# Patient Record
Sex: MALE | Race: BLACK OR AFRICAN AMERICAN | NOT HISPANIC OR LATINO | Employment: OTHER | URBAN - METROPOLITAN AREA
[De-identification: names, ages, dates, MRNs, and addresses within clinical notes are randomized per-mention and may not be internally consistent; named-entity substitution may affect disease eponyms.]

---

## 2017-01-10 RX ORDER — RAMIPRIL 2.5 MG/1
CAPSULE ORAL
Qty: 30 CAPSULE | Refills: 1 | Status: SHIPPED | OUTPATIENT
Start: 2017-01-10 | End: 2017-03-13 | Stop reason: DRUGHIGH

## 2017-02-05 DIAGNOSIS — I10 HTN (HYPERTENSION), BENIGN: ICD-10-CM

## 2017-02-05 RX ORDER — NIFEDIPINE 60 MG/1
TABLET, FILM COATED, EXTENDED RELEASE ORAL
Qty: 30 TABLET | Refills: 2 | Status: SHIPPED | OUTPATIENT
Start: 2017-02-05 | End: 2017-05-22 | Stop reason: SDUPTHER

## 2017-02-08 ENCOUNTER — OFFICE VISIT (OUTPATIENT)
Dept: PAIN MEDICINE | Facility: CLINIC | Age: 72
End: 2017-02-08

## 2017-02-08 VITALS
RESPIRATION RATE: 16 BRPM | WEIGHT: 315 LBS | HEIGHT: 74 IN | BODY MASS INDEX: 40.43 KG/M2 | SYSTOLIC BLOOD PRESSURE: 190 MMHG | TEMPERATURE: 97.8 F | OXYGEN SATURATION: 97 % | DIASTOLIC BLOOD PRESSURE: 92 MMHG | HEART RATE: 71 BPM

## 2017-02-08 DIAGNOSIS — M47.816 SPONDYLOSIS OF LUMBAR REGION WITHOUT MYELOPATHY OR RADICULOPATHY: Primary | ICD-10-CM

## 2017-02-08 PROCEDURE — 99213 OFFICE O/P EST LOW 20 MIN: CPT | Performed by: ANESTHESIOLOGY

## 2017-02-08 RX ORDER — TAMSULOSIN HYDROCHLORIDE 0.4 MG/1
1 CAPSULE ORAL 2 TIMES DAILY
COMMUNITY

## 2017-02-08 NOTE — PROGRESS NOTES
CHIEF COMPLAINT  Since 3/16/16 office visit,Pt states LBP bilaterally has significantly over the last 2 weeks,with episodic cramping in bilateral thighs and calfs at night,2-3x monthly.    Subjective   James Loaiza is a 71 y.o. male  who presents to the office for follow-up.He has a history of an axial type low back pain.    He states that his previous RF procedure was therapeutically successful when performed at [facility].         Back Pain   This is a recurrent problem. The current episode started more than 1 month ago. The problem occurs constantly. The problem has been gradually worsening since onset. The pain is present in the lumbar spine. The quality of the pain is described as aching. The pain does not radiate. The pain is moderate. The symptoms are aggravated by standing and twisting. Pertinent negatives include no chest pain, dysuria or numbness. The treatment provided moderate relief.        PEG Assessment   What number best describes your pain on average in the past week?3  What number best describes how, during the past week, pain has interfered with your enjoyment of life?4  What number best describes how, during the past week, pain has interfered with your general activity?  6      The following portions of the patient's history were reviewed and updated as appropriate: allergies, current medications, past family history, past medical history, past social history, past surgical history and problem list.    Review of Systems   Constitutional: Negative for activity change and appetite change.   Respiratory: Positive for apnea. Negative for chest tightness and shortness of breath.    Cardiovascular: Negative for chest pain.   Gastrointestinal: Negative for constipation, diarrhea and nausea.   Genitourinary: Negative for difficulty urinating and dysuria.   Musculoskeletal: Positive for back pain.   Neurological: Negative for dizziness, light-headedness and numbness.  "  Psychiatric/Behavioral: Positive for sleep disturbance. Negative for suicidal ideas.                             Vitals:    02/08/17 1552   BP: (!) 190/92   Pulse: 71   Resp: 16   Temp: 97.8 °F (36.6 °C)   SpO2: 97%   Weight: (!) 340 lb (154 kg)   Height: 74\" (188 cm)   PainSc: 2  Comment: LBP bilaterally ranges from 2-8/10   PainLoc: Back         Objective   Physical Exam   Constitutional: He is oriented to person, place, and time. Vital signs are normal. He appears well-developed and well-nourished. No distress.   HENT:   Head: Normocephalic and atraumatic.   Right Ear: Hearing normal.   Eyes: Conjunctivae and EOM are normal. Pupils are equal, round, and reactive to light.   Neck: No spinous process tenderness and no muscular tenderness present. Normal range of motion present.   Pulmonary/Chest: Effort normal. No respiratory distress.   Abdominal: Normal appearance.   Musculoskeletal:        Lumbar back: He exhibits decreased range of motion and tenderness. He exhibits no bony tenderness and no spasm.   He exhibitis pain on extension past 5 deg.  Pain on flexion but extension is more painful.  Rotation less than full.  Lumbar loading quite positive.  No pain on palp of spinous processes and also SI joints.  Herman equivocal.     Lymphadenopathy:     He has no cervical adenopathy.   Neurological: He is alert and oriented to person, place, and time. He has normal strength. No cranial nerve deficit or sensory deficit. Gait normal.   Reflex Scores:       Patellar reflexes are 2+ on the right side and 2+ on the left side.       Achilles reflexes are 2+ on the right side and 2+ on the left side.  SLR negative bilaterally   Psychiatric: He has a normal mood and affect. His speech is not rapid and/or pressured. He is not agitated.   Nursing note and vitals reviewed.          Assessment/Plan   James was seen today for back pain.    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  --- Bilateral L35 Lumbar medial branch blockade, x1    -- Reviewed the procedure at length with the patient.  Included in the review was expectations, complications, risk and benefits.The procedure was described in detail and the risks, benefits and alternatives were discussed with the patient (including but not limited to: bleeding, infection, nerve damage, worsening of pain, inability to perform injection, paralysis, seizures, and death) who agreed to proceed.  Discussed the potential for sedation if warranted/wanted.  Questions were answered and in a way the patient could understand.  Patient verbalized understanding and wishes to proceed.  This intervention will be ordered.    -- This procedure will be both diagnostic and potentially therapeutic.               DILAN REPORT      DILAN report has been reviewed and scanned into the patient's chart.    Date of last DILAN : 2-5-17    No current use of controlled substances.      EMR Dragon/Transcription disclaimer:   Much of this encounter note is an electronic transcription/translation of spoken language to printed text. The electronic translation of spoken language may permit erroneous, or at times, nonsensical words or phrases to be inadvertently transcribed; Although I have reviewed the note for such errors, some may still exist.

## 2017-02-14 ENCOUNTER — OUTSIDE FACILITY SERVICE (OUTPATIENT)
Dept: PAIN MEDICINE | Facility: CLINIC | Age: 72
End: 2017-02-14

## 2017-02-14 PROCEDURE — 64493 INJ PARAVERT F JNT L/S 1 LEV: CPT | Performed by: ANESTHESIOLOGY

## 2017-02-14 PROCEDURE — 64494 INJ PARAVERT F JNT L/S 2 LEV: CPT | Performed by: ANESTHESIOLOGY

## 2017-03-01 ENCOUNTER — OFFICE VISIT (OUTPATIENT)
Dept: PAIN MEDICINE | Facility: CLINIC | Age: 72
End: 2017-03-01

## 2017-03-01 VITALS
OXYGEN SATURATION: 94 % | HEART RATE: 68 BPM | HEIGHT: 74 IN | RESPIRATION RATE: 18 BRPM | DIASTOLIC BLOOD PRESSURE: 105 MMHG | TEMPERATURE: 98.7 F | WEIGHT: 315 LBS | BODY MASS INDEX: 40.43 KG/M2 | SYSTOLIC BLOOD PRESSURE: 196 MMHG

## 2017-03-01 DIAGNOSIS — M54.5 CHRONIC LOW BACK PAIN, UNSPECIFIED BACK PAIN LATERALITY, WITH SCIATICA PRESENCE UNSPECIFIED: Primary | ICD-10-CM

## 2017-03-01 DIAGNOSIS — G89.29 CHRONIC LOW BACK PAIN, UNSPECIFIED BACK PAIN LATERALITY, WITH SCIATICA PRESENCE UNSPECIFIED: Primary | ICD-10-CM

## 2017-03-01 DIAGNOSIS — M43.06 LUMBAR SPONDYLOLYSIS: ICD-10-CM

## 2017-03-01 PROBLEM — M54.50 CHRONIC LOW BACK PAIN: Status: ACTIVE | Noted: 2017-03-01

## 2017-03-01 PROCEDURE — 99212 OFFICE O/P EST SF 10 MIN: CPT | Performed by: NURSE PRACTITIONER

## 2017-03-01 NOTE — PROGRESS NOTES
CHIEF COMPLAINT  Back Pain, patient states that the pain has decreased since his last office visit.      Subjective   James Loaiza is a 71 y.o. male  who presents to the office for follow-up of procedure.  He completed a LMBB bilateral L3-L5   on  2/14/17 performed by Dr. Tineo for management of low back pain. Patient reports 70% ongoing relief from the procedure.     He also has a history of gout and is having pain in his right foot from this.    Complains of pain in his low back. Today his pain is 2/10VAS. His pain is not nearly as continuous. It has become more intermittent.     Has had low back pain since 2014. He was seen at [facility] in spring 2015. Had lumbar facets and eventually had lumbar RFA with positive results. He had significant relief for approximately 1 year.     Denies any dizziness, vision changes or headache today in office.     Back Pain   This is a recurrent problem. The current episode started more than 1 month ago. The pain is present in the lumbar spine. The quality of the pain is described as aching. The pain does not radiate. The pain is at a severity of 2/10. The symptoms are aggravated by standing and twisting. Pertinent negatives include no chest pain, dysuria, fever, headaches, numbness or weakness. The treatment provided moderate relief.      PEG Assessment   What number best describes your pain on average in the past week?2  What number best describes how, during the past week, pain has interfered with your enjoyment of life?3  What number best describes how, during the past week, pain has interfered with your general activity?  2    The following portions of the patient's history were reviewed and updated as appropriate: allergies, current medications, past family history, past medical history, past social history, past surgical history and problem list.    Review of Systems   Constitutional: Negative for chills and fever.   Respiratory: Negative for shortness of breath.   "  Cardiovascular: Negative for chest pain.   Gastrointestinal: Negative for constipation, diarrhea, nausea and vomiting.   Genitourinary: Negative for difficulty urinating and dysuria.   Musculoskeletal: Positive for back pain.   Neurological: Negative for dizziness, weakness, light-headedness, numbness and headaches.   Psychiatric/Behavioral: Negative for confusion, hallucinations, self-injury, sleep disturbance and suicidal ideas. The patient is not nervous/anxious.        Vitals:    03/01/17 1502   BP: (!) 196/105   Pulse: 68   Resp: 18   Temp: 98.7 °F (37.1 °C)   SpO2: 94%   Weight: (!) 336 lb (152 kg)   Height: 74\" (188 cm)   PainSc:   2   PainLoc: Back     Repeat manual BP-162/100 LUE    Objective   Physical Exam   Constitutional: He is oriented to person, place, and time. Vital signs are normal. He appears well-developed and well-nourished. He is cooperative.   HENT:   Head: Normocephalic and atraumatic.   Nose: Nose normal.   Eyes: Conjunctivae and lids are normal.   Cardiovascular: Normal rate, regular rhythm and normal heart sounds.    Pulmonary/Chest: Effort normal and breath sounds normal. No respiratory distress.   Abdominal: Normal appearance.   Musculoskeletal:        Lumbar back: He exhibits tenderness and pain. He exhibits no bony tenderness.   Minimal tenderness of bilateral L3-L5 facets    Negative SLR bilaterally    Neurological: He is alert and oriented to person, place, and time. Gait (slow ambulation) abnormal.   Reflex Scores:       Patellar reflexes are 1+ on the right side and 1+ on the left side.  Skin: Skin is warm, dry and intact.   Psychiatric: He has a normal mood and affect. His speech is normal and behavior is normal. Judgment and thought content normal. Cognition and memory are normal.   Nursing note and vitals reviewed.          Assessment/Plan   James was seen today for back pain.    Diagnoses and all orders for this visit:    Chronic low back pain, unspecified back pain " laterality, with sciatica presence unspecified    Lumbar spondylolysis        --- Encouraged patient to follow-up with PCP in regards to elevated BP.  --- --- The previous MBB is showing some preliminary evidence of therapeutic success.  Will bring in for follow up in 4 weeks, if therapeutic benefit from medial branch block (MBB) is ongoing at that time we can repeat the blocks on an as needed basis.  We once again discussed the diagnostic nature of MBB and the role of MBB in determining likelihood of therapeutic success of RF Lesioning.  If the therapeutic effects have started to wane at that time will seek authorization for radiofrequency lesioning (RFL) of the same levels; also the patient will call if pain begins to re-emerge before follow up and the plan will be to move forward with RFL.  We discussed risks of RFL procedure including but not limited to bleeding & infection & neural injury & postprocedural pain & risk of a lack of pain relief.  Patient verbalizes informed consent to proceed with RFL if needed.    --- Can order RFA if pain increases prior to 6 week follow-up.   --- Follow-up 6 weeks or sooner if needed.         DILAN REPORT    DILAN report has been reviewed and scanned into the patient's chart.    Date of last DILAN : 2-27-17          EMR Dragon/Transcription disclaimer:   Much of this encounter note is an electronic transcription/translation of spoken language to printed text. The electronic translation of spoken language may permit erroneous, or at times, nonsensical words or phrases to be inadvertently transcribed; Although I have reviewed the note for such errors, some may still exist.

## 2017-03-06 ENCOUNTER — TELEPHONE (OUTPATIENT)
Dept: PAIN MEDICINE | Facility: CLINIC | Age: 72
End: 2017-03-06

## 2017-03-06 DIAGNOSIS — M43.06 LUMBAR SPONDYLOLYSIS: Primary | ICD-10-CM

## 2017-03-09 ENCOUNTER — OUTSIDE FACILITY SERVICE (OUTPATIENT)
Dept: PAIN MEDICINE | Facility: CLINIC | Age: 72
End: 2017-03-09

## 2017-03-09 PROCEDURE — 99153 MOD SED SAME PHYS/QHP EA: CPT | Performed by: ANESTHESIOLOGY

## 2017-03-09 PROCEDURE — 64636 DESTROY L/S FACET JNT ADDL: CPT | Performed by: ANESTHESIOLOGY

## 2017-03-09 PROCEDURE — 64635 DESTROY LUMB/SAC FACET JNT: CPT | Performed by: ANESTHESIOLOGY

## 2017-03-09 PROCEDURE — 99152 MOD SED SAME PHYS/QHP 5/>YRS: CPT | Performed by: ANESTHESIOLOGY

## 2017-03-13 ENCOUNTER — OFFICE VISIT (OUTPATIENT)
Dept: FAMILY MEDICINE CLINIC | Facility: CLINIC | Age: 72
End: 2017-03-13

## 2017-03-13 VITALS
HEART RATE: 68 BPM | WEIGHT: 315 LBS | SYSTOLIC BLOOD PRESSURE: 150 MMHG | OXYGEN SATURATION: 96 % | BODY MASS INDEX: 40.43 KG/M2 | TEMPERATURE: 97.7 F | HEIGHT: 74 IN | DIASTOLIC BLOOD PRESSURE: 80 MMHG

## 2017-03-13 DIAGNOSIS — N40.0 BENIGN NODULAR PROSTATIC HYPERPLASIA WITHOUT LOWER URINARY TRACT SYMPTOMS: ICD-10-CM

## 2017-03-13 DIAGNOSIS — Z79.899 MEDICATION MANAGEMENT: ICD-10-CM

## 2017-03-13 DIAGNOSIS — R35.0 URINARY FREQUENCY: ICD-10-CM

## 2017-03-13 DIAGNOSIS — G44.031 INTRACTABLE EPISODIC PAROXYSMAL HEMICRANIA: ICD-10-CM

## 2017-03-13 DIAGNOSIS — R53.82 CHRONIC FATIGUE: Primary | ICD-10-CM

## 2017-03-13 DIAGNOSIS — I10 BENIGN ESSENTIAL HYPERTENSION: ICD-10-CM

## 2017-03-13 DIAGNOSIS — I10 HTN (HYPERTENSION), BENIGN: ICD-10-CM

## 2017-03-13 DIAGNOSIS — E55.9 VITAMIN D DEFICIENCY DISEASE: ICD-10-CM

## 2017-03-13 DIAGNOSIS — Z79.899 ENCOUNTER FOR LONG-TERM (CURRENT) USE OF MEDICATIONS: ICD-10-CM

## 2017-03-13 DIAGNOSIS — N18.2 CHRONIC KIDNEY DISEASE, STAGE 2 (MILD): ICD-10-CM

## 2017-03-13 DIAGNOSIS — E78.2 MIXED HYPERLIPIDEMIA: ICD-10-CM

## 2017-03-13 DIAGNOSIS — Z00.00 PREVENTATIVE HEALTH CARE: ICD-10-CM

## 2017-03-13 PROBLEM — R51.9 HEADACHE: Status: ACTIVE | Noted: 2017-03-13

## 2017-03-13 LAB
BILIRUB BLD-MCNC: NEGATIVE MG/DL
CLARITY, POC: CLEAR
COLOR UR: YELLOW
GLUCOSE UR STRIP-MCNC: NEGATIVE MG/DL
KETONES UR QL: NEGATIVE
LEUKOCYTE EST, POC: NEGATIVE
NITRITE UR-MCNC: NEGATIVE MG/ML
PH UR: 6 [PH] (ref 5–8)
PROT UR STRIP-MCNC: ABNORMAL MG/DL
RBC # UR STRIP: ABNORMAL /UL
SP GR UR: 1.03 (ref 1–1.03)
UROBILINOGEN UR QL: NORMAL

## 2017-03-13 PROCEDURE — 96372 THER/PROPH/DIAG INJ SC/IM: CPT | Performed by: INTERNAL MEDICINE

## 2017-03-13 PROCEDURE — 99214 OFFICE O/P EST MOD 30 MIN: CPT | Performed by: INTERNAL MEDICINE

## 2017-03-13 PROCEDURE — 81002 URINALYSIS NONAUTO W/O SCOPE: CPT | Performed by: INTERNAL MEDICINE

## 2017-03-13 RX ORDER — RAMIPRIL 5 MG/1
5 CAPSULE ORAL DAILY
Qty: 90 CAPSULE | Refills: 1 | Status: SHIPPED | OUTPATIENT
Start: 2017-03-13 | End: 2017-10-14 | Stop reason: SDUPTHER

## 2017-03-13 RX ORDER — KETOROLAC TROMETHAMINE 30 MG/ML
60 INJECTION, SOLUTION INTRAMUSCULAR; INTRAVENOUS ONCE
Status: COMPLETED | OUTPATIENT
Start: 2017-03-13 | End: 2017-03-13

## 2017-03-13 RX ADMIN — KETOROLAC TROMETHAMINE 60 MG: 30 INJECTION, SOLUTION INTRAMUSCULAR; INTRAVENOUS at 14:44

## 2017-03-13 NOTE — PROGRESS NOTES
James Loaiza is a 71 y.o. male   Chief Complaint   Patient presents with   • Hypertension     c/o b/p running high x 1 month        Subjective   History of Present Illness     James Loaiza is a 71 y.o.male who presents with:hypertension and headache for follow up.  Went to bathroom multiple times last night and now starting again.  Headache severe at times.  Taking ibuprofen and no relief.    Off Xarellto at this point.  No other acute changes.  No other acute changes at present time. No other acute changes at present time    Thursday night headache lasted through the night.  Seems to have headache 2-3 times per week.  Saw Tineo x 3 and each time BP was rather high.  BP is running 148/88.  Meds just not working as well as they did in the past.  Pressure in head feels very severe to patient.  Focuses in back of the neck.    The following portions of the patient's history were reviewed and updated as appropriate: past medical history, surgeries, family history, allergies, current medications, past social history and problem list.    A comprehensive review of 14 systems was peformed  Review of Systems   Constitutional: Negative.  Negative for chills, fatigue, fever and unexpected weight change.   HENT: Negative for ear pain, hearing loss, sinus pressure, sore throat and tinnitus.    Eyes: Negative.  Negative for pain, discharge and redness.   Respiratory: Negative.  Negative for cough, shortness of breath and wheezing.    Cardiovascular: Negative.  Negative for chest pain, palpitations and leg swelling.   Gastrointestinal: Negative.  Negative for abdominal pain, constipation, diarrhea and nausea.   Endocrine: Negative.  Negative for cold intolerance and heat intolerance.   Genitourinary: Positive for frequency. Negative for difficulty urinating, flank pain and urgency.   Musculoskeletal: Negative.  Negative for back pain, joint swelling and myalgias.   Skin: Negative.  Negative for rash and wound.  "  Allergic/Immunologic: Negative.  Negative for environmental allergies and food allergies.   Neurological: Positive for headaches. Negative for dizziness, seizures and numbness.   Hematological: Negative.  Negative for adenopathy. Does not bruise/bleed easily.   Psychiatric/Behavioral: Negative.  Negative for decreased concentration, dysphoric mood and sleep disturbance. The patient is not nervous/anxious.    All other systems reviewed and are negative.      I have reviewed the patient's medical history in detail and updated the computerized patient record.    Objective   Vitals:    03/13/17 1353   BP: 150/80   BP Location: Right arm   Patient Position: Sitting   Cuff Size: Large Adult   Pulse: 68   Temp: 97.7 °F (36.5 °C)   TempSrc: Oral   SpO2: 96%   Weight: (!) 338 lb 9.6 oz (154 kg)   Height: 74\" (188 cm)   PainSc: 4  Comment: headache started this morning 5 am has headaches in evening x 1 month   PainLoc: Head         Physical Exam   Constitutional: He is oriented to person, place, and time. He appears well-developed and well-nourished. He is active and cooperative.  Non-toxic appearance. No distress.   HENT:   Head: Normocephalic and atraumatic. Hair is normal.   Right Ear: Hearing, tympanic membrane, external ear and ear canal normal. No drainage.   Left Ear: Hearing, tympanic membrane, external ear and ear canal normal. No drainage.   Nose: Nose normal. No rhinorrhea, nasal deformity or septal deviation.   Mouth/Throat: Oropharynx is clear and moist.   Eyes: Conjunctivae, EOM and lids are normal. Pupils are equal, round, and reactive to light. Right eye exhibits no exudate. Left eye exhibits no exudate. Right pupil is round and reactive. Left pupil is round and reactive.   Neck: Trachea normal and normal range of motion. Neck supple. Normal carotid pulses, no hepatojugular reflux and no JVD present. Carotid bruit is not present. No tracheal deviation, no edema and normal range of motion present. No thyroid " mass and no thyromegaly present.   Cardiovascular: Normal rate, regular rhythm, normal heart sounds, intact distal pulses and normal pulses.   No extrasystoles are present. PMI is not displaced.    Pulmonary/Chest: Effort normal and breath sounds normal. No accessory muscle usage. No tachypnea. No respiratory distress.   Abdominal: Soft. Normal appearance, normal aorta and bowel sounds are normal. He exhibits no abdominal bruit. There is no hepatosplenomegaly. There is no tenderness. Hernia confirmed negative in the right inguinal area and confirmed negative in the left inguinal area.   Bowel sounds present and normal in all four quadrants   Musculoskeletal: Normal range of motion.   Lymphadenopathy: No inguinal adenopathy noted on the right or left side.   Neurological: He is alert and oriented to person, place, and time. He has normal strength and normal reflexes. No cranial nerve deficit or sensory deficit. He displays a negative Romberg sign.   No specific changes  Headache   Skin: Skin is warm, dry and intact. He is not diaphoretic. No pallor.   Psychiatric: He has a normal mood and affect. His speech is normal and behavior is normal. Judgment and thought content normal. Cognition and memory are normal.   Nursing note and vitals reviewed.      Procedures    Ay already have appointmednt                        Assessment/Plan     Diagnoses and all orders for this visit:    Chronic fatigue  Comments:  Check labs  Orders:  -     CBC & Differential  -     Comprehensive Metabolic Panel  -     Lipid Panel With LDL / HDL Ratio  -     Thyroid Panel With TSH  -     Vitamin D 25 Hydroxy  -     PSA  -     Ambulatory Referral to Urology    HTN (hypertension), benign  Comments:  Increase Altace to 5 daily  Avoid salt  Continue weight loss efforts  Orders:  -     PSA  -     Ambulatory Referral to Urology    Intractable episodic paroxysmal hemicrania  Comments:  Refer for Ct Scan  Other acute chAnges at present  Orders:  -      CT Head Without Contrast; Future    Vitamin D deficiency disease  Comments:  Check level again  Follow up as planned  Orders:  -     CBC & Differential  -     Comprehensive Metabolic Panel  -     Lipid Panel With LDL / HDL Ratio  -     Thyroid Panel With TSH  -     Vitamin D 25 Hydroxy  -     PSA  -     Ambulatory Referral to Urology    Mixed hyperlipidemia  Comments:  Continue same meds  Encouraged weight loss  Orders:  -     CBC & Differential  -     Comprehensive Metabolic Panel  -     Lipid Panel With LDL / HDL Ratio  -     Thyroid Panel With TSH  -     Vitamin D 25 Hydroxy  -     PSA  -     Ambulatory Referral to Urology    Chronic kidney disease, stage 2 (mild)  Comments:  Check labs today  Protein noted iin urine  Orders:  -     PSA  -     Ambulatory Referral to Urology    Benign nodular prostatic hyperplasia without lower urinary tract symptoms  Comments:  PSA  Refer to urology  Orders:  -     PSA  -     Ambulatory Referral to Urology    Urinary frequency  Comments:  refer to Dr. Castro in urology  Orders:  -     POCT urinalysis dipstick, manual  -     PSA  -     Ambulatory Referral to Urology    Medication management  -     CBC & Differential  -     Comprehensive Metabolic Panel  -     Lipid Panel With LDL / HDL Ratio  -     Thyroid Panel With TSH  -     Vitamin D 25 Hydroxy  -     PSA  -     Ambulatory Referral to Urology    Encounter for long-term (current) use of medications  -     CBC & Differential  -     Comprehensive Metabolic Panel  -     Lipid Panel With LDL / HDL Ratio  -     Thyroid Panel With TSH  -     Vitamin D 25 Hydroxy  -     PSA  -     Ambulatory Referral to Urology    Preventative health care  -     CBC & Differential  -     Comprehensive Metabolic Panel  -     Lipid Panel With LDL / HDL Ratio  -     Thyroid Panel With TSH  -     Vitamin D 25 Hydroxy  -     PSA  -     Ambulatory Referral to Urology    Benign essential hypertension (Ijeoma 1999)  -     PSA  -     Ambulatory Referral to  Urology    Other orders  -     ramipril (ALTACE) 5 MG capsule; Take 1 capsule by mouth Daily.           Franky Reeves MD  3/13/2017  2:01 PM

## 2017-03-13 NOTE — PATIENT INSTRUCTIONS
Diagnoses and all orders for this visit:    Chronic fatigue  Comments:  Check labs  Orders:  -     CBC & Differential  -     Comprehensive Metabolic Panel  -     Lipid Panel With LDL / HDL Ratio  -     Thyroid Panel With TSH  -     Vitamin D 25 Hydroxy  -     PSA  -     Ambulatory Referral to Urology    HTN (hypertension), benign  Comments:  Increase Altace to 5 daily  Avoid salt  Continue weight loss efforts  Orders:  -     PSA  -     Ambulatory Referral to Urology    Intractable episodic paroxysmal hemicrania  Comments:  Refer for Ct Scan  Other acute chAnges at present  Orders:  -     CT Head Without Contrast; Future    Vitamin D deficiency disease  Comments:  Check level again  Follow up as planned  Orders:  -     CBC & Differential  -     Comprehensive Metabolic Panel  -     Lipid Panel With LDL / HDL Ratio  -     Thyroid Panel With TSH  -     Vitamin D 25 Hydroxy  -     PSA  -     Ambulatory Referral to Urology    Mixed hyperlipidemia  Comments:  Continue same meds  Encouraged weight loss  Orders:  -     CBC & Differential  -     Comprehensive Metabolic Panel  -     Lipid Panel With LDL / HDL Ratio  -     Thyroid Panel With TSH  -     Vitamin D 25 Hydroxy  -     PSA  -     Ambulatory Referral to Urology    Chronic kidney disease, stage 2 (mild)  Comments:  Check labs today  Protein noted iin urine  Orders:  -     PSA  -     Ambulatory Referral to Urology    Benign nodular prostatic hyperplasia without lower urinary tract symptoms  Comments:  PSA  Refer to urology  Orders:  -     PSA  -     Ambulatory Referral to Urology    Urinary frequency  Comments:  refer to Dr. Castro in urology  Orders:  -     POCT urinalysis dipstick, manual  -     PSA  -     Ambulatory Referral to Urology    Medication management  -     CBC & Differential  -     Comprehensive Metabolic Panel  -     Lipid Panel With LDL / HDL Ratio  -     Thyroid Panel With TSH  -     Vitamin D 25 Hydroxy  -     PSA  -     Ambulatory Referral to  Urology    Encounter for long-term (current) use of medications  -     CBC & Differential  -     Comprehensive Metabolic Panel  -     Lipid Panel With LDL / HDL Ratio  -     Thyroid Panel With TSH  -     Vitamin D 25 Hydroxy  -     PSA  -     Ambulatory Referral to Urology    Preventative health care  -     CBC & Differential  -     Comprehensive Metabolic Panel  -     Lipid Panel With LDL / HDL Ratio  -     Thyroid Panel With TSH  -     Vitamin D 25 Hydroxy  -     PSA  -     Ambulatory Referral to Urology    Benign essential hypertension (Ijeoma 1999)  -     PSA  -     Ambulatory Referral to Urology    Other orders  -     ramipril (ALTACE) 5 MG capsule; Take 1 capsule by mouth Daily.

## 2017-03-14 LAB
25(OH)D3+25(OH)D2 SERPL-MCNC: 12.8 NG/ML (ref 30–100)
ALBUMIN SERPL-MCNC: 4.3 G/DL (ref 3.5–5.2)
ALBUMIN/GLOB SERPL: 1.6 G/DL
ALP SERPL-CCNC: 161 U/L (ref 39–117)
ALT SERPL-CCNC: 12 U/L (ref 1–41)
AST SERPL-CCNC: 13 U/L (ref 1–40)
BASOPHILS # BLD AUTO: 0.02 10*3/MM3 (ref 0–0.2)
BASOPHILS NFR BLD AUTO: 0.2 % (ref 0–1.5)
BILIRUB SERPL-MCNC: 0.6 MG/DL (ref 0.1–1.2)
BUN SERPL-MCNC: 18 MG/DL (ref 8–23)
BUN/CREAT SERPL: 11.3 (ref 7–25)
CALCIUM SERPL-MCNC: 9.3 MG/DL (ref 8.6–10.5)
CHLORIDE SERPL-SCNC: 100 MMOL/L (ref 98–107)
CHOLEST SERPL-MCNC: 165 MG/DL (ref 0–200)
CO2 SERPL-SCNC: 25.2 MMOL/L (ref 22–29)
CREAT SERPL-MCNC: 1.59 MG/DL (ref 0.76–1.27)
EOSINOPHIL # BLD AUTO: 0.11 10*3/MM3 (ref 0–0.7)
EOSINOPHIL NFR BLD AUTO: 1.1 % (ref 0.3–6.2)
ERYTHROCYTE [DISTWIDTH] IN BLOOD BY AUTOMATED COUNT: 13.3 % (ref 11.5–14.5)
FT4I SERPL CALC-MCNC: 1.5 (ref 1.2–4.9)
GLOBULIN SER CALC-MCNC: 2.7 GM/DL
GLUCOSE SERPL-MCNC: 80 MG/DL (ref 65–99)
HCT VFR BLD AUTO: 48.8 % (ref 40.4–52.2)
HDLC SERPL-MCNC: 35 MG/DL (ref 40–60)
HGB BLD-MCNC: 15.8 G/DL (ref 13.7–17.6)
IMM GRANULOCYTES # BLD: 0.03 10*3/MM3 (ref 0–0.03)
IMM GRANULOCYTES NFR BLD: 0.3 % (ref 0–0.5)
LDLC SERPL CALC-MCNC: 82 MG/DL (ref 0–100)
LDLC/HDLC SERPL: 2.34 {RATIO}
LYMPHOCYTES # BLD AUTO: 1.35 10*3/MM3 (ref 0.9–4.8)
LYMPHOCYTES NFR BLD AUTO: 13.4 % (ref 19.6–45.3)
MCH RBC QN AUTO: 31.5 PG (ref 27–32.7)
MCHC RBC AUTO-ENTMCNC: 32.4 G/DL (ref 32.6–36.4)
MCV RBC AUTO: 97.4 FL (ref 79.8–96.2)
MONOCYTES # BLD AUTO: 1.03 10*3/MM3 (ref 0.2–1.2)
MONOCYTES NFR BLD AUTO: 10.2 % (ref 5–12)
NEUTROPHILS # BLD AUTO: 7.57 10*3/MM3 (ref 1.9–8.1)
NEUTROPHILS NFR BLD AUTO: 74.8 % (ref 42.7–76)
PLATELET # BLD AUTO: 213 10*3/MM3 (ref 140–500)
POTASSIUM SERPL-SCNC: 4.1 MMOL/L (ref 3.5–5.2)
PROT SERPL-MCNC: 7 G/DL (ref 6–8.5)
PSA SERPL-MCNC: 0.92 NG/ML (ref 0–4)
RBC # BLD AUTO: 5.01 10*6/MM3 (ref 4.6–6)
SODIUM SERPL-SCNC: 143 MMOL/L (ref 136–145)
T3RU NFR SERPL: 29 % (ref 24–39)
T4 SERPL-MCNC: 5.3 UG/DL (ref 4.5–12)
TRIGL SERPL-MCNC: 240 MG/DL (ref 0–150)
TSH SERPL DL<=0.005 MIU/L-ACNC: 1.14 UIU/ML (ref 0.45–4.5)
VLDLC SERPL CALC-MCNC: 48 MG/DL (ref 5–40)
WBC # BLD AUTO: 10.11 10*3/MM3 (ref 4.5–10.7)

## 2017-03-22 ENCOUNTER — HOSPITAL ENCOUNTER (OUTPATIENT)
Dept: CT IMAGING | Facility: HOSPITAL | Age: 72
Discharge: HOME | End: 2017-03-22
Attending: INTERNAL MEDICINE | Admitting: INTERNAL MEDICINE

## 2017-03-22 DIAGNOSIS — G44.031 INTRACTABLE EPISODIC PAROXYSMAL HEMICRANIA: ICD-10-CM

## 2017-03-22 PROCEDURE — 70450 CT HEAD/BRAIN W/O DYE: CPT

## 2017-04-12 ENCOUNTER — OFFICE VISIT (OUTPATIENT)
Dept: PAIN MEDICINE | Facility: CLINIC | Age: 72
End: 2017-04-12

## 2017-04-12 VITALS
HEIGHT: 74 IN | DIASTOLIC BLOOD PRESSURE: 86 MMHG | OXYGEN SATURATION: 95 % | RESPIRATION RATE: 18 BRPM | HEART RATE: 65 BPM | BODY MASS INDEX: 40.43 KG/M2 | TEMPERATURE: 97.8 F | SYSTOLIC BLOOD PRESSURE: 143 MMHG | WEIGHT: 315 LBS

## 2017-04-12 DIAGNOSIS — M43.06 LUMBAR SPONDYLOLYSIS: Primary | ICD-10-CM

## 2017-04-12 DIAGNOSIS — G89.29 CHRONIC LOW BACK PAIN, UNSPECIFIED BACK PAIN LATERALITY, WITH SCIATICA PRESENCE UNSPECIFIED: ICD-10-CM

## 2017-04-12 DIAGNOSIS — M54.5 CHRONIC LOW BACK PAIN, UNSPECIFIED BACK PAIN LATERALITY, WITH SCIATICA PRESENCE UNSPECIFIED: ICD-10-CM

## 2017-04-12 PROCEDURE — 99212 OFFICE O/P EST SF 10 MIN: CPT | Performed by: NURSE PRACTITIONER

## 2017-04-12 NOTE — PROGRESS NOTES
CHIEF COMPLAINT  Follow-up for back pain.    Subjective   James Loaiza is a 71 y.o. male  who presents to the office for follow-up of procedure.  He completed a bilateral lumbar RF @ L3-5   on  3/9/17 performed by Dr. Tineo for management of back pain. Patient reports 30% ongoing relief from the procedure.     Complains of pain in his low back. Today his pain is 3/10VAS. Describes the pain as intermittent. He notices his pain is not as bad at night. He also notices that when it bothers him, it does not take as long to calm down. He notes no daytime pain but rather stiffness.  Pain increases with sleeping/rolling over; pain decreases with rest. ADLs by self.    Back Pain   This is a recurrent problem. The current episode started more than 1 month ago. The pain is present in the lumbar spine. The quality of the pain is described as aching. The pain does not radiate. The pain is at a severity of 3/10. The symptoms are aggravated by standing and twisting. Pertinent negatives include no bladder incontinence, bowel incontinence, chest pain, dysuria, fever, headaches, numbness or weakness. The treatment provided moderate relief.       PEG Assessment   What number best describes your pain on average in the past week?3  What number best describes how, during the past week, pain has interfered with your enjoyment of life?3  What number best describes how, during the past week, pain has interfered with your general activity?  3    The following portions of the patient's history were reviewed and updated as appropriate: allergies, current medications, past family history, past medical history, past social history, past surgical history and problem list.    Review of Systems   Constitutional: Positive for fatigue. Negative for fever.   HENT: Negative for congestion.    Eyes: Negative for visual disturbance.   Respiratory: Negative for cough, shortness of breath and wheezing.    Cardiovascular: Negative.  Negative for chest  "pain.   Gastrointestinal: Negative for bowel incontinence, constipation and diarrhea.   Genitourinary: Positive for frequency. Negative for bladder incontinence, difficulty urinating and dysuria.   Musculoskeletal: Positive for back pain.   Neurological: Negative for weakness, numbness and headaches.   Psychiatric/Behavioral: Positive for sleep disturbance. Negative for suicidal ideas. The patient is not nervous/anxious.        Vitals:    04/12/17 1305   BP: 143/86   Pulse: 65   Resp: 18   Temp: 97.8 °F (36.6 °C)   SpO2: 95%   Weight: (!) 340 lb (154 kg)   Height: 74\" (188 cm)   PainSc:   3   PainLoc: Back       Objective   Physical Exam   Constitutional: He is oriented to person, place, and time. Vital signs are normal. He appears well-developed and well-nourished. He is cooperative.   HENT:   Head: Normocephalic and atraumatic.   Nose: Nose normal.   Eyes: Conjunctivae and lids are normal.   Cardiovascular: Normal rate, regular rhythm and normal heart sounds.    Pulmonary/Chest: Effort normal and breath sounds normal. No respiratory distress.   Abdominal: Normal appearance.   Musculoskeletal:        Lumbar back: He exhibits tenderness, bony tenderness and pain.   Minimal tenderness of bilateral L3-L5 facets    Negative SLR bilaterally    Neurological: He is alert and oriented to person, place, and time. Gait (slow ambulation) abnormal.   Reflex Scores:       Patellar reflexes are 1+ on the right side and 1+ on the left side.  Skin: Skin is warm, dry and intact.   Psychiatric: He has a normal mood and affect. His speech is normal and behavior is normal. Judgment and thought content normal. Cognition and memory are normal.   Nursing note and vitals reviewed.          Assessment/Plan   James was seen today for back pain.    Diagnoses and all orders for this visit:    Lumbar spondylolysis    Chronic low back pain, unspecified back pain laterality, with sciatica presence unspecified      --- Continue with regimen. NO " changes at this time.  --- Reviewed expectations related to RFL/RFA, including how it can take 6-8 weeks to reach therapeutic effects from the procedure.  --- Follow-up 6 weeks or sooner if needed.         DILAN REPORT    DILAN report has been reviewed and scanned into the patient's chart.    Date of last DILAN : 4-10-17.          EMR Dragon/Transcription disclaimer:   Much of this encounter note is an electronic transcription/translation of spoken language to printed text. The electronic translation of spoken language may permit erroneous, or at times, nonsensical words or phrases to be inadvertently transcribed; Although I have reviewed the note for such errors, some may still exist.

## 2017-05-22 DIAGNOSIS — I10 HTN (HYPERTENSION), BENIGN: ICD-10-CM

## 2017-05-22 RX ORDER — NIFEDIPINE 60 MG/1
TABLET, EXTENDED RELEASE ORAL
Qty: 30 TABLET | Refills: 1 | Status: SHIPPED | OUTPATIENT
Start: 2017-05-22 | End: 2017-08-01 | Stop reason: SDUPTHER

## 2017-05-26 ENCOUNTER — OFFICE VISIT (OUTPATIENT)
Dept: PAIN MEDICINE | Facility: CLINIC | Age: 72
End: 2017-05-26

## 2017-05-26 VITALS
DIASTOLIC BLOOD PRESSURE: 77 MMHG | WEIGHT: 315 LBS | HEART RATE: 65 BPM | HEIGHT: 74 IN | SYSTOLIC BLOOD PRESSURE: 147 MMHG | TEMPERATURE: 97.9 F | OXYGEN SATURATION: 96 % | BODY MASS INDEX: 40.43 KG/M2 | RESPIRATION RATE: 18 BRPM

## 2017-05-26 DIAGNOSIS — M43.06 LUMBAR SPONDYLOLYSIS: ICD-10-CM

## 2017-05-26 DIAGNOSIS — M54.5 CHRONIC LOW BACK PAIN, UNSPECIFIED BACK PAIN LATERALITY, WITH SCIATICA PRESENCE UNSPECIFIED: Primary | ICD-10-CM

## 2017-05-26 DIAGNOSIS — D17.1 LIPOMA OF BACK: ICD-10-CM

## 2017-05-26 DIAGNOSIS — R22.2 MASS ON BACK: ICD-10-CM

## 2017-05-26 DIAGNOSIS — G89.29 CHRONIC LOW BACK PAIN, UNSPECIFIED BACK PAIN LATERALITY, WITH SCIATICA PRESENCE UNSPECIFIED: Primary | ICD-10-CM

## 2017-05-26 PROCEDURE — 99214 OFFICE O/P EST MOD 30 MIN: CPT | Performed by: NURSE PRACTITIONER

## 2017-06-05 RX ORDER — ATORVASTATIN CALCIUM 40 MG/1
TABLET, FILM COATED ORAL
Qty: 30 TABLET | Refills: 0 | Status: SHIPPED | OUTPATIENT
Start: 2017-06-05 | End: 2017-07-13 | Stop reason: SDUPTHER

## 2017-06-14 ENCOUNTER — OFFICE VISIT (OUTPATIENT)
Dept: FAMILY MEDICINE CLINIC | Facility: CLINIC | Age: 72
End: 2017-06-14

## 2017-06-14 VITALS
WEIGHT: 315 LBS | HEIGHT: 74 IN | TEMPERATURE: 97.7 F | BODY MASS INDEX: 40.43 KG/M2 | SYSTOLIC BLOOD PRESSURE: 138 MMHG | DIASTOLIC BLOOD PRESSURE: 76 MMHG

## 2017-06-14 DIAGNOSIS — E55.9 VITAMIN D DEFICIENCY DISEASE: ICD-10-CM

## 2017-06-14 DIAGNOSIS — E78.2 MIXED HYPERLIPIDEMIA: ICD-10-CM

## 2017-06-14 DIAGNOSIS — M54.5 CHRONIC LOW BACK PAIN, UNSPECIFIED BACK PAIN LATERALITY, WITH SCIATICA PRESENCE UNSPECIFIED: ICD-10-CM

## 2017-06-14 DIAGNOSIS — I10 BENIGN ESSENTIAL HYPERTENSION: Primary | ICD-10-CM

## 2017-06-14 DIAGNOSIS — N18.2 CHRONIC KIDNEY DISEASE, STAGE 2 (MILD): ICD-10-CM

## 2017-06-14 DIAGNOSIS — G89.29 CHRONIC LOW BACK PAIN, UNSPECIFIED BACK PAIN LATERALITY, WITH SCIATICA PRESENCE UNSPECIFIED: ICD-10-CM

## 2017-06-14 DIAGNOSIS — E29.1 HYPOGONADISM MALE: ICD-10-CM

## 2017-06-14 DIAGNOSIS — Z79.899 ENCOUNTER FOR LONG-TERM (CURRENT) USE OF MEDICATIONS: ICD-10-CM

## 2017-06-14 DIAGNOSIS — R53.82 CHRONIC FATIGUE: ICD-10-CM

## 2017-06-14 DIAGNOSIS — Z00.00 PREVENTATIVE HEALTH CARE: ICD-10-CM

## 2017-06-14 DIAGNOSIS — Z79.899 MEDICATION MANAGEMENT: ICD-10-CM

## 2017-06-14 PROCEDURE — 99214 OFFICE O/P EST MOD 30 MIN: CPT | Performed by: INTERNAL MEDICINE

## 2017-06-14 NOTE — PROGRESS NOTES
James Loaiza is a 71 y.o. male   Chief Complaint   Patient presents with   • Hypertension     follow up           Subjective   History of Present Illness     James Loaiza is a 71 y.o.male who presents with:increased lethargy and sleepiness despite using sleep apnea machine.  Testosterone still low.  Trying 18 pellets in his right hip to treat.  Radioablation not working well on his back.  First round went a lot better.  No other changes.  Now stable at present time.    Now stable at present time.  CKD stage 2, hypertension, chronic fatigue, hyperlipidemia, vit D deficiency    The following portions of the patient's history were reviewed and updated as appropriate: past medical history, surgeries, family history, allergies, current medications, past social history and problem list.    A comprehensive review of 14 systems was peformed  Review of Systems   Constitutional: Negative for chills, fatigue, fever and unexpected weight change.   HENT: Negative for ear pain, hearing loss, sinus pressure, sore throat and tinnitus.    Eyes: Negative for pain, discharge and redness.   Respiratory: Negative for cough, shortness of breath and wheezing.    Cardiovascular: Negative for chest pain, palpitations and leg swelling.   Gastrointestinal: Negative for abdominal pain, constipation, diarrhea and nausea.   Endocrine: Negative for cold intolerance and heat intolerance.   Genitourinary: Negative for difficulty urinating, flank pain and urgency.   Musculoskeletal: Negative for back pain, joint swelling and myalgias.   Skin: Negative for rash and wound.   Allergic/Immunologic: Negative for environmental allergies and food allergies.   Neurological: Negative for dizziness, seizures, numbness and headaches.   Hematological: Negative for adenopathy. Does not bruise/bleed easily.   Psychiatric/Behavioral: Negative for decreased concentration, dysphoric mood and sleep disturbance. The patient is not nervous/anxious.    All other  "systems reviewed and are negative.      I have reviewed the patient's medical history in detail and updated the computerized patient record.      Objective   Vitals:    06/14/17 1436   BP: 138/76   BP Location: Right arm   Patient Position: Sitting   Temp: 97.7 °F (36.5 °C)   TempSrc: Oral   Weight: (!) 333 lb 9.6 oz (151 kg)   Height: 74\" (188 cm)           Physical Exam   Constitutional: He is oriented to person, place, and time. He appears well-developed and well-nourished. He is active and cooperative.  Non-toxic appearance. No distress.   HENT:   Head: Normocephalic and atraumatic. Hair is normal.   Right Ear: Hearing, tympanic membrane, external ear and ear canal normal. No drainage.   Left Ear: Hearing, tympanic membrane, external ear and ear canal normal. No drainage.   Nose: Nose normal. No rhinorrhea, nasal deformity or septal deviation.   Mouth/Throat: Oropharynx is clear and moist.   Eyes: Conjunctivae, EOM and lids are normal. Pupils are equal, round, and reactive to light. Right eye exhibits no exudate. Left eye exhibits no exudate. Right pupil is round and reactive. Left pupil is round and reactive.   Neck: Trachea normal and normal range of motion. Neck supple. Normal carotid pulses, no hepatojugular reflux and no JVD present. Carotid bruit is not present. No tracheal deviation, no edema and normal range of motion present. No thyroid mass and no thyromegaly present.   Cardiovascular: Normal rate, regular rhythm, normal heart sounds, intact distal pulses and normal pulses.   No extrasystoles are present. PMI is not displaced.    Pulmonary/Chest: Effort normal and breath sounds normal. No accessory muscle usage. No tachypnea. No respiratory distress.   Abdominal: Soft. Normal appearance, normal aorta and bowel sounds are normal. He exhibits no abdominal bruit. There is no hepatosplenomegaly. There is no tenderness. Hernia confirmed negative in the right inguinal area and confirmed negative in the left " inguinal area.   Bowel sounds present and normal in all four quadrants   Musculoskeletal: Normal range of motion.   Lymphadenopathy: No inguinal adenopathy noted on the right or left side.   Neurological: He is alert and oriented to person, place, and time. He has normal strength and normal reflexes. No cranial nerve deficit or sensory deficit. He displays a negative Romberg sign.   Skin: Skin is warm, dry and intact. He is not diaphoretic. No pallor.   Psychiatric: He has a normal mood and affect. His speech is normal and behavior is normal. Judgment and thought content normal. Cognition and memory are normal.   Nursing note and vitals reviewed.      Procedures                            Assessment/Plan     Diagnoses and all orders for this visit:    Benign essential hypertension ([place]je[place] 1999)  Comments:  Reduce salt in diet  Exercise regularly  try to lose weight  Do not change meds  Orders:  -     CBC & Differential  -     Comprehensive Metabolic Panel  -     Lipid Panel With LDL / HDL Ratio  -     Thyroid Panel With TSH  -     Vitamin D 25 Hydroxy    Chronic kidney disease, stage 2 (mild)  Comments:  Continue to monitor carefully  Follow up if symptoms change or worsen  Orders:  -     CBC & Differential  -     Comprehensive Metabolic Panel  -     Lipid Panel With LDL / HDL Ratio  -     Thyroid Panel With TSH  -     Vitamin D 25 Hydroxy    Chronic low back pain, unspecified back pain laterality, with sciatica presence unspecified  Comments:  Monitor low back pain closely  Continue follow up with [facility]  Orders:  -     CBC & Differential  -     Comprehensive Metabolic Panel  -     Lipid Panel With LDL / HDL Ratio  -     Thyroid Panel With TSH  -     Vitamin D 25 Hydroxy    Chronic fatigue  Comments:  Follow up as planned.  Feels great as   Orders:  -     CBC & Differential  -     Comprehensive Metabolic Panel  -     Lipid Panel With LDL / HDL Ratio  -     Thyroid Panel With TSH  -     Vitamin D 25 Hydroxy    Mixed  hyperlipidemia  Comments:  Checked labs  Follow up as planned  Watch fats in the diet  Orders:  -     CBC & Differential  -     Comprehensive Metabolic Panel  -     Lipid Panel With LDL / HDL Ratio  -     Thyroid Panel With TSH  -     Vitamin D 25 Hydroxy    Hypogonadism male  Comments:  Dr. Castro follows    Vitamin D deficiency disease  Comments:  Now stable at present time  Doing well  Orders:  -     CBC & Differential  -     Comprehensive Metabolic Panel  -     Lipid Panel With LDL / HDL Ratio  -     Thyroid Panel With TSH  -     Vitamin D 25 Hydroxy    Preventative health care  -     CBC & Differential  -     Comprehensive Metabolic Panel  -     Lipid Panel With LDL / HDL Ratio  -     Thyroid Panel With TSH  -     Vitamin D 25 Hydroxy    Encounter for long-term (current) use of medications  -     CBC & Differential  -     Comprehensive Metabolic Panel  -     Lipid Panel With LDL / HDL Ratio  -     Thyroid Panel With TSH  -     Vitamin D 25 Hydroxy    Medication management  -     CBC & Differential  -     Comprehensive Metabolic Panel  -     Lipid Panel With LDL / HDL Ratio  -     Thyroid Panel With TSH  -     Vitamin D 25 Hydroxy           Franky Reeves MD  6/14/2017  2:40 PM

## 2017-06-14 NOTE — PATIENT INSTRUCTIONS
Diagnoses and all orders for this visit:    Benign essential hypertension (Ijeoma 1999)  Comments:  Reduce salt in diet  Exercise regularly  try to lose weight  Do not change meds  Orders:  -     CBC & Differential  -     Comprehensive Metabolic Panel  -     Lipid Panel With LDL / HDL Ratio  -     Thyroid Panel With TSH  -     Vitamin D 25 Hydroxy    Chronic kidney disease, stage 2 (mild)  Comments:  Continue to monitor carefully  Follow up if symptoms change or worsen  Orders:  -     CBC & Differential  -     Comprehensive Metabolic Panel  -     Lipid Panel With LDL / HDL Ratio  -     Thyroid Panel With TSH  -     Vitamin D 25 Hydroxy    Chronic low back pain, unspecified back pain laterality, with sciatica presence unspecified  Comments:  Monitor low back pain closely  Continue follow up with [facility]  Orders:  -     CBC & Differential  -     Comprehensive Metabolic Panel  -     Lipid Panel With LDL / HDL Ratio  -     Thyroid Panel With TSH  -     Vitamin D 25 Hydroxy    Chronic fatigue  Comments:  Follow up as planned.  Feels great as   Orders:  -     CBC & Differential  -     Comprehensive Metabolic Panel  -     Lipid Panel With LDL / HDL Ratio  -     Thyroid Panel With TSH  -     Vitamin D 25 Hydroxy    Mixed hyperlipidemia  Comments:  Checked labs  Follow up as planned  Watch fats in the diet  Orders:  -     CBC & Differential  -     Comprehensive Metabolic Panel  -     Lipid Panel With LDL / HDL Ratio  -     Thyroid Panel With TSH  -     Vitamin D 25 Hydroxy    Hypogonadism male  Comments:  Dr. Castro follows    Vitamin D deficiency disease  Comments:  Now stable at present time  Doing well  Orders:  -     CBC & Differential  -     Comprehensive Metabolic Panel  -     Lipid Panel With LDL / HDL Ratio  -     Thyroid Panel With TSH  -     Vitamin D 25 Hydroxy    Preventative health care  -     CBC & Differential  -     Comprehensive Metabolic Panel  -     Lipid Panel With LDL / HDL Ratio  -     Thyroid Panel With  TSH  -     Vitamin D 25 Hydroxy    Encounter for long-term (current) use of medications  -     CBC & Differential  -     Comprehensive Metabolic Panel  -     Lipid Panel With LDL / HDL Ratio  -     Thyroid Panel With TSH  -     Vitamin D 25 Hydroxy    Medication management  -     CBC & Differential  -     Comprehensive Metabolic Panel  -     Lipid Panel With LDL / HDL Ratio  -     Thyroid Panel With TSH  -     Vitamin D 25 Hydroxy

## 2017-06-15 LAB
25(OH)D3+25(OH)D2 SERPL-MCNC: 11.6 NG/ML (ref 30–100)
ALBUMIN SERPL-MCNC: 3.9 G/DL (ref 3.5–5.2)
ALBUMIN/GLOB SERPL: 1.4 G/DL
ALP SERPL-CCNC: 160 U/L (ref 39–117)
ALT SERPL-CCNC: 11 U/L (ref 1–41)
AST SERPL-CCNC: 14 U/L (ref 1–40)
BASOPHILS # BLD AUTO: 0.02 10*3/MM3 (ref 0–0.2)
BASOPHILS NFR BLD AUTO: 0.2 % (ref 0–1.5)
BILIRUB SERPL-MCNC: 0.6 MG/DL (ref 0.1–1.2)
BUN SERPL-MCNC: 21 MG/DL (ref 8–23)
BUN/CREAT SERPL: 10.8 (ref 7–25)
CALCIUM SERPL-MCNC: 8.7 MG/DL (ref 8.6–10.5)
CHLORIDE SERPL-SCNC: 104 MMOL/L (ref 98–107)
CHOLEST SERPL-MCNC: 153 MG/DL (ref 0–200)
CO2 SERPL-SCNC: 23.5 MMOL/L (ref 22–29)
CREAT SERPL-MCNC: 1.95 MG/DL (ref 0.76–1.27)
DIFFERENTIAL COMMENT: NORMAL
EOSINOPHIL # BLD AUTO: 0.07 10*3/MM3 (ref 0–0.7)
EOSINOPHIL NFR BLD AUTO: 0.8 % (ref 0.3–6.2)
ERYTHROCYTE [DISTWIDTH] IN BLOOD BY AUTOMATED COUNT: 13.6 % (ref 11.5–14.5)
FT4I SERPL CALC-MCNC: 1.5 (ref 1.2–4.9)
GLOBULIN SER CALC-MCNC: 2.7 GM/DL
GLUCOSE SERPL-MCNC: 92 MG/DL (ref 65–99)
HCT VFR BLD AUTO: 42.3 % (ref 40.4–52.2)
HDLC SERPL-MCNC: 28 MG/DL (ref 40–60)
HGB BLD-MCNC: 14 G/DL (ref 13.7–17.6)
IMM GRANULOCYTES # BLD: 0.05 10*3/MM3 (ref 0–0.03)
IMM GRANULOCYTES NFR BLD: 0.6 % (ref 0–0.5)
LDLC SERPL CALC-MCNC: 62 MG/DL (ref 0–100)
LDLC/HDLC SERPL: 2.22 {RATIO}
LYMPHOCYTES # BLD AUTO: 1.17 10*3/MM3 (ref 0.9–4.8)
LYMPHOCYTES NFR BLD AUTO: 12.9 % (ref 19.6–45.3)
MCH RBC QN AUTO: 31.3 PG (ref 27–32.7)
MCHC RBC AUTO-ENTMCNC: 33.1 G/DL (ref 32.6–36.4)
MCV RBC AUTO: 94.6 FL (ref 79.8–96.2)
MONOCYTES # BLD AUTO: 0.75 10*3/MM3 (ref 0.2–1.2)
MONOCYTES NFR BLD AUTO: 8.3 % (ref 5–12)
NEUTROPHILS # BLD AUTO: 7.01 10*3/MM3 (ref 1.9–8.1)
NEUTROPHILS NFR BLD AUTO: 77.2 % (ref 42.7–76)
NRBC BLD AUTO-RTO: 0 /100 WBC (ref 0–0)
PLATELET # BLD AUTO: 179 10*3/MM3 (ref 140–500)
PLATELET BLD QL SMEAR: NORMAL
POTASSIUM SERPL-SCNC: 4 MMOL/L (ref 3.5–5.2)
PROT SERPL-MCNC: 6.6 G/DL (ref 6–8.5)
RBC # BLD AUTO: 4.47 10*6/MM3 (ref 4.6–6)
RBC MORPH BLD: NORMAL
SODIUM SERPL-SCNC: 143 MMOL/L (ref 136–145)
T3RU NFR SERPL: 28 % (ref 24–39)
T4 SERPL-MCNC: 5.2 UG/DL (ref 4.5–12)
TRIGL SERPL-MCNC: 314 MG/DL (ref 0–150)
TSH SERPL DL<=0.005 MIU/L-ACNC: 0.84 UIU/ML (ref 0.45–4.5)
VLDLC SERPL CALC-MCNC: 62.8 MG/DL (ref 5–40)
WBC # BLD AUTO: 9.07 10*3/MM3 (ref 4.5–10.7)

## 2017-07-13 RX ORDER — ATORVASTATIN CALCIUM 40 MG/1
TABLET, FILM COATED ORAL
Qty: 30 TABLET | Refills: 0 | Status: SHIPPED | OUTPATIENT
Start: 2017-07-13 | End: 2018-03-08

## 2017-08-01 ENCOUNTER — OFFICE VISIT (OUTPATIENT)
Dept: NEUROSURGERY | Facility: CLINIC | Age: 72
End: 2017-08-01

## 2017-08-01 VITALS
SYSTOLIC BLOOD PRESSURE: 171 MMHG | DIASTOLIC BLOOD PRESSURE: 81 MMHG | BODY MASS INDEX: 40.43 KG/M2 | HEIGHT: 74 IN | WEIGHT: 315 LBS | HEART RATE: 68 BPM

## 2017-08-01 DIAGNOSIS — I10 HTN (HYPERTENSION), BENIGN: ICD-10-CM

## 2017-08-01 DIAGNOSIS — M43.06 LUMBAR SPONDYLOLYSIS: Primary | ICD-10-CM

## 2017-08-01 PROCEDURE — 99203 OFFICE O/P NEW LOW 30 MIN: CPT | Performed by: NEUROLOGICAL SURGERY

## 2017-08-01 NOTE — PATIENT INSTRUCTIONS

## 2017-08-01 NOTE — PROGRESS NOTES
Subjective   Patient ID: James Loaiza is a 71 y.o. male is being seen for consultation today at the request of YANIRA Cuellar for back pain.     History of Present Illness    This patient has been having pain in his back for a long time.  He doesn't have a lot of pain in his legs but he does have swelling in his legs.  He says that a mass was noted in the left side of his lower back at his last visit pain management and that is why he was referred here.  He has had 2 sets of radiofrequency ablation.  The first one helped after about 4 months and the most recent one was about 4 months ago and it is not helped as much as he had hoped.  The pain is still dull and aching and primarily in his back.  He has no difficulty with bowel or bladder control or other associated symptoms.  Any kind of activity makes the pain worse.    The following portions of the patient's history were reviewed and updated as appropriate: allergies, current medications, past family history, past medical history, past social history, past surgical history and problem list.    Review of Systems   Constitutional: Positive for activity change and fatigue.   Eyes: Positive for itching.   Respiratory: Positive for apnea and shortness of breath. Negative for chest tightness.    Cardiovascular: Negative for chest pain.   Genitourinary: Positive for urgency.   Musculoskeletal: Positive for arthralgias, back pain, gait problem and joint swelling.   Allergic/Immunologic: Positive for environmental allergies.   Neurological: Positive for weakness and light-headedness.   All other systems reviewed and are negative.      Objective   Physical Exam   Constitutional: He is oriented to person, place, and time. He appears well-developed and well-nourished.   Eyes: EOM are normal. Pupils are equal, round, and reactive to light.   Neurological: He is oriented to person, place, and time. He has a normal Finger-Nose-Finger Test and a normal Heel to Shin Test.  Gait normal.   Reflex Scores:       Tricep reflexes are 2+ on the right side and 2+ on the left side.       Bicep reflexes are 2+ on the right side and 2+ on the left side.       Brachioradialis reflexes are 2+ on the right side and 2+ on the left side.       Patellar reflexes are 2+ on the right side and 2+ on the left side.       Achilles reflexes are 2+ on the right side and 2+ on the left side.  Psychiatric: His speech is normal.     Neurologic Exam     Mental Status   Oriented to person, place, and time.   Registration of memory: Good recent and remote memory.   Attention: normal. Concentration: normal.   Speech: speech is normal   Level of consciousness: alert  Knowledge: consistent with education.     Cranial Nerves     CN II   Visual fields full to confrontation.   Visual acuity: normal    CN III, IV, VI   Pupils are equal, round, and reactive to light.  Extraocular motions are normal.     CN V   Facial sensation intact.   Right corneal reflex: normal  Left corneal reflex: normal    CN VII   Facial expression full, symmetric.   Right facial weakness: none  Left facial weakness: none    CN VIII   Hearing: intact    CN IX, X   Palate: symmetric    CN XI   Right sternocleidomastoid strength: normal  Left sternocleidomastoid strength: normal    CN XII   Tongue: not atrophic  Tongue deviation: none    Motor Exam   Muscle bulk: normal  Right arm tone: normal  Left arm tone: normal  Right leg tone: normal  Left leg tone: normal    Strength   Strength 5/5 except as noted.     Sensory Exam   Light touch normal.     Gait, Coordination, and Reflexes     Gait  Gait: normal    Coordination   Finger to nose coordination: normal  Heel to shin coordination: normal    Reflexes   Right brachioradialis: 2+  Left brachioradialis: 2+  Right biceps: 2+  Left biceps: 2+  Right triceps: 2+  Left triceps: 2+  Right patellar: 2+  Left patellar: 2+  Right achilles: 2+  Left achilles: 2+  Right : 2+  Left : 2+    I cannot feel  a mass in the area where he was told there was a mass.    Assessment/Plan   Independent Review of Radiographic Studies:      I did review a set of plain films of the lumbar spine done on this man in February 2015.  This shows multilevel degenerative disease particularly marked at L3 4, L4 5, and L5-S1.  There is also lateral spurring.    Medical Decision Making:      I told the patient that I don't think we need to do any further workup for the mass.  Clearly he has severe degenerative change in his back and that is almost certainly the cause of his pain.  There is really very little or nothing we can do with the man this size.  Until he loses some weight he will continue to have to treat this symptomatically.    James was seen today for back pain.    Diagnoses and all orders for this visit:    Lumbar spondylolysis    Return if symptoms worsen or fail to improve.

## 2017-08-02 RX ORDER — NIFEDIPINE 60 MG/1
TABLET, EXTENDED RELEASE ORAL
Qty: 90 TABLET | Refills: 0 | Status: SHIPPED | OUTPATIENT
Start: 2017-08-02 | End: 2019-01-30

## 2017-08-24 ENCOUNTER — OFFICE VISIT (OUTPATIENT)
Dept: PAIN MEDICINE | Facility: CLINIC | Age: 72
End: 2017-08-24

## 2017-08-24 VITALS
OXYGEN SATURATION: 95 % | WEIGHT: 315 LBS | SYSTOLIC BLOOD PRESSURE: 155 MMHG | BODY MASS INDEX: 40.43 KG/M2 | HEART RATE: 75 BPM | HEIGHT: 74 IN | DIASTOLIC BLOOD PRESSURE: 80 MMHG | TEMPERATURE: 98.1 F | RESPIRATION RATE: 16 BRPM

## 2017-08-24 DIAGNOSIS — M43.06 LUMBAR SPONDYLOLYSIS: ICD-10-CM

## 2017-08-24 DIAGNOSIS — G89.29 CHRONIC LOW BACK PAIN, UNSPECIFIED BACK PAIN LATERALITY, WITH SCIATICA PRESENCE UNSPECIFIED: Primary | ICD-10-CM

## 2017-08-24 DIAGNOSIS — M51.26 LUMBAR HERNIATED DISC: ICD-10-CM

## 2017-08-24 DIAGNOSIS — M54.5 CHRONIC LOW BACK PAIN, UNSPECIFIED BACK PAIN LATERALITY, WITH SCIATICA PRESENCE UNSPECIFIED: Primary | ICD-10-CM

## 2017-08-24 PROCEDURE — 99214 OFFICE O/P EST MOD 30 MIN: CPT | Performed by: NURSE PRACTITIONER

## 2017-08-24 RX ORDER — ATORVASTATIN CALCIUM 40 MG/1
TABLET, FILM COATED ORAL
Qty: 30 TABLET | Refills: 3 | Status: SHIPPED | OUTPATIENT
Start: 2017-08-24 | End: 2017-09-15 | Stop reason: SDUPTHER

## 2017-08-24 NOTE — PROGRESS NOTES
"CHIEF COMPLAINT    Since last visit, pt states back pain is unchanged. He has seen a neurosurgeon this month.     Subjective   James Loaiza is a 71 y.o. male  who presents to the office for follow-up.He has a history of chronic low back pain. His pain is unchanged since his last office visit.He completed a bilateral lumbar RF @ L3-5 on 3/9/17 performed by Dr. Tineo for management of back pain. He reports \"marginal improvement\" with this.     Complains of pain in his low back. Today his pain is 7/10VAS. Describes the pain as continuous and unchanged since his last office visit. No pain radiates into his legs. He is describing his pain as a \"discomfort.\" Reviewed VAS scale. Patient states his pain is closer to 4-5/10VAS.  Pain increases with arising from seated position, walking; pain decreases with sitting, rest and changing position.  Occasionally takes Aleve with mild improvement.  ADL's by self.    Admits his activity level is very limited, both due to back pain and SOA.     Back Pain   This is a recurrent problem. The current episode started more than 1 month ago. The problem occurs constantly. The pain is present in the lumbar spine. The quality of the pain is described as aching. The pain does not radiate. The pain is at a severity of 7/10. The pain is moderate. The pain is worse during the night (frequent urination at night which increases his pain--getting up and out of bed). The symptoms are aggravated by standing and twisting. Stiffness is present all day. Pertinent negatives include no bladder incontinence, bowel incontinence, chest pain, dysuria, fever, headaches, numbness or weakness. Risk factors include lack of exercise, obesity and recent trauma. Treatments tried: stiffness decreases with movement and walking. takes OTC IBU PRN.      PEG Assessment   What number best describes your pain on average in the past week?7  What number best describes how, during the past week, pain has interfered with " "your enjoyment of life?7  What number best describes how, during the past week, pain has interfered with your general activity?  7    The following portions of the patient's history were reviewed and updated as appropriate: allergies, current medications, past family history, past medical history, past social history, past surgical history and problem list.    Review of Systems   Constitutional: Positive for fatigue. Negative for chills and fever.   HENT: Negative for congestion.    Eyes: Negative for visual disturbance.   Respiratory: Negative for cough, shortness of breath and wheezing.    Cardiovascular: Negative.  Negative for chest pain.   Gastrointestinal: Negative for bowel incontinence, constipation, diarrhea, nausea and vomiting.   Genitourinary: Negative for bladder incontinence, difficulty urinating and dysuria.   Musculoskeletal: Positive for back pain.   Neurological: Negative for dizziness, weakness, light-headedness, numbness and headaches.   Psychiatric/Behavioral: Positive for sleep disturbance. Negative for agitation, confusion, hallucinations and suicidal ideas. The patient is not nervous/anxious.        Vitals:    08/24/17 1514   BP: 155/80   Pulse: 75   Resp: 16   Temp: 98.1 °F (36.7 °C)   SpO2: 95%   Weight: (!) 345 lb (156 kg)   Height: 74\" (188 cm)   PainSc:   7   PainLoc: Back     Objective   Physical Exam   Constitutional: He is oriented to person, place, and time. Vital signs are normal. He appears well-developed and well-nourished. He is cooperative.   HENT:   Head: Normocephalic and atraumatic.   Nose: Nose normal.   Eyes: Conjunctivae and lids are normal.   Cardiovascular: Normal rate, regular rhythm and normal heart sounds.    Pulmonary/Chest: Effort normal and breath sounds normal. No respiratory distress.   Abdominal: Normal appearance.   Musculoskeletal:        Lumbar back: He exhibits tenderness and pain. He exhibits no bony tenderness.   Minimal tenderness of bilateral L3-L5 " facets    Negative SLR bilaterally    Neurological: He is alert and oriented to person, place, and time. Gait (slow ambulation) abnormal.   Reflex Scores:       Patellar reflexes are 1+ on the right side and 1+ on the left side.  Skin: Skin is warm, dry and intact.   Psychiatric: He has a normal mood and affect. His speech is normal and behavior is normal. Judgment and thought content normal. Cognition and memory are normal.   Nursing note and vitals reviewed.      Assessment/Plan   James was seen today for back pain.    Diagnoses and all orders for this visit:    Chronic low back pain, unspecified back pain laterality, with sciatica presence unspecified  -     Ambulatory Referral to Physical Therapy Evaluate and treat    Lumbar spondylolysis  -     Ambulatory Referral to Physical Therapy Evaluate and treat    Lumbar herniated disc L3-L5 3/16/16 Open MRI  -     Ambulatory Referral to Physical Therapy Evaluate and treat    Other orders  -     diclofenac (VOLTAREN) 50 MG EC tablet; Take 1 tablet by mouth 2 (Two) Times a Day As Needed (pain).      --- Trial of Diclofenac 50 mg BID. Discussed medication with the patient.  Included in this discussion was the potential for side effects and adverse events.  Patient verbalized understanding and wished to proceed.  Prescription will be sent to pharmacy.  --- Refer to PT. Orders noted.   --- Reviewed importance of increasing activity and mechanical low back pain. ALso discussed importance of weight loss efforts.  --- Follow-up 6-8 weeks or sooner if needed.       DILAN REPORT    DILAN report has been reviewed and scanned into the patient's chart.    Date of last DILAN : 8-21-17          EMR Dragon/Transcription disclaimer:   Much of this encounter note is an electronic transcription/translation of spoken language to printed text. The electronic translation of spoken language may permit erroneous, or at times, nonsensical words or phrases to be inadvertently transcribed;  Although I have reviewed the note for such errors, some may still exist.

## 2017-09-11 ENCOUNTER — HOSPITAL ENCOUNTER (OUTPATIENT)
Dept: PHYSICAL THERAPY | Facility: HOSPITAL | Age: 72
Setting detail: THERAPIES SERIES
Discharge: HOME | End: 2017-09-11

## 2017-09-11 DIAGNOSIS — M43.06 SPONDYLOLYSIS OF LUMBAR REGION: ICD-10-CM

## 2017-09-11 DIAGNOSIS — M43.16 SPONDYLOLISTHESIS OF LUMBAR REGION: ICD-10-CM

## 2017-09-11 DIAGNOSIS — G89.29 CHRONIC LOW BACK PAIN, UNSPECIFIED BACK PAIN LATERALITY, WITH SCIATICA PRESENCE UNSPECIFIED: Primary | ICD-10-CM

## 2017-09-11 DIAGNOSIS — M54.5 CHRONIC LOW BACK PAIN, UNSPECIFIED BACK PAIN LATERALITY, WITH SCIATICA PRESENCE UNSPECIFIED: Primary | ICD-10-CM

## 2017-09-11 PROCEDURE — G8982 BODY POS GOAL STATUS: HCPCS | Performed by: PHYSICAL THERAPIST

## 2017-09-11 PROCEDURE — 97161 PT EVAL LOW COMPLEX 20 MIN: CPT | Performed by: PHYSICAL THERAPIST

## 2017-09-11 PROCEDURE — G8981 BODY POS CURRENT STATUS: HCPCS | Performed by: PHYSICAL THERAPIST

## 2017-09-11 PROCEDURE — 97110 THERAPEUTIC EXERCISES: CPT | Performed by: PHYSICAL THERAPIST

## 2017-09-11 NOTE — THERAPY EVALUATION
Outpatient Physical Therapy Ortho Initial Evaluation  [facility]     Patient Name: James Loaiza  : 1945  MRN: [id number]  Today's Date: 2017      Visit Date: 2017    Patient Active Problem List   Diagnosis   • Lumbar herniated disc L3-L5 3/16/16 Open MRI   • Abnormal electrocardiogram   • Abnormal weight gain   • Aortic valve insufficiency   • Coronary arteriosclerosis in native artery   • Benign essential hypertension ([place]je[place] )   • Edema   • Dyspnea on exertion   • Fatigue   • Left ventricular hypertrophy   • Obstructive sleep apnea syndrome   • Arthritis of left hip   • Hx of pulmonary embolus 7/15   • Morbid obesity with BMI of 45.0-49.9, adult   • Intermittent dysphagia   • Chronic kidney disease ([place])   • Hyperlipidemia    • BPH (benign prostatic hypertrophy)   • Left cervical radiculopathy   • Rotator cuff tear, left   • Cardiomegaly   • Plantar fasciitis   • Hypogonadism male   • Vitamin D deficiency disease   • Renal cyst, left   • Preventative health care   • Medication management   • Chronic low back pain   • Lumbar spondylolysis   • Headache        Past Medical History:   Diagnosis Date   • Abnormal EKG     referring to cardiology   • Anxiety    • Arthritis    • Back pain     worsening   • BPH (benign prostatic hyperplasia)     BPH/Nocturia/ Urinary Frequency   • Breast nodule     right   • Cardiomegaly     Cardiomegaly/ SOB with exertion   • Circulation problem    • Constipation    • Deviated septum    • DJD (degenerative joint disease)     DJD Knees   • Dysphagia     solids and liquids - resolved with normal studies   • Encounter for annual health examination 2013    Annual Health Assessment   • Enlarged prostate    • Fatigue     Extreme fatigue   • Hyperlipidemia    • Hypertension     followed Dr. Marcelo   • Hypogonadism male    • Left hip pain     and DJD   • Left leg pain    • Low back pain    • Moist mucous membranes of ear, nose, and throat      "\"Mucous in throat\"   • Morbid obesity     (BMI-41.2za)   • Muscle cramping    • Muscle spasm     Muscle spasms   • Neck arthritis    • Neck muscle spasm     Neck muscle spasm/Cervical strain   • Obesity    • Plantar fasciitis    • Prostatitis     recurrent   • Psoriasis    • Pulmonary embolus 07/2015    on Xarelltoypseerlipidemia   • Radiculopathy     Radiculopathy / Neuropathy left ar   • Renal insufficiency     followed by Dr. Mendes   • Seborrhea    • Shortness of breath    • Sleep apnea     Obstructive Sleep apnea worsening   • Urinary frequency    • Vascular disorder    • Vertigo    • Weight gain    • Wellness examination 10/23/2014    Annual Wellness Visit        Past Surgical History:   Procedure Laterality Date   • APPENDECTOMY  1965   • CARDIAC CATHETERIZATION  07/29/2010    [facility]   • COLONOSCOPY  01/05/2010   • COLONOSCOPY  10/01/2001   • NASAL SEPTUM SURGERY     • NOSE SURGERY  1999   • TURP / TRANSURETHRAL INCISION / DRAINAGE PROSTATE  10/23/2015    [facility]       Visit Dx:     ICD-10-CM ICD-9-CM   1. Chronic low back pain, unspecified back pain laterality, with sciatica presence unspecified M54.5 724.2    G89.29 338.29   2. Spondylolysis of lumbar region M43.06 738.4   3. Spondylolisthesis of lumbar region M43.16 738.4             Patient History       09/11/17 0900          History    Chief Complaint Pain  -RA      Type of Pain Back pain  -RA      Date Current Problem(s) Began --   chronic  -RA      Brief Description of Current Complaint Patient with hx of chronic LBP.  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 Yes  -RA      How has patient tried to help current problem? rest, OTC medication  -RA      What clinical tests have you had for this problem? X-ray   nothing recent  -RA      Results of Clinical Tests degenerative changes/arthritis, sponylolysis, spondylolisthesis  -RA      Pain     Pain Location Back  -RA      Pain at Present 2  -RA      Pain at Best 1  -RA      Pain at Worst 5  -RA      Pain Frequency Constant/continuous  -RA      Pain Description Aching;Discomfort  -RA      What Performance Factors Make the Current Problem(s) WORSE? standing, walking, transitional movements, increased activity  -RA      What Performance Factors Make the Current Problem(s) BETTER? sitting eases (increased stiffness if sits too long), rest  -RA      Tolerance Time- Sitting 1 hour  -RA      Is your sleep disturbed? Yes   wakes 2/2 BR needs but getting in/OOB increases back pain  -RA      Is medication used to assist with sleep? No  -RA      What position do you sleep in? Right sidelying;Left sidelying  -RA      Difficulties at work? 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 some pain and/or modification  -RA      Fall Risk Assessment    Any falls in the past year: Yes  -RA      Number of falls reported in the last 12 months a couple  -RA      Factors that contributed to the fall: Tripped;Lost balance;Uneven surface  -RA      Daily Activities    Primary Language English  -RA      How does patient learn best? Listening;Reading;Demonstration;Pictures/Video  -RA      Teaching needs identified Home Exercise Program;Management of Condition  -RA      Patient is concerned about/has problems with Bed Mobility;Performing home management (household chores, shopping, care of dependents);Walking;Transfers (getting out of a chair, bed);Standing;Performing job responsibilities/community activities (work, school,  -RA      Does  patient have problems with the following? None  -RA      Barriers to learning None  -RA      Functional Status --   independent with pain and/or modification  -RA      Pt Participated in POC and Goals Yes  -RA      Safety    Are you being hurt, hit, or frightened by anyone at home or in your life? No  -RA      Are you being neglected by a caregiver No  -RA        User Key  (r) = Recorded By, (t) = Taken By, (c) = Cosigned By    Initials Name Provider Type    RA Katharine Hamilton PT Physical Therapist                PT Ortho       09/11/17 0900    Posture/Observations    Posture/Observations Comments FH/rounded shoulders, increased body habitus, slow gait and transitional movements  -RA    Special Tests/Palpation    Special Tests/Palpation Lumbar/SI  -RA    Lumbosacral Palpation    Lumbosacral Palpation? Yes   tenderness mid/lower lumbar/sacral region  -RA    Lumbar/SI Special Tests    SLR (Neural Tension) Negative  -RA    LINDA (hip vs. SI Dysfunction) --   decreased ROM and painful L>>R   -RA    FAIR Test (Piriformis Syndrome) --   decreased ROM and painful B   -RA    Trunk    Flexion ROM Details 75%  -RA    Extension ROM Details 50%  -RA    Lt Lat Flexion ROM Details 25% - feels increased discomfort in LB  -RA    Right Lateral Flexion ROM Details 25% - feels increased discomfort in LB  -RA    Lt Rotation ROM Details 25% - increased LBP/discomfort   -RA    Right Rotation ROM Details 25% increased LBP/discomfort  -RA    Neck Trunk    Neck/Trunk Manual Muscle Testing Detail core strength grossly 4-/5   -RA    Left Hip    Hip Flexion Gross Movement (4+/5) good plus   ER bias  -RA    Hip Extension Gross Movement (4-/5) good minus;(4/5) good  -RA    Hip ABduction Gross Movement (4/5) good  -RA    Hip Int (Medial) Rotation Gross Movement (4/5) good;(4+/5) good plus  -RA    Hip Ext (Lat) Rotation Gross Movement (5/5) normal  -RA    Right Hip    Hip Flexion Gross Movement (4+/5) good plus   ER bias  -RA    Hip Extension  Gross Movement (4-/5) good minus;(4/5) good  -RA    Hip ABduction Gross Movement (4/5) good  -RA    Hip Int (Medial) Rotation Gross Movement (4/5) good;(4+/5) good plus  -RA    Hip Ext (Lat) Rotation Gross Movement (5/5) normal  -RA    Left Knee    Knee Extension Gross Movement (4+/5) good plus  -RA    Knee Flexion Gross Movement (4+/5) good plus  -RA    Right Knee    Knee Extension Gross Movement (4+/5) good plus  -RA    Knee Flexion Gross Movement (4+/5) good plus  -RA    Left Ankle/Foot    Ankle PF Gross Movement (4/5) good  -RA    Ankle Dorsiflexion Gross Movement (4/5) good;(4+/5) good plus  -RA    Right Ankle/Foot    Ankle PF Gross Movement (4/5) good  -RA    Ankle Dorsiflexion Gross Movement (4+/5) good plus  -RA    Lower Extremity Flexibility    LE Flexibility Comments tightness of hip flexors, hip rotators, and HS   -RA    Transfers    Transfer, Comment requires B UE push (hx of B knee arthritis as well as back pain)  -RA    Gait Assessment/Treatment    Gait, Comment slow,, increased R LE ER   -RA    Stairs Assessment/Treatment    Stairs, Comment Tries to avoid stairs - reports reciprocal at times and non reciprocal with railing at times - depends on time of day, energy level, and SOA.  (does not have to go up/down stairs at home)  -RA      User Key  (r) = Recorded By, (t) = Taken By, (c) = Cosigned By    Initials Name Provider Type    RA Katharine Hamilton PT Physical Therapist                            Therapy Education       09/11/17 9063          Therapy Education    Education Details Spinal anatomy/mechanics, optimal posture, importance of core strength/stabilization  -RA      Given HEP;Symptoms/condition management;Posture/body mechanics;Mobility training  -RA      Program New  -RA      How Provided Verbal;Demonstration;Written  -RA      Provided to Patient  -RA      Level of Understanding Teach back education performed;Verbalized  -RA        User Key  (r) = Recorded By, (t) = Taken By, (c) = Cosigned  By    Initials Name Provider Type    CARLOS MANUEL Hamilton, PT Physical Therapist                PT OP Goals       09/11/17 0900       PT Short Term Goals    STG 1 Patient will be independent with initial HEP for posture and ROM/flexiblity without increased symptoms  -RA     STG 2 Patient will be educated on and demonstrate knowledge of optimal posture, proper techniques with transitional movements, good body mechanics, and proper lifting techniques to minimize strain to spine and soft tissues with performance of ADLs.  -RA     STG 3 Patient will report >/= 25% increased ease/decreased symptoms with ADL's/transitional movements.  -RA     Long Term Goals    LTG 1 Patient will be independent with established HEP for strength/stabilization to facilitate self management of condition  -RA     LTG 2 Patient will demonstrate grossly 50% trunk AROM with minimal pain for greater ease/tolerance with ADL's   -RA     LTG 3 Patient will have >/= 1/2 grade improvement in core strength for greater ease/tolerance with prolonged positioning/activity  -RA     LTG 4 Patient will improve score on Modified Oswestry Outcome Measures from 32% to </= 22% indicating reduced functional disability   -RA     Time Calculation    PT Goal Re-Cert Due Date 10/11/17  -RA       User Key  (r) = Recorded By, (t) = Taken By, (c) = Cosigned By    Initials Name Provider Type    CARLOS MANUEL Hamilton, PT Physical Therapist                PT Assessment/Plan       09/11/17 0935       PT Assessment    Functional Limitations Impaired gait;Limitation in home management;Limitations in community activities;Performance in leisure activities  -RA     Impairments Posture;Range of motion;Muscle strength;Pain;Gait;Impaired flexibility  -RA     Assessment Comments Patient is a 71yo M with intermittent back pain secondary to degenerative spine changes.  He has hx of injections and radiofrequency ablation with some benefit but notes less benefit from last radiofrequency  ablation about 4 months ago.  His primary complaint is pain with transitional movements especially after prolonged sitting.  He has limitations with prolonged positioning/activity but indicates SOA is more limiting than back pain.  He demonstrates mild FF posture, slow gait with mild increased R LE ER, decreased trunk ROM, core weakness, decreased LE strength, impaired LE flexibility, and tenderness with palpation of lower lumbar/sacral spine.  He will benefit from skilled therapy for education, ROM/flexibility, strength/stabilization, and manual/modalities prn to help reduce pain with  functional mobility and aid in self-management of symptoms/condition  -RA     Please refer to paper survey for additional self-reported information Yes   TROY score = 16/50 or 32% disability  -RA     Rehab Potential Good  -RA     Patient/caregiver participated in establishment of treatment plan and goals Yes  -RA     Patient would benefit from skilled therapy intervention Yes  -RA     PT Plan    PT Frequency 2x/week  -RA     Predicted Duration of Therapy Intervention (days/wks) 4 weeks  -RA     Planned CPT's? PT THER PROC EA 15 MIN: 27647;PT ELECTRICAL STIM UNATTEND: ;PT HOT OR COLD PACK TREAT MCARE;PT MANUAL THERAPY EA 15 MIN: 37344;PT TRACTION LUMBAR: 35589;PT ULTRASOUND EA 15 MIN: 38215;PT EVAL LOW COMPLEXITY: 20248  -RA     PT Plan Comments Skilled PT for chronic LBP including education, ther ex for ROM/flexibility and strength/stabilization, and manual/modalities prn to help reduce pain and improve functional activity tolerance.  -RA       User Key  (r) = Recorded By, (t) = Taken By, (c) = Cosigned By    Initials Name Provider Type    CARLOS MANUEL Hamilton PT Physical Therapist                  Exercises       09/11/17 0900          Exercise 1    Exercise Name 1 See chart copy of initial HEP for ex details.  -RA        User Key  (r) = Recorded By, (t) = Taken By, (c) = Cosigned By    Initials Name Provider Type    CARLOS MANUEL SCHMIDT  Alfonso, PT Physical Therapist                              Outcome Measures       09/11/17 1000          Modified Oswestry    Modified Oswestry Score/Comments score = 16/50 or 32% disability  -RA      Functional Assessment    Outcome Measure Options Modifed Owestry  -RA        User Key  (r) = Recorded By, (t) = Taken By, (c) = Cosigned By    Initials Name Provider Type    RA Katharine Hamilton, PT Physical Therapist            Time Calculation:   Start Time: 0932  Stop Time: 1029  Time Calculation (min): 57 min     Therapy Charges for Today     Code Description Service Date Service Provider Modifiers Qty    26395986386 HC PT THER PROC EA 15 MIN 9/11/2017 Katharine Hamilton, PT GP 1    30221792168 HC PT EVAL LOW COMPLEXITY 3 9/11/2017 Katharine Hamilton, PT GP 1    43840418494 HC PT [facility] MAIN POS CURRENT 9/11/2017 Katharine Hamilton, PT GP, CJ 1    16216608284 HC PT [facility] MAIN POS PROJECTED 9/11/2017 Katharine Hamilton, PT GP, CI 1          PT G-Codes  PT Professional Judgement Used?: Yes  Outcome Measure Options: Modifed Owestry  Score: 16/50 or 32% disability  Functional Limitation: Changing and maintaining body position  Changing and Maintaining Body Position Current Status (): At least 20 percent but less than 40 percent impaired, limited or restricted  Changing and Maintaining Body Position Goal Status (): At least 1 percent but less than 20 percent impaired, limited or restricted         Katharine Hamilton, PT  9/11/2017

## 2017-09-15 ENCOUNTER — APPOINTMENT (OUTPATIENT)
Dept: PHYSICAL THERAPY | Facility: HOSPITAL | Age: 72
End: 2017-09-15

## 2017-09-15 ENCOUNTER — OFFICE VISIT (OUTPATIENT)
Dept: FAMILY MEDICINE CLINIC | Facility: CLINIC | Age: 72
End: 2017-09-15

## 2017-09-15 VITALS
TEMPERATURE: 98.2 F | HEIGHT: 74 IN | HEART RATE: 69 BPM | DIASTOLIC BLOOD PRESSURE: 92 MMHG | BODY MASS INDEX: 40.43 KG/M2 | SYSTOLIC BLOOD PRESSURE: 142 MMHG | WEIGHT: 315 LBS | OXYGEN SATURATION: 98 %

## 2017-09-15 DIAGNOSIS — E55.9 VITAMIN D DEFICIENCY DISEASE: ICD-10-CM

## 2017-09-15 DIAGNOSIS — E78.2 MIXED HYPERLIPIDEMIA: Primary | ICD-10-CM

## 2017-09-15 DIAGNOSIS — G47.33 OBSTRUCTIVE SLEEP APNEA SYNDROME: ICD-10-CM

## 2017-09-15 PROCEDURE — 99213 OFFICE O/P EST LOW 20 MIN: CPT | Performed by: NURSE PRACTITIONER

## 2017-09-15 RX ORDER — FUROSEMIDE 40 MG/1
TABLET ORAL
COMMUNITY
Start: 2017-09-14 | End: 2021-07-24

## 2017-09-15 RX ORDER — ERGOCALCIFEROL 1.25 MG/1
50000 CAPSULE ORAL WEEKLY
Qty: 12 CAPSULE | Refills: 1 | Status: SHIPPED | OUTPATIENT
Start: 2017-09-15 | End: 2021-02-24

## 2017-09-15 NOTE — PROGRESS NOTES
Subjective   James Loaiza is a 71 y.o. male presents for chronic fatigue. Followed by Dr. Sandoval, pulmonology for sleep apnea. Last visit was in January of this year. Started otc vitamin d without relief. Does receive testosterone replacement via Dr. Castro, urology. Also treated for BPH. Has scheduled a colonoscopy next week, secondary to rectal bleeding. Starting physical therapy next week every other day at the recommendation of Dr. Tineo, pain management. Does not want to start pain medications at this time secondary to decreased kidney function.    Fatigue   This is a chronic problem. The current episode started more than 1 month ago. The problem occurs daily. The problem has been unchanged. Associated symptoms include arthralgias and fatigue. Pertinent negatives include no abdominal pain, anorexia, change in bowel habit, chest pain, chills, congestion, coughing, diaphoresis, fever, headaches, joint swelling, myalgias, nausea, neck pain, numbness, rash, sore throat, swollen glands, urinary symptoms, vertigo, visual change, vomiting or weakness. Nothing aggravates the symptoms. Treatments tried: testosterone. The treatment provided mild relief.        The following portions of the patient's history were reviewed and updated as appropriate: allergies, current medications, past family history, past medical history, past social history, past surgical history and problem list.    Review of Systems   Constitutional: Positive for fatigue. Negative for chills, diaphoresis and fever.   HENT: Negative.  Negative for congestion and sore throat.    Eyes: Negative.    Respiratory: Negative.  Negative for cough.    Cardiovascular: Negative.  Negative for chest pain.   Gastrointestinal: Negative.  Negative for abdominal pain, anorexia, change in bowel habit, nausea and vomiting.   Endocrine: Negative.    Genitourinary: Negative.    Musculoskeletal: Positive for arthralgias. Negative for joint swelling, myalgias and neck  pain.   Skin: Negative.  Negative for rash.   Allergic/Immunologic: Negative.    Neurological: Negative.  Negative for vertigo, weakness, numbness and headaches.   Hematological: Negative.    Psychiatric/Behavioral: Negative.        Objective   Physical Exam   Constitutional: He is oriented to person, place, and time. He appears well-developed and well-nourished.   Eyes: Pupils are equal, round, and reactive to light.   Neck: Neck supple. No thyromegaly present.   Cardiovascular: Normal rate, regular rhythm and normal heart sounds.  Exam reveals no gallop and no friction rub.    No murmur heard.  Pulmonary/Chest: Effort normal and breath sounds normal. No respiratory distress. He has no wheezes. He has no rales.   Abdominal: Soft. Bowel sounds are normal.   Neurological: He is alert and oriented to person, place, and time.   Skin: Skin is warm and dry.   Psychiatric: He has a normal mood and affect.   Vitals reviewed.      Assessment/Plan   James was seen today for follow-up.    Diagnoses and all orders for this visit:    Mixed hyperlipidemia    Obstructive sleep apnea syndrome    Vitamin D deficiency disease    Other orders  -     vitamin D (ERGOCALCIFEROL) 35596 units capsule capsule; Take 1 capsule by mouth 1 (One) Time Per Week.

## 2017-09-18 ENCOUNTER — HOSPITAL ENCOUNTER (OUTPATIENT)
Dept: PHYSICAL THERAPY | Facility: HOSPITAL | Age: 72
Setting detail: THERAPIES SERIES
Discharge: HOME | End: 2017-09-18

## 2017-09-18 DIAGNOSIS — G89.29 CHRONIC LOW BACK PAIN, UNSPECIFIED BACK PAIN LATERALITY, WITH SCIATICA PRESENCE UNSPECIFIED: Primary | ICD-10-CM

## 2017-09-18 DIAGNOSIS — M43.16 SPONDYLOLISTHESIS OF LUMBAR REGION: ICD-10-CM

## 2017-09-18 DIAGNOSIS — M54.5 CHRONIC LOW BACK PAIN, UNSPECIFIED BACK PAIN LATERALITY, WITH SCIATICA PRESENCE UNSPECIFIED: Primary | ICD-10-CM

## 2017-09-18 DIAGNOSIS — M43.06 SPONDYLOLYSIS OF LUMBAR REGION: ICD-10-CM

## 2017-09-18 PROCEDURE — 97110 THERAPEUTIC EXERCISES: CPT | Performed by: PHYSICAL THERAPIST

## 2017-09-18 NOTE — THERAPY TREATMENT NOTE
Outpatient Physical Therapy Ortho Treatment Note  [facility]     Patient Name: James Loaiza  : 1945  MRN: [id number]  Today's Date: 2017      Visit Date: 2017    Visit Dx:    ICD-10-CM ICD-9-CM   1. Chronic low back pain, unspecified back pain laterality, with sciatica presence unspecified M54.5 724.2    G89.29 338.29   2. Spondylolysis of lumbar region M43.06 738.4   3. Spondylolisthesis of lumbar region M43.16 738.4       Patient Active Problem List   Diagnosis   • Lumbar herniated disc L3-L5 3/16/16 Open MRI   • Abnormal electrocardiogram   • Abnormal weight gain   • Aortic valve insufficiency   • Coronary arteriosclerosis in native artery   • Benign essential hypertension (Ijeoma )   • Edema   • Dyspnea on exertion   • Fatigue   • Left ventricular hypertrophy   • Obstructive sleep apnea syndrome   • Arthritis of left hip   • Hx of pulmonary embolus 7/15   • Morbid obesity with BMI of 45.0-49.9, adult   • Intermittent dysphagia   • Chronic kidney disease (Lona)   • Hyperlipidemia    • BPH (benign prostatic hypertrophy)   • Left cervical radiculopathy   • Rotator cuff tear, left   • Cardiomegaly   • Plantar fasciitis   • Hypogonadism male   • Vitamin D deficiency disease   • Renal cyst, left   • Preventative health care   • Medication management   • Chronic low back pain   • Lumbar spondylolysis   • Headache        Past Medical History:   Diagnosis Date   • Abnormal EKG     referring to cardiology   • Anxiety    • Arthritis    • Back pain     worsening   • BPH (benign prostatic hyperplasia)     BPH/Nocturia/ Urinary Frequency   • Breast nodule     right   • Cardiomegaly     Cardiomegaly/ SOB with exertion   • Circulation problem    • Constipation    • Deviated septum    • DJD (degenerative joint disease)     DJD Knees   • Dysphagia     solids and liquids - resolved with normal studies   • Encounter for annual health examination 2013    Annual Health Assessment   • Enlarged  "prostate    • Fatigue     Extreme fatigue   • Hyperlipidemia 1999   • Hypertension 1999    followed Dr. Marcelo   • Hypogonadism male    • Left hip pain     and DJD   • Left leg pain    • Low back pain    • Moist mucous membranes of ear, nose, and throat     \"Mucous in throat\"   • Morbid obesity     (BMI-41.2za)   • Muscle cramping    • Muscle spasm     Muscle spasms   • Neck arthritis    • Neck muscle spasm     Neck muscle spasm/Cervical strain   • Obesity    • Plantar fasciitis    • Prostatitis     recurrent   • Psoriasis    • Pulmonary embolus 07/2015    on Xarelltoypseerlipidemia   • Radiculopathy     Radiculopathy / Neuropathy left ar   • Renal insufficiency     followed by Dr. Mendes   • Seborrhea    • Shortness of breath    • Sleep apnea     Obstructive Sleep apnea worsening   • Urinary frequency    • Vascular disorder    • Vertigo    • Weight gain    • Wellness examination 10/23/2014    Annual Wellness Visit        Past Surgical History:   Procedure Laterality Date   • APPENDECTOMY  1965   • CARDIAC CATHETERIZATION  07/29/2010    [facility]   • COLONOSCOPY  01/05/2010   • COLONOSCOPY  10/01/2001   • NASAL SEPTUM SURGERY     • NOSE SURGERY  1999   • TURP / TRANSURETHRAL INCISION / DRAINAGE PROSTATE  10/23/2015    Michael Castro                             PT Assessment/Plan       09/18/17 1047       PT Assessment    Assessment Comments Patient returns to therapy for first time since initial eval (melida appt. last Friday due to conflict with MD appt.).  He admits to non compliance with initial HEP and asks questions as to how therapy is going to help him.  Ongoing education regarding importance of posture and core stabilization to reduce strain on spine with functional activity.  Progressed ther ex in clinic but did not progress for home due to admitted non compliance with initial HEP (of 3 ex).  -RA     PT Plan    PT Plan Comments Continue skilled PT for chronic LBP working on " ROM/flexibility and strength/stabilization to help reduce pain and improve functional activity tolerance.  Consider manual/modalities prn  -RA       User Key  (r) = Recorded By, (t) = Taken By, (c) = Cosigned By    Initials Name Provider Type    CARLOS MANUEL Hamilton PT Physical Therapist                    Exercises       09/18/17 1000          Subjective Comments    Subjective Comments Doing okay.  I'll be honest, I haven't really done the exercises at home.  -RA      Subjective Pain    Able to rate subjective pain? yes  -RA      Pre-Treatment Pain Level 0  -RA      Subjective Pain Comment except with STS transition  -RA      Exercise 1    Exercise Name 1 See flowsheet in chart for ex details  -RA        User Key  (r) = Recorded By, (t) = Taken By, (c) = Cosigned By    Initials Name Provider Type    CARLOS MANUEL Hamilton PT Physical Therapist                                   Therapy Education       09/18/17 1040          Therapy Education    Education Details Benefits of exercise and compliance with HEP  -RA      Given HEP;Symptoms/condition management;Posture/body mechanics;Mobility training  -RA      Program Reinforced  -RA      How Provided Verbal;Demonstration  -RA      Provided to Patient  -RA      Level of Understanding Teach back education performed;Verbalized  -RA        User Key  (r) = Recorded By, (t) = Taken By, (c) = Cosigned By    Initials Name Provider Type    CARLOS MANUEL Hamilton PT Physical Therapist                Time Calculation:   Start Time: 1035  Stop Time: 1115  Time Calculation (min): 40 min    Therapy Charges for Today     Code Description Service Date Service Provider Modifiers Qty    01177609141  PT THER PROC EA 15 MIN 9/18/2017 Katharine Hamilton, PT GP 3                    Katharine Hamilton PT  9/18/2017

## 2017-09-20 ENCOUNTER — HOSPITAL ENCOUNTER (OUTPATIENT)
Dept: PHYSICAL THERAPY | Facility: HOSPITAL | Age: 72
Setting detail: THERAPIES SERIES
Discharge: HOME | End: 2017-09-20

## 2017-09-20 DIAGNOSIS — G89.29 CHRONIC LOW BACK PAIN, UNSPECIFIED BACK PAIN LATERALITY, WITH SCIATICA PRESENCE UNSPECIFIED: Primary | ICD-10-CM

## 2017-09-20 DIAGNOSIS — M43.06 SPONDYLOLYSIS OF LUMBAR REGION: ICD-10-CM

## 2017-09-20 DIAGNOSIS — M43.16 SPONDYLOLISTHESIS OF LUMBAR REGION: ICD-10-CM

## 2017-09-20 DIAGNOSIS — M54.5 CHRONIC LOW BACK PAIN, UNSPECIFIED BACK PAIN LATERALITY, WITH SCIATICA PRESENCE UNSPECIFIED: Primary | ICD-10-CM

## 2017-09-20 PROCEDURE — 97110 THERAPEUTIC EXERCISES: CPT | Performed by: PHYSICAL THERAPIST

## 2017-09-20 NOTE — THERAPY TREATMENT NOTE
Outpatient Physical Therapy Ortho Treatment Note  [facility]     Patient Name: James Loaiza  : 1945  MRN: [id number]  Today's Date: 2017      Visit Date: 2017    Visit Dx:    ICD-10-CM ICD-9-CM   1. Chronic low back pain, unspecified back pain laterality, with sciatica presence unspecified M54.5 724.2    G89.29 338.29   2. Spondylolysis of lumbar region M43.06 738.4   3. Spondylolisthesis of lumbar region M43.16 738.4       Patient Active Problem List   Diagnosis   • Lumbar herniated disc L3-L5 3/16/16 Open MRI   • Abnormal electrocardiogram   • Abnormal weight gain   • Aortic valve insufficiency   • Coronary arteriosclerosis in native artery   • Benign essential hypertension (Ijeoma )   • Edema   • Dyspnea on exertion   • Fatigue   • Left ventricular hypertrophy   • Obstructive sleep apnea syndrome   • Arthritis of left hip   • Hx of pulmonary embolus 7/15   • Morbid obesity with BMI of 45.0-49.9, adult   • Intermittent dysphagia   • Chronic kidney disease (Lona)   • Hyperlipidemia    • BPH (benign prostatic hypertrophy)   • Left cervical radiculopathy   • Rotator cuff tear, left   • Cardiomegaly   • Plantar fasciitis   • Hypogonadism male   • Vitamin D deficiency disease   • Renal cyst, left   • Preventative health care   • Medication management   • Chronic low back pain   • Lumbar spondylolysis   • Headache        Past Medical History:   Diagnosis Date   • Abnormal EKG     referring to cardiology   • Anxiety    • Arthritis    • Back pain     worsening   • BPH (benign prostatic hyperplasia)     BPH/Nocturia/ Urinary Frequency   • Breast nodule     right   • Cardiomegaly     Cardiomegaly/ SOB with exertion   • Circulation problem    • Constipation    • Deviated septum    • DJD (degenerative joint disease)     DJD Knees   • Dysphagia     solids and liquids - resolved with normal studies   • Encounter for annual health examination 2013    Annual Health Assessment   • Enlarged  "prostate    • Fatigue     Extreme fatigue   • Hyperlipidemia 1999   • Hypertension 1999    followed Dr. Marcelo   • Hypogonadism male    • Left hip pain     and DJD   • Left leg pain    • Low back pain    • Moist mucous membranes of ear, nose, and throat     \"Mucous in throat\"   • Morbid obesity     (BMI-41.2za)   • Muscle cramping    • Muscle spasm     Muscle spasms   • Neck arthritis    • Neck muscle spasm     Neck muscle spasm/Cervical strain   • Obesity    • Plantar fasciitis    • Prostatitis     recurrent   • Psoriasis    • Pulmonary embolus 07/2015    on Xarelltoypseerlipidemia   • Radiculopathy     Radiculopathy / Neuropathy left ar   • Renal insufficiency     followed by Dr. Mendes   • Seborrhea    • Shortness of breath    • Sleep apnea     Obstructive Sleep apnea worsening   • Urinary frequency    • Vascular disorder    • Vertigo    • Weight gain    • Wellness examination 10/23/2014    Annual Wellness Visit        Past Surgical History:   Procedure Laterality Date   • APPENDECTOMY  1965   • CARDIAC CATHETERIZATION  07/29/2010    [facility]   • COLONOSCOPY  01/05/2010   • COLONOSCOPY  10/01/2001   • NASAL SEPTUM SURGERY     • NOSE SURGERY  1999   • TURP / TRANSURETHRAL INCISION / DRAINAGE PROSTATE  10/23/2015    Michael Castro                             PT Assessment/Plan       09/20/17 0906       PT Assessment    Assessment Comments Increased time on NuStep and progressed reps with ther ex without issue.  Issued GTB and printout of new TB ex for home.  Patient demonstrates difficulty with supine -> sit transition in clinic (does try to log roll) but denies difficulty getting OOB at home.   -RA     PT Plan    PT Plan Comments Continue skilled PT working on ROM/flexibility and core strength/stabilization to help reduce pain and improve functional activity tolerance. Consider manual/modalities prn  -RA       User Key  (r) = Recorded By, (t) = Taken By, (c) = Cosigned By    Initials " Name Provider Type    CARLOS MANUEL Hamilton PT Physical Therapist                    Exercises       09/20/17 0900          Subjective Comments    Subjective Comments Did okay after Tuesday, no soreness after the exercises.  -RA      Subjective Pain    Able to rate subjective pain? yes  -RA      Pre-Treatment Pain Level 0  -RA      Subjective Pain Comment only has pain with STS transition  -RA      Exercise 1    Exercise Name 1 See flowsheet in chart for ex details  -RA        User Key  (r) = Recorded By, (t) = Taken By, (c) = Cosigned By    Initials Name Provider Type    CARLOS MANUEL Hamilton PT Physical Therapist                                   Therapy Education       09/20/17 0906          Therapy Education    Given HEP;Symptoms/condition management;Posture/body mechanics;Mobility training  -RA      Program Reinforced  -RA      How Provided Verbal;Demonstration  -RA      Provided to Patient  -RA      Level of Understanding Teach back education performed;Verbalized  -RA        User Key  (r) = Recorded By, (t) = Taken By, (c) = Cosigned By    Initials Name Provider Type    CARLOS MANUEL Hamilton PT Physical Therapist                Time Calculation:   Start Time: 0902  Stop Time: 0945  Time Calculation (min): 43 min    Therapy Charges for Today     Code Description Service Date Service Provider Modifiers Qty    59674973109  PT THER PROC EA 15 MIN 9/20/2017 Katharine Hamilton PT GP 3                    Katharine Hamilton PT  9/20/2017

## 2017-09-25 ENCOUNTER — HOSPITAL ENCOUNTER (OUTPATIENT)
Dept: PHYSICAL THERAPY | Facility: HOSPITAL | Age: 72
Setting detail: THERAPIES SERIES
Discharge: HOME | End: 2017-09-25

## 2017-09-25 DIAGNOSIS — M43.06 SPONDYLOLYSIS OF LUMBAR REGION: ICD-10-CM

## 2017-09-25 DIAGNOSIS — M43.16 SPONDYLOLISTHESIS OF LUMBAR REGION: ICD-10-CM

## 2017-09-25 DIAGNOSIS — G89.29 CHRONIC LOW BACK PAIN, UNSPECIFIED BACK PAIN LATERALITY, WITH SCIATICA PRESENCE UNSPECIFIED: Primary | ICD-10-CM

## 2017-09-25 DIAGNOSIS — M54.5 CHRONIC LOW BACK PAIN, UNSPECIFIED BACK PAIN LATERALITY, WITH SCIATICA PRESENCE UNSPECIFIED: Primary | ICD-10-CM

## 2017-09-25 PROCEDURE — 97110 THERAPEUTIC EXERCISES: CPT

## 2017-09-25 NOTE — THERAPY TREATMENT NOTE
Outpatient Physical Therapy Ortho Treatment Note  [facility]     Patient Name: James Loaiza  : 1945  MRN: [id number]  Today's Date: 2017      Visit Date: 2017    Visit Dx:    ICD-10-CM ICD-9-CM   1. Chronic low back pain, unspecified back pain laterality, with sciatica presence unspecified M54.5 724.2    G89.29 338.29   2. Spondylolysis of lumbar region M43.06 738.4   3. Spondylolisthesis of lumbar region M43.16 738.4       Patient Active Problem List   Diagnosis   • Lumbar herniated disc L3-L5 3/16/16 Open MRI   • Abnormal electrocardiogram   • Abnormal weight gain   • Aortic valve insufficiency   • Coronary arteriosclerosis in native artery   • Benign essential hypertension (Ijeoma )   • Edema   • Dyspnea on exertion   • Fatigue   • Left ventricular hypertrophy   • Obstructive sleep apnea syndrome   • Arthritis of left hip   • Hx of pulmonary embolus 7/15   • Morbid obesity with BMI of 45.0-49.9, adult   • Intermittent dysphagia   • Chronic kidney disease (Lona)   • Hyperlipidemia    • BPH (benign prostatic hypertrophy)   • Left cervical radiculopathy   • Rotator cuff tear, left   • Cardiomegaly   • Plantar fasciitis   • Hypogonadism male   • Vitamin D deficiency disease   • Renal cyst, left   • Preventative health care   • Medication management   • Chronic low back pain   • Lumbar spondylolysis   • Headache        Past Medical History:   Diagnosis Date   • Abnormal EKG     referring to cardiology   • Anxiety    • Arthritis    • Back pain     worsening   • BPH (benign prostatic hyperplasia)     BPH/Nocturia/ Urinary Frequency   • Breast nodule     right   • Cardiomegaly     Cardiomegaly/ SOB with exertion   • Circulation problem    • Constipation    • Deviated septum    • DJD (degenerative joint disease)     DJD Knees   • Dysphagia     solids and liquids - resolved with normal studies   • Encounter for annual health examination 2013    Annual Health Assessment   • Enlarged  "prostate    • Fatigue     Extreme fatigue   • Hyperlipidemia 1999   • Hypertension 1999    followed Dr. Marcelo   • Hypogonadism male    • Left hip pain     and DJD   • Left leg pain    • Low back pain    • Moist mucous membranes of ear, nose, and throat     \"Mucous in throat\"   • Morbid obesity     (BMI-41.2za)   • Muscle cramping    • Muscle spasm     Muscle spasms   • Neck arthritis    • Neck muscle spasm     Neck muscle spasm/Cervical strain   • Obesity    • Plantar fasciitis    • Prostatitis     recurrent   • Psoriasis    • Pulmonary embolus 07/2015    on Xarelltoypseerlipidemia   • Radiculopathy     Radiculopathy / Neuropathy left ar   • Renal insufficiency     followed by Dr. Mendes   • Seborrhea    • Shortness of breath    • Sleep apnea     Obstructive Sleep apnea worsening   • Urinary frequency    • Vascular disorder    • Vertigo    • Weight gain    • Wellness examination 10/23/2014    Annual Wellness Visit        Past Surgical History:   Procedure Laterality Date   • APPENDECTOMY  1965   • CARDIAC CATHETERIZATION  07/29/2010    [facility]   • COLONOSCOPY  01/05/2010   • COLONOSCOPY  10/01/2001   • NASAL SEPTUM SURGERY     • NOSE SURGERY  1999   • TURP / TRANSURETHRAL INCISION / DRAINAGE PROSTATE  10/23/2015    Michael Castro                             PT Assessment/Plan       09/25/17 1015       PT Assessment    Assessment Comments Patient reports too busy over the weekend to do exercises. Continue to have difficulty with supine to sit  -       User Key  (r) = Recorded By, (t) = Taken By, (c) = Cosigned By    Initials Name Provider Type    WYATT Lee PTA Physical Therapy Assistant                    Exercises       09/25/17 0935          Subjective Comments    Subjective Comments Pain only with transitional movements  -WS      Subjective Pain    Able to rate subjective pain? yes  -WS      Pre-Treatment Pain Level 0  -WS      Exercise 1    Exercise Name 1 See flowsheet " in chart for ex details  -WS        User Key  (r) = Recorded By, (t) = Taken By, (c) = Cosigned By    Initials Name Provider Type    WYATT Lee PTA Physical Therapy Assistant                               PT OP Goals       09/25/17 1000       PT Short Term Goals    STG 1 Patient will be independent with initial HEP for posture and ROM/flexiblity without increased symptoms  -WS     STG 2 Patient will be educated on and demonstrate knowledge of optimal posture, proper techniques with transitional movements, good body mechanics, and proper lifting techniques to minimize strain to spine and soft tissues with performance of ADLs.  -WS     STG 3 Patient will report >/= 25% increased ease/decreased symptoms with ADL's/transitional movements.  -WS     Long Term Goals    LTG 1 Patient will be independent with established HEP for strength/stabilization to facilitate self management of condition  -WS     LTG 2 Patient will demonstrate grossly 50% trunk AROM with minimal pain for greater ease/tolerance with ADL's   -WS     LTG 3 Patient will have >/= 1/2 grade improvement in core strength for greater ease/tolerance with prolonged positioning/activity  -WS     LTG 4 Patient will improve score on Modified Oswestry Outcome Measures from 32% to </= 22% indicating reduced functional disability   -WS       User Key  (r) = Recorded By, (t) = Taken By, (c) = Cosigned By    Initials Name Provider Type    WYATT Lee PTA Physical Therapy Assistant                Therapy Education       09/25/17 1014          Therapy Education    Given HEP;Symptoms/condition management;Pain management;Posture/body mechanics  -WS      Program Reinforced  -WS      How Provided Verbal  -WS      Provided to Patient  -WS        User Key  (r) = Recorded By, (t) = Taken By, (c) = Cosigned By    Initials Name Provider Type    WYATT Lee PTA Physical Therapy Assistant                Time Calculation:   Start Time: 0930  Stop Time:  1015  Time Calculation (min): 45 min    Therapy Charges for Today     Code Description Service Date Service Provider Modifiers Qty    75015027352 HC PT THER PROC EA 15 MIN 9/25/2017 Franky Lee PTA GP 3                    Franky Lee PTA  9/25/2017

## 2017-09-27 ENCOUNTER — HOSPITAL ENCOUNTER (OUTPATIENT)
Dept: PHYSICAL THERAPY | Facility: HOSPITAL | Age: 72
Setting detail: THERAPIES SERIES
Discharge: HOME | End: 2017-09-27

## 2017-09-27 DIAGNOSIS — M43.06 SPONDYLOLYSIS OF LUMBAR REGION: ICD-10-CM

## 2017-09-27 DIAGNOSIS — G89.29 CHRONIC LOW BACK PAIN, UNSPECIFIED BACK PAIN LATERALITY, WITH SCIATICA PRESENCE UNSPECIFIED: Primary | ICD-10-CM

## 2017-09-27 DIAGNOSIS — M43.16 SPONDYLOLISTHESIS OF LUMBAR REGION: ICD-10-CM

## 2017-09-27 DIAGNOSIS — M54.5 CHRONIC LOW BACK PAIN, UNSPECIFIED BACK PAIN LATERALITY, WITH SCIATICA PRESENCE UNSPECIFIED: Primary | ICD-10-CM

## 2017-09-27 PROCEDURE — 97110 THERAPEUTIC EXERCISES: CPT

## 2017-09-27 NOTE — THERAPY TREATMENT NOTE
Outpatient Physical Therapy Ortho Treatment Note  [facility]     Patient Name: James Loaiza  : 1945  MRN: [id number]  Today's Date: 2017      Visit Date: 2017    Visit Dx:    ICD-10-CM ICD-9-CM   1. Chronic low back pain, unspecified back pain laterality, with sciatica presence unspecified M54.5 724.2    G89.29 338.29   2. Spondylolysis of lumbar region M43.06 738.4   3. Spondylolisthesis of lumbar region M43.16 738.4       Patient Active Problem List   Diagnosis   • Lumbar herniated disc L3-L5 3/16/16 Open MRI   • Abnormal electrocardiogram   • Abnormal weight gain   • Aortic valve insufficiency   • Coronary arteriosclerosis in native artery   • Benign essential hypertension (Ijeoma )   • Edema   • Dyspnea on exertion   • Fatigue   • Left ventricular hypertrophy   • Obstructive sleep apnea syndrome   • Arthritis of left hip   • Hx of pulmonary embolus 7/15   • Morbid obesity with BMI of 45.0-49.9, adult   • Intermittent dysphagia   • Chronic kidney disease (Lona)   • Hyperlipidemia    • BPH (benign prostatic hypertrophy)   • Left cervical radiculopathy   • Rotator cuff tear, left   • Cardiomegaly   • Plantar fasciitis   • Hypogonadism male   • Vitamin D deficiency disease   • Renal cyst, left   • Preventative health care   • Medication management   • Chronic low back pain   • Lumbar spondylolysis   • Headache        Past Medical History:   Diagnosis Date   • Abnormal EKG     referring to cardiology   • Anxiety    • Arthritis    • Back pain     worsening   • BPH (benign prostatic hyperplasia)     BPH/Nocturia/ Urinary Frequency   • Breast nodule     right   • Cardiomegaly     Cardiomegaly/ SOB with exertion   • Circulation problem    • Constipation    • Deviated septum    • DJD (degenerative joint disease)     DJD Knees   • Dysphagia     solids and liquids - resolved with normal studies   • Encounter for annual health examination 2013    Annual Health Assessment   • Enlarged  "prostate    • Fatigue     Extreme fatigue   • Hyperlipidemia 1999   • Hypertension 1999    followed Dr. Marcelo   • Hypogonadism male    • Left hip pain     and DJD   • Left leg pain    • Low back pain    • Moist mucous membranes of ear, nose, and throat     \"Mucous in throat\"   • Morbid obesity     (BMI-41.2za)   • Muscle cramping    • Muscle spasm     Muscle spasms   • Neck arthritis    • Neck muscle spasm     Neck muscle spasm/Cervical strain   • Obesity    • Plantar fasciitis    • Prostatitis     recurrent   • Psoriasis    • Pulmonary embolus 07/2015    on Xarelltoypseerlipidemia   • Radiculopathy     Radiculopathy / Neuropathy left ar   • Renal insufficiency     followed by Dr. Mendes   • Seborrhea    • Shortness of breath    • Sleep apnea     Obstructive Sleep apnea worsening   • Urinary frequency    • Vascular disorder    • Vertigo    • Weight gain    • Wellness examination 10/23/2014    Annual Wellness Visit        Past Surgical History:   Procedure Laterality Date   • APPENDECTOMY  1965   • CARDIAC CATHETERIZATION  07/29/2010    [facility]   • COLONOSCOPY  01/05/2010   • COLONOSCOPY  10/01/2001   • NASAL SEPTUM SURGERY     • NOSE SURGERY  1999   • TURP / TRANSURETHRAL INCISION / DRAINAGE PROSTATE  10/23/2015    Michael Castro                             PT Assessment/Plan       09/27/17 7777       PT Assessment    Assessment Comments Continued with core and hip strenghtening today and discussed importance of TA with transitional movements. Worked on sit to supine and supine to sit utilizing log roll and TA activation. Discussed importance of compliance with HEP in order to increase core and hip strength required for increased functional movement.   -CN     PT Plan    PT Plan Comments Continue with skilled PT with emphasis on core and hip strengthening and flexibility.   -CN       User Key  (r) = Recorded By, (t) = Taken By, (c) = Cosigned By    Initials Name Provider Type    CN " Cindy Tom, PT Physical Therapist                    Exercises       09/27/17 0900          Subjective Comments    Subjective Comments It feels like it might be a little better when I leave here. I really just have problems with transitions.   -CN      Subjective Pain    Able to rate subjective pain? yes  -CN      Pre-Treatment Pain Level 4  -CN      Exercise 1    Exercise Name 1 See flowsheet in chart for ex details  -CN        User Key  (r) = Recorded By, (t) = Taken By, (c) = Cosigned By    Initials Name Provider Type    TIA Tom, PT Physical Therapist                               PT OP Goals       09/27/17 0900       PT Short Term Goals    STG 1 Patient will be independent with initial HEP for posture and ROM/flexiblity without increased symptoms  -CN     STG 1 Progress Progressing  -CN     STG 1 Progress Comments Pt states that he is performing exercises with bands at home.   -CN     STG 2 Patient will be educated on and demonstrate knowledge of optimal posture, proper techniques with transitional movements, good body mechanics, and proper lifting techniques to minimize strain to spine and soft tissues with performance of ADLs.  -CN     STG 2 Progress Ongoing  -CN     STG 2 Progress Comments Educated on body mechanics with transitional movements today.   -CN     STG 3 Patient will report >/= 25% increased ease/decreased symptoms with ADL's/transitional movements.  -CN     STG 3 Progress Ongoing  -CN     Long Term Goals    LTG 1 Patient will be independent with established HEP for strength/stabilization to facilitate self management of condition  -CN     LTG 1 Progress Ongoing  -CN     LTG 2 Patient will demonstrate grossly 50% trunk AROM with minimal pain for greater ease/tolerance with ADL's   -CN     LTG 2 Progress Ongoing  -CN     LTG 3 Patient will have >/= 1/2 grade improvement in core strength for greater ease/tolerance with prolonged positioning/activity  -CN     LTG 3  Progress Ongoing  -CN     LTG 4 Patient will improve score on Modified Oswestry Outcome Measures from 32% to </= 22% indicating reduced functional disability   -CN     LTG 4 Progress Ongoing  -CN       User Key  (r) = Recorded By, (t) = Taken By, (c) = Cosigned By    Initials Name Provider Type    TIA Tom PT Physical Therapist                Therapy Education       09/27/17 0909          Therapy Education    Given HEP;Symptoms/condition management;Pain management;Posture/body mechanics  -CN      Program Progressed  -CN      How Provided Verbal  -CN      Provided to Patient  -CN      Level of Understanding Teach back education performed;Verbalized  -CN        User Key  (r) = Recorded By, (t) = Taken By, (c) = Cosigned By    Initials Name Provider Type    TIA Tom PT Physical Therapist                Time Calculation:   Start Time: 0900  Stop Time: 0945  Time Calculation (min): 45 min    Therapy Charges for Today     Code Description Service Date Service Provider Modifiers Qty    75798282251 HC PT THER PROC EA 15 MIN 9/27/2017 Cindy Tom, PT GP 3                    Cindy Tom PT  9/27/2017

## 2017-10-02 ENCOUNTER — HOSPITAL ENCOUNTER (OUTPATIENT)
Dept: PHYSICAL THERAPY | Facility: HOSPITAL | Age: 72
Setting detail: THERAPIES SERIES
Discharge: HOME | End: 2017-10-02

## 2017-10-02 DIAGNOSIS — M43.16 SPONDYLOLISTHESIS OF LUMBAR REGION: ICD-10-CM

## 2017-10-02 DIAGNOSIS — G89.29 CHRONIC LOW BACK PAIN, UNSPECIFIED BACK PAIN LATERALITY, WITH SCIATICA PRESENCE UNSPECIFIED: Primary | ICD-10-CM

## 2017-10-02 DIAGNOSIS — M43.06 SPONDYLOLYSIS OF LUMBAR REGION: ICD-10-CM

## 2017-10-02 DIAGNOSIS — M54.5 CHRONIC LOW BACK PAIN, UNSPECIFIED BACK PAIN LATERALITY, WITH SCIATICA PRESENCE UNSPECIFIED: Primary | ICD-10-CM

## 2017-10-02 PROCEDURE — 97110 THERAPEUTIC EXERCISES: CPT

## 2017-10-02 NOTE — THERAPY TREATMENT NOTE
Outpatient Physical Therapy Ortho Treatment Note  [facility]     Patient Name: James Loaiza  : 1945  MRN: [id number]  Today's Date: 10/2/2017      Visit Date: 10/02/2017    Visit Dx:    ICD-10-CM ICD-9-CM   1. Chronic low back pain, unspecified back pain laterality, with sciatica presence unspecified M54.5 724.2    G89.29 338.29   2. Spondylolysis of lumbar region M43.06 738.4   3. Spondylolisthesis of lumbar region M43.16 738.4       Patient Active Problem List   Diagnosis   • Lumbar herniated disc L3-L5 3/16/16 Open MRI   • Abnormal electrocardiogram   • Abnormal weight gain   • Aortic valve insufficiency   • Coronary arteriosclerosis in native artery   • Benign essential hypertension (Ijeoma )   • Edema   • Dyspnea on exertion   • Fatigue   • Left ventricular hypertrophy   • Obstructive sleep apnea syndrome   • Arthritis of left hip   • Hx of pulmonary embolus 7/15   • Morbid obesity with BMI of 45.0-49.9, adult   • Intermittent dysphagia   • Chronic kidney disease (Lona)   • Hyperlipidemia    • BPH (benign prostatic hypertrophy)   • Left cervical radiculopathy   • Rotator cuff tear, left   • Cardiomegaly   • Plantar fasciitis   • Hypogonadism male   • Vitamin D deficiency disease   • Renal cyst, left   • Preventative health care   • Medication management   • Chronic low back pain   • Lumbar spondylolysis   • Headache        Past Medical History:   Diagnosis Date   • Abnormal EKG     referring to cardiology   • Anxiety    • Arthritis    • Back pain     worsening   • BPH (benign prostatic hyperplasia)     BPH/Nocturia/ Urinary Frequency   • Breast nodule     right   • Cardiomegaly     Cardiomegaly/ SOB with exertion   • Circulation problem    • Constipation    • Deviated septum    • DJD (degenerative joint disease)     DJD Knees   • Dysphagia     solids and liquids - resolved with normal studies   • Encounter for annual health examination 2013    Annual Health Assessment   • Enlarged  "prostate    • Fatigue     Extreme fatigue   • Hyperlipidemia 1999   • Hypertension 1999    followed Dr. Marcelo   • Hypogonadism male    • Left hip pain     and DJD   • Left leg pain    • Low back pain    • Moist mucous membranes of ear, nose, and throat     \"Mucous in throat\"   • Morbid obesity     (BMI-41.2za)   • Muscle cramping    • Muscle spasm     Muscle spasms   • Neck arthritis    • Neck muscle spasm     Neck muscle spasm/Cervical strain   • Obesity    • Plantar fasciitis    • Prostatitis     recurrent   • Psoriasis    • Pulmonary embolus 07/2015    on Xarelltoypseerlipidemia   • Radiculopathy     Radiculopathy / Neuropathy left ar   • Renal insufficiency     followed by Dr. Mendes   • Seborrhea    • Shortness of breath    • Sleep apnea     Obstructive Sleep apnea worsening   • Urinary frequency    • Vascular disorder    • Vertigo    • Weight gain    • Wellness examination 10/23/2014    Annual Wellness Visit        Past Surgical History:   Procedure Laterality Date   • APPENDECTOMY  1965   • CARDIAC CATHETERIZATION  07/29/2010    [facility]   • COLONOSCOPY  01/05/2010   • COLONOSCOPY  10/01/2001   • NASAL SEPTUM SURGERY     • NOSE SURGERY  1999   • TURP / TRANSURETHRAL INCISION / DRAINAGE PROSTATE  10/23/2015    Michael Castro                             PT Assessment/Plan       10/02/17 1056       PT Assessment    Assessment Comments Pt reports increased fluidity of movement with transitions and demonstrates increased hip strength with exercises. Pt states that he is not performing HEP daily and discussed importance of compliance with pt after D/C from formal therapy services to maintain gains. Pt agreeable to perform HEP outside of therapy sessions in order to continue to progress toward goals.   -CN     PT Plan    PT Plan Comments Continue with core and hip strengthening as tolerated.   -CN       User Key  (r) = Recorded By, (t) = Taken By, (c) = Cosigned By    Initials Name " Provider Type    TIA Tom, PT Physical Therapist                    Exercises       10/02/17 0900          Subjective Comments    Subjective Comments I have an easier time getting out of bed. The pain is the same but it doesn't last as long. My motion is more fluid now.   -CN      Subjective Pain    Able to rate subjective pain? yes  -CN      Pre-Treatment Pain Level 0  -CN      Exercise 1    Exercise Name 1 See flowsheet in chart for ex details  -CN        User Key  (r) = Recorded By, (t) = Taken By, (c) = Cosigned By    Initials Name Provider Type    TIA Tom, PT Physical Therapist                               PT OP Goals       10/02/17 0900       PT Short Term Goals    STG 1 Patient will be independent with initial HEP for posture and ROM/flexiblity without increased symptoms  -CN     STG 1 Progress Progressing  -CN     STG 1 Progress Comments Pt states that he does not perform HEP at home.   -CN     STG 2 Patient will be educated on and demonstrate knowledge of optimal posture, proper techniques with transitional movements, good body mechanics, and proper lifting techniques to minimize strain to spine and soft tissues with performance of ADLs.  -CN     STG 2 Progress Ongoing  -CN     STG 3 Patient will report >/= 25% increased ease/decreased symptoms with ADL's/transitional movements.  -CN     STG 3 Progress Ongoing  -CN     Long Term Goals    LTG 1 Patient will be independent with established HEP for strength/stabilization to facilitate self management of condition  -CN     LTG 1 Progress Ongoing  -CN     LTG 2 Patient will demonstrate grossly 50% trunk AROM with minimal pain for greater ease/tolerance with ADL's   -CN     LTG 2 Progress Ongoing  -CN     LTG 3 Patient will have >/= 1/2 grade improvement in core strength for greater ease/tolerance with prolonged positioning/activity  -CN     LTG 3 Progress Ongoing  -CN     LTG 4 Patient will improve score on Modified Oswestry  Outcome Measures from 32% to </= 22% indicating reduced functional disability   -CN     LTG 4 Progress Ongoing  -CN       User Key  (r) = Recorded By, (t) = Taken By, (c) = Cosigned By    Initials Name Provider Type    TIA Tom PT Physical Therapist                Therapy Education       10/02/17 0920          Therapy Education    Given HEP;Symptoms/condition management;Pain management;Posture/body mechanics  -CN      Program Progressed  -CN      How Provided Verbal  -CN      Provided to Patient  -CN      Level of Understanding Teach back education performed;Verbalized  -CN        User Key  (r) = Recorded By, (t) = Taken By, (c) = Cosigned By    Initials Name Provider Type    TIA Tom PT Physical Therapist                Time Calculation:   Start Time: 0900  Stop Time: 0945  Time Calculation (min): 45 min    Therapy Charges for Today     Code Description Service Date Service Provider Modifiers Qty    [id number]  PT THER PROC EA 15 MIN 10/2/2017 Cindy Tom, JOHNNY GP 3                    Cindy Tom PT  10/2/2017

## 2017-10-04 ENCOUNTER — HOSPITAL ENCOUNTER (OUTPATIENT)
Dept: PHYSICAL THERAPY | Facility: HOSPITAL | Age: 72
Setting detail: THERAPIES SERIES
Discharge: HOME | End: 2017-10-04

## 2017-10-04 DIAGNOSIS — M43.06 SPONDYLOLYSIS OF LUMBAR REGION: ICD-10-CM

## 2017-10-04 DIAGNOSIS — M54.5 CHRONIC LOW BACK PAIN, UNSPECIFIED BACK PAIN LATERALITY, WITH SCIATICA PRESENCE UNSPECIFIED: Primary | ICD-10-CM

## 2017-10-04 DIAGNOSIS — M43.16 SPONDYLOLISTHESIS OF LUMBAR REGION: ICD-10-CM

## 2017-10-04 DIAGNOSIS — G89.29 CHRONIC LOW BACK PAIN, UNSPECIFIED BACK PAIN LATERALITY, WITH SCIATICA PRESENCE UNSPECIFIED: Primary | ICD-10-CM

## 2017-10-04 PROCEDURE — 97110 THERAPEUTIC EXERCISES: CPT

## 2017-10-04 NOTE — THERAPY TREATMENT NOTE
Outpatient Physical Therapy Ortho Treatment Note  [facility]     Patient Name: James Loaiza  : 1945  MRN: [id number]  Today's Date: 10/4/2017      Visit Date: 10/04/2017    Visit Dx:    ICD-10-CM ICD-9-CM   1. Chronic low back pain, unspecified back pain laterality, with sciatica presence unspecified M54.5 724.2    G89.29 338.29   2. Spondylolysis of lumbar region M43.06 738.4   3. Spondylolisthesis of lumbar region M43.16 738.4       Patient Active Problem List   Diagnosis   • Lumbar herniated disc L3-L5 3/16/16 Open MRI   • Abnormal electrocardiogram   • Abnormal weight gain   • Aortic valve insufficiency   • Coronary arteriosclerosis in native artery   • Benign essential hypertension (Ijeoma )   • Edema   • Dyspnea on exertion   • Fatigue   • Left ventricular hypertrophy   • Obstructive sleep apnea syndrome   • Arthritis of left hip   • Hx of pulmonary embolus 7/15   • Morbid obesity with BMI of 45.0-49.9, adult   • Intermittent dysphagia   • Chronic kidney disease (Lona)   • Hyperlipidemia    • BPH (benign prostatic hypertrophy)   • Left cervical radiculopathy   • Rotator cuff tear, left   • Cardiomegaly   • Plantar fasciitis   • Hypogonadism male   • Vitamin D deficiency disease   • Renal cyst, left   • Preventative health care   • Medication management   • Chronic low back pain   • Lumbar spondylolysis   • Headache        Past Medical History:   Diagnosis Date   • Abnormal EKG     referring to cardiology   • Anxiety    • Arthritis    • Back pain     worsening   • BPH (benign prostatic hyperplasia)     BPH/Nocturia/ Urinary Frequency   • Breast nodule     right   • Cardiomegaly     Cardiomegaly/ SOB with exertion   • Circulation problem    • Constipation    • Deviated septum    • DJD (degenerative joint disease)     DJD Knees   • Dysphagia     solids and liquids - resolved with normal studies   • Encounter for annual health examination 2013    Annual Health Assessment   • Enlarged  "prostate    • Fatigue     Extreme fatigue   • Hyperlipidemia 1999   • Hypertension 1999    followed Dr. Marcelo   • Hypogonadism male    • Left hip pain     and DJD   • Left leg pain    • Low back pain    • Moist mucous membranes of ear, nose, and throat     \"Mucous in throat\"   • Morbid obesity     (BMI-41.2za)   • Muscle cramping    • Muscle spasm     Muscle spasms   • Neck arthritis    • Neck muscle spasm     Neck muscle spasm/Cervical strain   • Obesity    • Plantar fasciitis    • Prostatitis     recurrent   • Psoriasis    • Pulmonary embolus 07/2015    on Xarelltoypseerlipidemia   • Radiculopathy     Radiculopathy / Neuropathy left ar   • Renal insufficiency     followed by Dr. Mendes   • Seborrhea    • Shortness of breath    • Sleep apnea     Obstructive Sleep apnea worsening   • Urinary frequency    • Vascular disorder    • Vertigo    • Weight gain    • Wellness examination 10/23/2014    Annual Wellness Visit        Past Surgical History:   Procedure Laterality Date   • APPENDECTOMY  1965   • CARDIAC CATHETERIZATION  07/29/2010    [facility]   • COLONOSCOPY  01/05/2010   • COLONOSCOPY  10/01/2001   • NASAL SEPTUM SURGERY     • NOSE SURGERY  1999   • TURP / TRANSURETHRAL INCISION / DRAINAGE PROSTATE  10/23/2015    Michael Castro                             PT Assessment/Plan       10/04/17 1046       PT Assessment    Assessment Comments Pt reports reduced symptoms with bed mobility including rolling and supine to sit. Pt continues to report relief of symptoms with PT exercises, however unable to perform at home due to busy schedule. Continued to encourage HEP for maximal therapy benefit.   -CN     PT Plan    PT Plan Comments Continue treating for next 2-3 weeks with plan to D/C to independent HEP.   -CN       User Key  (r) = Recorded By, (t) = Taken By, (c) = Cosigned By    Initials Name Provider Type    TIA Tom, PT Physical Therapist                    Exercises     "   10/04/17 0900          Subjective Comments    Subjective Comments It has still been feeling really good. I can turn over in bed without that shooting pain.   -CN      Subjective Pain    Able to rate subjective pain? yes  -CN      Pre-Treatment Pain Level 0  -CN      Exercise 1    Exercise Name 1 See flowsheet in chart for ex details  -CN        User Key  (r) = Recorded By, (t) = Taken By, (c) = Cosigned By    Initials Name Provider Type    TIA Tom PT Physical Therapist                                   Therapy Education       10/04/17 0926          Therapy Education    Given HEP;Symptoms/condition management;Pain management;Posture/body mechanics  -CN      Program Progressed  -CN      How Provided Verbal  -CN      Provided to Patient  -CN      Level of Understanding Teach back education performed;Verbalized  -CN        User Key  (r) = Recorded By, (t) = Taken By, (c) = Cosigned By    Initials Name Provider Type    TIA Tom PT Physical Therapist                Time Calculation:   Start Time: 0903  Stop Time: 0945  Time Calculation (min): 42 min    Therapy Charges for Today     Code Description Service Date Service Provider Modifiers Qty    22274450720  PT THER PROC EA 15 MIN 10/4/2017 Cindy Tom PT GP 3                    Cindy Tom PT  10/4/2017

## 2017-10-09 ENCOUNTER — APPOINTMENT (OUTPATIENT)
Dept: PHYSICAL THERAPY | Facility: HOSPITAL | Age: 72
End: 2017-10-09

## 2017-10-10 ENCOUNTER — HOSPITAL ENCOUNTER (OUTPATIENT)
Dept: PHYSICAL THERAPY | Facility: HOSPITAL | Age: 72
Setting detail: THERAPIES SERIES
Discharge: HOME | End: 2017-10-10

## 2017-10-10 DIAGNOSIS — G89.29 CHRONIC LOW BACK PAIN, UNSPECIFIED BACK PAIN LATERALITY, WITH SCIATICA PRESENCE UNSPECIFIED: Primary | ICD-10-CM

## 2017-10-10 DIAGNOSIS — M54.5 CHRONIC LOW BACK PAIN, UNSPECIFIED BACK PAIN LATERALITY, WITH SCIATICA PRESENCE UNSPECIFIED: Primary | ICD-10-CM

## 2017-10-10 DIAGNOSIS — M43.06 SPONDYLOLYSIS OF LUMBAR REGION: ICD-10-CM

## 2017-10-10 DIAGNOSIS — M43.16 SPONDYLOLISTHESIS OF LUMBAR REGION: ICD-10-CM

## 2017-10-10 PROCEDURE — G8982 BODY POS GOAL STATUS: HCPCS

## 2017-10-10 PROCEDURE — 97110 THERAPEUTIC EXERCISES: CPT

## 2017-10-10 PROCEDURE — G8981 BODY POS CURRENT STATUS: HCPCS

## 2017-10-10 NOTE — THERAPY PROGRESS REPORT/RE-CERT
Outpatient Physical Therapy Ortho Treatment Note  [facility]     Patient Name: James Loaiza  : 1945  MRN: [id number]  Today's Date: 10/10/2017      Visit Date: 10/10/2017    Visit Dx:    ICD-10-CM ICD-9-CM   1. Chronic low back pain, unspecified back pain laterality, with sciatica presence unspecified M54.5 724.2    G89.29 338.29   2. Spondylolysis of lumbar region M43.06 738.4   3. Spondylolisthesis of lumbar region M43.16 738.4       Patient Active Problem List   Diagnosis   • Lumbar herniated disc L3-L5 3/16/16 Open MRI   • Abnormal electrocardiogram   • Abnormal weight gain   • Aortic valve insufficiency   • Coronary arteriosclerosis in native artery   • Benign essential hypertension (Ijeoma )   • Edema   • Dyspnea on exertion   • Fatigue   • Left ventricular hypertrophy   • Obstructive sleep apnea syndrome   • Arthritis of left hip   • Hx of pulmonary embolus 7/15   • Morbid obesity with BMI of 45.0-49.9, adult   • Intermittent dysphagia   • Chronic kidney disease (Lona)   • Hyperlipidemia    • BPH (benign prostatic hypertrophy)   • Left cervical radiculopathy   • Rotator cuff tear, left   • Cardiomegaly   • Plantar fasciitis   • Hypogonadism male   • Vitamin D deficiency disease   • Renal cyst, left   • Preventative health care   • Medication management   • Chronic low back pain   • Lumbar spondylolysis   • Headache        Past Medical History:   Diagnosis Date   • Abnormal EKG     referring to cardiology   • Anxiety    • Arthritis    • Back pain     worsening   • BPH (benign prostatic hyperplasia)     BPH/Nocturia/ Urinary Frequency   • Breast nodule     right   • Cardiomegaly     Cardiomegaly/ SOB with exertion   • Circulation problem    • Constipation    • Deviated septum    • DJD (degenerative joint disease)     DJD Knees   • Dysphagia     solids and liquids - resolved with normal studies   • Encounter for annual health examination 2013    Annual Health Assessment   •  "Enlarged prostate    • Fatigue     Extreme fatigue   • Hyperlipidemia 1999   • Hypertension 1999    followed Dr. Marcelo   • Hypogonadism male    • Left hip pain     and DJD   • Left leg pain    • Low back pain    • Moist mucous membranes of ear, nose, and throat     \"Mucous in throat\"   • Morbid obesity     (BMI-41.2za)   • Muscle cramping    • Muscle spasm     Muscle spasms   • Neck arthritis    • Neck muscle spasm     Neck muscle spasm/Cervical strain   • Obesity    • Plantar fasciitis    • Prostatitis     recurrent   • Psoriasis    • Pulmonary embolus 07/2015    on Xarelltoypseerlipidemia   • Radiculopathy     Radiculopathy / Neuropathy left ar   • Renal insufficiency     followed by Dr. Mendes   • Seborrhea    • Shortness of breath    • Sleep apnea     Obstructive Sleep apnea worsening   • Urinary frequency    • Vascular disorder    • Vertigo    • Weight gain    • Wellness examination 10/23/2014    Annual Wellness Visit        Past Surgical History:   Procedure Laterality Date   • APPENDECTOMY  1965   • CARDIAC CATHETERIZATION  07/29/2010    [facility]   • COLONOSCOPY  01/05/2010   • COLONOSCOPY  10/01/2001   • NASAL SEPTUM SURGERY     • NOSE SURGERY  1999   • TURP / TRANSURETHRAL INCISION / DRAINAGE PROSTATE  10/23/2015    Michael Castro             PT Ortho       10/10/17 1500    Trunk    Flexion ROM Details 75%  -CN    Extension ROM Details 50%  -CN    Lt Lat Flexion ROM Details 25% with slight pain  -CN    Right Lateral Flexion ROM Details 25% with slight pain  -CN    Lt Rotation ROM Details 25% with slight pain  -CN    Right Rotation ROM Details 25% with slight pain  -CN    Neck Trunk    Neck/Trunk Manual Muscle Testing Detail Core strength grossly 4-/5, however increased awareness of TA contraction  -CN      User Key  (r) = Recorded By, (t) = Taken By, (c) = Cosigned By    Initials Name Provider Type    CN Cindy Tom, PT Physical Therapist                          "   PT Assessment/Plan       10/10/17 1552       PT Assessment    Functional Limitations Impaired gait;Limitation in home management;Limitations in community activities;Performance in leisure activities  -CN     Impairments Posture;Range of motion;Muscle strength;Pain;Gait;Impaired flexibility  -CN     Assessment Comments Pt has attended 8 skilled therapy sessions for treatment of low back pain due to degenerative changes. Pt reports 60% improvement in symptoms with therapy exercises and states that he is better able to perform bed mobility such as rolling and sit to supine. Pt continues to have days with slightly increased pain and continues to have slight difficulty with bed mobility on days with increased symptoms, however these days are becoming fewer. Pt has not attempted squatting/stooping which increased pain and will attempt with correct form this week. Pt scored 20% on the Oswestry where 0% is no disability. Pt continues to be candidate for skilled therapy services to address remaining deficits and prepare for independent self management of symptoms.   -CN     Please refer to paper survey for additional self-reported information Yes  -CN     Rehab Potential Good  -CN     Patient/caregiver participated in establishment of treatment plan and goals Yes  -CN     Patient would benefit from skilled therapy intervention Yes  -CN     PT Plan    PT Frequency 2x/week;1x/week  -CN     Predicted Duration of Therapy Intervention (days/wks) 3 weeks  -CN     Planned CPT's? PT RE-EVAL: 87430;PT THER PROC EA 15 MIN: 95610;PT THER ACT EA 15 MIN: 70595;PT MANUAL THERAPY EA 15 MIN: 61399;PT NEUROMUSC RE-EDUCATION EA 15 MIN: 98592;PT GAIT TRAINING EA 15 MIN: 10385;PT HOT OR COLD PACK TREAT MCARE;PT ELECTRICAL STIM UNATTEND:   -CN     Physical Therapy Interventions (Optional Details) lumbar stabilization;ROM (Range of Motion);stair training;strengthening;stretching;modalities;manual therapy techniques;bed mobility  training;neuromuscular re-education;patient/family education;home exercise program;postural re-education  -CN     PT Plan Comments Continue treating for next 2-3 weeks 1-2x/week with emphasis on D/C to independent HEP.   -CN       User Key  (r) = Recorded By, (t) = Taken By, (c) = Cosigned By    Initials Name Provider Type    TIA Tom, PT Physical Therapist                    Exercises       10/10/17 1500          Subjective Comments    Subjective Comments It was really achey yesterday and I don't know why. It was a little harder to roll in bed last night too.   -CN      Subjective Pain    Able to rate subjective pain? yes  -CN      Pre-Treatment Pain Level 3  -CN      Exercise 1    Exercise Name 1 See flowsheet in chart for ex details  -CN        User Key  (r) = Recorded By, (t) = Taken By, (c) = Cosigned By    Initials Name Provider Type    TIA Tom, PT Physical Therapist                               PT OP Goals       10/10/17 1500       PT Short Term Goals    STG 1 Patient will be independent with initial HEP for posture and ROM/flexiblity without increased symptoms  -CN     STG 1 Progress Progressing  -CN     STG 1 Progress Comments Pt states that due to time contraints, unable to perform HEP outside of PT.   -CN     STG 2 Patient will be educated on and demonstrate knowledge of optimal posture, proper techniques with transitional movements, good body mechanics, and proper lifting techniques to minimize strain to spine and soft tissues with performance of ADLs.  -CN     STG 2 Progress Met  -CN     STG 2 Progress Comments Pt performing good body mechanics with bed mobility activities.   -CN     STG 3 Patient will report >/= 25% increased ease/decreased symptoms with ADL's/transitional movements.  -CN     STG 3 Progress Met  -CN     STG 3 Progress Comments Pt reports 60% improvement in symptoms with transitional movements.   -CN     Long Term Goals    LTG 1 Patient will be  independent with established HEP for strength/stabilization to facilitate self management of condition  -CN     LTG 1 Progress Ongoing  -CN     LTG 2 Patient will demonstrate grossly 50% trunk AROM with minimal pain for greater ease/tolerance with ADL's   -CN     LTG 2 Progress Ongoing  -CN     LTG 2 Progress Comments Pt continuing to have difficulty with B SBing and rotation.  -CN     LTG 3 Patient will have >/= 1/2 grade improvement in core strength for greater ease/tolerance with prolonged positioning/activity  -CN     LTG 3 Progress Ongoing  -CN     LTG 3 Progress Comments Continues with weakness in core, however improved awareness of TA activation.   -CN     LTG 4 Patient will improve score on Modified Oswestry Outcome Measures from 32% to </= 22% indicating reduced functional disability   -CN     LTG 4 Progress Met  -CN     LTG 4 Progress Comments Pt scored 20% on the Oswestry where 0% is no disability.   -CN       User Key  (r) = Recorded By, (t) = Taken By, (c) = Cosigned By    Initials Name Provider Type    TIA Tom PT Physical Therapist                Therapy Education       10/10/17 1541          Therapy Education    Given HEP;Symptoms/condition management;Pain management;Posture/body mechanics  -CN      Program Progressed  -CN      How Provided Verbal  -CN      Provided to Patient  -CN      Level of Understanding Teach back education performed;Verbalized  -CN        User Key  (r) = Recorded By, (t) = Taken By, (c) = Cosigned By    Initials Name Provider Type    TIA Tom PT Physical Therapist                Outcome Measures       10/10/17 1600          Modified Oswestry    Modified Oswestry Score/Comments 20% where 0% is no disability  -TIA      Functional Assessment    Outcome Measure Options Modifed Owestry  -CN        User Key  (r) = Recorded By, (t) = Taken By, (c) = Cosigned By    Initials Name Provider Type    TIA Tom PT Physical Therapist             Time Calculation:   Start Time: 1530  Stop Time: 1615  Time Calculation (min): 45 min    Therapy Charges for Today     Code Description Service Date Service Provider Modifiers Qty    92578274742 HC PT THER PROC EA 15 MIN 10/10/2017 Cindy Tom, PT GP 3    60654300320 HC PT [facility] MAIN POS CURRENT 10/10/2017 Cindy Tom, PT GP, CI 1    70680589063 HC PT [facility] MAIN POS PROJECTED 10/10/2017 Cindy Tom, PT GP, CI 1          PT G-Codes  PT Professional Judgement Used?: Yes  Outcome Measure Options: Didier Song  Score: 20% where 0% is no disability  Functional Limitation: Changing and maintaining body position  Changing and Maintaining Body Position Current Status (): At least 1 percent but less than 20 percent impaired, limited or restricted  Changing and Maintaining Body Position Goal Status (): At least 1 percent but less than 20 percent impaired, limited or restricted         Cindy Tom, PT  10/10/2017

## 2017-10-11 ENCOUNTER — APPOINTMENT (OUTPATIENT)
Dept: PHYSICAL THERAPY | Facility: HOSPITAL | Age: 72
End: 2017-10-11

## 2017-10-12 ENCOUNTER — APPOINTMENT (OUTPATIENT)
Dept: PHYSICAL THERAPY | Facility: HOSPITAL | Age: 72
End: 2017-10-12

## 2017-10-14 RX ORDER — RAMIPRIL 5 MG/1
5 CAPSULE ORAL DAILY
Qty: 90 CAPSULE | Refills: 1 | Status: SHIPPED | OUTPATIENT
Start: 2017-10-14 | End: 2018-05-14 | Stop reason: SDUPTHER

## 2017-10-18 ENCOUNTER — HOSPITAL ENCOUNTER (OUTPATIENT)
Dept: PHYSICAL THERAPY | Facility: HOSPITAL | Age: 72
Setting detail: THERAPIES SERIES
Discharge: HOME | End: 2017-10-18

## 2017-10-18 DIAGNOSIS — M43.06 SPONDYLOLYSIS OF LUMBAR REGION: ICD-10-CM

## 2017-10-18 DIAGNOSIS — G89.29 CHRONIC LOW BACK PAIN, UNSPECIFIED BACK PAIN LATERALITY, WITH SCIATICA PRESENCE UNSPECIFIED: Primary | ICD-10-CM

## 2017-10-18 DIAGNOSIS — M43.16 SPONDYLOLISTHESIS OF LUMBAR REGION: ICD-10-CM

## 2017-10-18 DIAGNOSIS — M54.5 CHRONIC LOW BACK PAIN, UNSPECIFIED BACK PAIN LATERALITY, WITH SCIATICA PRESENCE UNSPECIFIED: Primary | ICD-10-CM

## 2017-10-18 PROCEDURE — 97110 THERAPEUTIC EXERCISES: CPT

## 2017-10-18 NOTE — THERAPY TREATMENT NOTE
Outpatient Physical Therapy Ortho Treatment Note  [facility]     Patient Name: James Loaiza  : 1945  MRN: [id number]  Today's Date: 10/18/2017      Visit Date: 10/18/2017    Visit Dx:    ICD-10-CM ICD-9-CM   1. Chronic low back pain, unspecified back pain laterality, with sciatica presence unspecified M54.5 724.2    G89.29 338.29   2. Spondylolysis of lumbar region M43.06 738.4   3. Spondylolisthesis of lumbar region M43.16 738.4       Patient Active Problem List   Diagnosis   • Lumbar herniated disc L3-L5 3/16/16 Open MRI   • Abnormal electrocardiogram   • Abnormal weight gain   • Aortic valve insufficiency   • Coronary arteriosclerosis in native artery   • Benign essential hypertension (Ijeoma )   • Edema   • Dyspnea on exertion   • Fatigue   • Left ventricular hypertrophy   • Obstructive sleep apnea syndrome   • Arthritis of left hip   • Hx of pulmonary embolus 7/15   • Morbid obesity with BMI of 45.0-49.9, adult   • Intermittent dysphagia   • Chronic kidney disease (Lona)   • Hyperlipidemia    • BPH (benign prostatic hypertrophy)   • Left cervical radiculopathy   • Rotator cuff tear, left   • Cardiomegaly   • Plantar fasciitis   • Hypogonadism male   • Vitamin D deficiency disease   • Renal cyst, left   • Preventative health care   • Medication management   • Chronic low back pain   • Lumbar spondylolysis   • Headache        Past Medical History:   Diagnosis Date   • Abnormal EKG     referring to cardiology   • Anxiety    • Arthritis    • Back pain     worsening   • BPH (benign prostatic hyperplasia)     BPH/Nocturia/ Urinary Frequency   • Breast nodule     right   • Cardiomegaly     Cardiomegaly/ SOB with exertion   • Circulation problem    • Constipation    • Deviated septum    • DJD (degenerative joint disease)     DJD Knees   • Dysphagia     solids and liquids - resolved with normal studies   • Encounter for annual health examination 2013    Annual Health Assessment   •  "Enlarged prostate    • Fatigue     Extreme fatigue   • Hyperlipidemia 1999   • Hypertension 1999    followed Dr. Marcelo   • Hypogonadism male    • Left hip pain     and DJD   • Left leg pain    • Low back pain    • Moist mucous membranes of ear, nose, and throat     \"Mucous in throat\"   • Morbid obesity     (BMI-41.2za)   • Muscle cramping    • Muscle spasm     Muscle spasms   • Neck arthritis    • Neck muscle spasm     Neck muscle spasm/Cervical strain   • Obesity    • Plantar fasciitis    • Prostatitis     recurrent   • Psoriasis    • Pulmonary embolus 07/2015    on Xarelltoypseerlipidemia   • Radiculopathy     Radiculopathy / Neuropathy left ar   • Renal insufficiency     followed by Dr. Mendes   • Seborrhea    • Shortness of breath    • Sleep apnea     Obstructive Sleep apnea worsening   • Urinary frequency    • Vascular disorder    • Vertigo    • Weight gain    • Wellness examination 10/23/2014    Annual Wellness Visit        Past Surgical History:   Procedure Laterality Date   • APPENDECTOMY  1965   • CARDIAC CATHETERIZATION  07/29/2010    [facility]   • COLONOSCOPY  01/05/2010   • COLONOSCOPY  10/01/2001   • NASAL SEPTUM SURGERY     • NOSE SURGERY  1999   • TURP / TRANSURETHRAL INCISION / DRAINAGE PROSTATE  10/23/2015    Michael Castro                             PT Assessment/Plan       10/18/17 1023       PT Assessment    Assessment Comments Continue to work on postural education and strengthening  -       User Key  (r) = Recorded By, (t) = Taken By, (c) = Cosigned By    Initials Name Provider Type    WYATT Lee PTA Physical Therapy Assistant                    Exercises       10/18/17 0940          Subjective Comments    Subjective Comments Pain is in the moderate range  -WYATT      Subjective Pain    Able to rate subjective pain? yes  -WS      Pre-Treatment Pain Level 5  -WS      Exercise 1    Exercise Name 1 See flowsheet in chart for ex details  -        User Key  " (r) = Recorded By, (t) = Taken By, (c) = Cosigned By    Initials Name Provider Type    WYATT Lee PTA Physical Therapy Assistant                               PT OP Goals       10/18/17 1000       PT Short Term Goals    STG 1 Patient will be independent with initial HEP for posture and ROM/flexiblity without increased symptoms  -WS     STG 1 Progress Progressing  -WS     STG 2 Patient will be educated on and demonstrate knowledge of optimal posture, proper techniques with transitional movements, good body mechanics, and proper lifting techniques to minimize strain to spine and soft tissues with performance of ADLs.  -WS     STG 2 Progress Met  -WS     STG 3 Patient will report >/= 25% increased ease/decreased symptoms with ADL's/transitional movements.  -WS     STG 3 Progress Met  -WS     Long Term Goals    LTG 1 Patient will be independent with established HEP for strength/stabilization to facilitate self management of condition  -WS     LTG 1 Progress Ongoing  -WS     LTG 2 Patient will demonstrate grossly 50% trunk AROM with minimal pain for greater ease/tolerance with ADL's   -WS     LTG 2 Progress Ongoing  -WS     LTG 3 Patient will have >/= 1/2 grade improvement in core strength for greater ease/tolerance with prolonged positioning/activity  -WS     LTG 3 Progress Ongoing  -WS     LTG 4 Patient will improve score on Modified Oswestry Outcome Measures from 32% to </= 22% indicating reduced functional disability   -WS     LTG 4 Progress Met  -WS       User Key  (r) = Recorded By, (t) = Taken By, (c) = Cosigned By    Initials Name Provider Type    WYATT Lee PTA Physical Therapy Assistant                Therapy Education       10/18/17 1023          Therapy Education    Given HEP;Symptoms/condition management;Pain management;Posture/body mechanics  -WS      Program Reinforced  -WS      How Provided Verbal  -WS      Provided to Patient  -WS        User Key  (r) = Recorded By, (t) = Taken By,  (c) = Cosigned By    Initials Name Provider Type    WS Franky Lee PTA Physical Therapy Assistant                Time Calculation:   Start Time: 0940  Stop Time: 1025  Time Calculation (min): 45 min    Therapy Charges for Today     Code Description Service Date Service Provider Modifiers Qty    00697981813 HC PT THER PROC EA 15 MIN 10/18/2017 Franky Lee PTA GP 3                    Franky Lee PTA  10/18/2017

## 2017-10-23 ENCOUNTER — OFFICE VISIT (OUTPATIENT)
Dept: PAIN MEDICINE | Facility: CLINIC | Age: 72
End: 2017-10-23

## 2017-10-23 VITALS
BODY MASS INDEX: 40.43 KG/M2 | TEMPERATURE: 98.4 F | WEIGHT: 315 LBS | HEIGHT: 74 IN | HEART RATE: 73 BPM | DIASTOLIC BLOOD PRESSURE: 79 MMHG | RESPIRATION RATE: 16 BRPM | OXYGEN SATURATION: 94 % | SYSTOLIC BLOOD PRESSURE: 167 MMHG

## 2017-10-23 DIAGNOSIS — G89.29 OTHER CHRONIC PAIN: Primary | ICD-10-CM

## 2017-10-23 DIAGNOSIS — M51.26 LUMBAR HERNIATED DISC: ICD-10-CM

## 2017-10-23 DIAGNOSIS — M54.5 CHRONIC LOW BACK PAIN, UNSPECIFIED BACK PAIN LATERALITY, WITH SCIATICA PRESENCE UNSPECIFIED: ICD-10-CM

## 2017-10-23 DIAGNOSIS — G89.29 CHRONIC LOW BACK PAIN, UNSPECIFIED BACK PAIN LATERALITY, WITH SCIATICA PRESENCE UNSPECIFIED: ICD-10-CM

## 2017-10-23 DIAGNOSIS — M43.06 LUMBAR SPONDYLOLYSIS: ICD-10-CM

## 2017-10-23 PROCEDURE — 99213 OFFICE O/P EST LOW 20 MIN: CPT | Performed by: NURSE PRACTITIONER

## 2017-10-23 NOTE — PROGRESS NOTES
"CHIEF COMPLAINT  Pt states episodic center LBP is basically unchanged,being moderately controlled overall.  Pt will finish PT on 10/24 17,with PT helping to \"stablelize\" back pain.    Subjective   James Loaiza is a 72 y.o. male  who presents to the office for follow-up.He has a history of chronic low back pain.     Overall his pain is unchanged. He has been in PT for the past 5-6 weeks.  He does feel that he is getting some benefit from the PT overall, moderate benefit.  Also trialed on Diclofenac, he did not start because his nephrologist did not recommend taking it.    Bilateral lumbar RFL @ L3-5 on 3/9/17 - he reports \"marginal\" benefit.  He reports that it tends to take a few months for it to fully kick in.  This does help him sleep comfortably through the night.  It has not helped as much as the first RFL he had a few years ago with [facility], which provided more significant benefit.      He continues to report back pain.  Denies any symptoms in the leg.  The pain is aggravated by changing positions, prolonged standing, walking.  It is improved with sitting, rest.      Back Pain   This is a recurrent problem. The current episode started more than 1 month ago. The problem occurs constantly. The pain is present in the lumbar spine. The quality of the pain is described as aching. The pain does not radiate. The pain is at a severity of 0/10. The pain is moderate. The pain is worse during the night (frequent urination at night which increases his pain--getting up and out of bed). The symptoms are aggravated by standing and twisting. Stiffness is present all day. Pertinent negatives include no bladder incontinence, bowel incontinence, chest pain, dysuria, fever, headaches, numbness or weakness. Risk factors include lack of exercise, obesity and recent trauma. Treatments tried: stiffness decreases with movement and walking. takes OTC IBU PRN.      PEG Assessment   What number best describes your pain on " "average in the past week?5  What number best describes how, during the past week, pain has interfered with your enjoyment of life?7  What number best describes how, during the past week, pain has interfered with your general activity?  5    The following portions of the patient's history were reviewed and updated as appropriate: allergies, current medications, past family history, past medical history, past social history, past surgical history and problem list.    Review of Systems   Constitutional: Negative for activity change, appetite change, chills, fatigue and fever.   HENT: Negative for congestion.    Eyes: Negative for visual disturbance.   Respiratory: Positive for apnea (cpap). Negative for cough, shortness of breath and wheezing.    Cardiovascular: Negative.  Negative for chest pain.   Gastrointestinal: Negative for bowel incontinence, constipation, diarrhea, nausea and vomiting.   Genitourinary: Negative for bladder incontinence, difficulty urinating and dysuria.   Musculoskeletal: Positive for back pain (with occasional legs cramping).   Neurological: Negative for dizziness, weakness, light-headedness, numbness and headaches.   Psychiatric/Behavioral: Positive for sleep disturbance. Negative for agitation, confusion, hallucinations and suicidal ideas. The patient is not nervous/anxious.      Vitals:    10/23/17 1524   BP: 167/79   Pulse: 73   Resp: 16   Temp: 98.4 °F (36.9 °C)   SpO2: 94%   Weight: (!) 339 lb (154 kg)   Height: 74\" (188 cm)   PainSc: 0-No pain  Comment: center LBP ranges from 0-7/10   PainLoc: Back     Objective   Physical Exam   Constitutional: He is oriented to person, place, and time. Vital signs are normal. He appears well-developed and well-nourished. He is cooperative.   HENT:   Head: Normocephalic and atraumatic.   Nose: Nose normal.   Eyes: Conjunctivae and lids are normal.   Cardiovascular: Normal rate.    Pulmonary/Chest: Effort normal. No respiratory distress.   Abdominal: " "Normal appearance.   Musculoskeletal:        Lumbar back: He exhibits tenderness and pain. He exhibits no bony tenderness.   Minimal tenderness of bilateral L2-L5 facets  Negative SLR bilaterally    Neurological: He is alert and oriented to person, place, and time. Gait (slowed) abnormal.   Reflex Scores:       Patellar reflexes are 1+ on the right side and 1+ on the left side.  Skin: Skin is warm, dry and intact.   Psychiatric: He has a normal mood and affect. His speech is normal and behavior is normal. Judgment and thought content normal. Cognition and memory are normal.   Nursing note and vitals reviewed.      Assessment/Plan   James was seen today for back pain.    Diagnoses and all orders for this visit:    Other chronic pain    Chronic low back pain, unspecified back pain laterality, with sciatica presence unspecified    Lumbar herniated disc L3-L5 3/16/16 Open MRI    Lumbar spondylolysis  -     Cancel: Case Request      --- Consider repeating lumbar MBB/RFL PRN - bilateral L2-L5   --- F/u 2 months          DILAN REPORT  DILAN report has been reviewed and scanned into the patient's chart.  Date of last DILAN : 10/20/2017  15 minute visit, greater than 50% spent in education/counseling    -------  Education about Medial Branch Blockade and RF Therapy:    This medial branch blockade (MBB) suggested is intended for diagnostic purposes, with the intent of offering the patient Radiofrequency thermal rhizotomy (RF) if the MBB is diagnostically effective.  The diagnostic blockade is necessary to determine the likelihood that RF therapy could be efficacious in providing long term relief to the patient.    Medial branches are sensory nerve branches that connect to a facet joint and transmit sensations & pain signals from that joint.  Facet is a term for the type of joints found in the spine.  Medial branches are the nerves that go to a facet, and therefore are also sometimes called \"facet joint nerves\" (FJNs).  "     In a medial branch blockade procedure, xray fluoroscopy is used to verify the locations of the outside of the joint lines which are being targeted.  Under xray guidance, needles are placed to these areas.  Contrast dye is injected to confirm proper placement, with dye flowing over the joint area, and to ensure that the dye does not flow into unintended areas such as a vein.  When this is confirmed, local anesthetic is injected to block the medial branch at that joint level.      If MBBs are diagnostically successful in blocking pain, then the patient is most likely a great candidate for Radiofrequency of those facet joint nerves.  In the RF procedure, needles are placed to the joint lines in the same fashion, and after testing, the needle tips are heated to thermally treat the nerves, blocking the nerves by in essence damaging the nerves with the heat treatment.       Medically, a successful RF procedure should provide a patient with 50% pain relief or more for at least 6 months.  Clinical experience suggests that successful patients receive relief more in the range of 12 months on average.  We also discussed that a fortunate minority of patients receive therapeutic success from the MBB, and may not require RF ablation.  If a patient receives more than 8 weeks of relief from MBB, then occasional repeat MBB for therapeutic purposes is a very reasonable alternative therapy.  This course of therapy is consistent with our LCDs according to our CMS  in the area, and therefore other insurance providers should follow accordingly.  We will monitor our patients to screen for these therapeutic responders and will offer RF therapy only when necessary.        We discussed that MBB & RF are not without risks.  Guidelines regarding anticoagulant use & neuraxial procedures will be respected.  Patients that are ill or otherwise may be at risk for sepsis will not have their spines accessed by neuraxial injections of  any type.  This patient will not be offered these therapies if there is an increased risk.   We discussed that there is a risk of postprocedural pain and also a risk of worsening of clinical picture with these procedures as with any neuraxial procedure.    -------

## 2017-10-23 NOTE — PATIENT INSTRUCTIONS
"-------  Education about Medial Branch Blockade and RF Therapy:    This medial branch blockade (MBB) suggested is intended for diagnostic purposes, with the intent of offering the patient Radiofrequency thermal rhizotomy (RF) if the MBB is diagnostically effective.  The diagnostic blockade is necessary to determine the likelihood that RF therapy could be efficacious in providing long term relief to the patient.    Medial branches are sensory nerve branches that connect to a facet joint and transmit sensations & pain signals from that joint.  Facet is a term for the type of joints found in the spine.  Medial branches are the nerves that go to a facet, and therefore are also sometimes called \"facet joint nerves\" (FJNs).      In a medial branch blockade procedure, xray fluoroscopy is used to verify the locations of the outside of the joint lines which are being targeted.  Under xray guidance, needles are placed to these areas.  Contrast dye is injected to confirm proper placement, with dye flowing over the joint area, and to ensure that the dye does not flow into unintended areas such as a vein.  When this is confirmed, local anesthetic is injected to block the medial branch at that joint level.      If MBBs are diagnostically successful in blocking pain, then the patient is most likely a great candidate for Radiofrequency of those facet joint nerves.  In the RF procedure, needles are placed to the joint lines in the same fashion, and after testing, the needle tips are heated to thermally treat the nerves, blocking the nerves by in essence damaging the nerves with the heat treatment.       Medically, a successful RF procedure should provide a patient with 50% pain relief or more for at least 6 months.  Clinical experience suggests that successful patients receive relief more in the range of 12 months on average.  We also discussed that a fortunate minority of patients receive therapeutic success from the MBB, and may " not require RF ablation.  If a patient receives more than 8 weeks of relief from MBB, then occasional repeat MBB for therapeutic purposes is a very reasonable alternative therapy.  This course of therapy is consistent with our LCDs according to our CMS  in the area, and therefore other insurance providers should follow accordingly.  We will monitor our patients to screen for these therapeutic responders and will offer RF therapy only when necessary.        We discussed that MBB & RF are not without risks.  Guidelines regarding anticoagulant use & neuraxial procedures will be respected.  Patients that are ill or otherwise may be at risk for sepsis will not have their spines accessed by neuraxial injections of any type.  This patient will not be offered these therapies if there is an increased risk.   We discussed that there is a risk of postprocedural pain and also a risk of worsening of clinical picture with these procedures as with any neuraxial procedure.    -------

## 2017-10-24 ENCOUNTER — HOSPITAL ENCOUNTER (OUTPATIENT)
Dept: PHYSICAL THERAPY | Facility: HOSPITAL | Age: 72
Setting detail: THERAPIES SERIES
Discharge: HOME | End: 2017-10-24

## 2017-10-24 DIAGNOSIS — G89.29 CHRONIC LOW BACK PAIN, UNSPECIFIED BACK PAIN LATERALITY, WITH SCIATICA PRESENCE UNSPECIFIED: Primary | ICD-10-CM

## 2017-10-24 DIAGNOSIS — M54.5 CHRONIC LOW BACK PAIN, UNSPECIFIED BACK PAIN LATERALITY, WITH SCIATICA PRESENCE UNSPECIFIED: Primary | ICD-10-CM

## 2017-10-24 DIAGNOSIS — M43.16 SPONDYLOLISTHESIS OF LUMBAR REGION: ICD-10-CM

## 2017-10-24 DIAGNOSIS — M43.06 SPONDYLOLYSIS OF LUMBAR REGION: ICD-10-CM

## 2017-10-24 PROCEDURE — 97110 THERAPEUTIC EXERCISES: CPT

## 2017-10-24 NOTE — THERAPY DISCHARGE NOTE
Outpatient Physical Therapy Ortho Treatment Note/Discharge Summary  [facility]     Patient Name: James Loaiza  : 1945  MRN: [id number]  Today's Date: 10/24/2017      Visit Date: 10/24/2017    Visit Dx:    ICD-10-CM ICD-9-CM   1. Chronic low back pain, unspecified back pain laterality, with sciatica presence unspecified M54.5 724.2    G89.29 338.29   2. Spondylolysis of lumbar region M43.06 738.4   3. Spondylolisthesis of lumbar region M43.16 738.4       Patient Active Problem List   Diagnosis   • Lumbar herniated disc L3-L5 3/16/16 Open MRI   • Abnormal electrocardiogram   • Abnormal weight gain   • Aortic valve insufficiency   • Coronary arteriosclerosis in native artery   • Benign essential hypertension ([place]je[place] )   • Edema   • Dyspnea on exertion   • Fatigue   • Left ventricular hypertrophy   • Obstructive sleep apnea syndrome   • Arthritis of left hip   • Hx of pulmonary embolus 7/15   • Morbid obesity with BMI of 45.0-49.9, adult   • Intermittent dysphagia   • Chronic kidney disease ([place])   • Hyperlipidemia    • BPH (benign prostatic hypertrophy)   • Left cervical radiculopathy   • Rotator cuff tear, left   • Cardiomegaly   • Plantar fasciitis   • Hypogonadism male   • Vitamin D deficiency disease   • Renal cyst, left   • Preventative health care   • Medication management   • Chronic low back pain   • Lumbar spondylolysis   • Headache        Past Medical History:   Diagnosis Date   • Abnormal EKG     referring to cardiology   • Anxiety    • Arthritis    • Back pain     worsening   • BPH (benign prostatic hyperplasia)     BPH/Nocturia/ Urinary Frequency   • Breast nodule     right   • Cardiomegaly     Cardiomegaly/ SOB with exertion   • Circulation problem    • Constipation    • Deviated septum    • DJD (degenerative joint disease)     DJD Knees   • Dysphagia     solids and liquids - resolved with normal studies   • Encounter for annual health examination 2013    Annual Health  "Assessment   • Enlarged prostate    • Fatigue     Extreme fatigue   • Hyperlipidemia 1999   • Hypertension 1999    followed Dr. Marcelo   • Hypogonadism male    • Left hip pain     and DJD   • Left leg pain    • Low back pain    • Moist mucous membranes of ear, nose, and throat     \"Mucous in throat\"   • Morbid obesity     (BMI-41.2za)   • Muscle cramping    • Muscle spasm     Muscle spasms   • Neck arthritis    • Neck muscle spasm     Neck muscle spasm/Cervical strain   • Obesity    • Plantar fasciitis    • Prostatitis     recurrent   • Psoriasis    • Pulmonary embolus 07/2015    on Xarelltoypseerlipidemia   • Radiculopathy     Radiculopathy / Neuropathy left ar   • Renal insufficiency     followed by Dr. Mendes   • Seborrhea    • Shortness of breath    • Sleep apnea     Obstructive Sleep apnea worsening   • Urinary frequency    • Vascular disorder    • Vertigo    • Weight gain    • Wellness examination 10/23/2014    Annual Wellness Visit        Past Surgical History:   Procedure Laterality Date   • APPENDECTOMY  1965   • CARDIAC CATHETERIZATION  07/29/2010    [facility]   • COLONOSCOPY  01/05/2010   • COLONOSCOPY  10/01/2001   • NASAL SEPTUM SURGERY     • NOSE SURGERY  1999   • TURP / TRANSURETHRAL INCISION / DRAINAGE PROSTATE  10/23/2015    Michael Castro             PT Ortho       10/24/17 1400    Trunk    Flexion ROM Details 75%  -CN    Extension ROM Details 50%  -CN    Lt Lat Flexion ROM Details 25% with tightness  -CN    Right Lateral Flexion ROM Details 25 with tightness  -CN    Lt Rotation ROM Details 25% with tightness  -CN    Right Rotation ROM Details 25% with tightness  -CN      User Key  (r) = Recorded By, (t) = Taken By, (c) = Cosigned By    Initials Name Provider Type    TIA Tom, PT Physical Therapist                                    Exercises       10/24/17 1400          Subjective Comments    Subjective Comments It has been feeling ok. I went to the " pain doctor yesterday and I might get another shot in December.   -CN      Subjective Pain    Able to rate subjective pain? yes  -CN      Pre-Treatment Pain Level 3  -CN      Exercise 1    Exercise Name 1 See flowsheet in chart for ex details  -CN        User Key  (r) = Recorded By, (t) = Taken By, (c) = Cosigned By    Initials Name Provider Type    TIA Tom, PT Physical Therapist                               PT OP Goals       10/24/17 1400       PT Short Term Goals    STG 1 Patient will be independent with initial HEP for posture and ROM/flexiblity without increased symptoms  -CN     STG 1 Progress Partially Met  -CN     STG 1 Progress Comments Readministered HEP handouts and encouraged to perform 5x/week to maintain therapy gains.   -CN     STG 2 Patient will be educated on and demonstrate knowledge of optimal posture, proper techniques with transitional movements, good body mechanics, and proper lifting techniques to minimize strain to spine and soft tissues with performance of ADLs.  -CN     STG 2 Progress Met  -CN     STG 3 Patient will report >/= 25% increased ease/decreased symptoms with ADL's/transitional movements.  -CN     STG 3 Progress Met  -CN     Long Term Goals    LTG 1 Patient will be independent with established HEP for strength/stabilization to facilitate self management of condition  -CN     LTG 1 Progress Partially Met  -CN     LTG 1 Progress Comments Readministered handouts today and reinforced importance of HEP performance.   -CN     LTG 2 Patient will demonstrate grossly 50% trunk AROM with minimal pain for greater ease/tolerance with ADL's   -CN     LTG 2 Progress Partially Met  -CN     LTG 2 Progress Comments Pt demonstrates continue stiffness into SBing and rotation wth stiffness B.   -CN     LTG 3 Patient will have >/= 1/2 grade improvement in core strength for greater ease/tolerance with prolonged positioning/activity  -CN     LTG 3 Progress Partially Met  -CN     LTG 3  Progress Comments Pt demonstrates improvement in core strength, however continues with slight weakness with transitional movements.   -CN     LTG 4 Patient will improve score on Modified Oswestry Outcome Measures from 32% to </= 22% indicating reduced functional disability   -CN     LTG 4 Progress Met  -CN       User Key  (r) = Recorded By, (t) = Taken By, (c) = Cosigned By    Initials Name Provider Type    TIA Tom PT Physical Therapist                Therapy Education       10/24/17 1413          Therapy Education    Given HEP;Symptoms/condition management;Pain management;Posture/body mechanics  -CN      Program Reinforced  -CN      How Provided Verbal;Written  -CN      Provided to Patient  -CN      Level of Understanding Teach back education performed;Verbalized  -CN        User Key  (r) = Recorded By, (t) = Taken By, (c) = Cosigned By    Initials Name Provider Type    TIA Tom PT Physical Therapist                Outcome Measures       10/24/17 1400          Modified Oswestry    Modified Oswestry Score/Comments 14% where 0% is no disability  -CN      Functional Assessment    Outcome Measure Options Modifed Owestry  -CN        User Key  (r) = Recorded By, (t) = Taken By, (c) = Cosigned By    Initials Name Provider Type    TIA Tom PT Physical Therapist            Time Calculation:   Start Time: 1400  Stop Time: 1445  Time Calculation (min): 45 min    Therapy Charges for Today     Code Description Service Date Service Provider Modifiers Qty    [id number]  PT THER PROC EA 15 MIN 10/24/2017 Cindy Tom PT GP 3          PT G-Codes  Outcome Measure Options: Modifed Owestry     OP PT Discharge Summary  Date of Discharge: 10/24/17  Reason for Discharge: Maximum functional potential achieved  Outcomes Achieved: Patient able to partially acheive established goals  Discharge Destination: Home with home program  Discharge Instructions: Pt attended 10  skilled therapy sessions for treatment of low back pain due to degenerative changes. Pt reports improved pain with transitions as well as increased core and hip strength and stability. Pt noncompliant with HEP during course of PT, however discussed importance of performing HEP daily in order to maintain therapy gains. Pt verbalizes understanding and updated handouts with current program.       Cindy Tom, PT  10/24/2017

## 2018-01-29 RX ORDER — ATORVASTATIN CALCIUM 40 MG/1
TABLET, FILM COATED ORAL
Qty: 30 TABLET | Refills: 2 | Status: SHIPPED | OUTPATIENT
Start: 2018-01-29 | End: 2018-03-03 | Stop reason: SDUPTHER

## 2018-03-05 RX ORDER — ATORVASTATIN CALCIUM 40 MG/1
TABLET, FILM COATED ORAL
Qty: 30 TABLET | Refills: 1 | Status: SHIPPED | OUTPATIENT
Start: 2018-03-05 | End: 2018-03-08 | Stop reason: SDUPTHER

## 2018-03-08 ENCOUNTER — TELEPHONE (OUTPATIENT)
Dept: FAMILY MEDICINE CLINIC | Facility: CLINIC | Age: 73
End: 2018-03-08

## 2018-03-08 RX ORDER — ATORVASTATIN CALCIUM 40 MG/1
40 TABLET, FILM COATED ORAL
Qty: 90 TABLET | Refills: 1 | Status: SHIPPED | OUTPATIENT
Start: 2018-03-08 | End: 2019-01-30

## 2018-03-15 ENCOUNTER — OFFICE VISIT (OUTPATIENT)
Dept: FAMILY MEDICINE CLINIC | Facility: CLINIC | Age: 73
End: 2018-03-15

## 2018-03-15 VITALS
OXYGEN SATURATION: 98 % | HEIGHT: 74 IN | HEART RATE: 64 BPM | SYSTOLIC BLOOD PRESSURE: 150 MMHG | TEMPERATURE: 98.3 F | WEIGHT: 315 LBS | DIASTOLIC BLOOD PRESSURE: 80 MMHG | BODY MASS INDEX: 40.43 KG/M2

## 2018-03-15 DIAGNOSIS — Z13.6 SCREENING FOR CARDIOVASCULAR CONDITION: ICD-10-CM

## 2018-03-15 DIAGNOSIS — Z00.00 ROUTINE GENERAL MEDICAL EXAMINATION AT HEALTH CARE FACILITY: ICD-10-CM

## 2018-03-15 DIAGNOSIS — Z79.899 MEDICATION MANAGEMENT: ICD-10-CM

## 2018-03-15 DIAGNOSIS — Z13.5 SCREENING FOR EYE CONDITION: Primary | ICD-10-CM

## 2018-03-15 DIAGNOSIS — R73.09 ELEVATED GLUCOSE: ICD-10-CM

## 2018-03-15 DIAGNOSIS — Z11.59 NEED FOR HEPATITIS C SCREENING TEST: ICD-10-CM

## 2018-03-15 DIAGNOSIS — Z13.1 SCREENING FOR DIABETES MELLITUS (DM): ICD-10-CM

## 2018-03-15 DIAGNOSIS — Z12.5 SCREENING FOR PROSTATE CANCER: ICD-10-CM

## 2018-03-15 DIAGNOSIS — Z13.6 SCREENING FOR HYPERTENSION: ICD-10-CM

## 2018-03-15 PROCEDURE — G0439 PPPS, SUBSEQ VISIT: HCPCS | Performed by: NURSE PRACTITIONER

## 2018-03-15 NOTE — PROGRESS NOTES
QUICK REFERENCE INFORMATION:  The ABCs of the Annual Wellness Visit    Subsequent Medicare Wellness Visit    HEALTH RISK ASSESSMENT    1945    Recent Hospitalizations:  No hospitalization(s) within the last year..        Current Medical Providers:  Patient Care Team:  YANIRA Roe as PCP - General (Family Medicine)  David Castro MD as PCP - Claims Attributed        Smoking Status:  History   Smoking Status   • Never Smoker   Smokeless Tobacco   • Never Used       Alcohol Consumption:  History   Alcohol Use   • Yes     Comment: social       Depression Screen:   PHQ-2/PHQ-9 Depression Screening 3/15/2018   Little interest or pleasure in doing things 0   Feeling down, depressed, or hopeless 0   Trouble falling or staying asleep, or sleeping too much 0   Feeling tired or having little energy 0   Poor appetite or overeating 0   Feeling bad about yourself - or that you are a failure or have let yourself or your family down 0   Trouble concentrating on things, such as reading the newspaper or watching television 0   Moving or speaking so slowly that other people could have noticed. Or the opposite - being so fidgety or restless that you have been moving around a lot more than usual 0   Thoughts that you would be better off dead, or of hurting yourself in some way 0   Total Score 0   If you checked off any problems, how difficult have these problems made it for you to do your work, take care of things at home, or get along with other people? Not difficult at all       Health Habits and Functional and Cognitive Screening:  Functional & Cognitive Status 3/15/2018   Do you have difficulty preparing food and eating? No   Do you have difficulty bathing yourself, getting dressed or grooming yourself? No   Do you have difficulty using the toilet? No   Do you have difficulty moving around from place to place? No   Do you have trouble with steps or getting out of a bed or a chair? No   In the past year  have you fallen or experienced a near fall? No   Current Diet Unhealthy Diet   Dental Exam Not up to date   Eye Exam Up to date   Exercise (times per week) 0 times per week   Current Exercise Activities Include None   Do you need help using the phone?  No   Are you deaf or do you have serious difficulty hearing?  No   Do you need help with transportation? No   Do you need help shopping? No   Do you need help preparing meals?  No   Do you need help with housework?  No   Do you need help with laundry? No   Do you need help taking your medications? No   Do you need help managing money? No   Have you felt unusual stress, anger or loneliness in the last month? No   Who do you live with? Spouse   If you need help, do you have trouble finding someone available to you? No   Have you been bothered in the last four weeks by sexual problems? No   Do you have difficulty concentrating, remembering or making decisions? No           Does the patient have evidence of cognitive impairment? No    Aspirin use counseling: Taking ASA appropriately as indicated      Recent Lab Results:  CMP:  Lab Results   Component Value Date    GLU 92 06/14/2017    BUN 21 06/14/2017    CREATININE 1.95 (H) 06/14/2017    EGFRIFNONA 34 (L) 06/14/2017    EGFRIFAFRI 42 (L) 06/14/2017    BCR 10.8 06/14/2017     06/14/2017    K 4.0 06/14/2017    CO2 23.5 06/14/2017    CALCIUM 8.7 06/14/2017    PROTENTOTREF 6.6 06/14/2017    ALBUMIN 3.90 06/14/2017    LABGLOBREF 2.7 06/14/2017    LABIL2 1.4 06/14/2017    BILITOT 0.6 06/14/2017    ALKPHOS 160 (H) 06/14/2017    AST 14 06/14/2017    ALT 11 06/14/2017     Lipid Panel:  Lab Results   Component Value Date    TRIG 314 (H) 06/14/2017    HDL 28 (L) 06/14/2017    VLDL 62.8 (H) 06/14/2017    LDLHDL 2.22 06/14/2017     HbA1c:       Visual Acuity:  Referral placed.    Age-appropriate Screening Schedule:  Refer to the list below for future screening recommendations based on patient's age, sex and/or medical  conditions. Orders for these recommended tests are listed in the plan section. The patient has been provided with a written plan.    Health Maintenance   Topic Date Due   • PNEUMOCOCCAL VACCINES (65+ LOW/MEDIUM RISK) (1 of 2 - PCV13) 09/26/2013   • LIPID PANEL  06/14/2018   • TDAP/TD VACCINES (2 - Td) 12/04/2018   • COLONOSCOPY  09/21/2027   • INFLUENZA VACCINE  Completed   • ZOSTER VACCINE  Addressed        Subjective   History of Present Illness presents for medicare wellness. Also reports new diagnosis of COPD. Winded with exertion. Seeing Dr. Sandoval, treated with Stiolto and albuterol prn.    James Loaiza is a 72 y.o. male who presents for an Subsequent Wellness Visit.    The following portions of the patient's history were reviewed and updated as appropriate: allergies, current medications, past family history, past medical history, past social history, past surgical history and problem list.    Outpatient Medications Prior to Visit   Medication Sig Dispense Refill   • aspirin 325 MG tablet Take by mouth daily.     • atorvastatin (LIPITOR) 40 MG tablet Take 1 tablet by mouth every night at bedtime. 90 tablet 1   • furosemide (LASIX) 40 MG tablet      • NIFEdipine XL (PROCARDIA XL) 60 MG 24 hr tablet TAKE ONE TABLET BY MOUTH DAILY 90 tablet 0   • ramipril (ALTACE) 5 MG capsule Take 1 capsule by mouth Daily. 90 capsule 1   • tamsulosin (FLOMAX) 0.4 MG capsule 24 hr capsule Take 1 capsule by mouth Every Night.     • vitamin D (ERGOCALCIFEROL) 30452 units capsule capsule Take 1 capsule by mouth 1 (One) Time Per Week. 12 capsule 1     No facility-administered medications prior to visit.        Patient Active Problem List   Diagnosis   • Lumbar herniated disc L3-L5 3/16/16 Open MRI   • Abnormal electrocardiogram   • Abnormal weight gain   • Aortic valve insufficiency   • Coronary arteriosclerosis in native artery   • Benign essential hypertension ([place] 1999)   • Edema   • Dyspnea on exertion   • Fatigue   • Left  "ventricular hypertrophy   • Obstructive sleep apnea syndrome   • Arthritis of left hip   • Hx of pulmonary embolus 7/15   • Morbid obesity with BMI of 45.0-49.9, adult   • Intermittent dysphagia   • Chronic kidney disease ([place])   • Hyperlipidemia 1999   • BPH (benign prostatic hypertrophy)   • Left cervical radiculopathy   • Rotator cuff tear, left   • Cardiomegaly   • Plantar fasciitis   • Hypogonadism male   • Vitamin D deficiency disease   • Renal cyst, left   • Preventative health care   • Medication management   • Chronic low back pain   • Lumbar spondylolysis   • Headache       Advance Care Planning:  has NO advance directive - not interested in additional information    Identification of Risk Factors:  Risk factors include: weight  and unhealthy diet.    Review of Systems   Respiratory: Positive for shortness of breath (on stiolto, followed by pulmonology).        Compared to one year ago, the patient feels his physical health is the same.  Compared to one year ago, the patient feels his mental health is the same.    Objective     Physical Exam   Constitutional: He is oriented to person, place, and time. He appears well-developed and well-nourished.   Neck: Neck supple.   Cardiovascular: Normal rate, regular rhythm and normal heart sounds.  Exam reveals no gallop and no friction rub.    No murmur heard.  Pulmonary/Chest: Effort normal and breath sounds normal. No respiratory distress. He has no wheezes. He has no rales.   Neurological: He is alert and oriented to person, place, and time.   Skin: Skin is warm and dry.   Psychiatric: He has a normal mood and affect.   Vitals reviewed.      Vitals:    03/15/18 1433   BP: 150/80   Pulse: 64   Temp: 98.3 °F (36.8 °C)   SpO2: 98%   Weight: (!) 155 kg (342 lb 8 oz)   Height: 188 cm (74.02\")       Body mass index is 43.96 kg/m².  Discussed the patient's BMI with him. BMI is above normal parameters. Follow-up plan includes:  exercise counseling.    Assessment/Plan "   Patient Self-Management and Personalized Health Advice  The patient has been provided with information about: diet and exercise and preventive services including:   · Exercise counseling provided.    Visit Diagnoses:    ICD-10-CM ICD-9-CM   1. Screening for eye condition Z13.5 V80.2   2. Need for hepatitis C screening test Z11.59 V73.89   3. Elevated glucose R73.09 790.29   4. Routine general medical examination at health care facility Z00.00 V70.0   5. Screening for cardiovascular condition Z13.6 V81.2   6. Screening for diabetes mellitus (DM) Z13.1 V77.1   7. Screening for hypertension Z13.6 V81.1   8. Screening for prostate cancer Z12.5 V76.44   9. Medication management Z79.899 V58.69       Orders Placed This Encounter   Procedures   • Hemoglobin A1c   • Hepatitis C Antibody   • Lipid Panel   • Comprehensive metabolic panel   • Ambulatory Referral to Ophthalmology     Referral Priority:   Routine     Referral Type:   Consultation     Referral Reason:   Specialty Services Required     Requested Specialty:   Ophthalmology     Number of Visits Requested:   1   • CBC & Differential       Outpatient Encounter Prescriptions as of 3/15/2018   Medication Sig Dispense Refill   • aspirin 325 MG tablet Take by mouth daily.     • atorvastatin (LIPITOR) 40 MG tablet Take 1 tablet by mouth every night at bedtime. 90 tablet 1   • furosemide (LASIX) 40 MG tablet      • NIFEdipine XL (PROCARDIA XL) 60 MG 24 hr tablet TAKE ONE TABLET BY MOUTH DAILY 90 tablet 0   • ramipril (ALTACE) 5 MG capsule Take 1 capsule by mouth Daily. 90 capsule 1   • tamsulosin (FLOMAX) 0.4 MG capsule 24 hr capsule Take 1 capsule by mouth Every Night.     • vitamin D (ERGOCALCIFEROL) 81238 units capsule capsule Take 1 capsule by mouth 1 (One) Time Per Week. 12 capsule 1     No facility-administered encounter medications on file as of 3/15/2018.        Reviewed use of high risk medication in the elderly: yes  Reviewed for potential of harmful drug  interactions in the elderly: yes    Follow Up:  Return in 6 months (on 9/15/2018).     An After Visit Summary and PPPS with all of these plans were given to the patient.

## 2018-03-16 LAB
ALBUMIN SERPL-MCNC: 4.3 G/DL (ref 3.5–5.2)
ALBUMIN/GLOB SERPL: 1.7 G/DL
ALP SERPL-CCNC: 168 U/L (ref 39–117)
ALT SERPL-CCNC: 13 U/L (ref 1–41)
AST SERPL-CCNC: 13 U/L (ref 1–40)
BASOPHILS # BLD AUTO: 0.02 10*3/MM3 (ref 0–0.2)
BASOPHILS NFR BLD AUTO: 0.2 % (ref 0–1.5)
BILIRUB SERPL-MCNC: 0.4 MG/DL (ref 0.1–1.2)
BUN SERPL-MCNC: 22 MG/DL (ref 8–23)
BUN/CREAT SERPL: 12 (ref 7–25)
CALCIUM SERPL-MCNC: 8.8 MG/DL (ref 8.6–10.5)
CHLORIDE SERPL-SCNC: 102 MMOL/L (ref 98–107)
CO2 SERPL-SCNC: 28.6 MMOL/L (ref 22–29)
CREAT SERPL-MCNC: 1.84 MG/DL (ref 0.76–1.27)
EOSINOPHIL # BLD AUTO: 0.13 10*3/MM3 (ref 0–0.7)
EOSINOPHIL NFR BLD AUTO: 1.2 % (ref 0.3–6.2)
ERYTHROCYTE [DISTWIDTH] IN BLOOD BY AUTOMATED COUNT: 13.8 % (ref 11.5–14.5)
GFR SERPLBLD CREATININE-BSD FMLA CKD-EPI: 37 ML/MIN/1.73
GFR SERPLBLD CREATININE-BSD FMLA CKD-EPI: 44 ML/MIN/1.73
GLOBULIN SER CALC-MCNC: 2.5 GM/DL
GLUCOSE SERPL-MCNC: 79 MG/DL (ref 65–99)
HCT VFR BLD AUTO: 41.8 % (ref 40.4–52.2)
HCV AB S/CO SERPL IA: <0.1 S/CO RATIO (ref 0–0.9)
HGB BLD-MCNC: 13 G/DL (ref 13.7–17.6)
IMM GRANULOCYTES # BLD: 0.06 10*3/MM3 (ref 0–0.03)
IMM GRANULOCYTES NFR BLD: 0.6 % (ref 0–0.5)
LYMPHOCYTES # BLD AUTO: 1.9 10*3/MM3 (ref 0.9–4.8)
LYMPHOCYTES NFR BLD AUTO: 17.6 % (ref 19.6–45.3)
MCH RBC QN AUTO: 30.1 PG (ref 27–32.7)
MCHC RBC AUTO-ENTMCNC: 31.1 G/DL (ref 32.6–36.4)
MCV RBC AUTO: 96.8 FL (ref 79.8–96.2)
MONOCYTES # BLD AUTO: 0.77 10*3/MM3 (ref 0.2–1.2)
MONOCYTES NFR BLD AUTO: 7.1 % (ref 5–12)
NEUTROPHILS # BLD AUTO: 7.91 10*3/MM3 (ref 1.9–8.1)
NEUTROPHILS NFR BLD AUTO: 73.3 % (ref 42.7–76)
PLATELET # BLD AUTO: 218 10*3/MM3 (ref 140–500)
POTASSIUM SERPL-SCNC: 4.2 MMOL/L (ref 3.5–5.2)
PROT SERPL-MCNC: 6.8 G/DL (ref 6–8.5)
RBC # BLD AUTO: 4.32 10*6/MM3 (ref 4.6–6)
SODIUM SERPL-SCNC: 143 MMOL/L (ref 136–145)
WBC # BLD AUTO: 10.79 10*3/MM3 (ref 4.5–10.7)

## 2018-05-15 RX ORDER — RAMIPRIL 5 MG/1
CAPSULE ORAL
Qty: 90 CAPSULE | Refills: 0 | Status: SHIPPED | OUTPATIENT
Start: 2018-05-15 | End: 2018-08-20 | Stop reason: SDUPTHER

## 2018-05-22 ENCOUNTER — OFFICE VISIT (OUTPATIENT)
Dept: PAIN MEDICINE | Facility: CLINIC | Age: 73
End: 2018-05-22

## 2018-05-22 VITALS
OXYGEN SATURATION: 94 % | HEIGHT: 74 IN | SYSTOLIC BLOOD PRESSURE: 159 MMHG | DIASTOLIC BLOOD PRESSURE: 81 MMHG | HEART RATE: 66 BPM | WEIGHT: 315 LBS | TEMPERATURE: 97.8 F | RESPIRATION RATE: 16 BRPM | BODY MASS INDEX: 40.43 KG/M2

## 2018-05-22 DIAGNOSIS — M43.06 LUMBAR SPONDYLOLYSIS: Primary | ICD-10-CM

## 2018-05-22 DIAGNOSIS — M54.5 CHRONIC LOW BACK PAIN, UNSPECIFIED BACK PAIN LATERALITY, WITH SCIATICA PRESENCE UNSPECIFIED: ICD-10-CM

## 2018-05-22 DIAGNOSIS — G89.29 CHRONIC LOW BACK PAIN, UNSPECIFIED BACK PAIN LATERALITY, WITH SCIATICA PRESENCE UNSPECIFIED: ICD-10-CM

## 2018-05-22 DIAGNOSIS — M51.26 LUMBAR HERNIATED DISC: ICD-10-CM

## 2018-05-22 PROCEDURE — 99213 OFFICE O/P EST LOW 20 MIN: CPT | Performed by: NURSE PRACTITIONER

## 2018-05-22 NOTE — PROGRESS NOTES
"CHIEF COMPLAINT  F/U back pain, pt's last visit was 10-23-17. States he senses back pain is increasing so he wanted to come in before it got worse.     Subjective   James Loaiza is a 72 y.o. male  who presents to the office for follow-up.He has a history of chronic back pain. He reports this is worsening since last office visit (10-23-17). Had Bilateral lumbar RFL @ L3-5 on 3/9/17 . Wants to repeat \"before it gets too bad.\"    Complains of pain in his low back. Today his pain is 0/10V(Sitting). Pain can range from 0-6/10VAS. Pain can interfere with sleep. Pain is worst at night. Describes the pain as intermittent. Pain increases with waking, standing, bending, laying down; pain decreases with procedures and sitting. Takes Aspirin 325 mg daily. ADL's by self.  HAs failed PT.  Has COPD.  Back Pain   This is a recurrent problem. The current episode started more than 1 month ago. The problem occurs constantly. Progression since onset: worse since last office visit 10-23-17. The pain is present in the lumbar spine. The quality of the pain is described as aching. The pain does not radiate. The pain is at a severity of 0/10. The pain is moderate. The pain is worse during the night (frequent urination at night which increases his pain--getting up and out of bed). The symptoms are aggravated by standing and twisting. Stiffness is present all day. Pertinent negatives include no bladder incontinence, bowel incontinence, chest pain, dysuria, fever, headaches, numbness or weakness. Risk factors include lack of exercise, obesity and recent trauma. Treatments tried: stiffness decreases with movement and walking. takes OTC IBU PRN.      PEG Assessment   What number best describes your pain on average in the past week?3  What number best describes how, during the past week, pain has interfered with your enjoyment of life?5  What number best describes how, during the past week, pain has interfered with your general activity?  " "5    The following portions of the patient's history were reviewed and updated as appropriate: allergies, current medications, past family history, past medical history, past social history, past surgical history and problem list.    Review of Systems   Constitutional: Negative for activity change, appetite change, chills, fatigue and fever.   HENT: Negative for congestion.    Eyes: Negative for visual disturbance.   Respiratory: Positive for apnea (cpap). Negative for cough, shortness of breath and wheezing.    Cardiovascular: Negative.  Negative for chest pain.   Gastrointestinal: Negative for bowel incontinence, constipation, diarrhea, nausea and vomiting.   Genitourinary: Negative for bladder incontinence, difficulty urinating and dysuria.   Musculoskeletal: Positive for back pain (with occasional legs cramping).   Neurological: Negative for dizziness, weakness, light-headedness, numbness and headaches.   Psychiatric/Behavioral: Positive for sleep disturbance. Negative for agitation, confusion, hallucinations and suicidal ideas. The patient is not nervous/anxious.        Vitals:    05/22/18 1332   BP: 159/81   Pulse: 66   Resp: 16   Temp: 97.8 °F (36.6 °C)   SpO2: 94%   Weight: (!) 155 kg (341 lb 12.8 oz)   Height: 188 cm (74.02\")   PainSc: 0-No pain   PainLoc: Back     Objective   Physical Exam   Constitutional: He is oriented to person, place, and time. Vital signs are normal. He appears well-developed and well-nourished. He is cooperative.   HENT:   Head: Normocephalic and atraumatic.   Nose: Nose normal.   Eyes: Conjunctivae and lids are normal.   Cardiovascular: Normal rate.    Pulmonary/Chest: Effort normal.   Abdominal: Normal appearance.   Musculoskeletal:        Lumbar back: He exhibits tenderness and pain. He exhibits no bony tenderness.   MODERATE tenderness of bilateral L2-L5 facets   Neurological: He is alert and oriented to person, place, and time. Gait (slowed) abnormal.   Reflex Scores:       " Patellar reflexes are 1+ on the right side and 1+ on the left side.  Skin: Skin is warm, dry and intact.   Psychiatric: He has a normal mood and affect. His speech is normal and behavior is normal. Judgment and thought content normal. Cognition and memory are normal.   Nursing note and vitals reviewed.      Assessment/Plan   James was seen today for back pain.    Diagnoses and all orders for this visit:    Lumbar spondylolysis  -     Case Request    Chronic low back pain, unspecified back pain laterality, with sciatica presence unspecified  -     Case Request    Lumbar herniated disc L3-L5 3/16/16 Open MRI      --- bilateral L3-L5 MBB. No blood thinners. Reviewed the procedure at length with the patient.  Included in the review was expectations, complications, risk and benefits.The procedure was described in detail and the risks, benefits and alternatives were discussed with the patient (including but not limited to: bleeding, infection, nerve damage, worsening of pain, inability to perform injection, paralysis, seizures, and death) who agreed to proceed.  Discussed the potential for sedation if warranted/wanted.  Questions were answered and in a way the patient could understand.  Patient verbalized understanding and wishes to proceed.  This intervention will be ordered.  --- Plan to proceed with RFL after MBB.  --- Follow-up after procedure or sooner if needed.       DILAN REPORT    DILAN report has been reviewed and scanned into the patient's chart.    As the clinician, I personally reviewed the DILAN from 5-21-18 while the patient was in the office today.          EMR Dragon/Transcription disclaimer:   Much of this encounter note is an electronic transcription/translation of spoken language to printed text. The electronic translation of spoken language may permit erroneous, or at times, nonsensical words or phrases to be inadvertently transcribed; Although I have reviewed the note for such errors, some may  still exist.

## 2018-06-12 ENCOUNTER — DOCUMENTATION (OUTPATIENT)
Dept: PAIN MEDICINE | Facility: CLINIC | Age: 73
End: 2018-06-12

## 2018-06-12 ENCOUNTER — OUTSIDE FACILITY SERVICE (OUTPATIENT)
Dept: PAIN MEDICINE | Facility: CLINIC | Age: 73
End: 2018-06-12

## 2018-06-12 PROCEDURE — 64494 INJ PARAVERT F JNT L/S 2 LEV: CPT | Performed by: ANESTHESIOLOGY

## 2018-06-12 PROCEDURE — 64493 INJ PARAVERT F JNT L/S 1 LEV: CPT | Performed by: ANESTHESIOLOGY

## 2018-06-12 PROCEDURE — 64495 INJ PARAVERT F JNT L/S 3 LEV: CPT | Performed by: ANESTHESIOLOGY

## 2018-06-13 NOTE — PROGRESS NOTES
Bilateral L2-5 Lumbar Medial Branch Blockade  [facility]    PREOPERATIVE DIAGNOSIS:  Lumbar spondylosis without myelopathy    POSTOPERATIVE DIAGNOSIS:  Lumbar spondylosis without myelopathy    PROCEDURE:   Diagnostic Bilateral Lumbar Medial Branch Nerve Blockades, with fluoroscopy:  L2, L3, L4, and L5 nerves (at the L3, L4, L5 transverse processes and the sacral alar groove) to block facet joints L3-4, L4-5, and L5-S1  1. 54419-10 -- Bilateral Lumbar Facet blocks, 1st Level  2. 12854-31 -- Bilateral Lumbar Facet blocks, 2nd  Level  3. 23322-46 -- Bilateral Lumbar Facet blocks, 3rd Level    PRE-PROCEDURE DISCUSSION WITH PATIENT:    Risks and complications were discussed with the patient prior to starting the procedure and informed consent was obtained.      SURGEON:  Edu Tineo MD    REASON FOR PROCEDURE:   The patient complains of pain that seems to have a significant axial component, Previous clinically significant therapeutic effect after prior Radiofrequency procedure, Tenderness of the affected facet joints on palpation and Tenderness of the affected facet joints on exam under fluoroscopy    SEDATION:  Patient declined administration of moderate sedation    ANESTHETIC:  Marcaine 0.5%  STEROID:  Methylprednisolone (DEPO MEDROL) 80mg/ml  TOTAL VOLUME OF SOLUTION: 8ml    DESCRIPTON OF PROCEDURE:  After obtaining informed consent, IV access was obtained in the preoperative area.   The patient was taken to the operating room.  The patient was placed in the prone position with a pillow under the abdomen. All pressure points were well padded.  EKG, blood pressure, and pulse oximeter were monitored.  The patient was monitored and sedated by the RN under my direction. The lumbosacral area was prepped with Chloraprep and draped in a sterile fashion. Under fluoroscopic guidance the transverse processes of the L3, L4, and L5 vertebrae at the junctions of the superior articular processes were  identified on the right. Also identified was the groove between the ala and the superior articular process of the sacrum on the ipsilateral side.  Skin and subcutaneous tissue were anesthetized with 1% lidocaine above each of these points. A 22-gauge spinal needle was introduced under fluoroscopic guidance at the above junctions. Aspiration was negative for blood and CSF.  After confirming the position of the needle with fluoroscope in all views, 0.25 mL of Omnipaque was injected, and after seeing the proper spread a total of 1 mL of the anesthetic solution noted above was injected at each of these points.  Needles were removed intact from each of the areas.  A similar procedure was repeated to block the L2, L3, L4, and L5 nerves on the contralateral side.   Onset of analgesia was noted.  Vital signs remained stable throughout.      ESTIMATED BLOOD LOSS:  <5 mL  SPECIMENS:  none    COMPLICATIONS:   No complications were noted., There was no indication of vascular uptake on live injection of contrast dye. and The patient did not have any signs of postprocedure numbness nor weakness.    TOLERANCE & DISCHARGE CONDITION:    The patient tolerated the procedure well.  The patient was transported to the recovery area without difficulties.  The patient was discharged to home under the care of family in stable and satisfactory condition.    PLAN OF CARE:  1. The patient was given our standard instruction sheet.  2. We discussed that Lumbar Medial Branch Blockade is a diagnostic procedure in consideration for radiofrequency ablation if two diagnostic procedures prove to be positive for significant benefit.  If sustained relief of 6 to eight weeks is obtained, then an alternative plan could be therapeutic lumbar branch blockades.  3. The patient is asked to keep a pain log each hour for 8 hours after the procedure today.  4. The patient will  Return to clinic 1 wk, be examined in the PACU to assess diagnostic effect. and Plan  for Radiofrequency procedure of the same levels as we have noted significant diagnostic effect on exam after the procedure.  5. The patient will resume all medications as per the medication reconciliation sheet.

## 2018-06-20 ENCOUNTER — OFFICE VISIT (OUTPATIENT)
Dept: PAIN MEDICINE | Facility: CLINIC | Age: 73
End: 2018-06-20

## 2018-06-20 VITALS
TEMPERATURE: 98.3 F | HEIGHT: 74 IN | HEART RATE: 60 BPM | DIASTOLIC BLOOD PRESSURE: 78 MMHG | BODY MASS INDEX: 40.43 KG/M2 | SYSTOLIC BLOOD PRESSURE: 154 MMHG | WEIGHT: 315 LBS

## 2018-06-20 DIAGNOSIS — M43.06 LUMBAR SPONDYLOLYSIS: Primary | ICD-10-CM

## 2018-06-20 DIAGNOSIS — M51.26 LUMBAR HERNIATED DISC: ICD-10-CM

## 2018-06-20 PROCEDURE — 99213 OFFICE O/P EST LOW 20 MIN: CPT | Performed by: NURSE PRACTITIONER

## 2018-06-20 NOTE — PROGRESS NOTES
CHIEF COMPLAINT patient is here for a 1 week follow up for a block and back pain     Subjective   James Loaiza is a 72 y.o. male  who presents to the office for follow-up of procedure.  He completed a bilateral L2-L5 MBB   On 6/12/18  performed by Dr. mojgan renteria for management of lumbar spondylosis without myelopathy. Patient reports  60% relief from the procedure for 4 days. Noticed he didn't have pain with sitting up, walked more and sneezed without feeling pain.     Complains of pain in his low back. Today his pain is 2/10VAS. Describes the pain as nearly continuous. He reports his pain is worse since the procedure wore off. Wants to repeat RFL. Pain can interfere with his sleep.  Continues with Ibuprofen  OTC PRN with mild relief. ADL's by self.     Had Bilateral lumbar RFL @ L3-5 on 3/9/17. Had significant relief until the past 2 months.   HAs failed PT.  Has COPD.    Back Pain   This is a recurrent problem. The current episode started more than 1 month ago. The problem occurs constantly. Progression since onset: worse since last office visit. The pain is present in the lumbar spine. The quality of the pain is described as aching. The pain does not radiate. The pain is at a severity of 2/10. The pain is moderate. The pain is worse during the night (frequent urination at night which increases his pain--getting up and out of bed). The symptoms are aggravated by standing and twisting. Stiffness is present all day. Pertinent negatives include no bladder incontinence, bowel incontinence, chest pain, dysuria, fever, headaches, numbness or weakness. Risk factors include lack of exercise, obesity and recent trauma. Treatments tried: stiffness decreases with movement and walking. takes OTC IBU PRN.    I have reviewed the above HPI today, 06/20/18.  The HPI is unchanged from the previous office visit.      PEG Assessment   What number best describes your pain on average in the past week?5  What number best describes  "how, during the past week, pain has interfered with your enjoyment of life?3  What number best describes how, during the past week, pain has interfered with your general activity?  3    The following portions of the patient's history were reviewed and updated as appropriate: allergies, current medications, past family history, past medical history, past social history, past surgical history and problem list.    Review of Systems   Constitutional: Negative for activity change, appetite change, chills, fatigue and fever.   HENT: Negative for congestion.    Eyes: Negative for visual disturbance.   Respiratory: Positive for apnea (cpap). Negative for cough, shortness of breath and wheezing.    Cardiovascular: Negative.  Negative for chest pain.   Gastrointestinal: Negative for bowel incontinence, constipation, diarrhea, nausea and vomiting.   Genitourinary: Negative for bladder incontinence, difficulty urinating and dysuria.   Musculoskeletal: Positive for back pain (with occasional legs cramping).   Neurological: Negative for dizziness, weakness, light-headedness, numbness and headaches.   Psychiatric/Behavioral: Positive for sleep disturbance. Negative for agitation, confusion, hallucinations and suicidal ideas. The patient is not nervous/anxious.        Vitals:    06/20/18 1325   BP: 154/78   BP Location: Left arm   Patient Position: Sitting   Cuff Size: Large Adult   Pulse: 60   Temp: 98.3 °F (36.8 °C)   TempSrc: Oral   Weight: (!) 156 kg (344 lb 6.4 oz)   Height: 188 cm (74.02\")   PainSc:   2   PainLoc: Back     Objective   Physical Exam   Constitutional: He is oriented to person, place, and time. Vital signs are normal. He appears well-developed and well-nourished. He is cooperative.   HENT:   Head: Normocephalic and atraumatic.   Nose: Nose normal.   Eyes: Conjunctivae and lids are normal.   Cardiovascular: Normal rate.    Pulmonary/Chest: Effort normal.   Abdominal: Normal appearance.   Musculoskeletal:        " Lumbar back: He exhibits tenderness and pain. He exhibits no bony tenderness.   MODERATE tenderness of bilateral L2-L5 facets  +lumbar loading manuever   Neurological: He is alert and oriented to person, place, and time. Gait (slowed) abnormal.   Reflex Scores:       Patellar reflexes are 1+ on the right side and 1+ on the left side.  Negative SLR bilaterally   Skin: Skin is warm, dry and intact.   Psychiatric: He has a normal mood and affect. His speech is normal and behavior is normal. Judgment and thought content normal. Cognition and memory are normal.   Nursing note and vitals reviewed.      Assessment/Plan   James was seen today for back pain.    Diagnoses and all orders for this visit:    Lumbar spondylolysis  -     Case Request    Lumbar herniated disc L3-L5 3/16/16 Open MRI      --- repeat bilateral L2-L5 RFL. No blood thinners. Reviewed the procedure at length with the patient.  Included in the review was expectations, complications, risk and benefits.The procedure was described in detail and the risks, benefits and alternatives were discussed with the patient (including but not limited to: bleeding, infection, nerve damage, worsening of pain, inability to perform injection, paralysis, seizures, and death) who agreed to proceed.  Discussed the potential for sedation if warranted/wanted.  Questions were answered and in a way the patient could understand.  Patient verbalized understanding and wishes to proceed.  This intervention will be ordered.  --- Follow-up after procedure or sooner if needed.    -------  Education about Medial Branch Blockade and RF Therapy:    This medial branch blockade (MBB) suggested is intended for diagnostic purposes, with the intent of offering the patient Radiofrequency thermal rhizotomy (RF) if the MBB is diagnostically effective.  The diagnostic blockade is necessary to determine the likelihood that RF therapy could be efficacious in providing long term relief to the  "patient.    Medial branches are sensory nerve branches that connect to a facet joint and transmit sensations & pain signals from that joint.  Facet is a term for the type of joints found in the spine.  Medial branches are the nerves that go to a facet, and therefore are also sometimes called \"facet joint nerves\" (FJNs).      In a medial branch blockade procedure, xray fluoroscopy is used to verify the locations of the outside of the joint lines which are being targeted.  Under xray guidance, needles are placed to these areas.  Contrast dye is injected to confirm proper placement, with dye flowing over the joint area, and to ensure that the dye does not flow into unintended areas such as a vein.  When this is confirmed, local anesthetic is injected to block the medial branch at that joint level.      If MBBs are diagnostically successful in blocking pain, then the patient is most likely a great candidate for Radiofrequency of those facet joint nerves.  In the RF procedure, needles are placed to the joint lines in the same fashion, and after testing, the needle tips are heated to thermally treat the nerves, blocking the nerves by in essence damaging the nerves with the heat treatment.       Medically, a successful RF procedure should provide a patient with 50% pain relief or more for at least 6 months.  Clinical experience suggests that successful patients receive relief more in the range of 12 months on average.  We also discussed that a fortunate minority of patients receive therapeutic success from the MBB, and may not require RF ablation.  If a patient receives more than 8 weeks of relief from MBB, then occasional repeat MBB for therapeutic purposes is a very reasonable alternative therapy.  This course of therapy is consistent with our LCDs according to our CMS  in the area, and therefore other insurance providers should follow accordingly.  We will monitor our patients to screen for these " therapeutic responders and will offer RF therapy only when necessary.        We discussed that MBB & RF are not without risks.  Guidelines regarding anticoagulant use & neuraxial procedures will be respected.  Patients that are ill or otherwise may be at risk for sepsis will not have their spines accessed by neuraxial injections of any type.  This patient will not be offered these therapies if there is an increased risk.   We discussed that there is a risk of postprocedural pain and also a risk of worsening of clinical picture with these procedures as with any neuraxial procedure.    -------             DILAN REPORT    DILAN report has been reviewed and scanned into the patient's chart.    As the clinician, I personally reviewed the DILAN from 6-19-18 while the patient was in the office today.          EMR Dragon/Transcription disclaimer:   Much of this encounter note is an electronic transcription/translation of spoken language to printed text. The electronic translation of spoken language may permit erroneous, or at times, nonsensical words or phrases to be inadvertently transcribed; Although I have reviewed the note for such errors, some may still exist.

## 2018-06-22 ENCOUNTER — DOCUMENTATION (OUTPATIENT)
Dept: PAIN MEDICINE | Facility: CLINIC | Age: 73
End: 2018-06-22

## 2018-06-22 ENCOUNTER — OUTSIDE FACILITY SERVICE (OUTPATIENT)
Dept: PAIN MEDICINE | Facility: CLINIC | Age: 73
End: 2018-06-22

## 2018-06-22 PROCEDURE — 64635 DESTROY LUMB/SAC FACET JNT: CPT | Performed by: ANESTHESIOLOGY

## 2018-06-22 PROCEDURE — 99152 MOD SED SAME PHYS/QHP 5/>YRS: CPT | Performed by: ANESTHESIOLOGY

## 2018-06-22 PROCEDURE — 64636 DESTROY L/S FACET JNT ADDL: CPT | Performed by: ANESTHESIOLOGY

## 2018-06-22 NOTE — PROGRESS NOTES
Bilateral L2-5 Lumbar Medial Branch RADIOFREQUENCY  [facility]      PREOPERATIVE DIAGNOSIS:  Lumbar spondylosis without myelopathy    POSTOPERATIVE DIAGNOSIS:  Lumbar spondylosis without myelopathy    PROCEDURE:   Diagnostic Bilateral Lumbar Medial Branch Nerve thermal radiofrequency lesioning, with fluoroscopy:  L2, L3, L4, and L5 nerves (at the L3, L4, L5 transverse processes and the sacral alar groove) to thermally treat the innervation to facet joints L3-4, L4-5, and L5-S1  1. 84415-56 -- Bilateral L/S facet neuro destr., 1st Level  2. 74591-95 -- Bilateral L/S facet neuro destr., 2nd  Level  3. 40153-15 -- Bilateral  L/S facet neuro destr., 3rd Level    PRE-PROCEDURE DISCUSSION WITH PATIENT:    Risks and complications were discussed with the patient prior to starting the procedure and informed consent was obtained.      SURGEON:  Edu Tineo MD    REASON FOR PROCEDURE:    The patient complains of pain that seems to have a significant axial component. Previous diagnostic positivity of MULTIPLE diagnostic injections to the same area. Previous clinically significant therapeutic effect after prior Radiofrequency procedure.    SEDATION:  Fentanyl 150 mcg IV and Dexamethasone 8mg IV  TIME OF PROCEDURE:   The intraoperative procedure time after administration of the sedative was 33 minutes.       ANESTHETIC:  Lidocaine 2%  STEROID:  NONE      DESCRIPTON OF PROCEDURE:  After obtaining informed consent, IV access was obtained in the preoperative area.   The patient was taken to the operating room.  The patient was placed in the prone position with a pillow under the abdomen. All pressure points were well padded.  EKG, blood pressure, and pulse oximeter were monitored.  The patient was monitored and sedated by the RN under my direction. The lumbosacral area was prepped with Chloraprep and draped in a sterile fashion.     Under fluoroscopic guidance the transverse processes of the L3, L4, and L5  vertebrae at the junctions of the superior articular processes were identified on the right.  Also identified was the groove between the ala and the superior articular process of the sacrum on the ipsilateral side.  Skin and subcutaneous tissue were anesthetized with 1ml of 1% lidocaine above each of these points. Then, radiofrequency probe needles were advanced in this fluoro view to the above junctions.  Aspiration was negative for blood and CSF.  After confirming the position of the needle with fluoroscope in all views, testing was initiated.  First, sensory testing was started on each needle a 1V and 50Hz and slowly decreased until painful pressure stimulation diminished at 0.5V.  Next, motor testing was confirmed to be negative at 3V and 2Hz for any radicular stimulation.  Then 1mL of the local anesthetic was instilled in each needle.  Two minutes elapsed, and during this time a lateral fluoroscopic view was confirmed again to ensure the needles had not advanced nor retracted.  Then, Radiofrequency Lesioning was initiated for 1.5 minutes at 85 degrees Celsius.  Needles were removed intact from each of the areas.     A similar procedure was repeated to address the L2, L3, L4, and L5 nerves on the contralateral side.   Onset of analgesia was noted.  Vital signs remained stable throughout.      ESTIMATED BLOOD LOSS:  <5 mL  SPECIMENS:  none    COMPLICATIONS:   No complications were noted., There was no indication of vascular uptake on live injection of contrast dye. and The patient did not have any signs of postprocedure numbness nor weakness.    TOLERANCE & DISCHARGE CONDITION:    The patient tolerated the procedure well.  The patient was transported to the recovery area without difficulties.  The patient was discharged to home under the care of family in stable and satisfactory condition.    PLAN OF CARE:  1. The patient was given our standard instruction sheet.  2. The patient will  Return to clinic 6  wks.  3. The patient will resume all medications as per the medication reconciliation sheet.

## 2018-07-30 ENCOUNTER — OFFICE VISIT (OUTPATIENT)
Dept: PAIN MEDICINE | Facility: CLINIC | Age: 73
End: 2018-07-30

## 2018-07-30 VITALS
WEIGHT: 315 LBS | HEIGHT: 74 IN | RESPIRATION RATE: 18 BRPM | OXYGEN SATURATION: 94 % | SYSTOLIC BLOOD PRESSURE: 133 MMHG | HEART RATE: 116 BPM | DIASTOLIC BLOOD PRESSURE: 82 MMHG | BODY MASS INDEX: 40.43 KG/M2 | TEMPERATURE: 98.4 F

## 2018-07-30 DIAGNOSIS — M43.06 LUMBAR SPONDYLOLYSIS: ICD-10-CM

## 2018-07-30 DIAGNOSIS — G89.29 OTHER CHRONIC PAIN: Primary | ICD-10-CM

## 2018-07-30 PROCEDURE — 99212 OFFICE O/P EST SF 10 MIN: CPT | Performed by: NURSE PRACTITIONER

## 2018-07-30 NOTE — PROGRESS NOTES
"CHIEF COMPLAINT  Back pain has decreased since last visit. He states he is starting to receive relief from the procedure.    Subjective   James Loaiza is a 72 y.o. male  who presents to the office for follow-up of procedure.  He completed a Bilateral L2-5 Lumbar Medial Branch RADIOFREQUENCY   on  6/22/18 performed by Dr. COOPER for management of low back pain. Patient reports 75% ongoing relief from the procedure.     Complains of pain in his low back. Today his pain is 4/10VAS. Describes the pain as nearly continuous and improved. Pain increases with walking, standing, getting up from laying or seated position; pain decreases with procedures and rest.  Has a lift chair at home. ADL' s by self.     Does occasionally notices that his back feels \"loose\" when he's been sitting and has to stand up.   Has had PT previously.    Back Pain   This is a recurrent problem. The current episode started more than 1 month ago. The problem occurs constantly. Progression since onset: improved since RFL. The pain is present in the lumbar spine. The quality of the pain is described as aching. The pain does not radiate. The pain is at a severity of 4/10. The pain is moderate. The pain is worse during the night (frequent urination at night which increases his pain--getting up and out of bed). The symptoms are aggravated by standing and twisting. Stiffness is present all day. Pertinent negatives include no bladder incontinence, bowel incontinence, chest pain, dysuria, fever, headaches, numbness or weakness. Risk factors include lack of exercise, obesity and recent trauma. Treatments tried: lumbar RFL, OTC IBU.      PEG Assessment   What number best describes your pain on average in the past week?4  What number best describes how, during the past week, pain has interfered with your enjoyment of life?4  What number best describes how, during the past week, pain has interfered with your general activity?  4    The following portions of " "the patient's history were reviewed and updated as appropriate: allergies, current medications, past family history, past medical history, past social history, past surgical history and problem list.    Review of Systems   Constitutional: Negative for chills and fever.   Respiratory: Negative for shortness of breath.    Cardiovascular: Negative for chest pain.   Gastrointestinal: Negative for bowel incontinence, constipation, diarrhea, nausea and vomiting.   Genitourinary: Negative for bladder incontinence, difficulty urinating, dysuria and enuresis.   Musculoskeletal: Positive for back pain (with occasional legs cramping).   Neurological: Negative for dizziness, weakness, light-headedness, numbness and headaches.   Psychiatric/Behavioral: Negative for confusion, hallucinations, self-injury, sleep disturbance and suicidal ideas. The patient is not nervous/anxious.        Vitals:    07/30/18 1239   BP: 133/82   Pulse: 116   Resp: 18   Temp: 98.4 °F (36.9 °C)   SpO2: 94%   Weight: (!) 157 kg (346 lb)   Height: 188 cm (74.02\")   PainSc:   4   PainLoc: Back     Objective   Physical Exam   Constitutional: He is oriented to person, place, and time. Vital signs are normal. He appears well-developed and well-nourished. He is cooperative.   HENT:   Head: Normocephalic and atraumatic.   Nose: Nose normal.   Eyes: Conjunctivae and lids are normal.   Cardiovascular: Normal rate.    Pulmonary/Chest: Effort normal.   Abdominal:   obese   Musculoskeletal:        Lumbar back: He exhibits tenderness and pain.   Mild tenderness of bilateral L2-L5 facets     Neurological: He is alert and oriented to person, place, and time. Gait (slowed) abnormal.   Reflex Scores:       Patellar reflexes are 1+ on the right side and 1+ on the left side.  Negative SLR bilaterally   Skin: Skin is warm, dry and intact.   Psychiatric: He has a normal mood and affect. His speech is normal and behavior is normal. Judgment and thought content normal. " Cognition and memory are normal.   Nursing note and vitals reviewed.      Assessment/Plan   James was seen today for back pain.    Diagnoses and all orders for this visit:    Other chronic pain    Lumbar spondylolysis      --- Continue with regimen.  --- Consider with repeat injections in future PRN.  --- Discussed repeating PT. Patient declined.  --- Discussed more regular Ibuprofen as needed.  --- Follow-up 6 months or sooner if needed.       DILAN REPORT  DILAN report has been reviewed and scanned into the patient's chart.    As the clinician, I personally reviewed the DILAN from 7-25-18 while the patient was in the office today.        EMR Dragon/Transcription disclaimer:   Much of this encounter note is an electronic transcription/translation of spoken language to printed text. The electronic translation of spoken language may permit erroneous, or at times, nonsensical words or phrases to be inadvertently transcribed; Although I have reviewed the note for such errors, some may still exist.

## 2018-08-20 RX ORDER — RAMIPRIL 5 MG/1
CAPSULE ORAL
Qty: 90 CAPSULE | Refills: 0 | Status: SHIPPED | OUTPATIENT
Start: 2018-08-20 | End: 2018-12-03 | Stop reason: SDUPTHER

## 2018-12-03 RX ORDER — RAMIPRIL 5 MG/1
CAPSULE ORAL
Qty: 90 CAPSULE | Refills: 0 | Status: SHIPPED | OUTPATIENT
Start: 2018-12-03 | End: 2019-03-17 | Stop reason: SDUPTHER

## 2018-12-12 ENCOUNTER — OFFICE VISIT (OUTPATIENT)
Dept: FAMILY MEDICINE CLINIC | Facility: CLINIC | Age: 73
End: 2018-12-12

## 2018-12-12 VITALS
BODY MASS INDEX: 44.36 KG/M2 | WEIGHT: 315 LBS | DIASTOLIC BLOOD PRESSURE: 90 MMHG | OXYGEN SATURATION: 96 % | TEMPERATURE: 98.1 F | SYSTOLIC BLOOD PRESSURE: 162 MMHG | HEART RATE: 67 BPM

## 2018-12-12 DIAGNOSIS — Z79.899 MEDICATION MANAGEMENT: Primary | ICD-10-CM

## 2018-12-12 DIAGNOSIS — E78.2 MIXED HYPERLIPIDEMIA: ICD-10-CM

## 2018-12-12 DIAGNOSIS — M25.552 LEFT HIP PAIN: ICD-10-CM

## 2018-12-12 DIAGNOSIS — R10.9 LEFT FLANK PAIN: ICD-10-CM

## 2018-12-12 DIAGNOSIS — E55.9 VITAMIN D DEFICIENCY: ICD-10-CM

## 2018-12-12 DIAGNOSIS — I10 BENIGN ESSENTIAL HYPERTENSION: ICD-10-CM

## 2018-12-12 DIAGNOSIS — Z12.5 SCREENING FOR PROSTATE CANCER: ICD-10-CM

## 2018-12-12 DIAGNOSIS — N18.30 STAGE 3 CHRONIC KIDNEY DISEASE (HCC): ICD-10-CM

## 2018-12-12 DIAGNOSIS — E55.9 VITAMIN D DEFICIENCY DISEASE: ICD-10-CM

## 2018-12-12 LAB
25(OH)D3+25(OH)D2 SERPL-MCNC: 47.9 NG/ML (ref 30–100)
ALBUMIN SERPL-MCNC: 3.8 G/DL (ref 3.5–5.2)
ALBUMIN/GLOB SERPL: 1.3 G/DL
ALP SERPL-CCNC: 176 U/L (ref 39–117)
ALT SERPL-CCNC: 9 U/L (ref 1–41)
AST SERPL-CCNC: 14 U/L (ref 1–40)
BASOPHILS # BLD AUTO: 0.02 10*3/MM3 (ref 0–0.2)
BASOPHILS NFR BLD AUTO: 0.2 % (ref 0–1.5)
BILIRUB SERPL-MCNC: 0.5 MG/DL (ref 0.1–1.2)
BUN SERPL-MCNC: 20 MG/DL (ref 8–23)
BUN/CREAT SERPL: 11.2 (ref 7–25)
CALCIUM SERPL-MCNC: 9 MG/DL (ref 8.6–10.5)
CHLORIDE SERPL-SCNC: 104 MMOL/L (ref 98–107)
CHOLEST SERPL-MCNC: 136 MG/DL (ref 0–200)
CO2 SERPL-SCNC: 27.5 MMOL/L (ref 22–29)
CREAT SERPL-MCNC: 1.78 MG/DL (ref 0.76–1.27)
EOSINOPHIL # BLD AUTO: 0.18 10*3/MM3 (ref 0–0.7)
EOSINOPHIL NFR BLD AUTO: 1.7 % (ref 0.3–6.2)
ERYTHROCYTE [DISTWIDTH] IN BLOOD BY AUTOMATED COUNT: 13.6 % (ref 11.5–14.5)
GLOBULIN SER CALC-MCNC: 2.9 GM/DL
GLUCOSE SERPL-MCNC: 92 MG/DL (ref 65–99)
HCT VFR BLD AUTO: 43.1 % (ref 40.4–52.2)
HDLC SERPL-MCNC: 31 MG/DL (ref 40–60)
HGB BLD-MCNC: 13.1 G/DL (ref 13.7–17.6)
IMM GRANULOCYTES # BLD: 0.05 10*3/MM3 (ref 0–0.03)
IMM GRANULOCYTES NFR BLD: 0.5 % (ref 0–0.5)
LDLC SERPL CALC-MCNC: 73 MG/DL (ref 0–100)
LDLC/HDLC SERPL: 2.35 {RATIO}
LYMPHOCYTES # BLD AUTO: 1.37 10*3/MM3 (ref 0.9–4.8)
LYMPHOCYTES NFR BLD AUTO: 13 % (ref 19.6–45.3)
MCH RBC QN AUTO: 30.8 PG (ref 27–32.7)
MCHC RBC AUTO-ENTMCNC: 30.4 G/DL (ref 32.6–36.4)
MCV RBC AUTO: 101.4 FL (ref 79.8–96.2)
MONOCYTES # BLD AUTO: 0.77 10*3/MM3 (ref 0.2–1.2)
MONOCYTES NFR BLD AUTO: 7.3 % (ref 5–12)
NEUTROPHILS # BLD AUTO: 8.11 10*3/MM3 (ref 1.9–8.1)
NEUTROPHILS NFR BLD AUTO: 77.3 % (ref 42.7–76)
PLATELET # BLD AUTO: 211 10*3/MM3 (ref 140–500)
POTASSIUM SERPL-SCNC: 4.2 MMOL/L (ref 3.5–5.2)
PROT SERPL-MCNC: 6.7 G/DL (ref 6–8.5)
PSA SERPL-MCNC: 0.25 NG/ML (ref 0–4)
RBC # BLD AUTO: 4.25 10*6/MM3 (ref 4.6–6)
SODIUM SERPL-SCNC: 143 MMOL/L (ref 136–145)
TRIGL SERPL-MCNC: 161 MG/DL (ref 0–150)
VLDLC SERPL CALC-MCNC: 32.2 MG/DL (ref 5–40)
WBC # BLD AUTO: 10.5 10*3/MM3 (ref 4.5–10.7)

## 2018-12-12 PROCEDURE — 90662 IIV NO PRSV INCREASED AG IM: CPT | Performed by: NURSE PRACTITIONER

## 2018-12-12 PROCEDURE — G0008 ADMIN INFLUENZA VIRUS VAC: HCPCS | Performed by: NURSE PRACTITIONER

## 2018-12-12 PROCEDURE — 99214 OFFICE O/P EST MOD 30 MIN: CPT | Performed by: NURSE PRACTITIONER

## 2018-12-12 NOTE — PROGRESS NOTES
Subjective   James Loaiza is a 73 y.o. male presents with for routine follow up. Does have new onset left flank pain, sensation is dull. Denies any pain with movement. States it does not feel like his typical lower back pain. He has decreased kidney function and is concerned. He has a follow up with nephrology in January.    He also is following up for hypertension. He has been taking his medication daily without reports of chest pain, headaches or lower extremity edema. BP today is noted to be elevated at 162/90. He has taken his bp med already today.     He has chronic hyperlipidemia, taking atorvastatin daily. Denies myalgias.      Hyperlipidemia   This is a chronic problem. The current episode started more than 1 year ago. The problem is controlled. Recent lipid tests were reviewed and are normal. Exacerbating diseases include obesity. He has no history of chronic renal disease, diabetes, hypothyroidism, liver disease or nephrotic syndrome. There are no known factors aggravating his hyperlipidemia. Pertinent negatives include no chest pain, focal sensory loss, focal weakness, leg pain, myalgias or shortness of breath. Current antihyperlipidemic treatment includes statins. The current treatment provides moderate improvement of lipids. Compliance problems include adherence to diet and adherence to exercise.  Risk factors for coronary artery disease include male sex, hypertension, obesity and dyslipidemia.   Hypertension   This is a chronic problem. The current episode started more than 1 year ago. The problem is unchanged. The problem is uncontrolled. Pertinent negatives include no anxiety, blurred vision, chest pain, headaches, malaise/fatigue, neck pain, orthopnea, palpitations, peripheral edema, PND, shortness of breath or sweats. There are no associated agents to hypertension. Risk factors for coronary artery disease include male gender, obesity and dyslipidemia. Past treatments include calcium channel  blockers and diuretics. Current antihypertension treatment includes diuretics and calcium channel blockers. The current treatment provides moderate improvement. There is no history of chronic renal disease.   Flank Pain   This is a new problem. The current episode started 1 to 4 weeks ago. The problem occurs intermittently. The problem has been waxing and waning since onset. Pain location: left flank. The quality of the pain is described as aching (dull). The pain does not radiate. The pain is at a severity of 4/10. The pain is mild. Pertinent negatives include no abdominal pain, bladder incontinence, bowel incontinence, chest pain, dysuria, fever, headaches, leg pain, numbness, paresis, paresthesias, pelvic pain, perianal numbness, tingling, weakness or weight loss. Risk factors include sedentary lifestyle and obesity. He has tried nothing for the symptoms. The treatment provided no relief.        The following portions of the patient's history were reviewed and updated as appropriate: allergies, current medications, past family history, past medical history, past social history, past surgical history and problem list.    Review of Systems   Constitutional: Negative for fever, malaise/fatigue and weight loss.   Eyes: Negative for blurred vision.   Respiratory: Negative for shortness of breath.    Cardiovascular: Negative for chest pain, palpitations, orthopnea and PND.   Gastrointestinal: Negative for abdominal pain and bowel incontinence.   Genitourinary: Positive for flank pain. Negative for bladder incontinence, dysuria and pelvic pain.   Musculoskeletal: Negative for myalgias and neck pain.   Neurological: Negative for tingling, focal weakness, weakness, numbness, headaches and paresthesias.       Objective   Physical Exam   Constitutional: He is oriented to person, place, and time. He appears well-developed and well-nourished.   HENT:   Head: Normocephalic and atraumatic.   Right Ear: Tympanic membrane and ear  canal normal.   Left Ear: Tympanic membrane and ear canal normal.   Nose: Nose normal.   Mouth/Throat: Uvula is midline, oropharynx is clear and moist and mucous membranes are normal.   Eyes: Conjunctivae are normal. Pupils are equal, round, and reactive to light.   Neck: Neck supple.   Cardiovascular: Normal rate, regular rhythm and normal heart sounds. Exam reveals no gallop and no friction rub.   No murmur heard.  Pulmonary/Chest: Effort normal and breath sounds normal. No stridor. No respiratory distress. He has no wheezes.   Abdominal: Soft. Bowel sounds are normal. He exhibits no distension. There is no tenderness.   Neurological: He is alert and oriented to person, place, and time.   Skin: Skin is warm and dry.   Psychiatric: He has a normal mood and affect.   Vitals reviewed.      Assessment/Plan   James was seen today for follow-up.    Diagnoses and all orders for this visit:    Medication management  -     Comprehensive metabolic panel  -     CBC & Differential  -     Lipid Panel With LDL / HDL Ratio  -     PSA Screen  -     Vitamin D 25 hydroxy    Vitamin D deficiency  -     Vitamin D 25 hydroxy    Benign essential hypertension ([place] 1999)  -     Comprehensive metabolic panel    Mixed hyperlipidemia  -     Lipid Panel With LDL / HDL Ratio    Vitamin D deficiency disease    Stage 3 chronic kidney disease (CMS/HCC)  -     Comprehensive metabolic panel  -     US Renal Bilateral; Future    Screening for prostate cancer  -     PSA Screen    Left flank pain  -     US Renal Bilateral; Future    Left hip pain  -     XR Hip With or Without Pelvis 2 - 3 View Left; Future    Other orders  -     Flu Vaccine High Dose PF 65YR+ ([id number])    will check renal US to check blood flow to kidneys with kidney disease and flank pain  Follow up scheduled with nephrology in one month  Will check xr of his left hip with hip problems  Follow up recommended in six months, sooner if needed

## 2018-12-14 RX ORDER — ATORVASTATIN CALCIUM 40 MG/1
TABLET, FILM COATED ORAL
Qty: 30 TABLET | Refills: 1 | Status: SHIPPED | OUTPATIENT
Start: 2018-12-14 | End: 2019-03-05 | Stop reason: SDUPTHER

## 2018-12-26 ENCOUNTER — HOSPITAL ENCOUNTER (OUTPATIENT)
Dept: ULTRASOUND IMAGING | Facility: HOSPITAL | Age: 73
Discharge: HOME | End: 2018-12-26
Admitting: NURSE PRACTITIONER

## 2018-12-26 DIAGNOSIS — R10.9 LEFT FLANK PAIN: ICD-10-CM

## 2018-12-26 DIAGNOSIS — N18.30 STAGE 3 CHRONIC KIDNEY DISEASE (HCC): ICD-10-CM

## 2018-12-26 PROCEDURE — 76775 US EXAM ABDO BACK WALL LIM: CPT

## 2019-01-13 ENCOUNTER — APPOINTMENT (OUTPATIENT)
Dept: GENERAL RADIOLOGY | Facility: HOSPITAL | Age: 74
End: 2019-01-13

## 2019-01-13 ENCOUNTER — HOSPITAL ENCOUNTER (INPATIENT)
Facility: HOSPITAL | Age: 74
LOS: 3 days | Discharge: HOME | End: 2019-01-16
Attending: EMERGENCY MEDICINE | Admitting: INTERNAL MEDICINE

## 2019-01-13 ENCOUNTER — APPOINTMENT (OUTPATIENT)
Dept: NUCLEAR MEDICINE | Facility: HOSPITAL | Age: 74
End: 2019-01-13

## 2019-01-13 DIAGNOSIS — I26.99 OTHER ACUTE PULMONARY EMBOLISM WITHOUT ACUTE COR PULMONALE (HCC): Primary | ICD-10-CM

## 2019-01-13 DIAGNOSIS — N18.9 CHRONIC KIDNEY DISEASE, UNSPECIFIED CKD STAGE: ICD-10-CM

## 2019-01-13 LAB
ALBUMIN SERPL-MCNC: 3.8 G/DL (ref 3.5–5.2)
ALBUMIN/GLOB SERPL: 1 G/DL
ALP SERPL-CCNC: 167 U/L (ref 39–117)
ALT SERPL W P-5'-P-CCNC: 11 U/L (ref 1–41)
ANION GAP SERPL CALCULATED.3IONS-SCNC: 13.5 MMOL/L
APTT PPP: 30 SECONDS (ref 22.7–35.4)
ARTERIAL PATENCY WRIST A: POSITIVE
AST SERPL-CCNC: 14 U/L (ref 1–40)
ATMOSPHERIC PRESS: 754.5 MMHG
BASE EXCESS BLDA CALC-SCNC: 2.5 MMOL/L (ref 0–2)
BASOPHILS # BLD AUTO: 0.01 10*3/MM3 (ref 0–0.2)
BASOPHILS NFR BLD AUTO: 0.1 % (ref 0–1.5)
BDY SITE: ABNORMAL
BILIRUB SERPL-MCNC: 0.6 MG/DL (ref 0.1–1.2)
BUN BLD-MCNC: 19 MG/DL (ref 8–23)
BUN/CREAT SERPL: 11.2 (ref 7–25)
CALCIUM SPEC-SCNC: 9.2 MG/DL (ref 8.6–10.5)
CHLORIDE SERPL-SCNC: 103 MMOL/L (ref 98–107)
CO2 SERPL-SCNC: 25.5 MMOL/L (ref 22–29)
CREAT BLD-MCNC: 1.69 MG/DL (ref 0.76–1.27)
D DIMER PPP FEU-MCNC: 11.57 MCGFEU/ML (ref 0–0.49)
DEPRECATED RDW RBC AUTO: 50 FL (ref 37–54)
EOSINOPHIL # BLD AUTO: 0.13 10*3/MM3 (ref 0–0.7)
EOSINOPHIL NFR BLD AUTO: 1 % (ref 0.3–6.2)
ERYTHROCYTE [DISTWIDTH] IN BLOOD BY AUTOMATED COUNT: 13.9 % (ref 11.5–14.5)
GAS FLOW AIRWAY: 2 LPM
GFR SERPL CREATININE-BSD FRML MDRD: 49 ML/MIN/1.73
GLOBULIN UR ELPH-MCNC: 3.8 GM/DL
GLUCOSE BLD-MCNC: 107 MG/DL (ref 65–99)
HCO3 BLDA-SCNC: 27.1 MMOL/L (ref 22–28)
HCT VFR BLD AUTO: 43.4 % (ref 40.4–52.2)
HGB BLD-MCNC: 13.8 G/DL (ref 13.7–17.6)
IMM GRANULOCYTES # BLD AUTO: 0.06 10*3/MM3 (ref 0–0.03)
IMM GRANULOCYTES NFR BLD AUTO: 0.5 % (ref 0–0.5)
INR PPP: 1.13 (ref 0.9–1.1)
LYMPHOCYTES # BLD AUTO: 1.02 10*3/MM3 (ref 0.9–4.8)
LYMPHOCYTES NFR BLD AUTO: 7.7 % (ref 19.6–45.3)
MCH RBC QN AUTO: 31.4 PG (ref 27–32.7)
MCHC RBC AUTO-ENTMCNC: 31.8 G/DL (ref 32.6–36.4)
MCV RBC AUTO: 98.6 FL (ref 79.8–96.2)
MODALITY: ABNORMAL
MONOCYTES # BLD AUTO: 0.73 10*3/MM3 (ref 0.2–1.2)
MONOCYTES NFR BLD AUTO: 5.5 % (ref 5–12)
NEUTROPHILS # BLD AUTO: 11.28 10*3/MM3 (ref 1.9–8.1)
NEUTROPHILS NFR BLD AUTO: 85.2 % (ref 42.7–76)
NT-PROBNP SERPL-MCNC: 289.5 PG/ML (ref 5–900)
PCO2 BLDA: 41.1 MM HG (ref 35–45)
PH BLDA: 7.43 PH UNITS (ref 7.35–7.45)
PLATELET # BLD AUTO: 184 10*3/MM3 (ref 140–500)
PMV BLD AUTO: 10 FL (ref 6–12)
PO2 BLDA: 66.4 MM HG (ref 80–100)
POTASSIUM BLD-SCNC: 3.6 MMOL/L (ref 3.5–5.2)
PROT SERPL-MCNC: 7.6 G/DL (ref 6–8.5)
PROTHROMBIN TIME: 14.3 SECONDS (ref 11.7–14.2)
RBC # BLD AUTO: 4.4 10*6/MM3 (ref 4.6–6)
SAO2 % BLDCOA: 93.3 % (ref 92–99)
SODIUM BLD-SCNC: 142 MMOL/L (ref 136–145)
TOTAL RATE: 18 BREATHS/MINUTE
TROPONIN T SERPL-MCNC: <0.01 NG/ML (ref 0–0.03)
WBC NRBC COR # BLD: 13.23 10*3/MM3 (ref 4.5–10.7)

## 2019-01-13 PROCEDURE — 71046 X-RAY EXAM CHEST 2 VIEWS: CPT

## 2019-01-13 PROCEDURE — 36600 WITHDRAWAL OF ARTERIAL BLOOD: CPT

## 2019-01-13 PROCEDURE — 25010000002 HEPARIN (PORCINE) PER 1000 UNITS: Performed by: EMERGENCY MEDICINE

## 2019-01-13 PROCEDURE — A9558 XE133 XENON 10MCI: HCPCS | Performed by: EMERGENCY MEDICINE

## 2019-01-13 PROCEDURE — 85730 THROMBOPLASTIN TIME PARTIAL: CPT | Performed by: INTERNAL MEDICINE

## 2019-01-13 PROCEDURE — 84484 ASSAY OF TROPONIN QUANT: CPT | Performed by: EMERGENCY MEDICINE

## 2019-01-13 PROCEDURE — 99285 EMERGENCY DEPT VISIT HI MDM: CPT

## 2019-01-13 PROCEDURE — 85610 PROTHROMBIN TIME: CPT | Performed by: INTERNAL MEDICINE

## 2019-01-13 PROCEDURE — 83880 ASSAY OF NATRIURETIC PEPTIDE: CPT | Performed by: EMERGENCY MEDICINE

## 2019-01-13 PROCEDURE — 93005 ELECTROCARDIOGRAM TRACING: CPT | Performed by: EMERGENCY MEDICINE

## 2019-01-13 PROCEDURE — 0 TECHNETIUM ALBUMIN AGGREGATED: Performed by: EMERGENCY MEDICINE

## 2019-01-13 PROCEDURE — 85379 FIBRIN DEGRADATION QUANT: CPT | Performed by: EMERGENCY MEDICINE

## 2019-01-13 PROCEDURE — 80053 COMPREHEN METABOLIC PANEL: CPT | Performed by: EMERGENCY MEDICINE

## 2019-01-13 PROCEDURE — 93010 ELECTROCARDIOGRAM REPORT: CPT | Performed by: INTERNAL MEDICINE

## 2019-01-13 PROCEDURE — 36415 COLL VENOUS BLD VENIPUNCTURE: CPT | Performed by: EMERGENCY MEDICINE

## 2019-01-13 PROCEDURE — 82803 BLOOD GASES ANY COMBINATION: CPT

## 2019-01-13 PROCEDURE — A9540 TC99M MAA: HCPCS | Performed by: EMERGENCY MEDICINE

## 2019-01-13 PROCEDURE — 85025 COMPLETE CBC W/AUTO DIFF WBC: CPT | Performed by: EMERGENCY MEDICINE

## 2019-01-13 PROCEDURE — 0 XENON XE 133: Performed by: EMERGENCY MEDICINE

## 2019-01-13 PROCEDURE — 78582 LUNG VENTILAT&PERFUS IMAGING: CPT

## 2019-01-13 RX ORDER — ASPIRIN 325 MG
325 TABLET ORAL DAILY
Status: DISCONTINUED | OUTPATIENT
Start: 2019-01-13 | End: 2019-01-16 | Stop reason: HOSPADM

## 2019-01-13 RX ORDER — HEPARIN SODIUM 10000 [USP'U]/100ML
9.74 INJECTION, SOLUTION INTRAVENOUS
Status: DISCONTINUED | OUTPATIENT
Start: 2019-01-13 | End: 2019-01-14

## 2019-01-13 RX ORDER — LABETALOL HYDROCHLORIDE 5 MG/ML
10 INJECTION, SOLUTION INTRAVENOUS ONCE
Status: COMPLETED | OUTPATIENT
Start: 2019-01-13 | End: 2019-01-13

## 2019-01-13 RX ORDER — HYDROCODONE BITARTRATE AND ACETAMINOPHEN 5; 325 MG/1; MG/1
1 TABLET ORAL EVERY 4 HOURS PRN
Status: DISCONTINUED | OUTPATIENT
Start: 2019-01-13 | End: 2019-01-16 | Stop reason: HOSPADM

## 2019-01-13 RX ORDER — HEPARIN SODIUM 5000 [USP'U]/ML
10000 INJECTION, SOLUTION INTRAVENOUS; SUBCUTANEOUS ONCE
Status: COMPLETED | OUTPATIENT
Start: 2019-01-13 | End: 2019-01-13

## 2019-01-13 RX ORDER — BISACODYL 5 MG/1
5 TABLET, DELAYED RELEASE ORAL DAILY PRN
Status: DISCONTINUED | OUTPATIENT
Start: 2019-01-13 | End: 2019-01-16 | Stop reason: HOSPADM

## 2019-01-13 RX ORDER — POTASSIUM CHLORIDE 1.5 G/1.77G
40 POWDER, FOR SOLUTION ORAL AS NEEDED
Status: DISCONTINUED | OUTPATIENT
Start: 2019-01-13 | End: 2019-01-16 | Stop reason: HOSPADM

## 2019-01-13 RX ORDER — CHOLECALCIFEROL (VITAMIN D3) 125 MCG
5 CAPSULE ORAL NIGHTLY PRN
Status: DISCONTINUED | OUTPATIENT
Start: 2019-01-13 | End: 2019-01-16 | Stop reason: HOSPADM

## 2019-01-13 RX ORDER — IPRATROPIUM BROMIDE AND ALBUTEROL SULFATE 2.5; .5 MG/3ML; MG/3ML
3 SOLUTION RESPIRATORY (INHALATION) EVERY 4 HOURS PRN
Status: DISCONTINUED | OUTPATIENT
Start: 2019-01-13 | End: 2019-01-16 | Stop reason: HOSPADM

## 2019-01-13 RX ORDER — POTASSIUM CHLORIDE 750 MG/1
40 CAPSULE, EXTENDED RELEASE ORAL AS NEEDED
Status: DISCONTINUED | OUTPATIENT
Start: 2019-01-13 | End: 2019-01-16 | Stop reason: HOSPADM

## 2019-01-13 RX ORDER — RAMIPRIL 5 MG/1
5 CAPSULE ORAL DAILY
Status: DISCONTINUED | OUTPATIENT
Start: 2019-01-13 | End: 2019-01-16 | Stop reason: HOSPADM

## 2019-01-13 RX ORDER — PROMETHAZINE HYDROCHLORIDE 12.5 MG/1
12.5 TABLET ORAL EVERY 6 HOURS PRN
Status: DISCONTINUED | OUTPATIENT
Start: 2019-01-13 | End: 2019-01-16 | Stop reason: HOSPADM

## 2019-01-13 RX ORDER — SODIUM CHLORIDE 0.9 % (FLUSH) 0.9 %
3 SYRINGE (ML) INJECTION EVERY 12 HOURS SCHEDULED
Status: DISCONTINUED | OUTPATIENT
Start: 2019-01-13 | End: 2019-01-16 | Stop reason: HOSPADM

## 2019-01-13 RX ORDER — HEPARIN SODIUM 5000 [USP'U]/ML
6200-10000 INJECTION, SOLUTION INTRAVENOUS; SUBCUTANEOUS EVERY 6 HOURS PRN
Status: DISCONTINUED | OUTPATIENT
Start: 2019-01-13 | End: 2019-01-14

## 2019-01-13 RX ORDER — ARFORMOTEROL TARTRATE 15 UG/2ML
15 SOLUTION RESPIRATORY (INHALATION)
Status: DISCONTINUED | OUTPATIENT
Start: 2019-01-13 | End: 2019-01-16 | Stop reason: HOSPADM

## 2019-01-13 RX ORDER — ATORVASTATIN CALCIUM 20 MG/1
40 TABLET, FILM COATED ORAL DAILY
Status: DISCONTINUED | OUTPATIENT
Start: 2019-01-13 | End: 2019-01-16 | Stop reason: HOSPADM

## 2019-01-13 RX ORDER — POTASSIUM CHLORIDE 7.45 MG/ML
10 INJECTION INTRAVENOUS
Status: DISCONTINUED | OUTPATIENT
Start: 2019-01-13 | End: 2019-01-16 | Stop reason: HOSPADM

## 2019-01-13 RX ORDER — SODIUM CHLORIDE 0.9 % (FLUSH) 0.9 %
3-10 SYRINGE (ML) INJECTION AS NEEDED
Status: DISCONTINUED | OUTPATIENT
Start: 2019-01-13 | End: 2019-01-16 | Stop reason: HOSPADM

## 2019-01-13 RX ORDER — TAMSULOSIN HYDROCHLORIDE 0.4 MG/1
0.4 CAPSULE ORAL NIGHTLY
Status: DISCONTINUED | OUTPATIENT
Start: 2019-01-13 | End: 2019-01-16 | Stop reason: HOSPADM

## 2019-01-13 RX ORDER — ACETAMINOPHEN 325 MG/1
650 TABLET ORAL EVERY 4 HOURS PRN
Status: DISCONTINUED | OUTPATIENT
Start: 2019-01-13 | End: 2019-01-16 | Stop reason: HOSPADM

## 2019-01-13 RX ORDER — NIFEDIPINE 60 MG/1
60 TABLET, EXTENDED RELEASE ORAL DAILY
Status: DISCONTINUED | OUTPATIENT
Start: 2019-01-13 | End: 2019-01-16 | Stop reason: HOSPADM

## 2019-01-13 RX ORDER — SODIUM CHLORIDE 0.9 % (FLUSH) 0.9 %
10 SYRINGE (ML) INJECTION AS NEEDED
Status: DISCONTINUED | OUTPATIENT
Start: 2019-01-13 | End: 2019-01-16 | Stop reason: HOSPADM

## 2019-01-13 RX ORDER — BISACODYL 10 MG
10 SUPPOSITORY, RECTAL RECTAL DAILY PRN
Status: DISCONTINUED | OUTPATIENT
Start: 2019-01-13 | End: 2019-01-16 | Stop reason: HOSPADM

## 2019-01-13 RX ADMIN — XENON XE-133 10.1 MILLICURIE: 10 GAS RESPIRATORY (INHALATION) at 18:49

## 2019-01-13 RX ADMIN — LABETALOL HYDROCHLORIDE 10 MG: 5 INJECTION, SOLUTION INTRAVENOUS at 20:23

## 2019-01-13 RX ADMIN — Medication 1 DOSE: at 18:50

## 2019-01-13 RX ADMIN — HEPARIN SODIUM 9.74 UNITS/KG/HR: 10000 INJECTION, SOLUTION INTRAVENOUS at 19:39

## 2019-01-13 RX ADMIN — TAMSULOSIN HYDROCHLORIDE 0.4 MG: 0.4 CAPSULE ORAL at 22:59

## 2019-01-13 RX ADMIN — NIFEDIPINE 60 MG: 60 TABLET, FILM COATED, EXTENDED RELEASE ORAL at 22:59

## 2019-01-13 RX ADMIN — ATORVASTATIN CALCIUM 40 MG: 20 TABLET, FILM COATED ORAL at 22:59

## 2019-01-13 RX ADMIN — RAMIPRIL 5 MG: 5 CAPSULE ORAL at 22:59

## 2019-01-13 RX ADMIN — ASPIRIN 325 MG: 325 TABLET ORAL at 22:59

## 2019-01-13 RX ADMIN — HEPARIN SODIUM 10000 UNITS: 5000 INJECTION INTRAVENOUS; SUBCUTANEOUS at 19:38

## 2019-01-13 RX ADMIN — LABETALOL HYDROCHLORIDE 10 MG: 5 INJECTION, SOLUTION INTRAVENOUS at 15:34

## 2019-01-13 NOTE — ED PROVIDER NOTES
EMERGENCY DEPARTMENT ENCOUNTER    CHIEF COMPLAINT  Chief Complaint: dyspnea  History given by: Patient  History limited by: None  Room Number: N640/1  PMD: Joleen Mccullough APRN Dr. Esterle, pulmonology.    HPI:  Pt is a 73 y.o. male who presents complaining of increased dyspnea for the past week worse with exertion. Pt advised he was evaluated by his pulmonologist 1/7/19 for f/u and sx developed shortly after evaluation. Pt has to use a C-PAP at night and denies any complications or increased difficulties with use of it, though he did develop a vague HA last night. He denies fever, sinus pressure, orthopnea, cough, hemoptysis, and other complaints, but does have chronic BLE swelling (R>L). Pt has hx of DVT (ALESHIA, 2006), denies hx of PE.  He is not currently anticoagulated.    Duration/Onset/Timing: one week  Location: n/a  Radiation: n/a  Quality: shortness of air  Intensity/Severity: moderate  Associated Symptoms: leg swelling  Aggravating or Alleviating Factors: exertion  Previous Episodes: pt has hx of respiratory disease and follows up w/ Dr. Sandoval regularly.      PAST MEDICAL HISTORY  Active Ambulatory Problems     Diagnosis Date Noted   • Lumbar herniated disc L3-L5 3/16/16 Open MRI 06/13/2016   • Abnormal electrocardiogram 06/13/2016   • Abnormal weight gain 06/13/2016   • Aortic valve insufficiency 06/13/2016   • Coronary arteriosclerosis in native artery 06/13/2016   • Benign essential hypertension ([place] 1999) 06/13/2016   • Edema 06/13/2016   • Dyspnea on exertion 06/13/2016   • Fatigue 06/13/2016   • Left ventricular hypertrophy 06/13/2016   • Obstructive sleep apnea syndrome 06/13/2016   • Arthritis of left hip 06/13/2016   • Hx of pulmonary embolus 7/15 06/13/2016   • Morbid obesity with BMI of 45.0-49.9, adult (CMS/[facility]) 06/13/2016   • Intermittent dysphagia 06/13/2016   • Chronic kidney disease ([place]) 06/13/2016   • Hyperlipidemia 1999 06/13/2016   • BPH (benign prostatic hypertrophy)  06/13/2016   • Left cervical radiculopathy 06/13/2016   • Rotator cuff tear, left 06/13/2016   • Cardiomegaly 06/13/2016   • Plantar fasciitis 06/13/2016   • Hypogonadism male 06/13/2016   • Vitamin D deficiency disease 06/13/2016   • Renal cyst, left 06/13/2016   • Preventative health care 09/22/2016   • Medication management 09/22/2016   • Chronic low back pain 03/01/2017   • Lumbar spondylolysis 03/01/2017   • Headache 03/13/2017   • Other chronic pain 07/30/2018     Resolved Ambulatory Problems     Diagnosis Date Noted   • No Resolved Ambulatory Problems     Past Medical History:   Diagnosis Date   • Abnormal EKG    • Anxiety    • Arthritis    • Back pain    • BPH (benign prostatic hyperplasia)    • Breast nodule    • Cardiomegaly    • Circulation problem    • Constipation    • COPD (chronic obstructive pulmonary disease) (CMS/HCC)    • Deviated septum    • DJD (degenerative joint disease)    • Dysphagia    • Encounter for annual health examination 11/14/2013   • Enlarged prostate    • Fatigue    • Hyperlipidemia 1999   • Hypertension 1999   • Hypogonadism male    • Left hip pain    • Left leg pain    • Low back pain    • Moist mucous membranes of ear, nose, and throat    • Morbid obesity (CMS/HCC)    • Muscle cramping    • Muscle spasm    • Neck arthritis    • Neck muscle spasm    • Obesity    • Plantar fasciitis    • Prostatitis    • Psoriasis    • Pulmonary embolus (CMS/HCC) 07/2015   • Radiculopathy    • Renal insufficiency    • Seborrhea    • Shortness of breath    • Sleep apnea    • Urinary frequency    • Vascular disorder    • Vertigo    • Weight gain    • Wellness examination 10/23/2014       PAST SURGICAL HISTORY  Past Surgical History:   Procedure Laterality Date   • APPENDECTOMY  1965   • CARDIAC CATHETERIZATION  07/29/2010    [facility]   • COLONOSCOPY  01/05/2010   • COLONOSCOPY  10/01/2001   • NASAL SEPTUM SURGERY     • NOSE SURGERY  1999   • TURP / TRANSURETHRAL INCISION /  DRAINAGE PROSTATE  10/23/2015    Michael Castro       FAMILY HISTORY  Family History   Problem Relation Age of Onset   • Arthritis Mother    • Heart disease Mother    • Hypertension Mother    • Heart attack Father    • Heart disease Father    • Hypertension Father    • Arthritis Sister    • Multiple sclerosis Sister    • Alcohol abuse Brother    • Diabetes Son        SOCIAL HISTORY  Social History     Socioeconomic History   • Marital status:      Spouse name: Not on file   • Number of children: Not on file   • Years of education: Not on file   • Highest education level: Not on file   Social Needs   • Financial resource strain: Not on file   • Food insecurity - worry: Not on file   • Food insecurity - inability: Not on file   • Transportation needs - medical: Not on file   • Transportation needs - non-medical: Not on file   Occupational History   • Not on file   Tobacco Use   • Smoking status: Never Smoker   • Smokeless tobacco: Never Used   Substance and Sexual Activity   • Alcohol use: Yes     Comment: social   • Drug use: No   • Sexual activity: Defer   Other Topics Concern   • Not on file   Social History Narrative   • Not on file       ALLERGIES  Patient has no known allergies.    REVIEW OF SYSTEMS  Review of Systems   Constitutional: Negative for activity change, appetite change and fever.   HENT: Negative for congestion and sore throat.    Eyes: Negative.    Respiratory: Positive for shortness of breath. Negative for cough.    Cardiovascular: Positive for leg swelling. Negative for chest pain.   Gastrointestinal: Negative for abdominal pain, diarrhea and vomiting.   Endocrine: Negative.    Genitourinary: Negative for decreased urine volume and dysuria.   Musculoskeletal: Negative for neck pain.   Skin: Negative for rash and wound.   Allergic/Immunologic: Negative.    Neurological: Negative for weakness, numbness and headaches.   Hematological: Negative.    Psychiatric/Behavioral: Negative.    All  other systems reviewed and are negative.      PHYSICAL EXAM  ED Triage Vitals [01/13/19 1453]   Temp Heart Rate Resp BP SpO2   97.7 °F (36.5 °C) 114 22 -- 91 %      Temp src Heart Rate Source Patient Position BP Location FiO2 (%)   -- -- -- -- --       Physical Exam   Constitutional: He is oriented to person, place, and time. No distress.   HENT:   Head: Normocephalic and atraumatic.   Eyes: EOM are normal. Pupils are equal, round, and reactive to light.   Neck: Normal range of motion. Neck supple.   Cardiovascular: Normal rate, regular rhythm and normal heart sounds.   Pulmonary/Chest: Effort normal and breath sounds normal. No respiratory distress.   92% on RA   Abdominal: Soft. There is no tenderness. There is no rebound and no guarding.   Pt is morbidly obese   Musculoskeletal: Normal range of motion. He exhibits no edema.   Chronic BLE swelling (R>L) w/ chronic venostasis on the R.   Neurological: He is alert and oriented to person, place, and time. He has normal sensation and normal strength.   Skin: Skin is warm and dry.   Psychiatric: Mood and affect normal.   Nursing note and vitals reviewed.      LAB RESULTS  Lab Results (last 24 hours)     Procedure Component Value Units Date/Time    CBC & Differential [[id number]] Collected:  01/13/19 1534    Specimen:  Blood Updated:  01/13/19 1600    Narrative:       The following orders were created for panel order CBC & Differential.  Procedure                               Abnormality         Status                     ---------                               -----------         ------                     CBC Auto Differential[[id number]]        Abnormal            Final result                 Please view results for these tests on the individual orders.    Comprehensive Metabolic Panel [[id number]]  (Abnormal) Collected:  01/13/19 1534    Specimen:  Blood Updated:  01/13/19 1613     Glucose 107 mg/dL      BUN 19 mg/dL      Creatinine 1.69 mg/dL      Sodium 142 mmol/L       Potassium 3.6 mmol/L      Chloride 103 mmol/L      CO2 25.5 mmol/L      Calcium 9.2 mg/dL      Total Protein 7.6 g/dL      Albumin 3.80 g/dL      ALT (SGPT) 11 U/L      AST (SGOT) 14 U/L      Alkaline Phosphatase 167 U/L      Total Bilirubin 0.6 mg/dL      eGFR  Spencer Amer 49 mL/min/1.73      Globulin 3.8 gm/dL      A/G Ratio 1.0 g/dL      BUN/Creatinine Ratio 11.2     Anion Gap 13.5 mmol/L     Narrative:       The MDRD GFR formula is only valid for adults with stable renal function between ages 18 and 70.    BNP [[id number]]  (Normal) Collected:  01/13/19 1534    Specimen:  Blood Updated:  01/13/19 1616     proBNP 289.5 pg/mL     Narrative:       Among patients with dyspnea, NT-proBNP is highly sensitive for the detection of acute congestive heart failure. In addition NT-proBNP of <300 pg/ml effectively rules out acute congestive heart failure with 99% negative predictive value.    D-dimer, Quantitative [[id number]]  (Abnormal) Collected:  01/13/19 1534    Specimen:  Blood Updated:  01/13/19 1616     D-Dimer, Quantitative 11.57 MCGFEU/mL     Narrative:       The Stago D-Dimer test used in conjunction with a clinical pretest probability (PTP) assessment model, has been approved by the FDA to rule out the presence of venous thromboembolism (VTE) in outpatients suspected of deep venous thrombosis (DVT) or pulmonary embolism (PE).     Troponin [[id number]]  (Normal) Collected:  01/13/19 1534    Specimen:  Blood Updated:  01/13/19 1616     Troponin T <0.010 ng/mL     Narrative:       Troponin T Reference Ranges:  Less than 0.03 ng/mL:    Negative for AMI  0.03 to 0.09 ng/mL:      Indeterminant for AMI  Greater than 0.09 ng/mL: Positive for AMI    CBC Auto Differential [[id number]]  (Abnormal) Collected:  01/13/19 1534    Specimen:  Blood Updated:  01/13/19 1600     WBC 13.23 10*3/mm3      RBC 4.40 10*6/mm3      Hemoglobin 13.8 g/dL      Hematocrit 43.4 %      MCV 98.6 fL      MCH 31.4 pg      MCHC 31.8 g/dL      RDW  13.9 %      RDW-SD 50.0 fl      MPV 10.0 fL      Platelets 184 10*3/mm3      Neutrophil % 85.2 %      Lymphocyte % 7.7 %      Monocyte % 5.5 %      Eosinophil % 1.0 %      Basophil % 0.1 %      Immature Grans % 0.5 %      Neutrophils, Absolute 11.28 10*3/mm3      Lymphocytes, Absolute 1.02 10*3/mm3      Monocytes, Absolute 0.73 10*3/mm3      Eosinophils, Absolute 0.13 10*3/mm3      Basophils, Absolute 0.01 10*3/mm3      Immature Grans, Absolute 0.06 10*3/mm3     aPTT [[id number]]  (Normal) Collected:  01/13/19 1534    Specimen:  Blood Updated:  01/13/19 1951     PTT 30.0 seconds     Protime-INR [[id number]]  (Abnormal) Collected:  01/13/19 1534    Specimen:  Blood Updated:  01/13/19 1952     Protime 14.3 Seconds      INR 1.13    Blood Gas, Arterial [[id number]]  (Abnormal) Collected:  01/13/19 1601    Specimen:  Arterial Blood Updated:  01/13/19 1606     Site Arterial: right radial     Rudi's Test Positive     pH, Arterial 7.427 pH units      pCO2, Arterial 41.1 mm Hg      pO2, Arterial 66.4 mm Hg      HCO3, Arterial 27.1 mmol/L      Base Excess, Arterial 2.5 mmol/L      O2 Saturation Calculated 93.3 %      Barometric Pressure for Blood Gas 754.5 mmHg      Modality Cannula     Flow Rate 2 lpm      Rate 18 Breaths/minute           I ordered the above labs and reviewed the results    RADIOLOGY  NM Lung Ventilation Perfusion   Preliminary Result   Mismatched perfusion defects as described. Findings are   consistent with high probability for pulmonary thromboembolism.          XR Chest 2 View   Final Result           I ordered the above noted radiological studies. Interpreted by radiologist. Reviewed by me in PACS.       PROCEDURES  Critical Care  Performed by: Franky Ramírez MD  Authorized by: Franky Ramírez MD     Critical care provider statement:     Critical care time (minutes):  30    Critical care was necessary to treat or prevent imminent or life-threatening deterioration of the following conditions:   Circulatory failure    Critical care was time spent personally by me on the following activities:  Development of treatment plan with patient or surrogate, discussions with consultants, evaluation of patient's response to treatment, examination of patient, obtaining history from patient or surrogate, ordering and performing treatments and interventions, ordering and review of laboratory studies, ordering and review of radiographic studies, re-evaluation of patient's condition and review of old charts        EKG          EKG time: 1508  Rhythm/Rate: NSR 84  P waves and KS: nml  QRS, axis: nml   ST and T waves: nonspecific ST/T wave changes     Interpreted Contemporaneously by me, independently viewed  unchanged compared to prior 08/10/15      PROGRESS AND CONSULTS     1640  BP- 183/121 HR- 71 Temp- 97.7 °F (36.5 °C) O2 sat- 92%  Rechecked the patient who is in NAD and is resting comfortably. Discussed pt's d-dimer being elevated and the need to do additional tests. Share decision making w/ the pt as his creatinine is elevated, though baseline. Discussed risks and benefits of doing CTA chest. Pt agreed to VQ scan first and if indeterminate then will do CTA chest. Pt understands and agrees with the plan, all questions answered.    1909  BP- 177/89 HR- 70 Temp- 97.7 °F (36.5 °C) O2 sat- 94%  Rechecked the patient who is in NAD and is resting comfortably. Discussed the scan to showing a high probability and the plan to consult with pulmonology, admit, and treat w/ anticoagulants. Pt understands and agrees with the plan, all questions answered.    1910  Discussed the pt with Dr. BJORN Palacios, pulmonology. He agreed to admit to telemetry.    MEDICAL DECISION MAKING  Results were reviewed/discussed with the patient and they were also made aware of online access. Pt also made aware that some labs, such as cultures, will not be resulted during ER visit and follow up with PMD is necessary.     MDM  Number of Diagnoses or  Management Options  Chronic kidney disease, unspecified CKD stage:   Other acute pulmonary embolism without acute cor pulmonale (CMS/HCC):      Amount and/or Complexity of Data Reviewed  Clinical lab tests: reviewed (D-dimer 11.57)  Tests in the radiology section of CPT®: reviewed (NM Lung perfusion-high probability scan w/ moderate sized defect on the left and right w/ a nml ventilation scan)  Tests in the medicine section of CPT®: reviewed (See EKG procedure note.)  Decide to obtain previous medical records or to obtain history from someone other than the patient: yes  Discuss the patient with other providers: yes (Dr. BJORN Palacios, pulmonology)  Independent visualization of images, tracings, or specimens: yes    Critical Care  Total time providing critical care: 30-74 minutes    Patient Progress  Patient progress: stable         DIAGNOSIS  Final diagnoses:   Other acute pulmonary embolism without acute cor pulmonale (CMS/HCC)   Chronic kidney disease, unspecified CKD stage       DISPOSITION  ADMISSION    Discussed treatment plan and reason for admission with pt/family and admitting physician.  Pt/family voiced understanding of the plan for admission for further testing/treatment as needed.     Latest Documented Vital Signs:  As of 9:58 PM  BP- (!) 187/107 HR- (!) 8 Temp- 97.8 °F (36.6 °C) (Oral) O2 sat- 94%    --  Documentation assistance provided by saulo Jeffries for Dr. Ramírez.  Information recorded by the scribe was done at my direction and has been verified and validated by me.     Jennifer Jeffries  01/13/19 1923       Franky Ramírez MD  01/13/19 3564

## 2019-01-13 NOTE — ED NOTES
"Pt reports SOA that has been getting \"progressively worse\" over the last week. Denies cough, chest pain, or fever. SOA with exertion, as well as SOA while sitting. Pt wears CPAP at night.     Pt is hypertensive. Reports HA last night. Denies HA or blurry vision at this time.     Freda Fernandez, AARON  01/13/19 1625       Freda Fernandez RN  01/13/19 1632    "

## 2019-01-14 ENCOUNTER — APPOINTMENT (OUTPATIENT)
Dept: CARDIOLOGY | Facility: HOSPITAL | Age: 74
End: 2019-01-14
Attending: INTERNAL MEDICINE

## 2019-01-14 LAB
AORTIC DIMENSIONLESS INDEX: 0.7 (DI)
APTT PPP: 124.3 SECONDS (ref 22.7–35.4)
APTT PPP: 59.3 SECONDS (ref 22.7–35.4)
BASOPHILS # BLD AUTO: 0.02 10*3/MM3 (ref 0–0.2)
BASOPHILS NFR BLD AUTO: 0.1 % (ref 0–1.5)
BH CV ECHO MEAS - ACS: 2.2 CM
BH CV ECHO MEAS - AI DEC SLOPE: 332 CM/SEC^2
BH CV ECHO MEAS - AI MAX PG: 100 MMHG
BH CV ECHO MEAS - AI MAX VEL: 500 CM/SEC
BH CV ECHO MEAS - AI P1/2T: 441.1 MSEC
BH CV ECHO MEAS - AO MAX PG (FULL): 5.4 MMHG
BH CV ECHO MEAS - AO MAX PG: 13.1 MMHG
BH CV ECHO MEAS - AO MEAN PG (FULL): 2 MMHG
BH CV ECHO MEAS - AO MEAN PG: 6 MMHG
BH CV ECHO MEAS - AO ROOT AREA (BSA CORRECTED): 1.5
BH CV ECHO MEAS - AO ROOT AREA: 13.2 CM^2
BH CV ECHO MEAS - AO ROOT DIAM: 4.1 CM
BH CV ECHO MEAS - AO V2 MAX: 181 CM/SEC
BH CV ECHO MEAS - AO V2 MEAN: 114 CM/SEC
BH CV ECHO MEAS - AO V2 VTI: 36.3 CM
BH CV ECHO MEAS - ASC AORTA: 3.4 CM
BH CV ECHO MEAS - AVA(I,A): 3.3 CM^2
BH CV ECHO MEAS - AVA(I,D): 3.3 CM^2
BH CV ECHO MEAS - AVA(V,A): 3.5 CM^2
BH CV ECHO MEAS - AVA(V,D): 3.5 CM^2
BH CV ECHO MEAS - BSA(HAYCOCK): 2.9 M^2
BH CV ECHO MEAS - BSA: 2.7 M^2
BH CV ECHO MEAS - BZI_BMI: 42.9 KILOGRAMS/M^2
BH CV ECHO MEAS - BZI_METRIC_HEIGHT: 188 CM
BH CV ECHO MEAS - BZI_METRIC_WEIGHT: 151.5 KG
BH CV ECHO MEAS - EDV(CUBED): 195.1 ML
BH CV ECHO MEAS - EDV(MOD-SP2): 154 ML
BH CV ECHO MEAS - EDV(MOD-SP4): 144 ML
BH CV ECHO MEAS - EDV(TEICH): 166.6 ML
BH CV ECHO MEAS - EF(CUBED): 64.7 %
BH CV ECHO MEAS - EF(MOD-BP): 62 %
BH CV ECHO MEAS - EF(MOD-SP2): 58.4 %
BH CV ECHO MEAS - EF(MOD-SP4): 63.9 %
BH CV ECHO MEAS - EF(TEICH): 55.4 %
BH CV ECHO MEAS - ESV(CUBED): 68.9 ML
BH CV ECHO MEAS - ESV(MOD-SP2): 64 ML
BH CV ECHO MEAS - ESV(MOD-SP4): 52 ML
BH CV ECHO MEAS - ESV(TEICH): 74.2 ML
BH CV ECHO MEAS - FS: 29.3 %
BH CV ECHO MEAS - IVS/LVPW: 1.2
BH CV ECHO MEAS - IVSD: 1.2 CM
BH CV ECHO MEAS - LAT PEAK E' VEL: 9 CM/SEC
BH CV ECHO MEAS - LV DIASTOLIC VOL/BSA (35-75): 53.3 ML/M^2
BH CV ECHO MEAS - LV MASS(C)D: 264.3 GRAMS
BH CV ECHO MEAS - LV MASS(C)DI: 97.8 GRAMS/M^2
BH CV ECHO MEAS - LV MAX PG: 7.7 MMHG
BH CV ECHO MEAS - LV MEAN PG: 4 MMHG
BH CV ECHO MEAS - LV SYSTOLIC VOL/BSA (12-30): 19.2 ML/M^2
BH CV ECHO MEAS - LV V1 MAX: 139 CM/SEC
BH CV ECHO MEAS - LV V1 MEAN: 86 CM/SEC
BH CV ECHO MEAS - LV V1 VTI: 26.2 CM
BH CV ECHO MEAS - LVIDD: 5.8 CM
BH CV ECHO MEAS - LVIDS: 4.1 CM
BH CV ECHO MEAS - LVLD AP2: 9.4 CM
BH CV ECHO MEAS - LVLD AP4: 9.2 CM
BH CV ECHO MEAS - LVLS AP2: 8.4 CM
BH CV ECHO MEAS - LVLS AP4: 8.2 CM
BH CV ECHO MEAS - LVOT AREA (M): 4.5 CM^2
BH CV ECHO MEAS - LVOT AREA: 4.5 CM^2
BH CV ECHO MEAS - LVOT DIAM: 2.4 CM
BH CV ECHO MEAS - LVPWD: 1 CM
BH CV ECHO MEAS - MED PEAK E' VEL: 7 CM/SEC
BH CV ECHO MEAS - MV A DUR: 0.17 SEC
BH CV ECHO MEAS - MV A MAX VEL: 118 CM/SEC
BH CV ECHO MEAS - MV DEC SLOPE: 419 CM/SEC^2
BH CV ECHO MEAS - MV DEC TIME: 0.25 SEC
BH CV ECHO MEAS - MV E MAX VEL: 88.1 CM/SEC
BH CV ECHO MEAS - MV E/A: 0.75
BH CV ECHO MEAS - MV MAX PG: 6.5 MMHG
BH CV ECHO MEAS - MV MEAN PG: 2 MMHG
BH CV ECHO MEAS - MV P1/2T MAX VEL: 91.2 CM/SEC
BH CV ECHO MEAS - MV P1/2T: 63.8 MSEC
BH CV ECHO MEAS - MV V2 MAX: 127 CM/SEC
BH CV ECHO MEAS - MV V2 MEAN: 63.5 CM/SEC
BH CV ECHO MEAS - MV V2 VTI: 32.8 CM
BH CV ECHO MEAS - MVA P1/2T LCG: 2.4 CM^2
BH CV ECHO MEAS - MVA(P1/2T): 3.5 CM^2
BH CV ECHO MEAS - MVA(VTI): 3.6 CM^2
BH CV ECHO MEAS - PA ACC TIME: 0.09 SEC
BH CV ECHO MEAS - PA MAX PG (FULL): 4.8 MMHG
BH CV ECHO MEAS - PA MAX PG: 6.7 MMHG
BH CV ECHO MEAS - PA PR(ACCEL): 40.8 MMHG
BH CV ECHO MEAS - PA V2 MAX: 129 CM/SEC
BH CV ECHO MEAS - PI END-D VEL: 136 CM/SEC
BH CV ECHO MEAS - PULM A REVS DUR: 0.17 SEC
BH CV ECHO MEAS - PULM A REVS VEL: 43.9 CM/SEC
BH CV ECHO MEAS - PULM DIAS VEL: 53.2 CM/SEC
BH CV ECHO MEAS - PULM S/D: 1.6
BH CV ECHO MEAS - PULM SYS VEL: 84.7 CM/SEC
BH CV ECHO MEAS - PVA(V,A): 3.1 CM^2
BH CV ECHO MEAS - PVA(V,D): 3.1 CM^2
BH CV ECHO MEAS - QP/QS: 0.63
BH CV ECHO MEAS - RV MAX PG: 1.9 MMHG
BH CV ECHO MEAS - RV MEAN PG: 1 MMHG
BH CV ECHO MEAS - RV V1 MAX: 69 CM/SEC
BH CV ECHO MEAS - RV V1 MEAN: 45.1 CM/SEC
BH CV ECHO MEAS - RV V1 VTI: 13 CM
BH CV ECHO MEAS - RVOT AREA: 5.7 CM^2
BH CV ECHO MEAS - RVOT DIAM: 2.7 CM
BH CV ECHO MEAS - SI(AO): 177.3 ML/M^2
BH CV ECHO MEAS - SI(CUBED): 46.7 ML/M^2
BH CV ECHO MEAS - SI(LVOT): 43.9 ML/M^2
BH CV ECHO MEAS - SI(MOD-SP2): 33.3 ML/M^2
BH CV ECHO MEAS - SI(MOD-SP4): 34 ML/M^2
BH CV ECHO MEAS - SI(TEICH): 34.2 ML/M^2
BH CV ECHO MEAS - SV(AO): 479.3 ML
BH CV ECHO MEAS - SV(CUBED): 126.2 ML
BH CV ECHO MEAS - SV(LVOT): 118.5 ML
BH CV ECHO MEAS - SV(MOD-SP2): 90 ML
BH CV ECHO MEAS - SV(MOD-SP4): 92 ML
BH CV ECHO MEAS - SV(RVOT): 74.4 ML
BH CV ECHO MEAS - SV(TEICH): 92.3 ML
BH CV ECHO MEAS - TAPSE (>1.6): 2.9 CM2
BH CV ECHO MEASUREMENTS AVERAGE E/E' RATIO: 11.01
BH CV LOW VAS RIGHT GASTRONEMIUS VESSEL: 1
BH CV LOW VAS RIGHT PERONEAL VESSEL: 1
BH CV LOW VAS RIGHT POPLITEAL SPONT: 1
BH CV LOW VAS RIGHT POSTERIOR TIBIAL VESSEL: 1
BH CV LOWER VASCULAR LEFT COMMON FEMORAL AUGMENT: NORMAL
BH CV LOWER VASCULAR LEFT COMMON FEMORAL COMPETENT: NORMAL
BH CV LOWER VASCULAR LEFT COMMON FEMORAL COMPRESS: NORMAL
BH CV LOWER VASCULAR LEFT COMMON FEMORAL PHASIC: NORMAL
BH CV LOWER VASCULAR LEFT COMMON FEMORAL SPONT: NORMAL
BH CV LOWER VASCULAR LEFT DISTAL FEMORAL COMPRESS: NORMAL
BH CV LOWER VASCULAR LEFT GASTRONEMIUS COMPRESS: NORMAL
BH CV LOWER VASCULAR LEFT GREATER SAPH AK COMPRESS: NORMAL
BH CV LOWER VASCULAR LEFT GREATER SAPH BK COMPRESS: NORMAL
BH CV LOWER VASCULAR LEFT LESSER SAPH COMPRESS: NORMAL
BH CV LOWER VASCULAR LEFT MID FEMORAL AUGMENT: NORMAL
BH CV LOWER VASCULAR LEFT MID FEMORAL COMPETENT: NORMAL
BH CV LOWER VASCULAR LEFT MID FEMORAL COMPRESS: NORMAL
BH CV LOWER VASCULAR LEFT MID FEMORAL PHASIC: NORMAL
BH CV LOWER VASCULAR LEFT MID FEMORAL SPONT: NORMAL
BH CV LOWER VASCULAR LEFT PERONEAL COMPRESS: NORMAL
BH CV LOWER VASCULAR LEFT POPLITEAL AUGMENT: NORMAL
BH CV LOWER VASCULAR LEFT POPLITEAL COMPETENT: NORMAL
BH CV LOWER VASCULAR LEFT POPLITEAL COMPRESS: NORMAL
BH CV LOWER VASCULAR LEFT POPLITEAL PHASIC: NORMAL
BH CV LOWER VASCULAR LEFT POPLITEAL SPONT: NORMAL
BH CV LOWER VASCULAR LEFT POSTERIOR TIBIAL COMPRESS: NORMAL
BH CV LOWER VASCULAR LEFT PROXIMAL FEMORAL COMPRESS: NORMAL
BH CV LOWER VASCULAR LEFT SAPHENOFEMORAL JUNCTION COMPRESS: NORMAL
BH CV LOWER VASCULAR LEFT SAPHENOFEMORAL JUNCTION PHASIC: NORMAL
BH CV LOWER VASCULAR LEFT SAPHENOFEMORAL JUNCTION SPONT: NORMAL
BH CV LOWER VASCULAR RIGHT COMMON FEMORAL AUGMENT: NORMAL
BH CV LOWER VASCULAR RIGHT COMMON FEMORAL COMPETENT: NORMAL
BH CV LOWER VASCULAR RIGHT COMMON FEMORAL COMPRESS: NORMAL
BH CV LOWER VASCULAR RIGHT COMMON FEMORAL PHASIC: NORMAL
BH CV LOWER VASCULAR RIGHT COMMON FEMORAL SPONT: NORMAL
BH CV LOWER VASCULAR RIGHT DISTAL FEMORAL COMPRESS: NORMAL
BH CV LOWER VASCULAR RIGHT GASTRONEMIUS COMPRESS: NORMAL
BH CV LOWER VASCULAR RIGHT GASTRONEMIUS THROMBUS: NORMAL
BH CV LOWER VASCULAR RIGHT GREATER SAPH AK COMPRESS: NORMAL
BH CV LOWER VASCULAR RIGHT GREATER SAPH BK COMPRESS: NORMAL
BH CV LOWER VASCULAR RIGHT LESSER SAPH COMPRESS: NORMAL
BH CV LOWER VASCULAR RIGHT MID FEMORAL AUGMENT: NORMAL
BH CV LOWER VASCULAR RIGHT MID FEMORAL COMPETENT: NORMAL
BH CV LOWER VASCULAR RIGHT MID FEMORAL COMPRESS: NORMAL
BH CV LOWER VASCULAR RIGHT MID FEMORAL PHASIC: NORMAL
BH CV LOWER VASCULAR RIGHT MID FEMORAL SPONT: NORMAL
BH CV LOWER VASCULAR RIGHT PERONEAL COMPRESS: NORMAL
BH CV LOWER VASCULAR RIGHT PERONEAL THROMBUS: NORMAL
BH CV LOWER VASCULAR RIGHT POPLITEAL AUGMENT: NORMAL
BH CV LOWER VASCULAR RIGHT POPLITEAL COMPETENT: NORMAL
BH CV LOWER VASCULAR RIGHT POPLITEAL COMPRESS: NORMAL
BH CV LOWER VASCULAR RIGHT POPLITEAL PHASIC: NORMAL
BH CV LOWER VASCULAR RIGHT POPLITEAL SPONT: NORMAL
BH CV LOWER VASCULAR RIGHT POPLITEAL THROMBUS: NORMAL
BH CV LOWER VASCULAR RIGHT POSTERIOR TIBIAL COMPRESS: NORMAL
BH CV LOWER VASCULAR RIGHT POSTERIOR TIBIAL THROMBUS: NORMAL
BH CV LOWER VASCULAR RIGHT PROXIMAL FEMORAL COMPRESS: NORMAL
BH CV LOWER VASCULAR RIGHT SAPHENOFEMORAL JUNCTION COMPRESS: NORMAL
BH CV LOWER VASCULAR RIGHT SAPHENOFEMORAL JUNCTION PHASIC: NORMAL
BH CV LOWER VASCULAR RIGHT SAPHENOFEMORAL JUNCTION SPONT: NORMAL
BH CV VAS BP RIGHT ARM: NORMAL MMHG
BH CV XLRA - RV BASE: 3.4 CM
BH CV XLRA - TDI S': 22 CM/SEC
DEPRECATED RDW RBC AUTO: 51.1 FL (ref 37–54)
EOSINOPHIL # BLD AUTO: 0.14 10*3/MM3 (ref 0–0.7)
EOSINOPHIL NFR BLD AUTO: 1 % (ref 0.3–6.2)
ERYTHROCYTE [DISTWIDTH] IN BLOOD BY AUTOMATED COUNT: 14 % (ref 11.5–14.5)
HCT VFR BLD AUTO: 40.6 % (ref 40.4–52.2)
HGB BLD-MCNC: 12.6 G/DL (ref 13.7–17.6)
IMM GRANULOCYTES # BLD AUTO: 0.07 10*3/MM3 (ref 0–0.03)
IMM GRANULOCYTES NFR BLD AUTO: 0.5 % (ref 0–0.5)
LEFT ATRIUM VOLUME INDEX: 28 ML/M2
LEFT ATRIUM VOLUME: 76 CM3
LYMPHOCYTES # BLD AUTO: 1.69 10*3/MM3 (ref 0.9–4.8)
LYMPHOCYTES NFR BLD AUTO: 11.7 % (ref 19.6–45.3)
MAXIMAL PREDICTED HEART RATE: 149 BPM
MCH RBC QN AUTO: 31.3 PG (ref 27–32.7)
MCHC RBC AUTO-ENTMCNC: 31 G/DL (ref 32.6–36.4)
MCV RBC AUTO: 100.7 FL (ref 79.8–96.2)
MONOCYTES # BLD AUTO: 0.7 10*3/MM3 (ref 0.2–1.2)
MONOCYTES NFR BLD AUTO: 4.9 % (ref 5–12)
NEUTROPHILS # BLD AUTO: 11.77 10*3/MM3 (ref 1.9–8.1)
NEUTROPHILS NFR BLD AUTO: 81.8 % (ref 42.7–76)
PLATELET # BLD AUTO: 178 10*3/MM3 (ref 140–500)
PMV BLD AUTO: 10 FL (ref 6–12)
RBC # BLD AUTO: 4.03 10*6/MM3 (ref 4.6–6)
STRESS TARGET HR: 127 BPM
WBC NRBC COR # BLD: 14.39 10*3/MM3 (ref 4.5–10.7)

## 2019-01-14 PROCEDURE — 85730 THROMBOPLASTIN TIME PARTIAL: CPT | Performed by: INTERNAL MEDICINE

## 2019-01-14 PROCEDURE — 83090 ASSAY OF HOMOCYSTEINE: CPT

## 2019-01-14 PROCEDURE — 25010000002 HEPARIN (PORCINE) PER 1000 UNITS: Performed by: EMERGENCY MEDICINE

## 2019-01-14 PROCEDURE — 85306 CLOT INHIBIT PROT S FREE: CPT | Performed by: INTERNAL MEDICINE

## 2019-01-14 PROCEDURE — 85025 COMPLETE CBC W/AUTO DIFF WBC: CPT | Performed by: EMERGENCY MEDICINE

## 2019-01-14 PROCEDURE — 86147 CARDIOLIPIN ANTIBODY EA IG: CPT

## 2019-01-14 PROCEDURE — 25010000002 HYDRALAZINE PER 20 MG: Performed by: INTERNAL MEDICINE

## 2019-01-14 PROCEDURE — 94640 AIRWAY INHALATION TREATMENT: CPT

## 2019-01-14 PROCEDURE — 93970 EXTREMITY STUDY: CPT

## 2019-01-14 PROCEDURE — 63710000001 PROMETHAZINE PER 12.5 MG: Performed by: INTERNAL MEDICINE

## 2019-01-14 PROCEDURE — 93306 TTE W/DOPPLER COMPLETE: CPT | Performed by: INTERNAL MEDICINE

## 2019-01-14 PROCEDURE — 85300 ANTITHROMBIN III ACTIVITY: CPT | Performed by: INTERNAL MEDICINE

## 2019-01-14 PROCEDURE — 94799 UNLISTED PULMONARY SVC/PX: CPT

## 2019-01-14 PROCEDURE — 81241 F5 GENE: CPT | Performed by: INTERNAL MEDICINE

## 2019-01-14 PROCEDURE — 85598 HEXAGNAL PHOSPH PLTLT NEUTRL: CPT

## 2019-01-14 PROCEDURE — 85307 ASSAY ACTIVATED PROTEIN C: CPT

## 2019-01-14 PROCEDURE — 85240 CLOT FACTOR VIII AHG 1 STAGE: CPT

## 2019-01-14 PROCEDURE — 85306 CLOT INHIBIT PROT S FREE: CPT

## 2019-01-14 PROCEDURE — 93306 TTE W/DOPPLER COMPLETE: CPT

## 2019-01-14 PROCEDURE — 85300 ANTITHROMBIN III ACTIVITY: CPT

## 2019-01-14 PROCEDURE — 81240 F2 GENE: CPT | Performed by: INTERNAL MEDICINE

## 2019-01-14 PROCEDURE — 85613 RUSSELL VIPER VENOM DILUTED: CPT

## 2019-01-14 PROCEDURE — 85730 THROMBOPLASTIN TIME PARTIAL: CPT

## 2019-01-14 PROCEDURE — 86146 BETA-2 GLYCOPROTEIN ANTIBODY: CPT

## 2019-01-14 PROCEDURE — 85303 CLOT INHIBIT PROT C ACTIVITY: CPT

## 2019-01-14 PROCEDURE — 85303 CLOT INHIBIT PROT C ACTIVITY: CPT | Performed by: INTERNAL MEDICINE

## 2019-01-14 RX ORDER — HYDRALAZINE HYDROCHLORIDE 20 MG/ML
20 INJECTION INTRAMUSCULAR; INTRAVENOUS EVERY 4 HOURS PRN
Status: DISCONTINUED | OUTPATIENT
Start: 2019-01-14 | End: 2019-01-16 | Stop reason: HOSPADM

## 2019-01-14 RX ADMIN — RAMIPRIL 5 MG: 5 CAPSULE ORAL at 09:53

## 2019-01-14 RX ADMIN — HYDRALAZINE HYDROCHLORIDE 20 MG: 20 INJECTION INTRAMUSCULAR; INTRAVENOUS at 11:24

## 2019-01-14 RX ADMIN — ASPIRIN 325 MG: 325 TABLET ORAL at 09:55

## 2019-01-14 RX ADMIN — ATORVASTATIN CALCIUM 40 MG: 20 TABLET, FILM COATED ORAL at 09:53

## 2019-01-14 RX ADMIN — HYDROCODONE BITARTRATE AND ACETAMINOPHEN 1 TABLET: 5; 325 TABLET ORAL at 22:06

## 2019-01-14 RX ADMIN — HYDRALAZINE HYDROCHLORIDE 20 MG: 20 INJECTION INTRAMUSCULAR; INTRAVENOUS at 22:06

## 2019-01-14 RX ADMIN — HEPARIN SODIUM 13.74 UNITS/KG/HR: 10000 INJECTION, SOLUTION INTRAVENOUS at 03:03

## 2019-01-14 RX ADMIN — HEPARIN SODIUM 10000 UNITS: 5000 INJECTION INTRAVENOUS; SUBCUTANEOUS at 03:01

## 2019-01-14 RX ADMIN — ARFORMOTEROL TARTRATE 15 MCG: 15 SOLUTION RESPIRATORY (INHALATION) at 11:02

## 2019-01-14 RX ADMIN — TAMSULOSIN HYDROCHLORIDE 0.4 MG: 0.4 CAPSULE ORAL at 20:49

## 2019-01-14 RX ADMIN — HYDROCODONE BITARTRATE AND ACETAMINOPHEN 1 TABLET: 5; 325 TABLET ORAL at 17:57

## 2019-01-14 RX ADMIN — ACETAMINOPHEN 650 MG: 325 TABLET, FILM COATED ORAL at 09:52

## 2019-01-14 RX ADMIN — APIXABAN 10 MG: 5 TABLET, FILM COATED ORAL at 12:38

## 2019-01-14 RX ADMIN — PROMETHAZINE HYDROCHLORIDE 12.5 MG: 12.5 TABLET ORAL at 23:01

## 2019-01-14 RX ADMIN — TIOTROPIUM BROMIDE 1 CAPSULE: 18 CAPSULE ORAL; RESPIRATORY (INHALATION) at 11:02

## 2019-01-14 RX ADMIN — NIFEDIPINE 60 MG: 60 TABLET, FILM COATED, EXTENDED RELEASE ORAL at 09:53

## 2019-01-14 RX ADMIN — APIXABAN 10 MG: 5 TABLET, FILM COATED ORAL at 20:49

## 2019-01-14 RX ADMIN — HEPARIN SODIUM 13.7 UNITS/KG/HR: 10000 INJECTION, SOLUTION INTRAVENOUS at 10:10

## 2019-01-14 RX ADMIN — SODIUM CHLORIDE, PRESERVATIVE FREE 3 ML: 5 INJECTION INTRAVENOUS at 20:50

## 2019-01-14 RX ADMIN — HYDRALAZINE HYDROCHLORIDE 20 MG: 20 INJECTION INTRAMUSCULAR; INTRAVENOUS at 16:36

## 2019-01-14 RX ADMIN — SODIUM CHLORIDE, PRESERVATIVE FREE 3 ML: 5 INJECTION INTRAVENOUS at 08:49

## 2019-01-14 NOTE — PROGRESS NOTES
[facility] INPATIENT PROGRESS NOTE         [address]    2019      PATIENT IDENTIFICATION:  Name: James Loaiza ADMIT: 2019   : 1945  PCP: Joleen Mccullough APRN    MRN: [id number] LOS: 1 days   AGE/SEX: 73 y.o. male  ROOM: [facility]                     LOS 1    Reason for visit: Follow up acute PE      SUBJECTIVE:    On heparin drip.  Going for Doppler    Objective   OBJECTIVE:    Vital Sign Min/Max for last 24 hours  Temp  Min: 97.7 °F (36.5 °C)  Max: 98.5 °F (36.9 °C)   BP  Min: 152/90  Max: 199/96   Pulse  Min: 8  Max: 114   Resp  Min: 20  Max: 22   SpO2  Min: 88 %  Max: 94 %   No Data Recorded   Weight  Min: 152 kg (334 lb)  Max: 154 kg (340 lb 9.6 oz)                         Body mass index is 42.88 kg/m².    Intake/Output Summary (Last 24 hours) at 2019 1012  Last data filed at 2019 0440  Gross per 24 hour   Intake --   Output 400 ml   Net -400 ml         Exam:  GEN:  No distress, appears stated age  EYES:   PERRL, anicteric sclera  ENT:    External ears/nose normal, OP clear  NECK:  No adenopathy, midline trachea  LUNGS: Normal chest on inspection, palpation and auscultation  CV:  Normal S1S2, without murmur  ABD:  Non tender, non distended, no hepatosplenomegaly, +BS  EXT:  No edema, cyanosis or clubbing    Scheduled meds:    arformoterol 15 mcg Nebulization BID - RT   aspirin 325 mg Oral Daily   atorvastatin 40 mg Oral Daily   NIFEdipine XL 60 mg Oral Daily   ramipril 5 mg Oral Daily   sodium chloride 3 mL Intravenous Q12H   tamsulosin 0.4 mg Oral Nightly   tiotropium 1 capsule Inhalation Daily - RT     IV meds:                        heparin (porcine) 9.74 Units/kg/hr Last Rate: 13.7 Units/kg/hr (19 1010)     Data Review:  Results from last 7 days   Lab Units  19   1534   SODIUM mmol/L  142   POTASSIUM mmol/L  3.6   CHLORIDE mmol/L  103   CO2 mmol/L  25.5   BUN mg/dL  19   CREATININE mg/dL  1.69*   GLUCOSE mg/dL  107*   CALCIUM mg/dL  9.2          Estimated Creatinine Clearance: 62.4 mL/min (A) (by C-G formula based on SCr of 1.69 mg/dL (H)).  Results from last 7 days   Lab Units  01/14/19   0210  01/13/19   1534   WBC 10*3/mm3  14.39*  13.23*   HEMOGLOBIN g/dL  12.6*  13.8   PLATELETS 10*3/mm3  178  184     Results from last 7 days   Lab Units  01/13/19   1534   INR   1.13*     Results from last 7 days   Lab Units  01/13/19   1534   ALT (SGPT) U/L  11   AST (SGOT) U/L  14     Results from last 7 days   Lab Units  01/13/19   1601   PH, ARTERIAL pH units  7.427   PO2 ART mm Hg  66.4*   PCO2, ARTERIAL mm Hg  41.1   HCO3 ART mmol/L  27.1             Lower extremity Doppler positive    )Assessment   ASSESSMENT:      Active Hospital Problems    Diagnosis Date Noted   • Pulmonary embolus (CMS/HCC) [I26.99] 01/13/2019      Resolved Hospital Problems   No resolved problems to display.       Acute PE/DVT  COPD  Restrictive lung disease related to obesity  Chronic kidney disease  GENOVEVA on CPAP    PLAN:  Anticoagulation on heparin drip.  Lower extremity Dopplers positive for DVT which is likely the etiology of his acute PE.  Will require lifelong anticoagulation as this is a recurrence of blood clot.  Previously was on Xarelto.  Hypercoagulable panel.  E echo is pending.    Davonte Sandoval MD  Pulmonary and Critical Care Medicine  [facility], [facility]  1/14/2019    10:12 AM

## 2019-01-14 NOTE — PLAN OF CARE
Problem: Patient Care Overview  Goal: Plan of Care Review  Outcome: Ongoing (interventions implemented as appropriate)   01/14/19 0256   Coping/Psychosocial   Plan of Care Reviewed With patient   Plan of Care Review   Progress no change   OTHER   Outcome Summary Pt admitted from ER for possible PE. Heparin drip started, pt on 2LNC to maintain sats >90%. Will continue to follow closely and await further orders       Problem: Fall Risk (Adult)  Goal: Identify Related Risk Factors and Signs and Symptoms  Outcome: Ongoing (interventions implemented as appropriate)    Goal: Absence of Fall  Outcome: Ongoing (interventions implemented as appropriate)      Problem: VTE, DVT and PE (Adult)  Goal: Signs and Symptoms of Listed Potential Problems Will be Absent, Minimized or Managed (VTE, DVT and PE)  Outcome: Ongoing (interventions implemented as appropriate)

## 2019-01-14 NOTE — NURSING NOTE
Ann Marie from vascular lab called with positive results on right leg acute dvt, left leg negative. Will place call to Dr Palacios office

## 2019-01-14 NOTE — PLAN OF CARE
Problem: Patient Care Overview  Goal: Plan of Care Review  Outcome: Ongoing (interventions implemented as appropriate)   01/14/19 7638   Coping/Psychosocial   Plan of Care Reviewed With patient   Plan of Care Review   Progress improving   OTHER   Outcome Summary c/o headache tylenol given with relief, b/p elevated hydralazine ordered prn, heparin stopped and eliquis started, positive for acute dvt in right leg,  o2 2Ln/c, will continue to monitor

## 2019-01-14 NOTE — H&P
"[facility]    CC: shortness of breath    HPI:  Mr. Loaiza is a 74yo AAM with a history of COPD.  He had fairly sudden onset of shortness of breath this last Tuesday.  He notices it is worse with exertion.  He has not had much increased cough or wheezing he has not had chest pain or tightness.  He has not had orthopnea or PND.  He reports the shortness of breath is moderate in nature.  It is better at rest. Evaluation in the ER showed very elevated D-dimer and V/q is high probability. He does report a history of an unprovoked DVT about 13 years ago.       Past Medical History:   Diagnosis Date   • Abnormal EKG     referring to cardiology   • Anxiety    • Arthritis    • Back pain     worsening   • BPH (benign prostatic hyperplasia)     BPH/Nocturia/ Urinary Frequency   • Breast nodule     right   • Cardiomegaly     Cardiomegaly/ SOB with exertion   • Circulation problem    • Constipation    • COPD (chronic obstructive pulmonary disease) (CMS/[facility])    • Deviated septum    • DJD (degenerative joint disease)     DJD Knees   • Dysphagia     solids and liquids - resolved with normal studies   • Encounter for annual health examination 11/14/2013    Annual Health Assessment   • Enlarged prostate    • Fatigue     Extreme fatigue   • Hyperlipidemia 1999   • Hypertension 1999    followed Dr. Marcelo   • Hypogonadism male    • Left hip pain     and DJD   • Left leg pain    • Low back pain    • Moist mucous membranes of ear, nose, and throat     \"Mucous in throat\"   • Morbid obesity (CMS/[facility])     (BMI-41.2za)   • Muscle cramping    • Muscle spasm     Muscle spasms   • Neck arthritis    • Neck muscle spasm     Neck muscle spasm/Cervical strain   • Obesity    • Plantar fasciitis    • Prostatitis     recurrent   • Psoriasis    • Pulmonary embolus (CMS/[facility]) 07/2015    on Xarelltoypseerlipidemia   • Radiculopathy     Radiculopathy / Neuropathy left ar   • Renal insufficiency     followed by Dr. Mendes   • Seborrhea    • " Shortness of breath    • Sleep apnea     Obstructive Sleep apnea worsening   • Urinary frequency    • Vascular disorder    • Vertigo    • Weight gain    • Wellness examination 10/23/2014    Annual Wellness Visit     Social History     Socioeconomic History   • Marital status:      Spouse name: Not on file   • Number of children: Not on file   • Years of education: Not on file   • Highest education level: Not on file   Tobacco Use   • Smoking status: Never Smoker   • Smokeless tobacco: Never Used   Substance and Sexual Activity   • Alcohol use: Yes     Comment: social   • Drug use: No   • Sexual activity: Defer     Family History   Problem Relation Age of Onset   • Arthritis Mother    • Heart disease Mother    • Hypertension Mother    • Heart attack Father    • Heart disease Father    • Hypertension Father    • Arthritis Sister    • Multiple sclerosis Sister    • Alcohol abuse Brother    • Diabetes Son      MEDS: reviewed as per chart  ALL: NKDA  ROS:  Constitutional: Negative for activity change, appetite change and fever.   HENT: Negative for congestion and sore throat.    Eyes: Negative.    Respiratory: Positive for shortness of breath. Negative for cough.    Cardiovascular: Positive for leg swelling. Negative for chest pain.   Gastrointestinal: Negative for abdominal pain, diarrhea and vomiting.   Endocrine: Negative.    Genitourinary: Negative for decreased urine volume and dysuria.   Musculoskeletal: Negative for neck pain.   Skin: Negative for rash and wound.   Allergic/Immunologic: Negative.    Neurological: Negative for weakness, numbness and headaches.   Hematological: Negative.    Psychiatric/Behavioral: Negative.    All other systems reviewed and are negative.    Vital Sign Min/Max for last 24 hours  Temp  Min: 97.7 °F (36.5 °C)  Max: 97.7 °F (36.5 °C)   BP  Min: 152/90  Max: 199/96   Pulse  Min: 70  Max: 114   Resp  Min: 22  Max: 22   SpO2  Min: 88 %  Max: 94 %   Flow (L/min)  Min: 2  Max: 2    Weight  Min: 154 kg (340 lb 9.6 oz)  Max: 154 kg (340 lb 9.6 oz)     GEN:   appears ill, obese, AxOx3  HEENT: PERRL, EOMI, no icterus, mmm, no jvd, trachea midline, neck supple  CHEST: decreased bilat, no wheezes, no crackles, no use of accessory muscles  CV: RRR, no m/g/r  ABD: soft, nt, nd +bs, no hepatosplenomegaly  EXT: no c/c/ no pitting edema bilaterally le  SKIN: no rashes, no xanthomas, nl turgor  LYMPH: no palpable cervical or supraclavicular lymphadenopathy  NEURO: CN 2-12 intact and symmetric bilaterally  PSYCH: nl affect, nl orientation, nl judgement, nl mood  MSK: no kyphoscoliosis, 5/5 strength ue and  Le bilaterally    Labs:  7.42/41/66  Cr 1.69 (baseline)  Bicarb 22  probnp 289  D dimer 11.5  Trop 0.01  Wbc 13.  hgb 13.8  plts 184    V/q: reviewed myself agree with report  CXR: NAD  Office note from Dr. Butler reviewed (fev1/fvc 103; fev1 53%  Old echo in computer reviewed    A/P:  1. Acute PE -- heparin gtt, check echo and le doppler.  He was on xarelto in the past.  He will need lifelong anticoagulation.  2. COPD -- appears stable.  3. Restrictive lung disease -- no fibrosis, likely just obesity related  4. CKD -- avoid nephrotoxins  5. GENOVEVA -- continue cpap  6. Obesity    Discussed with wife

## 2019-01-14 NOTE — PROGRESS NOTES
Bilateral lower extremity venous doppler completed, Prelim positive Rt leg for acute DVT and negative Lt leg given to AARON Whitmore.

## 2019-01-14 NOTE — NURSING NOTE
Spoke with Dr Sandoval about elevated b/p 197/95 he ordered hydralazine q 4 prn. Also made him aware of the elevated ptt at 124.3 heparin stopped. He wants eliqus started 2 hours after heparin stopped.

## 2019-01-14 NOTE — PLAN OF CARE
Problem: Fall Risk (Adult)  Goal: Identify Related Risk Factors and Signs and Symptoms  Outcome: Ongoing (interventions implemented as appropriate)    Goal: Absence of Fall  Outcome: Ongoing (interventions implemented as appropriate)      Problem: VTE, DVT and PE (Adult)  Goal: Signs and Symptoms of Listed Potential Problems Will be Absent, Minimized or Managed (VTE, DVT and PE)  Outcome: Ongoing (interventions implemented as appropriate)

## 2019-01-15 LAB
ANION GAP SERPL CALCULATED.3IONS-SCNC: 13.5 MMOL/L
APTT PPP: 38.7 SECONDS (ref 22.7–35.4)
BASOPHILS # BLD AUTO: 0.02 10*3/MM3 (ref 0–0.2)
BASOPHILS NFR BLD AUTO: 0.1 % (ref 0–1.5)
BUN BLD-MCNC: 18 MG/DL (ref 8–23)
BUN/CREAT SERPL: 10.7 (ref 7–25)
CALCIUM SPEC-SCNC: 9.1 MG/DL (ref 8.6–10.5)
CHLORIDE SERPL-SCNC: 106 MMOL/L (ref 98–107)
CO2 SERPL-SCNC: 21.5 MMOL/L (ref 22–29)
CREAT BLD-MCNC: 1.68 MG/DL (ref 0.76–1.27)
DEPRECATED RDW RBC AUTO: 51.7 FL (ref 37–54)
EOSINOPHIL # BLD AUTO: 0.02 10*3/MM3 (ref 0–0.7)
EOSINOPHIL NFR BLD AUTO: 0.1 % (ref 0.3–6.2)
ERYTHROCYTE [DISTWIDTH] IN BLOOD BY AUTOMATED COUNT: 14.1 % (ref 11.5–14.5)
GFR SERPL CREATININE-BSD FRML MDRD: 49 ML/MIN/1.73
GLUCOSE BLD-MCNC: 115 MG/DL (ref 65–99)
HCT VFR BLD AUTO: 44.8 % (ref 40.4–52.2)
HGB BLD-MCNC: 14.3 G/DL (ref 13.7–17.6)
IMM GRANULOCYTES # BLD AUTO: 0.08 10*3/MM3 (ref 0–0.03)
IMM GRANULOCYTES NFR BLD AUTO: 0.5 % (ref 0–0.5)
LYMPHOCYTES # BLD AUTO: 1.31 10*3/MM3 (ref 0.9–4.8)
LYMPHOCYTES NFR BLD AUTO: 8.5 % (ref 19.6–45.3)
MCH RBC QN AUTO: 31.8 PG (ref 27–32.7)
MCHC RBC AUTO-ENTMCNC: 31.9 G/DL (ref 32.6–36.4)
MCV RBC AUTO: 99.8 FL (ref 79.8–96.2)
MONOCYTES # BLD AUTO: 1.11 10*3/MM3 (ref 0.2–1.2)
MONOCYTES NFR BLD AUTO: 7.2 % (ref 5–12)
NEUTROPHILS # BLD AUTO: 12.88 10*3/MM3 (ref 1.9–8.1)
NEUTROPHILS NFR BLD AUTO: 83.6 % (ref 42.7–76)
PLATELET # BLD AUTO: 203 10*3/MM3 (ref 140–500)
PMV BLD AUTO: 10 FL (ref 6–12)
POTASSIUM BLD-SCNC: 3.9 MMOL/L (ref 3.5–5.2)
RBC # BLD AUTO: 4.49 10*6/MM3 (ref 4.6–6)
SODIUM BLD-SCNC: 141 MMOL/L (ref 136–145)
SPECIMEN STATUS: NORMAL
WBC NRBC COR # BLD: 15.42 10*3/MM3 (ref 4.5–10.7)

## 2019-01-15 PROCEDURE — 94799 UNLISTED PULMONARY SVC/PX: CPT

## 2019-01-15 PROCEDURE — 25010000002 HYDRALAZINE PER 20 MG: Performed by: INTERNAL MEDICINE

## 2019-01-15 PROCEDURE — 36415 COLL VENOUS BLD VENIPUNCTURE: CPT | Performed by: EMERGENCY MEDICINE

## 2019-01-15 PROCEDURE — 85025 COMPLETE CBC W/AUTO DIFF WBC: CPT | Performed by: EMERGENCY MEDICINE

## 2019-01-15 PROCEDURE — 80048 BASIC METABOLIC PNL TOTAL CA: CPT | Performed by: INTERNAL MEDICINE

## 2019-01-15 PROCEDURE — 85730 THROMBOPLASTIN TIME PARTIAL: CPT | Performed by: EMERGENCY MEDICINE

## 2019-01-15 RX ADMIN — NIFEDIPINE 60 MG: 60 TABLET, FILM COATED, EXTENDED RELEASE ORAL at 08:22

## 2019-01-15 RX ADMIN — ARFORMOTEROL TARTRATE 15 MCG: 15 SOLUTION RESPIRATORY (INHALATION) at 20:00

## 2019-01-15 RX ADMIN — TAMSULOSIN HYDROCHLORIDE 0.4 MG: 0.4 CAPSULE ORAL at 22:36

## 2019-01-15 RX ADMIN — Medication 5 MG: at 22:36

## 2019-01-15 RX ADMIN — TIOTROPIUM BROMIDE 1 CAPSULE: 18 CAPSULE ORAL; RESPIRATORY (INHALATION) at 12:26

## 2019-01-15 RX ADMIN — ASPIRIN 325 MG: 325 TABLET ORAL at 08:22

## 2019-01-15 RX ADMIN — HYDRALAZINE HYDROCHLORIDE 20 MG: 20 INJECTION INTRAMUSCULAR; INTRAVENOUS at 14:06

## 2019-01-15 RX ADMIN — RAMIPRIL 5 MG: 5 CAPSULE ORAL at 08:22

## 2019-01-15 RX ADMIN — ARFORMOTEROL TARTRATE 15 MCG: 15 SOLUTION RESPIRATORY (INHALATION) at 09:18

## 2019-01-15 RX ADMIN — HYDROCODONE BITARTRATE AND ACETAMINOPHEN 1 TABLET: 5; 325 TABLET ORAL at 22:42

## 2019-01-15 RX ADMIN — ATORVASTATIN CALCIUM 40 MG: 20 TABLET, FILM COATED ORAL at 08:22

## 2019-01-15 RX ADMIN — APIXABAN 10 MG: 5 TABLET, FILM COATED ORAL at 22:38

## 2019-01-15 RX ADMIN — SODIUM CHLORIDE, PRESERVATIVE FREE 3 ML: 5 INJECTION INTRAVENOUS at 08:24

## 2019-01-15 RX ADMIN — APIXABAN 10 MG: 5 TABLET, FILM COATED ORAL at 08:22

## 2019-01-15 NOTE — PROGRESS NOTES
Discharge Planning Assessment  [facility]     Patient Name: James Loaiza  MRN: [id number]  Today's Date: 1/15/2019    Admit Date: 1/13/2019    Discharge Needs Assessment     Row Name 01/15/19 1545       Living Environment    Lives With  spouse    Current Living Arrangements  home/apartment/condo    Primary Care Provided by  self    Provides Primary Care For  no one    Family Caregiver if Needed  spouse;child(ángel), adult    Quality of Family Relationships  involved;helpful;supportive    Able to Return to Prior Arrangements  yes       Resource/Environmental Concerns    Transportation Concerns  car, none       Transition Planning    Patient/Family Anticipates Transition to  home with family    Transportation Anticipated  family or friend will provide       Discharge Needs Assessment    Readmission Within the Last 30 Days  no previous admission in last 30 days    Concerns to be Addressed  basic needs    Equipment Currently Used at Home  bipap/cpap    Equipment Needed After Discharge  none    Discharge Facility/Level of Care Needs  home with home health        Discharge Plan     Row Name 01/15/19 1546       Plan    Plan  Home with spouse and [facility]     Patient/Family in Agreement with Plan  yes    Plan Comments  Spoke with pt and his dtrNo at bedside. Role of CCP explained. Facesheet info verified. Pharmacy verified. Pt denies any issues affording their medications or remembering to take them. Pt denies having a Living Will or a POA. Pt denies any history of HH or SURENDAR. Pt lives with his spouse in a multilevel home with 1-3 steps to enter. Pts bedroom and bathroom are on the 1st floor. Pt was [employer]'s PTA and still drives. Pt plans to return home at [place] with his spouse and HH. List of HH agencies reviewed with the pt. Pt would like to use [facility], referral made to [facility]. Pt wears a CPAP at night provided by [facility]. If any additional DME is needed pt would like to use [facility].Pt was started on Eliquis and a 30  day free coupon was given to pt. CCP to follow. [facility]/CCP         [facility] - Selection Complete      Service Provider Request Status Selected Services Address Phone Number Fax Number    [facility] [address] [address] [phone]      [facility]      No service coordination in this encounter.            Jade Bruce RN

## 2019-01-15 NOTE — PROGRESS NOTES
[facility] INPATIENT PROGRESS NOTE         [address]    1/15/2019      PATIENT IDENTIFICATION:  Name: James Loaiza ADMIT: 2019   : 1945  PCP: Joleen Mccullough APRN    MRN: [id number] LOS: 2 days   AGE/SEX: 73 y.o. male  ROOM: [facility]                     LOS 2    Reason for visit: Follow up acute PE      SUBJECTIVE:      Resting comfortably. Get shorter breath with exertion. Saturations 91% on room air at rest. Started Oral AC. Possibly home tomorrow.    Objective   OBJECTIVE:    Vital Sign Min/Max for last 24 hours  Temp  Min: 98.2 °F (36.8 °C)  Max: 98.6 °F (37 °C)   BP  Min: 166/96  Max: 189/94   Pulse  Min: 87  Max: 103   Resp  Min: 16  Max: 18   SpO2  Min: 89 %  Max: 97 %   No Data Recorded   No Data Recorded                         Body mass index is 42.88 kg/m².    Intake/Output Summary (Last 24 hours) at 1/15/2019 1318  Last data filed at 1/15/2019 0800  Gross per 24 hour   Intake 360 ml   Output 950 ml   Net -590 ml         Exam:  GEN:  No distress, appears stated age  EYES:   PERRL, anicteric sclera  ENT:    External ears/nose normal, OP clear  NECK:  No adenopathy, midline trachea  LUNGS: Normal chest on inspection, palpation and auscultation  CV:  Normal S1S2, without murmur  ABD:  Non tender, non distended, no hepatosplenomegaly, +BS  EXT:  No edema, cyanosis or clubbing    Scheduled meds:      apixaban 10 mg Oral Q12H   arformoterol 15 mcg Nebulization BID - RT   aspirin 325 mg Oral Daily   atorvastatin 40 mg Oral Daily   NIFEdipine XL 60 mg Oral Daily   ramipril 5 mg Oral Daily   sodium chloride 3 mL Intravenous Q12H   tamsulosin 0.4 mg Oral Nightly   tiotropium 1 capsule Inhalation Daily - RT     IV meds:                         Data Review:  Results from last 7 days   Lab Units  01/15/19   0545  19   1534   SODIUM mmol/L  141  142   POTASSIUM mmol/L  3.9  3.6   CHLORIDE mmol/L  106  103   CO2 mmol/L  21.5*  25.5   BUN mg/dL  18  19   CREATININE mg/dL   1.68*  1.69*   GLUCOSE mg/dL  115*  107*   CALCIUM mg/dL  9.1  9.2         Estimated Creatinine Clearance: 62.7 mL/min (A) (by C-G formula based on SCr of 1.68 mg/dL (H)).  Results from last 7 days   Lab Units  01/15/19   0545  01/14/19   0210  01/13/19   1534   WBC 10*3/mm3  15.42*  14.39*  13.23*   HEMOGLOBIN g/dL  14.3  12.6*  13.8   PLATELETS 10*3/mm3  203  178  184     Results from last 7 days   Lab Units  01/13/19   1534   INR   1.13*     Results from last 7 days   Lab Units  01/13/19   1534   ALT (SGPT) U/L  11   AST (SGOT) U/L  14     Results from last 7 days   Lab Units  01/13/19   1601   PH, ARTERIAL pH units  7.427   PO2 ART mm Hg  66.4*   PCO2, ARTERIAL mm Hg  41.1   HCO3 ART mmol/L  27.1             Lower extremity Doppler positive    2-D echo reviewed: EF 62% with grade 1 diastolic dysfunction        )Assessment   ASSESSMENT:      Active Hospital Problems    Diagnosis Date Noted   • Pulmonary embolus (CMS/HCC) [I26.99] 01/13/2019      Resolved Hospital Problems   No resolved problems to display.       Acute PE/DVT  COPD  Restrictive lung disease related to obesity  Chronic kidney disease  GENOVEVA on CPAP    PLAN:  Starting Eliquis.  Will require lifelong anticoagulation as has had recurrent clots.  Hypercoagulable panel pending.  2-D echo does not show evidence of pulmonary hypertension.  Possibly home tomorrow.    Davonte Sandoval MD  Pulmonary and Critical Care Medicine  [facility], [facility]  1/15/2019    1:18 PM

## 2019-01-15 NOTE — PLAN OF CARE
Problem: Patient Care Overview  Goal: Plan of Care Review  Outcome: Ongoing (interventions implemented as appropriate)   01/15/19 9627   Plan of Care Review   Progress improving   OTHER   Outcome Summary no c/o pain, soa better today, b/p elevated prn meds as ordered, on R/A, will continue to monitor        Problem: Fall Risk (Adult)  Goal: Identify Related Risk Factors and Signs and Symptoms  Outcome: Ongoing (interventions implemented as appropriate)    Goal: Absence of Fall  Outcome: Ongoing (interventions implemented as appropriate)

## 2019-01-16 VITALS
OXYGEN SATURATION: 90 % | HEART RATE: 94 BPM | HEIGHT: 74 IN | DIASTOLIC BLOOD PRESSURE: 81 MMHG | WEIGHT: 315 LBS | BODY MASS INDEX: 40.43 KG/M2 | SYSTOLIC BLOOD PRESSURE: 141 MMHG | TEMPERATURE: 97.5 F | RESPIRATION RATE: 18 BRPM

## 2019-01-16 LAB
ANION GAP SERPL CALCULATED.3IONS-SCNC: 15.2 MMOL/L
APTT PPP: 36.3 SECONDS (ref 22.7–35.4)
BASOPHILS # BLD AUTO: 0.02 10*3/MM3 (ref 0–0.2)
BASOPHILS NFR BLD AUTO: 0.2 % (ref 0–1.5)
BUN BLD-MCNC: 24 MG/DL (ref 8–23)
BUN/CREAT SERPL: 12.4 (ref 7–25)
CALCIUM SPEC-SCNC: 9.1 MG/DL (ref 8.6–10.5)
CHLORIDE SERPL-SCNC: 103 MMOL/L (ref 98–107)
CO2 SERPL-SCNC: 21.8 MMOL/L (ref 22–29)
CREAT BLD-MCNC: 1.94 MG/DL (ref 0.76–1.27)
DEPRECATED RDW RBC AUTO: 50.5 FL (ref 37–54)
EOSINOPHIL # BLD AUTO: 0.1 10*3/MM3 (ref 0–0.7)
EOSINOPHIL NFR BLD AUTO: 0.8 % (ref 0.3–6.2)
ERYTHROCYTE [DISTWIDTH] IN BLOOD BY AUTOMATED COUNT: 14.1 % (ref 11.5–14.5)
GFR SERPL CREATININE-BSD FRML MDRD: 42 ML/MIN/1.73
GLUCOSE BLD-MCNC: 114 MG/DL (ref 65–99)
HCT VFR BLD AUTO: 43.2 % (ref 40.4–52.2)
HGB BLD-MCNC: 13.9 G/DL (ref 13.7–17.6)
IMM GRANULOCYTES # BLD AUTO: 0.06 10*3/MM3 (ref 0–0.03)
IMM GRANULOCYTES NFR BLD AUTO: 0.5 % (ref 0–0.5)
LYMPHOCYTES # BLD AUTO: 1.2 10*3/MM3 (ref 0.9–4.8)
LYMPHOCYTES NFR BLD AUTO: 10 % (ref 19.6–45.3)
MCH RBC QN AUTO: 31.7 PG (ref 27–32.7)
MCHC RBC AUTO-ENTMCNC: 32.2 G/DL (ref 32.6–36.4)
MCV RBC AUTO: 98.6 FL (ref 79.8–96.2)
MONOCYTES # BLD AUTO: 0.72 10*3/MM3 (ref 0.2–1.2)
MONOCYTES NFR BLD AUTO: 6 % (ref 5–12)
NEUTROPHILS # BLD AUTO: 9.95 10*3/MM3 (ref 1.9–8.1)
NEUTROPHILS NFR BLD AUTO: 83 % (ref 42.7–76)
PLATELET # BLD AUTO: 224 10*3/MM3 (ref 140–500)
PMV BLD AUTO: 10.6 FL (ref 6–12)
POTASSIUM BLD-SCNC: 3.7 MMOL/L (ref 3.5–5.2)
RBC # BLD AUTO: 4.38 10*6/MM3 (ref 4.6–6)
SODIUM BLD-SCNC: 140 MMOL/L (ref 136–145)
WBC NRBC COR # BLD: 11.99 10*3/MM3 (ref 4.5–10.7)

## 2019-01-16 PROCEDURE — 85025 COMPLETE CBC W/AUTO DIFF WBC: CPT | Performed by: EMERGENCY MEDICINE

## 2019-01-16 PROCEDURE — 85730 THROMBOPLASTIN TIME PARTIAL: CPT | Performed by: EMERGENCY MEDICINE

## 2019-01-16 PROCEDURE — 94799 UNLISTED PULMONARY SVC/PX: CPT

## 2019-01-16 PROCEDURE — 80048 BASIC METABOLIC PNL TOTAL CA: CPT | Performed by: INTERNAL MEDICINE

## 2019-01-16 RX ADMIN — SODIUM CHLORIDE, PRESERVATIVE FREE 3 ML: 5 INJECTION INTRAVENOUS at 00:14

## 2019-01-16 RX ADMIN — ATORVASTATIN CALCIUM 40 MG: 20 TABLET, FILM COATED ORAL at 09:36

## 2019-01-16 RX ADMIN — APIXABAN 10 MG: 5 TABLET, FILM COATED ORAL at 09:36

## 2019-01-16 RX ADMIN — ASPIRIN 325 MG: 325 TABLET ORAL at 09:36

## 2019-01-16 RX ADMIN — TIOTROPIUM BROMIDE 1 CAPSULE: 18 CAPSULE ORAL; RESPIRATORY (INHALATION) at 08:50

## 2019-01-16 RX ADMIN — RAMIPRIL 5 MG: 5 CAPSULE ORAL at 09:36

## 2019-01-16 RX ADMIN — NIFEDIPINE 60 MG: 60 TABLET, FILM COATED, EXTENDED RELEASE ORAL at 09:36

## 2019-01-16 RX ADMIN — SODIUM CHLORIDE, PRESERVATIVE FREE 3 ML: 5 INJECTION INTRAVENOUS at 09:30

## 2019-01-16 RX ADMIN — ARFORMOTEROL TARTRATE 15 MCG: 15 SOLUTION RESPIRATORY (INHALATION) at 08:50

## 2019-01-16 NOTE — DISCHARGE SUMMARY
"                                                                   PHYSICIAN DISCHARGE SUMMARY                                                                        [facility]    Date of admit: 1/13/2019  Date of Discharge:  1/16/2019    PCP: Joleen Mccullough APRN    Discharge Diagnosis:     Pulmonary embolus (CMS/[facility])  Acute PE/DVT  COPD  Restrictive lung disease related to obesity  Chronic kidney disease  GENOVEAV on CPAP          Secondary Diagnoses:  Past Medical History:   Diagnosis Date   • Abnormal EKG     referring to cardiology   • Anxiety    • Arthritis    • Back pain     worsening   • BPH (benign prostatic hyperplasia)     BPH/Nocturia/ Urinary Frequency   • Breast nodule     right   • Cardiomegaly     Cardiomegaly/ SOB with exertion   • Circulation problem    • Constipation    • COPD (chronic obstructive pulmonary disease) (CMS/HCC)    • Deviated septum    • DJD (degenerative joint disease)     DJD Knees   • Dysphagia     solids and liquids - resolved with normal studies   • Encounter for annual health examination 11/14/2013    Annual Health Assessment   • Enlarged prostate    • Fatigue     Extreme fatigue   • Hyperlipidemia 1999   • Hypertension 1999    followed Dr. Marcelo   • Hypogonadism male    • Left hip pain     and DJD   • Left leg pain    • Low back pain    • Moist mucous membranes of ear, nose, and throat     \"Mucous in throat\"   • Morbid obesity (CMS/[facility])     (BMI-41.2za)   • Muscle cramping    • Muscle spasm     Muscle spasms   • Neck arthritis    • Neck muscle spasm     Neck muscle spasm/Cervical strain   • Obesity    • Plantar fasciitis    • Prostatitis     recurrent   • Psoriasis    • Pulmonary embolus (CMS/[facility]) 07/2015    on Xarelltoypseerlipidemia   • Radiculopathy     Radiculopathy / Neuropathy left ar   • Renal insufficiency     followed by Dr. Mendes   • Seborrhea    • Shortness of breath    • Sleep apnea     Obstructive Sleep apnea worsening   • Urinary frequency  "   • Vascular disorder    • Vertigo    • Weight gain    • Wellness examination 10/23/2014    Annual Wellness Visit       Procedures Performed           Consults:       Hospital Course  Patient is a 73 y.o. male presented with        CC: shortness of breath     HPI:  Mr. Loaiza is a 72yo AAM with a history of COPD.  He had fairly sudden onset of shortness of breath this last Tuesday.  He notices it is worse with exertion.  He has not had much increased cough or wheezing he has not had chest pain or tightness.  He has not had orthopnea or PND.  He reports the shortness of breath is moderate in nature.  It is better at rest. Evaluation in the ER showed very elevated D-dimer and V/q is high probability. He does report a history of an unprovoked DVT about 13 years ago.          Patient was admitted with acute PE and DVT.  Has chronic COPD and restrictive lung disease  due to obesity as well.  Was on anticoagulation.  Hypercoagulable lab panel has been sent but still pending at time of discharge.  2-D echo does not show evidence of pulmonary hypertension.  Switched over from heparin drip to Eliquis.  Plan for follow-up in the office in one week.  Home today.  Exercise oximetry prior to discharge to evaluate for potential supplemental oxygen need.      Condition on Discharge:  Stable.      Vital Signs  Temp:  [97.8 °F (36.6 °C)-98.8 °F (37.1 °C)] 98.8 °F (37.1 °C)  Heart Rate:  [] 94  Resp:  [16-18] 18  BP: (139-169)/(78-95) 139/89    Physical Exam:  General Appearance: no acute distress, appears comfortable  Heart: regular rate and rhythm  Lungs: clear to auscultation bilaterally, respirations unlabored  Abdomen: soft, non-tender, non-distended, no guarding/rebound, nl BS  Extremeties: no edema, no cyanosis  Neurology: speech normal, mental status normal, moving all four extremeties  Mental Status: alert, oriented        --  Consults:   IP CONSULT TO PULMONOLOGY    Significant Discharge Diagnostics   Procedures  Performed:         Pertinent Lab Results:  Results from last 7 days   Lab Units 01/16/19  0427 01/15/19  0545 01/13/19  1534   SODIUM mmol/L 140 141 142   POTASSIUM mmol/L 3.7 3.9 3.6   CHLORIDE mmol/L 103 106 103   CO2 mmol/L 21.8* 21.5* 25.5   BUN mg/dL 24* 18 19   CREATININE mg/dL 1.94* 1.68* 1.69*   GLUCOSE mg/dL 114* 115* 107*   CALCIUM mg/dL 9.1 9.1 9.2   AST (SGOT) U/L  --   --  14   ALT (SGPT) U/L  --   --  11     Results from last 7 days   Lab Units 01/13/19  1534   TROPONIN T ng/mL <0.010     Results from last 7 days   Lab Units 01/16/19  0427 01/15/19  0545 01/14/19  0210 01/13/19  1534   WBC 10*3/mm3 11.99* 15.42* 14.39* 13.23*   HEMOGLOBIN g/dL 13.9 14.3 12.6* 13.8   HEMATOCRIT % 43.2 44.8 40.6 43.4   PLATELETS 10*3/mm3 224 203 178 184   MCV fL 98.6* 99.8* 100.7* 98.6*   MCH pg 31.7 31.8 31.3 31.4   MCHC g/dL 32.2* 31.9* 31.0* 31.8*   RDW % 14.1 14.1 14.0 13.9   RDW-SD fl 50.5 51.7 51.1 50.0   MPV fL 10.6 10.0 10.0 10.0   NEUTROPHIL % % 83.0* 83.6* 81.8* 85.2*   LYMPHOCYTE % % 10.0* 8.5* 11.7* 7.7*   MONOCYTES % % 6.0 7.2 4.9* 5.5   EOSINOPHIL % % 0.8 0.1* 1.0 1.0   BASOPHIL % % 0.2 0.1 0.1 0.1   IMM GRAN % % 0.5 0.5 0.5 0.5   NEUTROS ABS 10*3/mm3 9.95* 12.88* 11.77* 11.28*   LYMPHS ABS 10*3/mm3 1.20 1.31 1.69 1.02   MONOS ABS 10*3/mm3 0.72 1.11 0.70 0.73   EOS ABS 10*3/mm3 0.10 0.02 0.14 0.13   BASOS ABS 10*3/mm3 0.02 0.02 0.02 0.01   IMMATURE GRANS (ABS) 10*3/mm3 0.06* 0.08* 0.07* 0.06*     Results from last 7 days   Lab Units 01/16/19  0427 01/15/19  0545 01/14/19  0959 01/14/19  0210 01/13/19  1534   INR   --   --   --   --  1.13*   APTT seconds 36.3* 38.7* 124.3* 59.3* 30.0               Invalid input(s): LDLCALC  Results from last 7 days   Lab Units 01/13/19  1534   PROBNP pg/mL 289.5         Results from last 7 days   Lab Units 01/13/19  1601   PH, ARTERIAL pH units 7.427   PO2 ART mm Hg 66.4*   PCO2, ARTERIAL mm Hg 41.1   HCO3 ART mmol/L 27.1                                   Imaging  Results:  Imaging Results (all)     Procedure Component Value Units Date/Time    NM Lung Ventilation Perfusion [[id number]] Collected:  01/13/19 1940     Updated:  01/14/19 1614    Narrative:       NUCLEAR VENTILATION AND PERFUSION LUNG SCAN 01/13/2019     HISTORY: Chest pain. Possible pulmonary embolus.     10.1 mCi 133 xenon was inhaled. Single breath rebreathing and washout  images were obtained posteriorly.     No segmental or larger ventilation defects are seen.. 5.4 mCi technetium  MAA was injected intravenously. Multiple perfusion images were obtained.     At least 2 or 3 moderate size perfusion defects are seen in the left  upper lobe. There may be moderate size perfusion defect in the right  lower lobe as seen on the RPO and posterior images. Mildly heterogeneous  perfusion is seen in the right upper lobe..       Impression:       Mismatched perfusion defects as described. Findings are  consistent with high probability for pulmonary thromboembolism.     This report was finalized on 1/14/2019 4:10 PM by Dr. Efrain Hernandez M.D.       XR Chest 2 View [[id number]] Collected:  01/13/19 1609     Updated:  01/13/19 1620    Narrative:       TWO-VIEW CHEST     HISTORY: Shortness of breath.     FINDINGS: The lungs are well-expanded and clear. There is mild  cardiomegaly unchanged from 08/10/2015. No acute abnormality is seen.     This report was finalized on 1/13/2019 4:17 PM by Dr. Remi Renee M.D.           --    Discharge Disposition  Home or Self Care    Discharge Medications     Discharge Medications      New Medications      Instructions Start Date   apixaban 5 MG tablet tablet  Commonly known as:  ELIQUIS   10 mg, Oral, Every 12 Hours Scheduled      apixaban 5 MG tablet tablet  Commonly known as:  ELIQUIS   5 mg, Oral, Every 12 Hours Scheduled         Continue These Medications      Instructions Start Date   aspirin 325 MG tablet   Oral, Daily      atorvastatin 40 MG tablet  Commonly known as:  LIPITOR    40 mg, Oral, Every Night at Bedtime      atorvastatin 40 MG tablet  Commonly known as:  LIPITOR   TAKE ONE TABLET BY MOUTH DAILY      furosemide 40 MG tablet  Commonly known as:  LASIX   PT REPORTS HE TAKES ONCE EVERY WEEK OR TWO      NIFEdipine XL 60 MG 24 hr tablet  Commonly known as:  PROCARDIA XL   TAKE ONE TABLET BY MOUTH DAILY      ramipril 5 MG capsule  Commonly known as:  ALTACE   TAKE ONE CAPSULE BY MOUTH DAILY      STIOLTO RESPIMAT 2.5-2.5 MCG/ACT aerosol solution inhaler  Generic drug:  tiotropium bromide-olodaterol   Inhalation, Daily - RT      tamsulosin 0.4 MG capsule 24 hr capsule  Commonly known as:  FLOMAX   1 capsule, Oral, Nightly      vitamin D 21783 units capsule capsule  Commonly known as:  ERGOCALCIFEROL   50,000 Units, Oral, Weekly             Discharge Diet: regular diet    Activity at Discharge:      Contact information for follow-up providers     Joleen Mccullough APRN Follow up.    Specialty:  Family Medicine  Contact information:  [address]  [facility] [id number] [phone]             Davonte Sandoval MD Follow up in 1 week(s).    Specialty:  Pulmonary Disease  Contact information:  [address]  [address]  [facility] [id number] [phone]                   Contact information for after-discharge care     Home Medical Care     [facility] Follow up.    Service:  Home Health Services  Contact information:  [address]  [facility] [address] [phone]                           See primary care provider, Joleen Mccullough APRN, in 1-2 weeks        Test Results Pending at Discharge   Order Current Status    Venous Thrombosis Profile ([id number]) - Miscellaneous Test In process           Dvaonte Sandoval MD  [facility], [facility]  Pulmonary and Critical Care Medicine  01/16/19  1:13 PM    Time: greater than 30 minutes.        Total time spent discharging patient including evaluation,post  hospitalization follow up,  medication and post hospitalization instructions and education total time exceeds 30 minutes.

## 2019-01-16 NOTE — PLAN OF CARE
Problem: Patient Care Overview  Goal: Plan of Care Review  Outcome: Ongoing (interventions implemented as appropriate)   01/16/19 6432   Coping/Psychosocial   Plan of Care Reviewed With patient   OTHER   Outcome Summary Has rested quietly tonight no distress noted vss possible discharge home today patient up in the room without assistance sitting in the chair without complaints        Problem: Fall Risk (Adult)  Goal: Absence of Fall  Outcome: Ongoing (interventions implemented as appropriate)      Problem: VTE, DVT and PE (Adult)  Goal: Signs and Symptoms of Listed Potential Problems Will be Absent, Minimized or Managed (VTE, DVT and PE)  Outcome: Ongoing (interventions implemented as appropriate)

## 2019-01-16 NOTE — PLAN OF CARE
Problem: Patient Care Overview  Goal: Plan of Care Review  Outcome: Outcome(s) achieved Date Met: 01/16/19

## 2019-01-16 NOTE — PROGRESS NOTES
Continued Stay Note  [facility]     Patient Name: James Loaiza  MRN: [id number]  Today's Date: 1/16/2019    Admit Date: 1/13/2019    Discharge Plan     Row Name 01/16/19 1505       Plan    Plan  Home with spouse and [employer]      Plan Comments  Pt is being discharged today. Pt qualifies for home O2. Dina/David updated. Dina to deliver O2 to pts room before dc. Pt updated at bedside. Saad aware. JChasteenRN/CCP         Discharge Codes    No documentation.       Expected Discharge Date and Time     Expected Discharge Date Expected Discharge Time    Jan 16, 2019             Jade Bruce RN

## 2019-01-17 ENCOUNTER — READMISSION MANAGEMENT (OUTPATIENT)
Dept: CALL CENTER | Facility: HOSPITAL | Age: 74
End: 2019-01-17

## 2019-01-17 NOTE — PROGRESS NOTES
Case Management Discharge Note    Final Note: Pt dc'd home with [facility]    Destination      No service has been selected for the patient.      Durable Medical Equipment      No service has been selected for the patient.      Dialysis/Infusion      No service has been selected for the patient.      Home Medical Care - Selection Complete      Service Provider Request Status Selected Services Address Phone Number Fax Number    [facility] Selected Home Health Services [address] [address] [phone]      Community Resources      No service has been selected for the patient.        Other: Other(private vehicle )    Final Discharge Disposition Code: 06 - home with home health care

## 2019-01-18 ENCOUNTER — READMISSION MANAGEMENT (OUTPATIENT)
Dept: CALL CENTER | Facility: HOSPITAL | Age: 74
End: 2019-01-18

## 2019-01-18 ENCOUNTER — TELEPHONE (OUTPATIENT)
Dept: FAMILY MEDICINE CLINIC | Facility: CLINIC | Age: 74
End: 2019-01-18

## 2019-01-18 NOTE — OUTREACH NOTE
Medical Week 1 Survey      Responses   Facility patient discharged from?  [place]   Does the patient have one of the following disease processes/diagnoses(primary or secondary)?  Other   Is there a successful TCM telephone encounter documented?  No   Week 1 attempt successful?  Yes   Call start time  0937   Call end time  0943   Meds reviewed with patient/caregiver?  Yes   Is the patient having any side effects they believe may be caused by any medication additions or changes?  No   Does the patient have all medications ordered at discharge?  Yes   Is the patient taking all medications as directed (includes completed medication regime)?  Yes   Comments regarding appointments  Dr Sandoval on 01/25/19   Does the patient have a primary care provider?   Yes   Does the patient have an appointment with their PCP within 7 days of discharge?  Yes   Comments regarding PCP  Joleen Mccullough   Has the patient kept scheduled appointments due by today?  N/A   What is the Home health agency?   [facility]   Has home health visited the patient within 72 hours of discharge?  Call prior to 72 hours   Has all DME been delivered?  Yes   DME comments  home oxygen as needed upon exertion   Psychosocial issues?  No   Psychosocial comments  Wife lives with him.   Did the patient receive a copy of their discharge instructions?  Yes   Nursing interventions  Reviewed instructions with patient, Educated on MyChart   What is the patient's perception of their health status since discharge?  Improving   Is the patient/caregiver able to teach back signs and symptoms related to disease process for when to call PCP?  Yes   Is the patient/caregiver able to teach back signs and symptoms related to disease process for when to call 911?  Yes   Is the patient/caregiver able to teach back the hierarchy of who to call/visit for symptoms/problems? PCP, Specialist, Home health nurse, Urgent Care, ED, 911  Yes   Week 1 call completed?  Yes   Wrap up additional  comments  States is feeling much better-wearing home oxygen as needed. Denies any problems today. [facility] home health will be visiting tomorrow.          Lexus Velasco RN

## 2019-01-18 NOTE — TELEPHONE ENCOUNTER
Hey they called Fabiola's phone. I received call from Shirley at [facility] and she states that they were suppose to visit this patient yesterday but he refused so they will see him tomorrow. JUAN CARLOS

## 2019-01-18 NOTE — OUTREACH NOTE
Prep Survey      Responses   Facility patient discharged from?  [place]   Is patient eligible?  Yes   Discharge diagnosis  Pulmonary embolus/DVT   Does the patient have one of the following disease processes/diagnoses(primary or secondary)?  Other   Does the patient have Home health ordered?  Yes   What is the Home health agency?   [facility]   Is there a DME ordered?  Yes   What DME was ordered?  [place] for home O2   Prep survey completed?  Yes          Argelia Holbrook RN

## 2019-01-24 ENCOUNTER — OFFICE VISIT (OUTPATIENT)
Dept: FAMILY MEDICINE CLINIC | Facility: CLINIC | Age: 74
End: 2019-01-24

## 2019-01-24 VITALS
DIASTOLIC BLOOD PRESSURE: 84 MMHG | HEIGHT: 74 IN | SYSTOLIC BLOOD PRESSURE: 160 MMHG | OXYGEN SATURATION: 97 % | HEART RATE: 63 BPM | BODY MASS INDEX: 40.43 KG/M2 | WEIGHT: 315 LBS | TEMPERATURE: 97.7 F

## 2019-01-24 DIAGNOSIS — F41.9 ANXIETY: Primary | ICD-10-CM

## 2019-01-24 DIAGNOSIS — I26.99 OTHER ACUTE PULMONARY EMBOLISM WITHOUT ACUTE COR PULMONALE (HCC): ICD-10-CM

## 2019-01-24 DIAGNOSIS — E66.01 MORBID (SEVERE) OBESITY DUE TO EXCESS CALORIES (HCC): ICD-10-CM

## 2019-01-24 DIAGNOSIS — J44.9 CHRONIC OBSTRUCTIVE PULMONARY DISEASE, UNSPECIFIED COPD TYPE (HCC): ICD-10-CM

## 2019-01-24 LAB
APCR PPP: 2.6 RATIO
APTT HEX PL PPP: 1 SEC
APTT IMM NP PPP: ABNORMAL SEC
APTT PPP 1:1 SALINE: ABNORMAL SEC
APTT PPP: 67.9 SEC
AT III ACT/NOR PPP CHRO: 98 %
B2 GLYCOPROT1 IGA SER-ACNC: <10 SAU
B2 GLYCOPROT1 IGG SER-ACNC: <10 SGU
B2 GLYCOPROT1 IGM SER-ACNC: <10 SMU
CARDIOLIPIN IGG SER IA-ACNC: <10 GPL
CARDIOLIPIN IGM SER IA-ACNC: <10 MPL
CONFIRM DRVVT: 44.7 SEC
CONV COMMENT: ABNORMAL
F2 GENE MUT ANL BLD/T: ABNORMAL
FACT VIII ACT/NOR PPP: 310 %
HCYS SERPL-SCNC: 13.4 UMOL/L
INTERPRETATION: ABNORMAL
LAC INTERPRETATION: ABNORMAL
PROT S FREE PPP-ACNC: 138 %
PRT C ACTIVITY (CHROMOGENIC): 109 %
REF LAB TEST METHOD: ABNORMAL
SCREEN DRVVT/NORMAL: 1 RATIO
SCREEN DRVVT: 48.2 SEC

## 2019-01-24 PROCEDURE — 99214 OFFICE O/P EST MOD 30 MIN: CPT | Performed by: NURSE PRACTITIONER

## 2019-01-24 RX ORDER — BUSPIRONE HYDROCHLORIDE 5 MG/1
5 TABLET ORAL NIGHTLY PRN
Qty: 30 TABLET | Refills: 3 | Status: SHIPPED | OUTPATIENT
Start: 2019-01-24 | End: 2021-02-24

## 2019-01-24 RX ORDER — LIDOCAINE 50 MG/G
1 PATCH TOPICAL EVERY 24 HOURS
Qty: 30 EACH | Refills: 1 | Status: SHIPPED | OUTPATIENT
Start: 2019-01-24 | End: 2020-02-27 | Stop reason: SDUPTHER

## 2019-01-24 NOTE — PROGRESS NOTES
Subjective   James Loaiza is a 73 y.o. male presents for hospital follow up. States he developed acute shortness of breath, was unable to walk several feet without getting extremely winded and proceeded to the ED. Was diagnosed with DVT and PE. Started on Eliquis and tolerating well. Denies any bleeding. Breathing is gradually improving. Denies any acute shortness of breath. He does report a previous DVT and treatment with anticoagulant. It was stopped after the recommended time.     Shortness of Breath   This is a new problem. The current episode started 1 to 4 weeks ago. The problem occurs constantly. The problem has been gradually improving. Associated symptoms include leg pain, leg swelling and PND. Pertinent negatives include no abdominal pain, chest pain, claudication, coryza, ear pain, fever, headaches, hemoptysis, neck pain, orthopnea, rash, rhinorrhea, sore throat, sputum production, swollen glands, syncope, vomiting or wheezing. The symptoms are aggravated by lying flat and any activity. The patient has no known risk factors for DVT/PE. Treatments tried: treated with anticoagulants. The treatment provided moderate relief. His past medical history is significant for COPD, DVT and PE.        The following portions of the patient's history were reviewed and updated as appropriate: allergies, current medications, past family history, past medical history, past social history, past surgical history and problem list.    Review of Systems   Constitutional: Positive for activity change (decreased, gradually improving) and fatigue (improving). Negative for fever.   HENT: Negative.  Negative for ear pain, rhinorrhea and sore throat.    Eyes: Negative.    Respiratory: Negative for cough, hemoptysis, sputum production, shortness of breath and wheezing.    Cardiovascular: Positive for leg swelling and PND. Negative for chest pain, orthopnea, claudication and syncope.   Gastrointestinal: Negative.  Negative for  abdominal pain and vomiting.   Endocrine: Negative.    Genitourinary: Negative.    Musculoskeletal: Negative.  Negative for neck pain.   Skin: Negative for rash.   Allergic/Immunologic: Negative.    Neurological: Negative.  Negative for headaches.   Hematological: Negative.    Psychiatric/Behavioral: The patient is nervous/anxious (at bedtime, states sleeping sitting up).        Objective   Physical Exam   Constitutional: He is oriented to person, place, and time. He appears well-developed and well-nourished.   HENT:   Mouth/Throat: Oropharynx is clear and moist.   Cardiovascular: Normal rate, regular rhythm and normal heart sounds. Exam reveals no gallop and no friction rub.   No murmur heard.  Pulmonary/Chest: Effort normal. No stridor. No respiratory distress. He has decreased breath sounds. He has no wheezes. He has no rhonchi. He has no rales.   Abdominal: Soft. Bowel sounds are normal. He exhibits no distension. There is no tenderness. There is no guarding.   Neurological: He is alert and oriented to person, place, and time.   Psychiatric: He has a normal mood and affect.   Vitals reviewed.    Current outpatient and discharge medications have been reconciled for the patient.  Reviewed by: YANIRA Roe      Assessment/Plan   James was seen today for follow-up.    Diagnoses and all orders for this visit:    Anxiety    Other acute pulmonary embolism without acute cor pulmonale (CMS/HCC)    Chronic obstructive pulmonary disease, unspecified COPD type (CMS/HCC)    Morbid (severe) obesity due to excess calories (CMS/HCC)    Other orders  -     busPIRone (BUSPAR) 5 MG tablet; Take 1 tablet by mouth At Night As Needed (anxiety).  -     lidocaine (LIDODERM) 5 %; Place 1 patch on the skin as directed by provider Daily. Remove & Discard patch within 12 hours or as directed by MD        Follow up with Dr. Beth as scheduled for worsening kidney function  Continue eliquis, lifelong  buspar prn for  anxiety  Lidocaine patch to help alleviate musculoskeletal pain  Follow up in 2 months, sooner if needed  Will monitor closely

## 2019-01-25 ENCOUNTER — READMISSION MANAGEMENT (OUTPATIENT)
Dept: CALL CENTER | Facility: HOSPITAL | Age: 74
End: 2019-01-25

## 2019-01-25 LAB — REF LAB TEST RESULTS: NORMAL

## 2019-01-25 NOTE — OUTREACH NOTE
Medical Week 2 Survey      Responses   Facility patient discharged from?  [place]   Does the patient have one of the following disease processes/diagnoses(primary or secondary)?  Other   Week 2 attempt successful?  Yes   Call start time  1109   Discharge diagnosis  Pulmonary embolus/DVT   Call end time  1121   Meds reviewed with patient/caregiver?  Yes   Is the patient having any side effects they believe may be caused by any medication additions or changes?  Yes   Side effects comments   Not sure but not sleeping the last few nights, new med started yesterday by APRN but no change   Does the patient have all medications ordered at discharge?  Yes   Is the patient taking all medications as directed (includes completed medication regime)?  Yes   Does the patient have a primary care provider?   Yes   Has the patient kept scheduled appointments due by today?  Yes   What DME was ordered?  [place] for home O2   DME comments  Has not needed O2, checking sats >94   Psychosocial issues?  No   Did the patient receive a copy of their discharge instructions?  Yes   Nursing interventions  Educated on MyChart   What is the patient's perception of their health status since discharge?  New symptoms unrelated to diagnosis   Additional teach back comments  Doesn't know why his sleep is being disrupted, no SOA, pain or other indication of PE sx's but just feels 'out of sorts', will take new med to dr. vora today and discuss further   Week 2 Call Completed?  Yes          Dina Hector RN

## 2019-01-29 PROBLEM — J44.9 CHRONIC OBSTRUCTIVE PULMONARY DISEASE (HCC): Status: ACTIVE | Noted: 2019-01-29

## 2019-01-30 ENCOUNTER — OFFICE VISIT (OUTPATIENT)
Dept: PAIN MEDICINE | Facility: CLINIC | Age: 74
End: 2019-01-30

## 2019-01-30 VITALS
HEIGHT: 74 IN | SYSTOLIC BLOOD PRESSURE: 160 MMHG | OXYGEN SATURATION: 94 % | TEMPERATURE: 97.9 F | DIASTOLIC BLOOD PRESSURE: 78 MMHG | WEIGHT: 315 LBS | RESPIRATION RATE: 16 BRPM | BODY MASS INDEX: 40.43 KG/M2 | HEART RATE: 69 BPM

## 2019-01-30 DIAGNOSIS — M19.011 OSTEOARTHRITIS OF RIGHT SHOULDER, UNSPECIFIED OSTEOARTHRITIS TYPE: ICD-10-CM

## 2019-01-30 DIAGNOSIS — M43.06 LUMBAR SPONDYLOLYSIS: ICD-10-CM

## 2019-01-30 DIAGNOSIS — G89.29 OTHER CHRONIC PAIN: Primary | ICD-10-CM

## 2019-01-30 PROCEDURE — 99214 OFFICE O/P EST MOD 30 MIN: CPT | Performed by: NURSE PRACTITIONER

## 2019-01-30 RX ORDER — ALPRAZOLAM 0.5 MG/1
TABLET ORAL
COMMUNITY
Start: 2019-01-25 | End: 2019-10-31

## 2019-01-30 RX ORDER — NIFEDIPINE 60 MG/1
60 TABLET, EXTENDED RELEASE ORAL DAILY
COMMUNITY
Start: 2018-08-01 | End: 2021-08-18 | Stop reason: SDUPTHER

## 2019-01-30 NOTE — PROGRESS NOTES
CHIEF COMPLAINT  Chronic Pain   Pt states improvement. He only has mild discomfort with some movements but no pain while inactive    Subjective   James Loaiza is a 73 y.o. male  who presents to the office for follow-up.He has a history of chronic back pain. REports his pain is relatively stable since last office visit.    Since last office visit, he developed a PE. Is now on Eliquis indefinitely.     Complains of pain in his low back. Today his pain is 0/10VAs. Describes the pain as intermittent aching. Pain increases with certain movements; pain decreases with rest and procedures.   Is having some right shoulder pain. This started approximately a month ago. He sleeps on his right shoulder. It can interfere with his sleep. Has to move to recliner to sleep sometimes. Was prescribed Lidocaine patch by PCP but insurance denied.  He did take Ibuprofen the other night. He is unsure if he can take this. Did not try Tylenol.     He completed a Bilateral L2-5 Lumbar Medial Branch RADIOFREQUENCY   on  6/22/18 performed by Dr. COOPER for management of low back pain. Patient reports 75% ongoing relief from the procedure.     Back Pain   This is a recurrent problem. The current episode started more than 1 month ago. The problem occurs constantly. Progression since onset: improved since RFL; stable since last office visit. The pain is present in the lumbar spine. The quality of the pain is described as aching. The pain does not radiate. The pain is at a severity of 0/10. The pain is mild. The pain is worse during the night (frequent urination at night which increases his pain--getting up and out of bed). The symptoms are aggravated by standing and twisting. Stiffness is present all day. Pertinent negatives include no bladder incontinence, bowel incontinence, chest pain, dysuria, fever, headaches, numbness or weakness. Risk factors include lack of exercise, obesity and recent trauma. Treatments tried: lumbar RFL, OTC IBU.     "  PEG Assessment   What number best describes your pain on average in the past week?2  What number best describes how, during the past week, pain has interfered with your enjoyment of life?2  What number best describes how, during the past week, pain has interfered with your general activity?  2    The following portions of the patient's history were reviewed and updated as appropriate: allergies, current medications, past family history, past medical history, past social history, past surgical history and problem list.    Review of Systems   Constitutional: Negative for chills and fever.   Respiratory: Negative for shortness of breath.    Cardiovascular: Negative for chest pain.   Gastrointestinal: Negative for bowel incontinence, constipation, diarrhea, nausea and vomiting.   Genitourinary: Negative for bladder incontinence, difficulty urinating, dysuria and enuresis.   Musculoskeletal: Positive for back pain (with occasional legs cramping).   Neurological: Negative for dizziness, weakness, light-headedness, numbness and headaches.   Psychiatric/Behavioral: Negative for confusion, hallucinations, self-injury, sleep disturbance and suicidal ideas. The patient is nervous/anxious (since recent Blood Clots Treatment with medication has begun).        Vitals:    01/30/19 1143   BP: 160/78   Pulse: 69   Resp: 16   Temp: 97.9 °F (36.6 °C)   SpO2: 94%   Weight: (!) 151 kg (333 lb)   Height: 188 cm (74\")   PainSc: 0-No pain     Objective   Physical Exam   Constitutional: He is oriented to person, place, and time. Vital signs are normal. He appears well-developed and well-nourished. He is cooperative.   HENT:   Head: Normocephalic and atraumatic.   Nose: Nose normal.   Eyes: Conjunctivae and lids are normal.   Cardiovascular: Normal rate.   Pulmonary/Chest: Effort normal.   Abdominal:   obese   Musculoskeletal:        Right shoulder: He exhibits decreased range of motion, tenderness and bony tenderness.        Lumbar back: " He exhibits tenderness and pain.   Mild tenderness of bilateral L2-L5 facets     Neurological: He is alert and oriented to person, place, and time. Gait (slowed) abnormal.   Reflex Scores:       Patellar reflexes are 1+ on the right side and 1+ on the left side.  Negative SLR bilaterally   Skin: Skin is warm, dry and intact.   Psychiatric: He has a normal mood and affect. His speech is normal and behavior is normal. Judgment and thought content normal. Cognition and memory are normal.   Nursing note and vitals reviewed.      Assessment/Plan   James was seen today for pain.    Diagnoses and all orders for this visit:    Other chronic pain    Lumbar spondylolysis    Osteoarthritis of right shoulder, unspecified osteoarthritis type    Other orders  -     diclofenac (VOLTAREN) 1 % gel gel; Apply 4 g topically to the appropriate area as directed 2 (Two) Times a Day.      --- Voltaren gel. Discussed medication with the patient.  Included in this discussion was the potential for side effects and adverse events.  Patient verbalized understanding and wished to proceed.  Prescription will be sent to pharmacy.  --- Encouraged patient to contact Dr. Sandoval in regards to Eliquis and ibuprofen. Reviewed there may be contraindications with Eliquis and NSAIDS. Discussed ok with Tylenol.  --- Repeat lumbar interventions in future PRN. Reviewed he would temporarily have to stop Eliquis in future for lumbar interventions. Patient verbalized understanding.   --- Follow-up 6 months or sooner if needed.     DILAN REPORT  DILAN report has been reviewed and scanned into the patient's chart.    As the clinician, I personally reviewed the DILAN from 1-29-19 while the patient was in the office today.          EMR Dragon/Transcription disclaimer:   Much of this encounter note is an electronic transcription/translation of spoken language to printed text. The electronic translation of spoken language may permit erroneous, or at times,  nonsensical words or phrases to be inadvertently transcribed; Although I have reviewed the note for such errors, some may still exist.

## 2019-02-02 ENCOUNTER — READMISSION MANAGEMENT (OUTPATIENT)
Dept: CALL CENTER | Facility: HOSPITAL | Age: 74
End: 2019-02-02

## 2019-02-02 NOTE — OUTREACH NOTE
Medical Week 3 Survey      Responses   Facility patient discharged from?  [place]   Does the patient have one of the following disease processes/diagnoses(primary or secondary)?  Other   Week 3 attempt successful?  No   Unsuccessful attempts  Attempt 1          Liset Ramirez RN

## 2019-02-03 ENCOUNTER — READMISSION MANAGEMENT (OUTPATIENT)
Dept: CALL CENTER | Facility: HOSPITAL | Age: 74
End: 2019-02-03

## 2019-02-03 NOTE — OUTREACH NOTE
Medical Week 3 Survey      Responses   Facility patient discharged from?  [place]   Does the patient have one of the following disease processes/diagnoses(primary or secondary)?  Other   Week 3 attempt successful?  Yes   Call start time  1451   Call end time  1456   Is patient permission given to speak with other caregiver?  No   Medication alerts for this patient  stated ofn Lidocaine pataches and xanax   Meds reviewed with patient/caregiver?  Yes   Is the patient having any side effects they believe may be caused by any medication additions or changes?  No   Is the patient taking all medications as directed (includes completed medication regime)?  Yes   Comments regarding appointments  has kept his appointments   Does the patient have a primary care provider?   Yes   Does the patient have an appointment with their PCP within 7 days of discharge?  N/A   Has the patient kept scheduled appointments due by today?  Yes   Has home health visited the patient within 72 hours of discharge?  N/A   What DME was ordered?  has oxygen but is not using at this time   Psychosocial issues?  No   Did the patient receive a copy of their discharge instructions?  Yes   Nursing interventions  Reviewed instructions with patient [My chart is pending]   What is the patient's perception of their health status since discharge?  Improving   Is the patient/caregiver able to teach back signs and symptoms related to disease process for when to call PCP?  Yes   Is the patient/caregiver able to teach back signs and symptoms related to disease process for when to call 911?  Yes   Is the patient/caregiver able to teach back the hierarchy of who to call/visit for symptoms/problems? PCP, Specialist, Home health nurse, Urgent Care, ED, 911  Yes   Week 3 Call Completed?  Yes   Wrap up additional comments  feels he is improving and has went to a movie last evening          Liset Ramirez RN

## 2019-02-06 ENCOUNTER — PRIOR AUTHORIZATION (OUTPATIENT)
Dept: PAIN MEDICINE | Facility: CLINIC | Age: 74
End: 2019-02-06

## 2019-02-06 NOTE — TELEPHONE ENCOUNTER
James Loaiza (Key: [id number]) – [id number]  Diclofenac Sodium 1% TD GEL  Status: Denied  Created: February 6th, 2019  Sent: February 6th, 2019

## 2019-02-20 ENCOUNTER — OFFICE VISIT (OUTPATIENT)
Dept: PAIN MEDICINE | Facility: CLINIC | Age: 74
End: 2019-02-20

## 2019-02-20 VITALS
HEIGHT: 74 IN | DIASTOLIC BLOOD PRESSURE: 81 MMHG | HEART RATE: 62 BPM | OXYGEN SATURATION: 95 % | RESPIRATION RATE: 15 BRPM | TEMPERATURE: 97.2 F | SYSTOLIC BLOOD PRESSURE: 149 MMHG | WEIGHT: 315 LBS | BODY MASS INDEX: 40.43 KG/M2

## 2019-02-20 DIAGNOSIS — G89.29 OTHER CHRONIC PAIN: Primary | ICD-10-CM

## 2019-02-20 DIAGNOSIS — Z79.01 CHRONIC ANTICOAGULATION: ICD-10-CM

## 2019-02-20 DIAGNOSIS — M54.5 CHRONIC LOW BACK PAIN, UNSPECIFIED BACK PAIN LATERALITY, WITH SCIATICA PRESENCE UNSPECIFIED: ICD-10-CM

## 2019-02-20 DIAGNOSIS — G89.29 CHRONIC LOW BACK PAIN, UNSPECIFIED BACK PAIN LATERALITY, WITH SCIATICA PRESENCE UNSPECIFIED: ICD-10-CM

## 2019-02-20 DIAGNOSIS — M43.06 LUMBAR SPONDYLOLYSIS: ICD-10-CM

## 2019-02-20 PROCEDURE — 99214 OFFICE O/P EST MOD 30 MIN: CPT | Performed by: NURSE PRACTITIONER

## 2019-02-20 RX ORDER — HYDRALAZINE HYDROCHLORIDE 10 MG/1
10 TABLET, FILM COATED ORAL
COMMUNITY
Start: 2019-02-14 | End: 2021-02-24

## 2019-02-20 NOTE — PROGRESS NOTES
CHIEF COMPLAINT  F/U back pain. Pt states pain is worse at night. Diclofenac gel in appeal. Wondering what options are while we wait. He is having a lot of difficulty sleeping. Still in PT.    Subjective   James Loaiza is a 73 y.o. male  who presents to the office for follow-up.He has a history of chronic back pain. Reports his pain is worsening.     Saw orthopedic surgeon since last office visit.  Told no back surgery.     Complains of pain his low back. Today his pain is 2/10VAS. Describes the pain as intermittent aching with intermittent shooting and stabbing pain. Pain increases with prolonged standing, laying flat, rolling over in bed; pain decreases with medication, rest, heating pad. Started PT, has been twice since last office visit. ADL's by self. Denies any bowel or bladder changes.      He completed a Bilateral L2-5 Lumbar Medial Branch RADIOFREQUENCY   on  6/22/18 performed by Dr. COOPER for management of low back pain. Patient reports 75% relief from the procedure.     Is having increased knee pain due to history of OA. Has a history of osteoarthritis of multiple joints. Wants to try Voltaren gel.   Back Pain   This is a recurrent problem. The current episode started more than 1 month ago. The problem occurs constantly. Progression since onset: improved since RFL; stable since last office visit. The pain is present in the lumbar spine. The quality of the pain is described as aching. The pain does not radiate. The pain is at a severity of 2/10. The pain is mild. The pain is worse during the night (frequent urination at night which increases his pain--getting up and out of bed). The symptoms are aggravated by standing and twisting. Stiffness is present all day. Pertinent negatives include no bladder incontinence, bowel incontinence, chest pain, dysuria, fever, headaches, numbness or weakness. Risk factors include lack of exercise, obesity and recent trauma. Treatments tried: lumbar RFL, OTC IBU.     "  PEG Assessment   What number best describes your pain on average in the past week?8  What number best describes how, during the past week, pain has interfered with your enjoyment of life?8  What number best describes how, during the past week, pain has interfered with your general activity?  5      The following portions of the patient's history were reviewed and updated as appropriate: allergies, current medications, past family history, past medical history, past social history, past surgical history and problem list.    Review of Systems   Constitutional: Negative for chills and fever.   Respiratory: Negative for shortness of breath.    Cardiovascular: Negative for chest pain.   Gastrointestinal: Negative for bowel incontinence, constipation, diarrhea, nausea and vomiting.   Genitourinary: Positive for frequency. Negative for bladder incontinence, difficulty urinating, dysuria and enuresis.   Musculoskeletal: Positive for arthralgias (tailbone) and back pain (with occasional legs cramping).   Neurological: Negative for dizziness, weakness, light-headedness, numbness and headaches.   Psychiatric/Behavioral: Positive for sleep disturbance. Negative for confusion, hallucinations, self-injury and suicidal ideas. The patient is nervous/anxious (since recent Blood Clots Treatment with medication has begun).        Vitals:    02/20/19 1455   BP: 149/81   Pulse: 62   Resp: 15   Temp: 97.2 °F (36.2 °C)   SpO2: 95%   Weight: (!) 150 kg (329 lb 12.8 oz)   Height: 188 cm (74.02\")   PainSc:   2   PainLoc: Back     Objective   Physical Exam   Constitutional: He is oriented to person, place, and time. Vital signs are normal. He appears well-developed and well-nourished. He is cooperative.   HENT:   Head: Normocephalic and atraumatic.   Nose: Nose normal.   Eyes: Conjunctivae and lids are normal.   Cardiovascular: Normal rate.   Pulmonary/Chest: Effort normal.   Abdominal: Normal appearance.   Musculoskeletal:        Right " shoulder: He exhibits decreased range of motion and tenderness.        Right knee: He exhibits decreased range of motion. Tenderness found.        Left knee: He exhibits decreased range of motion. Tenderness found.        Lumbar back: He exhibits tenderness, bony tenderness and pain.   MODERATE tenderness of bilateral L5-S3 facets  +lumbar loading manuever   Neurological: He is alert and oriented to person, place, and time. Gait (slowed) abnormal.   Reflex Scores:       Patellar reflexes are 1+ on the right side and 1+ on the left side.  Skin: Skin is warm, dry and intact.   Psychiatric: He has a normal mood and affect. His speech is normal and behavior is normal. Judgment and thought content normal. Cognition and memory are normal.   Nursing note and vitals reviewed.    Assessment/Plan   James was seen today for back pain.    Diagnoses and all orders for this visit:    Other chronic pain    Chronic low back pain, unspecified back pain laterality, with sciatica presence unspecified    Lumbar spondylolysis  -     Case Request    Chronic anticoagulation      --- bilateral L5-S3 MBB. Stop ELiquis 3 days. Patient will obtain OK from Dr. Sandoval for temporary cessation of Eliquis for injections. Reviewed the procedure at length with the patient.  Included in the review was expectations, complications, risk and benefits.The procedure was described in detail and the risks, benefits and alternatives were discussed with the patient (including but not limited to: bleeding, infection, nerve damage, worsening of pain, inability to perform injection, paralysis, seizures, and death) who agreed to proceed.  Discussed the potential for sedation if warranted/wanted.  The procedure will plan to be performed at [facility] with fluoroscopic guidance(unless ultrasound is indicated). Questions were answered and in a way the patient could understand.  Patient verbalized understanding and wishes to proceed.  This  "intervention will be ordered.  --- Voltaren gel for OA-knee, shoulder  --- Follow-up after procedure or sooner if needed.    -------  Education about Medial Branch Blockade and RF Therapy:    This medial branch blockade (MBB) suggested is intended for diagnostic purposes, with the intent of offering the patient Radiofrequency thermal rhizotomy (RF) if the MBB is diagnostically effective.  The diagnostic blockade is necessary to determine the likelihood that RF therapy could be efficacious in providing long term relief to the patient.    Medial branches are sensory nerve branches that connect to a facet joint and transmit sensations & pain signals from that joint.  Facet is a term for the type of joints found in the spine.  Medial branches are the nerves that go to a facet, and therefore are also sometimes called \"facet joint nerves\" (FJNs).      In a medial branch blockade procedure, xray fluoroscopy is used to verify the locations of the outside of the joint lines which are being targeted.  Under xray guidance, needles are placed to these areas.  Contrast dye is injected to confirm proper placement, with dye flowing over the joint area, and to ensure that the dye does not flow into unintended areas such as a vein.  When this is confirmed, local anesthetic is injected to block the medial branch at that joint level.      If MBBs are diagnostically successful in blocking pain, then the patient is most likely a great candidate for Radiofrequency of those facet joint nerves.  In the RF procedure, needles are placed to the joint lines in the same fashion, and after testing, the needle tips are heated to thermally treat the nerves, blocking the nerves by in essence damaging the nerves with the heat treatment.       Medically, a successful RF procedure should provide a patient with 50% pain relief or more for at least 6 months.  Clinical experience suggests that successful patients receive relief more in the range of 12 " months on average.  We also discussed that a fortunate minority of patients receive therapeutic success from the MBB, and may not require RF ablation.  If a patient receives more than 8 weeks of relief from MBB, then occasional repeat MBB for therapeutic purposes is a very reasonable alternative therapy.  This course of therapy is consistent with our LCDs according to our CMS  in the area, and therefore other insurance providers should follow accordingly.  We will monitor our patients to screen for these therapeutic responders and will offer RF therapy only when necessary.        We discussed that MBB & RF are not without risks.  Guidelines regarding anticoagulant use & neuraxial procedures will be respected.  Patients that are ill or otherwise may be at risk for sepsis will not have their spines accessed by neuraxial injections of any type.  This patient will not be offered these therapies if there is an increased risk.   We discussed that there is a risk of postprocedural pain and also a risk of worsening of clinical picture with these procedures as with any neuraxial procedure.    -------      --------    Anticoagulation risks for procedures....   - there are risks to discontinuation of anticoagulants for neuraxial procedures and surgery.  Risks include but are not limited to deep vein thromboses, pulmonary emboli, myocardial infarction, and stroke    - Neuraxial access with a needle is relatively contraindicated while anticoagulated because of the risk of hemorrhage and/or epidural hematoma, which can be a neurosurgical emergency to evacuate bleeding.  Left unchecked, bleeding can cause nerve damage leading to paralysis and/or death.  --------     DILAN REPORT  DILAN report has been reviewed and scanned into the patient's chart.    As the clinician, I personally reviewed the DILAN from 2-18-19 while the patient was in the office today.          EMR Dragon/Transcription disclaimer:   Much of this  encounter note is an electronic transcription/translation of spoken language to printed text. The electronic translation of spoken language may permit erroneous, or at times, nonsensical words or phrases to be inadvertently transcribed; Although I have reviewed the note for such errors, some may still exist.

## 2019-03-05 ENCOUNTER — HOSPITAL ENCOUNTER (EMERGENCY)
Facility: HOSPITAL | Age: 74
Discharge: HOME | End: 2019-03-05
Attending: EMERGENCY MEDICINE | Admitting: EMERGENCY MEDICINE

## 2019-03-05 ENCOUNTER — APPOINTMENT (OUTPATIENT)
Dept: CARDIOLOGY | Facility: HOSPITAL | Age: 74
End: 2019-03-05

## 2019-03-05 ENCOUNTER — TELEPHONE (OUTPATIENT)
Dept: FAMILY MEDICINE CLINIC | Facility: CLINIC | Age: 74
End: 2019-03-05

## 2019-03-05 VITALS
BODY MASS INDEX: 38.5 KG/M2 | WEIGHT: 300 LBS | RESPIRATION RATE: 18 BRPM | SYSTOLIC BLOOD PRESSURE: 163 MMHG | HEIGHT: 74 IN | HEART RATE: 80 BPM | TEMPERATURE: 98.3 F | OXYGEN SATURATION: 98 % | DIASTOLIC BLOOD PRESSURE: 87 MMHG

## 2019-03-05 DIAGNOSIS — F41.9 ANXIETY: ICD-10-CM

## 2019-03-05 DIAGNOSIS — I82.501 CHRONIC DEEP VEIN THROMBOSIS (DVT) OF RIGHT LOWER EXTREMITY, UNSPECIFIED VEIN (HCC): Primary | ICD-10-CM

## 2019-03-05 LAB
BH CV LOW VAS RIGHT POPLITEAL SPONT: 1
BH CV LOW VAS RIGHT POSTERIOR TIBIAL VESSEL: 1
BH CV LOWER VASCULAR LEFT COMMON FEMORAL AUGMENT: NORMAL
BH CV LOWER VASCULAR LEFT COMMON FEMORAL COMPETENT: NORMAL
BH CV LOWER VASCULAR LEFT COMMON FEMORAL COMPRESS: NORMAL
BH CV LOWER VASCULAR LEFT COMMON FEMORAL PHASIC: NORMAL
BH CV LOWER VASCULAR LEFT COMMON FEMORAL SPONT: NORMAL
BH CV LOWER VASCULAR RIGHT COMMON FEMORAL AUGMENT: NORMAL
BH CV LOWER VASCULAR RIGHT COMMON FEMORAL COMPETENT: NORMAL
BH CV LOWER VASCULAR RIGHT COMMON FEMORAL COMPRESS: NORMAL
BH CV LOWER VASCULAR RIGHT COMMON FEMORAL PHASIC: NORMAL
BH CV LOWER VASCULAR RIGHT COMMON FEMORAL SPONT: NORMAL
BH CV LOWER VASCULAR RIGHT DISTAL FEMORAL COMPRESS: NORMAL
BH CV LOWER VASCULAR RIGHT GASTRONEMIUS COMPRESS: NORMAL
BH CV LOWER VASCULAR RIGHT GREATER SAPH AK COMPRESS: NORMAL
BH CV LOWER VASCULAR RIGHT GREATER SAPH BK COMPRESS: NORMAL
BH CV LOWER VASCULAR RIGHT MID FEMORAL AUGMENT: NORMAL
BH CV LOWER VASCULAR RIGHT MID FEMORAL COMPETENT: NORMAL
BH CV LOWER VASCULAR RIGHT MID FEMORAL COMPRESS: NORMAL
BH CV LOWER VASCULAR RIGHT MID FEMORAL PHASIC: NORMAL
BH CV LOWER VASCULAR RIGHT MID FEMORAL SPONT: NORMAL
BH CV LOWER VASCULAR RIGHT PERONEAL COMPRESS: NORMAL
BH CV LOWER VASCULAR RIGHT POPLITEAL AUGMENT: NORMAL
BH CV LOWER VASCULAR RIGHT POPLITEAL COMPETENT: NORMAL
BH CV LOWER VASCULAR RIGHT POPLITEAL COMPRESS: NORMAL
BH CV LOWER VASCULAR RIGHT POPLITEAL PHASIC: NORMAL
BH CV LOWER VASCULAR RIGHT POPLITEAL SPONT: NORMAL
BH CV LOWER VASCULAR RIGHT POPLITEAL THROMBUS: NORMAL
BH CV LOWER VASCULAR RIGHT POSTERIOR TIBIAL COMPRESS: NORMAL
BH CV LOWER VASCULAR RIGHT POSTERIOR TIBIAL THROMBUS: NORMAL
BH CV LOWER VASCULAR RIGHT PROXIMAL FEMORAL COMPRESS: NORMAL
BH CV LOWER VASCULAR RIGHT SAPHENOFEMORAL JUNCTION AUGMENT: NORMAL
BH CV LOWER VASCULAR RIGHT SAPHENOFEMORAL JUNCTION COMPETENT: NORMAL
BH CV LOWER VASCULAR RIGHT SAPHENOFEMORAL JUNCTION COMPRESS: NORMAL
BH CV LOWER VASCULAR RIGHT SAPHENOFEMORAL JUNCTION PHASIC: NORMAL
BH CV LOWER VASCULAR RIGHT SAPHENOFEMORAL JUNCTION SPONT: NORMAL
WHOLE BLOOD HOLD SPECIMEN: NORMAL

## 2019-03-05 PROCEDURE — 93971 EXTREMITY STUDY: CPT

## 2019-03-05 PROCEDURE — 99283 EMERGENCY DEPT VISIT LOW MDM: CPT

## 2019-03-05 RX ORDER — ATORVASTATIN CALCIUM 40 MG/1
TABLET, FILM COATED ORAL
Qty: 30 TABLET | Refills: 5 | Status: SHIPPED | OUTPATIENT
Start: 2019-03-05 | End: 2019-09-26 | Stop reason: SDUPTHER

## 2019-03-05 RX ORDER — ALPRAZOLAM 0.5 MG/1
0.5 TABLET ORAL 2 TIMES DAILY PRN
Qty: 6 TABLET | Refills: 0 | Status: SHIPPED | OUTPATIENT
Start: 2019-03-05 | End: 2021-02-24

## 2019-03-05 RX ORDER — SODIUM CHLORIDE 0.9 % (FLUSH) 0.9 %
10 SYRINGE (ML) INJECTION AS NEEDED
Status: DISCONTINUED | OUTPATIENT
Start: 2019-03-05 | End: 2019-03-05 | Stop reason: HOSPADM

## 2019-03-05 NOTE — DISCHARGE INSTRUCTIONS
Continue your blood thinners for DVT. Follow up with your PCP as needed. Return for worsening symptoms as needed.

## 2019-03-05 NOTE — TELEPHONE ENCOUNTER
Washington Fay is in the er for anxiety. He wants to know if we can prescribe him anxiety meds. I told him I would forward you the message.  Washington fay [birth year]

## 2019-03-05 NOTE — ED PROVIDER NOTES
EMERGENCY DEPARTMENT ENCOUNTER    CHIEF COMPLAINT  Chief Complaint: right lower leg pain  History given by: patient  History limited by: nothing  Room Number: 01/01  PMD: Joleen Mccullough APRN      HPI:  Pt is a 73 y.o. male who presents complaining of pain and discomfort in his right lower leg that began several days ago. He notices the pain when he lays down at night and it resolves when he moves around during the day. Pt denies recent travel or surgery. Pt has a h/x of DVT and PEs 6 weeks ago and is now taking blood thinners. He also complains of a posterior HA and took ibuprofen three hours ago without relief.     Duration:  Several days  Onset: gradual  Timing: intermittent  Location: right, posterior lower leg  Radiation: none  Quality: pain  Intensity/Severity: moderate  Progression: worsening  Associated Symptoms: HA  Aggravating Factors: worse at night  Alleviating Factors: moving around  Previous Episodes: none  Treatment before arrival: none    PAST MEDICAL HISTORY  Active Ambulatory Problems     Diagnosis Date Noted   • Lumbar herniated disc L3-L5 3/16/16 Open MRI 06/13/2016   • Abnormal electrocardiogram 06/13/2016   • Abnormal weight gain 06/13/2016   • Aortic valve insufficiency 06/13/2016   • Coronary arteriosclerosis in native artery 06/13/2016   • Benign essential hypertension ([place] 1999) 06/13/2016   • Edema 06/13/2016   • Dyspnea on exertion 06/13/2016   • Fatigue 06/13/2016   • Left ventricular hypertrophy 06/13/2016   • Obstructive sleep apnea syndrome 06/13/2016   • Arthritis of left hip 06/13/2016   • Hx of pulmonary embolus 7/15 06/13/2016   • Morbid obesity with BMI of 45.0-49.9, adult (CMS/[facility]) 06/13/2016   • Intermittent dysphagia 06/13/2016   • Chronic kidney disease ([place]) 06/13/2016   • Hyperlipidemia 1999 06/13/2016   • BPH (benign prostatic hypertrophy) 06/13/2016   • Left cervical radiculopathy 06/13/2016   • Rotator cuff tear, left 06/13/2016   • Cardiomegaly  06/13/2016   • Plantar fasciitis 06/13/2016   • Hypogonadism male 06/13/2016   • Vitamin D deficiency disease 06/13/2016   • Renal cyst, left 06/13/2016   • Preventative health care 09/22/2016   • Medication management 09/22/2016   • Chronic low back pain 03/01/2017   • Lumbar spondylolysis 03/01/2017   • Headache 03/13/2017   • Other chronic pain 07/30/2018   • Pulmonary embolus (CMS/[facility]) 01/13/2019   • Chronic obstructive pulmonary disease (CMS/[facility]) 01/29/2019   • Osteoarthritis of right shoulder 01/30/2019   • Chronic anticoagulation 02/20/2019     Resolved Ambulatory Problems     Diagnosis Date Noted   • No Resolved Ambulatory Problems     Past Medical History:   Diagnosis Date   • Abnormal EKG    • Anxiety    • Arthritis    • Back pain    • BPH (benign prostatic hyperplasia)    • Breast nodule    • Cardiomegaly    • Circulation problem    • Constipation    • COPD (chronic obstructive pulmonary disease) (CMS/[facility])    • Deviated septum    • DJD (degenerative joint disease)    • DVT (deep venous thrombosis) (CMS/[facility])    • Dysphagia    • Encounter for annual health examination 11/14/2013   • Enlarged prostate    • Fatigue    • Hyperlipidemia 1999   • Hypertension 1999   • Hypogonadism male    • Left hip pain    • Left leg pain    • Low back pain    • Moist mucous membranes of ear, nose, and throat    • Morbid obesity (CMS/[facility])    • Muscle cramping    • Muscle spasm    • Neck arthritis    • Neck muscle spasm    • Obesity    • Plantar fasciitis    • Prostatitis    • Psoriasis    • Pulmonary embolus (CMS/[facility]) 07/2015   • Radiculopathy    • Renal insufficiency    • Seborrhea    • Shortness of breath    • Sleep apnea    • Urinary frequency    • Vascular disorder    • Vertigo    • Weight gain    • Wellness examination 10/23/2014       PAST SURGICAL HISTORY  Past Surgical History:   Procedure Laterality Date   • APPENDECTOMY  1965   • CARDIAC CATHETERIZATION  07/29/2010    [facility]   •  COLONOSCOPY  01/05/2010   • COLONOSCOPY  10/01/2001   • NASAL SEPTUM SURGERY     • NOSE SURGERY  1999   • TURP / TRANSURETHRAL INCISION / DRAINAGE PROSTATE  10/23/2015    [facility] Castro       FAMILY HISTORY  Family History   Problem Relation Age of Onset   • Arthritis Mother    • Heart disease Mother    • Hypertension Mother    • Heart attack Father    • Heart disease Father    • Hypertension Father    • Arthritis Sister    • Multiple sclerosis Sister    • Alcohol abuse Brother    • Diabetes Son        SOCIAL HISTORY  Social History     Socioeconomic History   • Marital status:      Spouse name: Not on file   • Number of children: Not on file   • Years of education: Not on file   • Highest education level: Not on file   Social Needs   • Financial resource strain: Not on file   • Food insecurity - worry: Not on file   • Food insecurity - inability: Not on file   • Transportation needs - medical: Not on file   • Transportation needs - non-medical: Not on file   Occupational History   • Not on file   Tobacco Use   • Smoking status: Never Smoker   • Smokeless tobacco: Never Used   Substance and Sexual Activity   • Alcohol use: No     Frequency: Never     Comment: social   • Drug use: No   • Sexual activity: Defer   Other Topics Concern   • Not on file   Social History Narrative   • Not on file       ALLERGIES  Patient has no known allergies.    REVIEW OF SYSTEMS  Review of Systems   Constitutional: Negative for activity change, appetite change and fever.   HENT: Negative for congestion and sore throat.    Eyes: Negative.    Respiratory: Negative for cough and shortness of breath.    Cardiovascular: Negative for chest pain and leg swelling.   Gastrointestinal: Negative for abdominal pain, diarrhea and vomiting.   Endocrine: Negative.    Genitourinary: Negative for decreased urine volume and dysuria.   Musculoskeletal: Positive for myalgias (right lower leg posteriorly). Negative for neck pain.   Skin: Negative  for rash and wound.   Allergic/Immunologic: Negative.    Neurological: Positive for headaches (posterior). Negative for weakness and numbness.   Hematological: Negative.    Psychiatric/Behavioral: Negative.    All other systems reviewed and are negative.      PHYSICAL EXAM  ED Triage Vitals   Temp Heart Rate Resp BP SpO2   03/05/19 1335 03/05/19 1335 03/05/19 1335 03/05/19 1345 03/05/19 1335   98.3 °F (36.8 °C) 80 18 163/87 98 %      Temp src Heart Rate Source Patient Position BP Location FiO2 (%)   03/05/19 1335 -- 03/05/19 1345 03/05/19 1345 --   Tympanic  Sitting Right arm        Physical Exam   Constitutional: He is oriented to person, place, and time and well-developed, well-nourished, and in no distress. No distress.   HENT:   Mouth/Throat: Mucous membranes are normal.   Eyes: EOM are normal.   Cardiovascular: Normal rate and regular rhythm. Exam reveals no gallop and no friction rub.   No murmur heard.  Pulses:       Dorsalis pedis pulses are 2+ on the right side.        Posterior tibial pulses are 2+ on the right side.   chronic venous stasis changes of his RLE. No calf tenderness. No significant edema.   Pulmonary/Chest: Effort normal. No respiratory distress. He has no wheezes. He has no rhonchi. He has no rales.   Breath sounds are symmetric.   Abdominal: Soft. He exhibits no distension. There is no tenderness. There is no guarding.   Musculoskeletal: Normal range of motion. He exhibits no deformity.   Neurological: He is alert and oriented to person, place, and time.   moving all extremities, no focal deficits   Skin: Skin is warm and dry.   No erythema of his right lower extremity   Psychiatric:   Calm, cooperative       LAB RESULTS  Lab Results (last 24 hours)     ** No results found for the last 24 hours. **          I ordered the above labs and reviewed the results    RADIOLOGY  No orders to display      Reviewed doppler RLE which shows chronic DVTs but nothing acute. Independently viewed by me.  Interpreted by radiologist. Discussed with US technician.    I ordered the above noted radiological studies. Interpreted by radiologist. Reviewed by me in PACS.       PROCEDURES  Procedures      PROGRESS AND CONSULTS     1418  Ordered doppler RLE for further evaluation.    1528  Rechecked Pt who is resting comfortably. Informed him that there are no new clots in his right leg. He does have old clots visualized on his doppler. Discussed plans to discharge pt and instructed him to apply heat to the area. Pt requests a prescription for a couple days worth of xanax until he can get to his PCP for a refill. He has a h/x anxiety and he is worried about not being able to sleep again tonight. I agreed to write this prescription. Pt understands and agrees to all plans. All questions answered.       MEDICAL DECISION MAKING  Results were reviewed/discussed with the patient and they were also made aware of online access. Pt also made aware that some labs, such as cultures, will not be resulted during ER visit and follow up with PMD is necessary.     MDM  Number of Diagnoses or Management Options     Amount and/or Complexity of Data Reviewed  Tests in the radiology section of CPT®: ordered and reviewed (Doppler RLE shows chronic DVTs but nothing acute.)  Discussion of test results with the performing providers: yes (US technician)           DIAGNOSIS  Final diagnoses:   Chronic deep vein thrombosis (DVT) of right lower extremity, unspecified vein (CMS/HCC)   Anxiety       DISPOSITION  DISCHARGE    Patient discharged in stable condition.    Reviewed implications of results, diagnosis, meds, responsibility to follow up, warning signs and symptoms of possible worsening, potential complications and reasons to return to ER, including new or worsening symptoms.    Patient/Family voiced understanding of above instructions.    Discussed plan for discharge, as there is no emergent indication for admission. Patient referred to primary  care provider for BP management due to today's BP. Pt/family is agreeable and understands need for follow up and repeat testing.  Pt is aware that discharge does not mean that nothing is wrong but it indicates no emergency is present that requires admission and they must continue care with follow-up as given below or physician of their choice.     FOLLOW-UP  Jamel Joleen Cherelle, APRN  [address]  [address]  [facility] [id number] [phone]    Schedule an appointment as soon as possible for a visit   As needed    [facility] Emergency Department  [address] [facility] [address] [phone]    As needed, If symptoms worsen         Medication List      Changed    * ALPRAZolam 0.5 MG tablet  Commonly known as:  XANAX  What changed:  Another medication with the same name was added. Make sure   you understand how and when to take each.     * ALPRAZolam 0.5 MG tablet  Commonly known as:  XANAX  Take 1 tablet by mouth 2 (Two) Times a Day As Needed for Anxiety.  What changed:  You were already taking a medication with the same name,   and this prescription was added. Make sure you understand how and when to   take each.         * This list has 2 medication(s) that are the same as other medications   prescribed for you. Read the directions carefully, and ask your doctor or   other care provider to review them with you.                  Latest Documented Vital Signs:  As of 3:35 PM  BP- 163/87 HR- 80 Temp- 98.3 °F (36.8 °C) (Tympanic) O2 sat- 98%    --  Documentation assistance provided by saulo Balderas for Dr. Moran.  Information recorded by the saulo was done at my direction and has been verified and validated by me.       Jesenia Balderas  03/05/19 3085       Des Moran MD  03/05/19 [id number]

## 2019-03-06 NOTE — TELEPHONE ENCOUNTER
Would recommend psych follow up for anxiety, can increase buspar if only at night time to 10 mg if he would like to try that

## 2019-03-19 RX ORDER — RAMIPRIL 5 MG/1
CAPSULE ORAL
Qty: 90 CAPSULE | Refills: 0 | Status: SHIPPED | OUTPATIENT
Start: 2019-03-19 | End: 2019-06-30 | Stop reason: SDUPTHER

## 2019-07-01 RX ORDER — RAMIPRIL 5 MG/1
CAPSULE ORAL
Qty: 90 CAPSULE | Refills: 0 | Status: SHIPPED | OUTPATIENT
Start: 2019-07-01 | End: 2019-10-12 | Stop reason: SDUPTHER

## 2019-07-30 ENCOUNTER — OFFICE VISIT (OUTPATIENT)
Dept: PAIN MEDICINE | Facility: CLINIC | Age: 74
End: 2019-07-30

## 2019-07-30 VITALS
OXYGEN SATURATION: 94 % | DIASTOLIC BLOOD PRESSURE: 80 MMHG | TEMPERATURE: 97.7 F | HEART RATE: 57 BPM | RESPIRATION RATE: 16 BRPM | SYSTOLIC BLOOD PRESSURE: 167 MMHG | WEIGHT: 315 LBS | HEIGHT: 74 IN | BODY MASS INDEX: 40.43 KG/M2

## 2019-07-30 DIAGNOSIS — Z79.01 CHRONIC ANTICOAGULATION: ICD-10-CM

## 2019-07-30 DIAGNOSIS — G89.29 OTHER CHRONIC PAIN: Primary | ICD-10-CM

## 2019-07-30 DIAGNOSIS — M43.06 LUMBAR SPONDYLOLYSIS: ICD-10-CM

## 2019-07-30 PROCEDURE — 99213 OFFICE O/P EST LOW 20 MIN: CPT | Performed by: NURSE PRACTITIONER

## 2019-07-30 NOTE — PROGRESS NOTES
CHIEF COMPLAINT  F/U back pain- patient states that his back pain has improved since his last visit. He states that he went to PT and that has improved his pain.     Subjective   James Loaiza is a 73 y.o. male  who presents to the office for follow-up.He has a history of chronic back pain. Reports this is improved since last office visit.  Reports he has been going to PT. Notes improvement in his pain.    Complains of pain in his low back. Today his pain is 2/10VAS. Describes the pain as intermittent aching and throbbing. Pain increases with walking, standing, and activity; pain decreases with rest, HEP and procedures. ADL's by self. Denies any bowel or bladder changes.   Congratulated on 10lb intentional weight loss since last office visit.   Remains on Eliquis. Per Dr Sandoval. Ran out 2 days ago. Called pulmonology but has not yet heard about refill.   Back Pain   This is a recurrent problem. The current episode started more than 1 month ago. The problem occurs constantly. Progression since onset: improved since RFL; improved since last office visit. The pain is present in the lumbar spine. The quality of the pain is described as aching. The pain does not radiate. The pain is at a severity of 2/10. The pain is mild. The pain is worse during the night (frequent urination at night which increases his pain--getting up and out of bed). The symptoms are aggravated by standing and twisting. Stiffness is present all day. Pertinent negatives include no abdominal pain, bladder incontinence, bowel incontinence, chest pain, dysuria, fever, headaches, numbness or weakness. Risk factors include lack of exercise, obesity and recent trauma. Treatments tried: lumbar RFL, OTC IBU.      PEG Assessment   What number best describes your pain on average in the past week?2  What number best describes how, during the past week, pain has interfered with your enjoyment of life?2  What number best describes how, during the past week, pain  "has interfered with your general activity?  2    The following portions of the patient's history were reviewed and updated as appropriate: allergies, current medications, past family history, past medical history, past social history, past surgical history and problem list.    Review of Systems   Constitutional: Positive for chills (occ). Negative for fatigue and fever.   Respiratory: Negative for chest tightness and shortness of breath.    Cardiovascular: Negative for chest pain.   Gastrointestinal: Negative for abdominal pain, bowel incontinence, constipation, diarrhea, nausea and vomiting.   Genitourinary: Positive for frequency. Negative for bladder incontinence, difficulty urinating, dysuria and enuresis.   Musculoskeletal: Positive for arthralgias (tailbone) and back pain (with occasional legs cramping).   Neurological: Negative for dizziness, weakness, light-headedness, numbness and headaches.   Psychiatric/Behavioral: Positive for sleep disturbance. Negative for agitation, confusion, hallucinations, self-injury and suicidal ideas. The patient is nervous/anxious (since recent Blood Clots Treatment with medication has begun).        Vitals:    07/30/19 1131   BP: 167/80   Pulse: 57   Resp: 16   Temp: 97.7 °F (36.5 °C)   SpO2: 94%   Weight: (!) 145 kg (319 lb 6.4 oz)   Height: 188 cm (74\")   PainSc:   2   PainLoc: Back     Objective   Physical Exam   Constitutional: He is oriented to person, place, and time. Vital signs are normal. He appears well-developed and well-nourished. He is cooperative.   HENT:   Head: Normocephalic and atraumatic.   Nose: Nose normal.   Eyes: Conjunctivae and lids are normal.   Cardiovascular: Normal rate.   Pulmonary/Chest: Effort normal.   Abdominal: Normal appearance.   Musculoskeletal:        Right shoulder: He exhibits decreased range of motion and tenderness.        Right knee: He exhibits decreased range of motion. Tenderness found.        Left knee: He exhibits decreased range " of motion. Tenderness found.        Lumbar back: He exhibits tenderness, bony tenderness and pain.   Mild to moderate tenderness of bilateral L5-S3 facets  +lumbar loading manuever   Neurological: He is alert and oriented to person, place, and time. Gait (slowed) abnormal.   Skin: Skin is warm, dry and intact.   Psychiatric: He has a normal mood and affect. His speech is normal and behavior is normal. Judgment and thought content normal. Cognition and memory are normal.   Nursing note and vitals reviewed.      Assessment/Plan   James was seen today for back pain.    Diagnoses and all orders for this visit:    Other chronic pain    Lumbar spondylolysis    Chronic anticoagulation    Other orders  -     apixaban (ELIQUIS) 5 MG tablet tablet; Take 1 tablet by mouth Every 12 (Twelve) Hours.      --- Continue with HEP.  --- Repeat interventions in future PRN. HOld at this time.  --- Temporary prescription for Eliquis 5 mg BID #6. Discussed medication with the patient.  Included in this discussion was the potential for side effects and adverse events.  Patient verbalized understanding and wished to proceed.  Prescription will be sent to pharmacy.  --- Follow-up 6 months or sooner if needed.     DILAN REPORT  DILAN report has been reviewed and scanned into the patient's chart.    As the clinician, I personally reviewed the DILAN from 7-29-19 while the patient was in the office today.          EMR Dragon/Transcription disclaimer:   Much of this encounter note is an electronic transcription/translation of spoken language to printed text. The electronic translation of spoken language may permit erroneous, or at times, nonsensical words or phrases to be inadvertently transcribed; Although I have reviewed the note for such errors, some may still exist.

## 2019-09-26 RX ORDER — ATORVASTATIN CALCIUM 40 MG/1
TABLET, FILM COATED ORAL
Qty: 90 TABLET | Refills: 1 | Status: SHIPPED | OUTPATIENT
Start: 2019-09-26 | End: 2020-04-29

## 2019-10-14 RX ORDER — RAMIPRIL 5 MG/1
CAPSULE ORAL
Qty: 30 CAPSULE | Refills: 0 | Status: SHIPPED | OUTPATIENT
Start: 2019-10-14 | End: 2019-10-31 | Stop reason: SDUPTHER

## 2019-10-31 ENCOUNTER — HOSPITAL ENCOUNTER (OUTPATIENT)
Dept: GENERAL RADIOLOGY | Facility: HOSPITAL | Age: 74
Discharge: HOME | End: 2019-10-31
Admitting: NURSE PRACTITIONER

## 2019-10-31 ENCOUNTER — OFFICE VISIT (OUTPATIENT)
Dept: FAMILY MEDICINE CLINIC | Facility: CLINIC | Age: 74
End: 2019-10-31

## 2019-10-31 VITALS
OXYGEN SATURATION: 97 % | HEART RATE: 62 BPM | WEIGHT: 315 LBS | TEMPERATURE: 97.9 F | BODY MASS INDEX: 40.43 KG/M2 | DIASTOLIC BLOOD PRESSURE: 80 MMHG | HEIGHT: 74 IN | SYSTOLIC BLOOD PRESSURE: 164 MMHG

## 2019-10-31 DIAGNOSIS — M25.512 ACUTE PAIN OF BOTH SHOULDERS: Primary | ICD-10-CM

## 2019-10-31 DIAGNOSIS — M25.511 ACUTE PAIN OF BOTH SHOULDERS: Primary | ICD-10-CM

## 2019-10-31 PROCEDURE — 99213 OFFICE O/P EST LOW 20 MIN: CPT | Performed by: NURSE PRACTITIONER

## 2019-10-31 PROCEDURE — 90653 IIV ADJUVANT VACCINE IM: CPT | Performed by: NURSE PRACTITIONER

## 2019-10-31 PROCEDURE — 73030 X-RAY EXAM OF SHOULDER: CPT

## 2019-10-31 PROCEDURE — G0008 ADMIN INFLUENZA VIRUS VAC: HCPCS | Performed by: NURSE PRACTITIONER

## 2019-10-31 RX ORDER — RAMIPRIL 5 MG/1
5 CAPSULE ORAL DAILY
Qty: 90 CAPSULE | Refills: 1 | Status: SHIPPED | OUTPATIENT
Start: 2019-10-31 | End: 2020-06-17

## 2019-10-31 NOTE — PROGRESS NOTES
Subjective   James Loaiza is a 74 y.o. male presents with five month history of shoulder pain. He is a contractor, previous frequent over use, and overhead work, however none in recent years. Pain is worse at night, when trying to sleep. No pain during the day typically.   Sleeps on right side, now with 5 month history of discomfort on his shoulder, bilateral. Does not cause problems during the day, but constant at night time. Unable to sleep on his back, sleeps in a lift chair. Will wake frequently to use the restroom. Denies DROM. States difficulty sleeping due to pain.    BP running 147/78 and 140/80 at rest. Denies chest pain or other problems with blood pressure.    Declines labs today  Follow up with urology a few weeks ago, labs completed    Shoulder Injury    There was no injury mechanism. The quality of the pain is described as aching. The pain is at a severity of 4/10. The pain is mild. Pertinent negatives include no chest pain, muscle weakness, numbness or tingling. The symptoms are aggravated by palpation. He has tried nothing for the symptoms. The treatment provided no relief.        The following portions of the patient's history were reviewed and updated as appropriate: allergies, current medications, past family history, past medical history, past social history, past surgical history and problem list.    Review of Systems   Constitutional: Negative.    HENT: Negative.    Eyes: Negative.    Respiratory: Negative.    Cardiovascular: Negative.  Negative for chest pain.   Gastrointestinal: Negative.    Endocrine: Negative.    Genitourinary: Negative.    Musculoskeletal: Positive for arthralgias.   Skin: Negative.    Allergic/Immunologic: Negative.    Neurological: Negative.  Negative for tingling and numbness.   Hematological: Negative.    Psychiatric/Behavioral: Negative.        Objective   Physical Exam   Constitutional: He is oriented to person, place, and time. He appears well-developed and  well-nourished.   Cardiovascular: Normal rate, regular rhythm and normal heart sounds. Exam reveals no friction rub.   No murmur heard.  Pulmonary/Chest: Effort normal and breath sounds normal. No stridor. No respiratory distress. He has no wheezes.   Musculoskeletal:        Right shoulder: He exhibits tenderness and bony tenderness. He exhibits normal range of motion and no swelling.        Left shoulder: He exhibits normal range of motion, no tenderness and no bony tenderness.   Neurological: He is alert and oriented to person, place, and time.   Psychiatric: He has a normal mood and affect.   Vitals reviewed.      Assessment/Plan   James was seen today for shoulder pain.    Diagnoses and all orders for this visit:    Acute pain of both shoulders  -     XR shoulder 2+ vw bilateral  -     Ambulatory Referral to Orthopedic Surgery    Other orders  -     FluZone High Dose 65YR+ ([id number])      Will eval shoulders for arthritic changes or other abnormalities  Recommend tylenol arthritis at bedtime  Apply biofreeze before bed and prn  Follow up with ortho for possible injection

## 2020-01-30 ENCOUNTER — OFFICE VISIT (OUTPATIENT)
Dept: PAIN MEDICINE | Facility: CLINIC | Age: 75
End: 2020-01-30

## 2020-01-30 VITALS
RESPIRATION RATE: 20 BRPM | OXYGEN SATURATION: 97 % | HEART RATE: 63 BPM | SYSTOLIC BLOOD PRESSURE: 160 MMHG | DIASTOLIC BLOOD PRESSURE: 88 MMHG | BODY MASS INDEX: 40.43 KG/M2 | WEIGHT: 315 LBS | HEIGHT: 74 IN | TEMPERATURE: 97.4 F

## 2020-01-30 DIAGNOSIS — M43.06 LUMBAR SPONDYLOLYSIS: ICD-10-CM

## 2020-01-30 DIAGNOSIS — M54.50 CHRONIC LOW BACK PAIN, UNSPECIFIED BACK PAIN LATERALITY, UNSPECIFIED WHETHER SCIATICA PRESENT: ICD-10-CM

## 2020-01-30 DIAGNOSIS — G89.29 CHRONIC LOW BACK PAIN, UNSPECIFIED BACK PAIN LATERALITY, UNSPECIFIED WHETHER SCIATICA PRESENT: ICD-10-CM

## 2020-01-30 DIAGNOSIS — G89.29 OTHER CHRONIC PAIN: Primary | ICD-10-CM

## 2020-01-30 PROCEDURE — 99212 OFFICE O/P EST SF 10 MIN: CPT | Performed by: NURSE PRACTITIONER

## 2020-01-30 NOTE — PROGRESS NOTES
"CHIEF COMPLAINT  F/u back pain. Pt sts pain has improved since doing PT exercises at home.     Subjective   James Loaiza is a 74 y.o. male  who presents to the office for follow-up.He has a history of chronic back pain. Reports this is improved since last office visit. Went to PT again. \"it loosened things up.\"    Complains of pain in his low back. Today his pain is 2/10VAS.  Describes the pain as intermittent aching. Pain is worse upon awakening and has stiffness, and prolonged position; pain decreases with rest, changing position and PT.  ADL's by self. Denies any bowel or bladder changes.     Remains on Eliquis.   He completed a Bilateral L2-5 Lumbar Medial Branch RADIOFREQUENCY   on  6/22/18 performed by Dr. COOPER for management of low back pain. Patient reports 75% ongoing relief from the procedure.   Back Pain   This is a recurrent problem. The current episode started more than 1 month ago. The problem occurs constantly. Progression since onset: improved since RFL; improved since last office visit. The pain is present in the lumbar spine. The quality of the pain is described as aching. The pain does not radiate. The pain is at a severity of 2/10. The pain is mild. The pain is worse during the night (frequent urination at night which increases his pain--getting up and out of bed). The symptoms are aggravated by standing and twisting. Stiffness is present all day. Pertinent negatives include no abdominal pain, bladder incontinence, bowel incontinence, chest pain, dysuria, fever, headaches, numbness or weakness. Risk factors include lack of exercise, obesity and recent trauma. Treatments tried: lumbar RFL, OTC IBU.      PEG Assessment   What number best describes your pain on average in the past week?2  What number best describes how, during the past week, pain has interfered with your enjoyment of life?2  What number best describes how, during the past week, pain has interfered with your general activity?  " "2    The following portions of the patient's history were reviewed and updated as appropriate: allergies, current medications, past family history, past medical history, past social history, past surgical history and problem list.    Review of Systems   Constitutional: Positive for activity change (improved) and chills (occ). Negative for fatigue and fever.   Respiratory: Negative for chest tightness and shortness of breath.    Cardiovascular: Negative for chest pain.   Gastrointestinal: Negative for abdominal pain, bowel incontinence, constipation, diarrhea, nausea and vomiting.   Genitourinary: Positive for frequency. Negative for bladder incontinence, difficulty urinating, dysuria and enuresis.   Musculoskeletal: Positive for arthralgias (tailbone) and back pain (with occasional legs cramping). Negative for gait problem.   Neurological: Negative for dizziness, weakness, light-headedness, numbness and headaches.   Psychiatric/Behavioral: Positive for sleep disturbance. Negative for agitation, confusion, hallucinations, self-injury and suicidal ideas. The patient is nervous/anxious (occ).        Vitals:    01/30/20 1131   BP: 160/88  Comment: pt sts did not take bp meds today   Pulse: 63   Resp: 20   Temp: 97.4 °F (36.3 °C)   SpO2: 97%   Weight: (!) 152 kg (334 lb)   Height: 188 cm (74\")   PainSc:   2   PainLoc: Back     Objective   Physical Exam   Constitutional: He is oriented to person, place, and time. Vital signs are normal. He appears well-developed and well-nourished. He is cooperative.   HENT:   Head: Normocephalic and atraumatic.   Nose: Nose normal.   Eyes: Conjunctivae and lids are normal.   Cardiovascular: Normal rate.   Pulmonary/Chest: Effort normal.   Abdominal: Normal appearance.   Musculoskeletal:        Right knee: He exhibits decreased range of motion. Tenderness found.        Left knee: He exhibits decreased range of motion. Tenderness found.        Lumbar back: He exhibits tenderness, bony " tenderness and pain.   Neurological: He is alert and oriented to person, place, and time. Gait (slowed) abnormal.   Skin: Skin is warm, dry and intact.   Psychiatric: He has a normal mood and affect. His speech is normal and behavior is normal. Judgment and thought content normal. Cognition and memory are normal.   Nursing note and vitals reviewed.      Assessment/Plan   James was seen today for back pain.    Diagnoses and all orders for this visit:    Other chronic pain    Chronic low back pain, unspecified back pain laterality, unspecified whether sciatica present    Lumbar spondylolysis      --- Continue with progress and HEP.  --- Follow-up 1 year or sooner if needed.       DILAN REPORT      DILAN report has been reviewed and scanned into the patient's chart.    As the clinician, I personally reviewed the DILAN from 1-28-20 while the patient was in the office today.          EMR Dragon/Transcription disclaimer:   Much of this encounter note is an electronic transcription/translation of spoken language to printed text. The electronic translation of spoken language may permit erroneous, or at times, nonsensical words or phrases to be inadvertently transcribed; Although I have reviewed the note for such errors, some may still exist.

## 2020-02-27 ENCOUNTER — OFFICE VISIT (OUTPATIENT)
Dept: FAMILY MEDICINE CLINIC | Facility: CLINIC | Age: 75
End: 2020-02-27

## 2020-02-27 VITALS
HEIGHT: 74 IN | WEIGHT: 315 LBS | HEART RATE: 66 BPM | RESPIRATION RATE: 20 BRPM | TEMPERATURE: 98 F | DIASTOLIC BLOOD PRESSURE: 90 MMHG | BODY MASS INDEX: 40.43 KG/M2 | OXYGEN SATURATION: 98 % | SYSTOLIC BLOOD PRESSURE: 166 MMHG

## 2020-02-27 DIAGNOSIS — M25.561 CHRONIC PAIN OF RIGHT KNEE: Primary | ICD-10-CM

## 2020-02-27 DIAGNOSIS — M15.9 PRIMARY OSTEOARTHRITIS INVOLVING MULTIPLE JOINTS: ICD-10-CM

## 2020-02-27 DIAGNOSIS — G89.29 CHRONIC PAIN OF RIGHT KNEE: Primary | ICD-10-CM

## 2020-02-27 PROCEDURE — 99213 OFFICE O/P EST LOW 20 MIN: CPT | Performed by: NURSE PRACTITIONER

## 2020-02-27 RX ORDER — LIDOCAINE 50 MG/G
1 PATCH TOPICAL EVERY 24 HOURS
Qty: 30 EACH | Refills: 1 | Status: SHIPPED | OUTPATIENT
Start: 2020-02-27 | End: 2020-11-20

## 2020-02-27 NOTE — PROGRESS NOTES
"Subjective   James Loaiza is a 74 y.o. male   who presents for   Chief Complaint   Patient presents with   • Leg Pain     right leg by knee area (discomfort) cant sleep with it       Increased pressure, discomfort on his right knee, below knee  Usually sleeps on his right side  States usually occurs after getting up to go to the bathroom  Woke up at 3:30 or 4 oclock in the morning and unable to lay back down  Denies pain with walking, improves after sitting up in the chair in a few hours, will generally stay up afterwards  Feels like a numbness, states it is more irritated than    Chronic OA in lower back, states this is different type of sensation  Ongoing intermittently for the past six months, worse over past week      /90   Pulse 66   Temp 98 °F (36.7 °C)   Resp 20   Ht 188 cm (74\")   Wt (!) 153 kg (336 lb 4.8 oz)   SpO2 98%   BMI 43.18 kg/m²       History of Present Illness   Knee Pain    The incident occurred more than 1 week ago. There was no injury mechanism. The pain is present in the right knee. The quality of the pain is described as aching. The pain is mild. The pain has been intermittent since onset. Pertinent negatives include no inability to bear weight, loss of motion, loss of sensation, muscle weakness, numbness or tingling. He reports no foreign bodies present. Exacerbated by: lying down. He has tried rest (position change) for the symptoms. The treatment provided moderate relief.       The following portions of the patient's history were reviewed and updated as appropriate: allergies, current medications, past family history, past medical history, past social history, past surgical history and problem list.    Review of Systems  Review of Systems   Constitutional: Negative.    HENT: Negative.    Eyes: Negative.    Respiratory: Negative.    Cardiovascular: Negative.    Gastrointestinal: Negative.    Endocrine: Negative.    Genitourinary: Negative.    Musculoskeletal: Positive for " arthralgias (right knee).   Skin: Negative.    Allergic/Immunologic: Negative.    Neurological: Negative.  Negative for tingling and numbness.   Hematological: Negative.    Psychiatric/Behavioral: Negative.        Objective   Physical Exam  Physical Exam   Constitutional: He is oriented to person, place, and time. He appears well-developed and well-nourished. No distress.   Cardiovascular: Normal rate, regular rhythm and normal heart sounds. Exam reveals no gallop and no friction rub.   No murmur heard.  Pulmonary/Chest: Effort normal and breath sounds normal. No stridor. No respiratory distress. He has no wheezes. He has no rales.   Musculoskeletal:        Right knee: He exhibits normal range of motion, no swelling and no effusion. Tenderness found. Lateral joint line tenderness noted.   Neurological: He is alert and oriented to person, place, and time.   Skin: Skin is warm and dry. He is not diaphoretic.   Psychiatric: He has a normal mood and affect.   Vitals reviewed.        Assessment/Plan   James was seen today for leg pain.    Diagnoses and all orders for this visit:    Chronic pain of right knee  -     Ambulatory Referral to Physical Therapy Evaluate and treat    Primary osteoarthritis involving multiple joints  -     lidocaine (LIDODERM) 5 %; Place 1 patch on the skin as directed by provider Daily. Remove & Discard patch within 12 hours or as directed by MD  -     Ambulatory Referral to Physical Therapy Evaluate and treat    previous imaging, only bothering at night, will resume lidocaine patches, used with improvement  PT as directed  Consider further imaging based on PT results

## 2020-03-09 ENCOUNTER — HOSPITAL ENCOUNTER (OUTPATIENT)
Dept: PHYSICAL THERAPY | Facility: HOSPITAL | Age: 75
Setting detail: THERAPIES SERIES
Discharge: HOME | End: 2020-03-09

## 2020-03-09 ENCOUNTER — TELEPHONE (OUTPATIENT)
Dept: FAMILY MEDICINE CLINIC | Facility: CLINIC | Age: 75
End: 2020-03-09

## 2020-03-09 NOTE — TELEPHONE ENCOUNTER
PATIENT IS CALLING FOR A PHYSICAL THERAPY REQUEST FOR HIS BACK PAIN.  CAN GIVE A VERBAL OR FAX AN ORDER.  PATIENT WOULD LIKE TO GO TO [facility] FOR THE THERAPY.  PATIENT DID NOT KNOW THE PHONE NUMBER      PATIENT CALL BACK [phone]

## 2020-03-09 NOTE — TELEPHONE ENCOUNTER
Pt would like order for PT for [facility] [place] by [facility] PT therapy for back pain

## 2020-03-10 DIAGNOSIS — G89.29 CHRONIC LOW BACK PAIN, UNSPECIFIED BACK PAIN LATERALITY, UNSPECIFIED WHETHER SCIATICA PRESENT: Primary | ICD-10-CM

## 2020-03-10 DIAGNOSIS — M54.50 CHRONIC LOW BACK PAIN, UNSPECIFIED BACK PAIN LATERALITY, UNSPECIFIED WHETHER SCIATICA PRESENT: Primary | ICD-10-CM

## 2020-04-29 RX ORDER — ATORVASTATIN CALCIUM 40 MG/1
TABLET, FILM COATED ORAL
Qty: 90 TABLET | Refills: 1 | Status: SHIPPED | OUTPATIENT
Start: 2020-04-29 | End: 2020-11-13

## 2020-05-06 ENCOUNTER — TELEPHONE (OUTPATIENT)
Dept: PAIN MEDICINE | Facility: CLINIC | Age: 75
End: 2020-05-06

## 2020-05-06 RX ORDER — METHYLPREDNISOLONE 4 MG/1
TABLET ORAL
Qty: 21 TABLET | Refills: 0 | Status: SHIPPED | OUTPATIENT
Start: 2020-05-06 | End: 2020-06-17

## 2020-05-06 NOTE — TELEPHONE ENCOUNTER
Pt called in today c/o increased severe tailbone pain this week. Pt hasn't had ov since 1/30/20. Pt sts he has been going to PT since Jan, which has helped but over the past week pain has increased. Pt asked for narcotics for pain, I explained to pt we will not prescribe narcotics or any new med without having an appt and eval by provider. Pt verbalized understanding. Pt would like to know if he can have a MDP in the meantime until his phone visit appt with you on 5/19/20. I told him I would ask if you could send MDP to his pharmacy. He would also like to discuss an inj at the appt also. Please advise. Thank you.

## 2020-05-06 NOTE — TELEPHONE ENCOUNTER
Sent in MDP. Please review red flag symptoms. Thanks. BB    Reviewed RED FLAG SYMPTOMS, including but not limited to-- fever, chills, stiff neck, headache, flu-like symptoms, increased tenderness and redness. Also reviewed loss of bowel and bladder control and saddle anesthesia. If patient notices this, he is to call office immediately. Patient verbalized understanding.

## 2020-05-19 ENCOUNTER — OFFICE VISIT (OUTPATIENT)
Dept: PAIN MEDICINE | Facility: CLINIC | Age: 75
End: 2020-05-19

## 2020-05-19 DIAGNOSIS — M54.50 CHRONIC LOW BACK PAIN, UNSPECIFIED BACK PAIN LATERALITY, UNSPECIFIED WHETHER SCIATICA PRESENT: Primary | ICD-10-CM

## 2020-05-19 DIAGNOSIS — M46.1 SACROILIAC INFLAMMATION (HCC): ICD-10-CM

## 2020-05-19 DIAGNOSIS — G89.29 CHRONIC LOW BACK PAIN, UNSPECIFIED BACK PAIN LATERALITY, UNSPECIFIED WHETHER SCIATICA PRESENT: Primary | ICD-10-CM

## 2020-05-19 DIAGNOSIS — M43.06 LUMBAR SPONDYLOLYSIS: ICD-10-CM

## 2020-05-19 DIAGNOSIS — Z79.01 CHRONIC ANTICOAGULATION: ICD-10-CM

## 2020-05-19 DIAGNOSIS — M53.3 SI (SACROILIAC) JOINT DYSFUNCTION: ICD-10-CM

## 2020-05-19 PROCEDURE — 99442 PR PHYS/QHP TELEPHONE EVALUATION 11-20 MIN: CPT | Performed by: NURSE PRACTITIONER

## 2020-05-19 RX ORDER — HYDROCODONE BITARTRATE AND ACETAMINOPHEN 5; 325 MG/1; MG/1
1 TABLET ORAL EVERY 4 HOURS PRN
Qty: 18 TABLET | Refills: 0 | Status: SHIPPED | OUTPATIENT
Start: 2020-05-19 | End: 2020-09-21 | Stop reason: SDUPTHER

## 2020-05-19 NOTE — PROGRESS NOTES
"TELEPHONE VISIT    You have chosen to receive care through a telephone visit. Do you consent to use a telephone visit for your medical care today? Yes    CHIEF COMPLAINT  Low back pain    Subjective   James Loaiza is a 74 y.o. male  who presents for a telephonic follow-up.He has a history of chronic low back pain. Reports his pain pattern is worse since yesterday.     Patient called on 5-6-20 with increased pain in his left low back. Was sent in Medrol Dosepack. Noticed improvement with this.  Finished this.     Complains of pain in his low back, now mainly in his right side(hip and back and buttock). Today his pain is 5/10VAS. Describes the pain as intermittent aching and throbbing.  Pain is worst at night.  Pain increases with laying flat, sleeping; pain decreases with medication and reclining position. Having extreme difficulty sleeping. Taking Ibuprofen OTC (\"it's the only thing that gives me relief.\") Reviewed not taking Ibuprofen with Eliquis. Takes Tylenol intermittently with minimal relief. Has been unable to attend PT.     Remains on Eliquis.   He completed a Bilateral L2-5 Lumbar Medial Branch RADIOFREQUENCY   on  6/22/18 performed by Dr. COOPER for management of low back pain. Patient reports 75% ongoing relief from the procedure.   Back Pain   This is a recurrent problem. The current episode started more than 1 month ago. The problem occurs constantly. Progression since onset: worse since last office viist. The pain is present in the lumbar spine and sacro-iliac. The quality of the pain is described as aching. The pain does not radiate. The pain is at a severity of 5/10. The pain is moderate. Worse during: frequent urination at night which increases his pain--getting up and out of bed. The symptoms are aggravated by standing and twisting. Stiffness is present all day. Pertinent negatives include no abdominal pain, bladder incontinence, bowel incontinence, chest pain, dysuria, fever, headaches, numbness " or weakness. Risk factors include lack of exercise, obesity and recent trauma. Treatments tried: lumbar RFL, OTC IBU.      The following portions of the patient's history were reviewed and updated as appropriate: allergies, current medications, past family history, past medical history, past social history, past surgical history and problem list.    Review of Systems   Constitutional: Negative for chills and fever.   Respiratory: Negative for chest tightness and shortness of breath.    Cardiovascular: Negative for chest pain.   Gastrointestinal: Negative for abdominal pain, bowel incontinence, constipation, diarrhea, nausea and vomiting.   Genitourinary: Negative for bladder incontinence, difficulty urinating and dysuria.   Musculoskeletal: Positive for back pain (with occasional legs cramping) and gait problem.   Neurological: Negative for weakness, numbness and headaches.   Psychiatric/Behavioral: Positive for sleep disturbance. The patient is nervous/anxious.      There were no vitals filed for this visit.    Objective   Physical Exam  As this is a telephone check-in, the ability to perform a routine physical exam is extremely limited. The patient seems alert and is oriented appropriately.   On this phone call there is not any evidence of respiratory distress.   The patient seems of normal mood.   The remainder of a routine physical exam is deferred.      Assessment/Plan   Diagnoses and all orders for this visit:    Chronic low back pain, unspecified back pain laterality, unspecified whether sciatica present    Lumbar spondylolysis  -     Ambulatory Referral to Physical Therapy Evaluate and treat    Chronic anticoagulation    Sacroiliac inflammation (CMS/HCC)  -     Case Request    SI (sacroiliac) joint dysfunction  -     Case Request    Other orders  -     HYDROcodone-acetaminophen (NORCO) 5-325 MG per tablet; Take 1 tablet by mouth Every 4 (Four) Hours As Needed for Moderate Pain .      ----------------    Our  practice is offering alternative &/or electronic methods to continue to follow our patients while at the same time further the efforts toward social distancing, in accordance with our organizational policies, professional societies' guidance, and [facility] mandates.  I support the Healthy at Home campaign and in this visit I have counseled the patient on our needs to limit in-person office visits and physical encounters with medical facilities whenever possible.  I have also educated the patient on the medical necessities of maintaining social distancing while we continue to function during this crisis period.      The patient was counseled on the need to consider telehealth options. The patient had obstacles which preclude consideration for a Video Visit. As a Video Visit was not practical, the patient was offered the option for a Telephone Check-In. The patient agreed to a Telephone Check-In. The patient was counseled on the need for a check-in visit. The patient was educated about our efforts to comply with monitoring standards when prescribing potent medications.    TIME:  Total Time:  11 minutes. Topics discussed are outlined in the Assessment/Plan section of the note.      ----------------    --- PT for low back pain.  --- RIGHT SI Joint injection. Reviewed the procedure at length with the patient.  Included in the review was expectations, complications, risk and benefits.The procedure was described in detail and the risks, benefits and alternatives were discussed with the patient (including but not limited to: bleeding, infection, nerve damage, worsening of pain, inability to perform injection, paralysis, seizures, and death) who agreed to proceed.  Discussed the potential for sedation if warranted/wanted.  The procedure will plan to be performed at [facility] with fluoroscopic guidance(unless ultrasound is indicated) and could potentially have steroids and contrast dye used.  Questions were answered and in a way the patient could understand.  Patient verbalized understanding and wishes to proceed.  This intervention will be ordered.  Discussed with patient that all procedures are part of a multimodal plan of care and include either formal PT or a home exercise program.  Patient has no evidence of coagulopathy or current infection.  --- Acute supply of Hydrocodone 5/325 q4-6hrs PRN. Discussed medication with the patient.  Included in this discussion was the potential for side effects and adverse events.  Patient verbalized understanding and wished to proceed.  Prescription will be sent to pharmacy.  --- Follow-up after procedure or sooner if needed.    ----------  Education about Sacroiliac joint injections:  This Sacroiliac joint injection (blockade) we have suggested is intended for diagnostic purposes, with the intent of offering the patient Radiofrequency thermal rhizotomy (RF) of the sensory branches to the joint if the block is diagnostically effective.  The diagnostic blockade is necessary to determine the likelihood that RF therapy could be efficacious in providing long term relief to the patient.    In this procedure, the sacroiliac joint is aligned with imaging, and under image guidance a needle is placed with the needle tip into the joint.  The needle position is confirmed to be appropriately in the joint before injection of medication into the joint.  When xray fluoroscopy is used, contrast dye is used to confirm a proper arthrogram (i.e., outline of the joint).  When ultrasound is used, IV fluid (normal saline) is injected to see the flow of the fluid into the joint.  Once confirmed, then the medication can be injected into the joint.  Oftentimes this medication is a combination of local anesthetics (for diagnostic purposes) and also a steroid (to decrease irritation & inflammation in the joint, also known as sacroilitis).      Medically, a successful RF procedure should  provide a patient with 50% pain relief or more for at least 6 months.  Clinical experience suggests that successful patients receive relief more in the range of 12 months on average.  We also discussed that many patients receive therapeutic success from the intraarticular joint injection, and may not require RF ablation.  If a patient receives more than 8 weeks of relief from joint injection, then occasional repeat joint blocks for therapeutic purposes is a very reasonable alternative therapy.  This course of therapy is consistent with our LCDs according to our CMS  in the area, and therefore other insurance providers should follow accordingly.  We will monitor our patients to screen for these therapeutic responders and will offer RF therapy only when necessary.      We discussed that joint injections & also RF procedures are not without risks.  Best practices regarding anticoagulant use & neuraxial procedures will be respected.  Oftentimes a patient on an anticoagulant can be offered a joint injection safely, but again this is not risk-free, and such patients give consent with regards to this increased bleeding risk, which could cause problems including but not limited to worsening of pain, nerve damage, or muscle damage.  Patients that are ill or otherwise may be at risk for sepsis will not have their spines accessed by neuraxial injections of any type.  This patient will not be offered these therapies if there is an increased risk.   We discussed that there is a risk of postprocedural pain and also a risk of worsening of clinical picture with these procedures as with any neuraxial procedure.    ----------         DILAN REPORT    As part of the patient's treatment plan, I am prescribing controlled substances. The patient has been made aware of appropriate use of such medications, including potential risk of somnolence, limited ability to drive and/or work safely, and the potential for dependence or  overdose. It has also been made clear that these medications are for use by this patient only, without concomitant use of alcohol or other substances unless prescribed.     Patient has completed prescribing agreement detailing terms of continued prescribing of controlled substances, including monitoring DILAN reports, urine drug screening, and pill counts if necessary. The patient is aware that inappropriate use will results in cessation of prescribing such medications.    DILAN report has been reviewed and scanned into the patient's chart.    As the clinician, I personally reviewed the DILAN from 5-18-20 while the patient was on the telephonic visit today.    History and physical exam exhibit continued safe and appropriate use of controlled substances.    -------    EMR Dragon/Transcription disclaimer:   Much of this encounter note is an electronic transcription/translation of spoken language to printed text. The electronic translation of spoken language may permit erroneous, or at times, nonsensical words or phrases to be inadvertently transcribed; Although I have reviewed the note for such errors, some may still exist.

## 2020-05-28 ENCOUNTER — HOSPITAL ENCOUNTER (OUTPATIENT)
Dept: PHYSICAL THERAPY | Facility: HOSPITAL | Age: 75
Setting detail: THERAPIES SERIES
Discharge: HOME | End: 2020-05-28

## 2020-05-28 DIAGNOSIS — M54.50 CHRONIC MIDLINE LOW BACK PAIN WITHOUT SCIATICA: Primary | ICD-10-CM

## 2020-05-28 DIAGNOSIS — G89.29 CHRONIC MIDLINE LOW BACK PAIN WITHOUT SCIATICA: Primary | ICD-10-CM

## 2020-05-28 DIAGNOSIS — Z74.09 IMPAIRED MOBILITY: ICD-10-CM

## 2020-05-28 PROCEDURE — 97110 THERAPEUTIC EXERCISES: CPT

## 2020-05-28 PROCEDURE — 97161 PT EVAL LOW COMPLEX 20 MIN: CPT

## 2020-05-28 NOTE — THERAPY EVALUATION
Outpatient Physical Therapy Ortho Initial Evaluation  [facility]     Patient Name: James Loaiza  : 1945  MRN: [id number]  Today's Date: 2020      Visit Date: 2020    Patient Active Problem List   Diagnosis   • Lumbar herniated disc L3-L5 3/16/16 Open MRI   • Abnormal electrocardiogram   • Abnormal weight gain   • Aortic valve insufficiency   • Coronary arteriosclerosis in native artery   • Benign essential hypertension ([place] )   • Edema   • Dyspnea on exertion   • Fatigue   • Left ventricular hypertrophy   • Obstructive sleep apnea syndrome   • Arthritis of left hip   • Hx of pulmonary embolus 7/15   • Morbid obesity with BMI of 45.0-49.9, adult (CMS/HCC)   • Intermittent dysphagia   • Chronic kidney disease ([place])   • Hyperlipidemia    • BPH (benign prostatic hypertrophy)   • Left cervical radiculopathy   • Rotator cuff tear, left   • Cardiomegaly   • Plantar fasciitis   • Hypogonadism male   • Vitamin D deficiency disease   • Renal cyst, left   • Preventative health care   • Medication management   • Chronic low back pain   • Lumbar spondylolysis   • Headache   • Other chronic pain   • Pulmonary embolus (CMS/HCC)   • Chronic obstructive pulmonary disease (CMS/HCC)   • Osteoarthritis of right shoulder   • Chronic anticoagulation        Past Medical History:   Diagnosis Date   • Abnormal EKG     referring to cardiology   • Anxiety    • Arthritis    • Back pain     worsening   • BPH (benign prostatic hyperplasia)     BPH/Nocturia/ Urinary Frequency   • Breast nodule     right   • Cardiomegaly     Cardiomegaly/ SOB with exertion   • Circulation problem    • Constipation    • COPD (chronic obstructive pulmonary disease) (CMS/HCC)    • Deviated septum    • DJD (degenerative joint disease)     DJD Knees   • DVT (deep venous thrombosis) (CMS/HCC)    • Dysphagia     solids and liquids - resolved with normal studies   • Encounter for annual health examination 2013    Annual Health  "Assessment   • Enlarged prostate    • Fatigue     Extreme fatigue   • Hyperlipidemia 1999   • Hypertension 1999    followed Dr. Marcelo   • Hypogonadism male    • Left hip pain     and DJD   • Left leg pain    • Low back pain    • Moist mucous membranes of ear, nose, and throat     \"Mucous in throat\"   • Morbid obesity (CMS/HCC)     (BMI-41.2za)   • Muscle cramping    • Muscle spasm     Muscle spasms   • Neck arthritis    • Neck muscle spasm     Neck muscle spasm/Cervical strain   • Obesity    • Plantar fasciitis    • Prostatitis     recurrent   • Psoriasis    • Pulmonary embolus (CMS/HCC) 07/2015    on Xarelltoypseerlipidemia   • Radiculopathy     Radiculopathy / Neuropathy left ar   • Renal insufficiency     followed by Dr. Mendes   • Seborrhea    • Shortness of breath    • Sleep apnea     Obstructive Sleep apnea worsening   • Urinary frequency    • Vascular disorder    • Vertigo    • Weight gain    • Wellness examination 10/23/2014    Annual Wellness Visit        Past Surgical History:   Procedure Laterality Date   • APPENDECTOMY  1965   • CARDIAC CATHETERIZATION  07/29/2010    [facility]   • COLONOSCOPY  01/05/2010   • COLONOSCOPY  10/01/2001   • NASAL SEPTUM SURGERY     • NOSE SURGERY  1999   • TURP / TRANSURETHRAL INCISION / DRAINAGE PROSTATE  10/23/2015    [facility]       Visit Dx:     ICD-10-CM ICD-9-CM   1. Chronic midline low back pain without sciatica M54.5 724.2    G89.29 338.29   2. Impaired mobility Z74.09 799.89         Patient History     Row Name 05/28/20 1200             History    Chief Complaint  Pain  -RS      Type of Pain  Back pain  -RS      Date Current Problem(s) Began  04/28/20  -RS      Brief Description of Current Complaint  The pt is a 75 yo male who presents with acute on chronic low back pain. His pain began around the end of April, no ROBBY. He has significant pain with sleeping, flat or on his sides. The pain was initially on the L, then the R, now in the " [place]. He had shots a few years ago which helped but has not had recently. He is going to get an ablation from [facility]. Denies falls in past year. Moving feels better than sitting still, has COPD which limits walking tolerance.  Takes hydrocodone before bed, had steroid dose pack which helped.  -RS      Previous treatment for THIS PROBLEM  -- having injection in SIJ  -RS      Patient/Caregiver Goals  Relieve pain;Improve mobility;Know what to do to help the symptoms  -RS      Hand Dominance  right-handed  -RS      Occupation/sports/leisure activities  part time- owns [employer]  -RS      How has patient tried to help current problem?  rest, OTC medication  -RS         Pain     Pain Location  Back  -RS      Pain at Present  2  -RS      Pain at Worst  9  -RS      Tolerance Time- Walking  feels better once moving, short distance due to COPD  -RS      Tolerance Time- Lying  5 min  -RS      Is your sleep disturbed?  Yes  -RS      Difficulties with ADL's?  sleeping, transitions  -RS         Fall Risk Assessment    Any falls in the past year:  No  -RS         Services    Are you currently receiving Home Health services  No  -RS         Daily Activities    Primary Language  English  -RS      How does patient learn best?  Listening;Reading  -RS      Pt Participated in POC and Goals  Yes  -RS         Safety    Are you being hurt, hit, or frightened by anyone at home or in your life?  No  -RS      Are you being neglected by a caregiver  No  -RS        User Key  (r) = Recorded By, (t) = Taken By, (c) = Cosigned By    Initials Name Provider Type    RS Cassandra Sprague PT Physical Therapist          PT Ortho     Row Name 05/28/20 1200       Posture/Observations    Alignment Options  Thoracic kyphosis;Rounded shoulders;Forward head;Lumbar lordosis  -RS    Forward Head  Mild  -RS    Thoracic Kyphosis  Moderate  -RS    Rounded Shoulders  Mild  -RS    Lumbar lordosis  Increased  -RS       Myotomal Screen- Lower Quarter  Clearing    Hip flexion (L2)  Right:;4 (Good);Left:;4- (Good -)  -RS    Knee extension (L3)  Bilateral:;5 (Normal)  -RS    Ankle DF (L4)  Bilateral:;4+ (Good +)  -RS    Ankle PF (S1)  Bilateral:;4 (Good)  -RS    Knee flexion (S2)  Bilateral:;4+ (Good +)  -RS       Lumbar ROM Screen- Lower Quarter Clearing    Lumbar Flexion  Impaired 50% WNL  -RS    Lumbar Extension  Impaired 20% WNL  -RS    Lumbar Lateral Flexion  Impaired 20% bilaterally, stiff and slight pain  -RS    Lumbar Rotation  Impaired 30% bilateral, stiff not pain  -RS       Special Tests/Palpation    Special Tests/Palpation  -- pt with mild pain during B LINDA/FAIR in center of back 2/10  -RS       Sensation    Sensation WNL?  WNL  -RS       Flexibility    Flexibility Tested?  Lower Extremity  -RS       Lower Extremity Flexibility    Hamstrings  Bilateral:;Mildly limited  -RS    Hip Flexors  Bilateral:;Moderately limited  -RS    Hip External Rotators  Bilateral:;Moderately limited  -RS       Gait/Stairs Assessment/Training    Comment (Gait/Stairs)  Pt demonstrates increased lumbar lordosis/ APT in standing, increased  lateral weight shift, decreased step length/ beartiz  -RS      User Key  (r) = Recorded By, (t) = Taken By, (c) = Cosigned By    Initials Name Provider Type    RS Cassandra Sprague PT Physical Therapist                      Therapy Education  Education Details: Role of outpatient PT, POC, differential diagnosis, initial HEP, expectations, goals.  Given: HEP, Symptoms/condition management, Pain management, Posture/body mechanics  Program: New  How Provided: Verbal, Written  Provided to: Patient  Level of Understanding: Teach back education performed, Verbalized     PT OP Goals     Row Name 05/28/20 1200          PT Short Term Goals    STG Date to Achieve  06/18/20  -RS     STG 1  The pt will demostrate IND with initial HEP.  -RS     STG 1 Progress  New  -RS     STG 2  The pt will tolerate sleeping through at least 50% of the nights in bed  rather than recliner for improved restorative sleep patter.  -RS     STG 2 Progress  New  -RS        Long Term Goals    LTG Date to Achieve  07/27/20  -RS     LTG 1  The pt will demonstrate IND and compliant with progressive HEP focused on IND condition management and return to PLOF.  -RS     LTG 1 Progress  New  -RS     LTG 2  The pt will score less than 25% disability on the modified oswestry to indicate improved perceived performance of ADLs.  -RS     LTG 2 Progress  New  -RS     LTG 3  The pt will sleep in bed without waking due to pain at least 5 nights per week for improved restorative sleep pattern.  -RS     LTG 3 Progress  New  -RS     LTG 4  The pt will demonstrate activation of TA in standing and during transitional positions to indicate improved activation of  lumbopelvic muscles.  -RS     LTG 4 Progress  New  -RS     LTG 5  The pt will perform transitional positions, including car without increased pain greater than 1/10.  -RS     LTG 5 Progress  New  -RS        Time Calculation    PT Goal Re-Cert Due Date  08/26/20  -RS       User Key  (r) = Recorded By, (t) = Taken By, (c) = Cosigned By    Initials Name Provider Type    RS Cassandra Sprageu, PT Physical Therapist          PT Assessment/Plan     Row Name 05/28/20 1300          PT Assessment    Functional Limitations  Impaired gait;Limitation in home management;Limitations in community activities;Performance in leisure activities  -RS     Impairments  Posture;Range of motion;Muscle strength;Pain;Gait;Impaired flexibility;Endurance;Poor body mechanics  -RS     Assessment Comments  James Loaiza is a 74 y.o. male referred to physical therapy for LBP. He presents with a stable clinical presentation, along with  comorbidities of COPD and personal factors of chronicity of pain, obesity, sedentary lifestyle that may impact his progress in the plan of care. Pt presents today with increased lumbar lordosis, rounded shoulders, difficulty engaging core muscles,  pain with hip ER/IR (mild pain) bilaterally, decreased hip strength. . his signs and symptoms are consistent with referring diagnosis. The previous impairments limit his ability to perform transitional positions, sleep without waking due to pain. He scores 36% disability on the modified oswestry (100= full disability). Pt will benefit from skilled PT to address the previous impairments and return to PLOF.  -RS     Please refer to paper survey for additional self-reported information  Yes  -RS     Rehab Potential  Good  -RS     Patient/caregiver participated in establishment of treatment plan and goals  Yes  -RS     Patient would benefit from skilled therapy intervention  Yes  -RS        PT Plan    PT Frequency  2x/week;1x/week  -RS     Predicted Duration of Therapy Intervention (Therapy Eval)  8 weeks  -RS     Planned CPT's?  PT EVAL LOW COMPLEXITY: 22924;PT RE-EVAL: 43062;PT THER PROC EA 15 MIN: 40380;PT THER ACT EA 15 MIN: 84941;PT MANUAL THERAPY EA 15 MIN: 14358;PT NEUROMUSC RE-EDUCATION EA 15 MIN: 71106;PT GAIT TRAINING EA 15 MIN: 20362;PT HOT OR COLD PACK TREAT MCARE;PT SELF CARE/HOME MGMT/TRAIN EA 15: 38325;PT ELECTRICAL STIM UNATTEND:   -RS     PT Plan Comments  Assess tolerance /compliance with initial HEP, consider traction/LAD manually, focus on hip and core strength as pt demonstrates increased APT and decreased core strength. Focus on pt education regarding importance of exercise and reasoning behind specific activities.  -RS       User Key  (r) = Recorded By, (t) = Taken By, (c) = Cosigned By    Initials Name Provider Type    RS Cassandra Sprague, PT Physical Therapist            OP Exercises     Row Name 05/28/20 1300             Total Minutes    25656 - PT Therapeutic Exercise Minutes  12  -RS         Exercise 1    Exercise Name 1  LTR 11-1  -RS      Cueing 1  Verbal  -RS      Reps 1  10  -RS      Time 1  3s  -RS         Exercise 2    Exercise Name 2  PPT  -RS      Cueing 2  Verbal;Tactile  -RS       Reps 2  10  -RS      Time 2  5s  -RS         Exercise 3    Exercise Name 3  SKTC  -RS      Cueing 3  Verbal  -RS      Reps 3  2  -RS      Time 3  30s  -RS         Exercise 4    Exercise Name 4  seated HS stretch  -RS      Cueing 4  Verbal;Demo  -RS      Reps 4  2  -RS      Time 4  30s  -RS         Exercise 5    Exercise Name 5  consider Nustep low intensity, manual traction/LAD, glute set/bridge, HL clamshell, PPT with march  -RS        User Key  (r) = Recorded By, (t) = Taken By, (c) = Cosigned By    Initials Name Provider Type    RS Cassandra Sprague, PT Physical Therapist                        Outcome Measure Options: Modifed Owest[place]  Modified Oswestry  Modified Oswestry Score/Comments: 36% disability      Time Calculation:     Start Time: 1215  Stop Time: 1255  Time Calculation (min): 40 min     Therapy Charges for Today     Code Description Service Date Service Provider Modifiers Qty    39790543247 HC PT THER PROC EA 15 MIN 5/28/2020 Cassandra Sprague, PT GP 1    05210693582 HC PT EVAL LOW COMPLEXITY 2 5/28/2020 Cassandra Sprague, PT GP 1          PT G-Codes  Outcome Measure Options: Modifed [place]  Modified Oswestry Score/Comments: 36% disability         Cassandra Sprague PT  5/28/2020

## 2020-06-02 ENCOUNTER — HOSPITAL ENCOUNTER (OUTPATIENT)
Dept: PHYSICAL THERAPY | Facility: HOSPITAL | Age: 75
Setting detail: THERAPIES SERIES
Discharge: HOME | End: 2020-06-02

## 2020-06-02 DIAGNOSIS — M54.50 CHRONIC MIDLINE LOW BACK PAIN WITHOUT SCIATICA: Primary | ICD-10-CM

## 2020-06-02 DIAGNOSIS — G89.29 CHRONIC MIDLINE LOW BACK PAIN WITHOUT SCIATICA: Primary | ICD-10-CM

## 2020-06-02 DIAGNOSIS — Z74.09 IMPAIRED MOBILITY: ICD-10-CM

## 2020-06-02 PROCEDURE — 97140 MANUAL THERAPY 1/> REGIONS: CPT

## 2020-06-02 PROCEDURE — 97110 THERAPEUTIC EXERCISES: CPT

## 2020-06-02 NOTE — THERAPY TREATMENT NOTE
Outpatient Physical Therapy Ortho Treatment Note  [facility]     Patient Name: James Loaiza  : 1945  MRN: [id number]  Today's Date: 2020      Visit Date: 2020    Visit Dx:    ICD-10-CM ICD-9-CM   1. Chronic midline low back pain without sciatica M54.5 724.2    G89.29 338.29   2. Impaired mobility Z74.09 799.89       Patient Active Problem List   Diagnosis   • Lumbar herniated disc L3-L5 3/16/16 Open MRI   • Abnormal electrocardiogram   • Abnormal weight gain   • Aortic valve insufficiency   • Coronary arteriosclerosis in native artery   • Benign essential hypertension (Ijeoma )   • Edema   • Dyspnea on exertion   • Fatigue   • Left ventricular hypertrophy   • Obstructive sleep apnea syndrome   • Arthritis of left hip   • Hx of pulmonary embolus 7/15   • Morbid obesity with BMI of 45.0-49.9, adult (CMS/[facility])   • Intermittent dysphagia   • Chronic kidney disease ([facility])   • Hyperlipidemia    • BPH (benign prostatic hypertrophy)   • Left cervical radiculopathy   • Rotator cuff tear, left   • Cardiomegaly   • Plantar fasciitis   • Hypogonadism male   • Vitamin D deficiency disease   • Renal cyst, left   • Preventative health care   • Medication management   • Chronic low back pain   • Lumbar spondylolysis   • Headache   • Other chronic pain   • Pulmonary embolus (CMS/[facility])   • Chronic obstructive pulmonary disease (CMS/[facility])   • Osteoarthritis of right shoulder   • Chronic anticoagulation        Past Medical History:   Diagnosis Date   • Abnormal EKG     referring to cardiology   • Anxiety    • Arthritis    • Back pain     worsening   • BPH (benign prostatic hyperplasia)     BPH/Nocturia/ Urinary Frequency   • Breast nodule     right   • Cardiomegaly     Cardiomegaly/ SOB with exertion   • Circulation problem    • Constipation    • COPD (chronic obstructive pulmonary disease) (CMS/HCC)    • Deviated septum    • DJD (degenerative joint disease)     DJD Knees   • DVT (deep venous thrombosis)  "(CMS/[facility])    • Dysphagia     solids and liquids - resolved with normal studies   • Encounter for annual health examination 11/14/2013    Annual Health Assessment   • Enlarged prostate    • Fatigue     Extreme fatigue   • Hyperlipidemia 1999   • Hypertension 1999    followed Dr. Marcelo   • Hypogonadism male    • Left hip pain     and DJD   • Left leg pain    • Low back pain    • Moist mucous membranes of ear, nose, and throat     \"Mucous in throat\"   • Morbid obesity (CMS/[facility])     (BMI-41.2za)   • Muscle cramping    • Muscle spasm     Muscle spasms   • Neck arthritis    • Neck muscle spasm     Neck muscle spasm/Cervical strain   • Obesity    • Plantar fasciitis    • Prostatitis     recurrent   • Psoriasis    • Pulmonary embolus (CMS/[facility]) 07/2015    on Xarelltoypseerlipidemia   • Radiculopathy     Radiculopathy / Neuropathy left ar   • Renal insufficiency     followed by Dr. Mendes   • Seborrhea    • Shortness of breath    • Sleep apnea     Obstructive Sleep apnea worsening   • Urinary frequency    • Vascular disorder    • Vertigo    • Weight gain    • Wellness examination 10/23/2014    Annual Wellness Visit        Past Surgical History:   Procedure Laterality Date   • APPENDECTOMY  1965   • CARDIAC CATHETERIZATION  07/29/2010    [facility]   • COLONOSCOPY  01/05/2010   • COLONOSCOPY  10/01/2001   • NASAL SEPTUM SURGERY     • NOSE SURGERY  1999   • TURP / TRANSURETHRAL INCISION / DRAINAGE PROSTATE  10/23/2015    Michael Castro       PT Ortho     Row Name 06/02/20 1000       Subjective Comments    Subjective Comments  Pt sleeps on right side.  Unable to lie on left side (Sh pain) and breathing issues if lie on back.  -SI       Subjective Pain    Able to rate subjective pain?  yes  -SI    Pre-Treatment Pain Level  1  -SI    Subjective Pain Comment  Pain at night and once oob in AM back loosens up and less pain  -SI      User Key  (r) = Recorded By, (t) = Taken By, (c) = Cosigned By    Initials " "Name Provider Type    JENNIFER Rebecca Cisneros PTA Physical Therapy Assistant                      PT Assessment/Plan     Row Name 06/02/20 1127          PT Assessment    Assessment Comments  Fair tolerance to lying supine for ex in PT.   Tolerated ex well.  C/o \"tightness\" with the ex  -SI       User Key  (r) = Recorded By, (t) = Taken By, (c) = Cosigned By    Initials Name Provider Type    JENNIFER JanicedougieRebecca PTA Physical Therapy Assistant            OP Exercises     Row Name 06/02/20 1100 06/02/20 1000          Subjective Comments    Subjective Comments  --  Pt sleeps on right side.  Unable to lie on left side (Sh pain) and breathing issues if lie on back.  -SI        Subjective Pain    Able to rate subjective pain?  --  yes  -SI     Pre-Treatment Pain Level  --  1  -SI     Subjective Pain Comment  --  Pain at night and once oob in AM back loosens up and less pain  -SI        Total Minutes    52288 - PT Therapeutic Exercise Minutes  --  35  -SI     98469 - PT Manual Therapy Minutes  6  -SI  --        Exercise 1    Exercise Name 1  --  LTR 11-1  -SI     Cueing 1  --  Verbal  -SI     Reps 1  --  10  -SI     Time 1  --  3s  -SI        Exercise 2    Exercise Name 2  --  PPT  -SI     Cueing 2  --  Verbal;Tactile  -SI     Reps 2  --  10  -SI     Time 2  --  5s  -SI        Exercise 3    Exercise Name 3  --  SKTC  -SI     Cueing 3  --  Verbal  -SI     Reps 3  --  2  -SI     Time 3  --  30s  -SI        Exercise 4    Exercise Name 4  --  seated HS stretch  -SI     Cueing 4  --  Verbal;Demo  -SI     Reps 4  --  2  -SI     Time 4  --  30s  -SI        Exercise 5    Exercise Name 5  --  consider Nustep low intensity, manual traction/LAD, glute set/bridge, HL clamshell, PPT with march  -SI        Exercise 6    Exercise Name 6  --  Nu step   level 5  -SI     Time 6  --  7 min  -SI        Exercise 7    Exercise Name 7  --  PPT with alt march  -SI     Reps 7  --  5  -SI     Additional Comments  --  HEP  -SI        Exercise 8    " Exercise Name 8  --  glut set with slight hip bridge  -SI     Reps 8  --  10  -SI     Additional Comments  --  HEP  -SI        Exercise 9    Exercise Name 9  --  Mini squat  -SI     Reps 9  --  5  -SI     Additional Comments  --  with UE support  -SI       User Key  (r) = Recorded By, (t) = Taken By, (c) = Cosigned By    Initials Name Provider Type    Rebecca Lee PTA Physical Therapy Assistant                      Manual Rx (last 36 hours)      Manual Treatments     Row Name 06/02/20 1100             Total Minutes    19530 - PT Manual Therapy Minutes  6  -SI         Manual Rx 1    Manual Rx 1 Location  LAD  -SI      Manual Rx 1 Duration  3 min each LE  -SI        User Key  (r) = Recorded By, (t) = Taken By, (c) = Cosigned By    Initials Name Provider Type    Rebecca Lee PTA Physical Therapy Assistant              Therapy Education  Given: HEP, Symptoms/condition management(sleep posture review)  Program: Progressed  How Provided: Verbal, Demonstration, Written  Provided to: Patient  Level of Understanding: Teach back education performed              Time Calculation:   Start Time: 1045  Stop Time: 1130  Time Calculation (min): 45 min  Total Timed Code Minutes- PT: 45 minute(s)  Therapy Charges for Today     Code Description Service Date Service Provider Modifiers Qty    58010458611 HC PT THER PROC EA 15 MIN 6/2/2020 Rebecca Cisneros PTA GP 2    13945806835 HC PT MANUAL THERAPY EA 15 MIN 6/2/2020 Rebecca Cisneros PTA GP 1                    Rebecca Cisneros PTA  6/2/2020

## 2020-06-05 ENCOUNTER — HOSPITAL ENCOUNTER (OUTPATIENT)
Dept: PHYSICAL THERAPY | Facility: HOSPITAL | Age: 75
Setting detail: THERAPIES SERIES
Discharge: HOME | End: 2020-06-05

## 2020-06-05 DIAGNOSIS — G89.29 CHRONIC MIDLINE LOW BACK PAIN WITHOUT SCIATICA: Primary | ICD-10-CM

## 2020-06-05 DIAGNOSIS — M54.50 CHRONIC MIDLINE LOW BACK PAIN WITHOUT SCIATICA: Primary | ICD-10-CM

## 2020-06-05 DIAGNOSIS — Z74.09 IMPAIRED MOBILITY: ICD-10-CM

## 2020-06-05 PROCEDURE — 97110 THERAPEUTIC EXERCISES: CPT

## 2020-06-05 NOTE — THERAPY TREATMENT NOTE
Outpatient Physical Therapy Ortho Treatment Note  [facility]     Patient Name: James Loaiza  : 1945  MRN: [id number]  Today's Date: 2020      Visit Date: 2020    Visit Dx:    ICD-10-CM ICD-9-CM   1. Chronic midline low back pain without sciatica M54.5 724.2    G89.29 338.29   2. Impaired mobility Z74.09 799.89       Patient Active Problem List   Diagnosis   • Lumbar herniated disc L3-L5 3/16/16 Open MRI   • Abnormal electrocardiogram   • Abnormal weight gain   • Aortic valve insufficiency   • Coronary arteriosclerosis in native artery   • Benign essential hypertension ([place] )   • Edema   • Dyspnea on exertion   • Fatigue   • Left ventricular hypertrophy   • Obstructive sleep apnea syndrome   • Arthritis of left hip   • Hx of pulmonary embolus 7/15   • Morbid obesity with BMI of 45.0-49.9, adult (CMS/[facility])   • Intermittent dysphagia   • Chronic kidney disease ([facility])   • Hyperlipidemia    • BPH (benign prostatic hypertrophy)   • Left cervical radiculopathy   • Rotator cuff tear, left   • Cardiomegaly   • Plantar fasciitis   • Hypogonadism male   • Vitamin D deficiency disease   • Renal cyst, left   • Preventative health care   • Medication management   • Chronic low back pain   • Lumbar spondylolysis   • Headache   • Other chronic pain   • Pulmonary embolus (CMS/[facility])   • Chronic obstructive pulmonary disease (CMS/[facility])   • Osteoarthritis of right shoulder   • Chronic anticoagulation        Past Medical History:   Diagnosis Date   • Abnormal EKG     referring to cardiology   • Anxiety    • Arthritis    • Back pain     worsening   • BPH (benign prostatic hyperplasia)     BPH/Nocturia/ Urinary Frequency   • Breast nodule     right   • Cardiomegaly     Cardiomegaly/ SOB with exertion   • Circulation problem    • Constipation    • COPD (chronic obstructive pulmonary disease) (CMS/HCC)    • Deviated septum    • DJD (degenerative joint disease)     DJD Knees   • DVT (deep venous thrombosis)  "(CMS/[facility])    • Dysphagia     solids and liquids - resolved with normal studies   • Encounter for annual health examination 11/14/2013    Annual Health Assessment   • Enlarged prostate    • Fatigue     Extreme fatigue   • Hyperlipidemia 1999   • Hypertension 1999    followed Dr. Marcelo   • Hypogonadism male    • Left hip pain     and DJD   • Left leg pain    • Low back pain    • Moist mucous membranes of ear, nose, and throat     \"Mucous in throat\"   • Morbid obesity (CMS/[facility])     (BMI-41.2za)   • Muscle cramping    • Muscle spasm     Muscle spasms   • Neck arthritis    • Neck muscle spasm     Neck muscle spasm/Cervical strain   • Obesity    • Plantar fasciitis    • Prostatitis     recurrent   • Psoriasis    • Pulmonary embolus (CMS/[facility]) 07/2015    on Xarelltoypseerlipidemia   • Radiculopathy     Radiculopathy / Neuropathy left ar   • Renal insufficiency     followed by Dr. Mendes   • Seborrhea    • Shortness of breath    • Sleep apnea     Obstructive Sleep apnea worsening   • Urinary frequency    • Vascular disorder    • Vertigo    • Weight gain    • Wellness examination 10/23/2014    Annual Wellness Visit        Past Surgical History:   Procedure Laterality Date   • APPENDECTOMY  1965   • CARDIAC CATHETERIZATION  07/29/2010    [facility]   • COLONOSCOPY  01/05/2010   • COLONOSCOPY  10/01/2001   • NASAL SEPTUM SURGERY     • NOSE SURGERY  1999   • TURP / TRANSURETHRAL INCISION / DRAINAGE PROSTATE  10/23/2015    Michael Castro       PT Ortho     Row Name 06/05/20 1000       Subjective Comments    Subjective Comments  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  Tied when get home from work.  States he tries to do PT ex (some of them) before gets out of bed in AM  -SI       Subjective Pain    Able to rate subjective pain?  yes  -SI    Pre-Treatment Pain Level  0  -SI    Subjective Pain Comment  little twinges and stiffness in back vs pain  -SI      User Key  (r) = " Recorded By, (t) = Taken By, (c) = Cosigned By    Initials Name Provider Type    Rebecca Lee PTA Physical Therapy Assistant                      PT Assessment/Plan     Row Name 06/05/20 1115          PT Assessment    Assessment Comments  Pt overweight and SOB with activity.  In and out of vehicle and drives a lot with work.  Deconditioned.  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  Tied when get home from work.  States he tries to do PT ex (some of them) before gets out of bed in AM  -SI        Subjective Pain    Able to rate subjective pain?  --  yes  -SI     Pre-Treatment Pain Level  --  0  -SI     Subjective Pain Comment  --  little twinges and stiffness in back vs pain  -SI        Total Minutes    94865 - PT Therapeutic Exercise Minutes  40  -SI  --        Exercise 1    Exercise Name 1  LTR 11-1  -SI  --     Cueing 1  Verbal  -SI  --     Reps 1  10  -SI  --     Time 1  3s  -SI  --        Exercise 2    Exercise Name 2  PPT  -SI  --     Cueing 2  Verbal;Tactile  -SI  --     Reps 2  10  -SI  --     Time 2  5s  -SI  --        Exercise 3    Exercise Name 3  SKTC  -SI  --     Cueing 3  Verbal  -SI  --     Reps 3  2  -SI  --     Time 3  30s  -SI  --        Exercise 4    Exercise Name 4  seated HS stretch  -SI  --     Cueing 4  Verbal;Demo  -SI  --     Reps 4  2  -SI  --     Time 4  30s  -SI  --        Exercise 5    Exercise Name 5   High knee march in parallel bars  -SI  --        Exercise 6    Exercise Name 6  Nu step   level 5  -SI  --     Time 6  7 min  -SI  --        Exercise 7    Exercise Name 7  PPT with alt march  -SI  --     Reps 7  5  -SI  --         Exercise 8    Exercise Name 8  glut set with slight hip bridge  -SI  --     Reps 8  10  -SI  --        Exercise 9    Exercise Name 9  Mini squat  -SI  --     Reps 9  5  -SI  --     Time 9  10  -SI  --        Exercise 10    Exercise Name 10  HS stretch sit side of mat  -SI  --     Reps 10  5  -SI  --     Time 10  20  -SI  --        Exercise 11    Exercise Name 11  gastroc stretch sit side of mat with belt  -SI  --     Reps 11  3  -SI  --     Time 11  20  -SI  --       User Key  (r) = Recorded By, (t) = Taken By, (c) = Cosigned By    Initials Name Provider Type    Rebecca Lee PTA Physical Therapy Assistant                           Therapy Education  Given: HEP, Symptoms/condition management  Program: Progressed  How Provided: Demonstration, Verbal, Written  Provided to: Patient              Time Calculation:   Start Time: 1045  Stop Time: 1130  Time Calculation (min): 45 min  Total Timed Code Minutes- PT: 40 minute(s)  Therapy Charges for Today     Code Description Service Date Service Provider Modifiers Qty    48598445222 HC PT THER PROC EA 15 MIN 6/5/2020 Rebecca Cisneros PTA GP 3                    Rebecca Cisneros PTA  6/5/2020

## 2020-06-09 ENCOUNTER — TELEPHONE (OUTPATIENT)
Dept: PHYSICAL THERAPY | Facility: HOSPITAL | Age: 75
End: 2020-06-09

## 2020-06-09 NOTE — TELEPHONE ENCOUNTER
Pt called since no show for PT appt.  Got his time wrong....Reminded him of next appt date and time

## 2020-06-12 ENCOUNTER — HOSPITAL ENCOUNTER (OUTPATIENT)
Dept: PHYSICAL THERAPY | Facility: HOSPITAL | Age: 75
Setting detail: THERAPIES SERIES
Discharge: HOME | End: 2020-06-12

## 2020-06-12 DIAGNOSIS — Z74.09 IMPAIRED MOBILITY: ICD-10-CM

## 2020-06-12 DIAGNOSIS — G89.29 CHRONIC MIDLINE LOW BACK PAIN WITHOUT SCIATICA: Primary | ICD-10-CM

## 2020-06-12 DIAGNOSIS — M54.50 CHRONIC MIDLINE LOW BACK PAIN WITHOUT SCIATICA: Primary | ICD-10-CM

## 2020-06-12 PROCEDURE — 97110 THERAPEUTIC EXERCISES: CPT

## 2020-06-12 NOTE — THERAPY TREATMENT NOTE
Outpatient Physical Therapy Ortho Treatment Note  [facility]     Patient Name: James Loaiza  : 1945  MRN: [id number]  Today's Date: 2020      Visit Date: 2020    Visit Dx:    ICD-10-CM ICD-9-CM   1. Chronic midline low back pain without sciatica M54.5 724.2    G89.29 338.29   2. Impaired mobility Z74.09 799.89       Patient Active Problem List   Diagnosis   • Lumbar herniated disc L3-L5 3/16/16 Open MRI   • Abnormal electrocardiogram   • Abnormal weight gain   • Aortic valve insufficiency   • Coronary arteriosclerosis in native artery   • Benign essential hypertension ([place] )   • Edema   • Dyspnea on exertion   • Fatigue   • Left ventricular hypertrophy   • Obstructive sleep apnea syndrome   • Arthritis of left hip   • Hx of pulmonary embolus 7/15   • Morbid obesity with BMI of 45.0-49.9, adult (CMS/[facility])   • Intermittent dysphagia   • Chronic kidney disease ([facility])   • Hyperlipidemia    • BPH (benign prostatic hypertrophy)   • Left cervical radiculopathy   • Rotator cuff tear, left   • Cardiomegaly   • Plantar fasciitis   • Hypogonadism male   • Vitamin D deficiency disease   • Renal cyst, left   • Preventative health care   • Medication management   • Chronic low back pain   • Lumbar spondylolysis   • Headache   • Other chronic pain   • Pulmonary embolus (CMS/HCC)   • Chronic obstructive pulmonary disease (CMS/[facility])   • Osteoarthritis of right shoulder   • Chronic anticoagulation        Past Medical History:   Diagnosis Date   • Abnormal EKG     referring to cardiology   • Anxiety    • Arthritis    • Back pain     worsening   • BPH (benign prostatic hyperplasia)     BPH/Nocturia/ Urinary Frequency   • Breast nodule     right   • Cardiomegaly     Cardiomegaly/ SOB with exertion   • Circulation problem    • Constipation    • COPD (chronic obstructive pulmonary disease) (CMS/HCC)    • Deviated septum    • DJD (degenerative joint disease)     DJD Knees   • DVT (deep venous  "thrombosis) (CMS/[facility])    • Dysphagia     solids and liquids - resolved with normal studies   • Encounter for annual health examination 11/14/2013    Annual Health Assessment   • Enlarged prostate    • Fatigue     Extreme fatigue   • Hyperlipidemia 1999   • Hypertension 1999    followed Dr. Marcelo   • Hypogonadism male    • Left hip pain     and DJD   • Left leg pain    • Low back pain    • Moist mucous membranes of ear, nose, and throat     \"Mucous in throat\"   • Morbid obesity (CMS/[facility])     (BMI-41.2za)   • Muscle cramping    • Muscle spasm     Muscle spasms   • Neck arthritis    • Neck muscle spasm     Neck muscle spasm/Cervical strain   • Obesity    • Plantar fasciitis    • Prostatitis     recurrent   • Psoriasis    • Pulmonary embolus (CMS/[facility]) 07/2015    on Xarelltoypseerlipidemia   • Radiculopathy     Radiculopathy / Neuropathy left ar   • Renal insufficiency     followed by Dr. Mendes   • Seborrhea    • Shortness of breath    • Sleep apnea     Obstructive Sleep apnea worsening   • Urinary frequency    • Vascular disorder    • Vertigo    • Weight gain    • Wellness examination 10/23/2014    Annual Wellness Visit        Past Surgical History:   Procedure Laterality Date   • APPENDECTOMY  1965   • CARDIAC CATHETERIZATION  07/29/2010    [facility]   • COLONOSCOPY  01/05/2010   • COLONOSCOPY  10/01/2001   • NASAL SEPTUM SURGERY     • NOSE SURGERY  1999   • TURP / TRANSURETHRAL INCISION / DRAINAGE PROSTATE  10/23/2015    Michael Castro                       PT Assessment/Plan     Row Name 06/12/20 1116          PT Assessment    Assessment Comments  Pt appears to be having much less back pain with activity.  Some better at night since instructed in use of pillow between knees.  Highly encouraging pt to continue HEP every other day for long term.  Sacral injection and RF proceedures not scheduled yet per pt report.  -SI       User Key  (r) = Recorded By, (t) = Taken By, (c) = Cosigned By    " "Initials Name Provider Type    SI Rebecca Cisneros, PTA Physical Therapy Assistant            OP Exercises     Row Name 06/12/20 1000             Subjective Comments    Subjective Comments  C/O right knee pain at night..  -SI         Subjective Pain    Able to rate subjective pain?  yes  -SI      Pre-Treatment Pain Level  1  -SI      Post-Treatment Pain Level  1  -SI      Subjective Pain Comment  \"twinges of pain in back.  Much better.  -SI         Total Minutes    48260 - PT Therapeutic Exercise Minutes  40  -SI         Exercise 1    Exercise Name 1  LTR 11-1  -SI      Cueing 1  Verbal  -SI      Reps 1  10  -SI      Time 1  3s  -SI         Exercise 2    Exercise Name 2  PPT  -SI      Cueing 2  Verbal;Tactile  -SI      Reps 2  10  -SI      Time 2  5s  -SI         Exercise 3    Exercise Name 3  SKTC  -SI      Cueing 3  Verbal  -SI      Reps 3  2  -SI      Time 3  30s  -SI         Exercise 4    Exercise Name 4  seated HS stretch  -SI      Cueing 4  Verbal;Demo  -SI      Reps 4  2  -SI      Time 4  30s  -SI         Exercise 5    Exercise Name 5   High knee march in parallel bars  -SI         Exercise 6    Exercise Name 6  Nu step   level 5  -SI      Time 6  7 min  -SI         Exercise 7    Exercise Name 7  PPT with alt march  -SI      Reps 7  5  -SI         Exercise 8    Exercise Name 8  glut set with slight hip bridge  -SI      Reps 8  20  -SI         Exercise 9    Exercise Name 9  Mini squat  -SI      Reps 9  5  -SI      Time 9  10  -SI         Exercise 10    Exercise Name 10  HS stretch sit side of mat  -SI      Reps 10  5  -SI      Time 10  20  -SI         Exercise 11    Exercise Name 11  gastroc stretch sit side of mat with belt  -SI      Reps 11  3  -SI      Time 11  20  -SI         Exercise 12    Exercise Name 12  LAQ  -SI      Reps 12  20  -SI      Additional Comments  alternate  left and right  -SI         Exercise 13    Exercise Name 13  WATER walking demo s has a pool  -SI      Cueing 13  Demo  -SI      Time " 13  5  -SI         Exercise 14    Exercise Name 14  piriformis stretch  -SI      Sets 14  3  -SI      Reps 14  20  -SI      Additional Comments  towel assist  -SI        User Key  (r) = Recorded By, (t) = Taken By, (c) = Cosigned By    Initials Name Provider Type    Rebecca Lee, PTA Physical Therapy Assistant                       PT OP Goals     Row Name 06/12/20 1000          PT Short Term Goals    STG Date to Achieve  06/18/20  -SI     STG 1  The pt will demostrate IND with initial HEP.  -SI     STG 1 Progress  New;Met  -SI     STG 2  The pt will tolerate sleeping through at least 50% of the nights in bed rather than recliner for improved restorative sleep patter.  -SI     STG 2 Progress  Met m50 ot 660% he reorts  -SI     STG 3  Patient will report >/= 25% increased ease/decreased symptoms with ADL's/transitional movements.  -SI     STG 3 Progress  Met  -SI        Long Term Goals    LTG Date to Achieve  07/27/20  -SI     LTG 1  The pt will demonstrate IND and compliant with progressive HEP focused on IND condition management and return to PLOF.  -SI     LTG 1 Progress  Progressing fair compliance reported with the ex  -SI     LTG 2  The pt will score less than 25% disability on the modified oswestry to indicate improved perceived performance of ADLs.  -SI     LTG 2 Progress  New  -SI     LTG 3  The pt will sleep in bed without waking due to pain at least 5 nights per week for improved restorative sleep pattern.  -SI     LTG 3 Progress  Partially Met  -SI     LTG 4  The pt will demonstrate activation of TA in standing and during transitional positions to indicate improved activation of  lumbopelvic muscles.  -SI     LTG 4 Progress  New  -SI     LTG 5  The pt will perform transitional positions, including car without increased pain greater than 1/10.  -SI     LTG 5 Progress  New  -SI       User Key  (r) = Recorded By, (t) = Taken By, (c) = Cosigned By    Initials Name Provider Type    Rebecca Lee  PTA Physical Therapy Assistant          Therapy Education  Given: HEP, Symptoms/condition management(proper bending and basic posture and body mechanics instruction and written info issued. )  Program: Progressed  How Provided: Verbal, Demonstration, Written  Provided to: Patient  Level of Understanding: Teach back education performed              Time Calculation:   Start Time: 1045  Stop Time: 1125  Time Calculation (min): 40 min  Total Timed Code Minutes- PT: 40 minute(s)  Therapy Charges for Today     Code Description Service Date Service Provider Modifiers Qty    72650130334 HC PT THER PROC EA 15 MIN 6/12/2020 Rebecca Cisneros PTA GP 3                    Rebecca Cisneros PTA  6/12/2020

## 2020-06-17 ENCOUNTER — OFFICE VISIT (OUTPATIENT)
Dept: FAMILY MEDICINE CLINIC | Facility: CLINIC | Age: 75
End: 2020-06-17

## 2020-06-17 VITALS
HEIGHT: 74 IN | DIASTOLIC BLOOD PRESSURE: 80 MMHG | TEMPERATURE: 97.8 F | WEIGHT: 315 LBS | RESPIRATION RATE: 18 BRPM | HEART RATE: 63 BPM | OXYGEN SATURATION: 99 % | BODY MASS INDEX: 40.43 KG/M2 | SYSTOLIC BLOOD PRESSURE: 162 MMHG

## 2020-06-17 DIAGNOSIS — R51.9 ACUTE NONINTRACTABLE HEADACHE, UNSPECIFIED HEADACHE TYPE: Primary | ICD-10-CM

## 2020-06-17 DIAGNOSIS — I10 ESSENTIAL HYPERTENSION: ICD-10-CM

## 2020-06-17 PROCEDURE — 99213 OFFICE O/P EST LOW 20 MIN: CPT | Performed by: NURSE PRACTITIONER

## 2020-06-17 RX ORDER — RAMIPRIL 10 MG/1
10 CAPSULE ORAL DAILY
Qty: 90 CAPSULE | Refills: 2 | Status: SHIPPED | OUTPATIENT
Start: 2020-06-17 | End: 2021-04-26

## 2020-06-17 NOTE — PROGRESS NOTES
"Subjective   James Loaiza is a 74 y.o. male   who presents for   Chief Complaint   Patient presents with   • Headache     in the back of the skull x1wk     Headache, since last Wednesday, took two tylenol a few hours later, continued, took two ibuprofen.   Improved by Thursday afternoon  Improved by sitting in lift chair    bp normally 140-145/80, states higher than usual, not taking lasix due to increased urination      /80   Pulse 63   Temp 97.8 °F (36.6 °C)   Resp 18   Ht 188 cm (74\")   Wt (!) 151 kg (333 lb 11.2 oz)   SpO2 99%   BMI 42.84 kg/m²       History of Present Illness   Headache    This is a new problem. The current episode started in the past 7 days. The problem occurs intermittently. The problem has been waxing and waning. The pain is located in the occipital region. The pain does not radiate. The pain quality is not similar to prior headaches. The quality of the pain is described as dull. The pain is at a severity of 2/10 (at worst, 8/10). The pain is mild. Pertinent negatives include no abdominal pain, abnormal behavior, anorexia, back pain, blurred vision, coughing, dizziness, drainage, ear pain, eye pain, eye redness, eye watering, facial sweating, fever, hearing loss, insomnia, loss of balance, muscle aches, nausea, neck pain, numbness, phonophobia, photophobia, rhinorrhea, scalp tenderness, seizures, sinus pressure, sore throat, swollen glands, tingling, tinnitus, visual change, vomiting, weakness or weight loss.       The following portions of the patient's history were reviewed and updated as appropriate: allergies, current medications, past family history, past medical history, past social history, past surgical history and problem list.    Review of Systems  Review of Systems   Constitutional: Negative for fever and weight loss.   HENT: Negative for ear pain, hearing loss, rhinorrhea, sinus pressure, sore throat and tinnitus.    Eyes: Negative for blurred vision, photophobia, " pain and redness.   Respiratory: Negative for cough.    Gastrointestinal: Negative for abdominal pain, anorexia, nausea and vomiting.   Musculoskeletal: Negative for back pain and neck pain.   Neurological: Positive for headaches. Negative for dizziness, tingling, seizures, weakness, numbness and loss of balance.   Psychiatric/Behavioral: The patient does not have insomnia.        Objective   Physical Exam  Physical Exam   Constitutional: He is oriented to person, place, and time. He appears well-developed and well-nourished. No distress.   Neck: Neck supple.   Cardiovascular: Normal rate, regular rhythm and normal heart sounds. Exam reveals no gallop and no friction rub.   No murmur heard.  Pulmonary/Chest: Effort normal and breath sounds normal. No respiratory distress. He has no wheezes. He has no rales.   Abdominal: Soft. Bowel sounds are normal. He exhibits no distension. There is no tenderness.   Neurological: He is alert and oriented to person, place, and time. No cranial nerve deficit or sensory deficit. Coordination normal.   Skin: Skin is warm and dry. He is not diaphoretic.   Psychiatric: He has a normal mood and affect.         Assessment/Plan   James was seen today for headache.    Diagnoses and all orders for this visit:    Acute nonintractable headache, unspecified headache type  -     CT Head Without Contrast; Future    Other orders  -     ramipril (Altace) 10 MG capsule; Take 1 capsule by mouth Daily.    discussed headache may be related to bp, but with acute onset, would like CT to rule out other etiology  Increase ramipril to 10 mg daily  Follow up for no improvement/worsening symptoms  For unilateral weakness, visual change or speech disturbance, follow up in ER

## 2020-06-18 ENCOUNTER — HOSPITAL ENCOUNTER (OUTPATIENT)
Dept: PHYSICAL THERAPY | Facility: HOSPITAL | Age: 75
Setting detail: THERAPIES SERIES
Discharge: HOME | End: 2020-06-18

## 2020-06-18 DIAGNOSIS — M54.50 CHRONIC MIDLINE LOW BACK PAIN WITHOUT SCIATICA: Primary | ICD-10-CM

## 2020-06-18 DIAGNOSIS — G89.29 CHRONIC MIDLINE LOW BACK PAIN WITHOUT SCIATICA: Primary | ICD-10-CM

## 2020-06-18 DIAGNOSIS — Z74.09 IMPAIRED MOBILITY: ICD-10-CM

## 2020-06-18 PROCEDURE — 97110 THERAPEUTIC EXERCISES: CPT

## 2020-06-18 NOTE — THERAPY DISCHARGE NOTE
Outpatient Physical Therapy Neuro Treatment Note/Discharge Summary  [facility]     Patient Name: James Loaiza  : 1945  MRN: [id number]  Today's Date: 2020      Visit Date: 2020    Visit Dx:    ICD-10-CM ICD-9-CM   1. Chronic midline low back pain without sciatica M54.5 724.2    G89.29 338.29   2. Impaired mobility Z74.09 799.89       Patient Active Problem List   Diagnosis   • Lumbar herniated disc L3-L5 3/16/16 Open MRI   • Abnormal electrocardiogram   • Abnormal weight gain   • Aortic valve insufficiency   • Coronary arteriosclerosis in native artery   • Benign essential hypertension ([place] )   • Edema   • Dyspnea on exertion   • Fatigue   • Left ventricular hypertrophy   • Obstructive sleep apnea syndrome   • Arthritis of left hip   • Hx of pulmonary embolus 7/15   • Morbid obesity with BMI of 45.0-49.9, adult (CMS/HCC)   • Intermittent dysphagia   • Chronic kidney disease ([facility])   • Hyperlipidemia    • BPH (benign prostatic hypertrophy)   • Left cervical radiculopathy   • Rotator cuff tear, left   • Cardiomegaly   • Plantar fasciitis   • Hypogonadism male   • Vitamin D deficiency disease   • Renal cyst, left   • Preventative health care   • Medication management   • Chronic low back pain   • Lumbar spondylolysis   • Headache   • Other chronic pain   • Pulmonary embolus (CMS/[facility])   • Chronic obstructive pulmonary disease (CMS/[facility])   • Osteoarthritis of right shoulder   • Chronic anticoagulation          PT Ortho     Row Name 20 1100       Subjective Comments    Subjective Comments  Stiffness more than pain  -SI       Subjective Pain    Able to rate subjective pain?  yes  -SI    Pre-Treatment Pain Level  1  -SI    Post-Treatment Pain Level  1  -SI    Subjective Pain Comment  no reeal pain he reports  -SI      User Key  (r) = Recorded By, (t) = Taken By, (c) = Cosigned By    Initials Name Provider Type    Rebecca Lee PTA Physical Therapy Assistant                     PT Assessment/Plan     Row Name 06/18/20 1201          PT Assessment    Assessment Comments  Pt demonstrates the given ex correctly and hs written copy of ex.  He realizees he nees to do for the long term.  he has a pool but not running yet for the summer.  Water eex has been discussed.  He has not scheduled the SI injection as back pain muchh better.  Stiffness is his complaint   -SI       User Key  (r) = Recorded By, (t) = Taken By, (c) = Cosigned By    Initials Name Provider Type    SI Rebecca Cisneros PTA Physical Therapy Assistant               OP Exercises     Row Name 06/18/20 1100             Subjective Comments    Subjective Comments  Stiffness more than pain  -SI         Subjective Pain    Able to rate subjective pain?  yes  -SI      Pre-Treatment Pain Level  1  -SI      Post-Treatment Pain Level  1  -SI      Subjective Pain Comment  no reeal pain he reports  -SI         Total Minutes    70622 - PT Therapeutic Exercise Minutes  40  -SI         Exercise 1    Exercise Name 1  LTR 11-1  -SI      Cueing 1  Verbal  -SI      Reps 1  10  -SI      Time 1  3s  -SI         Exercise 2    Exercise Name 2  PPT  -SI      Cueing 2  Verbal;Tactile  -SI      Reps 2  20  -SI      Time 2  5s  -SI         Exercise 3    Exercise Name 3  SKTC  -SI      Cueing 3  Verbal  -SI      Reps 3  2  -SI      Time 3  30s  -SI         Exercise 4    Exercise Name 4  seated HS stretch  -SI      Cueing 4  Verbal;Demo  -SI      Reps 4  2  -SI      Time 4  30s  -SI         Exercise 5    Exercise Name 5   High knee march in parallel bars  -SI         Exercise 6    Exercise Name 6  Nu step   level 5  -SI      Time 6  7 min  -SI         Exercise 7    Exercise Name 7  PPT with alt march  -SI      Reps 7  5  -SI         Exercise 8    Exercise Name 8  glut set with slight hip bridge  -SI      Reps 8  20  -SI         Exercise 9    Exercise Name 9  Mini squat  -SI      Reps 9  5  -SI      Time 9  10  -SI         Exercise 10    Exercise  Name 10  HS stretch sit side of mat  -SI      Reps 10  5  -SI      Time 10  20  -SI         Exercise 11    Exercise Name 11  gastroc stretch sit side of mat with belt  -SI      Reps 11  3  -SI      Time 11  20  -SI         Exercise 12    Exercise Name 12  LAQ  -SI      Reps 12  20  -SI         Exercise 13    Exercise Name 13  WATER walking demo s has a pool  -SI      Cueing 13  Demo  -SI      Time 13  5  -SI         Exercise 14    Exercise Name 14  piriformis stretch  -SI      Sets 14  3  -SI      Reps 14  20  -SI        User Key  (r) = Recorded By, (t) = Taken By, (c) = Cosigned By    Initials Name Provider Type    SI Rebecca Cisneros, PTA Physical Therapy Assistant                        PT OP Goals     Row Name 06/18/20 1200 06/18/20 1100       PT Short Term Goals    STG Date to Achieve  06/18/20  -SI  06/18/20  -SI    STG 1  The pt will demostrate IND with initial HEP.  -SI  The pt will demostrate IND with initial HEP.  -SI    STG 1 Progress  Met  -SI  Met  -SI    STG 2  The pt will tolerate sleeping through at least 50% of the nights in bed rather than recliner for improved restorative sleep patter.  -SI  The pt will tolerate sleeping through at least 50% of the nights in bed rather than recliner for improved restorative sleep patter.  -SI    STG 2 Progress  Met m50 ot 660% he reorts  -SI  Met m50 ot 660% he reorts  -SI    STG 3  Patient will report >/= 25% increased ease/decreased symptoms with ADL's/transitional movements.  -SI  Patient will report >/= 25% increased ease/decreased symptoms with ADL's/transitional movements.  -SI    STG 3 Progress  Met  -SI  Met  -SI       Long Term Goals    LTG Date to Achieve  07/27/20  -SI  07/27/20  -SI    LTG 1  The pt will demonstrate IND and compliant with progressive HEP focused on IND condition management and return to PLOF.  -SI  The pt will demonstrate IND and compliant with progressive HEP focused on IND condition management and return to PLOF.  -SI    LTG 1  "Progress  Met fair compliance reported with the ex  -SI  Met fair compliance reported with the ex  -SI    LTG 2  The pt will score less than 25% disability on the modified oswestry to indicate improved perceived performance of ADLs.  -SI  The pt will score less than 25% disability on the modified oswestry to indicate improved perceived performance of ADLs.  -SI    LTG 2 Progress  Not Met rogressed from 36 to 30%  -SI  Not Met rogressed from 36 to 30%  -SI    LTG 3  The pt will sleep in bed without waking due to pain at least 5 nights per week for improved restorative sleep pattern.  -SI  The pt will sleep in bed without waking due to pain at least 5 nights per week for improved restorative sleep pattern.  -SI    LTG 3 Progress  Not Met never have a real good night, right sh hurts also.  \"  -SI  Not Met never have a real good night, right sh hurts also.  \"  -SI    LTG 4  The pt will demonstrate activation of TA in standing and during transitional positions to indicate improved activation of  lumbopelvic muscles.  -SI  The pt will demonstrate activation of TA in standing and during transitional positions to indicate improved activation of  lumbopelvic muscles.  -SI    LTG 4 Progress  Partially Met fair  -SI  Partially Met fair  -SI    LTG 5  The pt will perform transitional positions, including car without increased pain greater than 1/10.  -SI  The pt will perform transitional positions, including car without increased pain greater than 1/10.  -SI    LTG 5 Progress  Partially Met \"somewhat better\"  -SI  Partially Met \"somewhat better\"  -SI      User Key  (r) = Recorded By, (t) = Taken By, (c) = Cosigned By    Initials Name Provider Type    Rebecca Lee, PTA Physical Therapy Assistant          Therapy Education  Given: HEP, Symptoms/condition management(water ex discussed when and if he gets his pool up and going...Emphasis on weightloss, daily to 4 times a week compliance for the long term with the ex and " stretching for his stiffness.)  Program: Reinforced  How Provided: Verbal, Demonstration, Written  Provided to: Patient  Level of Understanding: Teach back education performed    Outcome Measure Options: Modifed Owestry  Modified Oswestry  Modified Oswestry Score/Comments: 30%    Time Calculation:         Therapy Charges for Today     Code Description Service Date Service Provider Modifiers Qty    02595755005 HC PT THER PROC EA 15 MIN 6/18/2020 Rebecca Cisneros, PTA GP 3    60390046785 HC PT THER PROC EA 15 MIN 6/18/2020 Rebecca Cisneros, PTA GP 3          PT G-Codes  Outcome Measure Options: Modifed Owestry  Modified Oswestry Score/Comments: 30%     OP PT Discharge Summary  Date of Discharge: 06/18/20  Reason for Discharge: Independent  Outcomes Achieved: Patient able to partially acheive established goals  Discharge Destination: Home with home program  Discharge Instructions/Additional Comments: HEP 4-5 days a week for the long term.  Water walking and ex as available.        Rebecca Cisneros PTA  6/18/2020

## 2020-06-22 ENCOUNTER — HOSPITAL ENCOUNTER (OUTPATIENT)
Dept: CT IMAGING | Facility: HOSPITAL | Age: 75
Discharge: HOME | End: 2020-06-22
Admitting: NURSE PRACTITIONER

## 2020-06-22 DIAGNOSIS — R51.9 ACUTE NONINTRACTABLE HEADACHE, UNSPECIFIED HEADACHE TYPE: ICD-10-CM

## 2020-06-22 PROCEDURE — 70450 CT HEAD/BRAIN W/O DYE: CPT

## 2020-07-08 ENCOUNTER — TELEPHONE (OUTPATIENT)
Dept: FAMILY MEDICINE CLINIC | Facility: CLINIC | Age: 75
End: 2020-07-08

## 2020-07-09 ENCOUNTER — TELEPHONE (OUTPATIENT)
Dept: FAMILY MEDICINE CLINIC | Facility: CLINIC | Age: 75
End: 2020-07-09

## 2020-07-09 RX ORDER — AMOXICILLIN AND CLAVULANATE POTASSIUM 875; 125 MG/1; MG/1
1 TABLET, FILM COATED ORAL EVERY 12 HOURS SCHEDULED
Qty: 14 TABLET | Refills: 0 | Status: SHIPPED | OUTPATIENT
Start: 2020-07-09 | End: 2020-09-09

## 2020-07-09 NOTE — TELEPHONE ENCOUNTER
Pt has been called and advised. Pt understands.     ----- Message from YANIRA Roe sent at 7/9/2020  8:43 AM EDT -----  Contact: Recent CT   rx sent augmentin, twice daily x 7 days  ----- Message -----  From: Immanuel Giles MA  Sent: 7/8/2020   2:10 PM EDT  To: YANIRA Roe    Pt has been advised of recent CT results he does states he is now having sinus pain and pressure and would like Abx sent into his local pharmacy located on [facility] located in the chart.

## 2020-07-22 ENCOUNTER — OFFICE VISIT (OUTPATIENT)
Dept: FAMILY MEDICINE CLINIC | Facility: CLINIC | Age: 75
End: 2020-07-22

## 2020-07-22 VITALS
RESPIRATION RATE: 16 BRPM | DIASTOLIC BLOOD PRESSURE: 72 MMHG | WEIGHT: 315 LBS | BODY MASS INDEX: 40.43 KG/M2 | OXYGEN SATURATION: 98 % | TEMPERATURE: 97.6 F | HEART RATE: 69 BPM | SYSTOLIC BLOOD PRESSURE: 146 MMHG | HEIGHT: 74 IN

## 2020-07-22 DIAGNOSIS — M50.90 CERVICAL NECK PAIN WITH EVIDENCE OF DISC DISEASE: ICD-10-CM

## 2020-07-22 DIAGNOSIS — M54.2 CHRONIC NECK PAIN: Primary | ICD-10-CM

## 2020-07-22 DIAGNOSIS — M50.30 DEGENERATIVE DISC DISEASE, CERVICAL: ICD-10-CM

## 2020-07-22 DIAGNOSIS — G89.29 CHRONIC NECK PAIN: Primary | ICD-10-CM

## 2020-07-22 DIAGNOSIS — G89.29 CHRONIC PAIN OF BOTH SHOULDERS: ICD-10-CM

## 2020-07-22 DIAGNOSIS — M25.512 CHRONIC PAIN OF BOTH SHOULDERS: ICD-10-CM

## 2020-07-22 DIAGNOSIS — M25.511 CHRONIC PAIN OF BOTH SHOULDERS: ICD-10-CM

## 2020-07-22 PROCEDURE — 99213 OFFICE O/P EST LOW 20 MIN: CPT | Performed by: NURSE PRACTITIONER

## 2020-07-22 RX ORDER — BACLOFEN 10 MG/1
10 TABLET ORAL NIGHTLY PRN
Qty: 30 TABLET | Refills: 1 | Status: SHIPPED | OUTPATIENT
Start: 2020-07-22 | End: 2020-10-20

## 2020-07-22 NOTE — PROGRESS NOTES
"Subjective   James Loaiza is a 74 y.o. male   who presents for   Chief Complaint   Patient presents with   • Headache     several days   • Shoulder Pain     years ago more on the right but both shoulders     Has tried lidocaine to shoulder and diclofenac gel, states previously would help but now worse    Headache, starts in neck, improved if lying down with head hyperflexed      /72   Pulse 69   Temp 97.6 °F (36.4 °C)   Resp 16   Ht 188 cm (74\")   Wt (!) 153 kg (337 lb 11.2 oz)   SpO2 98%   BMI 43.36 kg/m²       History of Present Illness   Neck Pain    This is a chronic problem. The current episode started more than 1 year ago. The problem occurs daily. The problem has been gradually worsening. The pain is associated with nothing. The pain is present in the midline. The quality of the pain is described as aching. The pain is at a severity of 5/10. The pain is moderate. The symptoms are aggravated by position. Associated symptoms include headaches, numbness (occasional) and tingling. Pertinent negatives include no chest pain, fever, leg pain, paresis, photophobia, syncope, trouble swallowing, visual change, weakness or weight loss. He has tried nothing for the symptoms. The treatment provided no relief.   Headache    This is a chronic problem. The current episode started more than 1 month ago. The problem occurs intermittently. The problem has been waxing and waning. The pain quality is similar to prior headaches. The quality of the pain is described as aching. The pain is at a severity of 5/10. The pain is moderate. Associated symptoms include back pain, muscle aches (neck/shoulder), neck pain, numbness (occasional) and tingling. Pertinent negatives include no abdominal pain, abnormal behavior, anorexia, blurred vision, coughing, dizziness, drainage, ear pain, eye pain, eye redness, eye watering, facial sweating, fever, hearing loss, insomnia, loss of balance, nausea, phonophobia, photophobia, " rhinorrhea, scalp tenderness, seizures, sinus pressure, sore throat, swollen glands, tinnitus, visual change, vomiting, weakness or weight loss. Nothing aggravates the symptoms. He has tried nothing for the symptoms.       The following portions of the patient's history were reviewed and updated as appropriate: allergies, current medications, past family history, past medical history, past social history, past surgical history and problem list.    Review of Systems  Review of Systems   Constitutional: Negative.  Negative for fever and weight loss.   HENT: Negative for ear pain, hearing loss, rhinorrhea, sinus pressure, sore throat, tinnitus and trouble swallowing.    Eyes: Negative.  Negative for blurred vision, photophobia, pain and redness.   Respiratory: Negative.  Negative for cough.    Cardiovascular: Negative.  Negative for chest pain and syncope.   Gastrointestinal: Negative.  Negative for abdominal pain, anorexia, nausea and vomiting.   Endocrine: Negative.    Genitourinary: Negative.    Musculoskeletal: Positive for back pain and neck pain.   Skin: Negative.    Allergic/Immunologic: Negative.    Neurological: Positive for tingling, numbness (occasional) and headaches. Negative for dizziness, seizures, weakness and loss of balance.   Hematological: Negative.    Psychiatric/Behavioral: Negative.  The patient does not have insomnia.        Objective   Physical Exam  Physical Exam   Constitutional: He is oriented to person, place, and time. He appears well-developed and well-nourished. No distress.   Neck: Neck supple.   Cardiovascular: Normal rate, regular rhythm and normal heart sounds. Exam reveals no gallop and no friction rub.   No murmur heard.  Pulmonary/Chest: Effort normal and breath sounds normal. No respiratory distress. He has no wheezes. He has no rales.   Abdominal: Soft. Bowel sounds are normal. He exhibits no distension. There is no tenderness.   Neurological: He is alert and oriented to person,  place, and time.   Skin: Skin is warm and dry. He is not diaphoretic.   Psychiatric: He has a normal mood and affect.         Assessment/Plan   James was seen today for headache and shoulder pain.    Diagnoses and all orders for this visit:    Chronic neck pain    Chronic pain of both shoulders  -     Ambulatory Referral to Orthopedic Surgery    Cervical neck pain with evidence of disc disease  -     Ambulatory Referral to Orthopedic Surgery  -     MRI Cervical Spine Without Contrast; Future    Degenerative disc disease, cervical  -     Ambulatory Referral to Orthopedic Surgery  -     MRI Cervical Spine Without Contrast; Future    Other orders  -     baclofen (LIORESAL) 10 MG tablet; Take 1 tablet by mouth At Night As Needed for Muscle Spasms.    start baclofen prn  Reports neck pain now as chronic, positional, previous MRI completed and showed degenerative changes, will repeat MRI and follow up with ortho, previously established  Obtain MRI

## 2020-08-07 ENCOUNTER — TELEPHONE (OUTPATIENT)
Dept: FAMILY MEDICINE CLINIC | Facility: CLINIC | Age: 75
End: 2020-08-07

## 2020-08-07 ENCOUNTER — HOSPITAL ENCOUNTER (OUTPATIENT)
Dept: MRI IMAGING | Facility: HOSPITAL | Age: 75
Discharge: HOME | End: 2020-08-07

## 2020-08-07 DIAGNOSIS — M50.30 DEGENERATIVE DISC DISEASE, CERVICAL: ICD-10-CM

## 2020-08-07 DIAGNOSIS — M50.90 CERVICAL NECK PAIN WITH EVIDENCE OF DISC DISEASE: ICD-10-CM

## 2020-08-11 DIAGNOSIS — F40.240 CLAUSTROPHOBIA: Primary | ICD-10-CM

## 2020-08-11 RX ORDER — LORAZEPAM 0.5 MG/1
0.5 TABLET ORAL EVERY 8 HOURS PRN
Qty: 2 TABLET | Refills: 0 | Status: SHIPPED | OUTPATIENT
Start: 2020-08-11 | End: 2020-09-09

## 2020-08-23 ENCOUNTER — HOSPITAL ENCOUNTER (OUTPATIENT)
Dept: MRI IMAGING | Facility: HOSPITAL | Age: 75
Discharge: HOME | End: 2020-08-23
Admitting: NURSE PRACTITIONER

## 2020-08-23 PROCEDURE — 72141 MRI NECK SPINE W/O DYE: CPT

## 2020-08-24 RX ORDER — METHYLPREDNISOLONE 4 MG/1
TABLET ORAL
Qty: 21 EACH | Refills: 0 | OUTPATIENT
Start: 2020-08-24

## 2020-08-24 NOTE — TELEPHONE ENCOUNTER
Request came from the pharmacy. Looks like he was called in July to schedule his injection but declined because he didn't need it at the time.

## 2020-08-24 NOTE — TELEPHONE ENCOUNTER
I spoke with Mr. Loaiza and he states that he didn't request the pharmacy to send over the med refill. His painful issue resolved with PT.

## 2020-08-25 ENCOUNTER — OFFICE VISIT (OUTPATIENT)
Dept: ORTHOPEDIC SURGERY | Facility: CLINIC | Age: 75
End: 2020-08-25

## 2020-08-25 ENCOUNTER — TELEPHONE (OUTPATIENT)
Dept: ORTHOPEDIC SURGERY | Facility: CLINIC | Age: 75
End: 2020-08-25

## 2020-08-25 VITALS — WEIGHT: 315 LBS | TEMPERATURE: 97.8 F | BODY MASS INDEX: 40.43 KG/M2 | HEIGHT: 74 IN

## 2020-08-25 DIAGNOSIS — M54.2 NECK PAIN: ICD-10-CM

## 2020-08-25 DIAGNOSIS — M47.22 CERVICAL SPONDYLOSIS WITH RADICULOPATHY: ICD-10-CM

## 2020-08-25 DIAGNOSIS — M54.12 LEFT CERVICAL RADICULOPATHY: Primary | ICD-10-CM

## 2020-08-25 PROCEDURE — 99204 OFFICE O/P NEW MOD 45 MIN: CPT | Performed by: ORTHOPAEDIC SURGERY

## 2020-08-25 NOTE — TELEPHONE ENCOUNTER
Patient called to check on status of earlier message asking how much water he should put in cervical traction device? Patient can be reached at [phone]. Thanks /srh

## 2020-08-25 NOTE — PROGRESS NOTES
New patient or new problem visit    Chief Complaint   Patient presents with   • Cervical Spine - Establish Care, Pain       HPI: He complains of neck pain which radiates into the right shoulder also complains of numbness in the left middle finger.  He gets occipital headaches the pain is been chronic but worse in the last month is severe intermittent aching and activity related he has longstanding history of urinary retention but worse lately and he is wondering if it is from his neck.  He does have a history of prostate issues.    PFSH: See chart- reviewed    Review of Systems   Constitutional: Positive for fatigue. Negative for chills, fever and unexpected weight change.   HENT: Positive for congestion. Negative for trouble swallowing and voice change.    Eyes: Positive for discharge. Negative for visual disturbance.   Respiratory: Positive for apnea and shortness of breath. Negative for cough.    Cardiovascular: Positive for leg swelling. Negative for chest pain.   Gastrointestinal: Negative for abdominal pain, nausea and vomiting.   Endocrine: Positive for polyuria. Negative for cold intolerance and heat intolerance.   Genitourinary: Positive for frequency. Negative for difficulty urinating and urgency.   Musculoskeletal: Positive for arthralgias, back pain and neck stiffness.   Skin: Negative for rash and wound.   Allergic/Immunologic: Negative for immunocompromised state.   Neurological: Negative for weakness and numbness.   Hematological: Does not bruise/bleed easily.   Psychiatric/Behavioral: Negative for dysphoric mood. The patient is nervous/anxious.        PE: Constitutional: Vital signs above-noted.  Awake, alert and oriented he is morbidly obese but also just a big man    Psychiatric: Affect and insight do not appear grossly disturbed.    Pulmonary: Breathing is unlabored, color is good.    Skin: Warm, dry and normal turgor    Cardiac: Radial pulses intact.  No edema.    Eyesight and hearing appear  adequate for examination purposes      Musculoskeletal:  Posture is unremarkable to coronal and sagittal inspection.  Motion appears somewhat stiff.  The skin about the area is intact.  There is no palpable or visible deformity.  There is no local spasm. There is moderate tenderness to percussion and palpation of the mid cervical spine.     Neurologic:  In the bilateral upper extremities there is no evidence of atrophy.  Motor function is undisturbed in shoulder abduction, elbow flexion, wrist extension, finger extension, triceps extension, or finger abduction.  Sensation appears symmetrically intact to light touch.  Reflexes are 2+ and symmetrical in the biceps, brachioradialis, and triceps. Babb test is negative.  Gait appears undisturbed.  Spurling test is negative.      MEDICAL DECISION MAKING    XRAY: Plain film x-rays of the cervical spine  show degenerative changes and loss of lordosis.  No comparison views are available.  MRI shows multilevel foraminal stenosis with at least one level of moderate canal stenosis.  A lot of motion artifact is noted.  No evidence of myelomalacia.    Other: n/a    Impression: Cervical spondylosis with radiculopathy, I doubt myelopathy    Plan: For now epidural injections and traction.  If he fails to improve then a myelogram and CT scan of the cervical spine could be useful for better planning surgical intervention.

## 2020-09-09 ENCOUNTER — ANESTHESIA EVENT (OUTPATIENT)
Dept: PAIN MEDICINE | Facility: HOSPITAL | Age: 75
End: 2020-09-09

## 2020-09-09 ENCOUNTER — ANESTHESIA (OUTPATIENT)
Dept: PAIN MEDICINE | Facility: HOSPITAL | Age: 75
End: 2020-09-09

## 2020-09-09 ENCOUNTER — HOSPITAL ENCOUNTER (OUTPATIENT)
Dept: PAIN MEDICINE | Facility: HOSPITAL | Age: 75
Discharge: HOME | End: 2020-09-09

## 2020-09-09 ENCOUNTER — HOSPITAL ENCOUNTER (OUTPATIENT)
Dept: GENERAL RADIOLOGY | Facility: HOSPITAL | Age: 75
Discharge: HOME | End: 2020-09-09

## 2020-09-09 VITALS
RESPIRATION RATE: 16 BRPM | HEART RATE: 66 BPM | SYSTOLIC BLOOD PRESSURE: 141 MMHG | DIASTOLIC BLOOD PRESSURE: 75 MMHG | BODY MASS INDEX: 40.43 KG/M2 | TEMPERATURE: 98.6 F | WEIGHT: 315 LBS | HEIGHT: 74 IN | OXYGEN SATURATION: 96 %

## 2020-09-09 DIAGNOSIS — Q76.49 CONGENITAL CERVICAL SPINE STENOSIS: Primary | ICD-10-CM

## 2020-09-09 DIAGNOSIS — M48.02 CERVICAL STENOSIS OF SPINE: ICD-10-CM

## 2020-09-09 DIAGNOSIS — R52 PAIN: ICD-10-CM

## 2020-09-09 PROCEDURE — 25010000002 MIDAZOLAM PER 1 MG: Performed by: ANESTHESIOLOGY

## 2020-09-09 PROCEDURE — C1755 CATHETER, INTRASPINAL: HCPCS

## 2020-09-09 PROCEDURE — 25010000002 DEXAMETHASONE SODIUM PHOSPHATE 10 MG/ML SOLUTION: Performed by: ANESTHESIOLOGY

## 2020-09-09 PROCEDURE — 77003 FLUOROGUIDE FOR SPINE INJECT: CPT

## 2020-09-09 RX ORDER — METHYLPREDNISOLONE ACETATE 80 MG/ML
80 INJECTION, SUSPENSION INTRA-ARTICULAR; INTRALESIONAL; INTRAMUSCULAR; SOFT TISSUE ONCE
Status: DISCONTINUED | OUTPATIENT
Start: 2020-09-09 | End: 2020-09-10 | Stop reason: HOSPADM

## 2020-09-09 RX ORDER — MIDAZOLAM HYDROCHLORIDE 1 MG/ML
1 INJECTION INTRAMUSCULAR; INTRAVENOUS AS NEEDED
Status: DISCONTINUED | OUTPATIENT
Start: 2020-09-09 | End: 2020-09-10 | Stop reason: HOSPADM

## 2020-09-09 RX ORDER — SODIUM CHLORIDE 0.9 % (FLUSH) 0.9 %
3 SYRINGE (ML) INJECTION EVERY 12 HOURS SCHEDULED
Status: DISCONTINUED | OUTPATIENT
Start: 2020-09-09 | End: 2020-09-10 | Stop reason: HOSPADM

## 2020-09-09 RX ORDER — SODIUM CHLORIDE 0.9 % (FLUSH) 0.9 %
3-10 SYRINGE (ML) INJECTION AS NEEDED
Status: DISCONTINUED | OUTPATIENT
Start: 2020-09-09 | End: 2020-09-10 | Stop reason: HOSPADM

## 2020-09-09 RX ORDER — LIDOCAINE HYDROCHLORIDE 10 MG/ML
1 INJECTION, SOLUTION INFILTRATION; PERINEURAL ONCE AS NEEDED
Status: DISCONTINUED | OUTPATIENT
Start: 2020-09-09 | End: 2020-09-10 | Stop reason: HOSPADM

## 2020-09-09 RX ORDER — FENTANYL CITRATE 50 UG/ML
50 INJECTION, SOLUTION INTRAMUSCULAR; INTRAVENOUS AS NEEDED
Status: DISCONTINUED | OUTPATIENT
Start: 2020-09-09 | End: 2020-09-10 | Stop reason: HOSPADM

## 2020-09-09 RX ORDER — DEXAMETHASONE SODIUM PHOSPHATE 10 MG/ML
10 INJECTION, SOLUTION INTRAMUSCULAR; INTRAVENOUS ONCE
Status: COMPLETED | OUTPATIENT
Start: 2020-09-09 | End: 2020-09-09

## 2020-09-09 RX ORDER — SODIUM CHLORIDE 0.9 % (FLUSH) 0.9 %
1-10 SYRINGE (ML) INJECTION AS NEEDED
Status: DISCONTINUED | OUTPATIENT
Start: 2020-09-09 | End: 2020-09-10 | Stop reason: HOSPADM

## 2020-09-09 RX ADMIN — MIDAZOLAM 1 MG: 1 INJECTION INTRAMUSCULAR; INTRAVENOUS at 14:10

## 2020-09-09 RX ADMIN — DEXAMETHASONE SODIUM PHOSPHATE 10 MG: 10 INJECTION, SOLUTION INTRAMUSCULAR; INTRAVENOUS at 14:15

## 2020-09-09 NOTE — H&P
"[facility]    History and Physical    Patient Name: James Loaiza  :  1945  MRN:  [id number]  Date of Admission: 2020    Subjective     Patient is a 74 y.o. male presents with chief complaint of chronic neck pain.  Onset of symptoms was gradual starting several years ago.  Symptoms are associated/aggravated by nothing in particular. Symptoms improve with nothing    The following portions of the patients history were reviewed and updated as appropriate: current medications, allergies, past medical history, past surgical history, past family history, past social history and problem list     MRI shows degeneration and stenosis                Objective     Past Medical History:   Past Medical History:   Diagnosis Date   • Abnormal EKG     referring to cardiology   • Anxiety    • Arthritis    • Back pain     worsening   • BPH (benign prostatic hyperplasia)     BPH/Nocturia/ Urinary Frequency   • Breast nodule     right   • Cardiomegaly     Cardiomegaly/ SOB with exertion   • Circulation problem    • Constipation    • COPD (chronic obstructive pulmonary disease) (CMS/[facility])    • Deviated septum    • DJD (degenerative joint disease)     DJD Knees   • DVT (deep venous thrombosis) (CMS/[facility])    • Dysphagia     solids and liquids - resolved with normal studies   • Encounter for annual health examination 2013    Annual Health Assessment   • Enlarged prostate    • Fatigue     Extreme fatigue   • Hyperlipidemia    • Hypertension     followed Dr. Marcelo   • Hypogonadism male    • Left hip pain     and DJD   • Left leg pain    • Low back pain    • Moist mucous membranes of ear, nose, and throat     \"Mucous in throat\"   • Morbid obesity (CMS/[facility])     (BMI-41.2za)   • Muscle cramping    • Muscle spasm     Muscle spasms   • Neck arthritis    • Neck muscle spasm     Neck muscle spasm/Cervical strain   • Neck pain    • Obesity    • Plantar fasciitis    • Prostatitis     recurrent   • Psoriasis    • Pulmonary " "embolus (CMS/[facility]) 07/2015    on Xarelltoypseerlipidemia   • Radiculopathy     Radiculopathy / Neuropathy left ar   • Renal insufficiency     followed by Dr. Mendes   • Seborrhea    • Shortness of breath    • Sleep apnea     Obstructive Sleep apnea worsening   • Urinary frequency    • Vascular disorder    • Vertigo    • Weight gain    • Wellness examination 10/23/2014    Annual Wellness Visit     Past Surgical History:   Past Surgical History:   Procedure Laterality Date   • APPENDECTOMY  1965   • CARDIAC CATHETERIZATION  07/29/2010    [facility]   • COLONOSCOPY  01/05/2010   • COLONOSCOPY  10/01/2001   • NASAL SEPTUM SURGERY     • NOSE SURGERY  1999   • TURP / TRANSURETHRAL INCISION / DRAINAGE PROSTATE  10/23/2015    [facility] Castro     Family History:   Family History   Problem Relation Age of Onset   • Arthritis Mother    • Heart disease Mother    • Hypertension Mother    • Heart attack Father    • Heart disease Father    • Hypertension Father    • Arthritis Sister    • Multiple sclerosis Sister    • Alcohol abuse Brother    • Diabetes Son      Social History:   Social History     Tobacco Use   • Smoking status: Never Smoker   • Smokeless tobacco: Never Used   Substance Use Topics   • Alcohol use: Yes     Frequency: Never     Comment: social   • Drug use: No       Vital Signs Range for the last 24 hours  Temperature: Temp:  [37 °C (98.6 °F)] 37 °C (98.6 °F)   Temp Source: Temp src: Infrared   BP: BP: (199)/(115) 199/115   Pulse: Heart Rate:  [80] 80   Respirations: Resp:  [16] 16   SPO2: SpO2:  [99 %] 99 %   O2 Amount (l/min):     O2 Devices Device (Oxygen Therapy): room air   Weight: Weight:  [147 kg (325 lb)] 147 kg (325 lb)     Flowsheet Rows      First Filed Value   Admission Height  188 cm (74\") Documented at 09/09/2020 1343   Admission Weight  (!) 147 kg (325 lb) Documented at 09/09/2020 1343    "       --------------------------------------------------------------------------------    Current Outpatient Medications   Medication Sig Dispense Refill   • ALPRAZolam (XANAX) 0.5 MG tablet Take 1 tablet by mouth 2 (Two) Times a Day As Needed for Anxiety. 6 tablet 0   • apixaban (ELIQUIS) 5 MG tablet tablet Take 1 tablet by mouth Every 12 (Twelve) Hours. 6 tablet 0   • aspirin 325 MG tablet Take by mouth daily.     • atorvastatin (LIPITOR) 40 MG tablet TAKE ONE TABLET BY MOUTH DAILY 90 tablet 1   • baclofen (LIORESAL) 10 MG tablet Take 1 tablet by mouth At Night As Needed for Muscle Spasms. 30 tablet 1   • busPIRone (BUSPAR) 5 MG tablet Take 1 tablet by mouth At Night As Needed (anxiety). 30 tablet 3   • diclofenac (VOLTAREN) 1 % gel gel Apply 4 g topically to the appropriate area as directed 2 (Two) Times a Day. 3 tube 2   • furosemide (LASIX) 40 MG tablet PT REPORTS HE TAKES ONCE EVERY WEEK OR TWO     • hydrALAZINE (APRESOLINE) 10 MG tablet Take 10 mg by mouth.     • HYDROcodone-acetaminophen (NORCO) 5-325 MG per tablet Take 1 tablet by mouth Every 4 (Four) Hours As Needed for Moderate Pain . 18 tablet 0   • lidocaine (LIDODERM) 5 % Place 1 patch on the skin as directed by provider Daily. Remove & Discard patch within 12 hours or as directed by MD 30 each 1   • NIFEdipine XL (PROCARDIA XL) 60 MG 24 hr tablet TAKE ONE TABLET BY MOUTH DAILY     • ramipril (Altace) 10 MG capsule Take 1 capsule by mouth Daily. 90 capsule 2   • tamsulosin (FLOMAX) 0.4 MG capsule 24 hr capsule Take 1 capsule by mouth Every Night.     • tiotropium bromide-olodaterol (STIOLTO RESPIMAT) 2.5-2.5 MCG/ACT aerosol solution inhaler Inhale Daily.     • vitamin D (ERGOCALCIFEROL) 53992 units capsule capsule Take 1 capsule by mouth 1 (One) Time Per Week. 12 capsule 1     Current Facility-Administered Medications   Medication Dose Route Frequency Provider Last Rate Last Dose   • fentaNYL citrate (PF) (SUBLIMAZE) injection 50 mcg  50 mcg  Intravenous PRN Render, Armin Alba MD       • iopamidol (ISOVUE-M 200) injection 41%  12 mL Epidural Once in imaging Render, Armin Alba MD       • lidocaine (XYLOCAINE) 1 % injection 1 mL  1 mL Intradermal Once PRN Render, Armin Alba MD       • methylPREDNISolone acetate (DEPO-medrol) injection 80 mg  80 mg Intra-articular Once Render, Armin Alba MD       • midazolam (VERSED) injection 1 mg  1 mg Intravenous PRN Render, Armin Alba MD       • sodium chloride 0.9 % flush 1-10 mL  1-10 mL Intravenous PRN Armin Manzo MD       • sodium chloride 0.9 % flush 3 mL  3 mL Intravenous Q12H Render, Armin Alba MD       • sodium chloride 0.9 % flush 3-10 mL  3-10 mL Intravenous PRN RenderArmin MD           --------------------------------------------------------------------------------  Assessment/Plan      Anesthesia Evaluation           Pain impairs ability to perform ADLs: Sleeping       Airway   Mallampati: III  TM distance: >3 FB  Neck ROM: full  Possible difficult intubation  Dental - normal exam     Pulmonary - normal exam   (+) pulmonary embolism, COPD, shortness of breath, sleep apnea on CPAP,   Cardiovascular     Rhythm: regular    (+) hypertension, CAD, DVT, hyperlipidemia,   (-) murmur      Neuro/Psych- neuro exam normal  (+) headaches,     GI/Hepatic/Renal/Endo    (+) morbid obesity,  renal disease,     Musculoskeletal (-) normal exam    (+) neck pain,   Abdominal    Substance History      OB/GYN          Other                   Diagnosis and Plan    Treatment Plan  ASA 3      Procedures: Cervical Epidural Steroid Injection(ELENITA), With fluoroscopy,               Diagnosis     * 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

## 2020-09-09 NOTE — ANESTHESIA PROCEDURE NOTES
PAIN Epidural block    Pre-sedation assessment completed: 9/9/2020 2:05 PM    Patient reassessed immediately prior to procedure    Patient location during procedure: pain clinic  Start Time: 9/9/2020 2:05 PM  Stop Time: 9/9/2020 2:15 PM  Indication:at surgeon's request and procedure for pain  Performed By  Anesthesiologist: Armin Manzo MD  Preanesthetic Checklist  Completed: patient identified, surgical consent, pre-op evaluation, timeout performed, risks and benefits discussed and monitors and equipment checked  Additional Notes  Post-Op Diagnosis Codes:     * Cervical spine degeneration (M47.812)     * Cervical neuritis (M54.12)     * Cervical stenosis of spine (M48.02)    Prep:  Pt Position:prone  Sterile Tech:sterile barrier, mask and gloves  Prep:chlorhexidine gluconate and isopropyl alcohol  Monitoring:blood pressure monitoring, continuous pulse oximetry and EKG  Procedure:Sedation: yes     Approach:midline  Guidance: fluoroscopy  Location:cervical  Level:6-7  Needle Gauge:20 G  Aspiration:negative  Test Dose:negative  Medications:  Analgesia: dexamethasone 10 mg.  Comments:Epidural steroid injection was performed what I believed was the C6-7 level.  Due to his very large body habitus and difficulty positioning was difficult to count levels.  Lateral fluoroscopic guidance was used to place the needle tip into the interspinous ligament and then slowly advanced into there is loss resistance at the appropriate level.  Positioning was confirmed by lateral fluoroscopic imaging.  There is no return of red cells or CSF.  Dexamethasone was slowly injected and the needle was withdrawn.  Post Assessment:  Pt Tolerance:patient tolerated the procedure well with no apparent complications  Complications:no

## 2020-09-14 ENCOUNTER — TELEPHONE (OUTPATIENT)
Dept: PAIN MEDICINE | Facility: CLINIC | Age: 75
End: 2020-09-14

## 2020-09-14 NOTE — TELEPHONE ENCOUNTER
Pt called stg he had a recent cervical epidural that offered him 100% relief x 4 days then pain returned he wanted to discuss pain further with you, told zhao to call and schedule f/u appt. Please advise. Thank you.

## 2020-09-21 ENCOUNTER — OFFICE VISIT (OUTPATIENT)
Dept: PAIN MEDICINE | Facility: CLINIC | Age: 75
End: 2020-09-21

## 2020-09-21 ENCOUNTER — PROCEDURE VISIT (OUTPATIENT)
Dept: PAIN MEDICINE | Facility: CLINIC | Age: 75
End: 2020-09-21

## 2020-09-21 VITALS
WEIGHT: 315 LBS | TEMPERATURE: 97.3 F | HEIGHT: 74 IN | SYSTOLIC BLOOD PRESSURE: 145 MMHG | BODY MASS INDEX: 40.43 KG/M2 | OXYGEN SATURATION: 94 % | DIASTOLIC BLOOD PRESSURE: 79 MMHG | HEART RATE: 63 BPM | RESPIRATION RATE: 16 BRPM

## 2020-09-21 DIAGNOSIS — G89.29 OTHER CHRONIC PAIN: Primary | ICD-10-CM

## 2020-09-21 DIAGNOSIS — M54.81 BILATERAL OCCIPITAL NEURALGIA: Primary | ICD-10-CM

## 2020-09-21 DIAGNOSIS — M54.81 BILATERAL OCCIPITAL NEURALGIA: ICD-10-CM

## 2020-09-21 DIAGNOSIS — M43.06 LUMBAR SPONDYLOLYSIS: ICD-10-CM

## 2020-09-21 DIAGNOSIS — M48.02 CERVICAL STENOSIS OF SPINE: ICD-10-CM

## 2020-09-21 DIAGNOSIS — G89.29 CHRONIC LOW BACK PAIN, UNSPECIFIED BACK PAIN LATERALITY, UNSPECIFIED WHETHER SCIATICA PRESENT: ICD-10-CM

## 2020-09-21 DIAGNOSIS — M54.50 CHRONIC LOW BACK PAIN, UNSPECIFIED BACK PAIN LATERALITY, UNSPECIFIED WHETHER SCIATICA PRESENT: ICD-10-CM

## 2020-09-21 DIAGNOSIS — M54.12 LEFT CERVICAL RADICULOPATHY: ICD-10-CM

## 2020-09-21 PROCEDURE — 64405 NJX AA&/STRD GR OCPL NRV: CPT | Performed by: ANESTHESIOLOGY

## 2020-09-21 PROCEDURE — 99214 OFFICE O/P EST MOD 30 MIN: CPT | Performed by: NURSE PRACTITIONER

## 2020-09-21 RX ORDER — HYDROCODONE BITARTRATE AND ACETAMINOPHEN 5; 325 MG/1; MG/1
1 TABLET ORAL EVERY 4 HOURS PRN
Qty: 18 TABLET | Refills: 0 | Status: SHIPPED | OUTPATIENT
Start: 2020-09-21 | End: 2021-02-24

## 2020-09-21 NOTE — PROGRESS NOTES
CHIEF COMPLAINT: Neck Pain      James Loaiza is a 74 y.o. male.  He is here for the following procedure:  bilateral occipital nerve block.     See other note for vitals today.           Bupivacaine Lot#: [id number] Exp: Aug 2022  Depo Medrol Lot#: [id number] Exp: 08/2021      Procedures    Bilateral occipital nerve block:    Informed consent was obtained.  We discussed items including but not limited to bleeding and nerve injury and infection and paralysis and stroke and death.    A solution was prepared of 8 mL of 0.25% bupivacaine and 80 mg of Depo-Medrol.    The left and right occipital areas were cleansed with alcohol ×2 and then a 25-gauge needle was inserted just medial to each of the occipital arteries until contacting periosteum and aspiration was negative and then 3 mL of the above solution was injected.  The needle at each location was withdrawn to the skin and then redirected laterally and aspiration was checked and then I proceeded to inject another 2  milliliters of medication in the vicinity of the lesser occipital branch.  Again this was bilaterally performed.      The needle was removed intact.  Bleeding was minimal.  No apparent complications.          Visit Diagnoses:  1. Bilateral occipital neuralgia        Edu Tineo MD  Pain Management

## 2020-09-21 NOTE — PROGRESS NOTES
CHIEF COMPLAINT  F/U back pain- patient states his pain has worsened since his last visit.     Subjective   James Loaiza is a 74 y.o. male  who presents for follow-up.  He has a history of chronic back pain. Reports this is worse since last evaluation.    Patient presents for follow-up of PROCEDURE. Patient had a ELENITA performed by Dr. BERNAL([facility] on 9-9-20 for management of NECK PAIN. Patient reports 100% relief from the procedure for 4 days.    At last office visit, was ordered to have right SI joint injection.  He did not have this because he was feeling better.    Saw PCP for more recent increased neck pain. Had CT and MRI. Also prescribed Baclofen. Could not tolerate full dose.     Complains of pain in his low back and neck. Is also having right shoulder pain. Today his pain is 3/10VAS.  Describes his pain as nearly continuous aching and throbbing. Pain increases with activity, laying on back, household chores; pain decreases with rest, medication and procedures. Has been prescribed Hydrocodone 5/235 acutely previously. Helped pain. No side effects. Denies any bowel or bladder changes.    Remains on Eliquis.   He completed a Bilateral L2-5 Lumbar Medial Branch RADIOFREQUENCY   on  6/22/18 performed by Dr. COOPER for management of low back pain. Patient reports 75% ongoing relief from the procedure.     Patient remained masked during entire encounter. No cough present. I donned a mask and faceshield throughout entire visit. Prior to donning mask and faceshield, hand hygiene was performed, as well as when it was doffed.  I was closer than 6 feet, but not for an extended period of time. No obvious exposure to any bodily fluids.    Back Pain  This is a recurrent problem. The current episode started more than 1 month ago. The problem occurs constantly. Progression since onset: worse since last office visit. The pain is present in the lumbar spine and sacro-iliac. The quality of the pain is described as aching. The  pain does not radiate. The pain is at a severity of 3/10. The pain is moderate. Worse during: frequent urination at night which increases his pain--getting up and out of bed. The symptoms are aggravated by standing and twisting. Stiffness is present all day. Associated symptoms include numbness (right leg). Pertinent negatives include no abdominal pain, bladder incontinence, bowel incontinence, chest pain, dysuria, fever, headaches or weakness. Risk factors include lack of exercise, obesity and recent trauma. Treatments tried: lumbar RFL, OTC IBU.   Neck Pain   This is a new problem. The current episode started more than 1 month ago. The problem occurs constantly. The problem has been waxing and waning. The pain is associated with nothing. The pain is present in the occipital region. The pain is at a severity of 7/10. The pain is severe. Associated symptoms include numbness (right leg). Pertinent negatives include no chest pain, fever, headaches or weakness.      MRI OF THE CERVICAL SPINE WITHOUT CONTRAST 08/23/2020     CLINICAL HISTORY: Abnormal cervical spine x-ray demonstrating  degenerative disc disease of the cervical spine, patient has neck pain  and bilateral shoulder pain, right worse than left.     TECHNIQUE: Sagittal T1, gradient echo T2 and fat-suppressed fast spin  echo T2-weighted images were obtained of the cervical spine, in addition  axial gradient echo T2 weighted images were obtained from the skull base  to the T1 thoracic level.     COMPARISON: This is correlated to an outside MRI of the cervical spine  from [facility] on 07/28/2016.     FINDINGS: Exam is compromised by significant patient motion artifact  with substantial blurring of images on the sagittal images limits  evaluation.     There are some arthritic changes at the atlantodental interval,  otherwise the C1-C2 level is within normal limits.     At C2-C3, there is disc space narrowing and degenerative  endplate  changes, posterior spurs abut the ventral cord mildly narrowing the  canal. There is some mild right facet overgrowth and uncovertebral joint  hypertrophy. There is mild right foraminal narrowing. Left facets and  uncovertebral joints are normal with no left foraminal narrowing.      At C3-C4, the sagittal images are so severely blurred that the C3-C4  level is inadequately assessed in the axial images. There appears to be  some posterior endplate spurs that abut and deform the ventral surface  of the cord. On the axial images, there appears to be up to moderate  canal narrowing. There is moderate right facet overgrowth, right-sided  uncovertebral joint hypertrophy. There is moderate right foraminal  narrowing. There is some left-sided uncovertebral joint hypertrophy and  mild left facet overgrowth with mild-to-moderate left foraminal  narrowing.     At C4-C5, there is posterior endplate spurs abut the ventral surface of  the cord mildly narrowing the canal. There is mild facet overgrowth and  uncovertebral joint hypertrophy. There is mild-to-moderate left and  moderate right bony foraminal narrowing.     At C5-C6, there is moderate disc space narrowing. There is some anterior  marginal spurring. There is minimal posterior endplate spurring, minimal  canal narrowing. The facets are normal. There is some right-sided  uncovertebral joint hypertrophy and there is mild right and no left  foraminal narrowing.     At C6-C7, there is some anterior marginal endplate spurring. Posterior  disc margin is normal with no canal narrowing. There is mild  uncovertebral joint hypertrophy, and there is up to mild foraminal  narrowing.     At C7-T1, there is anterior marginal endplate spurring. There is mild  posterior disc osteophyte complex, minimal canal narrowing. There is  mild foraminal narrowing.     IMPRESSION:  1. This exam is significantly compromised by patient motion artifact.  There is blurring of the sagittal  images, significantly limits  evaluation, and furthermore the axial images are degraded and axial  images also tend to overestimate the degree of canal narrowing, thus  this is a limited evaluation of the cervical spine.  2. There is some disc space narrowing and degenerative endplate changes  throughout the cervical spine. At C2-C3, posterior endplate spurs abut  the ventral cord, mildly narrowing the canal, and there is mild right  facet overgrowth and uncovertebral joint hypertrophy with mild right  foraminal narrowing.  3. At C3-C4, again images are severely blurred on the sagittal images,  obscures evaluation of the C3-C4 level. There is mild motion artifact on  the axial images, limits evaluation. There is posterior endplate  spurring that appear to abut and deform the ventral surface of the cord  contributing to up to moderate canal narrowing. There is moderate right  facet overgrowth and some uncovertebral joint spurring, and there is  moderate right and mild-to-moderate left bony foraminal narrowing at  C3-C4.  4. At C4-C5, posterior endplate spurs abut the ventral cord, mildly  narrowing the canal, and there is some mild facet overgrowth and  uncovertebral joint hypertrophy. There is mild-to-moderate left and  moderate right bony foraminal narrowing.  5. There is some anterior marginal endplate spurring from C5 down to T2.  At C5-C6, there is minimal canal and mild right foraminal narrowing. At  C7-T1, there is minimal posterior spurring with mild canal and there is  mild bilateral foraminal narrowing. The remainder of the cervical spine  MRI is unremarkable. If the patient has cervical radiculopathy,  myelogram and postmyelogram CT is recommended for a more comprehensive  assessment as this study provides limited evaluation due to motion  artifact.     This report was finalized on 8/26/2020 7:17 AM by Dr. Adis Lares M.D.    PEG Assessment   What number best describes your pain on average in the past  "week?7  What number best describes how, during the past week, pain has interfered with your enjoyment of life?8  What number best describes how, during the past week, pain has interfered with your general activity?  4    The following portions of the patient's history were reviewed and updated as appropriate: allergies, current medications, past family history, past medical history, past social history, past surgical history and problem list.    Review of Systems   Constitutional: Positive for activity change (decreased). Negative for chills, fatigue and fever.   HENT: Negative for congestion.    Eyes: Negative for visual disturbance.   Respiratory: Negative for chest tightness and shortness of breath.    Cardiovascular: Negative for chest pain.   Gastrointestinal: Negative for abdominal pain, bowel incontinence, constipation and diarrhea.   Genitourinary: Negative for bladder incontinence, difficulty urinating and dysuria.   Musculoskeletal: Positive for back pain and neck pain.   Neurological: Positive for numbness (right leg). Negative for dizziness, weakness, light-headedness and headaches.   Psychiatric/Behavioral: Positive for agitation and sleep disturbance. The patient is nervous/anxious.      I have reviewed and confirmed the accuracy of the ROS as documented by the MA/LPN/RN Sarai Bermudez, YANIRA      Vitals:    09/21/20 0946   BP: 145/79   Pulse: 63   Resp: 16   Temp: 97.3 °F (36.3 °C)   SpO2: 94%   Weight: (!) 156 kg (344 lb)   Height: 188 cm (74\")   PainSc:   3   PainLoc: Back     Objective   Physical Exam  Vitals signs and nursing note reviewed.   Constitutional:       Appearance: Normal appearance. He is well-developed.   HENT:      Head: Normocephalic and atraumatic.      Nose: Nose normal.   Eyes:      General: Lids are normal.      Conjunctiva/sclera: Conjunctivae normal.   Neck:      Musculoskeletal: Neck supple. Decreased range of motion. Pain with movement, spinous process tenderness and " "muscular tenderness present.      Trachea: Trachea normal.      Comments: Exquisite tenderness of bilateral occipital area  Cardiovascular:      Rate and Rhythm: Normal rate.   Pulmonary:      Effort: Pulmonary effort is normal. No respiratory distress.   Abdominal:      General: Abdomen is protuberant.   Musculoskeletal:      Lumbar back: He exhibits decreased range of motion, tenderness and bony tenderness (moderate tenderness bilateral L2-L5 facets, \"+ loading maneuver).   Skin:     General: Skin is warm and dry.   Neurological:      Mental Status: He is alert and oriented to person, place, and time.      Gait: Gait abnormal.      Deep Tendon Reflexes:      Reflex Scores:       Patellar reflexes are 1+ on the right side and 1+ on the left side.  Psychiatric:         Speech: Speech normal.         Behavior: Behavior normal. Behavior is cooperative.         Thought Content: Thought content normal.         Judgment: Judgment normal.         Assessment/Plan   James was seen today for back pain.    Diagnoses and all orders for this visit:    Other chronic pain    Chronic low back pain, unspecified back pain laterality, unspecified whether sciatica present  -     Case Request    Lumbar spondylolysis  -     Case Request    Left cervical radiculopathy    Cervical stenosis of spine    Bilateral occipital neuralgia  -     Case Request    Other orders  -     HYDROcodone-acetaminophen (NORCO) 5-325 MG per tablet; Take 1 tablet by mouth Every 4 (Four) Hours As Needed for Moderate Pain .      --- Bilateral occipital nerve block in office today with Dr Tineo. Reviewed the procedure at length with the patient.  Included in the review was expectations, complications, risk and benefits.The procedure was described in detail and the risks, benefits and alternatives were discussed with the patient (including but not limited to: bleeding, infection, nerve damage, worsening of pain, inability to perform injection, paralysis, " seizures, and death) who agreed to proceed.  Discussed the potential for sedation if warranted/wanted.  The procedure will plan to be performed at [facility] with fluoroscopic guidance(unless ultrasound is indicated) and could potentially have steroids and contrast dye used. Questions were answered and in a way the patient could understand.  Patient verbalized understanding and wishes to proceed.  This intervention will be ordered.  Discussed with patient that all procedures are part of a multimodal plan of care and include either formal PT or a home exercise program.  Patient has no evidence of coagulopathy or current infection.    --- Bilateral L2-L5 MBB x1. Stop eliquis 3 day prior. Reviewed the procedure at length with the patient.  Included in the review was expectations, complications, risk and benefits.The procedure was described in detail and the risks, benefits and alternatives were discussed with the patient (including but not limited to: bleeding, infection, nerve damage, worsening of pain, inability to perform injection, paralysis, seizures, and death) who agreed to proceed.  Discussed the potential for sedation if warranted/wanted.  The procedure will plan to be performed at [facility] with fluoroscopic guidance(unless ultrasound is indicated) and could potentially have steroids and contrast dye used. Questions were answered and in a way the patient could understand.  Patient verbalized understanding and wishes to proceed.  This intervention will be ordered.  Discussed with patient that all procedures are part of a multimodal plan of care and include either formal PT or a home exercise program.  Patient has no evidence of coagulopathy or current infection.  --- ACute Hydrocodone 5/325.  Discussed medication with the patient.  Included in this discussion was the potential for side effects and adverse events.  Patient verbalized understanding and wished to  "proceed.  Prescription will be sent to pharmacy.  --- Follow-up after procedure or sooenr if needed.    -------  Education about Medial Branch Blockade and RF Therapy:    This medial branch blockade (MBB) suggested is intended for diagnostic purposes, with the intent of offering the patient Radiofrequency thermal rhizotomy (RF) if the MBB is diagnostically effective.  The diagnostic blockade is necessary to determine the likelihood that RF therapy could be efficacious in providing long term relief to the patient.    Medial branches are sensory nerve branches that connect to a facet joint and transmit sensations & pain signals from that joint.  Facet is a term for the type of joints found in the spine.  Medial branches are the nerves that go to a facet, and therefore are also sometimes called \"facet joint nerves\" (FJNs).      In a medial branch blockade procedure, xray fluoroscopy is used to verify the locations of the outside of the joint lines which are being targeted.  Under xray guidance, needles are placed to these areas.  Contrast dye is injected to confirm proper placement, with dye flowing over the joint area, and to ensure that the dye does not flow into unintended areas such as a vein.  When this is confirmed, local anesthetic is injected to block the medial branch at that joint level.      If MBBs are diagnostically successful in blocking pain, then the patient is most likely a great candidate for Radiofrequency of those facet joint nerves.  In the RF procedure, needles are placed to the joint lines in the same fashion, and after testing, the needle tips are heated to thermally treat the nerves, blocking the nerves by in essence damaging the nerves with the heat treatment.       Medically, a successful RF procedure should provide a patient with 50% pain relief or more for at least 6 months.  Clinical experience suggests that successful patients receive relief more in the range of 12 months on average.  We " also discussed that a fortunate minority of patients receive therapeutic success from the MBB, and may not require RF ablation.  If a patient receives more than 8 weeks of relief from MBB, then occasional repeat MBB for therapeutic purposes is a very reasonable alternative therapy.  This course of therapy is consistent with our LCDs according to our CMS  in the area, and therefore other insurance providers should follow accordingly.  We will monitor our patients to screen for these therapeutic responders and will offer RF therapy only when necessary.        We discussed that MBB & RF are not without risks.  Guidelines regarding anticoagulant use & neuraxial procedures will be respected.  Patients that are ill or otherwise may be at risk for sepsis will not have their spines accessed by neuraxial injections of any type.  This patient will not be offered these therapies if there is an increased risk.   We discussed that there is a risk of postprocedural pain and also a risk of worsening of clinical picture with these procedures as with any neuraxial procedure.    -------             DILAN REPORT  As part of the patient's treatment plan, I am prescribing controlled substances. The patient has been made aware of appropriate use of such medications, including potential risk of somnolence, limited ability to drive and/or work safely, and the potential for dependence or overdose. It has also bee made clear that these medications are for use by this patient only, without concomitant use of alcohol or other substances unless prescribed.     Patient has completed prescribing agreement detailing terms of continued prescribing of controlled substances, including monitoring DILAN reports, urine drug screening, and pill counts if necessary. The patient is aware that inappropriate use will results in cessation of prescribing such medications.    DILAN report has been reviewed and scanned into the patient's  chart.    As the clinician, I personally reviewed the DILAN from 9-18-20 while the patient was in the office today.    History and physical exam exhibit continued safe and appropriate use of controlled substances.        EMR Dragon/Transcription disclaimer:   Much of this encounter note is an electronic transcription/translation of spoken language to printed text. The electronic translation of spoken language may permit erroneous, or at times, nonsensical words or phrases to be inadvertently transcribed; Although I have reviewed the note for such errors, some may still exist.

## 2020-10-16 ENCOUNTER — LAB REQUISITION (OUTPATIENT)
Dept: LAB | Facility: HOSPITAL | Age: 75
End: 2020-10-16

## 2020-10-16 DIAGNOSIS — Z00.00 ENCOUNTER FOR GENERAL ADULT MEDICAL EXAMINATION WITHOUT ABNORMAL FINDINGS: ICD-10-CM

## 2020-10-19 ENCOUNTER — TELEPHONE (OUTPATIENT)
Dept: PAIN MEDICINE | Facility: CLINIC | Age: 75
End: 2020-10-19

## 2020-10-19 DIAGNOSIS — M54.81 BILATERAL OCCIPITAL NEURALGIA: Primary | ICD-10-CM

## 2020-10-19 PROCEDURE — U0004 COV-19 TEST NON-CDC HGH THRU: HCPCS | Performed by: ANESTHESIOLOGY

## 2020-10-19 NOTE — TELEPHONE ENCOUNTER
Patient called and states he had occipital nerve block completed in the office on 9/21/2020 which did help, but states the issues are coming back and he is having the headaches again. He wanted to know if this could be done at his appointment for injections on 10/21/2020

## 2020-10-20 LAB — SARS-COV-2 RNA RESP QL NAA+PROBE: NOT DETECTED

## 2020-10-20 RX ORDER — BACLOFEN 10 MG/1
TABLET ORAL
Qty: 30 TABLET | Refills: 5 | Status: SHIPPED | OUTPATIENT
Start: 2020-10-20 | End: 2021-02-24

## 2020-10-21 ENCOUNTER — DOCUMENTATION (OUTPATIENT)
Dept: PAIN MEDICINE | Facility: CLINIC | Age: 75
End: 2020-10-21

## 2020-10-21 NOTE — PROGRESS NOTES
Plan was for LMBB and also Occ NB today    He did not hold his eliquis appropriately    Will hold & we have offered jurgen for Friday.

## 2020-10-23 ENCOUNTER — DOCUMENTATION (OUTPATIENT)
Dept: PAIN MEDICINE | Facility: CLINIC | Age: 75
End: 2020-10-23

## 2020-10-23 ENCOUNTER — OUTSIDE FACILITY SERVICE (OUTPATIENT)
Dept: PAIN MEDICINE | Facility: CLINIC | Age: 75
End: 2020-10-23

## 2020-10-23 PROCEDURE — 64494 INJ PARAVERT F JNT L/S 2 LEV: CPT | Performed by: ANESTHESIOLOGY

## 2020-10-23 PROCEDURE — 64495 INJ PARAVERT F JNT L/S 3 LEV: CPT | Performed by: ANESTHESIOLOGY

## 2020-10-23 PROCEDURE — 64405 NJX AA&/STRD GR OCPL NRV: CPT | Performed by: ANESTHESIOLOGY

## 2020-10-23 PROCEDURE — 64493 INJ PARAVERT F JNT L/S 1 LEV: CPT | Performed by: ANESTHESIOLOGY

## 2020-10-23 NOTE — PROGRESS NOTES
TWO INDEPENDENT PROCEDURES:  LMBB & Occipital block    -------      Bilateral L2-5 Lumbar Medial Branch Blockade  [facility]    PREOPERATIVE DIAGNOSIS:  Lumbar spondylosis without myelopathy    POSTOPERATIVE DIAGNOSIS:  Lumbar spondylosis without myelopathy    PROCEDURE:   Diagnostic Bilateral Lumbar Medial Branch Nerve Blockades, with fluoroscopy:  L2, L3, L4, and L5 nerves (at the L3, L4, L5 transverse processes and the sacral alar groove) to block facet joints L3-4, L4-5, and L5-S1  1. 47639-29 -- Bilateral Lumbar Facet blocks, 1st Level  2. 14676-91 -- Bilateral Lumbar Facet blocks, 2nd  Level  3. 24066-89 -- Bilateral Lumbar Facet blocks, 3rd Level    PRE-PROCEDURE DISCUSSION WITH PATIENT:    Risks and complications were discussed with the patient prior to starting the procedure and informed consent was obtained.      SURGEON:  Edu Tineo MD    REASON FOR PROCEDURE:   The patient complains of pain that seems to have a significant axial component and Previous diagnostic positivity of a Lumbar Medial Branch Blockade at the same levels    SEDATION:  Versed 4mg & Fentanyl 50 mcg IV  ANESTHETIC:  Marcaine 0.5%  STEROID:  Methylprednisolone (DEPO MEDROL) 80mg/ml  TOTAL VOLUME OF SOLUTION: 8ml    DESCRIPTON OF PROCEDURE:  After obtaining informed consent, IV access was obtained in the preoperative area.   The patient was taken to the operating room.  The patient was placed in the prone position with a pillow under the abdomen. All pressure points were well padded.  EKG, blood pressure, and pulse oximeter were monitored.  The patient was monitored and sedated by the RN under my direction. The lumbosacral area was prepped with Chloraprep and draped in a sterile fashion. Under fluoroscopic guidance the transverse processes of the L3, L4, and L5 vertebrae at the junctions of the superior articular processes were identified on the right. Also identified was the groove between the ala and the  superior articular process of the sacrum on the ipsilateral side.  Skin and subcutaneous tissue were anesthetized with 1% lidocaine above each of these points. A 22-gauge spinal needle was introduced under fluoroscopic guidance at the above junctions. Aspiration was negative for blood and CSF.  After confirming the position of the needle with fluoroscope in all views, 0.25 mL of Omnipaque was injected, and after seeing the proper spread a total of 1 mL of the anesthetic solution noted above was injected at each of these points.  Needles were removed intact from each of the areas.  A similar procedure was repeated to block the L2, L3, L4, and L5 nerves on the contralateral side.   Onset of analgesia was noted.  Vital signs remained stable throughout.      ESTIMATED BLOOD LOSS:  <5 mL  SPECIMENS:  none    COMPLICATIONS:   No complications were noted., There was no indication of vascular uptake on live injection of contrast dye., There was no indication of intrathecal uptake on live injection of contrast dye., There was not any evidence of dural puncture.   and The patient did not have any signs of postprocedure numbness nor weakness.    TOLERANCE & DISCHARGE CONDITION:    The patient tolerated the procedure well.  The patient was transported to the recovery area without difficulties.  The patient was discharged to home under the care of family in stable and satisfactory condition.    PLAN OF CARE:  1. The patient was given our standard instruction sheet.  2. We discussed that Lumbar Medial Branch Blockade is a diagnostic procedure in consideration for radiofrequency ablation if two diagnostic procedures prove to be positive for significant benefit.  If sustained relief of 6 to eight weeks is obtained, then an alternative plan could be therapeutic lumbar branch blockades.  3. The patient is asked to keep a pain log each hour for 8 hours after the procedure today.  4. The patient will  Return to clinic 1-2 wks.  5. The  patient will resume all medications as per the medication reconciliation sheet.        -----    Occipital Nerve Blockade:  bilateral  [facility]      PREOPERATIVE DIAGNOSIS:  Occipital Neuralgia, bilateral    POSTOPERATIVE DIAGNOSIS:  Same as preop diagnosis    PROCEDURE:   Greater occipital nerve blockade on the aforementioned laterality      PRE-PROCEDURE DISCUSSION WITH PATIENT:    Risks and complications were discussed with the patient prior to starting the procedure and informed consent was obtained.  We discussed various topics including but not limited to bleeding, infection, injury, nerve injury, paralysis, coma, death, postprocedural painful flare-up, postprocedural site soreness, and a lack of pain relief.        SURGEON:  Edu Tineo MD    REASON FOR PROCEDURE:    Previous relief, recurrent pain.  Having other procedure, so it is reasonable to perform this block today for his comfort and also to use the fluoroscopy to assist.   This patient demonstrates evidence of occipital neuralgia on the aforementioned laterality with radiating pain across the occiput and in the pattern of the occipital nerve.     SEDATION:  as above  ANESTHETIC:  Marcaine 0.5%  STEROID:  Methylprednisolone (DEPO MEDROL) 40mg/ml    DESCRIPTON OF PROCEDURE:  After obtaining informed consent, an I.V. was started in the preoperative area. The patient taken to the operating room and was placed in the prone  position.  All pressure points were well padded.  EKG, blood pressure, and pulse oximeter were monitored.  The appropriate area was prepped with Chloraprep and draped in a sterile fashion.     A point on each aforementioned side was identified just lateral and caudad to the external occipitial protuberance.  Palpation was utilized along with ultrasound when necessary to identify the occipital artery.  Needle was inserted through the skin, perpendicularly, with the placement being just medial to the occipital  artery.      Under lateral fluoroscopic view, contrast dye was injected and visualized to spread along the occiput in the vicinity of the occipital nerve.  Lack of vascular uptake was confirmed.  Live fluoroscopy was used for lateral and AP fluoro imaging to confirm a lack of vascular uptake on injection of contrast dye.  Then 3ml of local anesthetic solution was injected, checking for negative aspiration prior to injection and every 1ml.    Needle removed intact.      Then a line is noted between the occipital protuberance and the mastoid process, and at 2/3 of the distance from the occipital protuberance the needle tip was placed subcutaneously, negative aspiration confirmed, 1ml of contrast injected under live fluoroscopy to confirm lack of vascular uptake,and 2ml injected diffusely to block the lesser occipital nerve.  The needle was removed intact.      Vital signs were stable throughout.        ESTIMATED BLOOD LOSS:   None  SPECIMENS:  none    COMPLICATIONS:   No complications were noted., There was no indication of vascular uptake on live injection of contrast dye. and The patient did not have any signs of postprocedure numbness nor weakness.    TOLERANCE & DISCHARGE CONDITION:    The patient tolerated the procedure well.  The patient was transported to the recovery area without difficulties.  The patient was discharged to home under the care of family in stable and satisfactory condition.    PLAN OF CARE:  1. The patient was given our standard instruction sheet.  2. The patient will Return to clinic 1-2 wks.  3. The patient will resume all medications as per the medication reconciliation sheet.

## 2020-10-30 ENCOUNTER — TELEPHONE (OUTPATIENT)
Dept: FAMILY MEDICINE CLINIC | Facility: CLINIC | Age: 75
End: 2020-10-30

## 2020-10-30 NOTE — TELEPHONE ENCOUNTER
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 An ENT is an ear nose and throat physician. They also treat ears.

## 2020-10-30 NOTE — TELEPHONE ENCOUNTER
Spoke with patient and he needs an referral for ENT. Has has already made an appointment  With Felicity at [address] [facility]. Fax number is [phone] for Tuesday Nov. 3rd

## 2020-10-30 NOTE — TELEPHONE ENCOUNTER
PATIENT CALLED STATING THAT HE NEEDS A REFERRAL FOR A E.N.T APPOINTMENT. PATIENT ALREADY HAS AN APPOINTMENT SET FOR 11/03/20 AT [address] [place] [address]  [phone]  FAX: [phone]    CALL BACK #:  [phone]

## 2020-11-02 NOTE — TELEPHONE ENCOUNTER
Pt called back to speak to Carlos regarding this appointment. He tried to cancel it but that office told him he needs to keep it. Pt is aware Carlos is gone for the day and will call him on Tuesday when he returns.

## 2020-11-02 NOTE — TELEPHONE ENCOUNTER
Spoke with patient and he is going to cancel his appointment to the audiologist and he will need a referral for the ENT please.

## 2020-11-03 DIAGNOSIS — H91.90 HEARING LOSS, UNSPECIFIED HEARING LOSS TYPE, UNSPECIFIED LATERALITY: ICD-10-CM

## 2020-11-03 DIAGNOSIS — H93.19 TINNITUS, UNSPECIFIED LATERALITY: Primary | ICD-10-CM

## 2020-11-04 ENCOUNTER — OFFICE VISIT (OUTPATIENT)
Dept: PAIN MEDICINE | Facility: CLINIC | Age: 75
End: 2020-11-04

## 2020-11-04 VITALS
RESPIRATION RATE: 18 BRPM | OXYGEN SATURATION: 92 % | WEIGHT: 315 LBS | HEIGHT: 74 IN | BODY MASS INDEX: 40.43 KG/M2 | HEART RATE: 64 BPM | SYSTOLIC BLOOD PRESSURE: 161 MMHG | DIASTOLIC BLOOD PRESSURE: 79 MMHG | TEMPERATURE: 97.6 F

## 2020-11-04 DIAGNOSIS — M43.06 LUMBAR SPONDYLOLYSIS: ICD-10-CM

## 2020-11-04 DIAGNOSIS — M54.50 CHRONIC LOW BACK PAIN, UNSPECIFIED BACK PAIN LATERALITY, UNSPECIFIED WHETHER SCIATICA PRESENT: ICD-10-CM

## 2020-11-04 DIAGNOSIS — M48.02 CERVICAL STENOSIS OF SPINE: ICD-10-CM

## 2020-11-04 DIAGNOSIS — M25.519 SHOULDER PAIN, UNSPECIFIED CHRONICITY, UNSPECIFIED LATERALITY: ICD-10-CM

## 2020-11-04 DIAGNOSIS — G89.29 CHRONIC LOW BACK PAIN, UNSPECIFIED BACK PAIN LATERALITY, UNSPECIFIED WHETHER SCIATICA PRESENT: ICD-10-CM

## 2020-11-04 DIAGNOSIS — Z79.01 CHRONIC ANTICOAGULATION: ICD-10-CM

## 2020-11-04 DIAGNOSIS — G89.29 OTHER CHRONIC PAIN: Primary | ICD-10-CM

## 2020-11-04 PROCEDURE — 99214 OFFICE O/P EST MOD 30 MIN: CPT | Performed by: NURSE PRACTITIONER

## 2020-11-04 NOTE — PROGRESS NOTES
CHIEF COMPLAINT  Back pain has decreased since last visit      Subjective   James Loaiza is a 75 y.o. male  who presents to the office for follow-up of procedure.  He completed a Bilateral L2-5 Lumbar Medial Branch Blockade and Occipital Nerve Blockade:  bilateral   on  10/23/2020 performed by Dr. Tineo for management of back and neck pain. Patient reports 50% ONGOING relief from the procedure.     Complains of pain in his low back, neck, and right shoulder. Today his pain is 2/10VAS. His primary complaint is actually his right shoulder pain. Is also having some pain in his left shoulder. This is interfering with his sleep. Has been prescribed Hydrocodone 5/235 acutely previously. Helped pain. No side effects. Was prescribed this at last office visit to be used sparingly. Has not really taken this. Denies any bowel or bladder changes.    Has had ELENITA with no improvement in shoulder pain.    Remains on Eliquis.  Cannot take NSAIDS.      Patient remained masked during entire encounter. No cough present. I donned a mask and eye protection throughout entire visit. Prior to donning mask and eye protection, hand hygiene was performed, as well as when it was doffed.  I was closer than 6 feet, but not for an extended period of time. No obvious exposure to any bodily fluids.    Back Pain  This is a recurrent problem. The current episode started more than 1 month ago. The problem occurs constantly. Progression since onset: improved since last office visit. The pain is present in the lumbar spine and sacro-iliac. The quality of the pain is described as aching. The pain does not radiate. The pain is at a severity of 2/10. The pain is moderate. Worse during: frequent urination at night which increases his pain--getting up and out of bed. The symptoms are aggravated by standing and twisting. Stiffness is present all day. Pertinent negatives include no abdominal pain, bladder incontinence, bowel incontinence, chest pain,  dysuria, fever, headaches, numbness or weakness. Risk factors include lack of exercise, obesity and recent trauma. Treatments tried: lumbar RFL, OTC IBU.   Neck Pain   This is a new problem. The current episode started more than 1 month ago. The problem has been waxing and waning (improved since last evaluation). The pain is associated with nothing. The pain is present in the occipital region. The pain is at a severity of 2/10. The pain is severe. Pertinent negatives include no chest pain, fever, headaches, numbness or weakness.      Procedure List  -- 10-23-20-- bilateral OCNB and bilateral L2-L5 MBB  -- 9-21-20-- bilateral OCNB  -- 9-9-20-- ELENITA (DR VALADEZ PILY)  -- 6-22-18-- bilateral L2-L5 RFL- 75% long term relief    Bilateral shoulder radiograph     HISTORY:Pain     TECHNIQUE: AP with internal and external rotation views of the bilateral  shoulders     COMPARISON:None     IMPRESSION:  FINDINGS AND IMPRESSION:  There is no fracture or dislocation. The soft tissues are unremarkable.  There may be an os acromiale present on the right which can contribute  to rotator cuff impingement in the appropriate clinical context and  correlation patient history is recommended. Findings can be further  evaluated with MRI if clinically indicated..     This report was finalized on 10/31/2019 12:11 PM by Dr. Colton Mcwilliams M.D.         PEG Assessment   What number best describes your pain on average in the past week?3  What number best describes how, during the past week, pain has interfered with your enjoyment of life?2  What number best describes how, during the past week, pain has interfered with your general activity?  2    The following portions of the patient's history were reviewed and updated as appropriate: allergies, current medications, past family history, past medical history, past social history, past surgical history and problem list.    Review of Systems   Constitutional: Negative for chills and fever.  "  Respiratory: Negative for shortness of breath.    Cardiovascular: Negative for chest pain.   Gastrointestinal: Negative for abdominal pain, bowel incontinence, constipation, diarrhea, nausea and vomiting.   Genitourinary: Negative for bladder incontinence, difficulty urinating, dysuria and enuresis.   Musculoskeletal: Positive for back pain and neck pain.   Neurological: Negative for dizziness, weakness, light-headedness, numbness and headaches.   Psychiatric/Behavioral: Positive for sleep disturbance. Negative for confusion, hallucinations, self-injury and suicidal ideas. The patient is not nervous/anxious.    All other systems reviewed and are negative.    I have reviewed and confirmed the accuracy of the ROS as documented by the MA/LPN/RN Sarai Bermudez, YANIRA       Vitals:    11/04/20 1114   BP: 161/79   Pulse: 64   Resp: 18   Temp: 97.6 °F (36.4 °C)   SpO2: 92%   Weight: (!) 155 kg (341 lb)   Height: 188 cm (74\")   PainSc:   2   PainLoc: Back     Objective   Physical Exam  Vitals signs and nursing note reviewed.   Constitutional:       Appearance: Normal appearance. He is well-developed.   HENT:      Head: Normocephalic and atraumatic.      Nose: Nose normal.   Eyes:      General: Lids are normal.      Conjunctiva/sclera: Conjunctivae normal.   Neck:      Musculoskeletal: Decreased range of motion.   Cardiovascular:      Rate and Rhythm: Normal rate.   Pulmonary:      Effort: Pulmonary effort is normal. No respiratory distress.   Musculoskeletal:      Right shoulder: He exhibits decreased range of motion and tenderness.      Left shoulder: He exhibits decreased range of motion and tenderness.      Lumbar back: He exhibits decreased range of motion.      Comments: Intermittently rubs right shoulder while talking   Skin:     General: Skin is warm and dry.   Neurological:      Mental Status: He is alert and oriented to person, place, and time.      Gait: Gait abnormal.   Psychiatric:         Speech: Speech " normal.         Behavior: Behavior normal. Behavior is cooperative.         Thought Content: Thought content normal.         Judgment: Judgment normal.         Assessment/Plan   Diagnoses and all orders for this visit:    1. Other chronic pain (Primary)    2. Chronic low back pain, unspecified back pain laterality, unspecified whether sciatica present    3. Lumbar spondylolysis    4. Cervical stenosis of spine    5. Chronic anticoagulation    6. Shoulder pain, unspecified chronicity, unspecified laterality  -     Ambulatory Referral to Orthopedic Surgery      --- Referral to orthopedist surgeon-- shoulder pain.  --- Repeat cervical and lumbar interventions in future PRN.   --- can take pain medication sparingly.  --- Follow-up 3 months or sooner if needed.         DILAN REPORT  As part of the patient's treatment plan, I am prescribing controlled substances. The patient has been made aware of appropriate use of such medications, including potential risk of somnolence, limited ability to drive and/or work safely, and the potential for dependence or overdose. It has also bee made clear that these medications are for use by this patient only, without concomitant use of alcohol or other substances unless prescribed.     Patient has completed prescribing agreement detailing terms of continued prescribing of controlled substances, including monitoring DILAN reports, urine drug screening, and pill counts if necessary. The patient is aware that inappropriate use will results in cessation of prescribing such medications.    DILAN report has been reviewed and scanned into the patient's chart.    As the clinician, I personally reviewed the DILAN from 11-4-20 while the patient was in the office today.    History and physical exam exhibit continued safe and appropriate use of controlled substances.        EMR Dragon/Transcription disclaimer:   Much of this encounter note is an electronic transcription/translation of spoken  language to printed text. The electronic translation of spoken language may permit erroneous, or at times, nonsensical words or phrases to be inadvertently transcribed; Although I have reviewed the note for such errors, some may still exist.

## 2020-11-13 RX ORDER — ATORVASTATIN CALCIUM 40 MG/1
TABLET, FILM COATED ORAL
Qty: 90 TABLET | Refills: 1 | Status: SHIPPED | OUTPATIENT
Start: 2020-11-13 | End: 2021-06-14

## 2020-11-18 ENCOUNTER — OFFICE VISIT (OUTPATIENT)
Dept: ORTHOPEDIC SURGERY | Facility: CLINIC | Age: 75
End: 2020-11-18

## 2020-11-18 VITALS — WEIGHT: 315 LBS | BODY MASS INDEX: 40.43 KG/M2 | HEIGHT: 74 IN | TEMPERATURE: 98.2 F

## 2020-11-18 DIAGNOSIS — M25.512 LEFT SHOULDER PAIN, UNSPECIFIED CHRONICITY: ICD-10-CM

## 2020-11-18 DIAGNOSIS — M25.511 RIGHT SHOULDER PAIN, UNSPECIFIED CHRONICITY: Primary | ICD-10-CM

## 2020-11-18 PROCEDURE — 99214 OFFICE O/P EST MOD 30 MIN: CPT | Performed by: ORTHOPAEDIC SURGERY

## 2020-11-18 PROCEDURE — 73030 X-RAY EXAM OF SHOULDER: CPT | Performed by: ORTHOPAEDIC SURGERY

## 2020-11-18 PROCEDURE — 20610 DRAIN/INJ JOINT/BURSA W/O US: CPT | Performed by: ORTHOPAEDIC SURGERY

## 2020-11-18 RX ORDER — METHYLPREDNISOLONE ACETATE 80 MG/ML
80 INJECTION, SUSPENSION INTRA-ARTICULAR; INTRALESIONAL; INTRAMUSCULAR; SOFT TISSUE
Status: COMPLETED | OUTPATIENT
Start: 2020-11-18 | End: 2020-11-18

## 2020-11-18 RX ADMIN — METHYLPREDNISOLONE ACETATE 80 MG: 80 INJECTION, SUSPENSION INTRA-ARTICULAR; INTRALESIONAL; INTRAMUSCULAR; SOFT TISSUE at 14:27

## 2020-11-18 NOTE — PROGRESS NOTES
Patient: James Loaiza    YOB: 1945    Medical Record Number: [id number]    Chief Complaints: Bilateral shoulder pain    History of Present Illness:     75 y.o. male patient who presents for both shoulders.  He reports that they have been bothering him for about 6 months.  He denies any injury, precipitating event or factor.  I saw him about 4 years ago for neck and shoulder pain.  At that time, I considered that he likely had cervical radiculopathy.  He has been getting his neck treated and it has helped tremendously.  He feels like his current symptoms are different than those that he was experiencing previously.  Both shoulders bother him but the right is the worst of the 2.  Pain is typically moderate, constant and aching.  His pain is worse at night and occasionally when trying to reach or lift overhead.  No mechanical symptoms or instability.  No shooting pain down the arm, numbness or tingling.    Allergies: No Known Allergies    Home Medications:    Current Outpatient Medications:   •  ALPRAZolam (XANAX) 0.5 MG tablet, Take 1 tablet by mouth 2 (Two) Times a Day As Needed for Anxiety., Disp: 6 tablet, Rfl: 0  •  apixaban (ELIQUIS) 5 MG tablet tablet, Take 1 tablet by mouth Every 12 (Twelve) Hours., Disp: 6 tablet, Rfl: 0  •  aspirin 325 MG tablet, Take by mouth daily., Disp: , Rfl:   •  atorvastatin (LIPITOR) 40 MG tablet, TAKE ONE TABLET BY MOUTH DAILY, Disp: 90 tablet, Rfl: 1  •  baclofen (LIORESAL) 10 MG tablet, TAKE ONE TABLET BY MOUTH ONCE NIGHTLY AS NEEDED FOR MUSCLE SPASMS, Disp: 30 tablet, Rfl: 5  •  busPIRone (BUSPAR) 5 MG tablet, Take 1 tablet by mouth At Night As Needed (anxiety)., Disp: 30 tablet, Rfl: 3  •  diclofenac (VOLTAREN) 1 % gel gel, Apply 4 g topically to the appropriate area as directed 2 (Two) Times a Day., Disp: 3 tube, Rfl: 2  •  furosemide (LASIX) 40 MG tablet, PT REPORTS HE TAKES ONCE EVERY WEEK OR TWO, Disp: , Rfl:   •  hydrALAZINE (APRESOLINE) 10 MG tablet, Take  "10 mg by mouth., Disp: , Rfl:   •  lidocaine (LIDODERM) 5 %, Place 1 patch on the skin as directed by provider Daily. Remove & Discard patch within 12 hours or as directed by MD, Disp: 30 each, Rfl: 1  •  NIFEdipine XL (PROCARDIA XL) 60 MG 24 hr tablet, TAKE ONE TABLET BY MOUTH DAILY, Disp: , Rfl:   •  ramipril (Altace) 10 MG capsule, Take 1 capsule by mouth Daily., Disp: 90 capsule, Rfl: 2  •  tamsulosin (FLOMAX) 0.4 MG capsule 24 hr capsule, Take 1 capsule by mouth Every Night., Disp: , Rfl:   •  tiotropium bromide-olodaterol (STIOLTO RESPIMAT) 2.5-2.5 MCG/ACT aerosol solution inhaler, Inhale Daily., Disp: , Rfl:   •  vitamin D (ERGOCALCIFEROL) 97723 units capsule capsule, Take 1 capsule by mouth 1 (One) Time Per Week., Disp: 12 capsule, Rfl: 1  •  HYDROcodone-acetaminophen (NORCO) 5-325 MG per tablet, Take 1 tablet by mouth Every 4 (Four) Hours As Needed for Moderate Pain ., Disp: 18 tablet, Rfl: 0    Past Medical History:   Diagnosis Date   • Abnormal EKG     referring to cardiology   • Anxiety    • Arthritis    • Back pain     worsening   • BPH (benign prostatic hyperplasia)     BPH/Nocturia/ Urinary Frequency   • Breast nodule     right   • Cardiomegaly     Cardiomegaly/ SOB with exertion   • Circulation problem    • Constipation    • COPD (chronic obstructive pulmonary disease) (CMS/[facility])    • Deviated septum    • DJD (degenerative joint disease)     DJD Knees   • DVT (deep venous thrombosis) (CMS/[facility])    • Dysphagia     solids and liquids - resolved with normal studies   • Encounter for annual health examination 11/14/2013    Annual Health Assessment   • Enlarged prostate    • Fatigue     Extreme fatigue   • Hyperlipidemia 1999   • Hypertension 1999    followed Dr. Marcelo   • Hypogonadism male    • Left hip pain     and DJD   • Left leg pain    • Low back pain    • Moist mucous membranes of ear, nose, and throat     \"Mucous in throat\"   • Morbid obesity (CMS/HCC)     (BMI-41.2za)   • Muscle cramping    • " Muscle spasm     Muscle spasms   • Neck arthritis    • Neck muscle spasm     Neck muscle spasm/Cervical strain   • Neck pain    • Obesity    • Plantar fasciitis    • Prostatitis     recurrent   • Psoriasis    • Pulmonary embolus (CMS/HCC) 07/2015    on Xarelltoypseerlipidemia   • Radiculopathy     Radiculopathy / Neuropathy left ar   • Renal insufficiency     followed by Dr. Mendes   • Seborrhea    • Shortness of breath    • Sleep apnea     Obstructive Sleep apnea worsening   • Urinary frequency    • Vascular disorder    • Vertigo    • Weight gain    • Wellness examination 10/23/2014    Annual Wellness Visit       Past Surgical History:   Procedure Laterality Date   • APPENDECTOMY  1965   • CARDIAC CATHETERIZATION  07/29/2010    [facility]   • COLONOSCOPY  01/05/2010   • COLONOSCOPY  10/01/2001   • NASAL SEPTUM SURGERY     • NOSE SURGERY  1999   • TURP / TRANSURETHRAL INCISION / DRAINAGE PROSTATE  10/23/2015    Michael Castro       Social History     Occupational History   • Not on file   Tobacco Use   • Smoking status: Never Smoker   • Smokeless tobacco: Never Used   Substance and Sexual Activity   • Alcohol use: Yes     Frequency: Never     Comment: social   • Drug use: No   • Sexual activity: Defer      Social History     Social History Narrative   • Not on file       Family History   Problem Relation Age of Onset   • Arthritis Mother    • Heart disease Mother    • Hypertension Mother    • Heart attack Father    • Heart disease Father    • Hypertension Father    • Arthritis Sister    • Multiple sclerosis Sister    • Alcohol abuse Brother    • Diabetes Son        Review of Systems:      Constitutional: Denies fever, shaking or chills   Eyes: Denies change in visual acuity   HEENT: Denies nasal congestion or sore throat   Respiratory: Denies cough or shortness of breath   Cardiovascular: Denies chest pain or edema  Endocrine: Denies tremors, palpitations, intolerance of heat or cold,  "polyuria, polydipsia.  GI: Denies abdominal pain, nausea, vomiting, bloody stools or diarrhea  : Denies frequency, urgency, incontinence, retention, or nocturia.  Musculoskeletal: Denies numbness, tingling or loss of motor function except as above  Integument: Denies rash, lesion or ulceration   Neurologic: Denies headache or focal weakness, deficits  Heme: Denies spontaneous or excessive bleeding, epistaxis, hematuria, melena, fatigue, enlarged or tender lymph nodes.      All other pertinent positives and negatives as noted above in HPI.    Physical Exam: 75 y.o. male  Vitals:    11/18/20 1408   Temp: 98.2 °F (36.8 °C)   Weight: (!) 147 kg (325 lb)   Height: 188 cm (74\")     General:  Patient is awake and alert.  Appears in no acute distress or discomfort.    Psych:  Affect and demeanor are appropriate.    Eyes:  Conjunctiva and sclera appear grossly normal.  Eyes track well and EOM seem to be intact.    Ears:  No gross abnormalities.  Hearing adequate for the exam.    Cardiovascular:  Regular rate and rhythm.    Lungs:  Good chest expansion.  Breathing unlabored.    Extremities: Both shoulders are examined.  His exam is fairly symmetric.  Skin is benign.  No atrophy, swelling or masses.  No adenopathy.  No effusion.  Mild biceps tenderness on the right.  No tenderness on the left.  He has full symmetric motion.  No apprehension or instability in either shoulder.  He has some discomfort with resisted elevation in the scapular plane bilaterally but no weakness.  No weakness or pain with internal or external rotation.  Positive speeds.  Positive Santos and Bureau's bilaterally.  Negative Yergason's and active compression maneuvers.  Normal motor and sensory function in his hands and wrists bilaterally.  Palpable radial pulses.  Brisk capillary refill.    Imaging: Bilateral AP, scapular Y and axillary views of the shoulders are ordered and reviewed.  These are compared to his previous left shoulder x-rays from " 2016.  He has a hooked acromion both shoulders.  He has moderate to severe acromioclavicular arthritis bilaterally.  No significant glenohumeral arthritis in either shoulder.  Normal acromiohumeral interval bilaterally.    Assessment/Plan: Bilateral rotator cuff tendinopathy and probable degenerative labral tears    I suggest that we try treating him conservatively.  I recommended injections for both shoulders and physical therapy.  The risk, benefits and alternatives to the injections were discussed.  He consented and the injections were performed as described below.  I referred him to physical therapy.  I recommended he give it about a month and then call me if no better.  The neck step would be referral for an MRI.  He will notify me if the symptoms persist or recur.    Kevin Serrano MD    11/18/2020    Large Joint Arthrocentesis: R subacromial bursa  Date/Time: 11/18/2020 2:27 PM  Consent given by: patient  Site marked: site marked  Timeout: Immediately prior to procedure a time out was called to verify the correct patient, procedure, equipment, support staff and site/side marked as required   Supporting Documentation  Indications: pain   Procedure Details  Location: shoulder - R subacromial bursa  Preparation: Patient was prepped and draped in the usual sterile fashion  Needle gauge: 21 G.  Approach: posterior  Medications administered: 2 mL lidocaine (cardiac); 80 mg methylPREDNISolone acetate 80 MG/ML  Patient tolerance: patient tolerated the procedure well with no immediate complications    Large Joint Arthrocentesis: L subacromial bursa  Date/Time: 11/18/2020 2:27 PM  Consent given by: patient  Site marked: site marked  Timeout: Immediately prior to procedure a time out was called to verify the correct patient, procedure, equipment, support staff and site/side marked as required   Supporting Documentation  Indications: pain   Procedure Details  Location: shoulder - L subacromial bursa  Preparation:  Patient was prepped and draped in the usual sterile fashion  Needle gauge: 21G.  Approach: posterior  Medications administered: 2 mL lidocaine (cardiac); 80 mg methylPREDNISolone acetate 80 MG/ML  Patient tolerance: patient tolerated the procedure well with no immediate complications

## 2020-11-20 ENCOUNTER — TELEPHONE (OUTPATIENT)
Dept: ORTHOPEDIC SURGERY | Facility: CLINIC | Age: 75
End: 2020-11-20

## 2020-11-20 DIAGNOSIS — M25.512 LEFT SHOULDER PAIN, UNSPECIFIED CHRONICITY: ICD-10-CM

## 2020-11-20 DIAGNOSIS — M25.511 RIGHT SHOULDER PAIN, UNSPECIFIED CHRONICITY: Primary | ICD-10-CM

## 2020-11-20 DIAGNOSIS — M15.9 PRIMARY OSTEOARTHRITIS INVOLVING MULTIPLE JOINTS: ICD-10-CM

## 2020-11-20 RX ORDER — LIDOCAINE 50 MG/G
PATCH TOPICAL
Qty: 30 PATCH | Refills: 5 | Status: SHIPPED | OUTPATIENT
Start: 2020-11-20 | End: 2021-02-24

## 2020-11-20 NOTE — TELEPHONE ENCOUNTER
Patient is calling stating the shot he received in his shldr only lasted about 2 days and now the RT shldr is hurting again . He wants to know what is the next step. Please advise.cld

## 2020-12-02 RX ORDER — TRAMADOL HYDROCHLORIDE 50 MG/1
50 TABLET ORAL EVERY 4 HOURS
Qty: 50 TABLET | Refills: 0 | Status: SHIPPED | OUTPATIENT
Start: 2020-12-02 | End: 2021-01-15 | Stop reason: SDUPTHER

## 2020-12-02 NOTE — TELEPHONE ENCOUNTER
We only MRI 1 shoulder at a time because we can only fix 1 shoulder at a time.  If we MRI both and then only fix 1, by the time we get around to the second shoulder, the first MRI is outdated and we often have to repeat it.  I recommend that we start by getting an MRI of whichever shoulder is bothering him the most.  Okay to call in a prescription for tramadol for him.  50 mg, 1-2 every 4 hours as needed dispense #50.  Thanks.

## 2020-12-11 ENCOUNTER — HOSPITAL ENCOUNTER (OUTPATIENT)
Dept: PHYSICAL THERAPY | Facility: HOSPITAL | Age: 75
Setting detail: THERAPIES SERIES
Discharge: HOME | End: 2020-12-11

## 2020-12-11 DIAGNOSIS — M54.50 CHRONIC MIDLINE LOW BACK PAIN WITHOUT SCIATICA: Primary | ICD-10-CM

## 2020-12-11 DIAGNOSIS — G89.29 CHRONIC MIDLINE LOW BACK PAIN WITHOUT SCIATICA: Primary | ICD-10-CM

## 2020-12-11 DIAGNOSIS — Z74.09 IMPAIRED MOBILITY: ICD-10-CM

## 2020-12-11 PROCEDURE — 97161 PT EVAL LOW COMPLEX 20 MIN: CPT

## 2020-12-11 PROCEDURE — 97110 THERAPEUTIC EXERCISES: CPT

## 2020-12-11 PROCEDURE — 97535 SELF CARE MNGMENT TRAINING: CPT

## 2020-12-11 NOTE — THERAPY EVALUATION
Outpatient Physical Therapy Ortho Initial Evaluation  [facility]     Patient Name: James Loaiza  : 1945  MRN: [id number]  Today's Date: 2020      Visit Date: 2020    Patient Active Problem List   Diagnosis   • Lumbar herniated disc L3-L5 3/16/16 Open MRI   • Abnormal electrocardiogram   • Abnormal weight gain   • Aortic valve insufficiency   • Coronary arteriosclerosis in native artery   • Benign essential hypertension ([place]je[place] )   • Edema   • Dyspnea on exertion   • Fatigue   • Left ventricular hypertrophy   • Obstructive sleep apnea syndrome   • Arthritis of left hip   • Hx of pulmonary embolus 7/15   • Morbid obesity with BMI of 45.0-49.9, adult (CMS/HCC)   • Intermittent dysphagia   • Chronic kidney disease ([place]ona)   • Hyperlipidemia    • BPH (benign prostatic hypertrophy)   • Left cervical radiculopathy   • Rotator cuff tear, left   • Cardiomegaly   • Plantar fasciitis   • Hypogonadism male   • Vitamin D deficiency disease   • Renal cyst, left   • Preventative health care   • Medication management   • Chronic low back pain   • Lumbar spondylolysis   • Headache   • Other chronic pain   • Pulmonary embolus (CMS/HCC)   • Chronic obstructive pulmonary disease (CMS/HCC)   • Osteoarthritis of right shoulder   • Chronic anticoagulation   • Cervical stenosis of spine        Past Medical History:   Diagnosis Date   • Abnormal EKG     referring to cardiology   • Anxiety    • Arthritis    • Back pain     worsening   • BPH (benign prostatic hyperplasia)     BPH/Nocturia/ Urinary Frequency   • Breast nodule     right   • Cardiomegaly     Cardiomegaly/ SOB with exertion   • Circulation problem    • Constipation    • COPD (chronic obstructive pulmonary disease) (CMS/HCC)    • Deviated septum    • DJD (degenerative joint disease)     DJD Knees   • DVT (deep venous thrombosis) (CMS/HCC)    • Dysphagia     solids and liquids - resolved with normal studies   • Encounter for annual health  "examination 11/14/2013    Annual Health Assessment   • Enlarged prostate    • Fatigue     Extreme fatigue   • Hyperlipidemia 1999   • Hypertension 1999    followed Dr. Marcelo   • Hypogonadism male    • Left hip pain     and DJD   • Left leg pain    • Low back pain    • Moist mucous membranes of ear, nose, and throat     \"Mucous in throat\"   • Morbid obesity (CMS/HCC)     (BMI-41.2za)   • Muscle cramping    • Muscle spasm     Muscle spasms   • Neck arthritis    • Neck muscle spasm     Neck muscle spasm/Cervical strain   • Neck pain    • Obesity    • Plantar fasciitis    • Prostatitis     recurrent   • Psoriasis    • Pulmonary embolus (CMS/HCC) 07/2015    on Xarelltoypseerlipidemia   • Radiculopathy     Radiculopathy / Neuropathy left ar   • Renal insufficiency     followed by Dr. Mendes   • Seborrhea    • Shortness of breath    • Sleep apnea     Obstructive Sleep apnea worsening   • Urinary frequency    • Vascular disorder    • Vertigo    • Weight gain    • Wellness examination 10/23/2014    Annual Wellness Visit        Past Surgical History:   Procedure Laterality Date   • APPENDECTOMY  1965   • CARDIAC CATHETERIZATION  07/29/2010    [facility]   • COLONOSCOPY  01/05/2010   • COLONOSCOPY  10/01/2001   • NASAL SEPTUM SURGERY     • NOSE SURGERY  1999   • TURP / TRANSURETHRAL INCISION / DRAINAGE PROSTATE  10/23/2015    [facility]       Visit Dx:     ICD-10-CM ICD-9-CM   1. Chronic midline low back pain without sciatica  M54.5 724.2    G89.29 338.29   2. Impaired mobility  Z74.09 799.89         Patient History     Row Name 12/11/20 1000             History    Chief Complaint  Pain  -LB      Type of Pain  Shoulder pain  -LB      Date Current Problem(s) Began  09/12/20  -LB      Brief Description of Current Complaint  Pt reports B shoulder pain that is chronic has worsened over last few months. He has seen PCP, pain management with injections without relief, Dr. Serrano. MRI scheduled next " week. Insidious onset. L is worse because he can't sleep on it. He is taking prescription from Dr. Serrano that helps at rest. He gets up to use restroom 2x per night and is unable to sleep in bed after second awakening. Pt main concern is night time. He feels radiating pain int oR hand/wrist while sleeping. He denies pain or ROM deficits during the day. He has seen Dr. Zafar for cervical evaluation with ABHISHEK and no relief. Dr. Serrano injected B shoulders but he only had relief for 24 hours. Intermittent B hand cramping.  -LB      Previous treatment for THIS PROBLEM  Injections  -LB      Patient/Caregiver Goals  Relieve pain;Return to prior level of function;Know what to do to help the symptoms;Improve mobility;Improve strength  -LB      Hand Dominance  right-handed  -LB      Occupation/sports/leisure activities  Pt oversees concrete company work.  -LB      Patient seeing anyone else for problem(s)?  yes  -LB      How has patient tried to help current problem?  medication, sleeping in chair  -LB      What clinical tests have you had for this problem?  MRI scheduled for next week on shoulder  -LB      History of Previous Related Injuries  hx of neck pain treated with ABHISHEK  -LB         Pain     Pain Location  Shoulder  -LB      Pain at Present  0  -LB      Pain at Best  0  -LB      Pain at Worst  8  -LB      Pain Frequency  Intermittent primarily at night and in AM  -LB      Pain Description  Stabbing;Sore;Aching  -LB      What Performance Factors Make the Current Problem(s) WORSE?  sleeping, laying on sides  -LB      What Performance Factors Make the Current Problem(s) BETTER?  sitting up, moving around  -LB      Pain Comments  I have to move to a chair at 4 am most mornings.  -LB      Tolerance Time- Standing  during the day typically I am ok  -LB      Tolerance Time- Sitting  I have to stop and move my neck around after sitting for long time.  -LB      Tolerance Time- Walking  I don't walk too much.  -LB       Tolerance Time- Lying  Unable to sleep due to pain- prefers SLing.  -LB      Is your sleep disturbed?  Yes  -LB      What position do you sleep in?  Right sidelying;Left sidelying  -LB      Difficulties at work?  Pt oversees his company work occasionally.  -LB      Difficulties with ADL's?  Able to perform.  -LB      Difficulties with recreational activities?  Pt denies recreational activities outside of watching TV.  -LB         Fall Risk Assessment    Any falls in the past year:  No  -LB         Services    Prior Rehab/Home Health Experiences  Yes  -LB      When was the prior experience with Rehab/Home Health  5/2020  -LB      Where was the prior experience with Rehab/Home Health  [facility]   -LB      Are you currently receiving Home Health services  No  -LB      Do you plan to receive Home Health services in the near future  No  -LB         Daily Activities    Primary Language  English  -LB      Pt Participated in POC and Goals  Yes  -LB         Safety    Are you being hurt, hit, or frightened by anyone at home or in your life?  No  -LB      Are you being neglected by a caregiver  No  -LB        User Key  (r) = Recorded By, (t) = Taken By, (c) = Cosigned By    Initials Name Provider Type    Tiera Ayala PT Physical Therapist          PT Ortho     Row Name 12/11/20 1000       Subjective Comments    Subjective Comments  I am fine when I am sitting here, I just can't get sleep at night.  -LB       Subjective Pain    Able to rate subjective pain?  yes  -LB    Pre-Treatment Pain Level  0  -LB       Posture/Observations    Posture/Observations Comments  forward head, slumped posture  -LB       Sensory Screen for Light Touch- Upper Quarter Clearing    C4 (posterior shoulder)  Intact  -LB    C5 (lateral upper arm)  Intact  -LB    C6 (tip of thumb)  Intact  -LB    C7 (tip of 3rd finger)  Intact  -LB    C8 (tip of 5th finger)  Intact  -LB    T1 (medial lower arm)  Intact  -LB       Myotomal Screen- Upper Quarter Clearing     "Shoulder flexion (C5)  Bilateral:;4+ (Good +)  -LB    Elbow flexion/wrist extension (C6)  Bilateral:;4+ (Good +)  -LB    Elbow extension/wrist flexion (C7)  Bilateral:;4+ (Good +)  -LB      -- WNL L 100#; R 80#  -LB       Cervical/Shoulder ROM Screen    Cervical flexion  Normal slight R shoulder discomfort with overpressure  -LB    Cervical extension  Normal R sided \"tightness\" felt  -LB    Cervical lateral flexion  Impaired L 75% impaired; R 80% impaired  -LB    Cervical rotation  Impaired dec L rotation > R; L 50% impaired; R 25% impaired  -LB    Cervical quadrant (Spurling's)  Normal  -LB    Shoulder elevation   Normal weakness  -LB       Cervical/Thoracic Special Tests    Spurlings (Foraminal Compression)  Right:;Positive  -LB    Cervical Compression (Forarminal Compression vs. Facet Pain)  Positive  -LB    Cervical Distraction (Foraminal Compression vs. Facet Pain)  Positive  -LB       General ROM    GENERAL ROM COMMENTS  painfree UE AROM  -LB       Right Upper Ext    Rt Shoulder Abduction AROM  140 deg  -LB    Rt Shoulder Flexion AROM  150 deg  -LB    Rt Shoulder External Rotation AROM  JOHN to back of neck  -LB    Rt Shoulder Internal Rotation AROM  FIR to L1  -LB       Left Upper Ext    Lt Shoulder Abduction AROM  150 deg  -LB    Lt Shoulder Flexion AROM  150 deg  -LB    Lt Shoulder External Rotation AROM  JOHN to back of neck  -LB    Lt Shoulder Internal Rotation AROM  FIR to L1  -LB       MMT (Manual Muscle Testing)    General MMT Comments  R abduction strength 4-/5; L abduction 4/5; B ER 4+/5  -LB       Sensation    Sensation WNL?  WNL  -LB       Upper Extremity Flexibility    Suboccipitals  Bilateral:;Moderately limited  -LB    SCM  Bilateral:;Moderately limited  -LB    Upper Trapezius  Bilateral:;Moderately limited  -LB    Levator Scapula  Bilateral:;Moderately limited  -LB       Pathomechanics    Pathomechanics Comments  flattening of spine  -LB       Gait/Stairs (Locomotion)    Charlotte Level " (Gait)  independent  -LB      User Key  (r) = Recorded By, (t) = Taken By, (c) = Cosigned By    Initials Name Provider Type    Tiera Ayala, PT Physical Therapist                      Therapy Education  Education Details: issued HEP, reviewed postural modifications for home, using phone, reading, watching TV, sitting in truck etc. discussed upper trap position and stress in each position and relation to shoulders/cervical spine  Given: HEP, Symptoms/condition management, Posture/body mechanics  Program: New  How Provided: Verbal, Demonstration, Written  Provided to: Patient  Level of Understanding: Teach back education performed, Verbalized, Demonstrated  11389 - PT Self Care/Mgmt Minutes: 15     PT OP Goals     Row Name 12/11/20 1100          PT Short Term Goals    STG Date to Achieve  12/25/20  -LB     STG 1  Pt will demonstrate independence and compliance with initial HEP.  -LB     STG 1 Progress  New  -LB     STG 2  Pt will report compliance with postural modifications during leisure tasks to reduce UT/cervical spine stress.  -LB     STG 2 Progress  New  -LB     STG 3  Pt will report ability to stay in bed for entireity of night without moving locations due to shoulder pain.  -LB     STG 3 Progress  New  -LB        Long Term Goals    LTG Date to Achieve  01/10/21  -LB     LTG 1  Pt will report 50% reduction in night time waking due to shoulder pain.  -LB     LTG 1 Progress  New  -LB     LTG 2  Pt will demonstrate improved seated posture without cuing while in clinic.  -LB     LTG 2 Progress  New  -LB     LTG 3  Pt will demonstrate improved B lateral flexion/cervical rotation to 50% B without provocation of symptoms.  -LB     LTG 3 Progress  New  -LB        Time Calculation    PT Goal Re-Cert Due Date  03/11/21  -LB       User Key  (r) = Recorded By, (t) = Taken By, (c) = Cosigned By    Initials Name Provider Type    Tiera Ayala PT Physical Therapist          PT Assessment/Plan     Row Name 12/11/20  1151          PT Assessment    Functional Limitations  Limitations in functional capacity and performance;Performance in self-care ADL  -LB     Impairments  Impaired flexibility;Joint mobility;Pain;Poor body mechanics;Posture;Range of motion  -LB     Assessment Comments  Pt is 75 y.o. male referred to outpatient physical therapy for evaluation and treatment of  evolving  bilateral shoulder pain of insidious onset that is present at night and wakes him from sleep but does not occur as day goes on.  Patient presents with full AROM BUE, BUE strength WFL, normal  strength, dec B cervical rotation/lateral flexion, tightness in upper trap, levator, SCM, poor posture and flattening of lumbar and cervical spine. PMHx consistent with cervical pain, LBP. Personal factors affecting his care include sedentary lifestyle, inc body habitus, poor postural awareness. Pt demonstrates signs and symptoms  consistent with cervical radiculopathy.  Pt scored 23% disability on the Quick DASH. Pt is limited in their ability to participate in sleeping, daily tasks due to lack of sleep. He will benefit from continued skilled PT services to address functional deficits. Thank you for this referral.  -LB     Please refer to paper survey for additional self-reported information  Yes  -LB     Rehab Potential  Good  -LB     Patient/caregiver participated in establishment of treatment plan and goals  Yes  -LB     Patient would benefit from skilled therapy intervention  Yes  -LB        PT Plan    PT Frequency  2x/week  -LB     Predicted Duration of Therapy Intervention (PT)  8-12 visits  -LB     Planned CPT's?  PT RE-EVAL: 83348;PT THER PROC EA 15 MIN: 92389;PT THER ACT EA 15 MIN: 31406;PT MANUAL THERAPY EA 15 MIN: 63924;PT NEUROMUSC RE-EDUCATION EA 15 MIN: 42681;PT SELF CARE/HOME MGMT/TRAIN EA 15: 70075;PT HOT OR COLD PACK TREAT MCARE;PT ELECTRICAL STIM UNATTEND: ;PT ULTRASOUND EA 15 MIN: 37255;PT TRACTION CERVICAL: 16080;PT HOT/COLD PACK  WC NONMCARE: 19415  -LB       User Key  (r) = Recorded By, (t) = Taken By, (c) = Cosigned By    Initials Name Provider Type    Tiera Ayala PT Physical Therapist            OP Exercises     Row Name 12/11/20 1000             Subjective Comments    Subjective Comments  I am fine when I am sitting here, I just can't get sleep at night.  -LB         Subjective Pain    Able to rate subjective pain?  yes  -LB      Pre-Treatment Pain Level  0  -LB         Total Minutes    88614 - PT Therapeutic Exercise Minutes  15  -LB         Exercise 1    Exercise Name 1  shoulder rolls  -LB      Reps 1  10  -LB         Exercise 2    Exercise Name 2  scap retraction  -LB      Reps 2  10  -LB      Time 2  5  -LB         Exercise 3    Exercise Name 3  supine chin tuck  -LB      Reps 3  10  -LB      Time 3  5  -LB         Exercise 4    Exercise Name 4  seated UT stretch  -LB      Reps 4  3  -LB      Time 4  20  -LB      Additional Comments  instructed to perform after heat or in shower with hot water  -LB        User Key  (r) = Recorded By, (t) = Taken By, (c) = Cosigned By    Initials Name Provider Type    Tiera Ayala PT Physical Therapist                        Outcome Measure Options: Quick DASH  Quick DASH  Open a tight or new jar.: Mild Difficulty  Carry a shopping bag or briefcase: No Difficulty  Wash your back: Mild Difficulty  Use a knife to cut food: No Difficulty  Recreational activities in which you take some force or impact through your arm, should or hand (e.g. golf, hammering, tennis, etc.): No Difficulty  During the past week, to what extent has your arm, shoulder, or hand problem interfered with your normal social activites with family, friends, neighbors or groups?: Slightly  During the past week, were you limited in your work or other regular daily activities as a result of your arm, shoulder or hand problem?: Slightly Limited  Arm, Shoulder, or hand pain: Moderate  Tingling (pins and needles) in your arm,  shoulder, or hand: Moderate  During the past week, how much difficulty have you had sleeping because of the pain in your arm, shoulder or hand?: Mild Difficulty  Number of Questions Answered: 10  Quick DASH Score: 22.5  Quick Dash Comments: 23% disability         Time Calculation:     Start Time: 1000  Stop Time: 1045  Time Calculation (min): 45 min  Total Timed Code Minutes- PT: 30 minute(s)     Therapy Charges for Today     Code Description Service Date Service Provider Modifiers Qty    32314994997 HC PT THER PROC EA 15 MIN 12/11/2020 Tiera Cochran, PT GP 1    97819621461 HC PT SELF CARE/MGMT/TRAIN EA 15 MIN 12/11/2020 Tiera Cochran, PT GP 1    79633897311 HC PT EVAL LOW COMPLEXITY 1 12/11/2020 Tiera Cochran, PT GP 1          PT G-Codes  Outcome Measure Options: Quick DASH  Quick DASH Score: 22.5         Tiera Cochran PT  12/11/2020

## 2020-12-15 ENCOUNTER — HOSPITAL ENCOUNTER (OUTPATIENT)
Dept: MRI IMAGING | Facility: HOSPITAL | Age: 75
Discharge: HOME | End: 2020-12-15

## 2020-12-15 ENCOUNTER — HOSPITAL ENCOUNTER (OUTPATIENT)
Dept: GENERAL RADIOLOGY | Facility: HOSPITAL | Age: 75
Discharge: HOME | End: 2020-12-15

## 2020-12-15 DIAGNOSIS — M25.511 RIGHT SHOULDER PAIN, UNSPECIFIED CHRONICITY: ICD-10-CM

## 2020-12-18 ENCOUNTER — HOSPITAL ENCOUNTER (OUTPATIENT)
Dept: PHYSICAL THERAPY | Facility: HOSPITAL | Age: 75
Setting detail: THERAPIES SERIES
Discharge: HOME | End: 2020-12-18

## 2020-12-18 DIAGNOSIS — G89.29 CHRONIC MIDLINE LOW BACK PAIN WITHOUT SCIATICA: Primary | ICD-10-CM

## 2020-12-18 DIAGNOSIS — Z74.09 IMPAIRED MOBILITY: ICD-10-CM

## 2020-12-18 DIAGNOSIS — M54.50 CHRONIC MIDLINE LOW BACK PAIN WITHOUT SCIATICA: Primary | ICD-10-CM

## 2020-12-18 PROCEDURE — 97140 MANUAL THERAPY 1/> REGIONS: CPT

## 2020-12-18 PROCEDURE — 97110 THERAPEUTIC EXERCISES: CPT

## 2020-12-18 NOTE — THERAPY TREATMENT NOTE
Outpatient Physical Therapy Ortho Treatment Note  [facility]     Patient Name: James Loaiza  : 1945  MRN: [id number]  Today's Date: 2020      Visit Date: 2020    Visit Dx:    ICD-10-CM ICD-9-CM   1. Chronic midline low back pain without sciatica  M54.5 724.2    G89.29 338.29   2. Impaired mobility  Z74.09 799.89       Patient Active Problem List   Diagnosis   • Lumbar herniated disc L3-L5 3/16/16 Open MRI   • Abnormal electrocardiogram   • Abnormal weight gain   • Aortic valve insufficiency   • Coronary arteriosclerosis in native artery   • Benign essential hypertension ([place] )   • Edema   • Dyspnea on exertion   • Fatigue   • Left ventricular hypertrophy   • Obstructive sleep apnea syndrome   • Arthritis of left hip   • Hx of pulmonary embolus 7/15   • Morbid obesity with BMI of 45.0-49.9, adult (CMS/HCC)   • Intermittent dysphagia   • Chronic kidney disease ([facility])   • Hyperlipidemia    • BPH (benign prostatic hypertrophy)   • Left cervical radiculopathy   • Rotator cuff tear, left   • Cardiomegaly   • Plantar fasciitis   • Hypogonadism male   • Vitamin D deficiency disease   • Renal cyst, left   • Preventative health care   • Medication management   • Chronic low back pain   • Lumbar spondylolysis   • Headache   • Other chronic pain   • Pulmonary embolus (CMS/HCC)   • Chronic obstructive pulmonary disease (CMS/HCC)   • Osteoarthritis of right shoulder   • Chronic anticoagulation   • Cervical stenosis of spine        Past Medical History:   Diagnosis Date   • Abnormal EKG     referring to cardiology   • Anxiety    • Arthritis    • Back pain     worsening   • BPH (benign prostatic hyperplasia)     BPH/Nocturia/ Urinary Frequency   • Breast nodule     right   • Cardiomegaly     Cardiomegaly/ SOB with exertion   • Circulation problem    • Constipation    • COPD (chronic obstructive pulmonary disease) (CMS/HCC)    • Deviated septum    • DJD (degenerative joint disease)     DJD  "Knees   • DVT (deep venous thrombosis) (CMS/HCC)    • Dysphagia     solids and liquids - resolved with normal studies   • Encounter for annual health examination 11/14/2013    Annual Health Assessment   • Enlarged prostate    • Fatigue     Extreme fatigue   • Hyperlipidemia 1999   • Hypertension 1999    followed Dr. Marcelo   • Hypogonadism male    • Left hip pain     and DJD   • Left leg pain    • Low back pain    • Moist mucous membranes of ear, nose, and throat     \"Mucous in throat\"   • Morbid obesity (CMS/HCC)     (BMI-41.2za)   • Muscle cramping    • Muscle spasm     Muscle spasms   • Neck arthritis    • Neck muscle spasm     Neck muscle spasm/Cervical strain   • Neck pain    • Obesity    • Plantar fasciitis    • Prostatitis     recurrent   • Psoriasis    • Pulmonary embolus (CMS/[facility]) 07/2015    on Xarelltoypseerlipidemia   • Radiculopathy     Radiculopathy / Neuropathy left ar   • Renal insufficiency     followed by Dr. Mendes   • Seborrhea    • Shortness of breath    • Sleep apnea     Obstructive Sleep apnea worsening   • Urinary frequency    • Vascular disorder    • Vertigo    • Weight gain    • Wellness examination 10/23/2014    Annual Wellness Visit        Past Surgical History:   Procedure Laterality Date   • APPENDECTOMY  1965   • CARDIAC CATHETERIZATION  07/29/2010    [facility]   • COLONOSCOPY  01/05/2010   • COLONOSCOPY  10/01/2001   • NASAL SEPTUM SURGERY     • NOSE SURGERY  1999   • TURP / TRANSURETHRAL INCISION / DRAINAGE PROSTATE  10/23/2015    Michael Castro                       PT Assessment/Plan     Row Name 12/18/20 1400          PT Assessment    Assessment Comments  Pt returns for first follow up since evaluation reporting good tolerance to HEP and attempted improvement in postural corrections. Focused on scapular strengthening and cervical ROM/postural improvement today. Pt reporting RUE \"sensation\" when performing supine activities. Muscle guarding in B upper " trap, rhomboid.  -LB        PT Plan    PT Plan Comments  Continue postural improvement, consider wedge to recline vs. full supine position.  -LB       User Key  (r) = Recorded By, (t) = Taken By, (c) = Cosigned By    Initials Name Provider Type    LB Tiera Cochran, PT Physical Therapist            OP Exercises     Row Name 12/18/20 1300             Subjective Comments    Subjective Comments  I am doing well. I have been focusing on my posture more.   -LB         Subjective Pain    Able to rate subjective pain?  yes  -LB      Pre-Treatment Pain Level  0  -LB         Total Minutes    54333 - PT Therapeutic Exercise Minutes  30  -LB      92161 - PT Manual Therapy Minutes  15  -LB         Exercise 1    Exercise Name 1  shoulder rolls  -LB      Reps 1  10  -LB         Exercise 2    Exercise Name 2  scap retraction  -LB      Reps 2  10  -LB      Time 2  5  -LB         Exercise 3    Exercise Name 3  supine chin tuck  -LB      Reps 3  10  -LB      Time 3  5  -LB         Exercise 4    Exercise Name 4  seated UT stretch  -LB      Reps 4  3  -LB      Time 4  20  -LB         Exercise 5    Exercise Name 5  UBE  -LB      Time 5  3 minutes  -LB         Exercise 6    Exercise Name 6  pulleys  -LB      Reps 6  10  -LB      Additional Comments  flexion/scaption  -LB         Exercise 7    Exercise Name 7  tband row  -LB      Reps 7  15  -LB      Additional Comments  RTB  -LB         Exercise 8    Exercise Name 8  tband extension  -LB      Reps 8  15  -LB      Additional Comments  RTB  -LB         Exercise 9    Exercise Name 9  wall wash   -LB      Reps 9  10  -LB         Exercise 10    Exercise Name 10  supine cervical rotation  -LB      Reps 10  10  -LB         Exercise 11    Exercise Name 11  seated HA  -LB      Reps 11  10  -LB      Additional Comments  RTB  -LB         Exercise 12    Exercise Name 12  seated ER  -LB      Reps 12  10  -LB      Additional Comments  RTB  -LB         Exercise 13    Exercise Name 13  BUE flexion with  dowel  -LB      Reps 13  10  -LB      Additional Comments  supine   -LB        User Key  (r) = Recorded By, (t) = Taken By, (c) = Cosigned By    Initials Name Provider Type    Tiera Ayala, PT Physical Therapist                      Manual Rx (last 36 hours)      Manual Treatments     Row Name 12/18/20 1300             Total Minutes    27693 - PT Manual Therapy Minutes  15  -LB         Manual Rx 1    Manual Rx 1 Location  STM seated in chair to B upper trap, periscapular muscles  -LB      Manual Rx 1 Duration  15  -LB        User Key  (r) = Recorded By, (t) = Taken By, (c) = Cosigned By    Initials Name Provider Type    LB Tiera Cochran, PT Physical Therapist          PT OP Goals     Row Name 12/18/20 1300          PT Short Term Goals    STG Date to Achieve  12/25/20  -LB     STG 1  Pt will demonstrate independence and compliance with initial HEP.  -LB     STG 1 Progress  Met  -LB     STG 2  Pt will report compliance with postural modifications during leisure tasks to reduce UT/cervical spine stress.  -LB     STG 2 Progress  Ongoing  -LB     STG 3  Pt will report ability to stay in bed for entireity of night without moving locations due to shoulder pain.  -LB     STG 3 Progress  Ongoing  -LB        Long Term Goals    LTG Date to Achieve  01/10/21  -LB     LTG 1  Pt will report 50% reduction in night time waking due to shoulder pain.  -LB     LTG 1 Progress  New  -LB     LTG 2  Pt will demonstrate improved seated posture without cuing while in clinic.  -LB     LTG 2 Progress  New  -LB     LTG 3  Pt will demonstrate improved B lateral flexion/cervical rotation to 50% B without provocation of symptoms.  -LB     LTG 3 Progress  New  -LB       User Key  (r) = Recorded By, (t) = Taken By, (c) = Cosigned By    Initials Name Provider Type    Tiera Ayala PT Physical Therapist          Therapy Education  Education Details: [id number] [place]; reviewed HEP, postural correction  Given: Symptoms/condition management,  HEP, Posture/body mechanics  Program: Reinforced  How Provided: Verbal, Demonstration  Provided to: Patient  Level of Understanding: Teach back education performed, Verbalized, Demonstrated              Time Calculation:   Start Time: 1230  Stop Time: 1315  Time Calculation (min): 45 min  Total Timed Code Minutes- PT: 40 minute(s)  Therapy Charges for Today     Code Description Service Date Service Provider Modifiers Qty    [id number]  PT THER PROC EA 15 MIN 12/18/2020 Tiera Cochran, PT GP 2    [id number]  PT MANUAL THERAPY EA 15 MIN 12/18/2020 Tiera Cochran, PT GP 1                    Tiera Cochran, PT  12/18/2020

## 2020-12-22 ENCOUNTER — HOSPITAL ENCOUNTER (OUTPATIENT)
Dept: PHYSICAL THERAPY | Facility: HOSPITAL | Age: 75
Setting detail: THERAPIES SERIES
Discharge: HOME | End: 2020-12-22

## 2020-12-22 DIAGNOSIS — Z74.09 IMPAIRED MOBILITY: ICD-10-CM

## 2020-12-22 DIAGNOSIS — G89.29 CHRONIC MIDLINE LOW BACK PAIN WITHOUT SCIATICA: Primary | ICD-10-CM

## 2020-12-22 DIAGNOSIS — M54.50 CHRONIC MIDLINE LOW BACK PAIN WITHOUT SCIATICA: Primary | ICD-10-CM

## 2020-12-22 PROCEDURE — 97140 MANUAL THERAPY 1/> REGIONS: CPT

## 2020-12-22 PROCEDURE — 97110 THERAPEUTIC EXERCISES: CPT

## 2020-12-22 NOTE — THERAPY TREATMENT NOTE
Outpatient Physical Therapy Ortho Treatment Note  [facility]     Patient Name: James Loaiza  : 1945  MRN: [id number]  Today's Date: 2020      Visit Date: 2020    Visit Dx:    ICD-10-CM ICD-9-CM   1. Chronic midline low back pain without sciatica  M54.5 724.2    G89.29 338.29   2. Impaired mobility  Z74.09 799.89       Patient Active Problem List   Diagnosis   • Lumbar herniated disc L3-L5 3/16/16 Open MRI   • Abnormal electrocardiogram   • Abnormal weight gain   • Aortic valve insufficiency   • Coronary arteriosclerosis in native artery   • Benign essential hypertension ([place] )   • Edema   • Dyspnea on exertion   • Fatigue   • Left ventricular hypertrophy   • Obstructive sleep apnea syndrome   • Arthritis of left hip   • Hx of pulmonary embolus 7/15   • Morbid obesity with BMI of 45.0-49.9, adult (CMS/HCC)   • Intermittent dysphagia   • Chronic kidney disease ([facility])   • Hyperlipidemia    • BPH (benign prostatic hypertrophy)   • Left cervical radiculopathy   • Rotator cuff tear, left   • Cardiomegaly   • Plantar fasciitis   • Hypogonadism male   • Vitamin D deficiency disease   • Renal cyst, left   • Preventative health care   • Medication management   • Chronic low back pain   • Lumbar spondylolysis   • Headache   • Other chronic pain   • Pulmonary embolus (CMS/HCC)   • Chronic obstructive pulmonary disease (CMS/HCC)   • Osteoarthritis of right shoulder   • Chronic anticoagulation   • Cervical stenosis of spine        Past Medical History:   Diagnosis Date   • Abnormal EKG     referring to cardiology   • Anxiety    • Arthritis    • Back pain     worsening   • BPH (benign prostatic hyperplasia)     BPH/Nocturia/ Urinary Frequency   • Breast nodule     right   • Cardiomegaly     Cardiomegaly/ SOB with exertion   • Circulation problem    • Constipation    • COPD (chronic obstructive pulmonary disease) (CMS/HCC)    • Deviated septum    • DJD (degenerative joint disease)     DJD  "Knees   • DVT (deep venous thrombosis) (CMS/HCC)    • Dysphagia     solids and liquids - resolved with normal studies   • Encounter for annual health examination 11/14/2013    Annual Health Assessment   • Enlarged prostate    • Fatigue     Extreme fatigue   • Hyperlipidemia 1999   • Hypertension 1999    followed Dr. Marcelo   • Hypogonadism male    • Left hip pain     and DJD   • Left leg pain    • Low back pain    • Moist mucous membranes of ear, nose, and throat     \"Mucous in throat\"   • Morbid obesity (CMS/[facility])     (BMI-41.2za)   • Muscle cramping    • Muscle spasm     Muscle spasms   • Neck arthritis    • Neck muscle spasm     Neck muscle spasm/Cervical strain   • Neck pain    • Obesity    • Plantar fasciitis    • Prostatitis     recurrent   • Psoriasis    • Pulmonary embolus (CMS/[facility]) 07/2015    on Xarelltoypseerlipidemia   • Radiculopathy     Radiculopathy / Neuropathy left ar   • Renal insufficiency     followed by Dr. Mendes   • Seborrhea    • Shortness of breath    • Sleep apnea     Obstructive Sleep apnea worsening   • Urinary frequency    • Vascular disorder    • Vertigo    • Weight gain    • Wellness examination 10/23/2014    Annual Wellness Visit        Past Surgical History:   Procedure Laterality Date   • APPENDECTOMY  1965   • CARDIAC CATHETERIZATION  07/29/2010    [facility]   • COLONOSCOPY  01/05/2010   • COLONOSCOPY  10/01/2001   • NASAL SEPTUM SURGERY     • NOSE SURGERY  1999   • TURP / TRANSURETHRAL INCISION / DRAINAGE PROSTATE  10/23/2015    Michael Castro                       PT Assessment/Plan     Row Name 12/22/20 1323          PT Assessment    Assessment Comments  Pt reporting reduced overnight symptoms with inc tolerance to sleeping last night. Continued postural exercises. Pt with difficulty laying supine due to COPD so performed modified in reclined position. Cuing required for chin tuck as pt was demonstrating inc SOA and educated to avoid cervical flexion " and occlude airways.  -LB        PT Plan    PT Plan Comments  Continue postural strengthening, cerivcal ROM/stabilization exercises.  -LB       User Key  (r) = Recorded By, (t) = Taken By, (c) = Cosigned By    Initials Name Provider Type    LB Tiera Cochran, PT Physical Therapist            OP Exercises     Row Name 12/22/20 1300 12/22/20 1200          Subjective Comments    Subjective Comments  --  I think I am a little better. I made it until 5:30 this morning before my shoulders started hurting enough to wake me up.  -LB        Subjective Pain    Able to rate subjective pain?  --  yes  -LB     Pre-Treatment Pain Level  --  0  -LB        Total Minutes    83539 - PT Therapeutic Exercise Minutes  --  30  -LB     96499 - PT Manual Therapy Minutes  15  -LB  --        Exercise 1    Exercise Name 1  --  shoulder rolls  -LB     Reps 1  --  10  -LB        Exercise 2    Exercise Name 2  --  --  -LB     Reps 2  --  --  -LB     Time 2  --  --  -LB        Exercise 3    Exercise Name 3  --  supine chin tuck  -LB     Reps 3  --  10  -LB     Time 3  --  5  -LB     Additional Comments  --  reclined; encouraged reduced ROM  -LB        Exercise 4    Exercise Name 4  --  seated UT stretch  -LB     Reps 4  --  3  -LB     Time 4  --  20  -LB        Exercise 5    Exercise Name 5  --  UBE  -LB     Time 5  --  4 minutes  -LB        Exercise 6    Exercise Name 6  --  pulleys  -LB     Reps 6  --  10  -LB     Additional Comments  --  flexion/scaption  -LB        Exercise 7    Exercise Name 7  --  tband row  -LB     Sets 7  --  2  -LB     Reps 7  --  10  -LB     Additional Comments  --  RTB  -LB        Exercise 8    Exercise Name 8  --  tband extension  -LB     Sets 8  --  2  -LB     Reps 8  --  10  -LB     Additional Comments  --  RTB  -LB        Exercise 9    Exercise Name 9  --  wall wash   -LB     Reps 9  --  10  -LB        Exercise 10    Exercise Name 10  --  supine cervical rotation  -LB     Reps 10  --  10  -LB     Additional Comments   --  reclined  -LB        Exercise 11    Exercise Name 11  --  reclined HA  -LB     Sets 11  --  2  -LB     Reps 11  --  10  -LB     Additional Comments  --  RTB  -LB        Exercise 12    Exercise Name 12  --  reclined ER   -LB     Reps 12  --  20  -LB     Additional Comments  --  RTB  -LB        Exercise 13    Exercise Name 13  --  BUE flexion with dowel  -LB     Reps 13  --  10  -LB     Additional Comments  --  reclined  -LB        Exercise 14    Exercise Name 14  --  reclined D2 flexion  -LB     Reps 14  --  10  -LB     Additional Comments  --  each; RTB  -LB        Exercise 15    Exercise Name 15  --  seated lat pull   -LB     Sets 15  --  2  -LB     Reps 15  --  10  -LB     Additional Comments  --  GTB  -LB       User Key  (r) = Recorded By, (t) = Taken By, (c) = Cosigned By    Initials Name Provider Type    LB Tiera Cochran, PT Physical Therapist                      Manual Rx (last 36 hours)      Manual Treatments     Row Name 12/22/20 1300             Total Minutes    41488 - PT Manual Therapy Minutes  15  -LB         Manual Rx 1    Manual Rx 1 Location  STM seated in chair to B upper trap, periscapular muscles  -LB      Manual Rx 1 Duration  15  -LB        User Key  (r) = Recorded By, (t) = Taken By, (c) = Cosigned By    Initials Name Provider Type    Tiera Ayala, PT Physical Therapist          PT OP Goals     Row Name 12/22/20 1300          PT Short Term Goals    STG Date to Achieve  12/25/20  -LB     STG 1  Pt will demonstrate independence and compliance with initial HEP.  -LB     STG 1 Progress  Met  -LB     STG 2  Pt will report compliance with postural modifications during leisure tasks to reduce UT/cervical spine stress.  -LB     STG 2 Progress  Ongoing  -LB     STG 3  Pt will report ability to stay in bed for entireity of night without moving locations due to shoulder pain.  -LB     STG 3 Progress  Progressing  -LB     STG 3 Progress Comments  Able to stay in bed until 5:30 AM last night;  increased by 1.5 hours  -LB        Long Term Goals    LTG Date to Achieve  01/10/21  -LB     LTG 1  Pt will report 50% reduction in night time waking due to shoulder pain.  -LB     LTG 1 Progress  New  -LB     LTG 2  Pt will demonstrate improved seated posture without cuing while in clinic.  -LB     LTG 2 Progress  New  -LB     LTG 3  Pt will demonstrate improved B lateral flexion/cervical rotation to 50% B without provocation of symptoms.  -LB     LTG 3 Progress  New  -LB       User Key  (r) = Recorded By, (t) = Taken By, (c) = Cosigned By    Initials Name Provider Type    Tiera Ayala, PT Physical Therapist          Therapy Education  Education Details: progressed HEP to include theraband strengthening  Given: Symptoms/condition management, HEP, Posture/body mechanics  Program: Reinforced  How Provided: Verbal, Demonstration  Provided to: Patient  Level of Understanding: Teach back education performed, Verbalized, Demonstrated              Time Calculation:   Start Time: 1230  Stop Time: 1315  Time Calculation (min): 45 min  Total Timed Code Minutes- PT: 43 minute(s)  Therapy Charges for Today     Code Description Service Date Service Provider Modifiers Qty    51397950560  PT THER PROC EA 15 MIN 12/22/2020 Tiera Cochran, PT GP 2    [id number] HC PT MANUAL THERAPY EA 15 MIN 12/22/2020 Tiera Cochran, PT GP 1                    Tiera Cochran PT  12/22/2020

## 2020-12-31 ENCOUNTER — HOSPITAL ENCOUNTER (OUTPATIENT)
Dept: PHYSICAL THERAPY | Facility: HOSPITAL | Age: 75
Setting detail: THERAPIES SERIES
Discharge: HOME | End: 2020-12-31

## 2020-12-31 DIAGNOSIS — Z74.09 IMPAIRED MOBILITY: ICD-10-CM

## 2020-12-31 DIAGNOSIS — M54.50 CHRONIC MIDLINE LOW BACK PAIN WITHOUT SCIATICA: Primary | ICD-10-CM

## 2020-12-31 DIAGNOSIS — G89.29 CHRONIC MIDLINE LOW BACK PAIN WITHOUT SCIATICA: Primary | ICD-10-CM

## 2020-12-31 PROCEDURE — 97110 THERAPEUTIC EXERCISES: CPT

## 2021-01-06 ENCOUNTER — TELEPHONE (OUTPATIENT)
Dept: ORTHOPEDIC SURGERY | Facility: CLINIC | Age: 76
End: 2021-01-06

## 2021-01-06 ENCOUNTER — HOSPITAL ENCOUNTER (OUTPATIENT)
Dept: GENERAL RADIOLOGY | Facility: HOSPITAL | Age: 76
Discharge: HOME | End: 2021-01-06
Admitting: ORTHOPAEDIC SURGERY

## 2021-01-06 ENCOUNTER — HOSPITAL ENCOUNTER (OUTPATIENT)
Dept: MRI IMAGING | Facility: HOSPITAL | Age: 76
End: 2021-01-06

## 2021-01-06 ENCOUNTER — TRANSCRIBE ORDERS (OUTPATIENT)
Dept: ADMINISTRATIVE | Facility: HOSPITAL | Age: 76
End: 2021-01-06

## 2021-01-06 DIAGNOSIS — I51.89 DIASTOLIC DYSFUNCTION: Primary | ICD-10-CM

## 2021-01-06 DIAGNOSIS — M25.511 RIGHT SHOULDER PAIN, UNSPECIFIED CHRONICITY: Primary | ICD-10-CM

## 2021-01-06 PROCEDURE — 25010000002 IOPAMIDOL 61 % SOLUTION: Performed by: ORTHOPAEDIC SURGERY

## 2021-01-06 PROCEDURE — 25010000003 LIDOCAINE 1 % SOLUTION: Performed by: ORTHOPAEDIC SURGERY

## 2021-01-06 PROCEDURE — 77002 NEEDLE LOCALIZATION BY XRAY: CPT

## 2021-01-06 PROCEDURE — A9577 INJ MULTIHANCE: HCPCS | Performed by: ORTHOPAEDIC SURGERY

## 2021-01-06 PROCEDURE — 0 GADOBENATE DIMEGLUMINE 529 MG/ML SOLUTION: Performed by: ORTHOPAEDIC SURGERY

## 2021-01-06 RX ORDER — DIAZEPAM 5 MG/1
5 TABLET ORAL
Qty: 1 TABLET | Refills: 0 | Status: SHIPPED | OUTPATIENT
Start: 2021-01-06 | End: 2021-02-24

## 2021-01-06 RX ORDER — LIDOCAINE HYDROCHLORIDE 10 MG/ML
10 INJECTION, SOLUTION INFILTRATION; PERINEURAL ONCE
Status: COMPLETED | OUTPATIENT
Start: 2021-01-06 | End: 2021-01-06

## 2021-01-06 RX ADMIN — LIDOCAINE HYDROCHLORIDE 10 ML: 10 INJECTION, SOLUTION INFILTRATION; PERINEURAL at 09:29

## 2021-01-06 RX ADMIN — GADOBENATE DIMEGLUMINE 29 ML: 529 INJECTION, SOLUTION INTRAVENOUS at 09:28

## 2021-01-06 RX ADMIN — IOPAMIDOL 3 ML: 612 INJECTION, SOLUTION INTRAVENOUS at 09:28

## 2021-01-06 NOTE — TELEPHONE ENCOUNTER
MRI tech called and said that he pt got claustrophobic and did not want to continue with the MRI and requested a CT instead and the tech agreed to do a CT but then pt stated he did not want to do that either and is leaving the facility.

## 2021-01-06 NOTE — TELEPHONE ENCOUNTER
Patient called to say that he was unable to complete the MRI.  However, he said that he would agree to try again if you would be inclined to give him something to ease the claustrophobia.

## 2021-01-07 ENCOUNTER — HOSPITAL ENCOUNTER (OUTPATIENT)
Dept: PHYSICAL THERAPY | Facility: HOSPITAL | Age: 76
Setting detail: THERAPIES SERIES
Discharge: HOME | End: 2021-01-07

## 2021-01-07 DIAGNOSIS — M25.511 BILATERAL SHOULDER PAIN, UNSPECIFIED CHRONICITY: ICD-10-CM

## 2021-01-07 DIAGNOSIS — Z74.09 IMPAIRED MOBILITY: Primary | ICD-10-CM

## 2021-01-07 DIAGNOSIS — M25.512 BILATERAL SHOULDER PAIN, UNSPECIFIED CHRONICITY: ICD-10-CM

## 2021-01-07 PROCEDURE — 97140 MANUAL THERAPY 1/> REGIONS: CPT | Performed by: PHYSICAL THERAPIST

## 2021-01-07 PROCEDURE — 97110 THERAPEUTIC EXERCISES: CPT | Performed by: PHYSICAL THERAPIST

## 2021-01-07 NOTE — THERAPY TREATMENT NOTE
Outpatient Physical Therapy Ortho Treatment Note  [facility]     Patient Name: James Loaiza  : 1945  MRN: [id number]  Today's Date: 2021      Visit Date: 2021    Visit Dx:    ICD-10-CM ICD-9-CM   1. Chronic midline low back pain without sciatica  M54.5 724.2    G89.29 338.29   2. Impaired mobility  Z74.09 799.89       Patient Active Problem List   Diagnosis   • Lumbar herniated disc L3-L5 3/16/16 Open MRI   • Abnormal electrocardiogram   • Abnormal weight gain   • Aortic valve insufficiency   • Coronary arteriosclerosis in native artery   • Benign essential hypertension ([place] )   • Edema   • Dyspnea on exertion   • Fatigue   • Left ventricular hypertrophy   • Obstructive sleep apnea syndrome   • Arthritis of left hip   • Hx of pulmonary embolus 7/15   • Morbid obesity with BMI of 45.0-49.9, adult (CMS/[facility])   • Intermittent dysphagia   • Chronic kidney disease ([facility])   • Hyperlipidemia    • BPH (benign prostatic hypertrophy)   • Left cervical radiculopathy   • Rotator cuff tear, left   • Cardiomegaly   • Plantar fasciitis   • Hypogonadism male   • Vitamin D deficiency disease   • Renal cyst, left   • Preventative health care   • Medication management   • Chronic low back pain   • Lumbar spondylolysis   • Headache   • Other chronic pain   • Pulmonary embolus (CMS/HCC)   • Chronic obstructive pulmonary disease (CMS/HCC)   • Osteoarthritis of right shoulder   • Chronic anticoagulation   • Cervical stenosis of spine        Past Medical History:   Diagnosis Date   • Abnormal EKG     referring to cardiology   • Anxiety    • Arthritis    • Back pain     worsening   • BPH (benign prostatic hyperplasia)     BPH/Nocturia/ Urinary Frequency   • Breast nodule     right   • Cardiomegaly     Cardiomegaly/ SOB with exertion   • Circulation problem    • Constipation    • COPD (chronic obstructive pulmonary disease) (CMS/HCC)    • Deviated septum    • DJD (degenerative joint disease)     DJD Knees  "  • DVT (deep venous thrombosis) (CMS/[facility])    • Dysphagia     solids and liquids - resolved with normal studies   • Encounter for annual health examination 11/14/2013    Annual Health Assessment   • Enlarged prostate    • Fatigue     Extreme fatigue   • Hyperlipidemia 1999   • Hypertension 1999    followed Dr. Marcelo   • Hypogonadism male    • Left hip pain     and DJD   • Left leg pain    • Low back pain    • Moist mucous membranes of ear, nose, and throat     \"Mucous in throat\"   • Morbid obesity (CMS/[facility])     (BMI-41.2za)   • Muscle cramping    • Muscle spasm     Muscle spasms   • Neck arthritis    • Neck muscle spasm     Neck muscle spasm/Cervical strain   • Neck pain    • Obesity    • Plantar fasciitis    • Prostatitis     recurrent   • Psoriasis    • Pulmonary embolus (CMS/[facility]) 07/2015    on Xarelltoypseerlipidemia   • Radiculopathy     Radiculopathy / Neuropathy left ar   • Renal insufficiency     followed by Dr. Mendes   • Seborrhea    • Shortness of breath    • Sleep apnea     Obstructive Sleep apnea worsening   • Urinary frequency    • Vascular disorder    • Vertigo    • Weight gain    • Wellness examination 10/23/2014    Annual Wellness Visit        Past Surgical History:   Procedure Laterality Date   • APPENDECTOMY  1965   • CARDIAC CATHETERIZATION  07/29/2010    [facility]   • COLONOSCOPY  01/05/2010   • COLONOSCOPY  10/01/2001   • NASAL SEPTUM SURGERY     • NOSE SURGERY  1999   • TURP / TRANSURETHRAL INCISION / DRAINAGE PROSTATE  10/23/2015    Michael Castro                       PT Assessment/Plan     Row Name 01/07/21 1551          PT Assessment    Assessment Comments  Mr. Loaiza presents to the clinic, reporting improvement in his bilateral shoulder pain. He reports he generaly only has pain at night. To date, he has been unable to get his MRI secondary to anxiety and is awaiting possible open MRI.  We continued to work on scapular girdle stregthening today and stretching " of anterior chest tissues.  We worked on STM of bilateral UT tissue which demonstrated signficant tautness bilaterally. Mr. Loaiza demontrates relatively well preserved AAROM bilateral shoulders. He requires regular cuing for posture awareness. He continues to be a good candidate for skilled physical therapy.  -GJ        PT Plan    PT Plan Comments  continue to work on scapular girdle/postural strength. consider STM bilateral UT/levator tissues  -GJ       User Key  (r) = Recorded By, (t) = Taken By, (c) = Cosigned By    Initials Name Provider Type    Fred Ritter, PT Physical Therapist            OP Exercises     Row Name 01/07/21 1326 01/07/21 1300          Subjective Comments    Subjective Comments  --  arrives at 1326 for 1315 appt.  I do think my shoulder is getting better. I'm sleeping better, but that is a new opiod pill and lidocane patch.  I am waiting to get set up for my MR at [facility] for an open MRI.   -GJ        Total Minutes    23478 - PT Therapeutic Exercise Minutes  21  -GJ  --     28379 - PT Manual Therapy Minutes  15  -GJ  --        Exercise 5    Exercise Name 5  --  UBE  -GJ     Time 5  --  4 minutes  -GJ        Exercise 6    Exercise Name 6  --  pulleys  -GJ     Cueing 6  --  Verbal;Demo  -GJ     Reps 6  --  10  -GJ     Additional Comments  --  flexion/scaption  -GJ        Exercise 7    Exercise Name 7  --  tband row  -GJ     Cueing 7  --  Verbal;Demo  -GJ     Sets 7  --  2  -GJ     Reps 7  --  10  -GJ     Additional Comments  --  GTB  -GJ        Exercise 8    Exercise Name 8  --  tband extension  -GJ     Cueing 8  --  Verbal;Demo  -GJ     Sets 8  --  2  -GJ     Reps 8  --  10  -GJ     Additional Comments  --  GTB  -GJ        Exercise 9    Exercise Name 9  --  wall wash   -GJ     Cueing 9  --  Verbal;Demo  -GJ     Reps 9  --  10 ea  -GJ     Additional Comments  --  flex and arc  -GJ        Exercise 11    Exercise Name 11  --  HA standing against pool noodle  -GJ     Cueing 11  --  Verbal   -GJ     Sets 11  --  2  -GJ     Reps 11  --  10  -GJ     Time 11  --  RTB  -GJ     Additional Comments  --  cues for chin tuck and posture  -GJ        Exercise 12    Exercise Name 12  --  B ER standing against noodle  -GJ     Cueing 12  --  Verbal;Tactile  -GJ     Reps 12  --  20  -GJ     Time 12  --  RTB  -GJ     Additional Comments  --  cues for chin tucks  -GJ        Exercise 15    Exercise Name 15  --  seated lat pull   -GJ     Cueing 15  --  Verbal;Demo  -GJ     Sets 15  --  2  -GJ     Reps 15  --  10  -GJ     Additional Comments  --  tuff stuff, 4 plates  -GJ        Exercise 16    Exercise Name 16  --  Doorway stretch  -GJ     Cueing 16  --  Verbal;Demo  -GJ     Reps 16  --  3  -GJ     Time 16  --  20 s  -GJ       User Key  (r) = Recorded By, (t) = Taken By, (c) = Cosigned By    Initials Name Provider Type    Fred Ritter, PT Physical Therapist                      Manual Rx (last 36 hours)      Manual Treatments     Row Name 01/07/21 1326 01/07/21 1300          Total Minutes    34219 - PT Manual Therapy Minutes  15  -GJ  --        Manual Rx 2    Manual Rx 2 Location  --  seated in chair, manual STM to bialteral UT tissues  -GJ       User Key  (r) = Recorded By, (t) = Taken By, (c) = Cosigned By    Initials Name Provider Type    Fred Ritter, PT Physical Therapist          PT OP Goals     Row Name 01/07/21 1300          PT Short Term Goals    STG Date to Achieve  12/25/20  -GJ     STG 1  Pt will demonstrate independence and compliance with initial HEP.  -GJ     STG 1 Progress  Met  -GJ     STG 2  Pt will report compliance with postural modifications during leisure tasks to reduce UT/cervical spine stress.  -GJ     STG 2 Progress  Ongoing  -GJ     STG 3  Pt will report ability to stay in bed for entireity of night without moving locations due to shoulder pain.  -GJ     STG 3 Progress  Progressing  -GJ     STG 3 Progress Comments  reports improving sleep, however with aid of medications  -GJ         Long Term Goals    LTG Date to Achieve  01/10/21  -GJ     LTG 1  Pt will report 50% reduction in night time waking due to shoulder pain.  -GJ     LTG 1 Progress  Ongoing  -GJ     LTG 2  Pt will demonstrate improved seated posture without cuing while in clinic.  -GJ     LTG 2 Progress  Ongoing  -GJ     LTG 2 Progress Comments  requires regular cuing  -GJ     LTG 3  Pt will demonstrate improved B lateral flexion/cervical rotation to 50% B without provocation of symptoms.  -GJ     LTG 3 Progress  Ongoing  -GJ       User Key  (r) = Recorded By, (t) = Taken By, (c) = Cosigned By    Initials Name Provider Type    Fred Ritter, PT Physical Therapist          Therapy Education  Education Details: discusssed postural implications, use of theracane to address soft tissue issues in UT tissues  Given: HEP, Symptoms/condition management, Pain management, Mobility training, Posture/body mechanics  Program: New, Reinforced, Progressed  How Provided: Verbal, Demonstration  Provided to: Patient  Level of Understanding: Teach back education performed, Verbalized, Demonstrated              Time Calculation:   Start Time: 1326(appt time 1315)  Stop Time: 1402  Time Calculation (min): 36 min  Therapy Charges for Today     Code Description Service Date Service Provider Modifiers Qty    35204129195 HC PT THER PROC EA 15 MIN 1/7/2021 Fred Lamar, PT GP 1    80052106914 HC PT MANUAL THERAPY EA 15 MIN 1/7/2021 Fred Lamar, PT GP 1                    Fred Lamar, PT  1/7/2021

## 2021-01-07 NOTE — THERAPY TREATMENT NOTE
Outpatient Physical Therapy Ortho Treatment Note  [facility]     Patient Name: James Loaiza  : 1945  MRN: [id number]  Today's Date: 2021      Visit Date: 2021    Visit Dx:    ICD-10-CM ICD-9-CM   1. Impaired mobility  Z74.09 799.89   2. Bilateral shoulder pain, unspecified chronicity  M25.511 719.41    M25.512        Patient Active Problem List   Diagnosis   • Lumbar herniated disc L3-L5 3/16/16 Open MRI   • Abnormal electrocardiogram   • Abnormal weight gain   • Aortic valve insufficiency   • Coronary arteriosclerosis in native artery   • Benign essential hypertension ([place] )   • Edema   • Dyspnea on exertion   • Fatigue   • Left ventricular hypertrophy   • Obstructive sleep apnea syndrome   • Arthritis of left hip   • Hx of pulmonary embolus 7/15   • Morbid obesity with BMI of 45.0-49.9, adult (CMS/HCC)   • Intermittent dysphagia   • Chronic kidney disease ([facility])   • Hyperlipidemia    • BPH (benign prostatic hypertrophy)   • Left cervical radiculopathy   • Rotator cuff tear, left   • Cardiomegaly   • Plantar fasciitis   • Hypogonadism male   • Vitamin D deficiency disease   • Renal cyst, left   • Preventative health care   • Medication management   • Chronic low back pain   • Lumbar spondylolysis   • Headache   • Other chronic pain   • Pulmonary embolus (CMS/HCC)   • Chronic obstructive pulmonary disease (CMS/HCC)   • Osteoarthritis of right shoulder   • Chronic anticoagulation   • Cervical stenosis of spine        Past Medical History:   Diagnosis Date   • Abnormal EKG     referring to cardiology   • Anxiety    • Arthritis    • Back pain     worsening   • BPH (benign prostatic hyperplasia)     BPH/Nocturia/ Urinary Frequency   • Breast nodule     right   • Cardiomegaly     Cardiomegaly/ SOB with exertion   • Circulation problem    • Constipation    • COPD (chronic obstructive pulmonary disease) (CMS/HCC)    • Deviated septum    • DJD (degenerative joint disease)     DJD Knees  "  • DVT (deep venous thrombosis) (CMS/[facility])    • Dysphagia     solids and liquids - resolved with normal studies   • Encounter for annual health examination 11/14/2013    Annual Health Assessment   • Enlarged prostate    • Fatigue     Extreme fatigue   • Hyperlipidemia 1999   • Hypertension 1999    followed Dr. Marcelo   • Hypogonadism male    • Left hip pain     and DJD   • Left leg pain    • Low back pain    • Moist mucous membranes of ear, nose, and throat     \"Mucous in throat\"   • Morbid obesity (CMS/[facility])     (BMI-41.2za)   • Muscle cramping    • Muscle spasm     Muscle spasms   • Neck arthritis    • Neck muscle spasm     Neck muscle spasm/Cervical strain   • Neck pain    • Obesity    • Plantar fasciitis    • Prostatitis     recurrent   • Psoriasis    • Pulmonary embolus (CMS/[facility]) 07/2015    on Xarelltoypseerlipidemia   • Radiculopathy     Radiculopathy / Neuropathy left ar   • Renal insufficiency     followed by Dr. Mendes   • Seborrhea    • Shortness of breath    • Sleep apnea     Obstructive Sleep apnea worsening   • Urinary frequency    • Vascular disorder    • Vertigo    • Weight gain    • Wellness examination 10/23/2014    Annual Wellness Visit        Past Surgical History:   Procedure Laterality Date   • APPENDECTOMY  1965   • CARDIAC CATHETERIZATION  07/29/2010    [facility]   • COLONOSCOPY  01/05/2010   • COLONOSCOPY  10/01/2001   • NASAL SEPTUM SURGERY     • NOSE SURGERY  1999   • TURP / TRANSURETHRAL INCISION / DRAINAGE PROSTATE  10/23/2015    Michael Castro                       PT Assessment/Plan     Row Name 01/07/21 1551          PT Assessment    Assessment Comments  Mr. Loaiza presents to the clinic, reporting improvement in his bilateral shoulder pain. He reports he generaly only has pain at night. To date, he has been unable to get his MRI secondary to anxiety and is awaiting possible open MRI.  We continued to work on scapular girdle stregthening today and stretching " of anterior chest tissues.  We worked on STM of bilateral UT tissue which demonstrated signficant tautness bilaterally. Mr. Loaiza demontrates relatively well preserved AAROM bilateral shoulders. He requires regular cuing for posture awareness. He continues to be a good candidate for skilled physical therapy.  -GJ        PT Plan    PT Plan Comments  continue to work on scapular girdle/postural strength. consider STM bilateral UT/levator tissues  -GJ       User Key  (r) = Recorded By, (t) = Taken By, (c) = Cosigned By    Initials Name Provider Type    Fred Ritter, PT Physical Therapist            OP Exercises     Row Name 01/07/21 1326 01/07/21 1300          Subjective Comments    Subjective Comments  --  arrives at 1326 for 1315 appt.  I do think my shoulder is getting better. I'm sleeping better, but that is a new opiod pill and lidocane patch.  I am waiting to get set up for my MR at [facility] for an open MRI.   -GJ        Total Minutes    69968 - PT Therapeutic Exercise Minutes  21  -GJ  --     78619 - PT Manual Therapy Minutes  15  -GJ  --        Exercise 5    Exercise Name 5  --  UBE  -GJ     Time 5  --  4 minutes  -GJ        Exercise 6    Exercise Name 6  --  pulleys  -GJ     Cueing 6  --  Verbal;Demo  -GJ     Reps 6  --  10  -GJ     Additional Comments  --  flexion/scaption  -GJ        Exercise 7    Exercise Name 7  --  tband row  -GJ     Cueing 7  --  Verbal;Demo  -GJ     Sets 7  --  2  -GJ     Reps 7  --  10  -GJ     Additional Comments  --  GTB  -GJ        Exercise 8    Exercise Name 8  --  tband extension  -GJ     Cueing 8  --  Verbal;Demo  -GJ     Sets 8  --  2  -GJ     Reps 8  --  10  -GJ     Additional Comments  --  GTB  -GJ        Exercise 9    Exercise Name 9  --  wall wash   -GJ     Cueing 9  --  Verbal;Demo  -GJ     Reps 9  --  10 ea  -GJ     Additional Comments  --  flex and arc  -GJ        Exercise 11    Exercise Name 11  --  HA standing against pool noodle  -GJ     Cueing 11  --  Verbal   -GJ     Sets 11  --  2  -GJ     Reps 11  --  10  -GJ     Time 11  --  RTB  -GJ     Additional Comments  --  cues for chin tuck and posture  -GJ        Exercise 12    Exercise Name 12  --  B ER standing against noodle  -GJ     Cueing 12  --  Verbal;Tactile  -GJ     Reps 12  --  20  -GJ     Time 12  --  RTB  -GJ     Additional Comments  --  cues for chin tucks  -GJ        Exercise 15    Exercise Name 15  --  seated lat pull   -GJ     Cueing 15  --  Verbal;Demo  -GJ     Sets 15  --  2  -GJ     Reps 15  --  10  -GJ     Additional Comments  --  tuff stuff, 4 plates  -GJ        Exercise 16    Exercise Name 16  --  Doorway stretch  -GJ     Cueing 16  --  Verbal;Demo  -GJ     Reps 16  --  3  -GJ     Time 16  --  20 s  -GJ       User Key  (r) = Recorded By, (t) = Taken By, (c) = Cosigned By    Initials Name Provider Type    Fred Ritter, PT Physical Therapist                      Manual Rx (last 36 hours)      Manual Treatments     Row Name 01/07/21 1326 01/07/21 1300          Total Minutes    83925 - PT Manual Therapy Minutes  15  -GJ  --        Manual Rx 2    Manual Rx 2 Location  --  seated in chair, manual STM to bialteral UT tissues  -GJ       User Key  (r) = Recorded By, (t) = Taken By, (c) = Cosigned By    Initials Name Provider Type    Fred Ritter, PT Physical Therapist          PT OP Goals     Row Name 01/07/21 1300          PT Short Term Goals    STG Date to Achieve  12/25/20  -GJ     STG 1  Pt will demonstrate independence and compliance with initial HEP.  -GJ     STG 1 Progress  Met  -GJ     STG 2  Pt will report compliance with postural modifications during leisure tasks to reduce UT/cervical spine stress.  -GJ     STG 2 Progress  Ongoing  -GJ     STG 3  Pt will report ability to stay in bed for entireity of night without moving locations due to shoulder pain.  -GJ     STG 3 Progress  Progressing  -GJ     STG 3 Progress Comments  reports improving sleep, however with aid of medications  -GJ         Long Term Goals    LTG Date to Achieve  01/10/21  -GJ     LTG 1  Pt will report 50% reduction in night time waking due to shoulder pain.  -GJ     LTG 1 Progress  Ongoing  -GJ     LTG 2  Pt will demonstrate improved seated posture without cuing while in clinic.  -GJ     LTG 2 Progress  Ongoing  -GJ     LTG 2 Progress Comments  requires regular cuing  -GJ     LTG 3  Pt will demonstrate improved B lateral flexion/cervical rotation to 50% B without provocation of symptoms.  -GJ     LTG 3 Progress  Ongoing  -GJ       User Key  (r) = Recorded By, (t) = Taken By, (c) = Cosigned By    Initials Name Provider Type    Fred Ritter, PT Physical Therapist          Therapy Education  Education Details: discusssed postural implications, use of theracane to address soft tissue issues in UT tissues  Given: HEP, Symptoms/condition management, Pain management, Mobility training, Posture/body mechanics  Program: New, Reinforced, Progressed  How Provided: Verbal, Demonstration  Provided to: Patient  Level of Understanding: Teach back education performed, Verbalized, Demonstrated              Time Calculation:   Start Time: 1326(appt time 1315)  Stop Time: 1402  Time Calculation (min): 36 min  Therapy Charges for Today     Code Description Service Date Service Provider Modifiers Qty    03115172060 HC PT THER PROC EA 15 MIN 1/7/2021 Fred Lamar, PT GP 1    08231855174 HC PT MANUAL THERAPY EA 15 MIN 1/7/2021 Fred Lmaar, PT GP 1                    Fred Lamar, PT  1/7/2021

## 2021-01-08 ENCOUNTER — APPOINTMENT (OUTPATIENT)
Dept: MRI IMAGING | Facility: HOSPITAL | Age: 76
End: 2021-01-08

## 2021-01-14 ENCOUNTER — TELEPHONE (OUTPATIENT)
Dept: PHYSICAL THERAPY | Facility: HOSPITAL | Age: 76
End: 2021-01-14

## 2021-01-14 ENCOUNTER — TELEPHONE (OUTPATIENT)
Dept: ORTHOPEDIC SURGERY | Facility: CLINIC | Age: 76
End: 2021-01-14

## 2021-01-14 NOTE — TELEPHONE ENCOUNTER
Caller: BETHANIE ORDONEZ    Relationship: SELF    Best call back number: [phone]    What orders are you requesting (i.e. lab or imaging): OPEN MRI    In what timeframe would the patient need to come in: ASAP    Where will you receive your lab/imaging services: UNKNOWN    Additional notes: PT CALLED CHECKING ON STATUS OF RECEIVING AN OPEN MRI DUE TO CLAUSTROPHOBIA. WHILE SPEAKING, CALL DROPPED, ATTEMPTED CALLBACK, UNABLE TO LEAVE A VOICEMAIL.

## 2021-01-14 NOTE — TELEPHONE ENCOUNTER
Spoke to pt regarding missed appt, states he did not know he had one. Reviewed upcoming appointment, pt states will be there and requests a printed schedule.

## 2021-01-15 RX ORDER — TRAMADOL HYDROCHLORIDE 50 MG/1
50 TABLET ORAL EVERY 4 HOURS
Qty: 50 TABLET | Refills: 0 | Status: SHIPPED | OUTPATIENT
Start: 2021-01-15 | End: 2021-02-24

## 2021-01-18 ENCOUNTER — HOSPITAL ENCOUNTER (OUTPATIENT)
Dept: PHYSICAL THERAPY | Facility: HOSPITAL | Age: 76
Setting detail: THERAPIES SERIES
Discharge: HOME | End: 2021-01-18

## 2021-01-18 DIAGNOSIS — M25.512 BILATERAL SHOULDER PAIN, UNSPECIFIED CHRONICITY: Primary | ICD-10-CM

## 2021-01-18 DIAGNOSIS — M25.511 BILATERAL SHOULDER PAIN, UNSPECIFIED CHRONICITY: Primary | ICD-10-CM

## 2021-01-18 DIAGNOSIS — Z74.09 IMPAIRED MOBILITY: ICD-10-CM

## 2021-01-18 PROCEDURE — 97110 THERAPEUTIC EXERCISES: CPT

## 2021-01-18 PROCEDURE — 97140 MANUAL THERAPY 1/> REGIONS: CPT

## 2021-01-18 NOTE — THERAPY PROGRESS REPORT/RE-CERT
Outpatient Physical Therapy Ortho Progress Note  [facility]     Patient Name: James Loaiza  : 1945  MRN: [id number]  Today's Date: 2021      Visit Date: 2021    Patient Active Problem List   Diagnosis   • Lumbar herniated disc L3-L5 3/16/16 Open MRI   • Abnormal electrocardiogram   • Abnormal weight gain   • Aortic valve insufficiency   • Coronary arteriosclerosis in native artery   • Benign essential hypertension (Ije[place] )   • Edema   • Dyspnea on exertion   • Fatigue   • Left ventricular hypertrophy   • Obstructive sleep apnea syndrome   • Arthritis of left hip   • Hx of pulmonary embolus 7/15   • Morbid obesity with BMI of 45.0-49.9, adult (CMS/HCC)   • Intermittent dysphagia   • Chronic kidney disease (Lona)   • Hyperlipidemia    • BPH (benign prostatic hypertrophy)   • Left cervical radiculopathy   • Rotator cuff tear, left   • Cardiomegaly   • Plantar fasciitis   • Hypogonadism male   • Vitamin D deficiency disease   • Renal cyst, left   • Preventative health care   • Medication management   • Chronic low back pain   • Lumbar spondylolysis   • Headache   • Other chronic pain   • Pulmonary embolus (CMS/HCC)   • Chronic obstructive pulmonary disease (CMS/HCC)   • Osteoarthritis of right shoulder   • Chronic anticoagulation   • Cervical stenosis of spine        Past Medical History:   Diagnosis Date   • Abnormal EKG     referring to cardiology   • Anxiety    • Arthritis    • Back pain     worsening   • BPH (benign prostatic hyperplasia)     BPH/Nocturia/ Urinary Frequency   • Breast nodule     right   • Cardiomegaly     Cardiomegaly/ SOB with exertion   • Circulation problem    • Constipation    • COPD (chronic obstructive pulmonary disease) (CMS/HCC)    • Deviated septum    • DJD (degenerative joint disease)     DJD Knees   • DVT (deep venous thrombosis) (CMS/HCC)    • Dysphagia     solids and liquids - resolved with normal studies   • Encounter for annual health examination  "11/14/2013    Annual Health Assessment   • Enlarged prostate    • Fatigue     Extreme fatigue   • Hyperlipidemia 1999   • Hypertension 1999    followed Dr. Marcelo   • Hypogonadism male    • Left hip pain     and DJD   • Left leg pain    • Low back pain    • Moist mucous membranes of ear, nose, and throat     \"Mucous in throat\"   • Morbid obesity (CMS/HCC)     (BMI-41.2za)   • Muscle cramping    • Muscle spasm     Muscle spasms   • Neck arthritis    • Neck muscle spasm     Neck muscle spasm/Cervical strain   • Neck pain    • Obesity    • Plantar fasciitis    • Prostatitis     recurrent   • Psoriasis    • Pulmonary embolus (CMS/HCC) 07/2015    on Xarelltoypseerlipidemia   • Radiculopathy     Radiculopathy / Neuropathy left ar   • Renal insufficiency     followed by Dr. Mendes   • Seborrhea    • Shortness of breath    • Sleep apnea     Obstructive Sleep apnea worsening   • Urinary frequency    • Vascular disorder    • Vertigo    • Weight gain    • Wellness examination 10/23/2014    Annual Wellness Visit        Past Surgical History:   Procedure Laterality Date   • APPENDECTOMY  1965   • CARDIAC CATHETERIZATION  07/29/2010    [facility]   • COLONOSCOPY  01/05/2010   • COLONOSCOPY  10/01/2001   • NASAL SEPTUM SURGERY     • NOSE SURGERY  1999   • TURP / TRANSURETHRAL INCISION / DRAINAGE PROSTATE  10/23/2015    [facility] RABAGO Castro       Visit Dx:     ICD-10-CM ICD-9-CM   1. Bilateral shoulder pain, unspecified chronicity  M25.511 719.41    M25.512    2. Impaired mobility  Z74.09 799.89                             Therapy Education  Given: Symptoms/condition management, HEP  Program: Reinforced  How Provided: Verbal  Provided to: Patient  Level of Understanding: Demonstrated, Verbalized, Teach back education performed     PT OP Goals     Row Name 01/18/21 1500          PT Short Term Goals    STG Date to Achieve  12/25/20  -LB     STG 1  Pt will demonstrate independence and compliance with initial HEP.  " -LB     STG 1 Progress  Met  -LB     STG 2  Pt will report compliance with postural modifications during leisure tasks to reduce UT/cervical spine stress.  -LB     STG 2 Progress  Progressing  -LB     STG 2 Progress Comments  Pt with improving awareness of posture and noted improved positioning in waiting area.  -LB     STG 3  Pt will report ability to stay in bed for entireity of night without moving locations due to shoulder pain.  -LB     STG 3 Progress  Progressing  -LB     STG 3 Progress Comments  Continued intermittent waking due to shoulder pain but pt reports he is now able to get back in bed after short break.  -LB        Long Term Goals    LTG Date to Achieve  01/10/21  -LB     LTG 1  Pt will report 50% reduction in night time waking due to shoulder pain.  -LB     LTG 1 Progress  Progressing  -LB     LTG 1 Progress Comments  Pt reports reduced waking to 1 time per night.  -LB     LTG 2  Pt will demonstrate improved seated posture without cuing while in clinic.  -LB     LTG 2 Progress  Met  -LB     LTG 2 Progress Comments  noted improvement in waiting area in lobby without cuing.  -LB     LTG 3  Pt will demonstrate improved B lateral flexion/cervical rotation to 50% B without provocation of symptoms.  -LB     LTG 3 Progress  Ongoing  -LB     LTG 3 Progress Comments  Continued lateral flexion deficits B; R > L rotation deficits  -LB       User Key  (r) = Recorded By, (t) = Taken By, (c) = Cosigned By    Initials Name Provider Type    Tiera Ayala PT Physical Therapist          PT Assessment/Plan     Row Name 01/18/21 1514          PT Assessment    Functional Limitations  Limitations in functional capacity and performance;Limitation in home management;Performance in self-care ADL  -LB     Impairments  Impaired flexibility;Joint mobility;Muscle strength;Pain;Poor body mechanics;Posture;Range of motion;Sensation  -LB     Assessment Comments  Pt has participated in 7 skilled PT sessions to address scapular  pain that primarily bothers him with cervical rotation/lateral flexion and when laying in bed at night. He reports he is now able to get up, sit up for period of time and return to bed which he was unable to do prior to PT. He has made adjustments to his posture at rest and during recreational activity and noted improvement in seated posture today without cuing. Pt continues to demonstrate dec scapular strength and requires cuing to improve retraction with exercises. Continued B UT and levator as well as rhomboid muscle guarding and trigger points. Continued dec B cervical lateral flexion and R > L cervical rotation. Pt will benefit from continued skilled PT services to continue manual therapy and postural strengthening.  -LB     Rehab Potential  Good  -LB     Patient/caregiver participated in establishment of treatment plan and goals  Yes  -LB     Patient would benefit from skilled therapy intervention  Yes  -LB        PT Plan    PT Frequency  2x/week  -LB     Predicted Duration of Therapy Intervention (PT)  6 visits  -LB     Planned CPT's?  PT RE-EVAL: 20076;PT THER PROC EA 15 MIN: 96882;PT THER ACT EA 15 MIN: 35505;PT MANUAL THERAPY EA 15 MIN: 69553;PT NEUROMUSC RE-EDUCATION EA 15 MIN: 37665;PT SELF CARE/HOME MGMT/TRAIN EA 15: 17918;PT HOT OR COLD PACK TREAT MCARE;PT ELECTRICAL STIM UNATTEND: ;PT TRACTION CERVICAL: 02799  -LB     PT Plan Comments  Continue POC, consider standing flexion with dowel against wall, standing wall renée.  -LB       User Key  (r) = Recorded By, (t) = Taken By, (c) = Cosigned By    Initials Name Provider Type    Tiera Ayala, PT Physical Therapist            OP Exercises     Row Name 01/18/21 1500             Subjective Comments    Subjective Comments  I am doing ok. I am able to get back into bed at night and get back to sleep rather than just staying up but I do continue to have shoulder pain.  -LB         Subjective Pain    Able to rate subjective pain?  yes  -LB       Pre-Treatment Pain Level  2  -LB      Subjective Pain Comment  It doesn't bother me much during the day; just tightness.  -LB         Total Minutes    81441 - PT Therapeutic Exercise Minutes  30  -LB      25744 - PT Manual Therapy Minutes  15  -LB         Exercise 5    Exercise Name 5  UBE  -LB      Time 5  4 minutes  -LB         Exercise 6    Exercise Name 6  pulleys  -LB      Cueing 6  Verbal;Demo  -LB      Reps 6  10  -LB      Additional Comments  flexion/scaption  -LB         Exercise 7    Exercise Name 7  tband row  -LB      Cueing 7  Verbal;Demo  -LB      Sets 7  2  -LB      Reps 7  10  -LB      Additional Comments  flexion/scaption  -LB         Exercise 8    Exercise Name 8  tband extension  -LB      Cueing 8  Verbal;Demo  -LB      Sets 8  2  -LB      Reps 8  10  -LB      Additional Comments  flexion/scaption  -LB         Exercise 9    Exercise Name 9  wall wash   -LB      Cueing 9  Verbal;Demo  -LB      Reps 9  10 ea  -LB      Additional Comments  flex and arc  -LB         Exercise 11    Exercise Name 11  HA standing against pool noodle  -LB      Cueing 11  Verbal  -LB      Sets 11  2  -LB      Reps 11  10  -LB      Time 11  RTB  -LB      Additional Comments  cues for chin tuck and posture  -LB         Exercise 12    Exercise Name 12  B ER standing against noodle  -LB      Cueing 12  Verbal;Tactile  -LB      Reps 12  20  -LB      Time 12  RTB  -LB      Additional Comments  cues for chin tuck and posture  -LB         Exercise 15    Exercise Name 15  seated lat pull   -LB      Cueing 15  Verbal;Demo  -LB      Sets 15  2  -LB      Reps 15  10  -LB      Additional Comments  tuff stuff, 4 plates  -LB         Exercise 16    Exercise Name 16  Doorway stretch  -LB      Cueing 16  Verbal;Demo  -LB      Reps 16  3  -LB      Time 16  20 s  -LB        User Key  (r) = Recorded By, (t) = Taken By, (c) = Cosigned By    Initials Name Provider Type    Tiera Ayala, PT Physical Therapist           Manual Rx (last 36  hours)      Manual Treatments     Row Name 01/18/21 1500             Total Minutes    91869 - PT Manual Therapy Minutes  15  -LB         Manual Rx 1    Manual Rx 1 Location  STM seated in chair to B upper trap, periscapular muscles  -LB      Manual Rx 1 Duration  15  -LB        User Key  (r) = Recorded By, (t) = Taken By, (c) = Cosigned By    Initials Name Provider Type    LB Tiera Cochran, PT Physical Therapist                                Time Calculation:     Start Time: 1430  Stop Time: 1515  Time Calculation (min): 45 min  Total Timed Code Minutes- PT: 43 minute(s)     Therapy Charges for Today     Code Description Service Date Service Provider Modifiers Qty    02932703172 HC PT THER PROC EA 15 MIN 1/18/2021 Tiera Cochran, PT GP 2    [id number]  PT MANUAL THERAPY EA 15 MIN 1/18/2021 Tiera Cochran, PT GP 1                    Tiera Cochran, PT  1/18/2021

## 2021-01-21 ENCOUNTER — OFFICE VISIT (OUTPATIENT)
Dept: PAIN MEDICINE | Facility: CLINIC | Age: 76
End: 2021-01-21

## 2021-01-21 ENCOUNTER — HOSPITAL ENCOUNTER (OUTPATIENT)
Dept: PHYSICAL THERAPY | Facility: HOSPITAL | Age: 76
Setting detail: THERAPIES SERIES
Discharge: HOME | End: 2021-01-21

## 2021-01-21 VITALS
HEART RATE: 69 BPM | TEMPERATURE: 97.5 F | HEIGHT: 74 IN | BODY MASS INDEX: 40.43 KG/M2 | DIASTOLIC BLOOD PRESSURE: 77 MMHG | RESPIRATION RATE: 20 BRPM | OXYGEN SATURATION: 96 % | SYSTOLIC BLOOD PRESSURE: 173 MMHG | WEIGHT: 315 LBS

## 2021-01-21 DIAGNOSIS — G89.29 CHRONIC LOW BACK PAIN, UNSPECIFIED BACK PAIN LATERALITY, UNSPECIFIED WHETHER SCIATICA PRESENT: Primary | ICD-10-CM

## 2021-01-21 DIAGNOSIS — G89.29 CHRONIC MIDLINE LOW BACK PAIN WITHOUT SCIATICA: ICD-10-CM

## 2021-01-21 DIAGNOSIS — Z79.01 CHRONIC ANTICOAGULATION: ICD-10-CM

## 2021-01-21 DIAGNOSIS — M54.50 CHRONIC LOW BACK PAIN, UNSPECIFIED BACK PAIN LATERALITY, UNSPECIFIED WHETHER SCIATICA PRESENT: Primary | ICD-10-CM

## 2021-01-21 DIAGNOSIS — M48.02 CERVICAL STENOSIS OF SPINE: ICD-10-CM

## 2021-01-21 DIAGNOSIS — M43.06 LUMBAR SPONDYLOLYSIS: ICD-10-CM

## 2021-01-21 DIAGNOSIS — Z74.09 IMPAIRED MOBILITY: ICD-10-CM

## 2021-01-21 DIAGNOSIS — M25.512 BILATERAL SHOULDER PAIN, UNSPECIFIED CHRONICITY: Primary | ICD-10-CM

## 2021-01-21 DIAGNOSIS — M25.511 BILATERAL SHOULDER PAIN, UNSPECIFIED CHRONICITY: Primary | ICD-10-CM

## 2021-01-21 DIAGNOSIS — M54.81 BILATERAL OCCIPITAL NEURALGIA: ICD-10-CM

## 2021-01-21 DIAGNOSIS — M54.50 CHRONIC MIDLINE LOW BACK PAIN WITHOUT SCIATICA: ICD-10-CM

## 2021-01-21 PROCEDURE — 99214 OFFICE O/P EST MOD 30 MIN: CPT | Performed by: NURSE PRACTITIONER

## 2021-01-21 PROCEDURE — 97110 THERAPEUTIC EXERCISES: CPT

## 2021-01-21 PROCEDURE — 97140 MANUAL THERAPY 1/> REGIONS: CPT

## 2021-01-21 NOTE — PROGRESS NOTES
"CHIEF COMPLAINT  F/u back and neck pain. Pt sts back pain is the same, neck pain has worsened since last ov. Pt would like to discuss bilat ONB today.     Subjective   James Loaiza is a 75 y.o. male  who presents for follow-up.  He has a history of chronic back and neck pain. Reports back pain is unchanged. Complains of worsening neck pain. Reports it is the same as it was in the fall. Wants to repeat \"shots in the back of the head.\"     Complains of pain in his neck and shoulders. Today his pain is 2/10VAS. Reports his low back pain is still well controlled since LMBB. Describes the neck pain as nearly continuous throbbing. Pain increases with certain positions, activitiss, touching against area; pain decreases with rest, changing position. ADL's by self. Denies any bowel or bladder changes.    Being seen by Dr Serrano for shoulder pain. Having MRI-arthrogram. Was prescribed Tramadol.     Has had ELENITA with no improvement in shoulder pain.     Remains on Eliquis.  Cannot take NSAIDS.     Patient remained masked during entire encounter. No cough present. I donned a mask and eye protection throughout entire visit. Prior to donning mask and eye protection, hand hygiene was performed, as well as when it was doffed.  I was closer than 6 feet, but not for an extended period of time. No obvious exposure to any bodily fluids.    Back Pain  This is a recurrent problem. The current episode started more than 1 month ago. The problem occurs constantly. Progression since onset: unchanged since last office visit. The pain is present in the lumbar spine and sacro-iliac. The quality of the pain is described as aching. The pain does not radiate. The pain is mild. Worse during: frequent urination at night which increases his pain--getting up and out of bed. The symptoms are aggravated by standing and twisting. Stiffness is present all day. Pertinent negatives include no abdominal pain, bladder incontinence, bowel incontinence, chest " pain, dysuria, fever, headaches, numbness or weakness. Risk factors include lack of exercise, obesity and recent trauma. Treatments tried: lumbar RFL, OTC IBU.   Neck Pain   This is a new problem. The current episode started more than 1 month ago. The problem has been waxing and waning (worse since last evaluation). The pain is associated with nothing. The pain is present in the occipital region. The pain is at a severity of 2/10. The pain is severe. Pertinent negatives include no chest pain, fever, headaches, numbness or weakness.      -- 10-23-20-- bilateral OCNB and bilateral L2-L5 MBB  -- 9-21-20-- bilateral OCNB  -- 9-9-20-- ELENITA (DR ALLYSON RIVERA)  -- 6-22-18-- bilateral L2-L5 RFL- 75% long term relief    MRI OF THE CERVICAL SPINE WITHOUT CONTRAST 08/23/2020     CLINICAL HISTORY: Abnormal cervical spine x-ray demonstrating  degenerative disc disease of the cervical spine, patient has neck pain  and bilateral shoulder pain, right worse than left.     TECHNIQUE: Sagittal T1, gradient echo T2 and fat-suppressed fast spin  echo T2-weighted images were obtained of the cervical spine, in addition  axial gradient echo T2 weighted images were obtained from the skull base  to the T1 thoracic level.     COMPARISON: This is correlated to an outside MRI of the cervical spine  from [facility] on 07/28/2016.     FINDINGS: Exam is compromised by significant patient motion artifact  with substantial blurring of images on the sagittal images limits  evaluation.     There are some arthritic changes at the atlantodental interval,  otherwise the C1-C2 level is within normal limits.     At C2-C3, there is disc space narrowing and degenerative endplate  changes, posterior spurs abut the ventral cord mildly narrowing the  canal. There is some mild right facet overgrowth and uncovertebral joint  hypertrophy. There is mild right foraminal narrowing. Left facets and  uncovertebral joints are normal with no left  foraminal narrowing.      At C3-C4, the sagittal images are so severely blurred that the C3-C4  level is inadequately assessed in the axial images. There appears to be  some posterior endplate spurs that abut and deform the ventral surface  of the cord. On the axial images, there appears to be up to moderate  canal narrowing. There is moderate right facet overgrowth, right-sided  uncovertebral joint hypertrophy. There is moderate right foraminal  narrowing. There is some left-sided uncovertebral joint hypertrophy and  mild left facet overgrowth with mild-to-moderate left foraminal  narrowing.     At C4-C5, there is posterior endplate spurs abut the ventral surface of  the cord mildly narrowing the canal. There is mild facet overgrowth and  uncovertebral joint hypertrophy. There is mild-to-moderate left and  moderate right bony foraminal narrowing.     At C5-C6, there is moderate disc space narrowing. There is some anterior  marginal spurring. There is minimal posterior endplate spurring, minimal  canal narrowing. The facets are normal. There is some right-sided  uncovertebral joint hypertrophy and there is mild right and no left  foraminal narrowing.     At C6-C7, there is some anterior marginal endplate spurring. Posterior  disc margin is normal with no canal narrowing. There is mild  uncovertebral joint hypertrophy, and there is up to mild foraminal  narrowing.     At C7-T1, there is anterior marginal endplate spurring. There is mild  posterior disc osteophyte complex, minimal canal narrowing. There is  mild foraminal narrowing.     IMPRESSION:  1. This exam is significantly compromised by patient motion artifact.  There is blurring of the sagittal images, significantly limits  evaluation, and furthermore the axial images are degraded and axial  images also tend to overestimate the degree of canal narrowing, thus  this is a limited evaluation of the cervical spine.  2. There is some disc space narrowing and  degenerative endplate changes  throughout the cervical spine. At C2-C3, posterior endplate spurs abut  the ventral cord, mildly narrowing the canal, and there is mild right  facet overgrowth and uncovertebral joint hypertrophy with mild right  foraminal narrowing.  3. At C3-C4, again images are severely blurred on the sagittal images,  obscures evaluation of the C3-C4 level. There is mild motion artifact on  the axial images, limits evaluation. There is posterior endplate  spurring that appear to abut and deform the ventral surface of the cord  contributing to up to moderate canal narrowing. There is moderate right  facet overgrowth and some uncovertebral joint spurring, and there is  moderate right and mild-to-moderate left bony foraminal narrowing at  C3-C4.  4. At C4-C5, posterior endplate spurs abut the ventral cord, mildly  narrowing the canal, and there is some mild facet overgrowth and  uncovertebral joint hypertrophy. There is mild-to-moderate left and  moderate right bony foraminal narrowing.  5. There is some anterior marginal endplate spurring from C5 down to T2.  At C5-C6, there is minimal canal and mild right foraminal narrowing. At  C7-T1, there is minimal posterior spurring with mild canal and there is  mild bilateral foraminal narrowing. The remainder of the cervical spine  MRI is unremarkable. If the patient has cervical radiculopathy,  myelogram and postmyelogram CT is recommended for a more comprehensive  assessment as this study provides limited evaluation due to motion  artifact.     This report was finalized on 8/26/2020 7:17 AM by Dr. Adis Lares M.D.     PEG Assessment   What number best describes your pain on average in the past week?2  What number best describes how, during the past week, pain has interfered with your enjoyment of life?2  What number best describes how, during the past week, pain has interfered with your general activity?  2      The following portions of the patient's  "history were reviewed and updated as appropriate: allergies, current medications, past family history, past medical history, past social history, past surgical history and problem list.    Review of Systems   Constitutional: Negative for activity change, fatigue and fever.   HENT: Negative for congestion.    Eyes: Negative for visual disturbance.   Respiratory: Negative for cough and shortness of breath.    Cardiovascular: Negative for chest pain.   Gastrointestinal: Negative for abdominal pain, bowel incontinence, constipation and diarrhea.   Genitourinary: Negative for bladder incontinence, difficulty urinating and dysuria.   Musculoskeletal: Positive for back pain, neck pain and neck stiffness.   Allergic/Immunologic: Negative for immunocompromised state.   Neurological: Negative for dizziness, weakness, light-headedness, numbness and headaches.   Psychiatric/Behavioral: Negative for agitation, sleep disturbance and suicidal ideas. The patient is not nervous/anxious.      I have reviewed and confirmed the accuracy of the ROS as documented by the MA/LPN/RN YANIRA Stokes      Vitals:    01/21/21 1025   BP: 173/77  Comment: pt sts took bp meds an hour ago   Pulse: 69   Resp: 20   Temp: 97.5 °F (36.4 °C)   SpO2: 96%   Weight: (!) 155 kg (342 lb)   Height: 188 cm (74\")   PainSc:   2   PainLoc: Back     Objective   Physical Exam  Vitals signs and nursing note reviewed.   Constitutional:       Appearance: Normal appearance. He is well-developed.   HENT:      Head: Normocephalic and atraumatic.   Neck:      Musculoskeletal: Neck supple. Decreased range of motion. Pain with movement, spinous process tenderness and muscular tenderness present.      Trachea: Trachea normal.      Comments: Exquisite tenderness of bilateral occipital area  Cardiovascular:      Rate and Rhythm: Normal rate.   Pulmonary:      Effort: Pulmonary effort is normal. No respiratory distress.   Abdominal:      General: Abdomen is " protuberant.   Musculoskeletal:      Lumbar back: He exhibits decreased range of motion and tenderness.   Skin:     General: Skin is warm and dry.   Neurological:      Mental Status: He is alert.      Gait: Gait abnormal.      Deep Tendon Reflexes:      Reflex Scores:       Bicep reflexes are 1+ on the right side and 1+ on the left side.       Brachioradialis reflexes are 1+ on the right side and 1+ on the left side.  Psychiatric:         Speech: Speech normal.         Behavior: Behavior normal. Behavior is cooperative.         Thought Content: Thought content normal.         Judgment: Judgment normal.         Assessment/Plan   Diagnoses and all orders for this visit:    1. Chronic low back pain, unspecified back pain laterality, unspecified whether sciatica present (Primary)    2. Lumbar spondylolysis    3. Cervical stenosis of spine    4. Chronic anticoagulation    5. Bilateral occipital neuralgia  -     Case Request      --- bilateral OCNB. No blood thinners. Reviewed the procedure at length with the patient.  Included in the review was expectations, complications, risk and benefits.The procedure was described in detail and the risks, benefits and alternatives were discussed with the patient (including but not limited to: bleeding, infection, nerve damage, worsening of pain, inability to perform injection, paralysis, seizures, and death) who agreed to proceed.  Discussed the potential for sedation if warranted/wanted.  The procedure will plan to be performed at [facility] with fluoroscopic guidance(unless ultrasound is indicated) and could potentially have steroids and contrast dye used. Questions were answered and in a way the patient could understand.  Patient verbalized understanding and wishes to proceed.  This intervention will be ordered.  Discussed with patient that all procedures are part of a multimodal plan of care and include either formal PT or a home exercise program.  Patient  has no evidence of coagulopathy or current infection.  --- Proceed with other specialists as planned.  --- Back pain-- stable  --- Follow-up after procedure or sooner if needed.       DLIAN REPORT      DILAN report has been reviewed and scanned into the patient's chart.    As the clinician, I personally reviewed the DILAN from 1-21-21 while the patient was in the office today.            EMR Dragon/Transcription disclaimer:   Much of this encounter note is an electronic transcription/translation of spoken language to printed text. The electronic translation of spoken language may permit erroneous, or at times, nonsensical words or phrases to be inadvertently transcribed; Although I have reviewed the note for such errors, some may still exist.

## 2021-01-21 NOTE — THERAPY TREATMENT NOTE
Outpatient Physical Therapy Ortho Treatment Note  [facility]     Patient Name: James Loaiza  : 1945  MRN: [id number]  Today's Date: 2021      Visit Date: 2021    Visit Dx:    ICD-10-CM ICD-9-CM   1. Bilateral shoulder pain, unspecified chronicity  M25.511 719.41    M25.512    2. Impaired mobility  Z74.09 799.89   3. Chronic midline low back pain without sciatica  M54.5 724.2    G89.29 338.29       Patient Active Problem List   Diagnosis   • Lumbar herniated disc L3-L5 3/16/16 Open MRI   • Abnormal electrocardiogram   • Abnormal weight gain   • Aortic valve insufficiency   • Coronary arteriosclerosis in native artery   • Benign essential hypertension ([place] )   • Edema   • Dyspnea on exertion   • Fatigue   • Left ventricular hypertrophy   • Obstructive sleep apnea syndrome   • Arthritis of left hip   • Hx of pulmonary embolus 7/15   • Morbid obesity with BMI of 45.0-49.9, adult (CMS/[facility])   • Intermittent dysphagia   • Chronic kidney disease ([place])   • Hyperlipidemia    • BPH (benign prostatic hypertrophy)   • Left cervical radiculopathy   • Rotator cuff tear, left   • Cardiomegaly   • Plantar fasciitis   • Hypogonadism male   • Vitamin D deficiency disease   • Renal cyst, left   • Preventative health care   • Medication management   • Chronic low back pain   • Lumbar spondylolysis   • Headache   • Other chronic pain   • Pulmonary embolus (CMS/[facility])   • Chronic obstructive pulmonary disease (CMS/[facility])   • Osteoarthritis of right shoulder   • Chronic anticoagulation   • Cervical stenosis of spine        Past Medical History:   Diagnosis Date   • Abnormal EKG     referring to cardiology   • Anxiety    • Arthritis    • Back pain     worsening   • BPH (benign prostatic hyperplasia)     BPH/Nocturia/ Urinary Frequency   • Breast nodule     right   • Cardiomegaly     Cardiomegaly/ SOB with exertion   • Circulation problem    • Constipation    • COPD (chronic obstructive pulmonary disease)  "(CMS/[facility])    • Deviated septum    • DJD (degenerative joint disease)     DJD Knees   • DVT (deep venous thrombosis) (CMS/[facility])    • Dysphagia     solids and liquids - resolved with normal studies   • Encounter for annual health examination 11/14/2013    Annual Health Assessment   • Enlarged prostate    • Fatigue     Extreme fatigue   • Hyperlipidemia 1999   • Hypertension 1999    followed Dr. Marcelo   • Hypogonadism male    • Left hip pain     and DJD   • Left leg pain    • Low back pain    • Moist mucous membranes of ear, nose, and throat     \"Mucous in throat\"   • Morbid obesity (CMS/[facility])     (BMI-41.2za)   • Muscle cramping    • Muscle spasm     Muscle spasms   • Neck arthritis    • Neck muscle spasm     Neck muscle spasm/Cervical strain   • Neck pain    • Obesity    • Plantar fasciitis    • Prostatitis     recurrent   • Psoriasis    • Pulmonary embolus (CMS/[facility]) 07/2015    on Xarelltoypseerlipidemia   • Radiculopathy     Radiculopathy / Neuropathy left ar   • Renal insufficiency     followed by Dr. Mendes   • Seborrhea    • Shortness of breath    • Sleep apnea     Obstructive Sleep apnea worsening   • Urinary frequency    • Vascular disorder    • Vertigo    • Weight gain    • Wellness examination 10/23/2014    Annual Wellness Visit        Past Surgical History:   Procedure Laterality Date   • APPENDECTOMY  1965   • CARDIAC CATHETERIZATION  07/29/2010    [facility]   • COLONOSCOPY  01/05/2010   • COLONOSCOPY  10/01/2001   • NASAL SEPTUM SURGERY     • NOSE SURGERY  1999   • TURP / TRANSURETHRAL INCISION / DRAINAGE PROSTATE  10/23/2015    Michael Castro                       PT Assessment/Plan     Row Name 01/21/21 1300          PT Assessment    Assessment Comments  Pt reports inc R shoulder pain this region (R UT/levscap) that has been present since last night/this AM. He reports inc TTP R lev scap and upper trap which improves following manual therapy. Added R UT stretch and posterior " shoulder rolls with good tolerance, pt reports dec pain at end of session compared to start of session.  -RS        PT Plan    PT Plan Comments  Consider standing flex at wall, follow up regarding response to epidural  -RS       User Key  (r) = Recorded By, (t) = Taken By, (c) = Cosigned By    Initials Name Provider Type    RS Cassandra Sprague, PT Physical Therapist            OP Exercises     Row Name 01/21/21 1300             Subjective Comments    Subjective Comments  Having mroe pain in R shoulder today than usual, usually it goes away in the AM but it did not today. Goes for epidural tomorrow in neck.  -RS         Subjective Pain    Able to rate subjective pain?  yes  -RS      Pre-Treatment Pain Level  4  -RS         Total Minutes    76187 - PT Therapeutic Exercise Minutes  25  -RS      59391 - PT Manual Therapy Minutes  15  -RS         Exercise 5    Exercise Name 5  UBE  -RS      Time 5  4 minutes  -RS         Exercise 7    Exercise Name 7  tband row  -RS      Cueing 7  Verbal;Demo  -RS      Sets 7  2  -RS      Reps 7  10  -RS      Additional Comments  GTB- cues for chest open  -RS         Exercise 8    Exercise Name 8  tband extension  -RS      Cueing 8  Verbal;Demo  -RS      Sets 8  2  -RS      Reps 8  10  -RS      Additional Comments  GTB cues for chest open  -RS         Exercise 9    Exercise Name 9  wall wash   -RS      Cueing 9  Verbal;Demo  -RS      Reps 9  10 ea  -RS      Additional Comments  flex and arc  -RS         Exercise 11    Exercise Name 11  HA standing against pool noodle  -RS      Cueing 11  Verbal  -RS      Sets 11  2  -RS      Reps 11  10  -RS      Time 11  RTB  -RS         Exercise 12    Exercise Name 12  B ER standing against noodle  -RS      Cueing 12  Verbal;Tactile  -RS      Reps 12  20  -RS      Time 12  RTB  -RS         Exercise 14    Exercise Name 14  seated R UT stretch  -RS      Cueing 14  Verbal;Demo  -RS      Reps 14  3  -RS      Time 14  20s  -RS         Exercise 15     Exercise Name 15  seated lat pull   -RS      Additional Comments  next time  -RS         Exercise 16    Exercise Name 16  Doorway stretch  -RS      Cueing 16  Verbal;Demo  -RS      Reps 16  3  -RS      Time 16  20 s  -RS        User Key  (r) = Recorded By, (t) = Taken By, (c) = Cosigned By    Initials Name Provider Type    RS Cassandra Sprague PT Physical Therapist                      Manual Rx (last 36 hours)      Manual Treatments     Row Name 01/21/21 1300             Total Minutes    73779 - PT Manual Therapy Minutes  15  -RS         Manual Rx 1    Manual Rx 1 Location  STM seated in chair to B upper trap, periscapular muscles  -RS      Manual Rx 1 Duration  15  -RS        User Key  (r) = Recorded By, (t) = Taken By, (c) = Cosigned By    Initials Name Provider Type    RS Cassandra Sprague PT Physical Therapist                             Time Calculation:   Start Time: 1245  Stop Time: 1330  Time Calculation (min): 45 min  Therapy Charges for Today     Code Description Service Date Service Provider Modifiers Qty    21420653038  PT THER PROC EA 15 MIN 1/21/2021 Cassandra Sprague, PT GP 2    [id number]  PT MANUAL THERAPY EA 15 MIN 1/21/2021 Cassandra Sprague, PT GP 1                    Cassandra Sprague PT  1/21/2021

## 2021-01-22 ENCOUNTER — PROCEDURE VISIT (OUTPATIENT)
Dept: PAIN MEDICINE | Facility: CLINIC | Age: 76
End: 2021-01-22

## 2021-01-22 VITALS
WEIGHT: 315 LBS | HEIGHT: 74 IN | TEMPERATURE: 98.4 F | SYSTOLIC BLOOD PRESSURE: 177 MMHG | OXYGEN SATURATION: 95 % | DIASTOLIC BLOOD PRESSURE: 81 MMHG | RESPIRATION RATE: 16 BRPM | BODY MASS INDEX: 40.43 KG/M2 | HEART RATE: 76 BPM

## 2021-01-22 DIAGNOSIS — M54.81 BILATERAL OCCIPITAL NEURALGIA: Primary | ICD-10-CM

## 2021-01-22 PROCEDURE — 64405 NJX AA&/STRD GR OCPL NRV: CPT | Performed by: ANESTHESIOLOGY

## 2021-01-22 NOTE — PROGRESS NOTES
"CHIEF COMPLAINT: Neck Pain      James Loaiza is a 75 y.o. male.  He is here for the following procedure:  bilateral occipital nerve block.     Vitals:    01/22/21 1039   BP: 177/81   Pulse: 76   Resp: 16   Temp: 98.4 °F (36.9 °C)   SpO2: 95%   Weight: (!) 156 kg (343 lb)   Height: 188 cm (74\")   PainSc:   2   PainLoc: Neck       Bupivacaine Lot#: [id number] Exp: 12/2022  Depo Medrol Lot#: [id number] Exp: 09/21      Procedures    Bilateral occipital nerve block:    Informed consent was obtained.  We discussed items including but not limited to bleeding and nerve injury and infection and paralysis and stroke and death.    A solution was prepared of 8 mL of 0.5% bupivacaine and 80 mg of Depo-Medrol.    The left and right occipital areas were cleansed with alcohol ×2 and then a 25-gauge needle was inserted just medial to each of the occipital arteries until contacting periosteum and aspiration was negative and then 3 mL of the above solution was injected.  The needle at each location was withdrawn to the skin and then redirected laterally and aspiration was checked and then I proceeded to inject another 2  milliliters of medication in the vicinity of the lesser occipital branch.  Again this was bilaterally performed.      The needle was removed intact.  Bleeding was minimal.  No apparent complications.          Visit Diagnoses:  1. Bilateral occipital neuralgia        Edu Tineo MD  Pain Management  "

## 2021-01-25 ENCOUNTER — TELEPHONE (OUTPATIENT)
Dept: ORTHOPEDICS | Facility: OTHER | Age: 76
End: 2021-01-25

## 2021-01-25 ENCOUNTER — HOSPITAL ENCOUNTER (OUTPATIENT)
Dept: PHYSICAL THERAPY | Facility: HOSPITAL | Age: 76
Setting detail: THERAPIES SERIES
Discharge: HOME | End: 2021-01-25

## 2021-01-25 DIAGNOSIS — G89.29 CHRONIC MIDLINE LOW BACK PAIN WITHOUT SCIATICA: ICD-10-CM

## 2021-01-25 DIAGNOSIS — M25.512 BILATERAL SHOULDER PAIN, UNSPECIFIED CHRONICITY: Primary | ICD-10-CM

## 2021-01-25 DIAGNOSIS — Z74.09 IMPAIRED MOBILITY: ICD-10-CM

## 2021-01-25 DIAGNOSIS — M25.511 RIGHT SHOULDER PAIN, UNSPECIFIED CHRONICITY: Primary | ICD-10-CM

## 2021-01-25 DIAGNOSIS — M54.50 CHRONIC MIDLINE LOW BACK PAIN WITHOUT SCIATICA: ICD-10-CM

## 2021-01-25 DIAGNOSIS — M25.511 BILATERAL SHOULDER PAIN, UNSPECIFIED CHRONICITY: Primary | ICD-10-CM

## 2021-01-25 PROCEDURE — 97110 THERAPEUTIC EXERCISES: CPT

## 2021-01-25 PROCEDURE — 97140 MANUAL THERAPY 1/> REGIONS: CPT

## 2021-01-27 ENCOUNTER — HOSPITAL ENCOUNTER (OUTPATIENT)
Dept: PHYSICAL THERAPY | Facility: HOSPITAL | Age: 76
Setting detail: THERAPIES SERIES
Discharge: HOME | End: 2021-01-27

## 2021-01-27 DIAGNOSIS — M25.512 BILATERAL SHOULDER PAIN, UNSPECIFIED CHRONICITY: Primary | ICD-10-CM

## 2021-01-27 DIAGNOSIS — M25.511 BILATERAL SHOULDER PAIN, UNSPECIFIED CHRONICITY: Primary | ICD-10-CM

## 2021-01-27 PROCEDURE — 97110 THERAPEUTIC EXERCISES: CPT

## 2021-01-27 PROCEDURE — 97140 MANUAL THERAPY 1/> REGIONS: CPT

## 2021-01-27 NOTE — THERAPY TREATMENT NOTE
Outpatient Physical Therapy Ortho Treatment Note  [facility]     Patient Name: James Loaiza  : 1945  MRN: [id number]  Today's Date: 2021      Visit Date: 2021    Visit Dx:    ICD-10-CM ICD-9-CM   1. Bilateral shoulder pain, unspecified chronicity  M25.511 719.41    M25.512        Patient Active Problem List   Diagnosis   • Lumbar herniated disc L3-L5 3/16/16 Open MRI   • Abnormal electrocardiogram   • Abnormal weight gain   • Aortic valve insufficiency   • Coronary arteriosclerosis in native artery   • Benign essential hypertension ([place] )   • Edema   • Dyspnea on exertion   • Fatigue   • Left ventricular hypertrophy   • Obstructive sleep apnea syndrome   • Arthritis of left hip   • Hx of pulmonary embolus 7/15   • Morbid obesity with BMI of 45.0-49.9, adult (CMS/HCC)   • Intermittent dysphagia   • Chronic kidney disease ([facility])   • Hyperlipidemia    • BPH (benign prostatic hypertrophy)   • Left cervical radiculopathy   • Rotator cuff tear, left   • Cardiomegaly   • Plantar fasciitis   • Hypogonadism male   • Vitamin D deficiency disease   • Renal cyst, left   • Preventative health care   • Medication management   • Chronic low back pain   • Lumbar spondylolysis   • Headache   • Other chronic pain   • Pulmonary embolus (CMS/HCC)   • Chronic obstructive pulmonary disease (CMS/HCC)   • Osteoarthritis of right shoulder   • Chronic anticoagulation   • Cervical stenosis of spine        Past Medical History:   Diagnosis Date   • Abnormal EKG     referring to cardiology   • Anxiety    • Arthritis    • Back pain     worsening   • BPH (benign prostatic hyperplasia)     BPH/Nocturia/ Urinary Frequency   • Breast nodule     right   • Cardiomegaly     Cardiomegaly/ SOB with exertion   • Circulation problem    • Constipation    • COPD (chronic obstructive pulmonary disease) (CMS/HCC)    • Deviated septum    • DJD (degenerative joint disease)     DJD Knees   • DVT (deep venous thrombosis)  "(CMS/[facility])    • Dysphagia     solids and liquids - resolved with normal studies   • Encounter for annual health examination 11/14/2013    Annual Health Assessment   • Enlarged prostate    • Fatigue     Extreme fatigue   • Hyperlipidemia 1999   • Hypertension 1999    followed Dr. Marcelo   • Hypogonadism male    • Left hip pain     and DJD   • Left leg pain    • Low back pain    • Moist mucous membranes of ear, nose, and throat     \"Mucous in throat\"   • Morbid obesity (CMS/[facility])     (BMI-41.2za)   • Muscle cramping    • Muscle spasm     Muscle spasms   • Neck arthritis    • Neck muscle spasm     Neck muscle spasm/Cervical strain   • Neck pain    • Obesity    • Plantar fasciitis    • Prostatitis     recurrent   • Psoriasis    • Pulmonary embolus (CMS/[facility]) 07/2015    on Xarelltoypseerlipidemia   • Radiculopathy     Radiculopathy / Neuropathy left ar   • Renal insufficiency     followed by Dr. Mendes   • Seborrhea    • Shortness of breath    • Sleep apnea     Obstructive Sleep apnea worsening   • Urinary frequency    • Vascular disorder    • Vertigo    • Weight gain    • Wellness examination 10/23/2014    Annual Wellness Visit        Past Surgical History:   Procedure Laterality Date   • APPENDECTOMY  1965   • CARDIAC CATHETERIZATION  07/29/2010    [facility]   • COLONOSCOPY  01/05/2010   • COLONOSCOPY  10/01/2001   • NASAL SEPTUM SURGERY     • NOSE SURGERY  1999   • TURP / TRANSURETHRAL INCISION / DRAINAGE PROSTATE  10/23/2015    Michael Castro                       PT Assessment/Plan     Row Name 01/27/21 1277          PT Assessment    Assessment Comments  Pt reporting improvement over last 2 days. Reduced trigger points noted surrounding R scapular medial border today. Pt continues to demonstrate scapular retraction weakness and dec postural awareness but does seem to tolerate slower progression of strengthening.  -LB        PT Plan    PT Plan Comments  Continue to slowly progress scapular " strengthening. Continue manual therapy.  -LB       User Key  (r) = Recorded By, (t) = Taken By, (c) = Cosigned By    Initials Name Provider Type    Tiera Ayala PT Physical Therapist            OP Exercises     Row Name 01/27/21 1100             Subjective Comments    Subjective Comments  I did better the last 2 nights. I did try a heating pad and that helped.  -LB         Subjective Pain    Able to rate subjective pain?  yes  -LB      Pre-Treatment Pain Level  2  -LB         Total Minutes    49006 - PT Therapeutic Exercise Minutes  25  -LB      72950 - PT Manual Therapy Minutes  15  -LB         Exercise 1    Exercise Name 1  seated T spine extension over chair  -LB      Reps 1  3  -LB      Time 1  15  -LB         Exercise 2    Exercise Name 2  seated shoulder rolls  -LB      Sets 2  2  -LB      Reps 2  10  -LB         Exercise 3    Exercise Name 3  seated UT stretch  -LB      Reps 3  3  -LB      Time 3  20  -LB         Exercise 4    Exercise Name 4  seated HA  -LB      Reps 4  15  -LB      Additional Comments  RTB  -LB         Exercise 5    Exercise Name 5  seated crossbody adduction stretch  -LB      Reps 5  3  -LB      Time 5  20  -LB         Exercise 6    Exercise Name 6  seated scapular clock  -LB      Reps 6  8  -LB      Time 6  3  -LB      Additional Comments  3,6,9,12  -LB         Exercise 7    Exercise Name 7  wall wash flexion  -LB      Reps 7  10  -LB         Exercise 8    Exercise Name 8  pulleys flexion/scaption  -LB      Reps 8  10  -LB      Additional Comments  each  -LB         Exercise 9    Exercise Name 9  seated chin tuck  -LB      Reps 9  10  -LB      Time 9  5  -LB        User Key  (r) = Recorded By, (t) = Taken By, (c) = Cosigned By    Initials Name Provider Type    Tiera Ayala PT Physical Therapist                      Manual Rx (last 36 hours)      Manual Treatments     Row Name 01/27/21 1100             Total Minutes    23232 - PT Manual Therapy Minutes  15  -LB         Manual Rx  1    Manual Rx 1 Location  STM seated in chair to B upper trap, periscapular muscles  -LB      Manual Rx 1 Duration  15  -LB        User Key  (r) = Recorded By, (t) = Taken By, (c) = Cosigned By    Initials Name Provider Type    Tiera Ayala, PT Physical Therapist          PT OP Goals     Row Name 01/27/21 1100          PT Short Term Goals    STG Date to Achieve  12/25/20  -LB     STG 1  Pt will demonstrate independence and compliance with initial HEP.  -LB     STG 1 Progress  Met  -LB     STG 2  Pt will report compliance with postural modifications during leisure tasks to reduce UT/cervical spine stress.  -LB     STG 2 Progress  Progressing  -LB     STG 3  Pt will report ability to stay in bed for entireity of night without moving locations due to shoulder pain.  -LB     STG 3 Progress  Progressing  -LB        Long Term Goals    LTG Date to Achieve  01/10/21  -LB     LTG 1  Pt will report 50% reduction in night time waking due to shoulder pain.  -LB     LTG 1 Progress  Progressing  -LB     LTG 2  Pt will demonstrate improved seated posture without cuing while in clinic.  -LB     LTG 2 Progress  Met  -LB     LTG 3  Pt will demonstrate improved B lateral flexion/cervical rotation to 50% B without provocation of symptoms.  -LB     LTG 3 Progress  Ongoing  -LB       User Key  (r) = Recorded By, (t) = Taken By, (c) = Cosigned By    Initials Name Provider Type    Tiera Ayala PT Physical Therapist          Therapy Education  Education Details: suggested heating pad with automatic shut off for safety vs. sleeping all night; continued reminders for posural improvement  Given: Symptoms/condition management, Posture/body mechanics  Program: Reinforced  How Provided: Verbal  Provided to: Patient  Level of Understanding: Teach back education performed, Verbalized, Demonstrated              Time Calculation:   Start Time: 1115  Stop Time: 1200  Time Calculation (min): 45 min  Total Timed Code Minutes- PT: 40  minute(s)  Therapy Charges for Today     Code Description Service Date Service Provider Modifiers Qty    76097467917 HC PT THER PROC EA 15 MIN 1/27/2021 Tiera Cochran, PT GP 2    49610999712 HC PT MANUAL THERAPY EA 15 MIN 1/27/2021 Tiera Cochran, PT GP 1                    Tiera Cochran, PT  1/27/2021

## 2021-01-28 ENCOUNTER — OFFICE VISIT (OUTPATIENT)
Dept: PAIN MEDICINE | Facility: CLINIC | Age: 76
End: 2021-01-28

## 2021-01-28 ENCOUNTER — TELEPHONE (OUTPATIENT)
Dept: ORTHOPEDICS | Facility: OTHER | Age: 76
End: 2021-01-28

## 2021-01-28 VITALS
SYSTOLIC BLOOD PRESSURE: 182 MMHG | RESPIRATION RATE: 20 BRPM | OXYGEN SATURATION: 97 % | HEIGHT: 74 IN | BODY MASS INDEX: 40.43 KG/M2 | TEMPERATURE: 97.5 F | DIASTOLIC BLOOD PRESSURE: 92 MMHG | HEART RATE: 69 BPM | WEIGHT: 315 LBS

## 2021-01-28 DIAGNOSIS — G89.29 OTHER CHRONIC PAIN: Primary | ICD-10-CM

## 2021-01-28 DIAGNOSIS — M43.06 LUMBAR SPONDYLOLYSIS: ICD-10-CM

## 2021-01-28 DIAGNOSIS — M54.81 BILATERAL OCCIPITAL NEURALGIA: ICD-10-CM

## 2021-01-28 DIAGNOSIS — G89.29 CHRONIC LOW BACK PAIN, UNSPECIFIED BACK PAIN LATERALITY, UNSPECIFIED WHETHER SCIATICA PRESENT: ICD-10-CM

## 2021-01-28 DIAGNOSIS — M54.50 CHRONIC LOW BACK PAIN, UNSPECIFIED BACK PAIN LATERALITY, UNSPECIFIED WHETHER SCIATICA PRESENT: ICD-10-CM

## 2021-01-28 DIAGNOSIS — M25.519 SHOULDER PAIN, UNSPECIFIED CHRONICITY, UNSPECIFIED LATERALITY: ICD-10-CM

## 2021-01-28 PROCEDURE — 99213 OFFICE O/P EST LOW 20 MIN: CPT | Performed by: NURSE PRACTITIONER

## 2021-01-28 NOTE — TELEPHONE ENCOUNTER
CALLER- ROSANNA ORDONEZ  CONTACT : [phone]    PATIENT IS CONCERNED WITH THE LEVEL OF CARE HE HAS RECEIVED FROM DR PRESLEY. THIS IS BEEN GOING ON FOR 9 WEEKS.   DEALING WITH SCHEDULING AN OPEN MRI- UPSET WITH THE AMOUNT OF CARE.   IS IN EXTREME DISCOMFORT- WANTS TO ENSURE EVERYTHING IS BEING DONE THAT CAN BE DONE.    HAS AN APPT WITH DR PRESLEY TOMORROW. PATIENT HASNT BEEN ABLE TO SLEEP AT NIGHT.    PLEASE CONTACT PATIENT. WOULD PREFER TO TALK TO .

## 2021-01-28 NOTE — TELEPHONE ENCOUNTER
PATIENT WAS ORIGINALLY SCHEDULED AT [facility], BUT HE IS CLAUSTROPHOBIC AND HE NEEDS THE BIG MACHINE, WANTS TO GO TO [facility] BECAUSE OF THE COST,  I APOLOGIZED TO PATIENT HE WAS ROUTED BY TO AMBULATORY ON 01-14-21 AND WE DID NOT GET IT, I HAVE THE PT SCHEDULED FOR 02-09-11 ARRIVE AT 09:30 SCAN 10:00, HE IS THEN TO CALL THE OFFICE AND TELL THEM HE HAD HIS MRI, I SPOKE WITH SANJEEV AT [facility],LLR

## 2021-01-28 NOTE — PROGRESS NOTES
"CHIEF COMPLAINT  F/u back and neck pain. Pt sts pain is the same. Pt had Bilateral occipital nerve block on 1/22/21 and sts receiving 5 days relief then pain returned to baseline. Pt c/o increased bilat shoulder pain, right shoulder worse than left.    Subjective   James Loaiza is a 75 y.o. male  who presents for follow-up.  He has a history of chronic back, neck and shoulder pain. Reports his back and neck pain are relatively unchanged. Back pain is stable. Neck pain has been variable since last evaluation.    Patient presents for follow-up of PROCEDURE. Patient had a bilateral OCNB performed by Dr. COOPER on 1-22-21 for management of HEAD AND NECK PAIN. Patient reports 90% relief from the procedure for 5 days. His pain returned yesterday morning and ceased last night.     Complains of pain in his neck, low back and shoulders(right worse than left). Today his pain is 2/10VAS. His primary complaint is his shoulder pain. Describes the pain as intermittent burning and throbbing. Pain increases with laying on shoulder, certain movements; pain decreases with \"nothing right now.\"     Being seen by Dr Serrano for shoulder pain. Having MRI-arthrogram. Was prescribed Tramadol.  Has started PT for this. \"it's just getting worse and they still haven't rescheduled that MRI.\" Has had ELENITA with no improvement in shoulder pain. No improvement with OCNB either.     Remains on Eliquis.  Cannot take NSAIDS.     Patient remained masked during entire encounter. No cough present. I donned a mask and eye protection throughout entire visit. Prior to donning mask and eye protection, hand hygiene was performed, as well as when it was doffed.  I was closer than 6 feet, but not for an extended period of time. No obvious exposure to any bodily fluids.    Back Pain  This is a recurrent problem. The current episode started more than 1 month ago. The problem occurs constantly. Progression since onset: unchanged since last office visit. The " pain is present in the lumbar spine and sacro-iliac. The quality of the pain is described as aching. The pain does not radiate. The pain is at a severity of 2/10. The pain is mild. Worse during: frequent urination at night which increases his pain--getting up and out of bed. The symptoms are aggravated by standing and twisting. Stiffness is present all day. Pertinent negatives include no abdominal pain, bladder incontinence, bowel incontinence, chest pain, dysuria, fever, headaches, numbness or weakness. Risk factors include lack of exercise, obesity and recent trauma. Treatments tried: lumbar RFL, OTC IBU.   Neck Pain   This is a new problem. The current episode started more than 1 month ago. The problem has been waxing and waning. The pain is associated with nothing. The pain is present in the occipital region. The pain is at a severity of 2/10. The pain is severe. Pertinent negatives include no chest pain, fever, headaches, numbness or weakness.      Procedure List  --1-22-21-- bilateral OCNB  -- 10-23-20-- bilateral OCNB and bilateral L2-L5 MBB  -- 9-21-20-- bilateral OCNB  -- 9-9-20-- ELENITA (DR ALLYSON RIVERA)  -- 6-22-18-- bilateral L2-L5 RFL- 75% long term relief    PEG Assessment   What number best describes your pain on average in the past week?7  What number best describes how, during the past week, pain has interfered with your enjoyment of life?7  What number best describes how, during the past week, pain has interfered with your general activity?  7    The following portions of the patient's history were reviewed and updated as appropriate: allergies, current medications, past family history, past medical history, past social history, past surgical history and problem list.    Review of Systems   Constitutional: Negative for activity change, fatigue and fever.   HENT: Negative for congestion.    Eyes: Negative for visual disturbance.   Respiratory: Negative for cough and shortness of breath.   "  Cardiovascular: Negative for chest pain.   Gastrointestinal: Negative for abdominal pain, bowel incontinence, constipation and diarrhea.   Genitourinary: Negative for bladder incontinence, difficulty urinating and dysuria.   Musculoskeletal: Positive for back pain and neck pain.   Allergic/Immunologic: Negative for immunocompromised state.   Neurological: Negative for dizziness, weakness, light-headedness, numbness and headaches.   Psychiatric/Behavioral: Negative for agitation, sleep disturbance and suicidal ideas. The patient is not nervous/anxious.      I have reviewed and confirmed the accuracy of the ROS as documented by the MA/LPN/RN Sarai Bermudez, APRN      Vitals:    01/28/21 1133   BP: (!) 182/92  Comment: pt sts has not had bp meds today   Pulse: 69   Resp: 20   Temp: 97.5 °F (36.4 °C)   SpO2: 97%   Weight: (!) 155 kg (341 lb 9.6 oz)   Height: 188 cm (74\")   PainSc:   2   PainLoc: Back  Comment: and neck     Objective   Physical Exam  Vitals signs and nursing note reviewed.   Constitutional:       Appearance: Normal appearance. He is well-developed.   HENT:      Head: Normocephalic and atraumatic.   Neck:      Musculoskeletal: Neck supple. Decreased range of motion. Pain with movement, spinous process tenderness and muscular tenderness present.      Vascular: JVD: mild to moderate.      Comments: Exquisite tenderness of bilateral occipital area  Abdominal:      General: Abdomen is protuberant.   Musculoskeletal:      Right shoulder: He exhibits decreased range of motion and tenderness.      Lumbar back: He exhibits decreased range of motion and tenderness.   Skin:     General: Skin is warm and dry.   Neurological:      Mental Status: He is alert.      Gait: Gait abnormal.   Psychiatric:         Speech: Speech normal.         Behavior: Behavior normal. Behavior is cooperative.         Thought Content: Thought content normal.         Judgment: Judgment normal.         Assessment/Plan   Diagnoses and " all orders for this visit:    1. Other chronic pain (Primary)    2. Lumbar spondylolysis    3. Chronic low back pain, unspecified back pain laterality, unspecified whether sciatica present    4. Bilateral occipital neuralgia    5. Shoulder pain, unspecified chronicity, unspecified laterality      --- Shoulder pain-- recommend continuing PT and follow up with Dr Solorzano and complete MRI. He is quite upset about prolonged wait for MRI.  --- neck pain- stable-- Consider bilateral C2-C3 RFL in future PRN.  He does not want to proceed with this at this time.   --- Low back pain-- stable. Declines repeat RFL at this time.   --- Follow-up 3 months or sooner if needed.       DILAN REPORT  DILAN report has been reviewed and scanned into the patient's chart.    As the clinician, I personally reviewed the DILAN from 1-28-21 while the patient was in the office today.          EMR Dragon/Transcription disclaimer:   Much of this encounter note is an electronic transcription/translation of spoken language to printed text. The electronic translation of spoken language may permit erroneous, or at times, nonsensical words or phrases to be inadvertently transcribed; Although I have reviewed the note for such errors, some may still exist.

## 2021-01-29 ENCOUNTER — TELEPHONE (OUTPATIENT)
Dept: PAIN MEDICINE | Facility: CLINIC | Age: 76
End: 2021-01-29

## 2021-01-29 ENCOUNTER — OFFICE VISIT (OUTPATIENT)
Dept: ORTHOPEDIC SURGERY | Facility: CLINIC | Age: 76
End: 2021-01-29

## 2021-01-29 VITALS — HEIGHT: 74 IN | TEMPERATURE: 97.2 F | WEIGHT: 315 LBS | BODY MASS INDEX: 40.43 KG/M2

## 2021-01-29 DIAGNOSIS — M47.22 CERVICAL SPONDYLOSIS WITH RADICULOPATHY: Primary | ICD-10-CM

## 2021-01-29 DIAGNOSIS — M25.511 RIGHT SHOULDER PAIN, UNSPECIFIED CHRONICITY: Primary | ICD-10-CM

## 2021-01-29 DIAGNOSIS — M54.2 NECK PAIN: ICD-10-CM

## 2021-01-29 PROCEDURE — 99213 OFFICE O/P EST LOW 20 MIN: CPT | Performed by: ORTHOPAEDIC SURGERY

## 2021-01-29 PROCEDURE — 73030 X-RAY EXAM OF SHOULDER: CPT | Performed by: ORTHOPAEDIC SURGERY

## 2021-01-29 RX ORDER — TRAMADOL HYDROCHLORIDE 50 MG/1
50 TABLET ORAL EVERY 4 HOURS PRN
Qty: 60 TABLET | Refills: 0 | Status: SHIPPED | OUTPATIENT
Start: 2021-01-29 | End: 2021-02-24

## 2021-01-29 RX ORDER — METHYLPREDNISOLONE 4 MG/1
TABLET ORAL
Qty: 21 TABLET | Refills: 0 | Status: SHIPPED | OUTPATIENT
Start: 2021-01-29 | End: 2021-02-24

## 2021-01-29 NOTE — TELEPHONE ENCOUNTER
Caller: BETHANIE ORDONEZ    Relationship: SELF    Best call back number: [phone]    What orders are you requesting (i.e. lab or imaging): PHYSICAL THERAPY    In what timeframe would the patient need to come in: ASAP    Where will you receive your lab/imaging services: [facility] [address]    Additional notes: PATIENT CURRENTLY RECEIVING THERAPY FOR HIS SHOULDERS, WOULD ALSO LIKE TO TRY THERAPY FOR HIS NECK

## 2021-01-29 NOTE — PROGRESS NOTES
Patient:James Loaiza    YOB: 1945    Medical Record Number:[id number]    Chief Complaints: Persistent neck and right shoulder pain    History of Present Illness:     75 y.o. male patient who presents for follow-up of his neck and right shoulder.  He is very upset about what he perceives as poor care through our office.  He is upset that he has not yet had his MRI of the shoulder.  We order the MRI sometime ago.  He tells me he went to a [facility] facility in [place].  He tried to get in the scanner but was too claustrophobic.  He ended up canceling the MRI.  We put in a new request for an open MRI.  He went to another facility and actually had the contrast injected into his shoulder for the MRI but then, once again, he was unable to actually go through the scanner.  We have ordered another open MRI for him but they cannot get him in until February 9.  I did call in a prescription for Valium for him to take prior to his previous MRI.  Unfortunately, he never knew that this had been done and never got that prescription filled.  Again, he is scheduled for another MRI on February 9.  He is upset with our office that we did not do a better job of getting him to a facility that he could actually tolerate the MRI.    He did see Dr. Zafar for his neck.  He had an epidural.  He tells me that the epidural helped for a period of about a day.  He says that the injection that I did for his shoulder also helped for a period of about a day.  The pain that he is experiencing now is burning in character.  He says it is primarily in the back of his shoulder beneath the shoulder blade and occasionally on the lateral side of his upper arm.  The pain is worse at night.  He says that he does get some occasional shooting pains down the arm.  He also gets occasional numbness and tingling into his hand.    Allergies:No Known Allergies    Home Medications:    Current Outpatient Medications:   •  ALPRAZolam (XANAX) 0.5 MG  tablet, Take 1 tablet by mouth 2 (Two) Times a Day As Needed for Anxiety., Disp: 6 tablet, Rfl: 0  •  apixaban (ELIQUIS) 5 MG tablet tablet, Take 1 tablet by mouth Every 12 (Twelve) Hours., Disp: 6 tablet, Rfl: 0  •  aspirin 325 MG tablet, Take by mouth daily., Disp: , Rfl:   •  atorvastatin (LIPITOR) 40 MG tablet, TAKE ONE TABLET BY MOUTH DAILY, Disp: 90 tablet, Rfl: 1  •  baclofen (LIORESAL) 10 MG tablet, TAKE ONE TABLET BY MOUTH ONCE NIGHTLY AS NEEDED FOR MUSCLE SPASMS, Disp: 30 tablet, Rfl: 5  •  busPIRone (BUSPAR) 5 MG tablet, Take 1 tablet by mouth At Night As Needed (anxiety)., Disp: 30 tablet, Rfl: 3  •  diazePAM (Valium) 5 MG tablet, Take 1 tablet by mouth 60 Minutes Prior to Surgery., Disp: 1 tablet, Rfl: 0  •  diclofenac (VOLTAREN) 1 % gel gel, Apply 4 g topically to the appropriate area as directed 2 (Two) Times a Day., Disp: 3 tube, Rfl: 2  •  furosemide (LASIX) 40 MG tablet, PT REPORTS HE TAKES ONCE EVERY WEEK OR TWO, Disp: , Rfl:   •  hydrALAZINE (APRESOLINE) 10 MG tablet, Take 10 mg by mouth., Disp: , Rfl:   •  HYDROcodone-acetaminophen (NORCO) 5-325 MG per tablet, Take 1 tablet by mouth Every 4 (Four) Hours As Needed for Moderate Pain ., Disp: 18 tablet, Rfl: 0  •  lidocaine (LIDODERM) 5 %, APPLY 1 PATCH TO AFFECTED AREA FOR 12 HOURS IN A 24 HOUR PERIOD OR AS DIRECTED BY MD, Disp: 30 patch, Rfl: 5  •  NIFEdipine XL (PROCARDIA XL) 60 MG 24 hr tablet, TAKE ONE TABLET BY MOUTH DAILY, Disp: , Rfl:   •  ramipril (Altace) 10 MG capsule, Take 1 capsule by mouth Daily., Disp: 90 capsule, Rfl: 2  •  tamsulosin (FLOMAX) 0.4 MG capsule 24 hr capsule, Take 1 capsule by mouth Every Night., Disp: , Rfl:   •  tiotropium bromide-olodaterol (STIOLTO RESPIMAT) 2.5-2.5 MCG/ACT aerosol solution inhaler, Inhale Daily., Disp: , Rfl:   •  traMADol (ULTRAM) 50 MG tablet, Take 1 tablet by mouth Every 4 (Four) Hours., Disp: 50 tablet, Rfl: 0  •  vitamin D (ERGOCALCIFEROL) 96944 units capsule capsule, Take 1 capsule by mouth  "1 (One) Time Per Week., Disp: 12 capsule, Rfl: 1    Past Medical History:   Diagnosis Date   • Abnormal EKG     referring to cardiology   • Anxiety    • Arthritis    • Back pain     worsening   • BPH (benign prostatic hyperplasia)     BPH/Nocturia/ Urinary Frequency   • Breast nodule     right   • Cardiomegaly     Cardiomegaly/ SOB with exertion   • Circulation problem    • Constipation    • COPD (chronic obstructive pulmonary disease) (CMS/[facility])    • Deviated septum    • DJD (degenerative joint disease)     DJD Knees   • DVT (deep venous thrombosis) (CMS/[facility])    • Dysphagia     solids and liquids - resolved with normal studies   • Encounter for annual health examination 11/14/2013    Annual Health Assessment   • Enlarged prostate    • Fatigue     Extreme fatigue   • Hyperlipidemia 1999   • Hypertension 1999    followed Dr. Marcelo   • Hypogonadism male    • Left hip pain     and DJD   • Left leg pain    • Low back pain    • Moist mucous membranes of ear, nose, and throat     \"Mucous in throat\"   • Morbid obesity (CMS/[facility])     (BMI-41.2za)   • Muscle cramping    • Muscle spasm     Muscle spasms   • Neck arthritis    • Neck muscle spasm     Neck muscle spasm/Cervical strain   • Neck pain    • Obesity    • Plantar fasciitis    • Prostatitis     recurrent   • Psoriasis    • Pulmonary embolus (CMS/[facility]) 07/2015    on Xarelltoypseerlipidemia   • Radiculopathy     Radiculopathy / Neuropathy left ar   • Renal insufficiency     followed by Dr. Mendes   • Seborrhea    • Shortness of breath    • Sleep apnea     Obstructive Sleep apnea worsening   • Urinary frequency    • Vascular disorder    • Vertigo    • Weight gain    • Wellness examination 10/23/2014    Annual Wellness Visit       Past Surgical History:   Procedure Laterality Date   • APPENDECTOMY  1965   • CARDIAC CATHETERIZATION  07/29/2010    [facility]   • COLONOSCOPY  01/05/2010   • COLONOSCOPY  10/01/2001   • NASAL SEPTUM SURGERY     • NOSE " "SURGERY  1999   • TURP / TRANSURETHRAL INCISION / DRAINAGE PROSTATE  10/23/2015    [facility]       Social History     Occupational History   • Not on file   Tobacco Use   • Smoking status: Never Smoker   • Smokeless tobacco: Never Used   Substance and Sexual Activity   • Alcohol use: Yes     Frequency: Never     Comment: social   • Drug use: No   • Sexual activity: Defer      Social History     Social History Narrative   • Not on file       Family History   Problem Relation Age of Onset   • Arthritis Mother    • Heart disease Mother    • Hypertension Mother    • Heart attack Father    • Heart disease Father    • Hypertension Father    • Arthritis Sister    • Multiple sclerosis Sister    • Alcohol abuse Brother    • Diabetes Son        Review of Systems:      Constitutional: Denies fever, shaking or chills   Eyes: Denies change in visual acuity   HEENT: Denies nasal congestion or sore throat   Respiratory: Denies cough or shortness of breath   Cardiovascular: Denies chest pain or edema  Endocrine: Denies tremors, palpitations, intolerance of heat or cold, polyuria, polydipsia.  GI: Denies abdominal pain, nausea, vomiting, bloody stools or diarrhea  : Denies frequency, urgency, incontinence, retention, or nocturia.  Musculoskeletal: Denies numbness, tingling or loss of motor function except as above  Integument: Denies rash, lesion or ulceration   Neurologic: Denies headache or focal weakness, deficits  Heme: Denies spontaneous or excessive bleeding, epistaxis, hematuria, melena, fatigue, enlarged or tender lymph nodes.      All other pertinent positives and negatives as noted above in HPI.    Physical Exam:75 y.o. male  Vitals:    01/29/21 1332   Temp: 97.2 °F (36.2 °C)   Weight: (!) 155 kg (341 lb)   Height: 188 cm (74\")     General:  Patient is awake and alert.  Appears in no acute distress or discomfort.    Psych:  Affect and demeanor are appropriate.    Neck: Skin is benign.  His motion is limited and " uncomfortable.  A Spurling's reproduces a lot of neck pain but does not really produce much shoulder pain.    His right shoulder is examined.  His shoulder is exam is fairly benign.  There is no tenderness.  No effusion.  He has good motion.  He has good strength in his rotator cuff and deltoid.  Negative Neer and Santos.  Negative speeds maneuver.  Mild discomfort with and Mineral's maneuver but nothing exquisite.    Imaging: AP and scapular Y views of the right shoulder are ordered and reviewed.  These are compared to previous x-rays.  He has a hooked acromion and a downsloping distal clavicle.  Acromiohumeral interval measures normal.  No significant glenohumeral arthritis.  No lesions, masses or other concerning findings.    Assessment/Plan: Neck and right shoulder pain, suspected cervical etiology    I apologized for the miscommunication about the Valium.  I told him that we are doing our best to try to get him taken care of.  He has the MRI scheduled for the ninth.  I recommend that he take the Valium 1 hour prior.  I told him I will call him once I see the results.  I am not expecting to see a whole lot on his shoulder MRI.  I think most of his symptoms are probably coming from the neck.  Dr. Zafar had previously suggested a CT myelogram.  I am going to see if we can get that ordered for him.  Hopefully we can get the results of both tests soon and really come up with some answers for him.  I told him I will call him once I see the MRI results.  If we can get the CT scan done before then, I told him I will also be happy to review those results for him as well.    Kevin Serrano MD    01/29/2021

## 2021-02-01 ENCOUNTER — HOSPITAL ENCOUNTER (OUTPATIENT)
Dept: PHYSICAL THERAPY | Facility: HOSPITAL | Age: 76
Setting detail: THERAPIES SERIES
Discharge: HOME | End: 2021-02-01

## 2021-02-01 ENCOUNTER — TRANSCRIBE ORDERS (OUTPATIENT)
Dept: ADMINISTRATIVE | Facility: HOSPITAL | Age: 76
End: 2021-02-01

## 2021-02-01 DIAGNOSIS — M25.511 BILATERAL SHOULDER PAIN, UNSPECIFIED CHRONICITY: Primary | ICD-10-CM

## 2021-02-01 DIAGNOSIS — M54.2 NECK PAIN: Primary | ICD-10-CM

## 2021-02-01 DIAGNOSIS — Z74.09 IMPAIRED MOBILITY: ICD-10-CM

## 2021-02-01 DIAGNOSIS — M25.512 BILATERAL SHOULDER PAIN, UNSPECIFIED CHRONICITY: Primary | ICD-10-CM

## 2021-02-01 PROCEDURE — 97140 MANUAL THERAPY 1/> REGIONS: CPT

## 2021-02-01 PROCEDURE — 97110 THERAPEUTIC EXERCISES: CPT

## 2021-02-01 NOTE — THERAPY TREATMENT NOTE
Outpatient Physical Therapy Ortho Treatment Note  [facility]     Patient Name: James Loaiza  : 1945  MRN: [id number]  Today's Date: 2021      Visit Date: 2021    Visit Dx:    ICD-10-CM ICD-9-CM   1. Bilateral shoulder pain, unspecified chronicity  M25.511 719.41    M25.512    2. Impaired mobility  Z74.09 799.89       Patient Active Problem List   Diagnosis   • Lumbar herniated disc L3-L5 3/16/16 Open MRI   • Abnormal electrocardiogram   • Abnormal weight gain   • Aortic valve insufficiency   • Coronary arteriosclerosis in native artery   • Benign essential hypertension ([facility] )   • Edema   • Dyspnea on exertion   • Fatigue   • Left ventricular hypertrophy   • Obstructive sleep apnea syndrome   • Arthritis of left hip   • Hx of pulmonary embolus 7/15   • Morbid obesity with BMI of 45.0-49.9, adult (CMS/HCC)   • Intermittent dysphagia   • Chronic kidney disease ([facility])   • Hyperlipidemia    • BPH (benign prostatic hypertrophy)   • Left cervical radiculopathy   • Rotator cuff tear, left   • Cardiomegaly   • Plantar fasciitis   • Hypogonadism male   • Vitamin D deficiency disease   • Renal cyst, left   • Preventative health care   • Medication management   • Chronic low back pain   • Lumbar spondylolysis   • Headache   • Other chronic pain   • Pulmonary embolus (CMS/HCC)   • Chronic obstructive pulmonary disease (CMS/HCC)   • Osteoarthritis of right shoulder   • Chronic anticoagulation   • Cervical stenosis of spine   • Bilateral occipital neuralgia   • Shoulder pain        Past Medical History:   Diagnosis Date   • Abnormal EKG     referring to cardiology   • Anxiety    • Arthritis    • Back pain     worsening   • BPH (benign prostatic hyperplasia)     BPH/Nocturia/ Urinary Frequency   • Breast nodule     right   • Cardiomegaly     Cardiomegaly/ SOB with exertion   • Circulation problem    • Constipation    • COPD (chronic obstructive pulmonary disease) (CMS/HCC)    • Deviated septum   "  • DJD (degenerative joint disease)     DJD Knees   • DVT (deep venous thrombosis) (CMS/HCC)    • Dysphagia     solids and liquids - resolved with normal studies   • Encounter for annual health examination 11/14/2013    Annual Health Assessment   • Enlarged prostate    • Fatigue     Extreme fatigue   • Hyperlipidemia 1999   • Hypertension 1999    followed Dr. Marcelo   • Hypogonadism male    • Left hip pain     and DJD   • Left leg pain    • Low back pain    • Moist mucous membranes of ear, nose, and throat     \"Mucous in throat\"   • Morbid obesity (CMS/HCC)     (BMI-41.2za)   • Muscle cramping    • Muscle spasm     Muscle spasms   • Neck arthritis    • Neck muscle spasm     Neck muscle spasm/Cervical strain   • Neck pain    • Obesity    • Plantar fasciitis    • Prostatitis     recurrent   • Psoriasis    • Pulmonary embolus (CMS/[facility]) 07/2015    on Xarelltoypseerlipidemia   • Radiculopathy     Radiculopathy / Neuropathy left ar   • Renal insufficiency     followed by Dr. Mendes   • Seborrhea    • Shortness of breath    • Sleep apnea     Obstructive Sleep apnea worsening   • Urinary frequency    • Vascular disorder    • Vertigo    • Weight gain    • Wellness examination 10/23/2014    Annual Wellness Visit        Past Surgical History:   Procedure Laterality Date   • APPENDECTOMY  1965   • CARDIAC CATHETERIZATION  07/29/2010    [facility]   • COLONOSCOPY  01/05/2010   • COLONOSCOPY  10/01/2001   • NASAL SEPTUM SURGERY     • NOSE SURGERY  1999   • TURP / TRANSURETHRAL INCISION / DRAINAGE PROSTATE  10/23/2015    Michael Castro                       PT Assessment/Plan     Row Name 02/01/21 0356          PT Assessment    Assessment Comments  Pt has seen Dr. Serrano who agrees symptoms are more cervical in nature, he is awaiting both MRI for cervical spine and myelogram. Pt able to tolerate near supine position today with head/shoulders elevated in order to allow manual distraction and increased " manual focused on cervical spine. Continued postural exercises as pt continues to stand and sit with forward B shoulders and slumped posture. B upper trap muscle guarding and dec PROM rotation B with pt slightly resisting cervical spine mobility despite cues to relax.  -LB        PT Plan    PT Plan Comments  Continue manual therapy to cervical spine, postural exercises. Follow up after MRI/myelogram.  -LB       User Key  (r) = Recorded By, (t) = Taken By, (c) = Cosigned By    Initials Name Provider Type    LB Tiera Cochran, PT Physical Therapist            OP Exercises     Row Name 02/01/21 1700             Subjective Comments    Subjective Comments  I am doing ok. I have a myelogram Friday and and an MRI scheduled for 2/9/21. I have been taking 2 pills at night and have slept much better. Dr. Serrano agrees it is not coming from my shoulder.  -LB         Subjective Pain    Able to rate subjective pain?  yes  -LB      Pre-Treatment Pain Level  2  -LB      Subjective Pain Comment  I am more uncomfortable at the base of my head today.  -LB         Total Minutes    76460 - PT Therapeutic Exercise Minutes  15  -LB      02591 - PT Manual Therapy Minutes  25  -LB         Exercise 1    Exercise Name 1  UBE  -LB      Time 1  3 minutes  -LB         Exercise 2    Exercise Name 2  supine HA  -LB      Sets 2  2  -LB      Reps 2  10  -LB      Additional Comments  RTB  -LB         Exercise 3    Exercise Name 3  supine B ER  -LB      Sets 3  2  -LB      Reps 3  10  -LB         Exercise 4    Exercise Name 4  supine cervical rotation  -LB      Reps 4  10  -LB      Time 4  5  -LB         Exercise 5    Exercise Name 5  supine chin tuck  -LB      Reps 5  10  -LB      Time 5  5  -LB         Exercise 6    Exercise Name 6  seated UT stretch  -LB      Reps 6  3  -LB      Time 6  20  -LB         Exercise 7    Exercise Name 7  shoulder rolls  -LB      Reps 7  10  -LB        User Key  (r) = Recorded By, (t) = Taken By, (c) = Cosigned By     Initials Name Provider Type    Tiera Ayala PT Physical Therapist                      Manual Rx (last 36 hours)      Manual Treatments     Row Name 02/01/21 1700             Total Minutes    56430 - PT Manual Therapy Minutes  25  -LB         Manual Rx 1    Manual Rx 1 Location  cervical spine  -LB      Manual Rx 1 Type  manual distraction, STM, gentle PA, suboccipital release on plinth with head elevated and several pillows  -LB      Manual Rx 1 Duration  25  -LB        User Key  (r) = Recorded By, (t) = Taken By, (c) = Cosigned By    Initials Name Provider Type    Tiera Ayala PT Physical Therapist              Therapy Education  Given: Symptoms/condition management  Program: Reinforced  How Provided: Verbal  Provided to: Patient  Level of Understanding: Teach back education performed, Verbalized              Time Calculation:   Start Time: 1630  Stop Time: 1715  Time Calculation (min): 45 min  Total Timed Code Minutes- PT: 42 minute(s)  Therapy Charges for Today     Code Description Service Date Service Provider Modifiers Qty    26456131779 HC PT THER PROC EA 15 MIN 2/1/2021 Tiera Cochran, PT GP 1    92412211306  PT MANUAL THERAPY EA 15 MIN 2/1/2021 Tiera Cochran, PT GP 2                    Tiera Cochran PT  2/1/2021

## 2021-02-04 ENCOUNTER — APPOINTMENT (OUTPATIENT)
Dept: PHYSICAL THERAPY | Facility: HOSPITAL | Age: 76
End: 2021-02-04

## 2021-02-05 ENCOUNTER — HOSPITAL ENCOUNTER (OUTPATIENT)
Dept: CT IMAGING | Facility: HOSPITAL | Age: 76
Discharge: HOME | End: 2021-02-05

## 2021-02-05 ENCOUNTER — HOSPITAL ENCOUNTER (OUTPATIENT)
Dept: GENERAL RADIOLOGY | Facility: HOSPITAL | Age: 76
Discharge: HOME | End: 2021-02-05

## 2021-02-05 VITALS
BODY MASS INDEX: 40.43 KG/M2 | DIASTOLIC BLOOD PRESSURE: 90 MMHG | TEMPERATURE: 98.2 F | WEIGHT: 315 LBS | OXYGEN SATURATION: 95 % | SYSTOLIC BLOOD PRESSURE: 172 MMHG | RESPIRATION RATE: 20 BRPM | HEART RATE: 83 BPM | HEIGHT: 74 IN

## 2021-02-05 DIAGNOSIS — M47.22 CERVICAL SPONDYLOSIS WITH RADICULOPATHY: ICD-10-CM

## 2021-02-05 RX ORDER — LIDOCAINE HYDROCHLORIDE 10 MG/ML
10 INJECTION, SOLUTION INFILTRATION; PERINEURAL ONCE
Status: DISCONTINUED | OUTPATIENT
Start: 2021-02-05 | End: 2021-02-05

## 2021-02-05 RX ORDER — SODIUM CHLORIDE 0.9 % (FLUSH) 0.9 %
3 SYRINGE (ML) INJECTION EVERY 12 HOURS SCHEDULED
Status: CANCELLED | OUTPATIENT
Start: 2021-02-11

## 2021-02-05 RX ORDER — SODIUM CHLORIDE 0.9 % (FLUSH) 0.9 %
10 SYRINGE (ML) INJECTION AS NEEDED
Status: CANCELLED | OUTPATIENT
Start: 2021-02-11

## 2021-02-05 NOTE — NURSING NOTE
Pt in xray triage for cervical myelogram  Pt wearing mask; RN wearing mask and goggles for all pt interactions

## 2021-02-11 ENCOUNTER — HOSPITAL ENCOUNTER (OUTPATIENT)
Dept: PHYSICAL THERAPY | Facility: HOSPITAL | Age: 76
Setting detail: THERAPIES SERIES
Discharge: HOME | End: 2021-02-11

## 2021-02-11 DIAGNOSIS — M25.512 BILATERAL SHOULDER PAIN, UNSPECIFIED CHRONICITY: Primary | ICD-10-CM

## 2021-02-11 DIAGNOSIS — Z74.09 IMPAIRED MOBILITY: ICD-10-CM

## 2021-02-11 DIAGNOSIS — M25.511 BILATERAL SHOULDER PAIN, UNSPECIFIED CHRONICITY: Primary | ICD-10-CM

## 2021-02-11 PROCEDURE — 97140 MANUAL THERAPY 1/> REGIONS: CPT

## 2021-02-11 PROCEDURE — 97110 THERAPEUTIC EXERCISES: CPT

## 2021-02-11 NOTE — THERAPY TREATMENT NOTE
Outpatient Physical Therapy Ortho Treatment Note  [facility]     Patient Name: James Loaiza  : 1945  MRN: [id number]  Today's Date: 2021      Visit Date: 2021    Visit Dx:    ICD-10-CM ICD-9-CM   1. Bilateral shoulder pain, unspecified chronicity  M25.511 719.41    M25.512    2. Impaired mobility  Z74.09 799.89       Patient Active Problem List   Diagnosis   • Lumbar herniated disc L3-L5 3/16/16 Open MRI   • Abnormal electrocardiogram   • Abnormal weight gain   • Aortic valve insufficiency   • Coronary arteriosclerosis in native artery   • Benign essential hypertension ([place] )   • Edema   • Dyspnea on exertion   • Fatigue   • Left ventricular hypertrophy   • Obstructive sleep apnea syndrome   • Arthritis of left hip   • Hx of pulmonary embolus 7/15   • Morbid obesity with BMI of 45.0-49.9, adult (CMS/HCC)   • Intermittent dysphagia   • Chronic kidney disease ([facility])   • Hyperlipidemia    • BPH (benign prostatic hypertrophy)   • Left cervical radiculopathy   • Rotator cuff tear, left   • Cardiomegaly   • Plantar fasciitis   • Hypogonadism male   • Vitamin D deficiency disease   • Renal cyst, left   • Preventative health care   • Medication management   • Chronic low back pain   • Lumbar spondylolysis   • Headache   • Other chronic pain   • Pulmonary embolus (CMS/HCC)   • Chronic obstructive pulmonary disease (CMS/HCC)   • Osteoarthritis of right shoulder   • Chronic anticoagulation   • Cervical stenosis of spine   • Bilateral occipital neuralgia   • Shoulder pain        Past Medical History:   Diagnosis Date   • Abnormal EKG     referring to cardiology   • Anxiety    • Arthritis    • Back pain     worsening   • BPH (benign prostatic hyperplasia)     BPH/Nocturia/ Urinary Frequency   • Breast nodule     right   • Cardiomegaly     Cardiomegaly/ SOB with exertion   • Circulation problem    • Constipation    • COPD (chronic obstructive pulmonary disease) (CMS/HCC)    • Deviated septum  "   • DJD (degenerative joint disease)     DJD Knees   • DVT (deep venous thrombosis) (CMS/[facility])    • Dysphagia     solids and liquids - resolved with normal studies   • Encounter for annual health examination 11/14/2013    Annual Health Assessment   • Enlarged prostate    • Fatigue     Extreme fatigue   • Hyperlipidemia 1999   • Hypertension 1999    followed Dr. Marcelo   • Hypogonadism male    • Left hip pain     and DJD   • Left leg pain    • Low back pain    • Moist mucous membranes of ear, nose, and throat     \"Mucous in throat\"   • Morbid obesity (CMS/[facility])     (BMI-41.2za)   • Muscle cramping    • Muscle spasm     Muscle spasms   • Neck arthritis    • Neck muscle spasm     Neck muscle spasm/Cervical strain   • Neck pain    • Obesity    • Plantar fasciitis    • Prostatitis     recurrent   • Psoriasis    • Pulmonary embolus (CMS/[facility]) 07/2015    on Xarelltoypseerlipidemia   • Radiculopathy     Radiculopathy / Neuropathy left ar   • Renal insufficiency     followed by Dr. Mendes   • Seborrhea    • Shortness of breath    • Sleep apnea     Obstructive Sleep apnea worsening   • Urinary frequency    • Vascular disorder    • Vertigo    • Weight gain    • Wellness examination 10/23/2014    Annual Wellness Visit        Past Surgical History:   Procedure Laterality Date   • APPENDECTOMY  1965   • CARDIAC CATHETERIZATION  07/29/2010    [facility]   • COLONOSCOPY  01/05/2010   • COLONOSCOPY  10/01/2001   • NASAL SEPTUM SURGERY     • NOSE SURGERY  1999   • TURP / TRANSURETHRAL INCISION / DRAINAGE PROSTATE  10/23/2015    Michael Castro                       PT Assessment/Plan     Row Name 02/11/21 7047          PT Assessment    Assessment Comments  Pt reports improving symptoms and unsure if he should attribute this to PT or increased medication dose over last 2 weeks. He denies burning sensation in R upper arm. Continued manual therapy to cervical spine and postural strengthening. Pt continues to " demonstrate guarded posture with manual therapy and struggles to relax to allow manual stretches.  -LB        PT Plan    PT Plan Comments  Continue x 2 more visits to determine if PT vs. medication is reducing symptoms. D/c to HEP after these 2.  -LB       User Key  (r) = Recorded By, (t) = Taken By, (c) = Cosigned By    Initials Name Provider Type    LB Tiera Cochran, PT Physical Therapist            OP Exercises     Row Name 02/11/21 1300 02/11/21 1200          Subjective Comments    Subjective Comments  --  I have had a few good nights of sleep so something is working. I am not sure if it is because I am taking 2 pills or because of the therapy. I did get a trigger point release and I am working with that.  -LB        Subjective Pain    Able to rate subjective pain?  --  yes  -LB     Pre-Treatment Pain Level  --  2  -LB     Subjective Pain Comment  --  My shoulder pain that was burning is gone.  -LB        Total Minutes    00001 - PT Therapeutic Exercise Minutes  --  25  -LB     66085 - PT Manual Therapy Minutes  15  -LB  --        Exercise 1    Exercise Name 1  --  UBE  -LB     Time 1  --  3 minutes  -LB        Exercise 2    Exercise Name 2  --  supine HA  -LB     Sets 2  --  2  -LB     Reps 2  --  10  -LB     Additional Comments  --  GTB  -LB        Exercise 3    Exercise Name 3  --  supine B ER  -LB     Sets 3  --  2  -LB     Reps 3  --  10  -LB     Additional Comments  --  GTB  -LB        Exercise 4    Exercise Name 4  --  supine cervical rotation  -LB     Reps 4  --  10  -LB     Time 4  --  5  -LB     Additional Comments  --  each  -LB        Exercise 5    Exercise Name 5  --  supine chin tuck  -LB     Reps 5  --  10  -LB     Time 5  --  5  -LB        Exercise 6    Exercise Name 6  --  seated UT stretch  -LB     Reps 6  --  3  -LB     Time 6  --  20  -LB        Exercise 7    Exercise Name 7  --  shoulder rolls  -LB     Reps 7  --  10  -LB        Exercise 8    Exercise Name 8  --  seated levator stretch  -LB      Reps 8  --  3  -LB     Time 8  --  20  -LB        Exercise 9    Exercise Name 9  --  seated crossbody adduction stretch  -LB     Reps 9  --  3  -LB     Time 9  --  20  -LB        Exercise 10    Exercise Name 10  --  tband row  -LB     Sets 10  --  2  -LB     Reps 10  --  10  -LB     Additional Comments  --  GTB  -LB        Exercise 11    Exercise Name 11  --  tband extension  -LB     Sets 11  --  2  -LB     Reps 11  --  10  -LB     Additional Comments  --  GTB  -LB        Exercise 12    Exercise Name 12  --  seated ball diagonals for thoracic mobility  -LB     Reps 12  --  10  -LB        Exercise 13    Exercise Name 13  --  seated B OH flexion with dowel  -LB     Reps 13  --  10  -LB       User Key  (r) = Recorded By, (t) = Taken By, (c) = Cosigned By    Initials Name Provider Type    LB Tiera Cochran, PT Physical Therapist                      Manual Rx (last 36 hours)      Manual Treatments     Row Name 02/11/21 1300             Total Minutes    77398 - PT Manual Therapy Minutes  15  -LB         Manual Rx 1    Manual Rx 1 Location  cervical spine  -LB      Manual Rx 1 Type  manual distraction, STM, gentle PA, suboccipital release on plinth with head elevated and several pillows  -LB      Manual Rx 1 Duration  15  -LB        User Key  (r) = Recorded By, (t) = Taken By, (c) = Cosigned By    Initials Name Provider Type    LB Tiera Cochran, PT Physical Therapist          PT OP Goals     Row Name 02/11/21 1300          PT Short Term Goals    STG Date to Achieve  12/25/20  -LB     STG 1  Pt will demonstrate independence and compliance with initial HEP.  -LB     STG 1 Progress  Met  -LB     STG 2  Pt will report compliance with postural modifications during leisure tasks to reduce UT/cervical spine stress.  -LB     STG 2 Progress  Progressing  -LB     STG 3  Pt will report ability to stay in bed for entireity of night without moving locations due to shoulder pain.  -LB     STG 3 Progress  Met  -LB     STG 3 Progress  Comments  Pt reports he has slept last 6 nights with much less discomfort.  -LB        Long Term Goals    LTG Date to Achieve  01/10/21  -LB     LTG 1  Pt will report 50% reduction in night time waking due to shoulder pain.  -LB     LTG 1 Progress  Met  -LB     LTG 2  Pt will demonstrate improved seated posture without cuing while in clinic.  -LB     LTG 2 Progress  Met  -LB     LTG 3  Pt will demonstrate improved B lateral flexion/cervical rotation to 50% B without provocation of symptoms.  -LB     LTG 3 Progress  Ongoing  -LB     LTG 3 Progress Comments  Limited B lateral flexion to 25% of normal.  -LB       User Key  (r) = Recorded By, (t) = Taken By, (c) = Cosigned By    Initials Name Provider Type    Tiera Ayala, PT Physical Therapist          Therapy Education  Education Details: continued to discuss posture, pain management, HEP for postural strengthening  Given: Symptoms/condition management, Posture/body mechanics, HEP  Program: Reinforced  How Provided: Verbal  Provided to: Patient  Level of Understanding: Teach back education performed, Verbalized, Demonstrated              Time Calculation:   Start Time: 1230  Stop Time: 1315  Time Calculation (min): 45 min  Total Timed Code Minutes- PT: 40 minute(s)  Therapy Charges for Today     Code Description Service Date Service Provider Modifiers Qty    22533232840 HC PT THER PROC EA 15 MIN 2/11/2021 Tiera Cochran, PT GP 2    04062531285 HC PT MANUAL THERAPY EA 15 MIN 2/11/2021 Tiera Cochran, PT GP 1                    Tiera Cochran PT  2/11/2021

## 2021-02-15 ENCOUNTER — TELEPHONE (OUTPATIENT)
Dept: ORTHOPEDIC SURGERY | Facility: CLINIC | Age: 76
End: 2021-02-15

## 2021-02-15 NOTE — TELEPHONE ENCOUNTER
I spoke to Maureen Josse.  I provided him with the results of his right shoulder CT arthrogram as reviewed by Dr. Serrano.  The CT arthrogram of his right shoulder looks okay, but I explained this test is not as good as an MRI.  Unfortunately he was not able to tolerate having the MRI.  Dr. Serrano feels that some of his symptoms still may be coming from his neck.  Patient says he has seen Dr. Zafar for his neck, back in August, and would like to follow-up with him.  I will have our office call him to schedule this appointment.

## 2021-02-17 ENCOUNTER — OFFICE VISIT (OUTPATIENT)
Dept: ORTHOPEDIC SURGERY | Facility: CLINIC | Age: 76
End: 2021-02-17

## 2021-02-17 ENCOUNTER — TELEPHONE (OUTPATIENT)
Dept: PAIN MEDICINE | Facility: CLINIC | Age: 76
End: 2021-02-17

## 2021-02-17 VITALS — TEMPERATURE: 97.1 F | BODY MASS INDEX: 40.43 KG/M2 | WEIGHT: 315 LBS | HEIGHT: 74 IN

## 2021-02-17 DIAGNOSIS — M47.22 CERVICAL SPONDYLOSIS WITH RADICULOPATHY: Primary | ICD-10-CM

## 2021-02-17 PROCEDURE — 99213 OFFICE O/P EST LOW 20 MIN: CPT | Performed by: ORTHOPAEDIC SURGERY

## 2021-02-17 NOTE — TELEPHONE ENCOUNTER
Caller: BETHANIE ORDONEZ     Relationship to patient: PATIENT    Best call back number: [phone]    Chief complaint: NECK/ SHOULDER PAIN    Type of visit: FOLLOW UP    Requested date: ASAP     Additional notes:PT HAS A 3 MONTH F/U WITH CLARENCE FAGAN ON 4/28/21; WOULD LIKE TO MAKE A SOONER APPT TO DISCUSS A PLAN FOR NECK/ SHOULDER PAIN- PT STATED HE SAW DR RUFFIN TODAY , 02/17/21, AND DID NOT RECEIVE HELP- PLEASE CALL PATIENT BACK [phone] TO DISCUSS EARLIER APPT

## 2021-02-17 NOTE — PROGRESS NOTES
I saw him last August.  He was having right shoulder pain which was slightly better after epidurals.  Recent occipital injections have helped immensely with his headaches there and he is happy with his progress.  He feels he has good quality of life at present.  On exam the neck is mildly tender in the mid cervical region.  His gait appears stable.  In the upper extremities he has good strength in all tested groups and reflexes are symmetrically diminished a Babb test is negative no evidence of clonus.  His MRI did show multilevel foraminal and canal stenosis it is pretty fuzzy looking MRI.  He could not tolerate the myelogram and CT scan.  At some point he may be a candidate for cervical decompression and fusion but it would be a multilevel intervention possibly with corpectomy and I think his operative risk are going to be elevated for this.  For now I would leave this alone and I encouraged him to follow-up for any increased pain in the neck or shoulder, imbalance or  bowel or bladder incontinence.

## 2021-02-18 ENCOUNTER — HOSPITAL ENCOUNTER (OUTPATIENT)
Dept: PHYSICAL THERAPY | Facility: HOSPITAL | Age: 76
Setting detail: THERAPIES SERIES
Discharge: HOME | End: 2021-02-18

## 2021-02-18 DIAGNOSIS — M54.50 CHRONIC MIDLINE LOW BACK PAIN WITHOUT SCIATICA: ICD-10-CM

## 2021-02-18 DIAGNOSIS — M25.512 BILATERAL SHOULDER PAIN, UNSPECIFIED CHRONICITY: Primary | ICD-10-CM

## 2021-02-18 DIAGNOSIS — G89.29 CHRONIC MIDLINE LOW BACK PAIN WITHOUT SCIATICA: ICD-10-CM

## 2021-02-18 DIAGNOSIS — M25.511 BILATERAL SHOULDER PAIN, UNSPECIFIED CHRONICITY: Primary | ICD-10-CM

## 2021-02-18 DIAGNOSIS — Z74.09 IMPAIRED MOBILITY: ICD-10-CM

## 2021-02-18 PROCEDURE — 97110 THERAPEUTIC EXERCISES: CPT

## 2021-02-18 PROCEDURE — 97140 MANUAL THERAPY 1/> REGIONS: CPT

## 2021-02-18 NOTE — THERAPY TREATMENT NOTE
Outpatient Physical Therapy Ortho Treatment Note  [facility]     Patient Name: James Loaiza  : 1945  MRN: [id number]  Today's Date: 2021      Visit Date: 2021    Visit Dx:    ICD-10-CM ICD-9-CM   1. Bilateral shoulder pain, unspecified chronicity  M25.511 719.41    M25.512    2. Impaired mobility  Z74.09 799.89   3. Chronic midline low back pain without sciatica  M54.5 724.2    G89.29 338.29       Patient Active Problem List   Diagnosis   • Lumbar herniated disc L3-L5 3/16/16 Open MRI   • Abnormal electrocardiogram   • Abnormal weight gain   • Aortic valve insufficiency   • Coronary arteriosclerosis in native artery   • Benign essential hypertension ([place] )   • Edema   • Dyspnea on exertion   • Fatigue   • Left ventricular hypertrophy   • Obstructive sleep apnea syndrome   • Arthritis of left hip   • Hx of pulmonary embolus 7/15   • Morbid obesity with BMI of 45.0-49.9, adult (CMS/[facility])   • Intermittent dysphagia   • Chronic kidney disease ([facility])   • Hyperlipidemia    • BPH (benign prostatic hypertrophy)   • Left cervical radiculopathy   • Rotator cuff tear, left   • Cardiomegaly   • Plantar fasciitis   • Hypogonadism male   • Vitamin D deficiency disease   • Renal cyst, left   • Preventative health care   • Medication management   • Chronic low back pain   • Lumbar spondylolysis   • Headache   • Other chronic pain   • Pulmonary embolus (CMS/[facility])   • Chronic obstructive pulmonary disease (CMS/[facility])   • Osteoarthritis of right shoulder   • Chronic anticoagulation   • Cervical stenosis of spine   • Bilateral occipital neuralgia   • Shoulder pain        Past Medical History:   Diagnosis Date   • Abnormal EKG     referring to cardiology   • Anxiety    • Arthritis    • Back pain     worsening   • BPH (benign prostatic hyperplasia)     BPH/Nocturia/ Urinary Frequency   • Breast nodule     right   • Cardiomegaly     Cardiomegaly/ SOB with exertion   • Circulation problem    •  "Constipation    • COPD (chronic obstructive pulmonary disease) (CMS/[facility])    • Deviated septum    • DJD (degenerative joint disease)     DJD Knees   • DVT (deep venous thrombosis) (CMS/[facility])    • Dysphagia     solids and liquids - resolved with normal studies   • Encounter for annual health examination 11/14/2013    Annual Health Assessment   • Enlarged prostate    • Fatigue     Extreme fatigue   • Hyperlipidemia 1999   • Hypertension 1999    followed Dr. Marcelo   • Hypogonadism male    • Left hip pain     and DJD   • Left leg pain    • Low back pain    • Moist mucous membranes of ear, nose, and throat     \"Mucous in throat\"   • Morbid obesity (CMS/[facility])     (BMI-41.2za)   • Muscle cramping    • Muscle spasm     Muscle spasms   • Neck arthritis    • Neck muscle spasm     Neck muscle spasm/Cervical strain   • Neck pain    • Obesity    • Plantar fasciitis    • Prostatitis     recurrent   • Psoriasis    • Pulmonary embolus (CMS/[facility]) 07/2015    on Xarelltoypseerlipidemia   • Radiculopathy     Radiculopathy / Neuropathy left ar   • Renal insufficiency     followed by Dr. Mendes   • Seborrhea    • Shortness of breath    • Sleep apnea     Obstructive Sleep apnea worsening   • Urinary frequency    • Vascular disorder    • Vertigo    • Weight gain    • Wellness examination 10/23/2014    Annual Wellness Visit        Past Surgical History:   Procedure Laterality Date   • APPENDECTOMY  1965   • CARDIAC CATHETERIZATION  07/29/2010    [facility]   • COLONOSCOPY  01/05/2010   • COLONOSCOPY  10/01/2001   • NASAL SEPTUM SURGERY     • NOSE SURGERY  1999   • TURP / TRANSURETHRAL INCISION / DRAINAGE PROSTATE  10/23/2015    Michael Castro                       PT Assessment/Plan     Row Name 02/18/21 7128          PT Assessment    Assessment Comments  Pt reports inc symptoms last night but he does appear to have slight pattern of inc symptoms in shoulders/periscapular region with inc emotional anxiety and pt " verbalizes upsetedness in lack of understanding of what he can do about his night time pain. We have discused at length postural implications, need for scapular strengthening and use of modalities to reduce muscle guarding. Pt continues to report frustration with night time pain but consistently reports relief with therapy. Unsure if we are actually improving symptoms or not as they have fluctuated greatly.  -LB        PT Plan    PT Plan Comments  d/c to independent program after next visit  -LB       User Key  (r) = Recorded By, (t) = Taken By, (c) = Cosigned By    Initials Name Provider Type    LB Tiera Cochran, PT Physical Therapist            OP Exercises     Row Name 02/18/21 1500             Subjective Comments    Subjective Comments  I had a bad night last night. I think it was mostly emotional. I am getting frustrated with the lack of what I can do about this problem.  -LB         Subjective Pain    Able to rate subjective pain?  yes  -LB      Pre-Treatment Pain Level  2  -LB         Total Minutes    96341 - PT Therapeutic Exercise Minutes  25  -LB      81622 - PT Manual Therapy Minutes  15  -LB         Exercise 1    Exercise Name 1  UBE  -LB      Time 1  3 minutes  -LB         Exercise 2    Exercise Name 2  supine HA  -LB      Sets 2  2  -LB      Reps 2  10  -LB      Additional Comments  GTB  -LB         Exercise 3    Exercise Name 3  supine B ER  -LB      Sets 3  2  -LB      Reps 3  10  -LB      Additional Comments  GTB  -LB         Exercise 4    Exercise Name 4  supine cervical rotation  -LB      Reps 4  10  -LB      Time 4  5  -LB      Additional Comments  each  -LB         Exercise 5    Exercise Name 5  supine chin tuck  -LB      Reps 5  10  -LB      Time 5  5  -LB         Exercise 7    Exercise Name 7  shoulder rolls  -LB      Reps 7  10  -LB         Exercise 9    Exercise Name 9  seated crossbody adduction stretch  -LB      Reps 9  3  -LB      Time 9  20  -LB         Exercise 10    Exercise Name 10   tband row  -LB      Sets 10  2  -LB      Reps 10  10  -LB      Additional Comments  GTB  -LB         Exercise 11    Exercise Name 11  tband extension  -LB      Sets 11  2  -LB      Reps 11  10  -LB      Additional Comments  GTB  -LB        User Key  (r) = Recorded By, (t) = Taken By, (c) = Cosigned By    Initials Name Provider Type    LB Tiera Cochran, PT Physical Therapist                      Manual Rx (last 36 hours)      Manual Treatments     Row Name 02/18/21 1500             Total Minutes    32414 - PT Manual Therapy Minutes  15  -LB         Manual Rx 1    Manual Rx 1 Location  cervical spine  -LB      Manual Rx 1 Type  manual distraction, STM, gentle PA, suboccipital release on plinth with head elevated and several pillows  -LB      Manual Rx 1 Duration  15  -LB        User Key  (r) = Recorded By, (t) = Taken By, (c) = Cosigned By    Initials Name Provider Type    LB Tiera Cochran, PT Physical Therapist          PT OP Goals     Row Name 02/18/21 1500          PT Short Term Goals    STG Date to Achieve  12/25/20  -LB     STG 1  Pt will demonstrate independence and compliance with initial HEP.  -LB     STG 1 Progress  Met  -LB     STG 2  Pt will report compliance with postural modifications during leisure tasks to reduce UT/cervical spine stress.  -LB     STG 2 Progress  Progressing  -LB     STG 3  Pt will report ability to stay in bed for entireity of night without moving locations due to shoulder pain.  -LB     STG 3 Progress  Met  -LB        Long Term Goals    LTG Date to Achieve  01/10/21  -LB     LTG 1  Pt will report 50% reduction in night time waking due to shoulder pain.  -LB     LTG 1 Progress  Met  -LB     LTG 2  Pt will demonstrate improved seated posture without cuing while in clinic.  -LB     LTG 2 Progress  Met  -LB     LTG 3  Pt will demonstrate improved B lateral flexion/cervical rotation to 50% B without provocation of symptoms.  -LB     LTG 3 Progress  Ongoing  -LB       User Key  (r) =  Recorded By, (t) = Taken By, (c) = Cosigned By    Initials Name Provider Type    LB Tiera Cochran, PT Physical Therapist          Therapy Education  Education Details: discussed using heat/ice to address symptoms  Given: Symptoms/condition management  Program: Reinforced  How Provided: Verbal  Provided to: Patient  Level of Understanding: Teach back education performed, Verbalized, Demonstrated              Time Calculation:   Start Time: 1425  Stop Time: 1505  Time Calculation (min): 40 min  Total Timed Code Minutes- PT: 40 minute(s)  Therapy Charges for Today     Code Description Service Date Service Provider Modifiers Qty    72941399546 HC PT THER PROC EA 15 MIN 2/18/2021 Tiera Cochran, PT GP 2    72858882917 HC PT MANUAL THERAPY EA 15 MIN 2/18/2021 Tiera Cochran, PT GP 1                    Tiera Cochran PT  2/18/2021

## 2021-02-22 ENCOUNTER — PREP FOR SURGERY (OUTPATIENT)
Dept: SURGERY | Facility: SURGERY CENTER | Age: 76
End: 2021-02-22

## 2021-02-22 ENCOUNTER — OFFICE VISIT (OUTPATIENT)
Dept: PAIN MEDICINE | Facility: CLINIC | Age: 76
End: 2021-02-22

## 2021-02-22 VITALS
TEMPERATURE: 98 F | HEART RATE: 93 BPM | SYSTOLIC BLOOD PRESSURE: 171 MMHG | BODY MASS INDEX: 40.43 KG/M2 | OXYGEN SATURATION: 96 % | HEIGHT: 74 IN | RESPIRATION RATE: 20 BRPM | WEIGHT: 315 LBS | DIASTOLIC BLOOD PRESSURE: 94 MMHG

## 2021-02-22 DIAGNOSIS — M43.06 LUMBAR SPONDYLOLYSIS: ICD-10-CM

## 2021-02-22 DIAGNOSIS — M47.812 CERVICAL FACET JOINT SYNDROME: Primary | ICD-10-CM

## 2021-02-22 DIAGNOSIS — M47.812 CERVICAL FACET JOINT SYNDROME: ICD-10-CM

## 2021-02-22 DIAGNOSIS — M25.519 SHOULDER PAIN, UNSPECIFIED CHRONICITY, UNSPECIFIED LATERALITY: ICD-10-CM

## 2021-02-22 DIAGNOSIS — G89.29 OTHER CHRONIC PAIN: Primary | ICD-10-CM

## 2021-02-22 PROCEDURE — 99214 OFFICE O/P EST MOD 30 MIN: CPT | Performed by: NURSE PRACTITIONER

## 2021-02-22 RX ORDER — GABAPENTIN 300 MG/1
300 CAPSULE ORAL NIGHTLY
Qty: 30 CAPSULE | Refills: 1 | Status: SHIPPED | OUTPATIENT
Start: 2021-02-22 | End: 2021-03-02 | Stop reason: SDUPTHER

## 2021-02-22 NOTE — PROGRESS NOTES
"CHIEF COMPLAINT  F/u back and neck pain. Pt sts pain has worsened at night.     Subjective   James Loaiza is a 75 y.o. male  who presents for follow-up.  He has a history of chronic back, neck and shoulder pain. Reports his pain has worsened.      PAtient seen by Dr Zafar 2-17-21 (ortho spine)-- Having shoulder pain. Slightly better after epidurals. Recent injections have helped with his headaches.  MRI shows multilevel foraminal and canal stenosis.  He could not tolerate the myelogram and CT scan.  At some point he may be a candidate for cervical decompression and fusion but it would be a multilevel intervention possibly with corpectomy.  Concern for elevated operative risk.    Reviewed [facility] DOYLE phone encounter 2-15-21--CT arthrogram of right shoulder looks okay.  Patient was unable to tolerate MRI.  Dr. Serrano feels like some of the symptoms may still be coming from his neck.  Recommend follow-up with Dr. Del Valle for cervical radiculopathy.    Complains of pain in his low back, neck, head and right shoulder. Today his pain is 5/10VAS.  His primary complains is the pain on the back of his head, as well as his shoulder pain, right worse than left.    He was to have cervical myelogram 2-5-21 but could not tolerate this.        Patient had a bilateral OCNB performed by Dr. COOPER on 1-22-21 for management of HEAD AND NECK PAIN. Patient reports 90% relief from the procedure for 5 days. His pain returned yesterday morning and ceased last night.     Being seen by Dr Serrano for shoulder pain. Having MRI-arthrogram. Was prescribed Tramadol.  Has started PT for this. \"it's just getting worse and they still haven't rescheduled that MRI.\" Has had ELENITA with no improvement in shoulder pain. No improvement with OCNB either.    Previously had bilateral shoulder injections-- no relief.      Remains on Eliquis.  Cannot take NSAIDS.     Patient remained masked during entire encounter. No cough present. I donned a mask " and eye protection throughout entire visit. Prior to donning mask and eye protection, hand hygiene was performed, as well as when it was doffed.  I was closer than 6 feet, but not for an extended period of time. No obvious exposure to any bodily fluids.    Back Pain  This is a recurrent problem. The current episode started more than 1 month ago. The problem occurs constantly. Progression since onset: slightly worse since last office visit. The pain is present in the lumbar spine and sacro-iliac. The quality of the pain is described as aching. The pain does not radiate. The pain is at a severity of 5/10. The pain is mild. Worse during: frequent urination at night which increases his pain--getting up and out of bed. The symptoms are aggravated by standing and twisting. Stiffness is present all day. Pertinent negatives include no abdominal pain, bladder incontinence, bowel incontinence, chest pain, dysuria, fever, headaches, numbness or weakness. Risk factors include lack of exercise, obesity and recent trauma. Treatments tried: lumbar RFL, OTC IBU.   Neck Pain   This is a new problem. The current episode started more than 1 month ago. The problem has been gradually worsening. The pain is associated with nothing. The pain is present in the occipital region. The pain is at a severity of 5/10. The pain is severe. Pertinent negatives include no chest pain, fever, headaches, numbness or weakness.        Procedure List  --1-22-21-- bilateral OCNB  -- 10-23-20-- bilateral OCNB and bilateral L2-L5 MBB  -- 9-21-20-- bilateral OCNB  -- 9-9-20-- ELENITA (DR VALADEZ PILY)  -- 6-22-18-- bilateral L2-L5 RFL- 75% long term relief    MRI OF THE CERVICAL SPINE WITHOUT CONTRAST 08/23/2020     CLINICAL HISTORY: Abnormal cervical spine x-ray demonstrating  degenerative disc disease of the cervical spine, patient has neck pain  and bilateral shoulder pain, right worse than left.     TECHNIQUE: Sagittal T1, gradient echo T2 and fat-suppressed  fast spin  echo T2-weighted images were obtained of the cervical spine, in addition  axial gradient echo T2 weighted images were obtained from the skull base  to the T1 thoracic level.     COMPARISON: This is correlated to an outside MRI of the cervical spine  from [facility] on 07/28/2016.     FINDINGS: Exam is compromised by significant patient motion artifact  with substantial blurring of images on the sagittal images limits  evaluation.     There are some arthritic changes at the atlantodental interval,  otherwise the C1-C2 level is within normal limits.     At C2-C3, there is disc space narrowing and degenerative endplate  changes, posterior spurs abut the ventral cord mildly narrowing the  canal. There is some mild right facet overgrowth and uncovertebral joint  hypertrophy. There is mild right foraminal narrowing. Left facets and  uncovertebral joints are normal with no left foraminal narrowing.      At C3-C4, the sagittal images are so severely blurred that the C3-C4  level is inadequately assessed in the axial images. There appears to be  some posterior endplate spurs that abut and deform the ventral surface  of the cord. On the axial images, there appears to be up to moderate  canal narrowing. There is moderate right facet overgrowth, right-sided  uncovertebral joint hypertrophy. There is moderate right foraminal  narrowing. There is some left-sided uncovertebral joint hypertrophy and  mild left facet overgrowth with mild-to-moderate left foraminal  narrowing.     At C4-C5, there is posterior endplate spurs abut the ventral surface of  the cord mildly narrowing the canal. There is mild facet overgrowth and  uncovertebral joint hypertrophy. There is mild-to-moderate left and  moderate right bony foraminal narrowing.     At C5-C6, there is moderate disc space narrowing. There is some anterior  marginal spurring. There is minimal posterior endplate spurring, minimal  canal narrowing.  The facets are normal. There is some right-sided  uncovertebral joint hypertrophy and there is mild right and no left  foraminal narrowing.     At C6-C7, there is some anterior marginal endplate spurring. Posterior  disc margin is normal with no canal narrowing. There is mild  uncovertebral joint hypertrophy, and there is up to mild foraminal  narrowing.     At C7-T1, there is anterior marginal endplate spurring. There is mild  posterior disc osteophyte complex, minimal canal narrowing. There is  mild foraminal narrowing.     IMPRESSION:  1. This exam is significantly compromised by patient motion artifact.  There is blurring of the sagittal images, significantly limits  evaluation, and furthermore the axial images are degraded and axial  images also tend to overestimate the degree of canal narrowing, thus  this is a limited evaluation of the cervical spine.  2. There is some disc space narrowing and degenerative endplate changes  throughout the cervical spine. At C2-C3, posterior endplate spurs abut  the ventral cord, mildly narrowing the canal, and there is mild right  facet overgrowth and uncovertebral joint hypertrophy with mild right  foraminal narrowing.  3. At C3-C4, again images are severely blurred on the sagittal images,  obscures evaluation of the C3-C4 level. There is mild motion artifact on  the axial images, limits evaluation. There is posterior endplate  spurring that appear to abut and deform the ventral surface of the cord  contributing to up to moderate canal narrowing. There is moderate right  facet overgrowth and some uncovertebral joint spurring, and there is  moderate right and mild-to-moderate left bony foraminal narrowing at  C3-C4.  4. At C4-C5, posterior endplate spurs abut the ventral cord, mildly  narrowing the canal, and there is some mild facet overgrowth and  uncovertebral joint hypertrophy. There is mild-to-moderate left and  moderate right bony foraminal narrowing.  5. There is some  anterior marginal endplate spurring from C5 down to T2.  At C5-C6, there is minimal canal and mild right foraminal narrowing. At  C7-T1, there is minimal posterior spurring with mild canal and there is  mild bilateral foraminal narrowing. The remainder of the cervical spine  MRI is unremarkable. If the patient has cervical radiculopathy,  myelogram and postmyelogram CT is recommended for a more comprehensive  assessment as this study provides limited evaluation due to motion  artifact.     This report was finalized on 8/26/2020 7:17 AM by Dr. Adis Lares M.D.     PEG Assessment   What number best describes your pain on average in the past week?5  What number best describes how, during the past week, pain has interfered with your enjoyment of life?5  What number best describes how, during the past week, pain has interfered with your general activity?  5    The following portions of the patient's history were reviewed and updated as appropriate: allergies, current medications, past family history, past medical history, past social history, past surgical history and problem list.    Review of Systems   Constitutional: Negative for activity change, fatigue and fever.   HENT: Negative for congestion.    Eyes: Negative for visual disturbance.   Respiratory: Negative for cough and shortness of breath.    Cardiovascular: Negative for chest pain.   Gastrointestinal: Negative for abdominal pain, bowel incontinence, constipation and diarrhea.   Endocrine: Negative for polyuria.   Genitourinary: Negative for bladder incontinence, difficulty urinating and dysuria.   Musculoskeletal: Positive for back pain, neck pain and neck stiffness.   Allergic/Immunologic: Negative for immunocompromised state.   Neurological: Negative for dizziness, weakness, light-headedness, numbness and headaches.   Psychiatric/Behavioral: Positive for sleep disturbance. Negative for agitation and suicidal ideas. The patient is not nervous/anxious.      I  "have reviewed and confirmed the accuracy of the ROS as documented by the MA/LPN/RN Sarai JOESPHMaureen Aidan, APRN      Vitals:    02/22/21 1455   BP: 171/94  Comment: pt sts had bp meds today   Pulse: 93   Resp: 20   Temp: 98 °F (36.7 °C)   SpO2: 96%   Weight: (!) 153 kg (337 lb)   Height: 188 cm (74\")   PainSc:   5   PainLoc: Back  Comment: and neck     Objective   Physical Exam  Vitals signs and nursing note reviewed.   Constitutional:       Appearance: Normal appearance. He is well-developed.   HENT:      Head: Normocephalic and atraumatic.   Eyes:      General: Lids are normal.   Neck:      Musculoskeletal: Decreased range of motion. Pain with movement, spinous process tenderness and muscular tenderness present.      Comments: Exquisite tenderness of bilateral occiput  Abdominal:      General: Abdomen is protuberant.   Musculoskeletal:      Right shoulder: He exhibits decreased range of motion and tenderness.      Left shoulder: He exhibits decreased range of motion and tenderness.      Lumbar back: He exhibits decreased range of motion and tenderness.   Skin:     General: Skin is warm and dry.   Neurological:      Mental Status: He is alert.      Gait: Gait normal.   Psychiatric:         Speech: Speech normal.         Behavior: Behavior normal. Behavior is cooperative.         Assessment/Plan   Diagnoses and all orders for this visit:    1. Other chronic pain (Primary)    2. Cervical facet joint syndrome    3. Lumbar spondylolysis    4. Shoulder pain, unspecified chronicity, unspecified laterality    Other orders  -     gabapentin (NEURONTIN) 300 MG capsule; Take 1 capsule by mouth Every Night.  Dispense: 30 capsule; Refill: 1        --- Trial of Gabapentin 300 mg nightly. Discussed medication with the patient.  Included in this discussion was the potential for side effects and adverse events.  Patient verbalized understanding and wished to proceed.  Prescription will be sent to pharmacy.  --- bilateral C2-C3 RFL. " Stop Eliquis 3 days prior. Reviewed the procedure at length with the patient.  Included in the review was expectations, complications, risk and benefits.The procedure was described in detail and the risks, benefits and alternatives were discussed with the patient (including but not limited to: bleeding, infection, nerve damage, worsening of pain, inability to perform injection, paralysis, seizures, and death) who agreed to proceed.  Discussed the potential for sedation if warranted/wanted.  The procedure will plan to be performed at [facility] with fluoroscopic guidance(unless ultrasound is indicated) and could potentially have steroids and contrast dye used. Questions were answered and in a way the patient could understand.  Patient verbalized understanding and wishes to proceed.  This intervention will be ordered.  Discussed with patient that all procedures are part of a multimodal plan of care and include either formal PT or a home exercise program.  Patient has no evidence of coagulopathy or current infection.  --- Consider ELENITA.  --- Consider lumbar MBB/RFL in future PRN-- he is noticing this is starting to bother him as well--- His primary complaint remains his neck and shoulder pain.  --- Follow-up after procedure or sooner if needed.    --------    Anticoagulation risks for procedures....   - there are risks to discontinuation of anticoagulants for neuraxial procedures and surgery.  Risks include but are not limited to deep vein thromboses, pulmonary emboli, myocardial infarction, and stroke    - Neuraxial access with a needle is relatively contraindicated while anticoagulated because of the risk of hemorrhage and/or epidural hematoma, which can be a neurosurgical emergency to evacuate bleeding.  Left unchecked, bleeding can cause nerve damage leading to paralysis and/or death.  --------           DILAN REPORT  As part of the patient's treatment plan, I am prescribing controlled  substances. The patient has been made aware of appropriate use of such medications, including potential risk of somnolence, limited ability to drive and/or work safely, and the potential for dependence or overdose. It has also bee made clear that these medications are for use by this patient only, without concomitant use of alcohol or other substances unless prescribed.     Patient has completed prescribing agreement detailing terms of continued prescribing of controlled substances, including monitoring DILAN reports, urine drug screening, and pill counts if necessary. The patient is aware that inappropriate use will results in cessation of prescribing such medications.    DILAN report has been reviewed and scanned into the patient's chart.    As the clinician, I personally reviewed the DILAN from 2-22-21 while the patient was in the office today.    History and physical exam exhibit continued safe and appropriate use of controlled substances.        EMR Dragon/Transcription disclaimer:   Much of this encounter note is an electronic transcription/translation of spoken language to printed text. The electronic translation of spoken language may permit erroneous, or at times, nonsensical words or phrases to be inadvertently transcribed; Although I have reviewed the note for such errors, some may still exist.

## 2021-02-23 ENCOUNTER — TELEPHONE (OUTPATIENT)
Dept: FAMILY MEDICINE CLINIC | Facility: CLINIC | Age: 76
End: 2021-02-23

## 2021-02-23 DIAGNOSIS — M15.9 PRIMARY OSTEOARTHRITIS INVOLVING MULTIPLE JOINTS: Primary | ICD-10-CM

## 2021-02-23 NOTE — TELEPHONE ENCOUNTER
Caller: No Loaiza    Relationship to patient: Emergency Contact    Best call back number: [phone] (M)    Patient is needing: PATIENT DAUGHTER CALLING IN REGARDS TO H[facility]ING L[facility] PATIENT IS NEED A PRESCRIPTION WRITTEN FOR CHAIR TO WAIVE REYNA    [facility] FAX#[phone]  PHONE# CANDIDO KEILA [phone]      DAUGHTER IS REQUESTING A CALLBACK ONCE PRESCRIPTION HAS BEEN FAXED        PLEASE ADVISE

## 2021-02-24 ENCOUNTER — OFFICE VISIT (OUTPATIENT)
Dept: FAMILY MEDICINE CLINIC | Facility: CLINIC | Age: 76
End: 2021-02-24

## 2021-02-24 ENCOUNTER — TRANSCRIBE ORDERS (OUTPATIENT)
Dept: SURGERY | Facility: SURGERY CENTER | Age: 76
End: 2021-02-24

## 2021-02-24 ENCOUNTER — TELEPHONE (OUTPATIENT)
Dept: ORTHOPEDIC SURGERY | Facility: CLINIC | Age: 76
End: 2021-02-24

## 2021-02-24 VITALS
WEIGHT: 315 LBS | SYSTOLIC BLOOD PRESSURE: 144 MMHG | HEART RATE: 71 BPM | OXYGEN SATURATION: 98 % | TEMPERATURE: 96.2 F | DIASTOLIC BLOOD PRESSURE: 80 MMHG | HEIGHT: 74 IN | RESPIRATION RATE: 16 BRPM | BODY MASS INDEX: 40.43 KG/M2

## 2021-02-24 DIAGNOSIS — M47.812 CERVICAL FACET JOINT SYNDROME: Primary | ICD-10-CM

## 2021-02-24 DIAGNOSIS — R73.09 ELEVATED GLUCOSE: ICD-10-CM

## 2021-02-24 DIAGNOSIS — E78.2 MIXED HYPERLIPIDEMIA: ICD-10-CM

## 2021-02-24 DIAGNOSIS — Z13.29 SCREENING FOR THYROID DISORDER: ICD-10-CM

## 2021-02-24 DIAGNOSIS — I10 ESSENTIAL HYPERTENSION: Primary | ICD-10-CM

## 2021-02-24 PROCEDURE — 99214 OFFICE O/P EST MOD 30 MIN: CPT | Performed by: NURSE PRACTITIONER

## 2021-02-24 NOTE — TELEPHONE ENCOUNTER
Patient called and stated that he did not get the myelogram ct that was ordered because the lady at [facility] told him that he would have to go home and lay flat in the bed and he stated that he cannot do that due to pain that he already has. He stated that he has a recliner that will lay 95% flat and the lady told him that wouldn't work. He is wanting to know if you think that the recliner would work or if there is another test you could order?

## 2021-02-24 NOTE — PROGRESS NOTES
Chief Complaint  Hypertension (x1wk) and Headache    Subjective          James Loaiza presents to [facility]  Elevated BP over past few weeks with headaches, evaluated by pain management Monday, thought increased BP related to pain,   Chronic pain, started on gabapentin, doing better now, bp is improved in past few days.  Chronic neck pain, having a procedure next week, radioablation, causes headaches as pain radiates into head, previous CT work up of head was negative. Also pain in shoulders, feels like bees are stinging his shoulders.       Hypertension  This is a chronic problem. The current episode started more than 1 year ago. The problem has been waxing and waning since onset. The problem is controlled. Associated symptoms include headaches. Pertinent negatives include no anxiety, blurred vision, chest pain, malaise/fatigue, neck pain, orthopnea, palpitations, peripheral edema, PND, shortness of breath or sweats. There are no associated agents to hypertension. Risk factors for coronary artery disease include obesity, male gender and dyslipidemia. Past treatments include diuretics and calcium channel blockers. Current antihypertension treatment includes diuretics and calcium channel blockers. The current treatment provides moderate improvement. Compliance problems include diet and exercise.    Headache   This is a new problem. The current episode started in the past 7 days. The problem occurs daily. The problem has been waxing and waning. The pain is located in the occipital region. The pain quality is similar to prior headaches. The quality of the pain is described as aching. The pain is mild. Associated symptoms include back pain. Pertinent negatives include no abdominal pain, abnormal behavior, anorexia, blurred vision, coughing, dizziness, drainage, ear pain, eye pain, eye redness, eye watering, facial sweating, fever, hearing loss, insomnia, loss of balance, muscle aches,  "nausea, neck pain, numbness, phonophobia, photophobia, rhinorrhea, scalp tenderness, seizures, sinus pressure, sore throat, swollen glands, tingling, tinnitus, visual change, vomiting, weakness or weight loss. Nothing aggravates the symptoms. Treatments tried: started gabapentin with improvement. The treatment provided moderate relief.       Review of Systems   Constitutional: Negative for fever, malaise/fatigue and unexpected weight loss.   HENT: Negative for ear pain, hearing loss, rhinorrhea, sinus pressure, sore throat, swollen glands and tinnitus.    Eyes: Negative for blurred vision, photophobia, pain and redness.   Respiratory: Negative for cough and shortness of breath.    Cardiovascular: Negative for chest pain, palpitations, orthopnea and PND.   Gastrointestinal: Negative for abdominal pain, anorexia, nausea and vomiting.   Musculoskeletal: Positive for back pain. Negative for neck pain.   Neurological: Negative for dizziness, tingling, seizures, weakness, numbness and loss of balance.   Psychiatric/Behavioral: The patient does not have insomnia.      Objective   Vital Signs:   /80   Pulse 71   Temp 96.2 °F (35.7 °C) (Temporal)   Resp 16   Ht 188 cm (74\")   Wt (!) 153 kg (336 lb 9.6 oz)   SpO2 98%   BMI 43.22 kg/m²     Physical Exam   Result Review :                 Assessment and Plan    Diagnoses and all orders for this visit:    1. Essential hypertension (Primary)    2. Elevated glucose  -     Comprehensive Metabolic Panel  -     Hemoglobin A1c    3. Mixed hyperlipidemia  -     CBC & Differential  -     Comprehensive Metabolic Panel  -     Lipid Panel With LDL / HDL Ratio    4. Screening for thyroid disorder  -     TSH Rfx On Abnormal To Free T4        Follow Up   No follow-ups on file.  Patient was given instructions and counseling regarding his condition or for health maintenance advice. Please see specific information pulled into the AVS if appropriate.     No labs since 2019, agreeable " to complete labs today  Monitor A1C as previous glucose elevated  Will monitor kidney/liver function on medication    Headache/elevated bp likely secondary to pain, he was taking tramadol to help with pain and it stopped working, also used lidocaine patches to help improve shoulder pain to no avail. He is scheduled for radioablation next week, howevever started gabapentin in past two days with improvement already    Will continue to monitor bp, continue current medications  Follow up in six months, sooner if needed

## 2021-02-25 ENCOUNTER — TRANSCRIBE ORDERS (OUTPATIENT)
Dept: LAB | Facility: SURGERY CENTER | Age: 76
End: 2021-02-25

## 2021-02-25 ENCOUNTER — HOSPITAL ENCOUNTER (OUTPATIENT)
Dept: PHYSICAL THERAPY | Facility: HOSPITAL | Age: 76
Setting detail: THERAPIES SERIES
Discharge: HOME | End: 2021-02-25

## 2021-02-25 DIAGNOSIS — G89.29 CHRONIC MIDLINE LOW BACK PAIN WITHOUT SCIATICA: ICD-10-CM

## 2021-02-25 DIAGNOSIS — Z74.09 IMPAIRED MOBILITY: ICD-10-CM

## 2021-02-25 DIAGNOSIS — M54.50 CHRONIC MIDLINE LOW BACK PAIN WITHOUT SCIATICA: ICD-10-CM

## 2021-02-25 DIAGNOSIS — M25.511 BILATERAL SHOULDER PAIN, UNSPECIFIED CHRONICITY: Primary | ICD-10-CM

## 2021-02-25 DIAGNOSIS — Z01.818 OTHER SPECIFIED PRE-OPERATIVE EXAMINATION: Primary | ICD-10-CM

## 2021-02-25 DIAGNOSIS — M25.512 BILATERAL SHOULDER PAIN, UNSPECIFIED CHRONICITY: Primary | ICD-10-CM

## 2021-02-25 LAB
ALBUMIN SERPL-MCNC: 3.7 G/DL (ref 3.5–5.2)
ALBUMIN/GLOB SERPL: 1.7 G/DL
ALP SERPL-CCNC: 160 U/L (ref 39–117)
ALT SERPL-CCNC: 9 U/L (ref 1–41)
AST SERPL-CCNC: 15 U/L (ref 1–40)
BASOPHILS # BLD AUTO: 0.04 10*3/MM3 (ref 0–0.2)
BASOPHILS NFR BLD AUTO: 0.5 % (ref 0–1.5)
BILIRUB SERPL-MCNC: 0.5 MG/DL (ref 0–1.2)
BUN SERPL-MCNC: 22 MG/DL (ref 8–23)
BUN/CREAT SERPL: 10.7 (ref 7–25)
CALCIUM SERPL-MCNC: 9 MG/DL (ref 8.6–10.5)
CHLORIDE SERPL-SCNC: 106 MMOL/L (ref 98–107)
CHOLEST SERPL-MCNC: 153 MG/DL (ref 0–200)
CO2 SERPL-SCNC: 27.8 MMOL/L (ref 22–29)
CREAT SERPL-MCNC: 2.05 MG/DL (ref 0.76–1.27)
EOSINOPHIL # BLD AUTO: 0.2 10*3/MM3 (ref 0–0.4)
EOSINOPHIL NFR BLD AUTO: 2.3 % (ref 0.3–6.2)
ERYTHROCYTE [DISTWIDTH] IN BLOOD BY AUTOMATED COUNT: 12 % (ref 12.3–15.4)
GLOBULIN SER CALC-MCNC: 2.2 GM/DL
GLUCOSE SERPL-MCNC: 104 MG/DL (ref 65–99)
HBA1C MFR BLD: 5.3 % (ref 4.8–5.6)
HCT VFR BLD AUTO: 39.4 % (ref 37.5–51)
HDLC SERPL-MCNC: 34 MG/DL (ref 40–60)
HGB BLD-MCNC: 12.6 G/DL (ref 13–17.7)
IMM GRANULOCYTES # BLD AUTO: 0.08 10*3/MM3 (ref 0–0.05)
IMM GRANULOCYTES NFR BLD AUTO: 0.9 % (ref 0–0.5)
LDLC SERPL CALC-MCNC: 94 MG/DL (ref 0–100)
LDLC/HDLC SERPL: 2.66 {RATIO}
LYMPHOCYTES # BLD AUTO: 1.18 10*3/MM3 (ref 0.7–3.1)
LYMPHOCYTES NFR BLD AUTO: 13.4 % (ref 19.6–45.3)
MCH RBC QN AUTO: 30.8 PG (ref 26.6–33)
MCHC RBC AUTO-ENTMCNC: 32 G/DL (ref 31.5–35.7)
MCV RBC AUTO: 96.3 FL (ref 79–97)
MONOCYTES # BLD AUTO: 0.63 10*3/MM3 (ref 0.1–0.9)
MONOCYTES NFR BLD AUTO: 7.2 % (ref 5–12)
NEUTROPHILS # BLD AUTO: 6.68 10*3/MM3 (ref 1.7–7)
NEUTROPHILS NFR BLD AUTO: 75.7 % (ref 42.7–76)
NRBC BLD AUTO-RTO: 0 /100 WBC (ref 0–0.2)
PLATELET # BLD AUTO: 200 10*3/MM3 (ref 140–450)
POTASSIUM SERPL-SCNC: 4.6 MMOL/L (ref 3.5–5.2)
PROT SERPL-MCNC: 5.9 G/DL (ref 6–8.5)
RBC # BLD AUTO: 4.09 10*6/MM3 (ref 4.14–5.8)
SODIUM SERPL-SCNC: 143 MMOL/L (ref 136–145)
TRIGL SERPL-MCNC: 143 MG/DL (ref 0–150)
TSH SERPL DL<=0.005 MIU/L-ACNC: 0.66 UIU/ML (ref 0.27–4.2)
VLDLC SERPL CALC-MCNC: 25 MG/DL (ref 5–40)
WBC # BLD AUTO: 8.81 10*3/MM3 (ref 3.4–10.8)

## 2021-02-25 PROCEDURE — 97110 THERAPEUTIC EXERCISES: CPT

## 2021-02-25 PROCEDURE — 97140 MANUAL THERAPY 1/> REGIONS: CPT

## 2021-02-25 NOTE — THERAPY DISCHARGE NOTE
Outpatient Physical Therapy Ortho Treatment Note/Discharge Summary  [facility]     Patient Name: James Loaiza  : 1945  MRN: [id number]  Today's Date: 2021      Visit Date: 2021    Visit Dx:    ICD-10-CM ICD-9-CM   1. Bilateral shoulder pain, unspecified chronicity  M25.511 719.41    M25.512    2. Impaired mobility  Z74.09 799.89   3. Chronic midline low back pain without sciatica  M54.5 724.2    G89.29 338.29       Patient Active Problem List   Diagnosis   • Lumbar herniated disc L3-L5 3/16/16 Open MRI   • Abnormal electrocardiogram   • Abnormal weight gain   • Aortic valve insufficiency   • Coronary arteriosclerosis in native artery   • Benign essential hypertension ([place] )   • Edema   • Dyspnea on exertion   • Fatigue   • Left ventricular hypertrophy   • Obstructive sleep apnea syndrome   • Arthritis of left hip   • Hx of pulmonary embolus 7/15   • Morbid obesity with BMI of 45.0-49.9, adult (CMS/[facility])   • Intermittent dysphagia   • Chronic kidney disease ([place])   • Hyperlipidemia    • BPH (benign prostatic hypertrophy)   • Left cervical radiculopathy   • Rotator cuff tear, left   • Cardiomegaly   • Plantar fasciitis   • Hypogonadism male   • Vitamin D deficiency disease   • Renal cyst, left   • Preventative health care   • Medication management   • Chronic low back pain   • Lumbar spondylolysis   • Headache   • Other chronic pain   • Pulmonary embolus (CMS/[facility])   • Chronic obstructive pulmonary disease (CMS/[facility])   • Osteoarthritis of right shoulder   • Chronic anticoagulation   • Cervical stenosis of spine   • Bilateral occipital neuralgia   • Shoulder pain   • Cervical facet joint syndrome        Past Medical History:   Diagnosis Date   • Abnormal EKG     referring to cardiology   • Anxiety    • Arthritis    • Back pain     worsening   • BPH (benign prostatic hyperplasia)     BPH/Nocturia/ Urinary Frequency   • Breast nodule     right   • Cardiomegaly     Cardiomegaly/ SOB  "with exertion   • Circulation problem    • Constipation    • COPD (chronic obstructive pulmonary disease) (CMS/HCC)    • Deviated septum    • DJD (degenerative joint disease)     DJD Knees   • DVT (deep venous thrombosis) (CMS/HCC)    • Dysphagia     solids and liquids - resolved with normal studies   • Encounter for annual health examination 11/14/2013    Annual Health Assessment   • Enlarged prostate    • Fatigue     Extreme fatigue   • Hyperlipidemia 1999   • Hypertension 1999    followed Dr. Marcelo   • Hypogonadism male    • Left hip pain     and DJD   • Left leg pain    • Low back pain    • Moist mucous membranes of ear, nose, and throat     \"Mucous in throat\"   • Morbid obesity (CMS/[facility])     (BMI-41.2za)   • Muscle cramping    • Muscle spasm     Muscle spasms   • Neck arthritis    • Neck muscle spasm     Neck muscle spasm/Cervical strain   • Neck pain    • Obesity    • Plantar fasciitis    • Prostatitis     recurrent   • Psoriasis    • Pulmonary embolus (CMS/[facility]) 07/2015    on Xarelltoypseerlipidemia   • Radiculopathy     Radiculopathy / Neuropathy left ar   • Renal insufficiency     followed by Dr. Mendes   • Seborrhea    • Shortness of breath    • Sleep apnea     Obstructive Sleep apnea worsening   • Urinary frequency    • Vascular disorder    • Vertigo    • Weight gain    • Wellness examination 10/23/2014    Annual Wellness Visit        Past Surgical History:   Procedure Laterality Date   • APPENDECTOMY  1965   • CARDIAC CATHETERIZATION  07/29/2010    [facility]   • COLONOSCOPY  01/05/2010   • COLONOSCOPY  10/01/2001   • NASAL SEPTUM SURGERY     • NOSE SURGERY  1999   • TURP / TRANSURETHRAL INCISION / DRAINAGE PROSTATE  10/23/2015    Michael Castro                       PT Assessment/Plan     Row Name 02/25/21 9467          PT Assessment    Assessment Comments  Pt with intermittent improvement in condition after bout of skilled PT services. He continues to demonstrate impaired " cervical AROM/PROM and nighttime shoulder pain that has not been improved with PT. He is much more aware of posture and demonstrates improved seated and standing posture with improved scapular strength. He is continuing to follow up with multiple providers to address continued night pain and we will d/c from formal PT at this time due to lack of change in condition.  -LB        PT Plan    PT Plan Comments  d/c to HEP  -LB       User Key  (r) = Recorded By, (t) = Taken By, (c) = Cosigned By    Initials Name Provider Type    LB Tiera Cochran, PT Physical Therapist              OP Exercises     Row Name 02/25/21 1100             Subjective Comments    Subjective Comments  I had the worst night every Saturday and my BP went up to 174. I saw my PCP and pain management and they gave me Gabapentin and that is really helping. I am having a repeat myelogram.  -LB         Subjective Pain    Able to rate subjective pain?  yes  -LB      Pre-Treatment Pain Level  2  -LB         Total Minutes    01689 - PT Therapeutic Exercise Minutes  15  -LB      22200 - PT Manual Therapy Minutes  30  -LB         Exercise 1    Exercise Name 1  UBE  -LB      Time 1  3 minutes  -LB         Exercise 2    Exercise Name 2  supine HA  -LB      Sets 2  2  -LB      Reps 2  10  -LB      Additional Comments  GTB  -LB         Exercise 3    Exercise Name 3  supine B ER  -LB      Sets 3  2  -LB      Reps 3  10  -LB      Additional Comments  GTB  -LB         Exercise 4    Exercise Name 4  supine cervical rotation  -LB      Reps 4  10  -LB      Time 4  5  -LB         Exercise 5    Exercise Name 5  supine chin tuck  -LB      Reps 5  10  -LB      Time 5  5  -LB         Exercise 7    Exercise Name 7  shoulder rolls  -LB      Reps 7  10  -LB         Exercise 9    Exercise Name 9  seated crossbody adduction stretch  -LB      Reps 9  3  -LB      Time 9  20  -LB         Exercise 10    Exercise Name 10  tband row  -LB      Sets 10  2  -LB      Reps 10  10  -LB       Additional Comments  GTB  -LB         Exercise 11    Exercise Name 11  tband extension  -LB      Sets 11  2  -LB      Reps 11  10  -LB      Additional Comments  GTB  -LB        User Key  (r) = Recorded By, (t) = Taken By, (c) = Cosigned By    Initials Name Provider Type    LB Tiera Cochran, PT Physical Therapist                        Manual Rx (last 36 hours)      Manual Treatments     Row Name 02/25/21 1100             Total Minutes    09240 - PT Manual Therapy Minutes  30  -LB         Manual Rx 1    Manual Rx 1 Location  cervical spine/B upper trap/periscapular muscles  -LB      Manual Rx 1 Type  manual distraction, STM, gentle PA, suboccipital release on plinth with head elevated and several pillows  -LB      Manual Rx 1 Duration  30  -LB        User Key  (r) = Recorded By, (t) = Taken By, (c) = Cosigned By    Initials Name Provider Type    LB Tirea Cochran, PT Physical Therapist          PT OP Goals     Row Name 02/25/21 1100          PT Short Term Goals    STG Date to Achieve  12/25/20  -LB     STG 1  Pt will demonstrate independence and compliance with initial HEP.  -LB     STG 1 Progress  Met  -LB     STG 2  Pt will report compliance with postural modifications during leisure tasks to reduce UT/cervical spine stress.  -LB     STG 2 Progress  Met  -LB     STG 2 Progress Comments  pt reports he is much more aware of resting posture  -LB     STG 3  Pt will report ability to stay in bed for entireity of night without moving locations due to shoulder pain.  -LB     STG 3 Progress  Met  -LB        Long Term Goals    LTG Date to Achieve  01/10/21  -LB     LTG 1  Pt will report 50% reduction in night time waking due to shoulder pain.  -LB     LTG 1 Progress  Met  -LB     LTG 2  Pt will demonstrate improved seated posture without cuing while in clinic.  -LB     LTG 2 Progress  Met  -LB     LTG 3  Pt will demonstrate improved B lateral flexion/cervical rotation to 50% B without provocation of symptoms.  -LB     LTG 3  Progress  Not Met  -LB     LTG 3 Progress Comments  pt remains limited in cervical AROM/PROM; difficulty assessing PROM due to muscle guarding  -LB       User Key  (r) = Recorded By, (t) = Taken By, (c) = Cosigned By    Initials Name Provider Type    Tiera Ayala, PT Physical Therapist          Therapy Education  Education Details: finalized HEP and reviewed postural management  Given: Symptoms/condition management, HEP, Posture/body mechanics  Program: Reinforced  How Provided: Verbal, Demonstration  Provided to: Patient  Level of Understanding: Teach back education performed, Verbalized, Demonstrated              Time Calculation:   Start Time: 1000  Stop Time: 1045  Time Calculation (min): 45 min  Total Timed Code Minutes- PT: 40 minute(s)  Therapy Charges for Today     Code Description Service Date Service Provider Modifiers Qty    45292842282  PT THER PROC EA 15 MIN 2/25/2021 Tiera Cochran, PT GP 1    70150434691 HC PT MANUAL THERAPY EA 15 MIN 2/25/2021 Tiera Cochran, PT GP 2                OP PT Discharge Summary  Date of Discharge: 02/25/21  Reason for Discharge: Lack of progress  Outcomes Achieved: Patient able to partially acheive established goals  Discharge Destination: Home with home program  Discharge Instructions/Additional Comments: see assessment      Tiera Cochran PT  2/25/2021

## 2021-02-25 NOTE — TELEPHONE ENCOUNTER
If he is that ill he cannot lie flat then he is probably not going to tolerate surgery very well either.  I just saw him last week and he did not look that bad-we are going to watch this for now and he can hold off of the myelogram.

## 2021-02-26 ENCOUNTER — TELEPHONE (OUTPATIENT)
Dept: PAIN MEDICINE | Facility: CLINIC | Age: 76
End: 2021-02-26

## 2021-02-26 NOTE — TELEPHONE ENCOUNTER
Provider: LCARENCE FAGAN  Caller: BETHANIE ORDONEZ  Relationship to Patient: SELF    Phone Number: [phone]    Reason for Call: PATIENT HAS QUESTION REGARDING HIS DOSAGE OF PAIN MEDICINE   When was the patient last seen: 02/22/21

## 2021-02-26 NOTE — TELEPHONE ENCOUNTER
Call returned to the patient.  He was not able like the myelogram since they told him he would have to lay flat for 24 hours after the myelogram.  Patient states that he has been sleeping in a recliner and is unable to lie flat due to the increased pain in his back and neck and has trouble laying on his sides because of his chronic shoulder pain.  States that his pain management physician did start him on some Neurontin.  I have advised him that there are really no other tests that Dr. Zafar could order or recommend.  He will call ahead and try the Neurontin for a while to see if it helps the pain I did explain to him that he can lay on either side or on his back but needs to keep his head flat.  He will reschedule the test once he thinks he can tolerate being flat for 24 hours

## 2021-02-26 NOTE — TELEPHONE ENCOUNTER
I spoke with Mr. Ramirez today and he wanted to know if he could take an extra Gabapentin the day he gets his myelogram done because he has increased shoulder pain when he has to lay down. His myelogram is scheduled for 3/11/2021. He states that his current Gabapentin dose helps with his pain when he goes to bed at night.

## 2021-02-27 ENCOUNTER — LAB (OUTPATIENT)
Dept: LAB | Facility: SURGERY CENTER | Age: 76
End: 2021-02-27

## 2021-02-27 DIAGNOSIS — Z01.818 OTHER SPECIFIED PRE-OPERATIVE EXAMINATION: ICD-10-CM

## 2021-02-27 LAB — SARS-COV-2 ORF1AB RESP QL NAA+PROBE: NOT DETECTED

## 2021-02-27 PROCEDURE — U0004 COV-19 TEST NON-CDC HGH THRU: HCPCS | Performed by: SURGERY

## 2021-02-27 PROCEDURE — U0005 INFEC AGEN DETEC AMPLI PROBE: HCPCS | Performed by: SURGERY

## 2021-02-27 PROCEDURE — C9803 HOPD COVID-19 SPEC COLLECT: HCPCS

## 2021-03-02 ENCOUNTER — HOSPITAL ENCOUNTER (OUTPATIENT)
Facility: SURGERY CENTER | Age: 76
Setting detail: HOSPITAL OUTPATIENT SURGERY
Discharge: HOME | End: 2021-03-02
Attending: ANESTHESIOLOGY | Admitting: ANESTHESIOLOGY

## 2021-03-02 ENCOUNTER — HOSPITAL ENCOUNTER (OUTPATIENT)
Dept: GENERAL RADIOLOGY | Facility: SURGERY CENTER | Age: 76
Setting detail: HOSPITAL OUTPATIENT SURGERY
End: 2021-03-02

## 2021-03-02 VITALS
BODY MASS INDEX: 40.43 KG/M2 | TEMPERATURE: 98 F | OXYGEN SATURATION: 95 % | WEIGHT: 315 LBS | SYSTOLIC BLOOD PRESSURE: 167 MMHG | DIASTOLIC BLOOD PRESSURE: 101 MMHG | HEART RATE: 74 BPM | HEIGHT: 74 IN | RESPIRATION RATE: 16 BRPM

## 2021-03-02 DIAGNOSIS — M47.812 CERVICAL FACET JOINT SYNDROME: ICD-10-CM

## 2021-03-02 PROCEDURE — 99152 MOD SED SAME PHYS/QHP 5/>YRS: CPT | Performed by: ANESTHESIOLOGY

## 2021-03-02 PROCEDURE — 0M5J0ZZ DESTRUCTION OF LEFT ABDOMEN BURSA AND LIGAMENT, OPEN APPROACH: ICD-10-PCS | Performed by: ANESTHESIOLOGY

## 2021-03-02 PROCEDURE — 64634 DESTROY C/TH FACET JNT ADDL: CPT | Performed by: ANESTHESIOLOGY

## 2021-03-02 PROCEDURE — 64633 DESTROY CERV/THOR FACET JNT: CPT | Performed by: ANESTHESIOLOGY

## 2021-03-02 PROCEDURE — 25010000002 FENTANYL CITRATE (PF) 100 MCG/2ML SOLUTION: Performed by: ANESTHESIOLOGY

## 2021-03-02 PROCEDURE — 25010000002 MIDAZOLAM PER 1 MG: Performed by: ANESTHESIOLOGY

## 2021-03-02 RX ORDER — MIDAZOLAM HYDROCHLORIDE 1 MG/ML
INJECTION INTRAMUSCULAR; INTRAVENOUS AS NEEDED
Status: DISCONTINUED | OUTPATIENT
Start: 2021-03-02 | End: 2021-03-02 | Stop reason: HOSPADM

## 2021-03-02 RX ORDER — SODIUM CHLORIDE 0.9 % (FLUSH) 0.9 %
10 SYRINGE (ML) INJECTION AS NEEDED
Status: DISCONTINUED | OUTPATIENT
Start: 2021-03-02 | End: 2021-03-02 | Stop reason: HOSPADM

## 2021-03-02 RX ORDER — GABAPENTIN 300 MG/1
300 CAPSULE ORAL 2 TIMES DAILY
Qty: 60 CAPSULE | Refills: 1 | Status: SHIPPED | OUTPATIENT
Start: 2021-03-02 | End: 2021-04-29 | Stop reason: SDUPTHER

## 2021-03-02 RX ORDER — SODIUM CHLORIDE 0.9 % (FLUSH) 0.9 %
10 SYRINGE (ML) INJECTION EVERY 12 HOURS SCHEDULED
Status: DISCONTINUED | OUTPATIENT
Start: 2021-03-02 | End: 2021-03-02 | Stop reason: HOSPADM

## 2021-03-02 RX ORDER — SODIUM CHLORIDE, SODIUM LACTATE, POTASSIUM CHLORIDE, CALCIUM CHLORIDE 600; 310; 30; 20 MG/100ML; MG/100ML; MG/100ML; MG/100ML
9 INJECTION, SOLUTION INTRAVENOUS CONTINUOUS
Status: DISCONTINUED | OUTPATIENT
Start: 2021-03-02 | End: 2021-03-02 | Stop reason: HOSPADM

## 2021-03-02 RX ORDER — FENTANYL CITRATE 50 UG/ML
INJECTION, SOLUTION INTRAMUSCULAR; INTRAVENOUS AS NEEDED
Status: DISCONTINUED | OUTPATIENT
Start: 2021-03-02 | End: 2021-03-02 | Stop reason: HOSPADM

## 2021-03-02 RX ORDER — SODIUM CHLORIDE, SODIUM LACTATE, POTASSIUM CHLORIDE, CALCIUM CHLORIDE 600; 310; 30; 20 MG/100ML; MG/100ML; MG/100ML; MG/100ML
9 INJECTION, SOLUTION INTRAVENOUS CONTINUOUS PRN
Status: DISCONTINUED | OUTPATIENT
Start: 2021-03-02 | End: 2021-03-02 | Stop reason: HOSPADM

## 2021-03-02 RX ADMIN — SODIUM CHLORIDE, POTASSIUM CHLORIDE, SODIUM LACTATE AND CALCIUM CHLORIDE 9 ML/HR: 600; 310; 30; 20 INJECTION, SOLUTION INTRAVENOUS at 14:40

## 2021-03-02 NOTE — DISCHARGE INSTRUCTIONS
[facility] - Post-procedure Instructions          --  While there are no absolute restrictions, it is recommended that you do not perform strenuous activity today. In the morning, you may resume your level of activity as before your block.    --  If you have a band-aid at your injection site, please remove it later today. Observe the area for any redness, swelling, pus-like drainage, or a temperature over 101°. If any of these symptoms occur, please call your doctor at [phone]. If after office hours, leave a message and the on-call provider will return your call.    --  Ice may be applied to your injection site. It is recommended you avoid direct heat (heating pad; hot tub) for 1-2 days.    --  Call [facility] at [phone] if you experience persistent headache, persistent bleeding from the injection site, or severe pain not relieved by heat or oral medication.    --  Do not make important decisions today.    --  Due to the effects of the block and/or the I.V. Sedation, DO NOT drive or operate hazardous machinery for 12 hours.    --  Do not drink alcohol for 12 hours.    -- You may return to work tomorrow, or as directed by your referring doctor.    --  Occasionally you may notice a slight increase in your pain after the procedure. This should start to improve within the next 24-48 hours. Radiofrequency ablation procedure pain may last 3-4 weeks.    --  It may take as long as 3-4 days before you notice a gradual improvement in your pain and/or other symptoms.    -- You may continue to take your prescribed pain medication as needed.    --  Some normal possible side effects of steroid use could include fluid retention, increased blood sugar, dull headache, increased sweating, increased appetite, mood swings and flushing.    --  Diabetics are recommended to watch their blood glucose level closely for 24-48 hours after the injection.    --  Must stay in PACU for 20 min upon arrival and  prove no leg weakness before being discharged.    --  IN THE EVENT OF A LIFE THREATENING EMERGENCY, (CHEST PAIN, BREATHING DIFFICULTIES, PARALYSIS…) YOU SHOULD GO TO YOUR NEAREST EMERGENCY ROOM.    --  You should be contacted by our office within 2-3 days to schedule follow up or next appointment date.  If not contacted within 7 days, please call the office at [phone]

## 2021-03-02 NOTE — OP NOTE
Cervical Facet Nerve (Medial Branch) Radiofrequency Lesioning      [facility]      PREOPERATIVE DIAGNOSIS:  Cervical spondylosis without myelopathy   POSTOPERATIVE DIAGNOSIS:  Same as preoperative diagnosis    PROCEDURE:    Cervical Facet Nerve (medial branch) RF, with Fluoroscopy:      LEVELS: bilateral  C2 and C3    PRE-PROCEDURE DISCUSSION WITH PATIENT:    Risks and complications were discussed with the patient prior to starting the procedure and informed consent was obtained.  We discussed various topics, including but not limited to bleeding, infection, injury, nerve injury, paralysis, coma, death, postprocedural soreness or painful flare, worsening of clinical picture, lack of pain relief.  We discussed the previous positive diagnostic nature of medial branch blockades.      SURGEON:  Edu Tineo MD    REASON FOR PROCEDURE:    The patient presents with chronic axial neck pain. The patient underwent 2 bilateral cervical medial branch blocks with diagnostically positive relief. Given the patient’s significant pain relief, it is diagnostic that we have likely found the source of the patient’s pain; therefore, radiofrequency ablation of those nerves has been indicated for therapeutic pain relief.    SEDATION:  Versed 4mg & Fentanyl 100 mcg IV  TIME OF PROCEDURE:  After administration of the IV sedative, the intraoperative procedure time was 26 minutes.      ANESTHETIC:   Lidocaine 2%  STEROID:   NONE  TOTAL VOLUME OF SOLUTION:  4 ml      DESCRIPTON OF PROCEDURE:  After obtaining informed consent, the patient was placed in the prone position. IV access was obtained.  EKG, blood pressure, and pulse oximeter were monitored and any & all sedation was administered by an RN under my direct guidance.  The cervical area was prepped with Chloraprep and draped with sterile barrier.     Under fluoroscopic guidance the waists of the above mentioned vertebra were identified and marked at the lateral  margin of the vertebrae on the AP fluoroscopic view.  Skin and subcutaneous tissue were then anesthetized with 1% lidocaine 1mL at each point.  Then RF cannula needles were sequentially introduced under fluoroscopic guidance to the waists of these vertebrae contacting periosteum on the lateral edge of the vertebra on the AP fluroscopic view. After confirming the position of the needle under fluoroscopic PA and lateral views, confirming the needle position in the center of the trapezoid at each level, and confirming negative aspiration of blood and CSF, testing was initiated.  There was a lack of radicular stimulation on each needle at 3v and 2Hz.      Then, in each needle, 1mL of the aforementioned local anesthetic was instilled.  After 2 minutes had elapsed, Radiofrequency thermal lesioning was initiated for 2 minutes at 80 degrees Celsius.      Needles were removed intact from all levels.  Vitals were stable throughout.       ESTIMATED BLOOD LOSS:  minimal  SPECIMENS:  None    COMPLICATIONS:    No complications were noted., There was not any evidence of dural puncture.  , The patient did not have any signs of postprocedure numbness nor weakness. and There was minimal amount of bleeding which was easily addressed with application of pressure and a bandage.    TOLERANCE & DISCHARGE CONDITION:    The patient tolerated the procedure well.  The patient was transported to the recovery area without difficulties.  The patient was discharged to home under the care of family in stable and satisfactory condition.  The patient did notice improvement in pain on extension and rotation of the cervical spine.      PLAN OF CARE:  1. The patient was given our standard instruction sheet.  2. We discussed that Cervical Medial Branch Blockade is a diagnostic procedure in consideration for radiofrequency ablation if two diagnostic procedures proved to be positive for significant benefit.  If sustained relief of six to eight weeks is  obtained, then an alternative plan could be therapeutic cervical medial branch blocks on an as-needed basis.  3. The patient is asked to keep a pain log hourly for 8 hours postoperatively today.  4. The patient will Return to clinic 4-6 wks.  5. The patient will resume all medications as per the medication reconciliation sheet.      \

## 2021-03-05 ENCOUNTER — TELEPHONE (OUTPATIENT)
Dept: PAIN MEDICINE | Facility: CLINIC | Age: 76
End: 2021-03-05

## 2021-03-05 ENCOUNTER — TELEPHONE (OUTPATIENT)
Dept: FAMILY MEDICINE CLINIC | Facility: CLINIC | Age: 76
End: 2021-03-05

## 2021-03-05 NOTE — TELEPHONE ENCOUNTER
Provider: CLARENCE FAGAN  Caller: BETHANIE ORDONEZ  Relationship to Patient: SELF  Phone Number: [phone]    Reason for Call: PATIENT HAD PROCEDURE 3-2-21, PERFORMED BY DR. COOPER.  HE WOULD LIKE A CALL BACK TO DISCUSS GABAPENTIN MEDICATION ALONG WITH STILL BEING IN SOME DISCOMFORT.    When was the patient last seen: 3-2-21

## 2021-03-05 NOTE — TELEPHONE ENCOUNTER
Called and informed pt of above message. Pt would like to know if the RF will improve his right shoulder pain. Please advise. Thank you.

## 2021-03-05 NOTE — TELEPHONE ENCOUNTER
PATIENT HAS AN APPOINTMENT NEXT WEEK.  AFTER THE APPOINTMENT HE HAS TO LAY DOWN IN HIS BED FOR 24 HOURS.  PATIENT WOULD LIKE SOME TYPE OF SLEEP MEDICATION DURING THAT 24 HOUR PERIOD.    CALL BACK #: [phone]     PHARMACY: [facility] [address] [place] [address] [address] AT [facility] [address]  - [phone] [facility] [phone] FX

## 2021-03-05 NOTE — TELEPHONE ENCOUNTER
Called and spoke with pt. He sts no relief so far from RFA cervical. Educated pt it can take up to 4-6 weeks to receive pain relief from procedure. Pt verbalized understanding. In the meantime, pt sts gabapentin no longer effective at night for pain relief and would like a different medication. He sts baclofen has worked in the past, he still has supply at home but sts once he takes it for a few days it works but then no longer effective. Any recommendations? Sarai not here today. Please advise. Thank you.

## 2021-03-05 NOTE — TELEPHONE ENCOUNTER
What kind of procedure is he having next week? Do not recommend rx sleep medicine, can try melatonin, otc, recommend gummy or dissolvable tablet

## 2021-03-05 NOTE — TELEPHONE ENCOUNTER
I don't think starting a new medication is reasonable at this time.  Give the RF some time.  Your advice was great

## 2021-03-06 NOTE — PROGRESS NOTES
03/15/21 0000   Pre-Procedure Phone Call   Procedure Time Verified Yes   Arrival Time 0800   Procedure Location Verified Yes   Medical History Reviewed No   NPO Status Reinforced Yes   Ride and Caregiver Arranged Yes   Patient Knows to Bring Current Medications   (medications reviewed and current)   Bring Outside Films Requested   (MRI Cervical spine 8/23/20 in PACS)

## 2021-03-12 ENCOUNTER — OFFICE VISIT (OUTPATIENT)
Dept: FAMILY MEDICINE CLINIC | Facility: CLINIC | Age: 76
End: 2021-03-12

## 2021-03-12 ENCOUNTER — TELEPHONE (OUTPATIENT)
Dept: ORTHOPEDIC SURGERY | Facility: CLINIC | Age: 76
End: 2021-03-12

## 2021-03-12 VITALS
WEIGHT: 315 LBS | OXYGEN SATURATION: 97 % | HEIGHT: 74 IN | DIASTOLIC BLOOD PRESSURE: 84 MMHG | SYSTOLIC BLOOD PRESSURE: 132 MMHG | HEART RATE: 80 BPM | TEMPERATURE: 97.6 F | BODY MASS INDEX: 40.43 KG/M2

## 2021-03-12 DIAGNOSIS — I35.1 NONRHEUMATIC AORTIC VALVE INSUFFICIENCY: ICD-10-CM

## 2021-03-12 DIAGNOSIS — I10 ESSENTIAL HYPERTENSION: ICD-10-CM

## 2021-03-12 DIAGNOSIS — M50.90 CERVICAL NECK PAIN WITH EVIDENCE OF DISC DISEASE: ICD-10-CM

## 2021-03-12 DIAGNOSIS — E78.2 MIXED HYPERLIPIDEMIA: ICD-10-CM

## 2021-03-12 DIAGNOSIS — M51.26 LUMBAR HERNIATED DISC: Primary | ICD-10-CM

## 2021-03-12 PROCEDURE — 99214 OFFICE O/P EST MOD 30 MIN: CPT | Performed by: INTERNAL MEDICINE

## 2021-03-12 RX ORDER — DIAZEPAM 10 MG/1
10 TABLET ORAL EVERY 8 HOURS PRN
Qty: 3 TABLET | Refills: 0 | Status: SHIPPED | OUTPATIENT
Start: 2021-03-12 | End: 2021-06-04

## 2021-03-12 NOTE — PROGRESS NOTES
03/12/2021    CC: cardiology referral, Hypertension, and Hyperlipidemia  .        HPI  Back Pain  This is a chronic problem. The current episode started more than 1 year ago. The problem occurs every several days. The problem is unchanged. The pain is present in the lumbar spine. The quality of the pain is described as cramping. The pain does not radiate. The pain is at a severity of 6/10. The symptoms are aggravated by bending.        Ricardo Loaiza is a 75 y.o. male.      The following portions of the patient's history were reviewed and updated as appropriate: allergies, current medications, past family history, past medical history, past social history, past surgical history and problem list.    Problem List  Patient Active Problem List   Diagnosis   • Lumbar herniated disc L3-L5 3/16/16 Open MRI   • Abnormal electrocardiogram   • Abnormal weight gain   • Aortic valve insufficiency   • Coronary arteriosclerosis in native artery   • Benign essential hypertension ([place]je[place] 1999)   • Edema   • Dyspnea on exertion   • Fatigue   • Left ventricular hypertrophy   • Obstructive sleep apnea syndrome   • Arthritis of left hip   • Hx of pulmonary embolus 7/15   • Morbid obesity with BMI of 45.0-49.9, adult (CMS/[facility])   • Intermittent dysphagia   • Chronic kidney disease ([place])   • Hyperlipidemia 1999   • BPH (benign prostatic hypertrophy)   • Left cervical radiculopathy   • Rotator cuff tear, left   • Cardiomegaly   • Plantar fasciitis   • Hypogonadism male   • Vitamin D deficiency disease   • Renal cyst, left   • Preventative health care   • Medication management   • Chronic low back pain   • Lumbar spondylolysis   • Headache   • Other chronic pain   • Pulmonary embolus (CMS/HCC)   • Chronic obstructive pulmonary disease (CMS/[facility])   • Osteoarthritis of right shoulder   • Chronic anticoagulation   • Cervical stenosis of spine   • Bilateral occipital neuralgia   • Shoulder pain   • Cervical facet joint syndrome  "      Past Medical History  Past Medical History:   Diagnosis Date   • Abnormal EKG     referring to cardiology   • Anxiety    • Arthritis    • Back pain     worsening   • BPH (benign prostatic hyperplasia)     BPH/Nocturia/ Urinary Frequency   • Breast nodule     right   • Cardiomegaly     Cardiomegaly/ SOB with exertion   • Circulation problem    • Constipation    • COPD (chronic obstructive pulmonary disease) (CMS/[facility])    • Deviated septum    • DJD (degenerative joint disease)     DJD Knees   • DVT (deep venous thrombosis) (CMS/[facility])    • Dysphagia     solids and liquids - resolved with normal studies   • Encounter for annual health examination 11/14/2013    Annual Health Assessment   • Enlarged prostate    • Fatigue     Extreme fatigue   • Hyperlipidemia 1999   • Hypertension 1999    followed Dr. Marcelo   • Hypogonadism male    • Left hip pain     and DJD   • Left leg pain    • Low back pain    • Moist mucous membranes of ear, nose, and throat     \"Mucous in throat\"   • Morbid obesity (CMS/[facility])     (BMI-41.2za)   • Muscle cramping    • Muscle spasm     Muscle spasms   • Neck arthritis    • Neck muscle spasm     Neck muscle spasm/Cervical strain   • Neck pain    • Obesity    • Plantar fasciitis    • Prostatitis     recurrent   • Psoriasis    • Pulmonary embolus (CMS/[facility]) 01/2019    on Xarelltoypseerlipidemia   • Radiculopathy     Radiculopathy / Neuropathy left ar   • Renal insufficiency     followed by Dr. Mendes   • Seborrhea    • Shortness of breath    • Sleep apnea     Obstructive Sleep apnea worsening   • Urinary frequency    • Vascular disorder    • Vertigo    • Weight gain    • Wellness examination 10/23/2014    Annual Wellness Visit       Surgical History  Past Surgical History:   Procedure Laterality Date   • APPENDECTOMY  1965   • CARDIAC CATHETERIZATION  07/29/2010    [facility]   • COLONOSCOPY  01/05/2010   • COLONOSCOPY  10/01/2001   • NASAL SEPTUM SURGERY     • NOSE SURGERY  " 1999   • RADIOFREQUENCY ABLATION Bilateral 3/2/2021    Procedure: RADIOFREQUENCY ABLATION NERVES---bilateral cervical 2-cervical3;  Surgeon: Edu Tineo MD;  Location: [facility] [place];  Service: Pain Management;  Laterality: Bilateral;   • TURP / TRANSURETHRAL INCISION / DRAINAGE PROSTATE  10/23/2015    Michael Castro       Family History  Family History   Problem Relation Age of Onset   • Arthritis Mother    • Heart disease Mother    • Hypertension Mother    • Heart attack Father    • Heart disease Father    • Hypertension Father    • Arthritis Sister    • Multiple sclerosis Sister    • Alcohol abuse Brother    • Diabetes Son    • Malig Hyperthermia Neg Hx        Social History  Social History    Tobacco Use      Smoking status: Never Smoker      Smokeless tobacco: Never Used       Is the Patient a current tobacco user? No    Allergies  No Known Allergies    Current Medications    Current Outpatient Medications:   •  apixaban (ELIQUIS) 5 MG tablet tablet, Take 1 tablet by mouth Every 12 (Twelve) Hours., Disp: 6 tablet, Rfl: 0  •  aspirin 325 MG tablet, Take by mouth daily., Disp: , Rfl:   •  atorvastatin (LIPITOR) 40 MG tablet, TAKE ONE TABLET BY MOUTH DAILY, Disp: 90 tablet, Rfl: 1  •  furosemide (LASIX) 40 MG tablet, PT REPORTS HE TAKES ONCE EVERY WEEK OR TWO, Disp: , Rfl:   •  gabapentin (NEURONTIN) 300 MG capsule, Take 1 capsule by mouth 2 (two) times a day., Disp: 60 capsule, Rfl: 1  •  NIFEdipine XL (PROCARDIA XL) 60 MG 24 hr tablet, TAKE ONE TABLET BY MOUTH DAILY, Disp: , Rfl:   •  ramipril (Altace) 10 MG capsule, Take 1 capsule by mouth Daily., Disp: 90 capsule, Rfl: 2  •  tamsulosin (FLOMAX) 0.4 MG capsule 24 hr capsule, Take 1 capsule by mouth Every Night., Disp: , Rfl:   •  tiotropium bromide-olodaterol (STIOLTO RESPIMAT) 2.5-2.5 MCG/ACT aerosol solution inhaler, Inhale Daily., Disp: , Rfl:   •  diazePAM (Valium) 10 MG tablet, Take 1 tablet by mouth Every 8 (Eight) Hours As Needed for Anxiety.,  Disp: 3 tablet, Rfl: 0     Review of System  Review of Systems   Respiratory: Negative.    Cardiovascular: Negative.    Gastrointestinal: Negative.    Endocrine: Negative.    Genitourinary: Negative.    Musculoskeletal: Positive for back pain.     I have reviewed and confirmed the accuracy of the ROS as documented by the MA/LPN/RN Magdiel Bowens MD    Vitals:    03/12/21 1227   BP: 132/84   Pulse: 80   Temp: 97.6 °F (36.4 °C)   SpO2: 97%     Body mass index is 42.74 kg/m².    Objective     Physical Exam  Physical Exam  Cardiovascular:      Rate and Rhythm: Normal rate and regular rhythm.      Pulses: Normal pulses.      Heart sounds: Normal heart sounds.   Pulmonary:      Effort: Pulmonary effort is normal.      Breath sounds: Normal breath sounds.   Abdominal:      General: Abdomen is flat.      Palpations: Abdomen is soft.   Musculoskeletal:         General: Normal range of motion.   Skin:     General: Skin is warm and dry.   Neurological:      General: No focal deficit present.      Mental Status: He is oriented to person, place, and time.         Assessment/Plan      This pleasant patient presents at this time for his first visit to our office.  He has been seen in the [facility] in the past but relates that he recently switched PCPs.  He has a long history of chronic back pain involving the neck.  He relates that he's going increasingly concerned about his heart and would like an evaluation by cardiology.    Patient's blood pressure is well controlled at this time.  He has past medical history significant for COPD.  He is on Eliquis for pulmonary embolism.  His chronic back pain is managed by pain management that he sees regularly.    Review of his record shows that he's had elevated cholesterol in the past and SCK D3 with a GFR in the 40 range.      His back pain is been managed with gabapentin although there is consideration of nerve block injections.    Patient is also being seen by orthopedic surgery  for chronic shoulder pain.    Review of his labs from 2/24/21 shows that his  Diagnoses and all orders for this visit:    1. Lumbar herniated disc L3-L5 3/16/16 Open MRI (Primary)    2. Cervical neck pain with evidence of disc disease    3. Essential hypertension    4. Mixed hyperlipidemia    Other orders  -     Ambulatory Referral to Cardiology      Plan:  1.)  Will refer the patient to cardiology for evaluation of his status.  #2.)  We'll see the patient back in the next few weeks with a more comprehensive physical examination and evaluation.  We'll maintain him on his current medicines with the understanding that pain management will play a prominent role and control of his neck pain and other chronic pain problems.  #3.)  I reviewed his labs from 2/24/2021.       Magdiel Bowens MD  03/12/2021

## 2021-03-12 NOTE — TELEPHONE ENCOUNTER
Caller: BETHANIE ORDONEZ    Relationship: SELF    Best call back number: [phone]    What medication are you requesting: RX FOR ANXIETY FOR MYELOGRAM 3/15/21    If a prescription is needed, what is your preferred pharmacy and phone number: [facility]    Additional notes: BELIEVES HE WAS PRESCRIBED A SLEEPING PILL FOR A DIFFERENT PROCEDURE BUT IT DID NOT WORK VERY WELL; HE IS WANTING THIS MORE FOR AFTER THE PROCEDURE THAN DURING THE PROCEDURE ITSELF. [facility] ATTEMPTED TO WARM TRANSFER, WAS UNSUCCESSFUL.

## 2021-03-12 NOTE — TELEPHONE ENCOUNTER
I spoke to Mr. Loaiza.  He is concerned about lying flat after the procedure and is requesting some medication to help him relax during the first 24 hours.  I have offered to prescribe him Valium 10 mg every 8 hours.  The risk of this medication were discussed.  He acknowledged understanding and appreciated the assistance.

## 2021-03-15 ENCOUNTER — HOSPITAL ENCOUNTER (OUTPATIENT)
Dept: GENERAL RADIOLOGY | Facility: HOSPITAL | Age: 76
Discharge: HOME | End: 2021-03-15

## 2021-03-15 ENCOUNTER — HOSPITAL ENCOUNTER (OUTPATIENT)
Dept: CT IMAGING | Facility: HOSPITAL | Age: 76
Discharge: HOME | End: 2021-03-15

## 2021-03-15 VITALS
RESPIRATION RATE: 16 BRPM | SYSTOLIC BLOOD PRESSURE: 153 MMHG | BODY MASS INDEX: 40.43 KG/M2 | OXYGEN SATURATION: 96 % | HEART RATE: 63 BPM | DIASTOLIC BLOOD PRESSURE: 87 MMHG | WEIGHT: 315 LBS | HEIGHT: 74 IN | TEMPERATURE: 97.7 F

## 2021-03-15 DIAGNOSIS — M47.22 CERVICAL SPONDYLOSIS WITH RADICULOPATHY: ICD-10-CM

## 2021-03-15 PROCEDURE — 25010000002 IOPAMIDOL 61 % SOLUTION: Performed by: RADIOLOGY

## 2021-03-15 PROCEDURE — 72126 CT NECK SPINE W/DYE: CPT

## 2021-03-15 PROCEDURE — 25010000003 LIDOCAINE 1 % SOLUTION: Performed by: RADIOLOGY

## 2021-03-15 PROCEDURE — 72240 MYELOGRAPHY NECK SPINE: CPT

## 2021-03-15 PROCEDURE — 62302 MYELOGRAPHY LUMBAR INJECTION: CPT

## 2021-03-15 RX ORDER — SODIUM CHLORIDE 0.9 % (FLUSH) 0.9 %
10 SYRINGE (ML) INJECTION AS NEEDED
Status: CANCELLED | OUTPATIENT
Start: 2021-03-15

## 2021-03-15 RX ORDER — LIDOCAINE HYDROCHLORIDE 10 MG/ML
10 INJECTION, SOLUTION INFILTRATION; PERINEURAL ONCE
Status: COMPLETED | OUTPATIENT
Start: 2021-03-15 | End: 2021-03-15

## 2021-03-15 RX ORDER — SODIUM CHLORIDE 0.9 % (FLUSH) 0.9 %
3 SYRINGE (ML) INJECTION EVERY 12 HOURS SCHEDULED
Status: CANCELLED | OUTPATIENT
Start: 2021-03-15

## 2021-03-15 RX ADMIN — LIDOCAINE HYDROCHLORIDE 3 ML: 10 INJECTION, SOLUTION INFILTRATION; PERINEURAL at 09:27

## 2021-03-15 RX ADMIN — IOPAMIDOL 15 ML: 612 INJECTION, SOLUTION INTRATHECAL at 09:29

## 2021-03-15 NOTE — DISCHARGE INSTRUCTIONS
EDUCATION /DISCHARGE INSTRUCTIONS:  A myelogram is a special radiology procedure of the spinal cord, spinal nerves and other related structures.  You will be awake during the examination.  An area of your lower back will be cleansed with an antiseptic solution.  The physician will inject a numbing medication in your lower back.  While your back is numb, a needle will be placed in the lower back area.  A small amount of spinal fluid may be withdrawn and sent to the lab if ordered by your physician. While the needle is in the back, an injection of a contrast material (xray dye) will be given through the needle.  The contrast material will allow the physician to see the spinal cord and spinal nerves.  Once injected, the needle will be removed and a band aid will be placed over the injection site.  The table will be tilted during the process to allow the contrast material to flow to particular areas in the spine.  Following the injection and xrays, you will be taken to the CT scan where more pictures will be taken. After the procedure is finished, the contrast material will be absorbed by your body and eliminated through your kidneys.  The radiologist will study and interpret your myelogram and send the results to your physician.  Procedure risks of a myelogram include, but are not limited to:  *  Bleeding   *  seizure  *  Infection   *  Headache, possibly severe requiring  *  Contrast reaction      a blood patch  *  Nerve or cord injury  *  Paralysis and death  Benefits of the procedure:  *  Best examination for delineating pathology related to spinal cord compression from a disc and/or nerve root compression  Alternatives to the procedure:  MRI - a non invasive procedure requiring intravenous contrast injection.  Cannot be done on patients with certain pacemakers or metal in the body.  MRI risks include possible reaction to the contrast material, movement of metal located in the body.   Benefit to MRI:  Non-invasive  and usually painless procedure.  THIS EDUCATION INFORMATION WAS REVIEWED PRIOR TO THE PROCEDURE AND CONSENT. Patient initials __________________Time_____0837____________  Important information following your myelogram:  *  Lie down with your head elevated no more than 2 pillows for the next 24 hours.   *  Sit up in the car going home.  Sit up to eat and use the restroom only,  for 24 hours.  *  24 HOURS COMPLETE AT _______1000_________________   *  Tomorrow, after 24 hours complete, take it easy and rest.  *  Do not drive for 48 hours following a myelogram  *  You may remove the bandage and shower in the morning  *  Increase your fluids for the next 24 hours.  Caffeinated drinks are encouraged.   Resume taking your blood thinner or Aspirin on 3/15/21.    CALL YOUR PHYSICIAN FOR THE FOLLOWING:  * Pain at the injection site  * Reddness, swelling, bruising or drainage at the injection site  * A fever by mouth of 101.0  * Any new symptoms  If you have problems with a headache that is not relieved with rest and medication, please call the Radiology Triage Nurse desk  [phone]

## 2021-03-15 NOTE — NURSING NOTE
Patient in xray triage for a mylegoram. Patient wore mask and RN wore mask and goggles during all patient interactions.

## 2021-03-15 NOTE — NURSING NOTE
Patient discharge instructions reviewed with copy given to patient. Patient verbalized understanding and able to teach back instructions.    DISPLAY PLAN FREE TEXT

## 2021-03-15 NOTE — NURSING NOTE
Patient taken to car via wheelchair and 1 staff member. Wife picked up patient. No issues or concerns.

## 2021-03-16 ENCOUNTER — TELEPHONE (OUTPATIENT)
Dept: INTERVENTIONAL RADIOLOGY/VASCULAR | Facility: HOSPITAL | Age: 76
End: 2021-03-16

## 2021-03-22 ENCOUNTER — TELEPHONE (OUTPATIENT)
Dept: ORTHOPEDIC SURGERY | Facility: CLINIC | Age: 76
End: 2021-03-22

## 2021-03-22 NOTE — TELEPHONE ENCOUNTER
I spoke to Mr. Loaiza.  I provided him with the cervical spine CT myelogram results as reviewed by Dr. Serrano.  He has some arthritis in his neck which could be causing some pinching of the nerves resulting in his shoulder pain.  Dr. Serrano recommends he follow-up in office with Dr. Zafar to discuss the study in detail and get his recommendations for treatment options.  Patient agreed with this plan.  I will have our office call him to schedule an appointment with Dr. Zafar.

## 2021-04-08 ENCOUNTER — OFFICE VISIT (OUTPATIENT)
Dept: FAMILY MEDICINE CLINIC | Facility: CLINIC | Age: 76
End: 2021-04-08

## 2021-04-08 VITALS
TEMPERATURE: 98 F | HEART RATE: 71 BPM | DIASTOLIC BLOOD PRESSURE: 86 MMHG | SYSTOLIC BLOOD PRESSURE: 140 MMHG | OXYGEN SATURATION: 95 % | BODY MASS INDEX: 40.43 KG/M2 | RESPIRATION RATE: 16 BRPM | HEIGHT: 74 IN | WEIGHT: 315 LBS

## 2021-04-08 DIAGNOSIS — Z00.00 MEDICARE ANNUAL WELLNESS VISIT, SUBSEQUENT: Primary | ICD-10-CM

## 2021-04-08 DIAGNOSIS — G47.33 OBSTRUCTIVE SLEEP APNEA SYNDROME: ICD-10-CM

## 2021-04-08 PROCEDURE — G0439 PPPS, SUBSEQ VISIT: HCPCS | Performed by: INTERNAL MEDICINE

## 2021-04-08 NOTE — PROGRESS NOTES
The ABCs of the Annual Wellness Visit  Subsequent Medicare Wellness Visit    Chief Complaint   Patient presents with   • Medicare Wellness-subsequent     ...no other issues       Subjective   History of Present Illness:  James Loaiza is a 75 y.o. male who presents for a Subsequent Medicare Wellness Visit.    HEALTH RISK ASSESSMENT    Recent Hospitalizations:  No hospitalization(s) within the last year.    Current Medical Providers:  Patient Care Team:  Magdiel Bowens MD as PCP - General (Internal Medicine)  Davonte Sandoval MD as Consulting Physician (Pulmonary Disease)    Smoking Status:  Social History     Tobacco Use   Smoking Status Never Smoker   Smokeless Tobacco Never Used       Alcohol Consumption:  Social History     Substance and Sexual Activity   Alcohol Use Yes    Comment: social       Depression Screen:   PHQ-2/PHQ-9 Depression Screening 4/8/2021   Little interest or pleasure in doing things 0   Feeling down, depressed, or hopeless 0   Trouble falling or staying asleep, or sleeping too much -   Feeling tired or having little energy -   Poor appetite or overeating -   Feeling bad about yourself - or that you are a failure or have let yourself or your family down -   Trouble concentrating on things, such as reading the newspaper or watching television -   Moving or speaking so slowly that other people could have noticed. Or the opposite - being so fidgety or restless that you have been moving around a lot more than usual -   Thoughts that you would be better off dead, or of hurting yourself in some way -   Total Score 0   If you checked off any problems, how difficult have these problems made it for you to do your work, take care of things at home, or get along with other people? -       Fall Risk Screen:  STEADI Fall Risk Assessment has not been completed.    Health Habits and Functional and Cognitive Screening:  Functional & Cognitive Status 4/8/2021   Do you have difficulty preparing food and  eating? No   Do you have difficulty bathing yourself, getting dressed or grooming yourself? No   Do you have difficulty using the toilet? No   Do you have difficulty moving around from place to place? No   Do you have trouble with steps or getting out of a bed or a chair? No   Current Diet Well Balanced Diet   Dental Exam Not up to date   Eye Exam Not up to date   Exercise (times per week) -   Current Exercise Activities Include None   Do you need help using the phone?  No   Are you deaf or do you have serious difficulty hearing?  No   Do you need help with transportation? No   Do you need help shopping? No   Do you need help preparing meals?  No   Do you need help with housework?  No   Do you need help with laundry? No   Do you need help taking your medications? No   Do you need help managing money? No   Do you ever drive or ride in a car without wearing a seat belt? Yes   Have you felt unusual stress, anger or loneliness in the last month? No   Who do you live with? Spouse   If you need help, do you have trouble finding someone available to you? No   Have you been bothered in the last four weeks by sexual problems? No   Do you have difficulty concentrating, remembering or making decisions? No         Does the patient have evidence of cognitive impairment? Yes    Asprin use counseling:Taking ASA appropriately as indicated    Age-appropriate Screening Schedule:  Refer to the list below for future screening recommendations based on patient's age, sex and/or medical conditions. Orders for these recommended tests are listed in the plan section. The patient has been provided with a written plan.    Health Maintenance   Topic Date Due   • ZOSTER VACCINE (2 of 2) 05/10/2018   • TDAP/TD VACCINES (2 - Td) 12/04/2018   • INFLUENZA VACCINE  08/01/2021   • LIPID PANEL  02/24/2022   • COLONOSCOPY  09/21/2027          The following portions of the patient's history were reviewed and updated as appropriate: allergies, current  medications, past family history, past medical history, past social history, past surgical history and problem list.    Outpatient Medications Prior to Visit   Medication Sig Dispense Refill   • apixaban (ELIQUIS) 5 MG tablet tablet Take 1 tablet by mouth Every 12 (Twelve) Hours. 6 tablet 0   • aspirin 325 MG tablet Take by mouth daily.     • atorvastatin (LIPITOR) 40 MG tablet TAKE ONE TABLET BY MOUTH DAILY 90 tablet 1   • diazePAM (Valium) 10 MG tablet Take 1 tablet by mouth Every 8 (Eight) Hours As Needed for Anxiety. 3 tablet 0   • furosemide (LASIX) 40 MG tablet PT REPORTS HE TAKES ONCE EVERY WEEK OR TWO     • gabapentin (NEURONTIN) 300 MG capsule Take 1 capsule by mouth 2 (two) times a day. (Patient taking differently: Take 300 mg by mouth Daily.) 60 capsule 1   • NIFEdipine XL (PROCARDIA XL) 60 MG 24 hr tablet TAKE ONE TABLET BY MOUTH DAILY     • ramipril (Altace) 10 MG capsule Take 1 capsule by mouth Daily. 90 capsule 2   • tamsulosin (FLOMAX) 0.4 MG capsule 24 hr capsule Take 1 capsule by mouth Every Night.     • tiotropium bromide-olodaterol (STIOLTO RESPIMAT) 2.5-2.5 MCG/ACT aerosol solution inhaler Inhale Daily.       No facility-administered medications prior to visit.       Patient Active Problem List   Diagnosis   • Lumbar herniated disc L3-L5 3/16/16 Open MRI   • Abnormal electrocardiogram   • Abnormal weight gain   • Aortic valve insufficiency   • Coronary arteriosclerosis in native artery   • Benign essential hypertension ([place] 1999)   • Edema   • Dyspnea on exertion   • Fatigue   • Left ventricular hypertrophy   • Obstructive sleep apnea syndrome   • Arthritis of left hip   • Hx of pulmonary embolus 7/15   • Morbid obesity with BMI of 45.0-49.9, adult (CMS/[facility])   • Intermittent dysphagia   • Chronic kidney disease ([place])   • Hyperlipidemia 1999   • BPH (benign prostatic hypertrophy)   • Left cervical radiculopathy   • Rotator cuff tear, left   • Cardiomegaly   • Plantar fasciitis   •  "Hypogonadism male   • Vitamin D deficiency disease   • Renal cyst, left   • Preventative health care   • Medication management   • Chronic low back pain   • Lumbar spondylolysis   • Headache   • Other chronic pain   • Pulmonary embolus (CMS/HCC)   • Chronic obstructive pulmonary disease (CMS/HCC)   • Osteoarthritis of right shoulder   • Chronic anticoagulation   • Cervical stenosis of spine   • Bilateral occipital neuralgia   • Shoulder pain   • Cervical facet joint syndrome       Advanced Care Planning:  ACP discussion was held with the patient during this visit. Patient does not have an advance directive, information provided.    Review of Systems   Constitutional: Negative.    HENT: Negative.    Eyes: Negative.    Respiratory: Negative.    Cardiovascular: Negative.    Gastrointestinal: Negative.    Genitourinary: Negative.    Musculoskeletal: Negative.    Skin: Negative.    Neurological: Negative.        Compared to one year ago, the patient feels his physical health is the same.  Compared to one year ago, the patient feels his mental health is the same.    Reviewed chart for potential of high risk medication in the elderly: yes  Reviewed chart for potential of harmful drug interactions in the elderly:yes    Objective         Vitals:    04/08/21 1337   BP: 140/86   Cuff Size: Large Adult   Pulse: 71   Resp: 16   Temp: 98 °F (36.7 °C)   TempSrc: Temporal   SpO2: 95%   Weight: (!) 154 kg (340 lb 9.6 oz)   Height: 188 cm (74\")       Body mass index is 43.73 kg/m².  Discussed the patient's BMI with him. The BMI is below average; no BMI management plan is appropriate.    Physical Exam  Vitals and nursing note reviewed.   Constitutional:       Appearance: He is well-developed.   HENT:      Head: Normocephalic and atraumatic.   Eyes:      Conjunctiva/sclera: Conjunctivae normal.      Pupils: Pupils are equal, round, and reactive to light.   Cardiovascular:      Rate and Rhythm: Normal rate and regular rhythm.      Heart " sounds: Normal heart sounds.   Pulmonary:      Effort: Pulmonary effort is normal.      Breath sounds: Normal breath sounds.   Abdominal:      General: Bowel sounds are normal.      Palpations: Abdomen is soft.   Musculoskeletal:         General: Normal range of motion.      Cervical back: Normal range of motion and neck supple.   Skin:     General: Skin is warm.   Neurological:      Mental Status: He is alert.   Psychiatric:         Behavior: Behavior normal.         Thought Content: Thought content normal.         Judgment: Judgment normal.         Lab Results   Component Value Date     (H) 02/24/2021    CHLPL 153 02/24/2021    TRIG 143 02/24/2021    HDL 34 (L) 02/24/2021    LDL 94 02/24/2021    VLDL 25 02/24/2021    HGBA1C 5.30 02/24/2021        Assessment/Plan      Regarding anticipatory guidance.  We discussed the importance of the Covid 19 back seen in helping to eradicate the pandemic.  We answered questions regarding who the vaccine was most suitable for and when it should be given.            Medicare Risks and Personalized Health Plan  CMS Preventative Services Quick Reference  Advance Directive Discussion    The above risks/problems have been discussed with the patient.  Pertinent information has been shared with the patient in the After Visit Summary.  Follow up plans and orders are seen below in the Assessment/Plan Section.    There are no diagnoses linked to this encounter.  Follow Up:  No follow-ups on file.     An After Visit Summary and PPPS were given to the patient.

## 2021-04-26 RX ORDER — RAMIPRIL 10 MG/1
CAPSULE ORAL
Qty: 90 CAPSULE | Refills: 1 | Status: SHIPPED | OUTPATIENT
Start: 2021-04-26 | End: 2021-07-24

## 2021-04-28 ENCOUNTER — OFFICE VISIT (OUTPATIENT)
Dept: ORTHOPEDIC SURGERY | Facility: CLINIC | Age: 76
End: 2021-04-28

## 2021-04-28 VITALS — WEIGHT: 315 LBS | TEMPERATURE: 97.3 F | HEIGHT: 74 IN | BODY MASS INDEX: 40.43 KG/M2

## 2021-04-28 DIAGNOSIS — M47.22 CERVICAL SPONDYLOSIS WITH RADICULOPATHY: Primary | ICD-10-CM

## 2021-04-28 PROCEDURE — 99213 OFFICE O/P EST LOW 20 MIN: CPT | Performed by: ORTHOPAEDIC SURGERY

## 2021-04-28 NOTE — PROGRESS NOTES
He is doing much better after the myelogram.  I told him this is not uncommon and he has been improving anyway.  I think he had some type of facet ablation since I last saw him as well.  Epidurals did not work however surprisingly.  Myelogram CT shows foraminal stenosis quite severe to couple of levels but nothing in the canal.  I reviewed the radiologist report as well with which I agree.  He is much better now neurologically intact on exam today so lets leave him alone and have him follow-up as needed

## 2021-04-29 ENCOUNTER — OFFICE VISIT (OUTPATIENT)
Dept: PAIN MEDICINE | Facility: CLINIC | Age: 76
End: 2021-04-29

## 2021-04-29 VITALS
DIASTOLIC BLOOD PRESSURE: 81 MMHG | WEIGHT: 315 LBS | RESPIRATION RATE: 20 BRPM | SYSTOLIC BLOOD PRESSURE: 146 MMHG | BODY MASS INDEX: 40.43 KG/M2 | HEART RATE: 63 BPM | HEIGHT: 74 IN | OXYGEN SATURATION: 95 % | TEMPERATURE: 96.8 F

## 2021-04-29 DIAGNOSIS — G89.29 CHRONIC LOW BACK PAIN, UNSPECIFIED BACK PAIN LATERALITY, UNSPECIFIED WHETHER SCIATICA PRESENT: ICD-10-CM

## 2021-04-29 DIAGNOSIS — M47.812 CERVICAL FACET JOINT SYNDROME: Primary | ICD-10-CM

## 2021-04-29 DIAGNOSIS — G89.29 OTHER CHRONIC PAIN: ICD-10-CM

## 2021-04-29 DIAGNOSIS — M54.50 CHRONIC LOW BACK PAIN, UNSPECIFIED BACK PAIN LATERALITY, UNSPECIFIED WHETHER SCIATICA PRESENT: ICD-10-CM

## 2021-04-29 DIAGNOSIS — M43.06 LUMBAR SPONDYLOLYSIS: ICD-10-CM

## 2021-04-29 PROCEDURE — 99214 OFFICE O/P EST MOD 30 MIN: CPT | Performed by: NURSE PRACTITIONER

## 2021-04-29 RX ORDER — GABAPENTIN 300 MG/1
300 CAPSULE ORAL NIGHTLY
Qty: 30 CAPSULE | Refills: 2 | Status: SHIPPED | OUTPATIENT
Start: 2021-04-29 | End: 2021-07-24

## 2021-04-29 NOTE — PROGRESS NOTES
"CHIEF COMPLAINT  F/u back and neck pain. Pt sts pain has improved since last ov.     Subjective   James Loaiza is a 75 y.o. male  who presents for follow-up.  He has a history of chronic back and neck pain. Reports his pain is improved since last evaluation.    Patient presents for follow-up of PROCEDURE. Patient had a bilateral C2-C3 RFL performed by Dr. COOPER on 3-2-21 for management of NECK PAIN. Patient reports MODERATE ONGOING relief from the procedure.    Complains of pain in his low back and neck. Today his pain is 1/10VAS.  Describes his pain as intermittent aching and throbbing. Pain increases with activity, prolonged position; pain decreases with medication, rest, and procedures. Is currently taking Gabapentin 300 mg nightly. Denies any side effects from this. Noticed improvement with this. ADL's by self.     Also started Gabapentin at last evaluation (February 2021).    Saw new orthopedist(Peg) and started PT. Did notice improvement with this as well.  Started cervical traction and TENS. Completed approximately 2 weeks ago.    Patient had a bilateral OCNB performed by Dr. COOPER on 1-22-21 for management of HEAD AND NECK PAIN. Patient reports 90% relief from the procedure for 5 days. His pain returned yesterday morning and ceased last night.      Being seen by Dr Serrano for shoulder pain. Having MRI-arthrogram. Was prescribed Tramadol.  Has started PT for this. \"it's just getting worse and they still haven't rescheduled that MRI.\" Has had ELENITA with no improvement in shoulder pain. No improvement with OCNB either.     Previously had bilateral shoulder injections-- no relief.      Remains on Eliquis.  Cannot take NSAIDS.     Patient remained masked during entire encounter. No cough present. I donned a mask and eye protection throughout entire visit. Prior to donning mask and eye protection, hand hygiene was performed, as well as when it was doffed.  I was closer than 6 feet, but not " for an extended period of time. No obvious exposure to any bodily fluids.    Back Pain  This is a recurrent problem. The current episode started more than 1 month ago. The problem occurs constantly. Progression since onset: improved since last office visit. The pain is present in the lumbar spine and sacro-iliac. The quality of the pain is described as aching. The pain does not radiate. The pain is at a severity of 1/10. The pain is mild. Worse during: frequent urination at night which increases his pain--getting up and out of bed. The symptoms are aggravated by standing and twisting. Stiffness is present all day. Pertinent negatives include no abdominal pain, bladder incontinence, bowel incontinence, chest pain, dysuria, fever, headaches, numbness or weakness. Risk factors include lack of exercise, obesity and recent trauma. Treatments tried: lumbar RFL, OTC IBU.   Neck Pain   This is a new problem. The current episode started more than 1 month ago. The problem has been gradually worsening (improved since last evaluation). The pain is associated with nothing. The pain is present in the occipital region. The pain is at a severity of 1/10. The pain is severe. Pertinent negatives include no chest pain, fever, headaches, numbness or weakness.      Procedure List  --1-22-21-- bilateral OCNB  -- 10-23-20-- bilateral OCNB and bilateral L2-L5 MBB  -- 9-21-20-- bilateral OCNB  -- 9-9-20-- ELENITA (DR ALLYSON RIVERA)  -- 6-22-18-- bilateral L2-L5 RFL- 75% long term relief     PEG Assessment   What number best describes your pain on average in the past week?1  What number best describes how, during the past week, pain has interfered with your enjoyment of life?1  What number best describes how, during the past week, pain has interfered with your general activity?  1    The following portions of the patient's history were reviewed and updated as appropriate: allergies, current medications, past family history, past medical history,  "past social history, past surgical history and problem list.    Review of Systems   Constitutional: Negative for activity change, fatigue and fever.   HENT: Negative for congestion.    Eyes: Negative for visual disturbance.   Respiratory: Negative for cough and shortness of breath.    Cardiovascular: Negative for chest pain.   Gastrointestinal: Negative for abdominal pain, bowel incontinence, constipation and diarrhea.   Endocrine: Negative for polyuria.   Genitourinary: Negative for bladder incontinence, difficulty urinating and dysuria.   Musculoskeletal: Positive for back pain and neck pain. Negative for neck stiffness.   Allergic/Immunologic: Negative for immunocompromised state.   Neurological: Negative for dizziness, weakness, light-headedness, numbness and headaches.   Psychiatric/Behavioral: Negative for agitation, sleep disturbance and suicidal ideas. The patient is not nervous/anxious.      I have reviewed and confirmed the accuracy of the ROS as documented by the MA/LPN/RN YANIRA Stokes      Vitals:    04/29/21 1026   BP: 146/81   Pulse: 63   Resp: 20   Temp: 96.8 °F (36 °C)   SpO2: 95%   Weight: (!) 155 kg (341 lb)   Height: 188 cm (74\")   PainSc:   1   PainLoc: Back  Comment: and neck         Objective   Physical Exam  Vitals and nursing note reviewed.   Constitutional:       Appearance: Normal appearance. He is well-developed.   HENT:      Head: Normocephalic and atraumatic.   Eyes:      General: Lids are normal.   Abdominal:      General: Abdomen is protuberant.   Musculoskeletal:      Right shoulder: Tenderness present. Decreased range of motion.      Left shoulder: Tenderness present. Decreased range of motion.      Cervical back: Pain with movement, spinous process tenderness and muscular tenderness present. Decreased range of motion.      Lumbar back: Tenderness present. Decreased range of motion.   Skin:     General: Skin is warm and dry.   Neurological:      Mental Status: He is alert. "      Gait: Gait normal.   Psychiatric:         Speech: Speech normal.         Behavior: Behavior normal. Behavior is cooperative.         Assessment/Plan   Diagnoses and all orders for this visit:    1. Cervical facet joint syndrome (Primary)    2. Chronic low back pain, unspecified back pain laterality, unspecified whether sciatica present    3. Lumbar spondylolysis    4. Other chronic pain    Other orders  -     gabapentin (NEURONTIN) 300 MG capsule; Take 1 capsule by mouth Every Night.  Dispense: 30 capsule; Refill: 2        --- Refill Gabapentin.  --- Continue with other specialists as planned.  --- Repeat cervical or lumbar interventions in future PRN.  --- Follow-up 3 months or sooner if needed.       DILAN REPORT  As part of the patient's treatment plan, I am prescribing controlled substances. The patient has been made aware of appropriate use of such medications, including potential risk of somnolence, limited ability to drive and/or work safely, and the potential for dependence or overdose. It has also bee made clear that these medications are for use by this patient only, without concomitant use of alcohol or other substances unless prescribed.     Patient has completed prescribing agreement detailing terms of continued prescribing of controlled substances, including monitoring DILAN reports, urine drug screening, and pill counts if necessary. The patient is aware that inappropriate use will results in cessation of prescribing such medications.    DILAN report has been reviewed and scanned into the patient's chart.    As the clinician, I personally reviewed the DILAN from 4-29-21 while the patient was in the office today.    History and physical exam exhibit continued safe and appropriate use of controlled substances.        EMR Dragon/Transcription disclaimer:   Much of this encounter note is an electronic transcription/translation of spoken language to printed text. The electronic translation of  spoken language may permit erroneous, or at times, nonsensical words or phrases to be inadvertently transcribed; Although I have reviewed the note for such errors, some may still exist.

## 2021-05-04 ENCOUNTER — OFFICE VISIT (OUTPATIENT)
Dept: CARDIOLOGY | Facility: CLINIC | Age: 76
End: 2021-05-04

## 2021-05-04 VITALS
SYSTOLIC BLOOD PRESSURE: 138 MMHG | DIASTOLIC BLOOD PRESSURE: 74 MMHG | HEIGHT: 74 IN | RESPIRATION RATE: 18 BRPM | BODY MASS INDEX: 40.43 KG/M2 | HEART RATE: 67 BPM | WEIGHT: 315 LBS | OXYGEN SATURATION: 98 %

## 2021-05-04 DIAGNOSIS — G47.33 OBSTRUCTIVE SLEEP APNEA SYNDROME: ICD-10-CM

## 2021-05-04 DIAGNOSIS — E78.2 MIXED HYPERLIPIDEMIA: ICD-10-CM

## 2021-05-04 DIAGNOSIS — E66.01 MORBID OBESITY WITH BMI OF 45.0-49.9, ADULT (HCC): ICD-10-CM

## 2021-05-04 DIAGNOSIS — I25.10 CORONARY ARTERIOSCLEROSIS IN NATIVE ARTERY: Chronic | ICD-10-CM

## 2021-05-04 DIAGNOSIS — I10 BENIGN ESSENTIAL HYPERTENSION: ICD-10-CM

## 2021-05-04 DIAGNOSIS — I26.99 OTHER ACUTE PULMONARY EMBOLISM WITHOUT ACUTE COR PULMONALE (HCC): ICD-10-CM

## 2021-05-04 DIAGNOSIS — I51.7 LEFT VENTRICULAR HYPERTROPHY: ICD-10-CM

## 2021-05-04 DIAGNOSIS — I35.1 NONRHEUMATIC AORTIC VALVE INSUFFICIENCY: Primary | ICD-10-CM

## 2021-05-04 DIAGNOSIS — Z79.01 CHRONIC ANTICOAGULATION: ICD-10-CM

## 2021-05-04 DIAGNOSIS — Z86.711 HX OF PULMONARY EMBOLUS: ICD-10-CM

## 2021-05-04 DIAGNOSIS — J44.9 CHRONIC OBSTRUCTIVE PULMONARY DISEASE, UNSPECIFIED COPD TYPE (HCC): ICD-10-CM

## 2021-05-04 PROCEDURE — 93000 ELECTROCARDIOGRAM COMPLETE: CPT | Performed by: INTERNAL MEDICINE

## 2021-05-04 PROCEDURE — 99204 OFFICE O/P NEW MOD 45 MIN: CPT | Performed by: INTERNAL MEDICINE

## 2021-05-04 RX ORDER — ASPIRIN 81 MG/1
81 TABLET ORAL DAILY
COMMUNITY
End: 2021-12-20

## 2021-05-04 NOTE — PROGRESS NOTES
Subjective:     Encounter Date:05/04/21      Patient ID: James Loaiza is a 75 y.o. male.    Chief Complaint: Aortic insufficiency  History of Present Illness    Dear Dr. Bowens,    I had the pleasure of seeing this patient in the office today for initial evaluation and consultation.  I appreciate that you sent him in to see us.  They come in today to be seen for evaluation of CAD and aortic insufficiency.    He used to follow with Dr. Raphael but comes to establish with a new cardiologist now that Dr. Raphael is retired.  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  He has a history of morbid obesity with sleep apnea and is on CPAP, single-vessel coronary artery disease with a 70% small caliber diagonal by cardiac catheterization in 2010, hypertension, hyperlipidemia, as well as chronic renal failure and he follows with Dr. Beth.  He has a history of chronic lower extremity edema secondary to venous insufficiency.    He had a stress test performed in April 2018 that revealed a small area of mild fixed hypoperfusion in the inferior posterior wall base but no ischemia.  He had an echocardiogram performed February 2020, reviewed below, which showed continued mild aortic insufficiency.    He feels like he is doing well.  No chest pain or chest discomfort.  No shortness of breath at rest.  He does have some dyspnea with exercise which is unchanged.  He has had both of his Covid shots.  No feeling of palpitations or tachycardia.  No dizziness or lightheadedness no presyncope or syncope.  No unusual fatigue.    The following portions of the patient's history were reviewed and updated as appropriate: allergies, current medications, past family history, past medical history, past social history, past surgical history and problem list.      ECG 12  "Lead    Date/Time: 5/4/2021 9:14 AM  Performed by: Pradeep Obrien III, MD  Authorized by: Pradeep Obrien III, MD   Comparison: compared with previous ECG   Similar to previous ECG  Rhythm: sinus rhythm  Rate: normal  Conduction: conduction normal  ST Segments: ST segments normal  T Waves: T waves normal  QRS axis: normal  Other: no other findings    Clinical impression: normal ECG               Objective:     Vitals:    05/04/21 0846   BP: 138/74   Pulse: 67   Resp: 18   SpO2: 98%   Weight: (!) 156 kg (343 lb)   Height: 188 cm (74\")     Body mass index is 44.04 kg/m².      Vitals reviewed.   Constitutional:       General: Not in acute distress.     Appearance: Well-developed. Not diaphoretic.   Eyes:      General:         Right eye: No discharge.         Left eye: No discharge.      Conjunctiva/sclera: Conjunctivae normal.      Pupils: Pupils are equal, round, and reactive to light.   HENT:      Head: Normocephalic and atraumatic.      Nose: Nose normal.   Neck:      Thyroid: No thyromegaly.      Trachea: No tracheal deviation.      Lymphadenopathy: No cervical adenopathy.   Pulmonary:      Effort: Pulmonary effort is normal. No respiratory distress.      Breath sounds: Normal breath sounds. No stridor.   Chest:      Chest wall: Not tender to palpatation.   Cardiovascular:      Normal rate. Regular rhythm.      Murmurs: There is no murmur.      . No S3 gallop. No click. No rub.   Pulses:     Intact distal pulses.   Abdominal:      General: Bowel sounds are normal. There is no distension.      Palpations: Abdomen is soft. There is no abdominal mass.      Tenderness: There is no abdominal tenderness. There is no guarding or rebound.   Musculoskeletal: Normal range of motion.         General: No tenderness or deformity.      Cervical back: Normal range of motion and neck supple. Skin:     General: Skin is warm and dry.      Findings: No erythema or rash.   Neurological:      Mental Status: Alert and oriented to person, " place, and time.      Deep Tendon Reflexes: Reflexes are normal and symmetric.   Psychiatric:         Thought Content: Thought content normal.         Data and records reviewed:     Lab Results   Component Value Date    GLUCOSE 114 (H) 01/16/2019    BUN 22 02/24/2021    CREATININE 2.05 (H) 02/24/2021    EGFRIFNONA 32 (L) 02/24/2021    EGFRIFAFRI 39 (L) 02/24/2021    BCR 10.7 02/24/2021    K 4.6 02/24/2021    CO2 27.8 02/24/2021    CALCIUM 9.0 02/24/2021    PROTENTOTREF 5.9 (L) 02/24/2021    ALBUMIN 3.70 02/24/2021    LABIL2 1.7 02/24/2021    AST 15 02/24/2021    ALT 9 02/24/2021     No results found for: CHOL  Lab Results   Component Value Date    TRIG 143 02/24/2021    TRIG 161 (H) 12/12/2018    TRIG 314 (H) 06/14/2017     Lab Results   Component Value Date    HDL 34 (L) 02/24/2021    HDL 31 (L) 12/12/2018    HDL 28 (L) 06/14/2017     Lab Results   Component Value Date    LDL 94 02/24/2021    LDL 73 12/12/2018    LDL 62 06/14/2017     Lab Results   Component Value Date    VLDL 25 02/24/2021    VLDL 32.2 12/12/2018    VLDL 62.8 (H) 06/14/2017     Lab Results   Component Value Date    LDLHDL 2.66 02/24/2021    LDLHDL 2.35 12/12/2018    LDLHDL 2.22 06/14/2017     CBC    CBC 2/24/21   WBC 8.81   RBC 4.09 (A)   Hemoglobin 12.6 (A)   Hematocrit 39.4   MCV 96.3   MCH 30.8   MCHC 32.0   RDW 12.0 (A)   Platelets 200   (A) Abnormal value            CT Cervical Spine With Intrathecal Contrast    Result Date: 3/16/2021  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 There is osseous foraminal stenosis at several levels as discussed level by level above. No significant appearing spinal canal stenosis is present, however.  This report was finalized on 3/16/2021 7:22 AM by Dr. Daniel Newton M.D.      IR myelogram cspine S&I included    Result Date: 3/16/2021  Successful cervical myelography. See subsequent CT imaging.  This report was finalized on 3/16/2021 7:29 AM by   Doug Mays M.D.      Results for orders placed during the hospital encounter of 01/13/19    Adult Transthoracic Echo Complete W/ Cont if Necessary Per Protocol    Interpretation Summary  · Calculated EF = 62.0%.  · Left ventricular systolic function is normal.  · Left ventricular diastolic dysfunction (grade I) consistent with impaired relaxation.  · Mild aortic valve regurgitation is present.  · Left ventricular wall thickness is consistent with mild concentric hypertrophy.    Echocardiogram [facility] February 2020:     Summary    The left ventricle is mildly enlarged.    There is moderate concentric left ventricular hypertrophy.    There is normal left ventricle systolic function with estimated left    ventricular ejection fraction 55%.    There is mild grade 1A diastolic dysfunction of the left ventricle.    The left atrium is mildly enlarged.    The right ventricle is mildly enlarged    There is mild aortic regurgitation.    There is trivial to mild mitral regurgitation.    The aortic root and ascending aorta are mildly dilated.         Assessment:          Diagnosis Plan   1. Nonrheumatic aortic valve insufficiency  ECG 12 Lead   2. Coronary arteriosclerosis in native artery  ECG 12 Lead   3. Benign essential hypertension ([place] 1999)  ECG 12 Lead   4. Left ventricular hypertrophy  ECG 12 Lead   5. Mixed hyperlipidemia  ECG 12 Lead   6. Other acute pulmonary embolism without acute cor pulmonale (CMS/HCC)  ECG 12 Lead   7. Hx of pulmonary embolus 7/15  ECG 12 Lead   8. Chronic anticoagulation  ECG 12 Lead   9. Morbid obesity with BMI of 45.0-49.9, adult (CMS/HCC)  ECG 12 Lead   10. Chronic obstructive pulmonary disease, unspecified COPD type (CMS/HCC)  ECG 12 Lead   11. Obstructive sleep apnea syndrome  ECG 12 Lead          Plan:       1.  Aortic valve insufficiency, mild, continue current medical therapy with nifedipine and ramipril  2.  Hypertension, good control, continue nifedipine and ramipril  3.   History of recurrent DVT and PE, on chronic anticoagulation.  Patient has been taking 325 mg aspirin daily along with this, I have asked him to decrease this to 81 mg of aspirin daily and to change his medication list for this  4.  Morbid obesity, complicating all aspects of care  5.  Obstructive sleep apnea on CPAP, discussed compliance  6.  Chronic lower extremity edema secondary to venous insufficiency, patient remains on low-dose diuretic therapy.    Thank you very much for allowing us to participate in the care of this pleasant patient.  Please don't hesitate to call if I can be of assistance in any way.      Current Outpatient Medications:   •  apixaban (ELIQUIS) 5 MG tablet tablet, Take 1 tablet by mouth Every 12 (Twelve) Hours., Disp: 6 tablet, Rfl: 0  •  aspirin 81 MG EC tablet, Take 81 mg by mouth Daily., Disp: , Rfl:   •  atorvastatin (LIPITOR) 40 MG tablet, TAKE ONE TABLET BY MOUTH DAILY, Disp: 90 tablet, Rfl: 1  •  diazePAM (Valium) 10 MG tablet, Take 1 tablet by mouth Every 8 (Eight) Hours As Needed for Anxiety., Disp: 3 tablet, Rfl: 0  •  furosemide (LASIX) 40 MG tablet, PT REPORTS HE TAKES ONCE EVERY WEEK OR TWO, Disp: , Rfl:   •  gabapentin (NEURONTIN) 300 MG capsule, Take 1 capsule by mouth Every Night., Disp: 30 capsule, Rfl: 2  •  NIFEdipine XL (PROCARDIA XL) 60 MG 24 hr tablet, TAKE ONE TABLET BY MOUTH DAILY, Disp: , Rfl:   •  ramipril (ALTACE) 10 MG capsule, TAKE ONE CAPSULE BY MOUTH DAILY, Disp: 90 capsule, Rfl: 1  •  tamsulosin (FLOMAX) 0.4 MG capsule 24 hr capsule, Take 1 capsule by mouth Every Night., Disp: , Rfl:   •  tiotropium bromide-olodaterol (STIOLTO RESPIMAT) 2.5-2.5 MCG/ACT aerosol solution inhaler, Inhale Daily., Disp: , Rfl:          Return in about 1 year (around 5/4/2022).

## 2021-05-11 ENCOUNTER — OFFICE VISIT (OUTPATIENT)
Dept: FAMILY MEDICINE CLINIC | Facility: CLINIC | Age: 76
End: 2021-05-11

## 2021-05-11 VITALS
SYSTOLIC BLOOD PRESSURE: 120 MMHG | DIASTOLIC BLOOD PRESSURE: 78 MMHG | BODY MASS INDEX: 40.43 KG/M2 | WEIGHT: 315 LBS | HEIGHT: 74 IN | RESPIRATION RATE: 16 BRPM

## 2021-05-11 DIAGNOSIS — L03.115 CELLULITIS AND ABSCESS OF RIGHT LOWER EXTREMITY: Primary | ICD-10-CM

## 2021-05-11 DIAGNOSIS — R60.9 DEPENDENT EDEMA: ICD-10-CM

## 2021-05-11 DIAGNOSIS — L02.415 CELLULITIS AND ABSCESS OF RIGHT LOWER EXTREMITY: Primary | ICD-10-CM

## 2021-05-11 PROCEDURE — 99214 OFFICE O/P EST MOD 30 MIN: CPT | Performed by: INTERNAL MEDICINE

## 2021-05-11 RX ORDER — AMOXICILLIN AND CLAVULANATE POTASSIUM 500; 125 MG/1; MG/1
1 TABLET, FILM COATED ORAL 3 TIMES DAILY
Qty: 30 TABLET | Refills: 0 | Status: SHIPPED | OUTPATIENT
Start: 2021-05-11 | End: 2021-05-20 | Stop reason: SDUPTHER

## 2021-05-11 NOTE — PROGRESS NOTES
05/11/2021    CC: Leg Swelling (right leg.  also c/o burning on back of leg)  .        HPI  Leg Swelling  This is a chronic problem. The current episode started in the past 7 days. The problem occurs daily. The problem has been waxing and waning. He has tried rest for the symptoms.        Subjective   James Loaiza is a 75 y.o. male.      The following portions of the patient's history were reviewed and updated as appropriate: allergies, current medications, past family history, past medical history, past social history, past surgical history and problem list.    Problem List  Patient Active Problem List   Diagnosis   • Lumbar herniated disc L3-L5 3/16/16 Open MRI   • Abnormal electrocardiogram   • Abnormal weight gain   • Aortic valve insufficiency   • Coronary arteriosclerosis in native artery   • Benign essential hypertension ([place] 1999)   • Edema   • Dyspnea on exertion   • Fatigue   • Left ventricular hypertrophy   • Obstructive sleep apnea syndrome   • Arthritis of left hip   • Hx of pulmonary embolus 7/15   • Morbid obesity with BMI of 45.0-49.9, adult (CMS/[facility])   • Intermittent dysphagia   • Chronic kidney disease ([facility])   • Hyperlipidemia 1999   • BPH (benign prostatic hypertrophy)   • Left cervical radiculopathy   • Rotator cuff tear, left   • Cardiomegaly   • Plantar fasciitis   • Hypogonadism male   • Vitamin D deficiency disease   • Renal cyst, left   • Preventative health care   • Medication management   • Chronic low back pain   • Lumbar spondylolysis   • Headache   • Other chronic pain   • Pulmonary embolus (CMS/[facility])   • Chronic obstructive pulmonary disease (CMS/[facility])   • Osteoarthritis of right shoulder   • Chronic anticoagulation   • Cervical stenosis of spine   • Bilateral occipital neuralgia   • Shoulder pain   • Cervical facet joint syndrome       Past Medical History  Past Medical History:   Diagnosis Date   • Abnormal EKG     referring to cardiology   • Anxiety    • Arthritis    • Back pain  "    worsening   • BPH (benign prostatic hyperplasia)     BPH/Nocturia/ Urinary Frequency   • Breast nodule     right   • Cardiomegaly     Cardiomegaly/ SOB with exertion   • Circulation problem    • Constipation    • COPD (chronic obstructive pulmonary disease) (CMS/[facility])    • Deviated septum    • DJD (degenerative joint disease)     DJD Knees   • DVT (deep venous thrombosis) (CMS/[facility])    • Dysphagia     solids and liquids - resolved with normal studies   • Encounter for annual health examination 11/14/2013    Annual Health Assessment   • Enlarged prostate    • Fatigue     Extreme fatigue   • Hyperlipidemia 1999   • Hypertension 1999    followed Dr. Marcelo   • Hypogonadism male    • Left hip pain     and DJD   • Left leg pain    • Low back pain    • Moist mucous membranes of ear, nose, and throat     \"Mucous in throat\"   • Morbid obesity (CMS/[facility])     (BMI-41.2za)   • Muscle cramping    • Muscle spasm     Muscle spasms   • Neck arthritis    • Neck muscle spasm     Neck muscle spasm/Cervical strain   • Neck pain    • Obesity    • Plantar fasciitis    • Prostatitis     recurrent   • Psoriasis    • Pulmonary embolus (CMS/[facility]) 01/2019    on Xarelltoypseerlipidemia   • Radiculopathy     Radiculopathy / Neuropathy left ar   • Renal insufficiency     followed by Dr. Mendes   • Seborrhea    • Shortness of breath    • Sleep apnea     Obstructive Sleep apnea worsening   • Urinary frequency    • Vascular disorder    • Vertigo    • Weight gain    • Wellness examination 10/23/2014    Annual Wellness Visit       Surgical History  Past Surgical History:   Procedure Laterality Date   • APPENDECTOMY  1965   • CARDIAC CATHETERIZATION  07/29/2010    [facility]   • COLONOSCOPY  01/05/2010   • COLONOSCOPY  10/01/2001   • NASAL SEPTUM SURGERY     • NOSE SURGERY  1999   • RADIOFREQUENCY ABLATION Bilateral 3/2/2021    Procedure: RADIOFREQUENCY ABLATION NERVES---bilateral cervical 2-cervical3;  Surgeon: Noman" Edu WILSON MD;  Location: [facility];  Service: Pain Management;  Laterality: Bilateral;   • TURP / TRANSURETHRAL INCISION / DRAINAGE PROSTATE  10/23/2015    Michael Castro       Family History  Family History   Problem Relation Age of Onset   • Arthritis Mother    • Heart disease Mother    • Hypertension Mother    • Heart attack Father    • Heart disease Father    • Hypertension Father    • Arthritis Sister    • Multiple sclerosis Sister    • Alcohol abuse Brother    • Diabetes Son    • Malig Hyperthermia Neg Hx        Social History  Social History    Tobacco Use      Smoking status: Never Smoker      Smokeless tobacco: Never Used       Is the Patient a current tobacco user? No    Allergies  No Known Allergies    Current Medications    Current Outpatient Medications:   •  apixaban (ELIQUIS) 5 MG tablet tablet, Take 1 tablet by mouth Every 12 (Twelve) Hours., Disp: 6 tablet, Rfl: 0  •  aspirin 81 MG EC tablet, Take 81 mg by mouth Daily., Disp: , Rfl:   •  atorvastatin (LIPITOR) 40 MG tablet, TAKE ONE TABLET BY MOUTH DAILY, Disp: 90 tablet, Rfl: 1  •  diazePAM (Valium) 10 MG tablet, Take 1 tablet by mouth Every 8 (Eight) Hours As Needed for Anxiety., Disp: 3 tablet, Rfl: 0  •  furosemide (LASIX) 40 MG tablet, PT REPORTS HE TAKES ONCE EVERY WEEK OR TWO, Disp: , Rfl:   •  gabapentin (NEURONTIN) 300 MG capsule, Take 1 capsule by mouth Every Night., Disp: 30 capsule, Rfl: 2  •  NIFEdipine XL (PROCARDIA XL) 60 MG 24 hr tablet, TAKE ONE TABLET BY MOUTH DAILY, Disp: , Rfl:   •  ramipril (ALTACE) 10 MG capsule, TAKE ONE CAPSULE BY MOUTH DAILY, Disp: 90 capsule, Rfl: 1  •  tamsulosin (FLOMAX) 0.4 MG capsule 24 hr capsule, Take 1 capsule by mouth Every Night., Disp: , Rfl:   •  tiotropium bromide-olodaterol (STIOLTO RESPIMAT) 2.5-2.5 MCG/ACT aerosol solution inhaler, Inhale Daily., Disp: , Rfl:      Review of System  Review of Systems   Constitutional: Negative.    HENT: Negative.    Eyes: Negative.    Respiratory:  Negative.    Cardiovascular: Negative.    Endocrine: Negative.      I have reviewed and confirmed the accuracy of the ROS as documented by the MA/LPN/RN Magdiel Bowens MD    Vitals:    05/11/21 1548   BP: 120/78   Resp: 16     Body mass index is 44.04 kg/m².    Objective     Physical Exam  Physical Exam  Constitutional:       Appearance: Normal appearance.   HENT:      Head: Normocephalic and atraumatic.   Cardiovascular:      Rate and Rhythm: Normal rate and regular rhythm.   Musculoskeletal:         General: Normal range of motion.      Right lower leg: Edema present.      Comments: 3+ edema of the right lower extremity   Skin:     General: Skin is warm.      Findings: Lesion present.      Comments: Weeping   Neurological:      General: No focal deficit present.      Mental Status: He is alert and oriented to person, place, and time.         Assessment/Plan      This 75-year-old gentleman presents at this time for initial evaluation of 3+ edema of the right lower extremity.  He relates this problem has occurred off and on over the past several years but more recently within the past week or 2.  He relates he has been seen by vascular surgery in the past but he is not sure of the diagnosis.  He is scheduled to see them tomorrow for further evaluation.    Right lower extremity demonstrates 3+ edema with weeping wound on the right tibial area.  The leg is nontender not inflamed.  Left lower extremity demonstrates 2+ edema.  Patient ambulates without difficulty.  Patient BMI is 44+.    He was recently seen by Dr. Beth nephrologist who increased his Lasix to 40 mg p.o. daily but the patient relates that he had not started taking the increased dose.    Patient's lungs are clear to auscultation and resonant to percussion normal respiratory excursions were appreciated.    We note that the patient's BMI on 2/24 was 44+.  His potassium was 4.6.    Diagnoses and all orders for this visit:    1. Cellulitis and abscess of  right lower extremity (Primary)    2. Dependent edema      Plan  1.)  Amoxicillin 500/125 1 tab p.o. every 8  2.)  Lasix 40 mg 1 tab p.o. every morning  3.)  Follow-up in 1 week  4.)  Patient is urged to keep his appointment with vascular surgery.  5.)       Magdiel Bowens MD  05/11/2021

## 2021-05-20 ENCOUNTER — OFFICE VISIT (OUTPATIENT)
Dept: FAMILY MEDICINE CLINIC | Facility: CLINIC | Age: 76
End: 2021-05-20

## 2021-05-20 VITALS
BODY MASS INDEX: 40.43 KG/M2 | RESPIRATION RATE: 16 BRPM | HEART RATE: 69 BPM | SYSTOLIC BLOOD PRESSURE: 124 MMHG | OXYGEN SATURATION: 98 % | DIASTOLIC BLOOD PRESSURE: 76 MMHG | WEIGHT: 315 LBS | HEIGHT: 74 IN

## 2021-05-20 DIAGNOSIS — R60.9 DEPENDENT EDEMA: ICD-10-CM

## 2021-05-20 DIAGNOSIS — L02.415 CELLULITIS AND ABSCESS OF RIGHT LOWER EXTREMITY: Primary | ICD-10-CM

## 2021-05-20 DIAGNOSIS — I10 ESSENTIAL HYPERTENSION: ICD-10-CM

## 2021-05-20 DIAGNOSIS — L03.115 CELLULITIS AND ABSCESS OF RIGHT LOWER EXTREMITY: Primary | ICD-10-CM

## 2021-05-20 PROCEDURE — 99214 OFFICE O/P EST MOD 30 MIN: CPT | Performed by: INTERNAL MEDICINE

## 2021-05-20 RX ORDER — AMOXICILLIN AND CLAVULANATE POTASSIUM 500; 125 MG/1; MG/1
1 TABLET, FILM COATED ORAL 3 TIMES DAILY
Qty: 12 TABLET | Refills: 0 | Status: SHIPPED | OUTPATIENT
Start: 2021-05-20 | End: 2021-05-27 | Stop reason: SDUPTHER

## 2021-05-24 NOTE — PROGRESS NOTES
05/20/2021    CC: Cellulitis (follow up.  Both legs feel weak...no other issues)  .        HPI  Cellulitis  This is a recurrent problem. The current episode started 1 to 4 weeks ago. The problem occurs 2 to 4 times per day. The problem has been rapidly improving. Associated symptoms include joint swelling and a rash. The symptoms are aggravated by walking and standing. He has tried rest and lying down for the symptoms. The treatment provided moderate relief.        Subjective   James Loaiza is a 75 y.o. male.      The following portions of the patient's history were reviewed and updated as appropriate: allergies, current medications, past family history, past medical history, past social history, past surgical history and problem list.    Problem List  Patient Active Problem List   Diagnosis   • Lumbar herniated disc L3-L5 3/16/16 Open MRI   • Abnormal electrocardiogram   • Abnormal weight gain   • Aortic valve insufficiency   • Coronary arteriosclerosis in native artery   • Benign essential hypertension ([place] 1999)   • Edema   • Dyspnea on exertion   • Fatigue   • Left ventricular hypertrophy   • Obstructive sleep apnea syndrome   • Arthritis of left hip   • Hx of pulmonary embolus 7/15   • Morbid obesity with BMI of 45.0-49.9, adult (CMS/[facility])   • Intermittent dysphagia   • Chronic kidney disease ([facility])   • Hyperlipidemia 1999   • BPH (benign prostatic hypertrophy)   • Left cervical radiculopathy   • Rotator cuff tear, left   • Cardiomegaly   • Plantar fasciitis   • Hypogonadism male   • Vitamin D deficiency disease   • Renal cyst, left   • Preventative health care   • Medication management   • Chronic low back pain   • Lumbar spondylolysis   • Headache   • Other chronic pain   • Pulmonary embolus (CMS/[facility])   • Chronic obstructive pulmonary disease (CMS/[facility])   • Osteoarthritis of right shoulder   • Chronic anticoagulation   • Cervical stenosis of spine   • Bilateral occipital neuralgia   • Shoulder pain   •  "Cervical facet joint syndrome       Past Medical History  Past Medical History:   Diagnosis Date   • Abnormal EKG     referring to cardiology   • Anxiety    • Arthritis    • Back pain     worsening   • BPH (benign prostatic hyperplasia)     BPH/Nocturia/ Urinary Frequency   • Breast nodule     right   • Cardiomegaly     Cardiomegaly/ SOB with exertion   • Circulation problem    • Constipation    • COPD (chronic obstructive pulmonary disease) (CMS/[facility])    • Deviated septum    • DJD (degenerative joint disease)     DJD Knees   • DVT (deep venous thrombosis) (CMS/[facility])    • Dysphagia     solids and liquids - resolved with normal studies   • Encounter for annual health examination 11/14/2013    Annual Health Assessment   • Enlarged prostate    • Fatigue     Extreme fatigue   • Hyperlipidemia 1999   • Hypertension 1999    followed Dr. Marcelo   • Hypogonadism male    • Left hip pain     and DJD   • Left leg pain    • Low back pain    • Moist mucous membranes of ear, nose, and throat     \"Mucous in throat\"   • Morbid obesity (CMS/[facility])     (BMI-41.2za)   • Muscle cramping    • Muscle spasm     Muscle spasms   • Neck arthritis    • Neck muscle spasm     Neck muscle spasm/Cervical strain   • Neck pain    • Obesity    • Plantar fasciitis    • Prostatitis     recurrent   • Psoriasis    • Pulmonary embolus (CMS/[facility]) 01/2019    on Xarelltoypseerlipidemia   • Radiculopathy     Radiculopathy / Neuropathy left ar   • Renal insufficiency     followed by Dr. Mendes   • Seborrhea    • Shortness of breath    • Sleep apnea     Obstructive Sleep apnea worsening   • Urinary frequency    • Vascular disorder    • Vertigo    • Weight gain    • Wellness examination 10/23/2014    Annual Wellness Visit       Surgical History  Past Surgical History:   Procedure Laterality Date   • APPENDECTOMY  1965   • CARDIAC CATHETERIZATION  07/29/2010    [facility]   • COLONOSCOPY  01/05/2010   • COLONOSCOPY  10/01/2001   • NASAL " SEPTUM SURGERY     • NOSE SURGERY  1999   • RADIOFREQUENCY ABLATION Bilateral 3/2/2021    Procedure: RADIOFREQUENCY ABLATION NERVES---bilateral cervical 2-cervical3;  Surgeon: Edu Tineo MD;  Location: [facility] MAIN OR;  Service: Pain Management;  Laterality: Bilateral;   • TURP / TRANSURETHRAL INCISION / DRAINAGE PROSTATE  10/23/2015    Michael Castro       Family History  Family History   Problem Relation Age of Onset   • Arthritis Mother    • Heart disease Mother    • Hypertension Mother    • Heart attack Father    • Heart disease Father    • Hypertension Father    • Arthritis Sister    • Multiple sclerosis Sister    • Alcohol abuse Brother    • Diabetes Son    • Malig Hyperthermia Neg Hx        Social History  Social History    Tobacco Use      Smoking status: Never Smoker      Smokeless tobacco: Never Used       Is the Patient a current tobacco user? No    Allergies  No Known Allergies    Current Medications    Current Outpatient Medications:   •  amoxicillin-clavulanate (Augmentin) 500-125 MG per tablet, Take 1 tablet by mouth 3 (Three) Times a Day., Disp: 12 tablet, Rfl: 0  •  apixaban (ELIQUIS) 5 MG tablet tablet, Take 1 tablet by mouth Every 12 (Twelve) Hours., Disp: 6 tablet, Rfl: 0  •  aspirin 81 MG EC tablet, Take 81 mg by mouth Daily., Disp: , Rfl:   •  atorvastatin (LIPITOR) 40 MG tablet, TAKE ONE TABLET BY MOUTH DAILY, Disp: 90 tablet, Rfl: 1  •  diazePAM (Valium) 10 MG tablet, Take 1 tablet by mouth Every 8 (Eight) Hours As Needed for Anxiety., Disp: 3 tablet, Rfl: 0  •  furosemide (LASIX) 40 MG tablet, PT REPORTS HE TAKES ONCE EVERY WEEK OR TWO, Disp: , Rfl:   •  gabapentin (NEURONTIN) 300 MG capsule, Take 1 capsule by mouth Every Night., Disp: 30 capsule, Rfl: 2  •  NIFEdipine XL (PROCARDIA XL) 60 MG 24 hr tablet, TAKE ONE TABLET BY MOUTH DAILY, Disp: , Rfl:   •  ramipril (ALTACE) 10 MG capsule, TAKE ONE CAPSULE BY MOUTH DAILY, Disp: 90 capsule, Rfl: 1  •  tamsulosin (FLOMAX) 0.4 MG capsule  24 hr capsule, Take 1 capsule by mouth Every Night., Disp: , Rfl:   •  tiotropium bromide-olodaterol (STIOLTO RESPIMAT) 2.5-2.5 MCG/ACT aerosol solution inhaler, Inhale Daily., Disp: , Rfl:      Review of System  Review of Systems   Eyes: Negative.    Respiratory: Negative.    Cardiovascular: Negative.    Gastrointestinal: Negative.    Musculoskeletal: Positive for joint swelling.   Skin: Positive for color change and rash.     I have reviewed and confirmed the accuracy of the ROS as documented by the MA/LPN/RN Magdiel Bowens MD    Vitals:    05/20/21 1137   BP: 124/76   Pulse: 69   Resp: 16   SpO2: 98%     Body mass index is 43.99 kg/m².    Objective     Physical Exam  Physical Exam  Cardiovascular:      Rate and Rhythm: Normal rate and regular rhythm.      Pulses: Normal pulses.      Heart sounds: Normal heart sounds.   Pulmonary:      Effort: Pulmonary effort is normal.      Breath sounds: Normal breath sounds.   Musculoskeletal:         General: Swelling present.      Right lower leg: Edema present.      Left lower leg: Edema present.   Skin:     General: Skin is warm and dry.         Assessment/Plan      This pleasant 75-year-old presents at this time for follow-up of cellulitis and edema of the lower extremities.  His last office visit was approximately 2 weeks or so ago.  Patient had 2+ edema of the lower extremities then this is decreased to 1+ at this time.  He was placed on Augmentin but she is just about completed.  He was also started on Lasix for the edema which has helped tremendously as well.  Patient was finally seen by vascular surgery and he is been entered into the edema clinic with vascular.  He due to follow-up with him in the next few weeks.  Overall his cellulitis has progressed well.        Diagnoses and all orders for this visit:    1. Cellulitis and abscess of right lower extremity (Primary)  -     amoxicillin-clavulanate (Augmentin) 500-125 MG per tablet; Take 1 tablet by mouth 3  (Three) Times a Day.  Dispense: 12 tablet; Refill: 0    2. Essential hypertension    3. Dependent edema      Plan:  1.)   Intended furosemide 40 mg 1 tab by mouth twice a day  #2.)  Continue Augmentin to 8×5 more days.  #3.)  Follow-up in 3 weeks.       Magdiel Bowens MD  05/20/2021

## 2021-05-27 ENCOUNTER — TELEPHONE (OUTPATIENT)
Dept: FAMILY MEDICINE CLINIC | Facility: CLINIC | Age: 76
End: 2021-05-27

## 2021-05-27 DIAGNOSIS — L03.119 CELLULITIS OF LOWER EXTREMITY, UNSPECIFIED LATERALITY: Primary | ICD-10-CM

## 2021-05-27 DIAGNOSIS — L03.115 CELLULITIS AND ABSCESS OF RIGHT LOWER EXTREMITY: ICD-10-CM

## 2021-05-27 DIAGNOSIS — L02.415 CELLULITIS AND ABSCESS OF RIGHT LOWER EXTREMITY: ICD-10-CM

## 2021-05-27 RX ORDER — AMOXICILLIN AND CLAVULANATE POTASSIUM 500; 125 MG/1; MG/1
1 TABLET, FILM COATED ORAL 3 TIMES DAILY
Qty: 42 TABLET | Refills: 1 | Status: SHIPPED | OUTPATIENT
Start: 2021-05-27 | End: 2021-06-22

## 2021-05-27 NOTE — TELEPHONE ENCOUNTER
Caller: James Loaiza    Relationship: Self    Best call back number: [phone]     Medication needed:   Requested Prescriptions      No prescriptions requested or ordered in this encounter     MEDS FOR THE CELULITIUS    When do you need the refill by: ASAP    What additional details did the patient provide when requesting the medication: PATIENT WANTS MORE OF THE MEDICATION    Does the patient have less than a 3 day supply:  [x] Yes  [] No    What is the patient's preferred pharmacy: [facility] [address] [place] [address] [address] [facility] [address]  - [phone] [facility] [phone] FX

## 2021-05-28 NOTE — TELEPHONE ENCOUNTER
Patient currently is being seen in vascular edema clinic.  Apparently the edema is not progressing as hoped.  Also his cellulitis his reoccurred.  He completed his last dose of Augmentin 500/125 today.  I would like to increase his Augmentin to 875/125 but we need to be cautious with his decreased GFR of 39 as of 2/21.  We will bring him in to the office to reculture the wound site and restart him for now on Augmentin 500/125 as we obtain a new GFR.

## 2021-06-01 ENCOUNTER — OFFICE VISIT (OUTPATIENT)
Dept: FAMILY MEDICINE CLINIC | Facility: CLINIC | Age: 76
End: 2021-06-01

## 2021-06-01 VITALS
WEIGHT: 315 LBS | TEMPERATURE: 98.1 F | SYSTOLIC BLOOD PRESSURE: 128 MMHG | DIASTOLIC BLOOD PRESSURE: 82 MMHG | RESPIRATION RATE: 20 BRPM | HEART RATE: 64 BPM | OXYGEN SATURATION: 97 % | BODY MASS INDEX: 40.43 KG/M2 | HEIGHT: 74 IN

## 2021-06-01 DIAGNOSIS — G47.33 OBSTRUCTIVE SLEEP APNEA SYNDROME: Primary | ICD-10-CM

## 2021-06-01 DIAGNOSIS — L02.415 CELLULITIS AND ABSCESS OF RIGHT LOWER EXTREMITY: ICD-10-CM

## 2021-06-01 DIAGNOSIS — I10 ESSENTIAL HYPERTENSION: ICD-10-CM

## 2021-06-01 DIAGNOSIS — L03.115 CELLULITIS AND ABSCESS OF RIGHT LOWER EXTREMITY: ICD-10-CM

## 2021-06-01 PROCEDURE — 99214 OFFICE O/P EST MOD 30 MIN: CPT | Performed by: INTERNAL MEDICINE

## 2021-06-04 NOTE — PROGRESS NOTES
06/01/2021    CC: Cellulitis  .        HPI  Cellulitis  This is a recurrent problem. The current episode started 1 to 4 weeks ago. The problem occurs 2 to 4 times per day. The problem has been gradually improving. The symptoms are aggravated by walking. He has tried acetaminophen for the symptoms. The treatment provided moderate relief.        Subjective   James Loaiza is a 75 y.o. male.      The following portions of the patient's history were reviewed and updated as appropriate: allergies, current medications, past family history, past medical history, past social history, past surgical history and problem list.    Problem List  Patient Active Problem List   Diagnosis   • Lumbar herniated disc L3-L5 3/16/16 Open MRI   • Abnormal electrocardiogram   • Abnormal weight gain   • Aortic valve insufficiency   • Coronary arteriosclerosis in native artery   • Benign essential hypertension ([place] 1999)   • Edema   • Dyspnea on exertion   • Fatigue   • Left ventricular hypertrophy   • Obstructive sleep apnea syndrome   • Arthritis of left hip   • Hx of pulmonary embolus 7/15   • Morbid obesity with BMI of 45.0-49.9, adult (CMS/[facility])   • Intermittent dysphagia   • Chronic kidney disease ([facility])   • Hyperlipidemia 1999   • BPH (benign prostatic hypertrophy)   • Left cervical radiculopathy   • Rotator cuff tear, left   • Cardiomegaly   • Plantar fasciitis   • Hypogonadism male   • Vitamin D deficiency disease   • Renal cyst, left   • Preventative health care   • Medication management   • Chronic low back pain   • Lumbar spondylolysis   • Headache   • Other chronic pain   • Pulmonary embolus (CMS/[facility])   • Chronic obstructive pulmonary disease (CMS/[facility])   • Osteoarthritis of right shoulder   • Chronic anticoagulation   • Cervical stenosis of spine   • Bilateral occipital neuralgia   • Shoulder pain   • Cervical facet joint syndrome       Past Medical History  Past Medical History:   Diagnosis Date   • Abnormal EKG     referring  "to cardiology   • Anxiety    • Arthritis    • Back pain     worsening   • BPH (benign prostatic hyperplasia)     BPH/Nocturia/ Urinary Frequency   • Breast nodule     right   • Cardiomegaly     Cardiomegaly/ SOB with exertion   • Circulation problem    • Constipation    • COPD (chronic obstructive pulmonary disease) (CMS/[facility])    • Deviated septum    • DJD (degenerative joint disease)     DJD Knees   • DVT (deep venous thrombosis) (CMS/[facility])    • Dysphagia     solids and liquids - resolved with normal studies   • Encounter for annual health examination 11/14/2013    Annual Health Assessment   • Enlarged prostate    • Fatigue     Extreme fatigue   • Hyperlipidemia 1999   • Hypertension 1999    followed Dr. Marcelo   • Hypogonadism male    • Left hip pain     and DJD   • Left leg pain    • Low back pain    • Moist mucous membranes of ear, nose, and throat     \"Mucous in throat\"   • Morbid obesity (CMS/[facility])     (BMI-41.2za)   • Muscle cramping    • Muscle spasm     Muscle spasms   • Neck arthritis    • Neck muscle spasm     Neck muscle spasm/Cervical strain   • Neck pain    • Obesity    • Plantar fasciitis    • Prostatitis     recurrent   • Psoriasis    • Pulmonary embolus (CMS/[facility]) 01/2019    on Xarelltoypseerlipidemia   • Radiculopathy     Radiculopathy / Neuropathy left ar   • Renal insufficiency     followed by Dr. Mendes   • Seborrhea    • Shortness of breath    • Sleep apnea     Obstructive Sleep apnea worsening   • Urinary frequency    • Vascular disorder    • Vertigo    • Weight gain    • Wellness examination 10/23/2014    Annual Wellness Visit       Surgical History  Past Surgical History:   Procedure Laterality Date   • APPENDECTOMY  1965   • CARDIAC CATHETERIZATION  07/29/2010    [facility]   • COLONOSCOPY  01/05/2010   • COLONOSCOPY  10/01/2001   • NASAL SEPTUM SURGERY     • NOSE SURGERY  1999   • RADIOFREQUENCY ABLATION Bilateral 3/2/2021    Procedure: RADIOFREQUENCY ABLATION " NERVES---bilateral cervical 2-cervical3;  Surgeon: Edu Tineo MD;  Location: [facility] MAIN OR;  Service: Pain Management;  Laterality: Bilateral;   • TURP / TRANSURETHRAL INCISION / DRAINAGE PROSTATE  10/23/2015    Michael Castro       Family History  Family History   Problem Relation Age of Onset   • Arthritis Mother    • Heart disease Mother    • Hypertension Mother    • Heart attack Father    • Heart disease Father    • Hypertension Father    • Arthritis Sister    • Multiple sclerosis Sister    • Alcohol abuse Brother    • Diabetes Son    • Malig Hyperthermia Neg Hx        Social History  Social History    Tobacco Use      Smoking status: Never Smoker      Smokeless tobacco: Never Used       Is the Patient a current tobacco user? No    Allergies  No Known Allergies    Current Medications    Current Outpatient Medications:   •  amoxicillin-clavulanate (Augmentin) 500-125 MG per tablet, Take 1 tablet by mouth 3 (Three) Times a Day., Disp: 42 tablet, Rfl: 1  •  apixaban (ELIQUIS) 5 MG tablet tablet, Take 1 tablet by mouth Every 12 (Twelve) Hours., Disp: 6 tablet, Rfl: 0  •  aspirin 81 MG EC tablet, Take 81 mg by mouth Daily., Disp: , Rfl:   •  atorvastatin (LIPITOR) 40 MG tablet, TAKE ONE TABLET BY MOUTH DAILY, Disp: 90 tablet, Rfl: 1  •  furosemide (LASIX) 40 MG tablet, PT REPORTS HE TAKES ONCE EVERY WEEK OR TWO, Disp: , Rfl:   •  gabapentin (NEURONTIN) 300 MG capsule, Take 1 capsule by mouth Every Night., Disp: 30 capsule, Rfl: 2  •  NIFEdipine XL (PROCARDIA XL) 60 MG 24 hr tablet, TAKE ONE TABLET BY MOUTH DAILY, Disp: , Rfl:   •  ramipril (ALTACE) 10 MG capsule, TAKE ONE CAPSULE BY MOUTH DAILY, Disp: 90 capsule, Rfl: 1  •  tamsulosin (FLOMAX) 0.4 MG capsule 24 hr capsule, Take 1 capsule by mouth Every Night., Disp: , Rfl:   •  tiotropium bromide-olodaterol (STIOLTO RESPIMAT) 2.5-2.5 MCG/ACT aerosol solution inhaler, Inhale Daily., Disp: , Rfl:   •  diazePAM (Valium) 10 MG tablet, Take 1 tablet by mouth  Every 8 (Eight) Hours As Needed for Anxiety., Disp: 3 tablet, Rfl: 0     Review of System  Review of Systems   Eyes: Negative.    Respiratory: Negative.    Cardiovascular: Negative.    Genitourinary: Negative.    Musculoskeletal: Negative.      I have reviewed and confirmed the accuracy of the ROS as documented by the MA/LPN/RN Magdiel Bowens MD    Vitals:    06/01/21 1559   BP: 128/82   Pulse: 64   Resp: 20   Temp: 98.1 °F (36.7 °C)   SpO2: 97%     Body mass index is 43.88 kg/m².    Objective     Physical Exam  Physical Exam  Cardiovascular:      Rate and Rhythm: Normal rate and regular rhythm.      Pulses: Normal pulses.      Heart sounds: Normal heart sounds.   Pulmonary:      Breath sounds: Normal breath sounds.   Abdominal:      General: Bowel sounds are normal.   Musculoskeletal:         General: Swelling and tenderness present. Normal range of motion.      Cervical back: Normal range of motion and neck supple.      Right lower leg: Edema present.      Left lower leg: Edema present.   Skin:     Findings: Erythema present.         Assessment/Plan      This pleasant 75-year-old presents at this time for follow-up of cellulitis.  He is currently being seen by vascular surgery lymphedema clinic.  He relates that the pain and discomfort in both lower extremities has eased somewhat.  His dependent edema is now 2+ rather than 3+ and the erythema noted along the shins has decreased.  He is scheduled to have wrapping of the lower extremities done tomorrow through vascular.        Diagnoses and all orders for this visit:    1. Obstructive sleep apnea syndrome (Primary)    2. Cellulitis and abscess of right lower extremity    3. Essential hypertension      Plan:  1.)  Continue Lasix  2.)  Encouraged the patient to keep follow-up appointment tomorrow with vascular edema clinic for wrapping of the lower extremities for edema and cellulitis.       Magdiel Bowens MD  06/01/2021

## 2021-06-14 RX ORDER — ATORVASTATIN CALCIUM 40 MG/1
TABLET, FILM COATED ORAL
Qty: 90 TABLET | Refills: 0 | Status: SHIPPED | OUTPATIENT
Start: 2021-06-14 | End: 2021-07-24

## 2021-06-22 ENCOUNTER — OFFICE VISIT (OUTPATIENT)
Dept: FAMILY MEDICINE CLINIC | Facility: CLINIC | Age: 76
End: 2021-06-22

## 2021-06-22 VITALS
HEIGHT: 74 IN | BODY MASS INDEX: 40.43 KG/M2 | SYSTOLIC BLOOD PRESSURE: 138 MMHG | WEIGHT: 315 LBS | DIASTOLIC BLOOD PRESSURE: 78 MMHG

## 2021-06-22 DIAGNOSIS — L03.115 CELLULITIS AND ABSCESS OF RIGHT LOWER EXTREMITY: Primary | ICD-10-CM

## 2021-06-22 DIAGNOSIS — L02.415 CELLULITIS AND ABSCESS OF RIGHT LOWER EXTREMITY: Primary | ICD-10-CM

## 2021-06-22 PROCEDURE — 99213 OFFICE O/P EST LOW 20 MIN: CPT | Performed by: INTERNAL MEDICINE

## 2021-06-24 ENCOUNTER — TELEPHONE (OUTPATIENT)
Dept: FAMILY MEDICINE CLINIC | Facility: CLINIC | Age: 76
End: 2021-06-24

## 2021-06-24 NOTE — TELEPHONE ENCOUNTER
PATIENT STATES THAT HE IS CONCERNED ABOUT NEEDING ANTIBIOTICS FOR HIS SWELLING IN HIS LEG. HE STATES THAT THE LEG IS WARM AND RED AS WELL.    PLEASE ADVISE    PATIENT CAN BE REACHED AT  [phone]    [facility]  [address] - [address] AT [facility] [address]  - [phone]  - [phone] FX  [phone]

## 2021-06-25 RX ORDER — AMOXICILLIN AND CLAVULANATE POTASSIUM 500; 125 MG/1; MG/1
1 TABLET, FILM COATED ORAL 3 TIMES DAILY
Qty: 30 TABLET | Refills: 0 | OUTPATIENT
Start: 2021-06-25 | End: 2021-07-17

## 2021-06-25 NOTE — PROGRESS NOTES
06/22/2021    CC: Cellulitis (follow up)  .        HPI  Cellulitis  This is a recurrent problem. The current episode started 1 to 4 weeks ago. The problem has been resolved. Nothing aggravates the symptoms. He has tried rest for the symptoms. The treatment provided significant relief.        Subjective   James Loaiza is a 75 y.o. male.      The following portions of the patient's history were reviewed and updated as appropriate: allergies, current medications, past family history, past medical history, past social history, past surgical history and problem list.    Problem List  Patient Active Problem List   Diagnosis   • Lumbar herniated disc L3-L5 3/16/16 Open MRI   • Abnormal electrocardiogram   • Abnormal weight gain   • Aortic valve insufficiency   • Coronary arteriosclerosis in native artery   • Benign essential hypertension ([place] 1999)   • Edema   • Dyspnea on exertion   • Fatigue   • Left ventricular hypertrophy   • Obstructive sleep apnea syndrome   • Arthritis of left hip   • Hx of pulmonary embolus 7/15   • Morbid obesity with BMI of 45.0-49.9, adult (CMS/[facility])   • Intermittent dysphagia   • Chronic kidney disease ([facility])   • Hyperlipidemia 1999   • BPH (benign prostatic hypertrophy)   • Left cervical radiculopathy   • Rotator cuff tear, left   • Cardiomegaly   • Plantar fasciitis   • Hypogonadism male   • Vitamin D deficiency disease   • Renal cyst, left   • Preventative health care   • Medication management   • Chronic low back pain   • Lumbar spondylolysis   • Headache   • Other chronic pain   • Pulmonary embolus (CMS/[facility])   • Chronic obstructive pulmonary disease (CMS/[facility])   • Osteoarthritis of right shoulder   • Chronic anticoagulation   • Cervical stenosis of spine   • Bilateral occipital neuralgia   • Shoulder pain   • Cervical facet joint syndrome       Past Medical History  Past Medical History:   Diagnosis Date   • Abnormal EKG     referring to cardiology   • Anxiety    • Arthritis    • Back  "pain     worsening   • BPH (benign prostatic hyperplasia)     BPH/Nocturia/ Urinary Frequency   • Breast nodule     right   • Cardiomegaly     Cardiomegaly/ SOB with exertion   • Circulation problem    • Constipation    • COPD (chronic obstructive pulmonary disease) (CMS/[facility])    • Deviated septum    • DJD (degenerative joint disease)     DJD Knees   • DVT (deep venous thrombosis) (CMS/[facility])    • Dysphagia     solids and liquids - resolved with normal studies   • Encounter for annual health examination 11/14/2013    Annual Health Assessment   • Enlarged prostate    • Fatigue     Extreme fatigue   • Hyperlipidemia 1999   • Hypertension 1999    followed Dr. Marcelo   • Hypogonadism male    • Left hip pain     and DJD   • Left leg pain    • Low back pain    • Moist mucous membranes of ear, nose, and throat     \"Mucous in throat\"   • Morbid obesity (CMS/[facility])     (BMI-41.2za)   • Muscle cramping    • Muscle spasm     Muscle spasms   • Neck arthritis    • Neck muscle spasm     Neck muscle spasm/Cervical strain   • Neck pain    • Obesity    • Plantar fasciitis    • Prostatitis     recurrent   • Psoriasis    • Pulmonary embolus (CMS/[facility]) 01/2019    on Xarelltoypseerlipidemia   • Radiculopathy     Radiculopathy / Neuropathy left ar   • Renal insufficiency     followed by Dr. Mendes   • Seborrhea    • Shortness of breath    • Sleep apnea     Obstructive Sleep apnea worsening   • Urinary frequency    • Vascular disorder    • Vertigo    • Weight gain    • Wellness examination 10/23/2014    Annual Wellness Visit       Surgical History  Past Surgical History:   Procedure Laterality Date   • APPENDECTOMY  1965   • CARDIAC CATHETERIZATION  07/29/2010    [facility]   • COLONOSCOPY  01/05/2010   • COLONOSCOPY  10/01/2001   • NASAL SEPTUM SURGERY     • NOSE SURGERY  1999   • RADIOFREQUENCY ABLATION Bilateral 3/2/2021    Procedure: RADIOFREQUENCY ABLATION NERVES---bilateral cervical 2-cervical3;  Surgeon: Noman" Edu WILSON MD;  Location: [facility] [place] MA[facility] OR;  Service: Pain Management;  Laterality: Bilateral;   • TURP / TRANSURETHRAL INCISION / DRAINAGE PROSTATE  10/23/2015    Michael Castro       Family History  Family History   Problem Relation Age of Onset   • Arthritis Mother    • Heart disease Mother    • Hypertension Mother    • Heart attack Father    • Heart disease Father    • Hypertension Father    • Arthritis Sister    • Multiple sclerosis Sister    • Alcohol abuse Brother    • Diabetes Son    • Malig Hyperthermia Neg Hx        Social History  Social History    Tobacco Use      Smoking status: Never Smoker      Smokeless tobacco: Never Used       Is the Patient a current tobacco user? No    Allergies  No Known Allergies    Current Medications    Current Outpatient Medications:   •  apixaban (ELIQUIS) 5 MG tablet tablet, Take 1 tablet by mouth Every 12 (Twelve) Hours., Disp: 6 tablet, Rfl: 0  •  aspirin 81 MG EC tablet, Take 81 mg by mouth Daily., Disp: , Rfl:   •  atorvastatin (LIPITOR) 40 MG tablet, TAKE ONE TABLET BY MOUTH DAILY, Disp: 90 tablet, Rfl: 0  •  furosemide (LASIX) 40 MG tablet, PT REPORTS HE TAKES ONCE EVERY WEEK OR TWO, Disp: , Rfl:   •  gabapentin (NEURONTIN) 300 MG capsule, Take 1 capsule by mouth Every Night., Disp: 30 capsule, Rfl: 2  •  NIFEdipine XL (PROCARDIA XL) 60 MG 24 hr tablet, TAKE ONE TABLET BY MOUTH DAILY, Disp: , Rfl:   •  ramipril (ALTACE) 10 MG capsule, TAKE ONE CAPSULE BY MOUTH DAILY, Disp: 90 capsule, Rfl: 1  •  tamsulosin (FLOMAX) 0.4 MG capsule 24 hr capsule, Take 1 capsule by mouth Every Night., Disp: , Rfl:   •  tiotropium bromide-olodaterol (STIOLTO RESPIMAT) 2.5-2.5 MCG/ACT aerosol solution inhaler, Inhale Daily., Disp: , Rfl:   •  amoxicillin-clavulanate (Augmentin) 500-125 MG per tablet, Take 1 tablet by mouth 3 (Three) Times a Day., Disp: 30 tablet, Rfl: 0     Review of System  Review of Systems   Constitutional: Negative.    Eyes: Negative.    Respiratory: Negative.     Gastrointestinal: Negative.    Musculoskeletal:        Previous edema in the right lower extremity which was 3+ is now 1+.   Skin:        Erythema of the right lower extremity has resolved.     I have reviewed and confirmed the accuracy of the ROS as documented by the MA/LPN/RN Magdiel Bowens MD    Vitals:    06/22/21 1617   BP: 138/78     Body mass index is 44.91 kg/m².    Objective     Physical Exam  Physical Exam  Constitutional:       Appearance: He is obese.   Cardiovascular:      Rate and Rhythm: Normal rate and regular rhythm.      Pulses: Normal pulses.      Heart sounds: Normal heart sounds.   Pulmonary:      Breath sounds: Normal breath sounds.   Musculoskeletal:         General: Normal range of motion.   Neurological:      Mental Status: He is alert.         Assessment/Plan    Patient is seen for follow-up of cellulitis of the right lower extremity.  The previously seen 3+ edema has resolved in the right lower extremity and is 1+ on the left.  There is no evidence of erythema in the left.  Note is made the patient had vein ligation about a week or so ago in the right lower extremity.    His comprehensive metabolic panel was within normal limits as of 2/21.  His lipid profile likewise was normal on 2/21.  He has completed his antibiotic therapy which consist consisted of Augmentin.  He is to [facility] with [facility] in the next week.  Marked improvement has been seen in his right lower extremity.          Diagnoses and all orders for this visit:    1. Cellulitis and abscess of right lower extremity (Primary)      Plan:  1.)  Follow-up in the next several months.  2.)  We urged patient to continue follow-up with the edema clinic and to continue his compression hose.       Magdiel Bowens MD  06/22/2021

## 2021-06-29 ENCOUNTER — OFFICE VISIT (OUTPATIENT)
Dept: FAMILY MEDICINE CLINIC | Facility: CLINIC | Age: 76
End: 2021-06-29

## 2021-06-29 VITALS
HEART RATE: 65 BPM | HEIGHT: 74 IN | SYSTOLIC BLOOD PRESSURE: 112 MMHG | RESPIRATION RATE: 18 BRPM | BODY MASS INDEX: 40.43 KG/M2 | DIASTOLIC BLOOD PRESSURE: 74 MMHG | TEMPERATURE: 97.7 F | OXYGEN SATURATION: 96 % | WEIGHT: 315 LBS

## 2021-06-29 DIAGNOSIS — E66.01 MORBID OBESITY WITH BMI OF 45.0-49.9, ADULT (HCC): ICD-10-CM

## 2021-06-29 DIAGNOSIS — N18.32 STAGE 3B CHRONIC KIDNEY DISEASE (HCC): ICD-10-CM

## 2021-06-29 DIAGNOSIS — I89.0 LYMPHEDEMA OF BOTH LOWER EXTREMITIES: ICD-10-CM

## 2021-06-29 DIAGNOSIS — Z79.01 CHRONIC ANTICOAGULATION: ICD-10-CM

## 2021-06-29 DIAGNOSIS — Z86.711 HX OF PULMONARY EMBOLUS: ICD-10-CM

## 2021-06-29 DIAGNOSIS — L03.115 CELLULITIS OF RIGHT LOWER EXTREMITY: Primary | ICD-10-CM

## 2021-06-29 PROCEDURE — 99213 OFFICE O/P EST LOW 20 MIN: CPT | Performed by: FAMILY MEDICINE

## 2021-06-29 NOTE — PROGRESS NOTES
"HPI  James Loaiza is a 75 y.o. male who is here for follow up of cellulitis in his legs.  Reports flareup of cellulitis last week and was started back on Augmentin on Friday.  Continued with redness and swelling over the weekend but actually yesterday improved significantly.  Patient has chronic edema in his legs and is followed by a specialty clinic.  Also followed by nephrologist including recent lab work.      Review of Systems   Skin: Positive for color change and wound.   All other systems reviewed and are negative.        Past Medical History:   Diagnosis Date   • Abnormal EKG     referring to cardiology   • Anxiety    • Arthritis    • Back pain     worsening   • BPH (benign prostatic hyperplasia)     BPH/Nocturia/ Urinary Frequency   • Breast nodule     right   • Cardiomegaly     Cardiomegaly/ SOB with exertion   • Circulation problem    • Constipation    • COPD (chronic obstructive pulmonary disease) (CMS/[facility])    • Deviated septum    • DJD (degenerative joint disease)     DJD Knees   • DVT (deep venous thrombosis) (CMS/[facility])    • Dysphagia     solids and liquids - resolved with normal studies   • Encounter for annual health examination 11/14/2013    Annual Health Assessment   • Enlarged prostate    • Fatigue     Extreme fatigue   • Hyperlipidemia 1999   • Hypertension 1999    followed Dr. Marcelo   • Hypogonadism male    • Left hip pain     and DJD   • Left leg pain    • Low back pain    • Moist mucous membranes of ear, nose, and throat     \"Mucous in throat\"   • Morbid obesity (CMS/[facility])     (BMI-41.2za)   • Muscle cramping    • Muscle spasm     Muscle spasms   • Neck arthritis    • Neck muscle spasm     Neck muscle spasm/Cervical strain   • Neck pain    • Obesity    • Plantar fasciitis    • Prostatitis     recurrent   • Psoriasis    • Pulmonary embolus (CMS/[facility]) 01/2019    on Xarelltoypseerlipidemia   • Radiculopathy     Radiculopathy / Neuropathy left ar   • Renal insufficiency     followed by Dr. Mendes "   • Seborrhea    • Shortness of breath    • Sleep apnea     Obstructive Sleep apnea worsening   • Urinary frequency    • Vascular disorder    • Vertigo    • Weight gain    • Wellness examination 10/23/2014    Annual Wellness Visit       Past Surgical History:   Procedure Laterality Date   • APPENDECTOMY  1965   • CARDIAC CATHETERIZATION  07/29/2010    [facility]   • COLONOSCOPY  01/05/2010   • COLONOSCOPY  10/01/2001   • NASAL SEPTUM SURGERY     • NOSE SURGERY  1999   • RADIOFREQUENCY ABLATION Bilateral 3/2/2021    Procedure: RADIOFREQUENCY ABLATION NERVES---bilateral cervical 2-cervical3;  Surgeon: Edu Tineo MD;  [place] [facility] [place];  Service: Pain Management;  Laterality: Bilateral;   • TURP / TRANSURETHRAL INCISION / DRAINAGE PROSTATE  10/23/2015    Michael Castro       Family History   Problem Relation Age of Onset   • Arthritis Mother    • Heart disease Mother    • Hypertension Mother    • Heart attack Father    • Heart disease Father    • Hypertension Father    • Arthritis Sister    • Multiple sclerosis Sister    • Alcohol abuse Brother    • Diabetes Son    • Malig Hyperthermia Neg Hx        Social History     Socioeconomic History   • Marital status:      Spouse name: Not on file   • Number of children: Not on file   • Years of education: Not on file   • Highest education level: Not on file   Tobacco Use   • Smoking status: Never Smoker   • Smokeless tobacco: Never Used   Vaping Use   • Vaping Use: Never used   Substance and Sexual Activity   • Alcohol use: Yes     Comment: social   • Drug use: No   • Sexual activity: Defer       Vitals:    06/29/21 1358   BP: 112/74   Pulse: 65   Resp: 18   Temp: 97.7 °F (36.5 °C)   SpO2: 96%        Body mass index is 44.6 kg/m².      Physical Exam  Vitals and nursing note reviewed.   Constitutional:       General: He is not in acute distress.     Appearance: He is well-developed. He is obese.   HENT:      Head: Normocephalic  and atraumatic.      Nose:      Comments: Patient with mask.  Provider with mask and shield  Eyes:      Extraocular Movements: Extraocular movements intact.      Conjunctiva/sclera: Conjunctivae normal.      Pupils: Pupils are equal, round, and reactive to light.   Neck:      Thyroid: No thyromegaly.   Cardiovascular:      Rate and Rhythm: Normal rate and regular rhythm.      Heart sounds: Normal heart sounds.   Pulmonary:      Effort: Pulmonary effort is normal. No respiratory distress.      Breath sounds: Normal breath sounds.   Abdominal:      General: There is no distension.      Palpations: Abdomen is soft. There is no mass.      Tenderness: There is no abdominal tenderness.      Hernia: No hernia is present.   Musculoskeletal:         General: Tenderness present. No deformity. Normal range of motion.      Cervical back: Normal range of motion.      Right lower leg: Edema present.      Left lower leg: Edema present.   Lymphadenopathy:      Cervical: No cervical adenopathy.   Skin:     General: Skin is warm and dry.      Coloration: Skin is not pale.      Findings: Erythema and lesion present. No rash.      Comments: Open wounds and dressings both lower legs related to chronic lymphedema.   Neurological:      General: No focal deficit present.      Mental Status: He is alert and oriented to person, place, and time.      Motor: No abnormal muscle tone.      Coordination: Coordination normal.   Psychiatric:         Mood and Affect: Mood normal.         Behavior: Behavior normal.         Thought Content: Thought content normal.         Judgment: Judgment normal.           Assessment/Plan    Diagnoses and all orders for this visit:    1. Cellulitis of right lower extremity (Primary)    2. Lymphedema of both lower extremities    3. Stage 3b chronic kidney disease (CMS/HCC)    4. Morbid obesity with BMI of 45.0-49.9, adult (CMS/HCC)    5. Chronic anticoagulation    6. Hx of pulmonary embolus 7/15      Patient presents  for follow-up of cellulitis of the lower extremity.  Has chronic lymphedema and apparent history of previous DVT and pulmonary emboli.  Is followed by edema clinic and apparently recently developed cellulitis of the lower leg.  Augmentin was resumed Friday night and reports cellulitis has improved over the last 24 hours.  Recommend continuing Augmentin as well as elevation of leg as much as possible.  Call if inflammation recurs or worsens.  Otherwise recommend routine follow-up appointment with primary care physician next week.  Of note was seen by nephrologist recently and had lab work obtained.    This note includes information entered using a voice recognition dictation system.  Though reviewed, some nonsensible errors may remain.

## 2021-07-02 ENCOUNTER — TELEPHONE (OUTPATIENT)
Dept: FAMILY MEDICINE CLINIC | Facility: CLINIC | Age: 76
End: 2021-07-02

## 2021-07-02 NOTE — TELEPHONE ENCOUNTER
PATIENT CALLED AND STATES HIS CELLULITIS IS NOT HEALING. HE IS AT THIS MOMENT GETTING HIS LEG WRAPPED. HE STATES HE IS AT [facility] AND STATES HIS LEG IS STILL INFLAMMED, LEAKING FROM OPEN SORE.     HE IS TAKING AMOXICILLIN. HE FINISHED THE FIRST ROUND 2 WEEKS AGO. AS THE WEEK WENT ON IT WAS BURNING BADLY. WAS SEEN BY DR. ALEXANDER BUT IT WAS NOT LOOKED AT.   HE DOES HAVE PICTURES OF IT ON HIS PHONE   IT IS ACTIVELY LEAKING. OFFERED APPOINTMENT SAME DAY BUT HIS LEG IS WRAPPED.     PLEASE CALL AND ADVISE  [phone]    [facility] [place] [address] AT [facility] [facility] [address] - [phone] PH - [phone] FX  [phone]    ATTEMPTED WARM TRANSFER

## 2021-07-03 NOTE — TELEPHONE ENCOUNTER
Patient relates that he's feeling much better now.  He got his legs rewrapped today.  He relates that the pain and discomfort has resolved.  There are no longer weeping and he is leaning toward continuing by mouth antibiotics.  We asked him to go to the emergency room if he develops temperature fever, if the wound begins to week more, or becomes more painful.

## 2021-07-13 ENCOUNTER — APPOINTMENT (OUTPATIENT)
Dept: CARDIOLOGY | Facility: HOSPITAL | Age: 76
End: 2021-07-13

## 2021-07-16 ENCOUNTER — HOSPITAL ENCOUNTER (EMERGENCY)
Facility: HOSPITAL | Age: 76
Discharge: HOME | End: 2021-07-17
Attending: EMERGENCY MEDICINE | Admitting: EMERGENCY MEDICINE

## 2021-07-16 DIAGNOSIS — I89.0 LYMPHEDEMA: ICD-10-CM

## 2021-07-16 DIAGNOSIS — L03.818 CELLULITIS OF OTHER SPECIFIED SITE: Primary | ICD-10-CM

## 2021-07-16 PROCEDURE — 99283 EMERGENCY DEPT VISIT LOW MDM: CPT

## 2021-07-16 RX ORDER — SODIUM CHLORIDE 0.9 % (FLUSH) 0.9 %
10 SYRINGE (ML) INJECTION AS NEEDED
Status: DISCONTINUED | OUTPATIENT
Start: 2021-07-16 | End: 2021-07-17 | Stop reason: HOSPADM

## 2021-07-17 VITALS
RESPIRATION RATE: 22 BRPM | HEART RATE: 88 BPM | HEIGHT: 74 IN | OXYGEN SATURATION: 96 % | DIASTOLIC BLOOD PRESSURE: 82 MMHG | SYSTOLIC BLOOD PRESSURE: 166 MMHG | TEMPERATURE: 98.5 F | BODY MASS INDEX: 44.6 KG/M2

## 2021-07-17 LAB
ALBUMIN SERPL-MCNC: 3.8 G/DL (ref 3.5–5.2)
ALBUMIN/GLOB SERPL: 1.2 G/DL
ALP SERPL-CCNC: 174 U/L (ref 39–117)
ALT SERPL W P-5'-P-CCNC: 10 U/L (ref 1–41)
ANION GAP SERPL CALCULATED.3IONS-SCNC: 10.2 MMOL/L (ref 5–15)
AST SERPL-CCNC: 12 U/L (ref 1–40)
BASOPHILS # BLD AUTO: 0.05 10*3/MM3 (ref 0–0.2)
BASOPHILS NFR BLD AUTO: 0.5 % (ref 0–1.5)
BILIRUB SERPL-MCNC: 0.3 MG/DL (ref 0–1.2)
BUN SERPL-MCNC: 27 MG/DL (ref 8–23)
BUN/CREAT SERPL: 14.8 (ref 7–25)
CALCIUM SPEC-SCNC: 8.8 MG/DL (ref 8.6–10.5)
CHLORIDE SERPL-SCNC: 108 MMOL/L (ref 98–107)
CO2 SERPL-SCNC: 24.8 MMOL/L (ref 22–29)
CREAT SERPL-MCNC: 1.83 MG/DL (ref 0.76–1.27)
D-LACTATE SERPL-SCNC: 1 MMOL/L (ref 0.5–2)
DEPRECATED RDW RBC AUTO: 45.2 FL (ref 37–54)
EOSINOPHIL # BLD AUTO: 0.17 10*3/MM3 (ref 0–0.4)
EOSINOPHIL NFR BLD AUTO: 1.7 % (ref 0.3–6.2)
ERYTHROCYTE [DISTWIDTH] IN BLOOD BY AUTOMATED COUNT: 13.1 % (ref 12.3–15.4)
GFR SERPL CREATININE-BSD FRML MDRD: 44 ML/MIN/1.73
GLOBULIN UR ELPH-MCNC: 3.3 GM/DL
GLUCOSE SERPL-MCNC: 120 MG/DL (ref 65–99)
HCT VFR BLD AUTO: 37.5 % (ref 37.5–51)
HGB BLD-MCNC: 12.7 G/DL (ref 13–17.7)
IMM GRANULOCYTES # BLD AUTO: 0.09 10*3/MM3 (ref 0–0.05)
IMM GRANULOCYTES NFR BLD AUTO: 0.9 % (ref 0–0.5)
LYMPHOCYTES # BLD AUTO: 1.44 10*3/MM3 (ref 0.7–3.1)
LYMPHOCYTES NFR BLD AUTO: 14.5 % (ref 19.6–45.3)
MCH RBC QN AUTO: 31.9 PG (ref 26.6–33)
MCHC RBC AUTO-ENTMCNC: 33.9 G/DL (ref 31.5–35.7)
MCV RBC AUTO: 94.2 FL (ref 79–97)
MONOCYTES # BLD AUTO: 0.72 10*3/MM3 (ref 0.1–0.9)
MONOCYTES NFR BLD AUTO: 7.3 % (ref 5–12)
NEUTROPHILS NFR BLD AUTO: 7.45 10*3/MM3 (ref 1.7–7)
NEUTROPHILS NFR BLD AUTO: 75.1 % (ref 42.7–76)
NRBC BLD AUTO-RTO: 0 /100 WBC (ref 0–0.2)
PLATELET # BLD AUTO: 203 10*3/MM3 (ref 140–450)
PMV BLD AUTO: 10.1 FL (ref 6–12)
POTASSIUM SERPL-SCNC: 4.4 MMOL/L (ref 3.5–5.2)
PROT SERPL-MCNC: 7.1 G/DL (ref 6–8.5)
RBC # BLD AUTO: 3.98 10*6/MM3 (ref 4.14–5.8)
SODIUM SERPL-SCNC: 143 MMOL/L (ref 136–145)
WBC # BLD AUTO: 9.92 10*3/MM3 (ref 3.4–10.8)

## 2021-07-17 PROCEDURE — 85025 COMPLETE CBC W/AUTO DIFF WBC: CPT | Performed by: EMERGENCY MEDICINE

## 2021-07-17 PROCEDURE — 80053 COMPREHEN METABOLIC PANEL: CPT | Performed by: EMERGENCY MEDICINE

## 2021-07-17 PROCEDURE — 83605 ASSAY OF LACTIC ACID: CPT | Performed by: EMERGENCY MEDICINE

## 2021-07-17 RX ORDER — DOXYCYCLINE 100 MG/1
100 CAPSULE ORAL 2 TIMES DAILY
Qty: 20 CAPSULE | Refills: 0 | Status: SHIPPED | OUTPATIENT
Start: 2021-07-17 | End: 2021-07-23 | Stop reason: SDUPTHER

## 2021-07-17 NOTE — ED TRIAGE NOTES
Pt has chronic cellulitis of R lower leg that has gotten worse, pt has mask in place, has not been able to get in to see his PMD.  This RN wearing mask and eye protection with hand hygiene prior to and after encounter.

## 2021-07-17 NOTE — ED PROVIDER NOTES
" EMERGENCY DEPARTMENT ENCOUNTER    Room Number:  10/10  Date of encounter:  7/17/2021  PCP: Magdiel Bowens MD  Historian: Patient      HPI:  Chief Complaint: Cellulitis to right lower extremity  A complete HPI/ROS/PMH/PSH/SH/FH are unobtainable due to: None    Context: James Loaiza is a 75 y.o. male who presents to the ED c/o worsening cellulitis to right lower extremity.  Patient has history of chronic peripheral edema.  Noted some clear drainage.  Patient has been on Augmentin for \"months \"for chronic cellulitis.  No fevers or chills.  Patient denies any pain to the limb.      PAST MEDICAL HISTORY  Active Ambulatory Problems     Diagnosis Date Noted   • Lumbar herniated disc L3-L5 3/16/16 Open MRI 06/13/2016   • Abnormal electrocardiogram 06/13/2016   • Abnormal weight gain 06/13/2016   • Aortic valve insufficiency 06/13/2016   • Coronary arteriosclerosis in native artery 06/13/2016   • Benign essential hypertension ([place]je[place] 1999) 06/13/2016   • Edema 06/13/2016   • Dyspnea on exertion 06/13/2016   • Fatigue 06/13/2016   • Left ventricular hypertrophy 06/13/2016   • Obstructive sleep apnea syndrome 06/13/2016   • Arthritis of left hip 06/13/2016   • Hx of pulmonary embolus 7/15 06/13/2016   • Morbid obesity with BMI of 45.0-49.9, adult (CMS/HCC) 06/13/2016   • Intermittent dysphagia 06/13/2016   • Chronic kidney disease (Lona) 06/13/2016   • Hyperlipidemia 1999 06/13/2016   • BPH (benign prostatic hypertrophy) 06/13/2016   • Left cervical radiculopathy 06/13/2016   • Rotator cuff tear, left 06/13/2016   • Cardiomegaly 06/13/2016   • Plantar fasciitis 06/13/2016   • Hypogonadism male 06/13/2016   • Vitamin D deficiency disease 06/13/2016   • Renal cyst, left 06/13/2016   • Preventative health care 09/22/2016   • Medication management 09/22/2016   • Chronic low back pain 03/01/2017   • Lumbar spondylolysis 03/01/2017   • Headache 03/13/2017   • Other chronic pain 07/30/2018   • Pulmonary embolus (CMS/[facility]) " 01/13/2019   • Chronic obstructive pulmonary disease (CMS/HCC) 01/29/2019   • Osteoarthritis of right shoulder 01/30/2019   • Chronic anticoagulation 02/20/2019   • Cervical stenosis of spine 09/21/2020   • Bilateral occipital neuralgia 01/28/2021   • Shoulder pain 01/28/2021   • Cervical facet joint syndrome 02/22/2021     Resolved Ambulatory Problems     Diagnosis Date Noted   • No Resolved Ambulatory Problems     Past Medical History:   Diagnosis Date   • Abnormal EKG    • Anxiety    • Arthritis    • Back pain    • BPH (benign prostatic hyperplasia)    • Breast nodule    • Circulation problem    • Constipation    • COPD (chronic obstructive pulmonary disease) (CMS/[facility])    • Deviated septum    • DJD (degenerative joint disease)    • DVT (deep venous thrombosis) (CMS/[facility])    • Dysphagia    • Encounter for annual health examination 11/14/2013   • Enlarged prostate    • Hypertension 1999   • Left hip pain    • Left leg pain    • Low back pain    • Moist mucous membranes of ear, nose, and throat    • Morbid obesity (CMS/[facility])    • Muscle cramping    • Muscle spasm    • Neck arthritis    • Neck muscle spasm    • Neck pain    • Obesity    • Prostatitis    • Psoriasis    • Radiculopathy    • Renal insufficiency    • Seborrhea    • Shortness of breath    • Sleep apnea    • Urinary frequency    • Vascular disorder    • Vertigo    • Weight gain    • Wellness examination 10/23/2014         PAST SURGICAL HISTORY  Past Surgical History:   Procedure Laterality Date   • APPENDECTOMY  1965   • CARDIAC CATHETERIZATION  07/29/2010    [facility]   • COLONOSCOPY  01/05/2010   • COLONOSCOPY  10/01/2001   • NASAL SEPTUM SURGERY     • NOSE SURGERY  1999   • RADIOFREQUENCY ABLATION Bilateral 3/2/2021    Procedure: RADIOFREQUENCY ABLATION NERVES---bilateral cervical 2-cervical3;  Surgeon: Edu Tineo MD;  Location: [facility] [place];  Service: Pain Management;  Laterality: Bilateral;   • TURP / TRANSURETHRAL  INCISION / DRAINAGE PROSTATE  10/23/2015    Michael Castro         FAMILY HISTORY  Family History   Problem Relation Age of Onset   • Arthritis Mother    • Heart disease Mother    • Hypertension Mother    • Heart attack Father    • Heart disease Father    • Hypertension Father    • Arthritis Sister    • Multiple sclerosis Sister    • Alcohol abuse Brother    • Diabetes Son    • Malig Hyperthermia Neg Hx          SOCIAL HISTORY  Social History     Socioeconomic History   • Marital status:      Spouse name: Not on file   • Number of children: Not on file   • Years of education: Not on file   • Highest education level: Not on file   Tobacco Use   • Smoking status: Never Smoker   • Smokeless tobacco: Never Used   Vaping Use   • Vaping Use: Never used   Substance and Sexual Activity   • Alcohol use: Yes     Comment: social   • Drug use: No   • Sexual activity: Defer         ALLERGIES  Patient has no known allergies.        REVIEW OF SYSTEMS  Review of Systems     All systems reviewed and negative except for those discussed in HPI.       PHYSICAL EXAM    I have reviewed the triage vital signs and nursing notes.    ED Triage Vitals [07/16/21 2240]   Temp Heart Rate Resp BP SpO2   98.7 °F (37.1 °C) 86 -- -- 94 %      Temp src Heart Rate Source Patient Position BP Location FiO2 (%)   Temporal -- -- -- --       Physical Exam  GENERAL: not distressed  HENT: nares patent  EYES: no scleral icterus  CV: regular rhythm, regular rate, chronic edema to bilateral lower extremities right lower extremity has small area of skin breakdown with a very minimal amount of purulence on the bandage no significant erythema, small amount of weeping fluid  RESPIRATORY: normal effort  ABDOMEN: soft  MUSCULOSKELETAL: no deformity  NEURO: alert, moves all extremities, follows commands  SKIN: warm, dry        LAB RESULTS  Recent Results (from the past 24 hour(s))   Comprehensive Metabolic Panel    Collection Time: 07/17/21 12:10 AM     Specimen: Blood   Result Value Ref Range    Glucose 120 (H) 65 - 99 mg/dL    BUN 27 (H) 8 - 23 mg/dL    Creatinine 1.83 (H) 0.76 - 1.27 mg/dL    Sodium 143 136 - 145 mmol/L    Potassium 4.4 3.5 - 5.2 mmol/L    Chloride 108 (H) 98 - 107 mmol/L    CO2 24.8 22.0 - 29.0 mmol/L    Calcium 8.8 8.6 - 10.5 mg/dL    Total Protein 7.1 6.0 - 8.5 g/dL    Albumin 3.80 3.50 - 5.20 g/dL    ALT (SGPT) 10 1 - 41 U/L    AST (SGOT) 12 1 - 40 U/L    Alkaline Phosphatase 174 (H) 39 - 117 U/L    Total Bilirubin 0.3 0.0 - 1.2 mg/dL    eGFR  African Amer 44 (L) >60 mL/min/1.73    Globulin 3.3 gm/dL    A/G Ratio 1.2 g/dL    BUN/Creatinine Ratio 14.8 7.0 - 25.0    Anion Gap 10.2 5.0 - 15.0 mmol/L   Lactic Acid, Plasma    Collection Time: 07/17/21 12:10 AM    Specimen: Blood   Result Value Ref Range    Lactate 1.0 0.5 - 2.0 mmol/L   CBC Auto Differential    Collection Time: 07/17/21 12:10 AM    Specimen: Blood   Result Value Ref Range    WBC 9.92 3.40 - 10.80 10*3/mm3    RBC 3.98 (L) 4.14 - 5.80 10*6/mm3    Hemoglobin 12.7 (L) 13.0 - 17.7 g/dL    Hematocrit 37.5 37.5 - 51.0 %    MCV 94.2 79.0 - 97.0 fL    MCH 31.9 26.6 - 33.0 pg    MCHC 33.9 31.5 - 35.7 g/dL    RDW 13.1 12.3 - 15.4 %    RDW-SD 45.2 37.0 - 54.0 fl    MPV 10.1 6.0 - 12.0 fL    Platelets 203 140 - 450 10*3/mm3    Neutrophil % 75.1 42.7 - 76.0 %    Lymphocyte % 14.5 (L) 19.6 - 45.3 %    Monocyte % 7.3 5.0 - 12.0 %    Eosinophil % 1.7 0.3 - 6.2 %    Basophil % 0.5 0.0 - 1.5 %    Immature Grans % 0.9 (H) 0.0 - 0.5 %    Neutrophils, Absolute 7.45 (H) 1.70 - 7.00 10*3/mm3    Lymphocytes, Absolute 1.44 0.70 - 3.10 10*3/mm3    Monocytes, Absolute 0.72 0.10 - 0.90 10*3/mm3    Eosinophils, Absolute 0.17 0.00 - 0.40 10*3/mm3    Basophils, Absolute 0.05 0.00 - 0.20 10*3/mm3    Immature Grans, Absolute 0.09 (H) 0.00 - 0.05 10*3/mm3    nRBC 0.0 0.0 - 0.2 /100 WBC       Ordered the above labs and independently reviewed the results.        RADIOLOGY  No Radiology Exams Resulted Within Past 24  Hours    I ordered the above noted radiological studies. Reviewed by me and discussed with radiologist.  See dictation for official radiology interpretation.      PROCEDURES    Procedures      MEDICATIONS GIVEN IN ER    Medications - No data to display      PROGRESS, DATA ANALYSIS, CONSULTS, AND MEDICAL DECISION MAKING    All labs have been independently reviewed by me.  All radiology studies have been reviewed by me and discussed with radiologist dictating the report.   EKG's independently viewed and interpreted by me.  Discussion below represents my analysis of pertinent findings related to patient's condition, differential diagnosis, treatment plan and final disposition.        ED Course as of Jul 17 0734   Sat Jul 17, 2021   0043 Baseline     Creatinine(!): 1.83 [TJ]   0043 WBC: 9.92 [TJ]   0043 Lactate: 1.0 [TJ]   0043 Glucose(!): 120 [TJ]   0044 Patient's labs are reassuring.  Augmentin does not seem to be adequate coverage for cellulitis.  Cellulitis is relatively mild.  Will discharge with doxycycline.    [TJ]      ED Course User Index  [TJ] James Shepherd MD           PPE: Both the patient and I wore a surgical mask throughout the entire patient encounter. I wore protective goggles.     AS OF 07:34 EDT VITALS:    BP - 166/82  HR - 88  TEMP - 98.5 °F (36.9 °C)  O2 SATS - 96%        DIAGNOSIS  Final diagnoses:   Cellulitis of other specified site   Lymphedema         DISPOSITION  Discharge           James Shepherd MD  07/17/21 0734

## 2021-07-17 NOTE — ED NOTES
I wore full protective equipment throughout this patient encounter including a face mask, goggles, and gloves. Hand hygiene was performed before donning protective equipment and after removal when leaving the room.       Lawanda Aviles RN  07/16/21 2962

## 2021-07-20 ENCOUNTER — OFFICE VISIT (OUTPATIENT)
Dept: FAMILY MEDICINE CLINIC | Facility: CLINIC | Age: 76
End: 2021-07-20

## 2021-07-20 VITALS
RESPIRATION RATE: 16 BRPM | WEIGHT: 315 LBS | SYSTOLIC BLOOD PRESSURE: 130 MMHG | DIASTOLIC BLOOD PRESSURE: 80 MMHG | HEIGHT: 74 IN | BODY MASS INDEX: 40.43 KG/M2

## 2021-07-20 DIAGNOSIS — L03.115 CELLULITIS OF RIGHT LOWER EXTREMITY: Primary | Chronic | ICD-10-CM

## 2021-07-20 DIAGNOSIS — M79.604 PAIN OF RIGHT LOWER EXTREMITY: ICD-10-CM

## 2021-07-20 PROCEDURE — 99214 OFFICE O/P EST MOD 30 MIN: CPT | Performed by: INTERNAL MEDICINE

## 2021-07-22 ENCOUNTER — HOSPITAL ENCOUNTER (OUTPATIENT)
Dept: CARDIOLOGY | Facility: HOSPITAL | Age: 76
Discharge: HOME | End: 2021-07-22
Admitting: INTERNAL MEDICINE

## 2021-07-22 VITALS — HEIGHT: 74 IN | BODY MASS INDEX: 40.43 KG/M2 | HEART RATE: 80 BPM | WEIGHT: 315 LBS

## 2021-07-22 DIAGNOSIS — I51.89 DIASTOLIC DYSFUNCTION: ICD-10-CM

## 2021-07-22 PROCEDURE — 93356 MYOCRD STRAIN IMG SPCKL TRCK: CPT

## 2021-07-22 PROCEDURE — 93306 TTE W/DOPPLER COMPLETE: CPT | Performed by: INTERNAL MEDICINE

## 2021-07-22 PROCEDURE — 93356 MYOCRD STRAIN IMG SPCKL TRCK: CPT | Performed by: INTERNAL MEDICINE

## 2021-07-22 PROCEDURE — 93306 TTE W/DOPPLER COMPLETE: CPT

## 2021-07-23 DIAGNOSIS — L03.115 CELLULITIS OF RIGHT LOWER EXTREMITY: Primary | ICD-10-CM

## 2021-07-23 LAB
AORTIC ARCH: 2.2 CM
ASCENDING AORTA: 3.9 CM
BH CV ECHO MEAS - ACS: 2.6 CM
BH CV ECHO MEAS - AO MAX PG (FULL): 1.3 MMHG
BH CV ECHO MEAS - AO MAX PG: 8.3 MMHG
BH CV ECHO MEAS - AO MEAN PG (FULL): 0.64 MMHG
BH CV ECHO MEAS - AO MEAN PG: 4.8 MMHG
BH CV ECHO MEAS - AO ROOT AREA (BSA CORRECTED): 1.5
BH CV ECHO MEAS - AO ROOT AREA: 12.7 CM^2
BH CV ECHO MEAS - AO ROOT DIAM: 4 CM
BH CV ECHO MEAS - AO V2 MAX: 143.9 CM/SEC
BH CV ECHO MEAS - AO V2 MEAN: 105.3 CM/SEC
BH CV ECHO MEAS - AO V2 VTI: 34.5 CM
BH CV ECHO MEAS - ASC AORTA: 3.9 CM
BH CV ECHO MEAS - AVA(I,A): 3.4 CM^2
BH CV ECHO MEAS - AVA(I,D): 3.4 CM^2
BH CV ECHO MEAS - AVA(V,A): 3.5 CM^2
BH CV ECHO MEAS - AVA(V,D): 3.5 CM^2
BH CV ECHO MEAS - BSA(HAYCOCK): 2.9 M^2
BH CV ECHO MEAS - BSA: 2.7 M^2
BH CV ECHO MEAS - BZI_BMI: 44.6 KILOGRAMS/M^2
BH CV ECHO MEAS - BZI_METRIC_HEIGHT: 188 CM
BH CV ECHO MEAS - BZI_METRIC_WEIGHT: 157.4 KG
BH CV ECHO MEAS - EDV(MOD-SP2): 152 ML
BH CV ECHO MEAS - EDV(MOD-SP4): 139 ML
BH CV ECHO MEAS - EDV(TEICH): 141.8 ML
BH CV ECHO MEAS - EF(CUBED): 72.7 %
BH CV ECHO MEAS - EF(MOD-BP): 58.1 %
BH CV ECHO MEAS - EF(MOD-SP2): 55.3 %
BH CV ECHO MEAS - EF(MOD-SP4): 59.7 %
BH CV ECHO MEAS - EF(TEICH): 63.9 %
BH CV ECHO MEAS - ESV(MOD-SP2): 68 ML
BH CV ECHO MEAS - ESV(MOD-SP4): 56 ML
BH CV ECHO MEAS - ESV(TEICH): 51.2 ML
BH CV ECHO MEAS - FS: 35.1 %
BH CV ECHO MEAS - IVS/LVPW: 0.93
BH CV ECHO MEAS - IVSD: 1.4 CM
BH CV ECHO MEAS - LAT PEAK E' VEL: 9.2 CM/SEC
BH CV ECHO MEAS - LV DIASTOLIC VOL/BSA (35-75): 50.6 ML/M^2
BH CV ECHO MEAS - LV MASS(C)D: 361.6 GRAMS
BH CV ECHO MEAS - LV MASS(C)DI: 131.7 GRAMS/M^2
BH CV ECHO MEAS - LV MAX PG: 7 MMHG
BH CV ECHO MEAS - LV MEAN PG: 4.1 MMHG
BH CV ECHO MEAS - LV SYSTOLIC VOL/BSA (12-30): 20.4 ML/M^2
BH CV ECHO MEAS - LV V1 MAX: 132 CM/SEC
BH CV ECHO MEAS - LV V1 MEAN: 97.7 CM/SEC
BH CV ECHO MEAS - LV V1 VTI: 30.7 CM
BH CV ECHO MEAS - LVIDD: 5.4 CM
BH CV ECHO MEAS - LVIDS: 3.5 CM
BH CV ECHO MEAS - LVLD AP2: 9.2 CM
BH CV ECHO MEAS - LVLD AP4: 8.5 CM
BH CV ECHO MEAS - LVLS AP2: 7.5 CM
BH CV ECHO MEAS - LVLS AP4: 7.1 CM
BH CV ECHO MEAS - LVOT AREA (M): 3.8 CM^2
BH CV ECHO MEAS - LVOT AREA: 3.9 CM^2
BH CV ECHO MEAS - LVOT DIAM: 2.2 CM
BH CV ECHO MEAS - LVPWD: 1.5 CM
BH CV ECHO MEAS - MED PEAK E' VEL: 8.5 CM/SEC
BH CV ECHO MEAS - MV A DUR: 0.13 SEC
BH CV ECHO MEAS - MV A MAX VEL: 99 CM/SEC
BH CV ECHO MEAS - MV DEC SLOPE: 897 CM/SEC^2
BH CV ECHO MEAS - MV DEC TIME: 0.2 SEC
BH CV ECHO MEAS - MV E MAX VEL: 75.8 CM/SEC
BH CV ECHO MEAS - MV E/A: 0.77
BH CV ECHO MEAS - MV MAX PG: 3.2 MMHG
BH CV ECHO MEAS - MV MEAN PG: 1.4 MMHG
BH CV ECHO MEAS - MV P1/2T MAX VEL: 90.8 CM/SEC
BH CV ECHO MEAS - MV P1/2T: 29.6 MSEC
BH CV ECHO MEAS - MV V2 MAX: 90 CM/SEC
BH CV ECHO MEAS - MV V2 MEAN: 55.7 CM/SEC
BH CV ECHO MEAS - MV V2 VTI: 30 CM
BH CV ECHO MEAS - MVA P1/2T LCG: 2.4 CM^2
BH CV ECHO MEAS - MVA(P1/2T): 7.4 CM^2
BH CV ECHO MEAS - MVA(VTI): 4 CM^2
BH CV ECHO MEAS - PA ACC TIME: 0.11 SEC
BH CV ECHO MEAS - PA MAX PG (FULL): 4.1 MMHG
BH CV ECHO MEAS - PA MAX PG: 5.6 MMHG
BH CV ECHO MEAS - PA PR(ACCEL): 31.5 MMHG
BH CV ECHO MEAS - PA V2 MAX: 118.1 CM/SEC
BH CV ECHO MEAS - PULM A REVS DUR: 0.12 SEC
BH CV ECHO MEAS - PULM A REVS VEL: 35.8 CM/SEC
BH CV ECHO MEAS - PULM DIAS VEL: 43 CM/SEC
BH CV ECHO MEAS - PULM S/D: 1.2
BH CV ECHO MEAS - PULM SYS VEL: 49.7 CM/SEC
BH CV ECHO MEAS - PVA(V,A): 2 CM^2
BH CV ECHO MEAS - PVA(V,D): 2 CM^2
BH CV ECHO MEAS - QP/QS: 0.39
BH CV ECHO MEAS - RV MAX PG: 1.5 MMHG
BH CV ECHO MEAS - RV MEAN PG: 0.7 MMHG
BH CV ECHO MEAS - RV V1 MAX: 60.8 CM/SEC
BH CV ECHO MEAS - RV V1 MEAN: 38.1 CM/SEC
BH CV ECHO MEAS - RV V1 VTI: 11.6 CM
BH CV ECHO MEAS - RVOT AREA: 4 CM^2
BH CV ECHO MEAS - RVOT DIAM: 2.3 CM
BH CV ECHO MEAS - SI(AO): 159.8 ML/M^2
BH CV ECHO MEAS - SI(CUBED): 41.8 ML/M^2
BH CV ECHO MEAS - SI(LVOT): 43.2 ML/M^2
BH CV ECHO MEAS - SI(MOD-SP2): 30.6 ML/M^2
BH CV ECHO MEAS - SI(MOD-SP4): 30.2 ML/M^2
BH CV ECHO MEAS - SI(TEICH): 33 ML/M^2
BH CV ECHO MEAS - SV(AO): 438.8 ML
BH CV ECHO MEAS - SV(CUBED): 114.9 ML
BH CV ECHO MEAS - SV(LVOT): 118.7 ML
BH CV ECHO MEAS - SV(MOD-SP2): 84 ML
BH CV ECHO MEAS - SV(MOD-SP4): 83 ML
BH CV ECHO MEAS - SV(RVOT): 46 ML
BH CV ECHO MEAS - SV(TEICH): 90.6 ML
BH CV ECHO MEAS - TAPSE (>1.6): 2.7 CM
BH CV ECHO MEASUREMENTS AVERAGE E/E' RATIO: 8.56
BH CV XLRA - RV BASE: 5.2 CM
BH CV XLRA - RV LENGTH: 8.2 CM
BH CV XLRA - RV MID: 3.3 CM
BH CV XLRA - TDI S': 14.6 CM/SEC
LEFT ATRIUM VOLUME INDEX: 21 ML/M2
LV EF 2D ECHO EST: 58 %
MAXIMAL PREDICTED HEART RATE: 147 BPM
SINUS: 4.1 CM
STJ: 3.7 CM
STRESS TARGET HR: 125 BPM

## 2021-07-23 RX ORDER — DOXYCYCLINE 100 MG/1
100 CAPSULE ORAL 2 TIMES DAILY
Qty: 20 CAPSULE | Refills: 0 | Status: SHIPPED | OUTPATIENT
Start: 2021-07-23 | End: 2021-07-24

## 2021-07-23 NOTE — TELEPHONE ENCOUNTER
Caller: LoaizaJames    Relationship: Self    Best call back number: [phone]     Medication needed:   Requested Prescriptions     Pending Prescriptions Disp Refills   • doxycycline (MONODOX) 100 MG capsule 20 capsule 0     Sig: Take 1 capsule by mouth 2 (Two) Times a Day for 10 days.       When do you need the refill by: ASAP    Does the patient have less than a 3 day supply:  [x] Yes  [] No    What is the patient's preferred pharmacy: [facility] [address] [place] [address] [address] [facility]  - [phone] [facility] [phone] FX

## 2021-07-24 ENCOUNTER — APPOINTMENT (OUTPATIENT)
Dept: CARDIOLOGY | Facility: HOSPITAL | Age: 76
End: 2021-07-24

## 2021-07-24 ENCOUNTER — HOSPITAL ENCOUNTER (OUTPATIENT)
Facility: HOSPITAL | Age: 76
Setting detail: OBSERVATION
Discharge: HOME | End: 2021-07-26
Attending: EMERGENCY MEDICINE | Admitting: NURSE PRACTITIONER

## 2021-07-24 ENCOUNTER — APPOINTMENT (OUTPATIENT)
Dept: GENERAL RADIOLOGY | Facility: HOSPITAL | Age: 76
End: 2021-07-24

## 2021-07-24 DIAGNOSIS — S81.801A OPEN WOUND OF RIGHT LOWER LEG, INITIAL ENCOUNTER: Primary | ICD-10-CM

## 2021-07-24 DIAGNOSIS — R52 INTRACTABLE PAIN: ICD-10-CM

## 2021-07-24 DIAGNOSIS — I73.9 PVD (PERIPHERAL VASCULAR DISEASE) (HCC): ICD-10-CM

## 2021-07-24 DIAGNOSIS — N18.9 ACUTE RENAL FAILURE SUPERIMPOSED ON CHRONIC KIDNEY DISEASE, UNSPECIFIED CKD STAGE, UNSPECIFIED ACUTE RENAL FAILURE TYPE (HCC): ICD-10-CM

## 2021-07-24 DIAGNOSIS — I87.8 VENOUS STASIS: ICD-10-CM

## 2021-07-24 DIAGNOSIS — N17.9 ACUTE RENAL FAILURE SUPERIMPOSED ON CHRONIC KIDNEY DISEASE, UNSPECIFIED CKD STAGE, UNSPECIFIED ACUTE RENAL FAILURE TYPE (HCC): ICD-10-CM

## 2021-07-24 LAB
ANION GAP SERPL CALCULATED.3IONS-SCNC: 10 MMOL/L (ref 5–15)
BASOPHILS # BLD AUTO: 0.05 10*3/MM3 (ref 0–0.2)
BASOPHILS NFR BLD AUTO: 0.5 % (ref 0–1.5)
BUN SERPL-MCNC: 31 MG/DL (ref 8–23)
BUN/CREAT SERPL: 14.5 (ref 7–25)
CALCIUM SPEC-SCNC: 8.7 MG/DL (ref 8.6–10.5)
CHLORIDE SERPL-SCNC: 103 MMOL/L (ref 98–107)
CO2 SERPL-SCNC: 26 MMOL/L (ref 22–29)
CREAT SERPL-MCNC: 2.14 MG/DL (ref 0.76–1.27)
CRP SERPL-MCNC: 2.74 MG/DL (ref 0–0.5)
DEPRECATED RDW RBC AUTO: 48.2 FL (ref 37–54)
EOSINOPHIL # BLD AUTO: 0.13 10*3/MM3 (ref 0–0.4)
EOSINOPHIL NFR BLD AUTO: 1.3 % (ref 0.3–6.2)
ERYTHROCYTE [DISTWIDTH] IN BLOOD BY AUTOMATED COUNT: 13.3 % (ref 12.3–15.4)
ERYTHROCYTE [SEDIMENTATION RATE] IN BLOOD: 19 MM/HR (ref 0–20)
GFR SERPL CREATININE-BSD FRML MDRD: 37 ML/MIN/1.73
GLUCOSE SERPL-MCNC: 95 MG/DL (ref 65–99)
HCT VFR BLD AUTO: 40 % (ref 37.5–51)
HGB BLD-MCNC: 12.8 G/DL (ref 13–17.7)
IMM GRANULOCYTES # BLD AUTO: 0.1 10*3/MM3 (ref 0–0.05)
IMM GRANULOCYTES NFR BLD AUTO: 1 % (ref 0–0.5)
LYMPHOCYTES # BLD AUTO: 1.17 10*3/MM3 (ref 0.7–3.1)
LYMPHOCYTES NFR BLD AUTO: 11.3 % (ref 19.6–45.3)
MCH RBC QN AUTO: 31.2 PG (ref 26.6–33)
MCHC RBC AUTO-ENTMCNC: 32 G/DL (ref 31.5–35.7)
MCV RBC AUTO: 97.6 FL (ref 79–97)
MONOCYTES # BLD AUTO: 0.82 10*3/MM3 (ref 0.1–0.9)
MONOCYTES NFR BLD AUTO: 7.9 % (ref 5–12)
NEUTROPHILS NFR BLD AUTO: 78 % (ref 42.7–76)
NEUTROPHILS NFR BLD AUTO: 8.11 10*3/MM3 (ref 1.7–7)
NRBC BLD AUTO-RTO: 0 /100 WBC (ref 0–0.2)
PLATELET # BLD AUTO: 212 10*3/MM3 (ref 140–450)
PMV BLD AUTO: 10.2 FL (ref 6–12)
POTASSIUM SERPL-SCNC: 4.5 MMOL/L (ref 3.5–5.2)
RBC # BLD AUTO: 4.1 10*6/MM3 (ref 4.14–5.8)
SARS-COV-2 RNA RESP QL NAA+PROBE: NOT DETECTED
SODIUM SERPL-SCNC: 139 MMOL/L (ref 136–145)
WBC # BLD AUTO: 10.38 10*3/MM3 (ref 3.4–10.8)

## 2021-07-24 PROCEDURE — 25010000003 CEFTRIAXONE PER 250 MG: Performed by: EMERGENCY MEDICINE

## 2021-07-24 PROCEDURE — 80048 BASIC METABOLIC PNL TOTAL CA: CPT | Performed by: EMERGENCY MEDICINE

## 2021-07-24 PROCEDURE — G0378 HOSPITAL OBSERVATION PER HR: HCPCS

## 2021-07-24 PROCEDURE — 99283 EMERGENCY DEPT VISIT LOW MDM: CPT

## 2021-07-24 PROCEDURE — U0003 INFECTIOUS AGENT DETECTION BY NUCLEIC ACID (DNA OR RNA); SEVERE ACUTE RESPIRATORY SYNDROME CORONAVIRUS 2 (SARS-COV-2) (CORONAVIRUS DISEASE [COVID-19]), AMPLIFIED PROBE TECHNIQUE, MAKING USE OF HIGH THROUGHPUT TECHNOLOGIES AS DESCRIBED BY CMS-2020-01-R: HCPCS | Performed by: EMERGENCY MEDICINE

## 2021-07-24 PROCEDURE — 73590 X-RAY EXAM OF LOWER LEG: CPT

## 2021-07-24 PROCEDURE — 96361 HYDRATE IV INFUSION ADD-ON: CPT

## 2021-07-24 PROCEDURE — 86140 C-REACTIVE PROTEIN: CPT | Performed by: EMERGENCY MEDICINE

## 2021-07-24 PROCEDURE — 93971 EXTREMITY STUDY: CPT

## 2021-07-24 PROCEDURE — 85025 COMPLETE CBC W/AUTO DIFF WBC: CPT | Performed by: EMERGENCY MEDICINE

## 2021-07-24 PROCEDURE — 25010000002 VANCOMYCIN 10 G RECONSTITUTED SOLUTION: Performed by: EMERGENCY MEDICINE

## 2021-07-24 PROCEDURE — C9803 HOPD COVID-19 SPEC COLLECT: HCPCS

## 2021-07-24 PROCEDURE — 96365 THER/PROPH/DIAG IV INF INIT: CPT

## 2021-07-24 PROCEDURE — 85652 RBC SED RATE AUTOMATED: CPT | Performed by: EMERGENCY MEDICINE

## 2021-07-24 PROCEDURE — 25010000002 MORPHINE PER 10 MG: Performed by: EMERGENCY MEDICINE

## 2021-07-24 PROCEDURE — U0005 INFEC AGEN DETEC AMPLI PROBE: HCPCS | Performed by: EMERGENCY MEDICINE

## 2021-07-24 PROCEDURE — 87040 BLOOD CULTURE FOR BACTERIA: CPT | Performed by: EMERGENCY MEDICINE

## 2021-07-24 PROCEDURE — 96375 TX/PRO/DX INJ NEW DRUG ADDON: CPT

## 2021-07-24 RX ORDER — HYDROCODONE BITARTRATE AND ACETAMINOPHEN 7.5; 325 MG/1; MG/1
1 TABLET ORAL EVERY 4 HOURS PRN
Status: DISCONTINUED | OUTPATIENT
Start: 2021-07-24 | End: 2021-07-26 | Stop reason: HOSPADM

## 2021-07-24 RX ORDER — GABAPENTIN 300 MG/1
300 CAPSULE ORAL NIGHTLY
Status: DISCONTINUED | OUTPATIENT
Start: 2021-07-24 | End: 2021-07-26 | Stop reason: HOSPADM

## 2021-07-24 RX ORDER — MORPHINE SULFATE 4 MG/ML
4 INJECTION, SOLUTION INTRAMUSCULAR; INTRAVENOUS EVERY 4 HOURS PRN
Status: DISCONTINUED | OUTPATIENT
Start: 2021-07-24 | End: 2021-07-26 | Stop reason: HOSPADM

## 2021-07-24 RX ORDER — TRAMADOL HYDROCHLORIDE 50 MG/1
25 TABLET ORAL EVERY 6 HOURS PRN
Status: DISCONTINUED | OUTPATIENT
Start: 2021-07-24 | End: 2021-07-26 | Stop reason: HOSPADM

## 2021-07-24 RX ORDER — ATORVASTATIN CALCIUM 20 MG/1
40 TABLET, FILM COATED ORAL DAILY
Status: DISCONTINUED | OUTPATIENT
Start: 2021-07-25 | End: 2021-07-26 | Stop reason: HOSPADM

## 2021-07-24 RX ORDER — ACETAMINOPHEN 325 MG/1
650 TABLET ORAL EVERY 4 HOURS PRN
Status: DISCONTINUED | OUTPATIENT
Start: 2021-07-24 | End: 2021-07-26 | Stop reason: HOSPADM

## 2021-07-24 RX ORDER — ATORVASTATIN CALCIUM 40 MG/1
40 TABLET, FILM COATED ORAL DAILY
COMMUNITY
End: 2021-09-22 | Stop reason: SDUPTHER

## 2021-07-24 RX ORDER — SODIUM CHLORIDE 9 MG/ML
125 INJECTION, SOLUTION INTRAVENOUS CONTINUOUS
Status: DISCONTINUED | OUTPATIENT
Start: 2021-07-24 | End: 2021-07-24

## 2021-07-24 RX ORDER — RAMIPRIL 10 MG/1
10 CAPSULE ORAL DAILY
COMMUNITY
End: 2021-11-19 | Stop reason: SDUPTHER

## 2021-07-24 RX ORDER — GABAPENTIN 300 MG/1
300 CAPSULE ORAL NIGHTLY
COMMUNITY
End: 2021-07-28 | Stop reason: SDUPTHER

## 2021-07-24 RX ORDER — MORPHINE SULFATE 2 MG/ML
4 INJECTION, SOLUTION INTRAMUSCULAR; INTRAVENOUS ONCE
Status: COMPLETED | OUTPATIENT
Start: 2021-07-24 | End: 2021-07-24

## 2021-07-24 RX ORDER — FUROSEMIDE 40 MG/1
40 TABLET ORAL DAILY
COMMUNITY
End: 2022-05-19

## 2021-07-24 RX ORDER — FUROSEMIDE 40 MG/1
40 TABLET ORAL DAILY
Status: DISCONTINUED | OUTPATIENT
Start: 2021-07-24 | End: 2021-07-26 | Stop reason: HOSPADM

## 2021-07-24 RX ORDER — SODIUM CHLORIDE 0.9 % (FLUSH) 0.9 %
10 SYRINGE (ML) INJECTION EVERY 12 HOURS SCHEDULED
Status: DISCONTINUED | OUTPATIENT
Start: 2021-07-24 | End: 2021-07-26 | Stop reason: HOSPADM

## 2021-07-24 RX ORDER — TAMSULOSIN HYDROCHLORIDE 0.4 MG/1
0.4 CAPSULE ORAL NIGHTLY
Status: DISCONTINUED | OUTPATIENT
Start: 2021-07-24 | End: 2021-07-26 | Stop reason: HOSPADM

## 2021-07-24 RX ORDER — CEFTRIAXONE SODIUM 2 G/50ML
2 INJECTION, SOLUTION INTRAVENOUS ONCE
Status: COMPLETED | OUTPATIENT
Start: 2021-07-24 | End: 2021-07-24

## 2021-07-24 RX ORDER — ASPIRIN 81 MG/1
81 TABLET ORAL DAILY
Status: DISCONTINUED | OUTPATIENT
Start: 2021-07-24 | End: 2021-07-26 | Stop reason: HOSPADM

## 2021-07-24 RX ORDER — SODIUM CHLORIDE 0.9 % (FLUSH) 0.9 %
10 SYRINGE (ML) INJECTION AS NEEDED
Status: DISCONTINUED | OUTPATIENT
Start: 2021-07-24 | End: 2021-07-26 | Stop reason: HOSPADM

## 2021-07-24 RX ORDER — CEFTRIAXONE SODIUM 2 G/50ML
2 INJECTION, SOLUTION INTRAVENOUS EVERY 24 HOURS
Status: DISCONTINUED | OUTPATIENT
Start: 2021-07-25 | End: 2021-07-26 | Stop reason: HOSPADM

## 2021-07-24 RX ORDER — RAMIPRIL 10 MG/1
10 CAPSULE ORAL DAILY
Status: DISCONTINUED | OUTPATIENT
Start: 2021-07-25 | End: 2021-07-26 | Stop reason: HOSPADM

## 2021-07-24 RX ORDER — ONDANSETRON 2 MG/ML
4 INJECTION INTRAMUSCULAR; INTRAVENOUS EVERY 6 HOURS PRN
Status: DISCONTINUED | OUTPATIENT
Start: 2021-07-24 | End: 2021-07-26 | Stop reason: HOSPADM

## 2021-07-24 RX ORDER — DOXYCYCLINE HYCLATE 100 MG/1
100 CAPSULE ORAL 2 TIMES DAILY
COMMUNITY
Start: 2021-07-23 | End: 2021-07-26 | Stop reason: HOSPADM

## 2021-07-24 RX ORDER — NIFEDIPINE 60 MG/1
60 TABLET, EXTENDED RELEASE ORAL DAILY
Status: DISCONTINUED | OUTPATIENT
Start: 2021-07-25 | End: 2021-07-26 | Stop reason: HOSPADM

## 2021-07-24 RX ADMIN — TAMSULOSIN HYDROCHLORIDE 0.4 MG: 0.4 CAPSULE ORAL at 21:09

## 2021-07-24 RX ADMIN — HYDROCODONE BITARTRATE AND ACETAMINOPHEN 1 TABLET: 7.5; 325 TABLET ORAL at 20:00

## 2021-07-24 RX ADMIN — ASPIRIN 81 MG: 81 TABLET, COATED ORAL at 21:09

## 2021-07-24 RX ADMIN — SODIUM CHLORIDE 125 ML/HR: 9 INJECTION, SOLUTION INTRAVENOUS at 17:00

## 2021-07-24 RX ADMIN — MORPHINE SULFATE 4 MG: 2 INJECTION, SOLUTION INTRAMUSCULAR; INTRAVENOUS at 14:48

## 2021-07-24 RX ADMIN — GABAPENTIN 300 MG: 300 CAPSULE ORAL at 21:08

## 2021-07-24 RX ADMIN — APIXABAN 5 MG: 5 TABLET, FILM COATED ORAL at 21:08

## 2021-07-24 RX ADMIN — CEFTRIAXONE SODIUM 2 G: 2 INJECTION, SOLUTION INTRAVENOUS at 17:01

## 2021-07-24 RX ADMIN — VANCOMYCIN HYDROCHLORIDE 3000 MG: 10 INJECTION, POWDER, LYOPHILIZED, FOR SOLUTION INTRAVENOUS at 17:32

## 2021-07-24 NOTE — H&P
Internal medicine history and physical  INTERNAL MEDICINE   [facility]       Patient Identification:  Name: James Loaiza  Age: 75 y.o.  Sex: male  :  1945  MRN: [id number]                   Primary Care Physician: Magdiel Bowens MD                               Date of admission:2021    Chief Complaint: Worsening pain and discomfort in right lower extremity and nonhealing wound for the last 3 months.    History of Present Illness:   Patient is a 72-year-old male with complicated past medical history as noted below came to the emergency room with worsening discomfort and nonhealing wound of the right lower extremity despite various interventions and consultations and antibiotic treatment in the left 3 months coordinated by his primary care physician.  Patient was recently started on doxycycline on 2021 and he visited emergency room after his failure to improve on Augmentin prescribed earlier by his primary care physician.  Patient has not improved since and because of the worsening pain today he decided to come to the emergency room further to be required.  Patient is being seen in the edema clinic and getting it right lower extremity wound care and wraps.  Patient denies systemic fever or chills but his right leg feels worse especially at night.  Today he describes his pain to be 12 on the scale of 1-10.  After receiving pain medications in the emergency room currently his leg pain is better and sleeping better.  Patient was empirically started on ceftriaxone and vancomycin in the emergency room.  Patient has known history of venous stasis of the lower extremities and history of DVT and is on  chronic anticoagulation therapy with Eliquis.      Past Medical History:  Past Medical History:   Diagnosis Date   • Abnormal EKG     referring to cardiology   • Anxiety    • Arthritis    • Back pain     worsening   • BPH (benign prostatic hyperplasia)     BPH/Nocturia/ Urinary  "Frequency   • Breast nodule     right   • Cardiomegaly     Cardiomegaly/ SOB with exertion   • Circulation problem    • Constipation    • COPD (chronic obstructive pulmonary disease) (CMS/HCC)    • Deviated septum    • DJD (degenerative joint disease)     DJD Knees   • DVT (deep venous thrombosis) (CMS/HCC)    • Dysphagia     solids and liquids - resolved with normal studies   • Encounter for annual health examination 11/14/2013    Annual Health Assessment   • Enlarged prostate    • Fatigue     Extreme fatigue   • Hyperlipidemia 1999   • Hypertension 1999    followed Dr. Marcelo   • Hypogonadism male    • Left hip pain     and DJD   • Left leg pain    • Low back pain    • Moist mucous membranes of ear, nose, and throat     \"Mucous in throat\"   • Morbid obesity (CMS/HCC)     (BMI-41.2za)   • Muscle cramping    • Muscle spasm     Muscle spasms   • Neck arthritis    • Neck muscle spasm     Neck muscle spasm/Cervical strain   • Neck pain    • Obesity    • Plantar fasciitis    • Prostatitis     recurrent   • Psoriasis    • Pulmonary embolus (CMS/HCC) 01/2019    on Xarelltoypseerlipidemia   • Radiculopathy     Radiculopathy / Neuropathy left ar   • Renal insufficiency     followed by Dr. Mendes   • Seborrhea    • Shortness of breath    • Sleep apnea     Obstructive Sleep apnea worsening; uses CPAP   • Urinary frequency    • Vascular disorder    • Vertigo    • Weight gain    • Wellness examination 10/23/2014    Annual Wellness Visit     Past Surgical History:  Past Surgical History:   Procedure Laterality Date   • APPENDECTOMY  1965   • CARDIAC CATHETERIZATION  07/29/2010    [facility]   • COLONOSCOPY  01/05/2010   • COLONOSCOPY  10/01/2001   • NASAL SEPTUM SURGERY     • NOSE SURGERY  1999   • RADIOFREQUENCY ABLATION Bilateral 3/2/2021    Procedure: RADIOFREQUENCY ABLATION NERVES---bilateral cervical 2-cervical3;  Surgeon: Edu Tineo MD;  Location: [facility] [place];  Service: Pain Management;  " Laterality: Bilateral;   • TURP / TRANSURETHRAL INCISION / DRAINAGE PROSTATE  10/23/2015    Michael Castro      Home Meds:  Medications Prior to Admission   Medication Sig Dispense Refill Last Dose   • apixaban (ELIQUIS) 5 MG tablet tablet Take 5 mg by mouth Every 12 (Twelve) Hours.   7/24/2021 at Unknown time   • aspirin 81 MG EC tablet Take 81 mg by mouth Daily.   Past Week at Unknown time   • atorvastatin (LIPITOR) 40 MG tablet Take 40 mg by mouth Daily.   7/24/2021 at Unknown time   • doxycycline (VIBRAMYCIN) 100 MG capsule Take 100 mg by mouth 2 (Two) Times a Day. x10 days   7/24/2021 at Unknown time   • furosemide (LASIX) 40 MG tablet Take 40 mg by mouth Daily.   Past Week at Unknown time   • gabapentin (NEURONTIN) 300 MG capsule Take 300 mg by mouth Every Night.   7/23/2021 at Unknown time   • NIFEdipine XL (PROCARDIA XL) 60 MG 24 hr tablet Take 60 mg by mouth Daily.   7/24/2021 at Unknown time   • ramipril (ALTACE) 10 MG capsule Take 10 mg by mouth Daily.   7/24/2021 at Unknown time   • tamsulosin (FLOMAX) 0.4 MG capsule 24 hr capsule Take 1 capsule by mouth Every Night.   7/23/2021 at Unknown time   • tiotropium bromide-olodaterol (STIOLTO RESPIMAT) 2.5-2.5 MCG/ACT aerosol solution inhaler Inhale 2 puffs Daily.   7/24/2021 at Unknown time     Current Meds:     Current Facility-Administered Medications:   •  [COMPLETED] Insert peripheral IV, , , Once **AND** sodium chloride 0.9 % flush 10 mL, 10 mL, Intravenous, PRN, Efraín Castro MD  •  sodium chloride 0.9 % infusion, 125 mL/hr, Intravenous, Continuous, Efraín Castro MD, Last Rate: 125 mL/hr at 07/24/21 1700, 125 mL/hr at 07/24/21 1700  Allergies:  No Known Allergies  Social History:   Social History     Tobacco Use   • Smoking status: Never Smoker   • Smokeless tobacco: Never Used   Substance Use Topics   • Alcohol use: Yes     Comment: social      Family History:  Family History   Problem Relation Age of Onset   • Arthritis Mother    • Heart disease  "Mother    • Hypertension Mother    • Heart attack Father    • Heart disease Father    • Hypertension Father    • Arthritis Sister    • Multiple sclerosis Sister    • Alcohol abuse Brother    • Diabetes Son    • Malig Hyperthermia Neg Hx           Review of Systems  See history of present illness and past medical history. Remainder of ROS is negative.  Constitutional: Negative.    Respiratory: Negative.    Cardiovascular: Negative.    Endocrine: Negative.    Skin: Positive for skin lesions.     Open wound and drainage from the right leg.     Vitals:   /76 (BP Location: Right arm, Patient Position: Lying)   Pulse 69   Temp 98.1 °F (36.7 °C) (Tympanic)   Resp 16   Ht 188 cm (74\")   Wt (!) 152 kg (335 lb)   SpO2 96%   BMI 43.01 kg/m²   I/O: No intake or output data in the 24 hours ending 07/24/21 1939  Exam:  Patient is examined using the personal protective equipment as per guidelines from infection control for this particular patient as enacted.  Hand washing was performed before and after patient interaction.  General Appearance:    Alert, cooperative, no distress, appears stated age, morbidly obese male sitting in the recliner and does not appear to be toxic or septic.   Head:    Normocephalic, without obvious abnormality, atraumatic   Eyes:    PERRL, conjunctiva/corneas clear, EOM's intact, both eyes   Ears:    Normal external ear canals, both ears   Nose:   Nares normal, septum midline, mucosa normal, no drainage    or sinus tenderness   Throat:   Lips, tongue, gums normal; oral mucosa pink and moist   Neck:   Supple, symmetrical, trachea midline, no adenopathy;     thyroid:  no enlargement/tenderness/nodules; no carotid    bruit or JVD   Back:     Symmetric, no curvature, ROM normal, no CVA tenderness   Lungs:     Clear to auscultation bilaterally, respirations unlabored   Chest Wall:    No tenderness or deformity    Heart:    Regular rate and rhythm, S1 and S2 normal, no murmur, rub   or gallop "   Abdomen:    Of the obese soft nontender   Extremities:       Right leg is more swollen compared to left.   Pulses:   Pulses palpable in all extremities; symmetric all extremities   Skin:  Chronic venous stasis changes in bilateral lower extremity with open wound right leg noted.   Neurologic:  Grossly nonfocal examination       Data Review:      I reviewed the patient's new clinical results.  Results from last 7 days   Lab Units 07/24/21  1457   WBC 10*3/mm3 10.38   HEMOGLOBIN g/dL 12.8*   PLATELETS 10*3/mm3 212     Results from last 7 days   Lab Units 07/24/21  1457   SODIUM mmol/L 139   POTASSIUM mmol/L 4.5   CHLORIDE mmol/L 103   CO2 mmol/L 26.0   BUN mg/dL 31*   CREATININE mg/dL 2.14*   CALCIUM mg/dL 8.7   GLUCOSE mg/dL 95     No orders to display     No radiology results for the last 30 days.  Microbiology Results (last 10 days)     Procedure Component Value - Date/Time    COVID PRE-OP / PRE-PROCEDURE SCREENING ORDER (NO ISOLATION) - Swab, Nasopharynx [[id number]]  (Normal) Collected: 07/24/21 1623    Lab Status: Final result Specimen: Swab from Nasopharynx Updated: 07/24/21 1732    Narrative:      The following orders were created for panel order COVID PRE-OP / PRE-PROCEDURE SCREENING ORDER (NO ISOLATION) - Swab, Nasopharynx.  Procedure                               Abnormality         Status                     ---------                               -----------         ------                     COVID-19,[facility] IN-HOUSE...[[id number]]  Normal              Final result                 Please view results for these tests on the individual orders.    COVID-19,[facility] IN-HOUSE CEPHEID/MARK NP SWAB IN TRANSPORT MEDIA 8-12 HR TAT - Swab, Nasopharynx [[id number]]  (Normal) Collected: 07/24/21 1623    Lab Status: Final result Specimen: Swab from Nasopharynx Updated: 07/24/21 1732     COVID19 Not Detected    Narrative:      Fact sheet for providers: https://www.fda.gov/media/622844/download     Fact sheet for patients:  https://www.fda.gov/media/146762/download          Assessment:  Active Hospital Problems    Diagnosis  POA   • **Open wound of right lower leg [S81.801A]  Yes   • Chronic anticoagulation [Z79.01]  Not Applicable   • Chronic obstructive pulmonary disease (CMS/[facility]) [J44.9]  Yes   • Benign essential hypertension ([place]je[place] 1999) [I10]  Yes   • Chronic kidney disease ([place]ona) [N18.9]  Yes   • Morbid obesity with BMI of 45.0-49.9, adult (CMS/[facility]) [E66.01, Z68.42]  Not Applicable   • Obstructive sleep apnea syndrome [G47.33]  Yes     Description: USES CPAP         Medical decision making:  Chronic right lower extremity venous stasis wound-plan is to admit the patient continue anticoagulation therapy elevation of the leg and empiric antibiotic therapy with wound care consult for possible Unna boot wrappings of his lower extremity with prolonged outpatient follow-up and wound care plan of venous stasis clinic.  Patient has been seen by vascular surgery service and patient and ER physician requested 1 reason for admission to be evaluation by vascular surgery service.  Patient has been empirically started on antibiotic therapy.  History of DVT on chronic anticoagulation therapy-continue with Eliquis and monitor.  Morbid obesity with obstructive sleep apnea and COPD-continue his home regimen including his CPAP device.  Hypertension-continue antihypertensive regimen and avoid hypotensive episodes and monitor his renal function.  Chronic kidney disease-monitor his creatinine and avoid nephrotoxic agents.  The patient progressed consider nephrology evaluation including possibility of renal ultrasound and bladder scan to rule out urinary retention as a cause of his worsening.  Currently he is stable and denies any symptoms and his creatinine appears to be near his baseline.    Jolanta Winchester MD   7/24/2021  19:39 EDT  Much of this encounter note is an electronic transcription/translation of spoken language to printed text. The  electronic translation of spoken language may permit erroneous, or at times, nonsensical words or phrases to be inadvertently transcribed; Although I have reviewed the note for such errors, some may still exist

## 2021-07-24 NOTE — ED PROVIDER NOTES
EMERGENCY DEPARTMENT ENCOUNTER    Room Number:  37/37  Date of encounter:  7/24/2021  PCP: Magdiel Bowens MD  Historian: Patient    Patient was placed in face mask during triage process. Patient was wearing facemask when I entered the room and throughout our encounter. I wore full protective equipment throughout this patient encounter including a face mask, eye protection, and gloves. Hand hygiene was performed before donning protective equipment and again following doffing of PPE after leaving the room.    HPI:  Chief Complaint: Progressive pain right lower extremity  A complete HPI/ROS/PMH/PSH/SH/FH are unobtainable due to: N/A   Context: James Loaiza is a 75 y.o. male who presents to the ED c/o right lower extremity progressive pain.  Patient's pain now moderately severe.  Patient has had developing right lower extremity wound and erythema over the last 3 months.  He has been seen by primary care, vascular, cardiology, and in the emergency department recently.  He has been on Augmentin for quite some time without improvement, began seeing edema clinic who is doing regular wound care of the leg, and most recently was changed to doxycycline 1 week ago in the emergency department.  Patient reports no improvement, and rather increasing swelling and discomfort.  This week it is felt like his leg is been laying on a hot fire/grill, especially at night.  Tramadol prescribed by his primary care provider has not been helpful.  Patient denies any fevers, cough, chills, abdominal pain, dyspnea.  No clear exacerbating or relieving factors identified.  Moderately severe pain at this time.      MEDICAL HISTORY REVIEW  Vascular surgery-Roger Ochoa MD  Cardiology - Pradeep Obrien MD  PCP - Magdiel Bowens MD    PAST MEDICAL HISTORY  Active Ambulatory Problems     Diagnosis Date Noted   • Lumbar herniated disc L3-L5 3/16/16 Open MRI 06/13/2016   • Abnormal electrocardiogram 06/13/2016   • Abnormal weight gain 06/13/2016   •  Aortic valve insufficiency 06/13/2016   • Coronary arteriosclerosis in native artery 06/13/2016   • Benign essential hypertension ([place] 1999) 06/13/2016   • Edema 06/13/2016   • Dyspnea on exertion 06/13/2016   • Fatigue 06/13/2016   • Left ventricular hypertrophy 06/13/2016   • Obstructive sleep apnea syndrome 06/13/2016   • Arthritis of left hip 06/13/2016   • Hx of pulmonary embolus 7/15 06/13/2016   • Morbid obesity with BMI of 45.0-49.9, adult (CMS/[facility]) 06/13/2016   • Intermittent dysphagia 06/13/2016   • Chronic kidney disease ([place]) 06/13/2016   • Hyperlipidemia 1999 06/13/2016   • BPH (benign prostatic hypertrophy) 06/13/2016   • Left cervical radiculopathy 06/13/2016   • Rotator cuff tear, left 06/13/2016   • Cardiomegaly 06/13/2016   • Plantar fasciitis 06/13/2016   • Hypogonadism male 06/13/2016   • Vitamin D deficiency disease 06/13/2016   • Renal cyst, left 06/13/2016   • Preventative health care 09/22/2016   • Medication management 09/22/2016   • Chronic low back pain 03/01/2017   • Lumbar spondylolysis 03/01/2017   • Headache 03/13/2017   • Other chronic pain 07/30/2018   • Pulmonary embolus (CMS/[facility]) 01/13/2019   • Chronic obstructive pulmonary disease (CMS/[facility]) 01/29/2019   • Osteoarthritis of right shoulder 01/30/2019   • Chronic anticoagulation 02/20/2019   • Cervical stenosis of spine 09/21/2020   • Bilateral occipital neuralgia 01/28/2021   • Shoulder pain 01/28/2021   • Cervical facet joint syndrome 02/22/2021     Resolved Ambulatory Problems     Diagnosis Date Noted   • No Resolved Ambulatory Problems     Past Medical History:   Diagnosis Date   • Abnormal EKG    • Anxiety    • Arthritis    • Back pain    • BPH (benign prostatic hyperplasia)    • Breast nodule    • Circulation problem    • Constipation    • COPD (chronic obstructive pulmonary disease) (CMS/[facility])    • Deviated septum    • DJD (degenerative joint disease)    • DVT (deep venous thrombosis) (CMS/[facility])    • Dysphagia    •  Encounter for annual health examination 11/14/2013   • Enlarged prostate    • Hypertension 1999   • Left hip pain    • Left leg pain    • Low back pain    • Moist mucous membranes of ear, nose, and throat    • Morbid obesity (CMS/HCC)    • Muscle cramping    • Muscle spasm    • Neck arthritis    • Neck muscle spasm    • Neck pain    • Obesity    • Prostatitis    • Psoriasis    • Radiculopathy    • Renal insufficiency    • Seborrhea    • Shortness of breath    • Sleep apnea    • Urinary frequency    • Vascular disorder    • Vertigo    • Weight gain    • Wellness examination 10/23/2014         PAST SURGICAL HISTORY  Past Surgical History:   Procedure Laterality Date   • APPENDECTOMY  1965   • CARDIAC CATHETERIZATION  07/29/2010    [facility]   • COLONOSCOPY  01/05/2010   • COLONOSCOPY  10/01/2001   • NASAL SEPTUM SURGERY     • NOSE SURGERY  1999   • RADIOFREQUENCY ABLATION Bilateral 3/2/2021    Procedure: RADIOFREQUENCY ABLATION NERVES---bilateral cervical 2-cervical3;  Surgeon: Edu Tineo MD;  [place] [facility] [place]  Service: Pain Management;  Laterality: Bilateral;   • TURP / TRANSURETHRAL INCISION / DRAINAGE PROSTATE  10/23/2015    Michael Castro         FAMILY HISTORY  Family History   Problem Relation Age of Onset   • Arthritis Mother    • Heart disease Mother    • Hypertension Mother    • Heart attack Father    • Heart disease Father    • Hypertension Father    • Arthritis Sister    • Multiple sclerosis Sister    • Alcohol abuse Brother    • Diabetes Son    • Malig Hyperthermia Neg Hx          SOCIAL HISTORY  Social History     Socioeconomic History   • Marital status:      Spouse name: Not on file   • Number of children: Not on file   • Years of education: Not on file   • Highest education level: Not on file   Tobacco Use   • Smoking status: Never Smoker   • Smokeless tobacco: Never Used   Vaping Use   • Vaping Use: Never used   Substance and Sexual Activity   •  Alcohol use: Yes     Comment: social   • Drug use: No   • Sexual activity: Defer         ALLERGIES  Patient has no known allergies.        REVIEW OF SYSTEMS  Review of Systems     All systems reviewed and negative except for those discussed in HPI.       PHYSICAL EXAM    I have reviewed the triage vital signs and nursing notes.    ED Triage Vitals [07/24/21 1403]   Temp Heart Rate Resp BP SpO2   98.1 °F (36.7 °C) 89 16 -- 96 %      Temp src Heart Rate Source Patient Position BP Location FiO2 (%)   Tympanic Monitor -- -- --       Physical Exam    Physical Exam   Constitutional: No distress.  Not overtly toxic  HENT:  Head: Normocephalic and atraumatic.   Oropharynx: Mucous membranes are moist.   Eyes: No scleral icterus. No conjunctival pallor.  Neck: Painless range of motion noted. Neck supple.   Cardiovascular: Pink warm and well-perfused  Pulmonary/Chest: No respiratory distress.  No tachypnea or increased work of breathing   Abdominal: Large habitus   Musculoskeletal: Changes bilateral lower extremities with chronic venous stasis.  Right lower extremity much larger than the left with noted induration, moderate heat, and large wound that is dressed along the lateral surface of the calf.  No purulent drainage or foul smell noted.  Diminished pulses bilateral lower extremities.    Neurological: Alert.  Baseline strength and sensation noted.   Skin: Skin is pink, warm, and dry. No pallor.   Psychiatric: Mood and affect normal.   Nursing note and vitals reviewed.    LAB RESULTS  Recent Results (from the past 24 hour(s))   Basic Metabolic Panel    Collection Time: 07/24/21  2:57 PM    Specimen: Blood   Result Value Ref Range    Glucose 95 65 - 99 mg/dL    BUN 31 (H) 8 - 23 mg/dL    Creatinine 2.14 (H) 0.76 - 1.27 mg/dL    Sodium 139 136 - 145 mmol/L    Potassium 4.5 3.5 - 5.2 mmol/L    Chloride 103 98 - 107 mmol/L    CO2 26.0 22.0 - 29.0 mmol/L    Calcium 8.7 8.6 - 10.5 mg/dL    eGFR  African Amer 37 (L) >60  mL/min/1.73    BUN/Creatinine Ratio 14.5 7.0 - 25.0    Anion Gap 10.0 5.0 - 15.0 mmol/L   Sedimentation Rate    Collection Time: 07/24/21  2:57 PM    Specimen: Blood   Result Value Ref Range    Sed Rate 19 0 - 20 mm/hr   C-reactive Protein    Collection Time: 07/24/21  2:57 PM    Specimen: Blood   Result Value Ref Range    C-Reactive Protein 2.74 (H) 0.00 - 0.50 mg/dL   CBC Auto Differential    Collection Time: 07/24/21  2:57 PM    Specimen: Blood   Result Value Ref Range    WBC 10.38 3.40 - 10.80 10*3/mm3    RBC 4.10 (L) 4.14 - 5.80 10*6/mm3    Hemoglobin 12.8 (L) 13.0 - 17.7 g/dL    Hematocrit 40.0 37.5 - 51.0 %    MCV 97.6 (H) 79.0 - 97.0 fL    MCH 31.2 26.6 - 33.0 pg    MCHC 32.0 31.5 - 35.7 g/dL    RDW 13.3 12.3 - 15.4 %    RDW-SD 48.2 37.0 - 54.0 fl    MPV 10.2 6.0 - 12.0 fL    Platelets 212 140 - 450 10*3/mm3    Neutrophil % 78.0 (H) 42.7 - 76.0 %    Lymphocyte % 11.3 (L) 19.6 - 45.3 %    Monocyte % 7.9 5.0 - 12.0 %    Eosinophil % 1.3 0.3 - 6.2 %    Basophil % 0.5 0.0 - 1.5 %    Immature Grans % 1.0 (H) 0.0 - 0.5 %    Neutrophils, Absolute 8.11 (H) 1.70 - 7.00 10*3/mm3    Lymphocytes, Absolute 1.17 0.70 - 3.10 10*3/mm3    Monocytes, Absolute 0.82 0.10 - 0.90 10*3/mm3    Eosinophils, Absolute 0.13 0.00 - 0.40 10*3/mm3    Basophils, Absolute 0.05 0.00 - 0.20 10*3/mm3    Immature Grans, Absolute 0.10 (H) 0.00 - 0.05 10*3/mm3    nRBC 0.0 0.0 - 0.2 /100 WBC       Ordered the above labs and independently reviewed the results.        RADIOLOGY  XR Tibia Fibula 2 View Right    Result Date: 7/24/2021  TWO-VIEW RIGHT TIBIA-FIBULA  HISTORY: Nonhealing wound of lower leg.  No gas is seen in the soft tissues of the lower leg. The underlying bony structures appear normal.  This report was finalized on 7/24/2021 3:11 PM by Dr. Remi Renee M.D.        I ordered the above noted radiological studies. Reviewed by me and discussed with radiologist.  See dictation for official radiology  interpretation.      PROCEDURES    Procedures        MEDICATIONS GIVEN IN ER    Medications   sodium chloride 0.9 % flush 10 mL (has no administration in time range)   vancomycin 3000 mg/1000 mL 0.9% NS IVPB (has no administration in time range)   cefTRIAXone (ROCEPHIN) IVPB 2 g (has no administration in time range)   sodium chloride 0.9 % infusion (has no administration in time range)   morphine injection 4 mg (4 mg Intravenous Given 7/24/21 1448)         PROGRESS, DATA ANALYSIS, CONSULTS, AND MEDICAL DECISION MAKING    My diagnosis for lower extremity pain and injury includes but is not limited to hip fracture, femur fracture, hip dislocation, hip contusion, hip sprain, hip strain, pelvic fracture, knee sprain, patella dislocation, knee dislocation, internal derangement of knee, fractures of the femur, tibia, fibula, ankle, foot and digits, ankle sprain, ankle dislocation, Lisfranc fracture, fracture dislocations of the digits, pulmonary embolism, claudication, peripheral vascular disease, gout, osteoarthritis, rheumatoid arthritis, bursitis, septic joint, poly-rheumatica, polyarthralgia and other inflammatory or infectious disease processes.      All labs have been independently reviewed by me.  All radiology studies have been reviewed by me and discussed with radiologist dictating the report.   EKG's independently viewed and interpreted by me.  Discussion below represents my analysis of pertinent findings related to patient's condition, differential diagnosis, treatment plan and final disposition.      ED Course as of Jul 24 1616   Sat Jul 24, 2021   1517 Chronic and unchanged   Hemoglobin(!): 12.8 [RS]   1608 Preliminary Doppler result via tech Jose Daniel.  Negative for DVT.  Chronic SVT consistent with prior procedure.    [RS]   1615 Patient and spouse updated with lab results, concerns and plan.  Agreeable with admission plan and antibiotics.    [RS]   1615 CONSULT        Provider: Dr. Winchester-JOSEE    Discussion:  Patient history, ED presentation and evaluation as well as ED testing and planned antibiotics.  He is agreeable to except the patient for observation admission to the [facility] floor for further evaluation and treatment.    Agreeable c treatment and planned disposition.            [RS]      ED Course User Index  [RS] Efraín Castro MD       AS OF 16:16 EDT VITALS:    BP -    HR - 89  TEMP - 98.1 °F (36.7 °C) (Tympanic)  O2 SATS - 96%        DIAGNOSIS  Final diagnoses:   Open wound of right lower leg, initial encounter   Venous stasis   PVD (peripheral vascular disease) (CMS/HCC)   Intractable pain   Acute renal failure superimposed on chronic kidney disease, unspecified CKD stage, unspecified acute renal failure type (CMS/HCC)         DISPOSITION  ADMISSION    Discussed treatment plan and reason for admission with pt/family and admitting physician.  Pt/family voiced understanding of the plan for admission for further testing/treatment as needed.          Efraín Castro MD  07/24/21 [id number]

## 2021-07-24 NOTE — PROGRESS NOTES
Clinical Pharmacy Services: Medication History    James Loaiza is a 75 y.o. male presenting to [facility] for Open wound of right lower leg, initial encounter [S81.380A]    He  has a past medical history of Abnormal EKG, Anxiety, Arthritis, Back pain, BPH (benign prostatic hyperplasia), Breast nodule, Cardiomegaly, Circulation problem, Constipation, COPD (chronic obstructive pulmonary disease) (CMS/[facility]), Deviated septum, DJD (degenerative joint disease), DVT (deep venous thrombosis) (CMS/[facility]), Dysphagia, Encounter for annual health examination (11/14/2013), Enlarged prostate, Fatigue, Hyperlipidemia (1999), Hypertension (1999), Hypogonadism male, Left hip pain, Left leg pain, Low back pain, Moist mucous membranes of ear, nose, and throat, Morbid obesity (CMS/[facility]), Muscle cramping, Muscle spasm, Neck arthritis, Neck muscle spasm, Neck pain, Obesity, Plantar fasciitis, Prostatitis, Psoriasis, Pulmonary embolus (CMS/[facility]) (01/2019), Radiculopathy, Renal insufficiency, Seborrhea, Shortness of breath, Sleep apnea, Urinary frequency, Vascular disorder, Vertigo, Weight gain, and Wellness examination (10/23/2014).    Allergies as of 07/24/2021   • (No Known Allergies)       Medication information was obtained from: Patient  Pharm[place] and Phone Number[place] [address] AT [facility]. [address]  - [phone] [facility] [phone] FX        Prior to Admission Medications     Prescriptions Last Dose Informant Patient Reported? Taking?    apixaban (ELIQUIS) 5 MG tablet tablet 7/24/2021 Self Yes Yes    Take 5 mg by mouth Every 12 (Twelve) Hours.    aspirin 81 MG EC tablet Past Week Self Yes Yes    Take 81 mg by mouth Daily.    atorvastatin (LIPITOR) 40 MG tablet 7/24/2021 Self Yes Yes    Take 40 mg by mouth Daily.    doxycycline (VIBRAMYCIN) 100 MG capsule 7/24/2021 Self Yes Yes    Take 100 mg by mouth 2 (Two) Times a Day. x10 days    furosemide (LASIX) 40 MG tablet  Past Week Self Yes Yes    Take 40 mg by mouth Daily.    gabapentin (NEURONTIN) 300 MG capsule 7/23/2021 Self Yes Yes    Take 300 mg by mouth Every Night.    NIFEdipine XL (PROCARDIA XL) 60 MG 24 hr tablet 7/24/2021 Self Yes Yes    Take 60 mg by mouth Daily.    ramipril (ALTACE) 10 MG capsule 7/24/2021 Self Yes Yes    Take 10 mg by mouth Daily.    tamsulosin (FLOMAX) 0.4 MG capsule 24 hr capsule 7/23/2021 Self Yes Yes    Take 1 capsule by mouth Every Night.    tiotropium bromide-olodaterol (STIOLTO RESPIMAT) 2.5-2.5 MCG/ACT aerosol solution inhaler 7/24/2021 Self Yes Yes    Inhale 2 puffs Daily.            Medication notes: Patient reports he has not taken his aspirin in several days because he ran out and has not been able to go to the store    This medication list is complete to the best of my knowledge as of 7/24/2021    Please call if questions.    Oscar De La Paz [facility]  7/24/2021 17:34 EDT

## 2021-07-24 NOTE — PROGRESS NOTES
07/20/2021    CC: Cellulitis (f/u RLE edema.  went to [facility], [place], operated by [facility] on 7/16/21..no other issues)  .        HPI  Cellulitis  This is a chronic problem. The current episode started more than 1 month ago. The problem occurs 2 to 4 times per day. The problem has been waxing and waning. The symptoms are aggravated by walking. He has tried acetaminophen for the symptoms. The treatment provided moderate relief.        Subjective   James Loaiza is a 75 y.o. male.      The following portions of the patient's history were reviewed and updated as appropriate: allergies, current medications, past family history, past medical history, past social history, past surgical history and problem list.    Problem List  Patient Active Problem List   Diagnosis   • Lumbar herniated disc L3-L5 3/16/16 Open MRI   • Abnormal electrocardiogram   • Abnormal weight gain   • Aortic valve insufficiency   • Coronary arteriosclerosis in native artery   • Benign essential hypertension ([facility] 1999)   • Edema   • Dyspnea on exertion   • Fatigue   • Left ventricular hypertrophy   • Obstructive sleep apnea syndrome   • Arthritis of left hip   • Hx of pulmonary embolus 7/15   • Morbid obesity with BMI of 45.0-49.9, adult (CMS/[facility])   • Intermittent dysphagia   • Chronic kidney disease ([facility])   • Hyperlipidemia 1999   • BPH (benign prostatic hypertrophy)   • Left cervical radiculopathy   • Rotator cuff tear, left   • Cardiomegaly   • Plantar fasciitis   • Hypogonadism male   • Vitamin D deficiency disease   • Renal cyst, left   • Preventative health care   • Medication management   • Chronic low back pain   • Lumbar spondylolysis   • Headache   • Other chronic pain   • Pulmonary embolus (CMS/[facility])   • Chronic obstructive pulmonary disease (CMS/[facility])   • Osteoarthritis of right shoulder   • Chronic anticoagulation   • Cervical stenosis of spine   • Bilateral occipital neuralgia   • Shoulder pain   • Cervical facet joint syndrome       Past Medical  "History  Past Medical History:   Diagnosis Date   • Abnormal EKG     referring to cardiology   • Anxiety    • Arthritis    • Back pain     worsening   • BPH (benign prostatic hyperplasia)     BPH/Nocturia/ Urinary Frequency   • Breast nodule     right   • Cardiomegaly     Cardiomegaly/ SOB with exertion   • Circulation problem    • Constipation    • COPD (chronic obstructive pulmonary disease) (CMS/[facility])    • Deviated septum    • DJD (degenerative joint disease)     DJD Knees   • DVT (deep venous thrombosis) (CMS/[facility])    • Dysphagia     solids and liquids - resolved with normal studies   • Encounter for annual health examination 11/14/2013    Annual Health Assessment   • Enlarged prostate    • Fatigue     Extreme fatigue   • Hyperlipidemia 1999   • Hypertension 1999    followed Dr. Marcelo   • Hypogonadism male    • Left hip pain     and DJD   • Left leg pain    • Low back pain    • Moist mucous membranes of ear, nose, and throat     \"Mucous in throat\"   • Morbid obesity (CMS/[facility])     (BMI-41.2za)   • Muscle cramping    • Muscle spasm     Muscle spasms   • Neck arthritis    • Neck muscle spasm     Neck muscle spasm/Cervical strain   • Neck pain    • Obesity    • Plantar fasciitis    • Prostatitis     recurrent   • Psoriasis    • Pulmonary embolus (CMS/[facility]) 01/2019    on Xarelltoypseerlipidemia   • Radiculopathy     Radiculopathy / Neuropathy left ar   • Renal insufficiency     followed by Dr. Mendes   • Seborrhea    • Shortness of breath    • Sleep apnea     Obstructive Sleep apnea worsening   • Urinary frequency    • Vascular disorder    • Vertigo    • Weight gain    • Wellness examination 10/23/2014    Annual Wellness Visit       Surgical History  Past Surgical History:   Procedure Laterality Date   • APPENDECTOMY  1965   • CARDIAC CATHETERIZATION  07/29/2010    [facility]   • COLONOSCOPY  01/05/2010   • COLONOSCOPY  10/01/2001   • NASAL SEPTUM SURGERY     • NOSE SURGERY  1999   • " RADIOFREQUENCY ABLATION Bilateral 3/2/2021    Procedure: RADIOFREQUENCY ABLATION NERVES---bilateral cervical 2-cervical3;  Surgeon: Edu Tineo MD;  Location: [facility];  Service: Pain Management;  Laterality: Bilateral;   • TURP / TRANSURETHRAL INCISION / DRAINAGE PROSTATE  10/23/2015    Michael Castro       Family History  Family History   Problem Relation Age of Onset   • Arthritis Mother    • Heart disease Mother    • Hypertension Mother    • Heart attack Father    • Heart disease Father    • Hypertension Father    • Arthritis Sister    • Multiple sclerosis Sister    • Alcohol abuse Brother    • Diabetes Son    • Malig Hyperthermia Neg Hx        Social History  Social History    Tobacco Use      Smoking status: Never Smoker      Smokeless tobacco: Never Used       Is the Patient a current tobacco user? No    Allergies  No Known Allergies    Current Medications    Current Outpatient Medications:   •  apixaban (ELIQUIS) 5 MG tablet tablet, Take 1 tablet by mouth Every 12 (Twelve) Hours., Disp: 6 tablet, Rfl: 0  •  aspirin 81 MG EC tablet, Take 81 mg by mouth Daily., Disp: , Rfl:   •  atorvastatin (LIPITOR) 40 MG tablet, TAKE ONE TABLET BY MOUTH DAILY, Disp: 90 tablet, Rfl: 0  •  furosemide (LASIX) 40 MG tablet, PT REPORTS HE TAKES ONCE EVERY WEEK OR TWO, Disp: , Rfl:   •  gabapentin (NEURONTIN) 300 MG capsule, Take 1 capsule by mouth Every Night., Disp: 30 capsule, Rfl: 2  •  NIFEdipine XL (PROCARDIA XL) 60 MG 24 hr tablet, TAKE ONE TABLET BY MOUTH DAILY, Disp: , Rfl:   •  ramipril (ALTACE) 10 MG capsule, TAKE ONE CAPSULE BY MOUTH DAILY, Disp: 90 capsule, Rfl: 1  •  tamsulosin (FLOMAX) 0.4 MG capsule 24 hr capsule, Take 1 capsule by mouth Every Night., Disp: , Rfl:   •  tiotropium bromide-olodaterol (STIOLTO RESPIMAT) 2.5-2.5 MCG/ACT aerosol solution inhaler, Inhale Daily., Disp: , Rfl:   •  doxycycline (MONODOX) 100 MG capsule, Take 1 capsule by mouth 2 (Two) Times a Day for 10 days., Disp: 20  capsule, Rfl: 0     Review of System  Review of Systems   Constitutional: Negative.    Respiratory: Negative.    Cardiovascular: Negative.    Endocrine: Negative.    Skin: Positive for skin lesions.        Cellulitis with weeping on the right tibial area     I have reviewed and confirmed the accuracy of the ROS as documented by the MA/LPN/RN Magdiel Bowens MD    Vitals:    07/20/21 0832   BP: 130/80   Resp: 16     Body mass index is 44.58 kg/m².    Objective     Physical Exam  Physical Exam  Constitutional:       Appearance: Normal appearance. He is obese.   Cardiovascular:      Rate and Rhythm: Normal rate and regular rhythm.      Pulses: Normal pulses.      Heart sounds: Normal heart sounds.   Pulmonary:      Effort: Pulmonary effort is normal.      Breath sounds: Normal breath sounds.   Musculoskeletal:         General: Normal range of motion.      Right lower leg: Edema present.   Skin:     Comments: Recurrent cellulitis apparent on the right tibial area.  There is weeping of the skin.   Neurological:      Mental Status: He is alert.         Assessment/Plan    This 75-year-old patient presents at this time for a follow-up of recurrent celulitis of the right lower extremity.  He is been seen in the edema clinic in the past has had the leg wrapped.  He continues to have pain which is increased to 7/10 in intensity.  He presented to the emergency room on 7/16 where his antibiotic was changed to doxycycline.  He was previously seen by Dr. Ochoa vascular surgeon.  The patient has had several wound dressings applied however it really appears that he has been seen in the edema clinic rather than the wound care clinic. The patient was previously on Augmentin before transferring to my care and it done well with the resolution of the cellulitis for several months. Patient relates that the cellulitis recurred and so he presented to the emergency room.    He complains of pain 7/10 in intensity.          Diagnoses and all  orders for this visit:    1. Cellulitis of right lower extremity (Primary)  -     Ambulatory Referral to Wound Clinic    2. Pain of right lower extremity         plan:  #1.)  Referral to wound care center for evaluation and treatment  #2.)  Tramadol 50 mg 1 tab by mouth twice a day ×5 days for pain  #3.)  Follow-up in 3-4 weeks    Magdiel Bowens MD  07/20/2021

## 2021-07-24 NOTE — ED TRIAGE NOTES
Cellulitis on back of right leg dx by pmd.  Sent here and put on abx and it is not getting better    Patient was placed in face mask during first look triage.  Patient was wearing a face mask throughout encounter.  I wore personal protective equipment throughout the encounter.  Hand hygiene was performed before and after patient encounter.

## 2021-07-25 PROBLEM — N17.9 AKI (ACUTE KIDNEY INJURY): Status: ACTIVE | Noted: 2021-07-25

## 2021-07-25 PROBLEM — I87.2 VENOUS INSUFFICIENCY: Status: ACTIVE | Noted: 2021-07-25

## 2021-07-25 PROBLEM — K59.00 CONSTIPATION: Status: ACTIVE | Noted: 2021-07-25

## 2021-07-25 LAB
ALBUMIN SERPL-MCNC: 3.7 G/DL (ref 3.5–5.2)
ALBUMIN/GLOB SERPL: 1.2 G/DL
ALP SERPL-CCNC: 155 U/L (ref 39–117)
ALT SERPL W P-5'-P-CCNC: 21 U/L (ref 1–41)
ANION GAP SERPL CALCULATED.3IONS-SCNC: 11.3 MMOL/L (ref 5–15)
AST SERPL-CCNC: 22 U/L (ref 1–40)
BASOPHILS # BLD AUTO: 0.06 10*3/MM3 (ref 0–0.2)
BASOPHILS NFR BLD AUTO: 0.6 % (ref 0–1.5)
BH CV LOW VAS RIGHT GREATER SAPH AK VESSEL: 1
BH CV LOW VAS RIGHT GREATER SAPH BK VESSEL: 1
BH CV LOW VAS RIGHT LESSER SAPH VESSEL: 1
BH CV LOWER VASCULAR LEFT COMMON FEMORAL AUGMENT: NORMAL
BH CV LOWER VASCULAR LEFT COMMON FEMORAL COMPETENT: NORMAL
BH CV LOWER VASCULAR LEFT COMMON FEMORAL COMPRESS: NORMAL
BH CV LOWER VASCULAR LEFT COMMON FEMORAL PHASIC: NORMAL
BH CV LOWER VASCULAR LEFT COMMON FEMORAL SPONT: NORMAL
BH CV LOWER VASCULAR RIGHT COMMON FEMORAL AUGMENT: NORMAL
BH CV LOWER VASCULAR RIGHT COMMON FEMORAL COMPETENT: NORMAL
BH CV LOWER VASCULAR RIGHT COMMON FEMORAL COMPRESS: NORMAL
BH CV LOWER VASCULAR RIGHT COMMON FEMORAL PHASIC: NORMAL
BH CV LOWER VASCULAR RIGHT COMMON FEMORAL SPONT: NORMAL
BH CV LOWER VASCULAR RIGHT DISTAL FEMORAL COMPRESS: NORMAL
BH CV LOWER VASCULAR RIGHT GASTRONEMIUS COMPRESS: NORMAL
BH CV LOWER VASCULAR RIGHT GREATER SAPH AK COMPRESS: NORMAL
BH CV LOWER VASCULAR RIGHT GREATER SAPH AK THROMBUS: NORMAL
BH CV LOWER VASCULAR RIGHT GREATER SAPH BK COMPRESS: NORMAL
BH CV LOWER VASCULAR RIGHT GREATER SAPH BK THROMBUS: NORMAL
BH CV LOWER VASCULAR RIGHT LESSER SAPH COMPRESS: NORMAL
BH CV LOWER VASCULAR RIGHT LESSER SAPH THROMBUS: NORMAL
BH CV LOWER VASCULAR RIGHT MID FEMORAL AUGMENT: NORMAL
BH CV LOWER VASCULAR RIGHT MID FEMORAL COMPETENT: NORMAL
BH CV LOWER VASCULAR RIGHT MID FEMORAL COMPRESS: NORMAL
BH CV LOWER VASCULAR RIGHT MID FEMORAL PHASIC: NORMAL
BH CV LOWER VASCULAR RIGHT MID FEMORAL SPONT: NORMAL
BH CV LOWER VASCULAR RIGHT PERONEAL COMPRESS: NORMAL
BH CV LOWER VASCULAR RIGHT POPLITEAL AUGMENT: NORMAL
BH CV LOWER VASCULAR RIGHT POPLITEAL COMPETENT: NORMAL
BH CV LOWER VASCULAR RIGHT POPLITEAL COMPRESS: NORMAL
BH CV LOWER VASCULAR RIGHT POPLITEAL PHASIC: NORMAL
BH CV LOWER VASCULAR RIGHT POPLITEAL SPONT: NORMAL
BH CV LOWER VASCULAR RIGHT POSTERIOR TIBIAL COMPRESS: NORMAL
BH CV LOWER VASCULAR RIGHT PROFUNDA FEMORAL COMPRESS: NORMAL
BH CV LOWER VASCULAR RIGHT PROXIMAL FEMORAL COMPRESS: NORMAL
BH CV LOWER VASCULAR RIGHT SAPHENOFEMORAL JUNCTION COMPRESS: NORMAL
BILIRUB SERPL-MCNC: 0.3 MG/DL (ref 0–1.2)
BUN SERPL-MCNC: 26 MG/DL (ref 8–23)
BUN/CREAT SERPL: 13.6 (ref 7–25)
CALCIUM SPEC-SCNC: 8.4 MG/DL (ref 8.6–10.5)
CHLORIDE SERPL-SCNC: 106 MMOL/L (ref 98–107)
CO2 SERPL-SCNC: 21.7 MMOL/L (ref 22–29)
CREAT SERPL-MCNC: 1.91 MG/DL (ref 0.76–1.27)
DEPRECATED RDW RBC AUTO: 43.5 FL (ref 37–54)
EOSINOPHIL # BLD AUTO: 0.1 10*3/MM3 (ref 0–0.4)
EOSINOPHIL NFR BLD AUTO: 1 % (ref 0.3–6.2)
ERYTHROCYTE [DISTWIDTH] IN BLOOD BY AUTOMATED COUNT: 12.7 % (ref 12.3–15.4)
GFR SERPL CREATININE-BSD FRML MDRD: 42 ML/MIN/1.73
GLOBULIN UR ELPH-MCNC: 3.2 GM/DL
GLUCOSE SERPL-MCNC: 104 MG/DL (ref 65–99)
HCT VFR BLD AUTO: 38.1 % (ref 37.5–51)
HGB BLD-MCNC: 12.5 G/DL (ref 13–17.7)
IMM GRANULOCYTES # BLD AUTO: 0.09 10*3/MM3 (ref 0–0.05)
IMM GRANULOCYTES NFR BLD AUTO: 0.9 % (ref 0–0.5)
LYMPHOCYTES # BLD AUTO: 0.97 10*3/MM3 (ref 0.7–3.1)
LYMPHOCYTES NFR BLD AUTO: 10.1 % (ref 19.6–45.3)
MAXIMAL PREDICTED HEART RATE: 147 BPM
MCH RBC QN AUTO: 31.2 PG (ref 26.6–33)
MCHC RBC AUTO-ENTMCNC: 32.8 G/DL (ref 31.5–35.7)
MCV RBC AUTO: 95 FL (ref 79–97)
MONOCYTES # BLD AUTO: 0.85 10*3/MM3 (ref 0.1–0.9)
MONOCYTES NFR BLD AUTO: 8.9 % (ref 5–12)
NEUTROPHILS NFR BLD AUTO: 7.52 10*3/MM3 (ref 1.7–7)
NEUTROPHILS NFR BLD AUTO: 78.5 % (ref 42.7–76)
NRBC BLD AUTO-RTO: 0 /100 WBC (ref 0–0.2)
PLATELET # BLD AUTO: 192 10*3/MM3 (ref 140–450)
PMV BLD AUTO: 10.1 FL (ref 6–12)
POTASSIUM SERPL-SCNC: 4.7 MMOL/L (ref 3.5–5.2)
PROT SERPL-MCNC: 6.9 G/DL (ref 6–8.5)
RBC # BLD AUTO: 4.01 10*6/MM3 (ref 4.14–5.8)
SODIUM SERPL-SCNC: 139 MMOL/L (ref 136–145)
STRESS TARGET HR: 125 BPM
VANCOMYCIN SERPL-MCNC: 12.8 MCG/ML (ref 5–40)
WBC # BLD AUTO: 9.59 10*3/MM3 (ref 3.4–10.8)

## 2021-07-25 PROCEDURE — 25010000003 CEFTRIAXONE PER 250 MG: Performed by: INTERNAL MEDICINE

## 2021-07-25 PROCEDURE — 85025 COMPLETE CBC W/AUTO DIFF WBC: CPT | Performed by: INTERNAL MEDICINE

## 2021-07-25 PROCEDURE — 25010000002 VANCOMYCIN PER 500 MG: Performed by: INTERNAL MEDICINE

## 2021-07-25 PROCEDURE — 80053 COMPREHEN METABOLIC PANEL: CPT | Performed by: INTERNAL MEDICINE

## 2021-07-25 PROCEDURE — G0378 HOSPITAL OBSERVATION PER HR: HCPCS

## 2021-07-25 PROCEDURE — 96366 THER/PROPH/DIAG IV INF ADDON: CPT

## 2021-07-25 PROCEDURE — 80202 ASSAY OF VANCOMYCIN: CPT | Performed by: INTERNAL MEDICINE

## 2021-07-25 RX ORDER — LACTULOSE 10 G/15ML
10 SOLUTION ORAL DAILY PRN
Status: DISCONTINUED | OUTPATIENT
Start: 2021-07-25 | End: 2021-07-26 | Stop reason: HOSPADM

## 2021-07-25 RX ORDER — ALUMINA, MAGNESIA, AND SIMETHICONE 2400; 2400; 240 MG/30ML; MG/30ML; MG/30ML
15 SUSPENSION ORAL EVERY 6 HOURS PRN
Status: DISCONTINUED | OUTPATIENT
Start: 2021-07-25 | End: 2021-07-26 | Stop reason: HOSPADM

## 2021-07-25 RX ORDER — VANCOMYCIN HYDROCHLORIDE 1 G/200ML
1000 INJECTION, SOLUTION INTRAVENOUS EVERY 24 HOURS
Status: DISCONTINUED | OUTPATIENT
Start: 2021-07-25 | End: 2021-07-26 | Stop reason: HOSPADM

## 2021-07-25 RX ORDER — AMOXICILLIN 250 MG
1 CAPSULE ORAL 2 TIMES DAILY
Status: DISCONTINUED | OUTPATIENT
Start: 2021-07-25 | End: 2021-07-26 | Stop reason: HOSPADM

## 2021-07-25 RX ADMIN — NIFEDIPINE 60 MG: 60 TABLET, FILM COATED, EXTENDED RELEASE ORAL at 09:29

## 2021-07-25 RX ADMIN — APIXABAN 5 MG: 5 TABLET, FILM COATED ORAL at 22:26

## 2021-07-25 RX ADMIN — HYDROCODONE BITARTRATE AND ACETAMINOPHEN 1 TABLET: 7.5; 325 TABLET ORAL at 05:26

## 2021-07-25 RX ADMIN — RAMIPRIL 10 MG: 10 CAPSULE ORAL at 09:30

## 2021-07-25 RX ADMIN — APIXABAN 5 MG: 5 TABLET, FILM COATED ORAL at 09:29

## 2021-07-25 RX ADMIN — ACETAMINOPHEN 650 MG: 325 TABLET, FILM COATED ORAL at 20:14

## 2021-07-25 RX ADMIN — CEFTRIAXONE SODIUM 2 G: 2 INJECTION, SOLUTION INTRAVENOUS at 16:34

## 2021-07-25 RX ADMIN — ACETAMINOPHEN 650 MG: 325 TABLET, FILM COATED ORAL at 09:38

## 2021-07-25 RX ADMIN — ASPIRIN 81 MG: 81 TABLET, COATED ORAL at 09:29

## 2021-07-25 RX ADMIN — VANCOMYCIN HYDROCHLORIDE 1000 MG: 1 INJECTION, SOLUTION INTRAVENOUS at 20:43

## 2021-07-25 RX ADMIN — DOCUSATE SODIUM 50MG AND SENNOSIDES 8.6MG 1 TABLET: 8.6; 5 TABLET, FILM COATED ORAL at 22:26

## 2021-07-25 RX ADMIN — GABAPENTIN 300 MG: 300 CAPSULE ORAL at 22:26

## 2021-07-25 RX ADMIN — ATORVASTATIN CALCIUM 40 MG: 20 TABLET, FILM COATED ORAL at 09:29

## 2021-07-25 RX ADMIN — HYDROCODONE BITARTRATE AND ACETAMINOPHEN 1 TABLET: 7.5; 325 TABLET ORAL at 00:04

## 2021-07-25 RX ADMIN — SODIUM CHLORIDE, PRESERVATIVE FREE 10 ML: 5 INJECTION INTRAVENOUS at 09:34

## 2021-07-25 RX ADMIN — SILVER SULFADIAZINE: 10 CREAM TOPICAL at 22:26

## 2021-07-25 RX ADMIN — TAMSULOSIN HYDROCHLORIDE 0.4 MG: 0.4 CAPSULE ORAL at 22:26

## 2021-07-25 RX ADMIN — DOCUSATE SODIUM 50MG AND SENNOSIDES 8.6MG 1 TABLET: 8.6; 5 TABLET, FILM COATED ORAL at 13:08

## 2021-07-25 NOTE — PLAN OF CARE
Goal Outcome Evaluation:  Plan of Care Reviewed With: patient        Progress: no change Pt admitted to unit from ER-orientation completed and care plan discussed. He had a very restless night-unable to sleep with O2 on-family brought C-PAP to hospital around 0200. Pt then sat up in chair all night and did not wear either one. He has a continuous pulse oximeter on and sats are between 88-95 on room air. Up ad jackie to BR-he has a received a dose of vanc and has a random level to be drawn at 1400 today. Unable to get TONIA boots from distribution-message left for wound care nurse regarding these. Consult placed for vascular surgery to see pt about venous stasis ulcer on rt leg. Currently wrapped in gauzed and covered with ACE wrap. He has received lortab x 2 for pain with good effect. Will continue to monitor and follow current POC.

## 2021-07-25 NOTE — CONSULTS
Name: James Loaiza ADMIT: 2021   : 1945  PCP: Magdiel Bowens MD    MRN: [id number] LOS: 0 days   AGE/SEX: 75 y.o. male  ROOM: [facility] P491/1     Inpatient Vascular Surgery Consult  Consult performed by: Griffin Ramirez MD  Consult ordered by: Jolanta Winchester MD        CC: Venous stasis ulcer  Subjective     History of Present Illness  75 y.o. male gentleman who is a patient of Dr. Roger Ochoa's.  He had worsening discomfort overlying a right-sided venous stasis ulcer and came into the emergency department and was admitted to their for evaluation.  Patient was started on doxycycline on 2021 and then escalated to Augmentin by his primary care physician.  He underwent right lower extremity great saphenous and short saphenous ablation last month by Dr. Ochoa.    HPI Elements:  Location: Right leg  Quality: Aching pain  Severity: Severe  Duration: Days  Timing: Worse at night  Context: Previous venous insufficiency and venous intervention  Associated Signs and Symptoms: Denies fever, on anticoagulation    Review of Systems   Constitutional: Negative for chills and fever.   Skin: Positive for wound.   All other systems reviewed and are negative.      History Review Reviewed Comments   Past Medical History:  [x]   COPD, venous insufficiency, obesity, DVT, chronic kidney disease   Past Surgical History: [x]   Venous ablation, appendectomy   Family History: [x]   Coronary disease, hypertension   Social History: [x]   Non-smoker.  Occasional alcohol use.     Allergies: Patient has no known allergies.      Objective   Temp:  [97.4 °F (36.3 °C)-98.1 °F (36.7 °C)] 97.4 °F (36.3 °C)  Heart Rate:  [69-89] 89  Resp:  [16-18] 18  BP: (168-178)/(72-84) 172/84    No intake/output data recorded.    Physical Exam  Vitals reviewed.   Constitutional:       General: He is not in acute distress.     Appearance: Normal appearance. He is well-developed.   Eyes:      General: Lids are normal.       Conjunctiva/sclera: Conjunctivae normal.   Neck:      Vascular: No JVD.   Cardiovascular:      Rate and Rhythm: Regular rhythm.      Pulses:           Carotid pulses are 2+ on the right side and 2+ on the left side.     Heart sounds: No murmur heard.        Comments: Patient's right lower extremity swelling is worse than the right.  Chronic hemosiderin staining.  Patient has an open ulcer in the posterior lateral aspect of the calf.  These are small punctate draining lesions more than 1 large ulceration.  Erythema is decreased when compared to the picture below.  Pulmonary:      Effort: Pulmonary effort is normal.      Breath sounds: Normal breath sounds.   Abdominal:      Tenderness: There is no abdominal tenderness.   Musculoskeletal:      Right lower leg: Edema present.      Left lower leg: Edema present.      Comments: No digital cyanosis or clubbing   Neurological:      Mental Status: He is alert and oriented to person, place, and time.               Data Reviewed:  Coag     Infection     Radiology(recent) No radiology results for the last day  Results from last 7 days   Lab Units 07/25/21  0826 07/24/21  1457   WBC 10*3/mm3 9.59 10.38   HEMOGLOBIN g/dL 12.5* 12.8*   PLATELETS 10*3/mm3 192 212     Results from last 7 days   Lab Units 07/25/21  0826 07/24/21  1457   SODIUM mmol/L 139 139   POTASSIUM mmol/L 4.7 4.5   CHLORIDE mmol/L 106 103   CO2 mmol/L 21.7* 26.0   BUN mg/dL 26* 31*   CREATININE mg/dL 1.91* 2.14*   GLUCOSE mg/dL 104* 95   Estimated Creatinine Clearance: 53.6 mL/min (A) (by C-G formula based on SCr of 1.91 mg/dL (H)).       Labs significant for: Normal white blood cell count    Imaging Studies:  White blood cell count normal.  Hemoglobin 12.5.  Creatinine 1.91   (Baseline)  Superficial venous insufficiency noted on images from our office.  Reviewed.    Active Hospital Problems    Diagnosis  POA   • **Open wound of right lower leg [S81.801A]  Yes   • REJI (acute kidney injury) (CMS/HCC) [N17.9]   Yes   • Chronic anticoagulation [Z79.01]  Not Applicable   • Chronic obstructive pulmonary disease (CMS/HCC) [J44.9]  Yes   • Benign essential hypertension ([facility] ) [I10]  Yes   • Chronic kidney disease ([facility]) [N18.9]  Yes   • Morbid obesity with BMI of 45.0-49.9, adult (CMS/[facility]) [E66.01, Z68.42]  Not Applicable   • Obstructive sleep apnea syndrome [G47.33]  Yes      Resolved Hospital Problems   No resolved problems to display.       Data Points:  During this visit the following were done:  Labs Reviewed [x]    Labs Ordered []    Radiology Reports Reviewed [x]    Radiology Ordered []    EKG, echo, and/or stress test reviewed []    Vascular lab results reviewed  []    Vascular lab images reviewed and interpreted per myself   []    Referring Provider Records Reviewed []    ER Records Reviewed []    Hospital Records Reviewed/Summarized [x]    History Obtained From Family []    Radiological images view and Interpreted per myself [x]    Case Discussed with referring provider []     Decision to obtain and request outside records  []    Total data points:4 or more    Active Hospital Problems:   Active Hospital Problems    Diagnosis  POA   • **Open wound of right lower leg [S81.801A]  Yes   • REJI (acute kidney injury) (CMS/[facility]) [N17.9]  Yes   • Chronic anticoagulation [Z79.01]  Not Applicable   • Chronic obstructive pulmonary disease (CMS/HCC) [J44.9]  Yes   • Benign essential hypertension ([facility] ) [I10]  Yes   • Chronic kidney disease ([facility]) [N18.9]  Yes   • Morbid obesity with BMI of 45.0-49.9, adult (CMS/[facility]) [E66.01, Z68.42]  Not Applicable   • Obstructive sleep apnea syndrome [G47.33]  Yes      Resolved Hospital Problems   No resolved problems to display.      Assessment/Plan   Billin  Assessment / Plan     75 y.o. male who is well-known to my partner Dr. Roger Ochoa.  Patient has a longstanding history of venous insufficiency and has been dealing with venous stasis ulcers over the last few months.   He receiving multimodality care including compression therapy at edema partners, venous ablation at [facility] vein [facility], and antibiotics directed by Dr. Zuñiga.  The patient presents with worsening pain and erythema.  He says it is actually already improved since starting on antibiotics here.    I remove the dressings at the bedside today.  That it is tender to touch.  He has some punctate ulcers on the posterior lateral aspect of the right leg.  Moderate amount of drainage.  No large ulcerations.  Edema decreased when compared to the picture taken at admission.    I will write for wound care to come and do an Unna boot compression wrap on the patient's leg.  I do not think it can be left on for more than 2 or 3 days because of his infection.  Antibiotics per primary team.  He has an appointment with Dr. Roger Ochoa on Wednesday.  We could take the dressing down then if able to be applied on Monday and discharged.  Until wound care can place the compression wrap, I will order some Silvadene to be applied to the open areas of the wound, open areas covered with ABD pad or Telfa and then wrapped with Kerlix and Ace from toes to knee.     Patient would benefit from follow-[facility] at [facility] also for ongoing wound care and will see if we can help arrange this with CCP.    I discussed the patients findings and my recommendations with patient.  Please call my office with any question: [phone]

## 2021-07-25 NOTE — PROGRESS NOTES
[facility]  Clinical Pharmacy Department     Vancomycin Pharmacokinetic Note    James Loaiza is a 75 y.o. male who is on day  of pharmacy to dose vancomycin for complicated skin and soft tissue infection.    Target level: AUC24 400-600  Consulting Provider:  Dr Winchester  Current Vancomycin Dose:   Intermittent vancomycin dosing   Planned Duration of Therapy: 7 days  Other Antimicrobials: ceftriaxone     Allergies  Allergies as of 07/24/2021   • (No Known Allergies)       Microbiology:  Microbiology Results (last 10 days)     Procedure Component Value - Date/Time    Blood Culture - Blood, Arm, Left [[id number]] Collected: 07/24/21 1631    Lab Status: Preliminary result Specimen: Blood from Arm, Left Updated: 07/25/21 1645     Blood Culture No growth at 24 hours    COVID PRE-OP / PRE-PROCEDURE SCREENING ORDER (NO ISOLATION) - Swab, Nasopharynx [[id number]]  (Normal) Collected: 07/24/21 1623    Lab Status: Final result Specimen: Swab from Nasopharynx Updated: 07/24/21 1732    Narrative:      The following orders were created for panel order COVID PRE-OP / PRE-PROCEDURE SCREENING ORDER (NO ISOLATION) - Swab, Nasopharynx.  Procedure                               Abnormality         Status                     ---------                               -----------         ------                     COVID-19,[facility] IN-HOUSE...[[id number]]  Normal              Final result                 Please view results for these tests on the individual orders.    COVID-19,[facility] IN-HOUSE CEPHEID/MARK NP SWAB IN TRANSPORT MEDIA 8-12 HR TAT - Swab, Nasopharynx [[id number]]  (Normal) Collected: 07/24/21 1623    Lab Status: Final result Specimen: Swab from Nasopharynx Updated: 07/24/21 1732     COVID19 Not Detected    Narrative:      Fact sheet for providers: https://www.fda.gov/media/142138/download     Fact sheet for patients: https://www.fda.gov/media/554728/download    Blood Culture - Blood, Arm, Right [[id number]] Collected: 07/24/21 1884  "   Lab Status: Preliminary result Specimen: Blood from Arm, Right Updated: 07/25/21 1515     Blood Culture No growth at 24 hours          Vitals/Labs/I&O  188 cm (74\")  (!) 152 kg (335 lb)  Body mass index is 43.01 kg/m².   Temp:  [97.2 °F (36.2 °C)-97.7 °F (36.5 °C)] 97.2 °F (36.2 °C)  Heart Rate:  [58-89] 58  Resp:  [16-18] 16  BP: (147-178)/(72-84) 147/76    Results from last 7 days   Lab Units 07/25/21  0826 07/24/21  1457   WBC 10*3/mm3 9.59 10.38              Results from last 7 days   Lab Units 07/24/21  1457   CRP mg/dL 2.74*       Results from last 7 days   Lab Units 07/24/21  1457   SED RATE mm/hr 19        Estimated Creatinine Clearance: 53.6 mL/min (A) (by C-G formula based on SCr of 1.91 mg/dL (H)).  Results from last 7 days   Lab Units 07/25/21  0826 07/24/21  1457   BUN mg/dL 26* 31*   CREATININE mg/dL 1.91* 2.14*     Intake & Output (last 3 days)       07/22 0701 - 07/23 0700 07/23 0701 - 07/24 0700 07/24 0701 - 07/25 0700 07/25 0701 - 07/26 0700    P.O.   600 780    Total Intake(mL/kg)   600 (3.9) 780 (5.1)    Net   +600 +780            Urine Unmeasured Occurrence   3 x           Vancomycin Dosing and Level History:  See [employer]    Results from last 7 days   Lab Units 07/25/21  1446   VANCOMYCIN RM mcg/mL 12.80               Assessment/Plan:    Assessment:  Bayesian analysis of the most recent level(s) using [employer] provides the following patient-specific pharmacokinetic parameters:   CL: 2.19 L/h   Vd: 28.9 L   T1/2: 33.1 h  If the predicted trough on this regimen is not within what was previously considered a \"target trough range\", the AUC24 (a stronger predictor of vancomycin efficacy) is predicted to achieve the accepted target of 400-600 mg*h/L. To best balance efficacy and toxicity, we will be targeting AUC24 in this patient rather than steady-state trough levels.     Initiating the regimen of 1000 mg (6.6mg/kg) IV every 24 hours gives a predicted steady-state trough of 15.8 mg/L and " AUC24 of 442 mg*h/L based upon population pharmacokinetics and this patient's specific parameters.    Recommendations/Plan:  -Initiate vancomycin 1000 mg (6.6 mg/kg) IV every 24 hours   -Obtain vancomycin level on 7/27 at 1730  -CBC/BMP in AM    Thank you for involving pharmacy in this patient's care. Please contact pharmacy with any questions or concerns.                           Juan Lee [facility]  Clinical Pharmacist  07/25/21 16:50 EDT

## 2021-07-25 NOTE — PROGRESS NOTES
"Pharmacokinetics by Pharmacy - Vancomycin Initial Consult    Consulting provider: Dr. Winchester    Days of therapy: 1 of 7    James Loaiaz is a 75 y.o. male being initiated on vancomycin for skin and soft tissue infection. Patient is also receiving ceftriaxone 2 grams iv daily.    Objective:     [Ht: 188 cm (74\"); Wt: (!) 152 kg (335 lb)]     WBC   Date Value Ref Range Status   07/24/2021 10.38 3.40 - 10.80 10*3/mm3 Final   07/17/2021 9.92 3.40 - 10.80 10*3/mm3 Final   02/24/2021 8.81 3.40 - 10.80 10*3/mm3 Final   01/16/2019 11.99 (H) 4.50 - 10.70 10*3/mm3 Final   12/12/2018 10.50 4.50 - 10.70 10*3/mm3 Final      C-Reactive Protein   Date Value Ref Range Status   07/24/2021 2.74 (H) 0.00 - 0.50 mg/dL Final     Lactate   Date Value Ref Range Status   07/17/2021 1.0 0.5 - 2.0 mmol/L Final      Temp Readings from Last 1 Encounters:   07/24/21 97.7 °F (36.5 °C) (Oral)     Estimated Creatinine Clearance: 47.8 mL/min (A) (by C-G formula based on SCr of 2.14 mg/dL (H)).   Creatinine   Date Value Ref Range Status   07/24/2021 2.14 (H) 0.76 - 1.27 mg/dL Final   07/17/2021 1.83 (H) 0.76 - 1.27 mg/dL Final   02/24/2021 2.05 (H) 0.76 - 1.27 mg/dL Final   01/16/2019 1.94 (H) 0.76 - 1.27 mg/dL Final       Baseline culture results:  Microbiology Results (last 10 days)       Procedure Component Value - Date/Time    COVID PRE-OP / PRE-PROCEDURE SCREENING ORDER (NO ISOLATION) - Swab, Nasopharynx [[id number]]  (Normal) Collected: 07/24/21 7246    Lab Status: Final result Specimen: Swab from Nasopharynx Updated: 07/24/21 2848    Narrative:      The following orders were created for panel order COVID PRE-OP / PRE-PROCEDURE SCREENING ORDER (NO ISOLATION) - Swab, Nasopharynx.  Procedure                               Abnormality         Status                     ---------                               -----------         ------                     COVID-19, [facility]-HOUSE...[[id number]]  Normal              Final result                 Please " view results for these tests on the individual orders.    COVID-19,[facility]-HOUSE CEPHEID/MARK NP SWAB IN TRANSPORT MEDIA 8-12 HR TAT - Swab, Nasopharynx [[id number]]  (Normal) Collected: 07/24/21 1623    Lab Status: Final result Specimen: Swab from Nasopharynx Updated: 07/24/21 1732     COVID19 Not Detected    Narrative:      Fact sheet for providers: https://www.fda.gov/media/938133/download     Fact sheet for patients: https://www.fda.gov/media/631945/download          No results found for: RESPCX    Assessment  Vancomycin 3 grams given in ED.  Bayesian calculator shows that this dose will not be cleared until 7/26.  Will order a random level 7/25 @ 1400 to have a better idea of patient specific populations as patient is morbidly obese and has renal dysfunction.     Plan  1. Give vancomycin  3000 mg IV x 1   2. Will order vancomycin random level 7/25 @ 1400  3. Pharmacy will monitor renal function and adjust dose accordingly.    Zachary Grijalva, PharmD, BCIDP, BCPS  07/25/21 01:02 EDT

## 2021-07-25 NOTE — PROGRESS NOTES
Name: James Loaiza ADMIT: 2021   : 1945  PCP: Magdiel Bowens MD    MRN: [id number] LOS: 0 days   AGE/SEX: 75 y.o. male  ROOM: [facility]     Subjective   Subjective   Patient appears elderly, obese, generally weak, relatively comfortable, and in no apparent distress.  Voices no new concerns.  Requesting food.  Discussed with RN.    Review of Systems   Constitutional: Negative for chills and fever.   HENT: Negative for congestion and rhinorrhea.    Respiratory: Negative for cough and shortness of breath.    Cardiovascular: Negative for chest pain and leg swelling.   Gastrointestinal: Negative for abdominal pain, constipation, diarrhea, nausea and vomiting.   Endocrine: Negative for polydipsia, polyphagia and polyuria.   Genitourinary: Negative for difficulty urinating and dysuria.   Musculoskeletal: Positive for myalgias (RLE). Negative for back pain.   Skin: Positive for wound (RLE). Negative for rash.   Neurological: Positive for weakness (Generalized). Negative for light-headedness.   Psychiatric/Behavioral: Negative for confusion and hallucinations.        Objective   Objective   Vital Signs  Temp:  [97.4 °F (36.3 °C)-98.1 °F (36.7 °C)] 97.4 °F (36.3 °C)  Heart Rate:  [69-89] 89  Resp:  [16-18] 18  BP: (168-178)/(72-84) 172/84  SpO2:  [96 %-98 %] 98 %  on   ;   Device (Oxygen Therapy): room air  Body mass index is 43.01 kg/m².     Physical Exam  Constitutional:       General: He is not in acute distress.     Appearance: He is obese. He is not ill-appearing or toxic-appearing.      Comments: Generally weak   HENT:      Head: Normocephalic and atraumatic.   Eyes:      Extraocular Movements: Extraocular movements intact.      Conjunctiva/sclera: Conjunctivae normal.   Cardiovascular:      Rate and Rhythm: Normal rate.      Heart sounds: Normal heart sounds.   Pulmonary:      Effort: Pulmonary effort is normal.      Comments: Diminished on expiration throughout  Abdominal:      General: Bowel sounds  are normal. There is no distension.      Palpations: Abdomen is soft.      Tenderness: There is no abdominal tenderness.   Musculoskeletal:         General: No tenderness (RLE).      Cervical back: Normal range of motion and neck supple.      Right lower leg: Edema (trace pitting edema) present.   Skin:     General: Skin is warm and dry.      Comments: Clean dry intact, occlusive dressing applied   Neurological:      Mental Status: He is alert and oriented to person, place, and time.      Cranial Nerves: No cranial nerve deficit.      Motor: Weakness (generalized) present.   Psychiatric:         Behavior: Behavior normal.         Thought Content: Thought content normal.         Results Review     I reviewed the patient's new clinical results.  Results from last 7 days   Lab Units 07/25/21  0826 07/24/21  1457   WBC 10*3/mm3 9.59 10.38   HEMOGLOBIN g/dL 12.5* 12.8*   PLATELETS 10*3/mm3 192 212     Results from last 7 days   Lab Units 07/25/21  0826 07/24/21  1457   SODIUM mmol/L 139 139   POTASSIUM mmol/L 4.7 4.5   CHLORIDE mmol/L 106 103   CO2 mmol/L 21.7* 26.0   BUN mg/dL 26* 31*   CREATININE mg/dL 1.91* 2.14*   GLUCOSE mg/dL 104* 95   Estimated Creatinine Clearance: 53.6 mL/min (A) (by C-G formula based on SCr of 1.91 mg/dL (H)).  Results from last 7 days   Lab Units 07/25/21  0826   ALBUMIN g/dL 3.70   BILIRUBIN mg/dL 0.3   ALK PHOS U/L 155*   AST (SGOT) U/L 22   ALT (SGPT) U/L 21     Results from last 7 days   Lab Units 07/25/21  0826 07/24/21  1457   CALCIUM mg/dL 8.4* 8.7   ALBUMIN g/dL 3.70  --        COVID19   Date Value Ref Range Status   07/24/2021 Not Detected Not Detected - Ref. Range Final   02/27/2021 Not Detected Not Detected - Ref. Range Final     No results found for: HGBA1C, POCGLU    XR Tibia Fibula 2 View Right  TWO-VIEW RIGHT TIBIA-FIBULA     HISTORY: Nonhealing wound of lower leg.     No gas is seen in the soft tissues of the lower leg. The underlying bony  structures appear normal.     This  report was finalized on 7/24/2021 3:11 PM by Dr. Remi Renee M.D.       Scheduled Medications  apixaban, 5 mg, Oral, Q12H  aspirin, 81 mg, Oral, Daily  atorvastatin, 40 mg, Oral, Daily  cefTRIAXone, 2 g, Intravenous, Q24H  furosemide, 40 mg, Oral, Daily  gabapentin, 300 mg, Oral, Nightly  NIFEdipine XL, 60 mg, Oral, Daily  ramipril, 10 mg, Oral, Daily  senna-docusate sodium, 1 tablet, Oral, BID  sodium chloride, 10 mL, Intravenous, Q12H  tamsulosin, 0.4 mg, Oral, Nightly  tiotropium bromide-olodaterol, 2 puff, Inhalation, Daily    Infusions  Pharmacy to dose vancomycin,     Diet  Diet Regular; Cardiac       Assessment/Plan     Active Hospital Problems    Diagnosis  POA   • **Open wound of right lower leg [S81.801A]  Yes   • REJI (acute kidney injury) (CMS/[facility]) [N17.9]  Yes   • Chronic anticoagulation [Z79.01]  Not Applicable   • Chronic obstructive pulmonary disease (CMS/[facility]) [J44.9]  Yes   • Benign essential hypertension ([place]jeoma 1999) [I10]  Yes   • Chronic kidney disease ([place]ona) [N18.9]  Yes   • Morbid obesity with BMI of 45.0-49.9, adult (CMS/[facility]) [E66.01, Z68.42]  Not Applicable   • Obstructive sleep apnea syndrome [G47.33]  Yes      Resolved Hospital Problems   No resolved problems to display.       75 y.o. male admitted with Open wound of right lower leg.    Open wound of right lower leg / Venous insufficiency  Appreciate vascular surgery following--Unna boot, compression wrap, Silvadene to open areas of wound, ABD pad or Telfa followed by Kerlix and Ace from toes to knee  Follow-up with Dr. Roger Esqueda on Wednesday  Duplex venous lower extremity right pending  6 days course empiric antibiotic therapy IV ceftriaxone 2 g continued  Status post vancomycin  Afebrile, absence leukocytosis  CRP 2.7    REJI /CKD  Serum creatinine 2.1 increased from 1.8 on July 17, 2021  Returning to baseline  Avoid nephrotoxins   Monitor labs    Chronic anticoagulation  Apixaban continued on admission  Serum hemoglobin stable  trend    Hypertension  BP acceptable, hemodynamically stable  Continue nifedipine, ramipril    COPD  O2 saturation 98% on room air  Stiolto Respimat    GENOVEVA /obesity  Complicating all problems    · apixaban for DVT prophylaxis.  · CPR  · Discussed with Dr. Bro.   · Anticipate discharge pending recommendations / CCP consult      YANIRA Sidhu  [facility]  07/25/21  12:40 EDT

## 2021-07-26 ENCOUNTER — READMISSION MANAGEMENT (OUTPATIENT)
Dept: CALL CENTER | Facility: HOSPITAL | Age: 76
End: 2021-07-26

## 2021-07-26 VITALS
HEART RATE: 59 BPM | SYSTOLIC BLOOD PRESSURE: 140 MMHG | WEIGHT: 315 LBS | DIASTOLIC BLOOD PRESSURE: 73 MMHG | TEMPERATURE: 97.9 F | HEIGHT: 74 IN | RESPIRATION RATE: 16 BRPM | OXYGEN SATURATION: 95 % | BODY MASS INDEX: 40.43 KG/M2

## 2021-07-26 LAB
ANION GAP SERPL CALCULATED.3IONS-SCNC: 8.6 MMOL/L (ref 5–15)
BUN SERPL-MCNC: 26 MG/DL (ref 8–23)
BUN/CREAT SERPL: 14.4 (ref 7–25)
CALCIUM SPEC-SCNC: 8.4 MG/DL (ref 8.6–10.5)
CHLORIDE SERPL-SCNC: 107 MMOL/L (ref 98–107)
CO2 SERPL-SCNC: 22.4 MMOL/L (ref 22–29)
CREAT SERPL-MCNC: 1.8 MG/DL (ref 0.76–1.27)
DEPRECATED RDW RBC AUTO: 46.4 FL (ref 37–54)
ERYTHROCYTE [DISTWIDTH] IN BLOOD BY AUTOMATED COUNT: 13.1 % (ref 12.3–15.4)
GFR SERPL CREATININE-BSD FRML MDRD: 45 ML/MIN/1.73
GLUCOSE SERPL-MCNC: 86 MG/DL (ref 65–99)
HCT VFR BLD AUTO: 37.2 % (ref 37.5–51)
HGB BLD-MCNC: 12.3 G/DL (ref 13–17.7)
MCH RBC QN AUTO: 31.9 PG (ref 26.6–33)
MCHC RBC AUTO-ENTMCNC: 33.1 G/DL (ref 31.5–35.7)
MCV RBC AUTO: 96.6 FL (ref 79–97)
PLATELET # BLD AUTO: 195 10*3/MM3 (ref 140–450)
PMV BLD AUTO: 10.4 FL (ref 6–12)
POTASSIUM SERPL-SCNC: 4.3 MMOL/L (ref 3.5–5.2)
RBC # BLD AUTO: 3.85 10*6/MM3 (ref 4.14–5.8)
SODIUM SERPL-SCNC: 138 MMOL/L (ref 136–145)
WBC # BLD AUTO: 8.05 10*3/MM3 (ref 3.4–10.8)

## 2021-07-26 PROCEDURE — 25010000003 CEFTRIAXONE PER 250 MG: Performed by: INTERNAL MEDICINE

## 2021-07-26 PROCEDURE — 85027 COMPLETE CBC AUTOMATED: CPT | Performed by: NURSE PRACTITIONER

## 2021-07-26 PROCEDURE — 80048 BASIC METABOLIC PNL TOTAL CA: CPT | Performed by: NURSE PRACTITIONER

## 2021-07-26 PROCEDURE — G0378 HOSPITAL OBSERVATION PER HR: HCPCS

## 2021-07-26 PROCEDURE — 94640 AIRWAY INHALATION TREATMENT: CPT

## 2021-07-26 RX ORDER — HYDROCODONE BITARTRATE AND ACETAMINOPHEN 5; 325 MG/1; MG/1
1 TABLET ORAL EVERY 6 HOURS PRN
Qty: 12 TABLET | Refills: 0 | Status: SHIPPED | OUTPATIENT
Start: 2021-07-26 | End: 2021-08-27

## 2021-07-26 RX ORDER — DOXYCYCLINE HYCLATE 100 MG/1
100 CAPSULE ORAL 2 TIMES DAILY
Qty: 20 CAPSULE | Refills: 0 | Status: CANCELLED | OUTPATIENT
Start: 2021-07-26 | End: 2021-08-05

## 2021-07-26 RX ORDER — CIPROFLOXACIN 500 MG/1
500 TABLET, FILM COATED ORAL 2 TIMES DAILY
Qty: 14 TABLET | Refills: 0 | Status: SHIPPED | OUTPATIENT
Start: 2021-07-26 | End: 2021-08-02

## 2021-07-26 RX ORDER — CLINDAMYCIN HYDROCHLORIDE 300 MG/1
300 CAPSULE ORAL 3 TIMES DAILY
Qty: 21 CAPSULE | Refills: 0 | Status: SHIPPED | OUTPATIENT
Start: 2021-07-26 | End: 2021-08-02

## 2021-07-26 RX ADMIN — CEFTRIAXONE SODIUM 2 G: 2 INJECTION, SOLUTION INTRAVENOUS at 16:00

## 2021-07-26 RX ADMIN — ASPIRIN 81 MG: 81 TABLET, COATED ORAL at 09:47

## 2021-07-26 RX ADMIN — RAMIPRIL 10 MG: 10 CAPSULE ORAL at 09:48

## 2021-07-26 RX ADMIN — ACETAMINOPHEN 650 MG: 325 TABLET, FILM COATED ORAL at 07:48

## 2021-07-26 RX ADMIN — DOCUSATE SODIUM 50MG AND SENNOSIDES 8.6MG 1 TABLET: 8.6; 5 TABLET, FILM COATED ORAL at 09:47

## 2021-07-26 RX ADMIN — NIFEDIPINE 60 MG: 60 TABLET, FILM COATED, EXTENDED RELEASE ORAL at 09:48

## 2021-07-26 RX ADMIN — ATORVASTATIN CALCIUM 40 MG: 20 TABLET, FILM COATED ORAL at 09:47

## 2021-07-26 RX ADMIN — FUROSEMIDE 40 MG: 40 TABLET ORAL at 09:50

## 2021-07-26 RX ADMIN — TIOTROPIUM BROMIDE AND OLODATEROL 2 PUFF: 3.124; 2.736 SPRAY, METERED RESPIRATORY (INHALATION) at 11:30

## 2021-07-26 RX ADMIN — APIXABAN 5 MG: 5 TABLET, FILM COATED ORAL at 09:48

## 2021-07-26 RX ADMIN — SODIUM CHLORIDE, PRESERVATIVE FREE 10 ML: 5 INJECTION INTRAVENOUS at 09:00

## 2021-07-26 RX ADMIN — SILVER SULFADIAZINE: 10 CREAM TOPICAL at 09:48

## 2021-07-26 NOTE — CONSULTS
"  Infectious Diseases Progress Note    Jolanta Winchester MD     [facility]  Los: 0 days  Patient Identification:  Name: James Loaiza  Age: 75 y.o.  Sex: male  :  1945  MRN: [id number]         Primary Care Physician: Magdiel Bowens MD            Subjective: Feeling better decreased pain and discomfort.  Interval History: Since his admission he feels much improved.  Has undergone Unna boot wrappings involving the right lower extremity with decrease in redness and pain.  Denies any fever and chills.  Tolerating antibiotics without any problem.  Has been seen by vascular surgery service and appears that he has been cleared for discharge later today with close follow-up with vascular surgery service scheduled for coming Wednesday.    Objective:    Scheduled Meds:apixaban, 5 mg, Oral, Q12H  aspirin, 81 mg, Oral, Daily  atorvastatin, 40 mg, Oral, Daily  cefTRIAXone, 2 g, Intravenous, Q24H  furosemide, 40 mg, Oral, Daily  gabapentin, 300 mg, Oral, Nightly  NIFEdipine XL, 60 mg, Oral, Daily  ramipril, 10 mg, Oral, Daily  senna-docusate sodium, 1 tablet, Oral, BID  silver sulfadiazine, , Topical, Q24H  sodium chloride, 10 mL, Intravenous, Q12H  tamsulosin, 0.4 mg, Oral, Nightly  tiotropium bromide-olodaterol, 2 puff, Inhalation, Daily  vancomycin, 1,000 mg, Intravenous, Q24H      Continuous Infusions:Pharmacy to dose vancomycin,         Vital signs in last 24 hours:  Temp:  [97.2 °F (36.2 °C)-98 °F (36.7 °C)] 98 °F (36.7 °C)  Heart Rate:  [58-61] 59  Resp:  [16] 16  BP: (121-167)/(64-87) 121/64    Intake/Output:    Intake/Output Summary (Last 24 hours) at 2021 0950  Last data filed at 2021 1700  Gross per 24 hour   Intake 700 ml   Output --   Net 700 ml       Exam:  /64 (BP Location: Left arm, Patient Position: Lying)   Pulse 59   Temp 98 °F (36.7 °C) (Oral)   Resp 16   Ht 188 cm (74\")   Wt (!) 152 kg (335 lb)   SpO2 95%   BMI 43.01 kg/m²   Patient is examined using the personal " protective equipment as per guidelines from infection control for this particular patient as enacted.  Hand washing was performed before and after patient interaction.  General Appearance:    Alert, cooperative, no distress, AAOx3                          Head:    Normocephalic, without obvious abnormality, atraumatic                           Eyes:    PERRL, conjunctivae/corneas clear, EOM's intact, both eyes                         Throat:   Lips, tongue, gums normal; oral mucosa pink and moist                           Neck:   Supple, symmetrical, trachea midline, no JVD                         Lungs:    Clear to auscultation bilaterally, respirations unlabored                 Chest Wall:    No tenderness or deformity                          Heart:    Regular rate and rhythm, S1 and S2 normal                  Abdomen:     Soft, non-tender, bowel sounds active                 Extremities: Right lower extremity wrapped                        Pulses:   Pulses palpable in all extremities                            Skin:   Skin is warm and dry,  no rashes or palpable lesions                  Neurologic: Grossly nonfocal       Data Review:    I reviewed the patient's new clinical results.  Results from last 7 days   Lab Units 07/26/21  0618 07/25/21  0826 07/24/21  1457   WBC 10*3/mm3 8.05 9.59 10.38   HEMOGLOBIN g/dL 12.3* 12.5* 12.8*   PLATELETS 10*3/mm3 195 192 212     Results from last 7 days   Lab Units 07/26/21  0618 07/25/21  0826 07/24/21  1457   SODIUM mmol/L 138 139 139   POTASSIUM mmol/L 4.3 4.7 4.5   CHLORIDE mmol/L 107 106 103   CO2 mmol/L 22.4 21.7* 26.0   BUN mg/dL 26* 26* 31*   CREATININE mg/dL 1.80* 1.91* 2.14*   CALCIUM mg/dL 8.4* 8.4* 8.7   GLUCOSE mg/dL 86 104* 95     Microbiology Results (last 10 days)     Procedure Component Value - Date/Time    Blood Culture - Blood, Arm, Left [[id number]] Collected: 07/24/21 1631    Lab Status: Preliminary result Specimen: Blood from Arm, Left Updated: 07/25/21  1645     Blood Culture No growth at 24 hours    COVID PRE-OP / PRE-PROCEDURE SCREENING ORDER (NO ISOLATION) - Swab, Nasopharynx [[id number]]  (Normal) Collected: 07/24/21 1623    Lab Status: Final result Specimen: Swab from Nasopharynx Updated: 07/24/21 1732    Narrative:      The following orders were created for panel order COVID PRE-OP / PRE-PROCEDURE SCREENING ORDER (NO ISOLATION) - Swab, Nasopharynx.  Procedure                               Abnormality         Status                     ---------                               -----------         ------                     COVID-19,[facility] IN-HOUSE...[[id number]]  Normal              Final result                 Please view results for these tests on the individual orders.    COVID-19,[facility] IN-HOUSE CEPHEID/MARK NP SWAB IN TRANSPORT MEDIA 8-12 HR TAT - Swab, Nasopharynx [[id number]]  (Normal) Collected: 07/24/21 1623    Lab Status: Final result Specimen: Swab from Nasopharynx Updated: 07/24/21 1732     COVID19 Not Detected    Narrative:      Fact sheet for providers: https://www.fda.gov/media/134179/download     Fact sheet for patients: https://www.fda.gov/media/780631/download    Blood Culture - Blood, Arm, Right [[id number]] Collected: 07/24/21 1457    Lab Status: Preliminary result Specimen: Blood from Arm, Right Updated: 07/25/21 1515     Blood Culture No growth at 24 hours            Assessment:    Open wound of right lower leg    Benign essential hypertension (Ijeoma 1999)    Obstructive sleep apnea syndrome    Morbid obesity with BMI of 45.0-49.9, adult (CMS/[facility])    Chronic kidney disease ([place]ona)    Chronic obstructive pulmonary disease (CMS/[facility])    Chronic anticoagulation    REJI (acute kidney injury) (CMS/[facility])    Constipation    Venous insufficiency  Impression:  1-chronic right cellulitis failing outpatient Augmentin and doxycycline now improved with aggressive local care along with wrapping of his right lower extremity with Unna boot and introduction of  broad-spectrum antibiotics consisting of vancomycin and ceftriaxone.  2-chronic venous stasis of bilateral lower extremity with history of DVT on chronic anticoagulation therapy  3-morbid obesity with obstructive sleep apnea  4-chronic kidney disease  5-other diagnosis per primary team.  Recommendations/discussion: His chronic right cellulitis failing outpatient Augmentin and doxycycline now improved with aggressive local care along with wrapping of his right lower extremity with Unna boot and introduction of broad-spectrum antibiotics consisting of vancomycin and ceftriaxone.  No culture data is available.  This improvement during this hospitalization and failing to improve in the outpatient setting could be related to combination of Unna boot wrapping as well as broadening of antibiotic coverage against pathogen not responding to Augmentin and doxycycline.  The ideal translation of current antibiotic regimen to oral formulation would be combination of clindamycin and ciprofloxacin.  Clinda and Bactrim is another option but given the fact the patient is significant kidney disease Bactrim may not be useful in safe choice in the situation.  The caveats in this combination of Clinda and ciprofloxacin are:    - all the side effects  associated with broad-spectrum antibiotics such as development of C. difficile infection, yeast infection, selection of resistant pathogen,   - but also specific to this combination such as tendon injury neuropsychiatric side effects and potential for QTC prolongation in EKG due to ciprofloxacin.  Adding patient would benefit for 7-day course of clindamycin and ciprofloxacin assuming that he understand the risk-benefit ratio and caveats and side effects of these antibiotics and explicitly expresses understanding and acceptance of these treatment.  Jolanta Winchester MD  7/26/2021  09:50 EDT    Much of this encounter note is an electronic transcription/translation of spoken language to printed  text. The electronic translation of spoken language may permit erroneous, or at times, nonsensical words or phrases to be inadvertently transcribed; Although I have reviewed the note for such errors, some may still exist

## 2021-07-26 NOTE — CASE MANAGEMENT/SOCIAL WORK
Case Management Discharge Note      Final Note: Home; no needs identified.  LALO Pelayo    Provided Post Acute Provider List?: N/A  N/A Provider List Comment: The patient denies any HH/SNF needs.  Provided Post Acute Provider Quality & Resource List?: N/A  N/A Quality & Resource List Comment: The patient denies any HH/SNF needs.    Selected Continued Care - Admitted Since 7/24/2021     Destination    No services have been selected for the patient.              Durable Medical Equipment    No services have been selected for the patient.              Dialysis/Infusion    No services have been selected for the patient.              Home Medical Care    No services have been selected for the patient.              Therapy    No services have been selected for the patient.              Community Resources    No services have been selected for the patient.              Community & DME    No services have been selected for the patient.                  Transportation Services  Private: Car    Final Discharge Disposition Code: 01 - home or self-care

## 2021-07-26 NOTE — CASE MANAGEMENT/SOCIAL WORK
Discharge Planning Assessment  [facility]     Patient Name: James Loaiza  MRN: [id number]  Today's Date: 7/26/2021    Admit Date: 7/24/2021    Discharge Needs Assessment     Row Name 07/26/21 1434       Living Environment    Lives With  child(ángel), adult;spouse    Name(s) of Who Lives With Patient  spouse Yarelis Loaiza [phone] and his adult daughter    Current Living Arrangements  home/apartment/condo    Primary Care Provided by  self;spouse/significant other;child(ángel)    Provides Primary Care For  no one, unable/limited ability to care for self    Family Caregiver if Needed  child(ángel), adult;spouse    Family Caregiver Names  spouse Yarelis Loaiza [phone] and his adult daughter    Quality of Family Relationships  helpful;involved;supportive    Able to Return to Prior Arrangements  yes       Resource/Environmental Concerns    Resource/Environmental Concerns  home accessibility    Home Accessibility Concerns  stairs to enter home 1 step to enter the multi-level home from the front and 3 steps with 1 handrail to enter the garage.       Transition Planning    Patient/Family Anticipates Transition to  home with family    Patient/Family Anticipated Services at Transition  outpatient care    Transportation Anticipated  family or friend will provide       Discharge Needs Assessment    Equipment Currently Used at Home  none    Concerns to be Addressed  denies needs/concerns at this time    Anticipated Changes Related to Illness  none    Equipment Needed After Discharge  none    Current Discharge Risk  physical impairment        Discharge Plan     Row Name 07/26/21 1439       Plan    Plan  Plans home; denies any d/c needs.    Provided Post Acute Provider List?  N/A    N/A Provider List Comment  The patient denies any HH/SNF needs.    Provided Post Acute Provider Quality & Resource List?  N/A    N/A Quality & Resource List Comment  The patient denies any HH/SNF needs.    Patient/Family in Agreement with Plan   yes    Plan Comments  Met with the patient at bedside; explained role of CCP, verified facesheet, checked Zhao notice and discussed discharge planning needs. The patient plans to return home upon d/c with assistance from his spouse Yarelis Loaiza [phone] and his adult daughter.  The patient uses no DME, has 1 step to enter the multi-level home from the front and 3 steps with 1 handrail to enter the garage. The patient states that everything that he needs is on the main level of the home.  The patient’s PCP is Magdiel Bowens, pharmacy is [facility] and he states that his inhaler and Eliquis are both expensive but he is able to get them.  The patient denies any HH/SNF history, denies any POA documents, states that he drives himself to his appointments and his spouse will transport him home upon d/c.  The patient was not provided with a HH/SNF list nor a print out of the HH/nursing home compare list from Medicare.gov as he currently denies any HH/SNF needs. The patient confirmed the following appointments which he was informed were also on his AVS: Wednesday Jul 28, 2021- Dr. Ochoa to change his Unna boot and Thursday Jul 29, 2021 9:45 AM- [facility].  The patient was also informed that the phone number to call if he has problems accessing his My chart account is also on his AVS.  Spoke to the patient’s pharmacy who states that the 3 medications ordered for him upon d/c total approximately $10 and the patient was informed.  LALO Pelayo        Continued Care and Services - Admitted Since 7/24/2021    Coordination has not been started for this encounter.       Expected Discharge Date and Time     Expected Discharge Date Expected Discharge Time    Jul 26, 2021         Demographic Summary     Row Name 07/26/21 7979       General Information    Admission Type  observation    Arrived From  home    Referral Source  admission list    Reason for Consult  discharge planning     Preferred Language  English     Used During This Interaction  no        Functional Status     Row Name 07/26/21 1434       Functional Status    Usual Activity Tolerance  moderate    Current Activity Tolerance  fair       Functional Status, IADL    Medications  independent    Meal Preparation  independent    Housekeeping  independent    Laundry  independent    Shopping  independent       Mental Status    General Appearance WDL  WDL       Mental Status Summary    Recent Changes in Mental Status/Cognitive Functioning  no changes        Psychosocial    No documentation.       Abuse/Neglect    No documentation.       Legal    No documentation.       Substance Abuse    No documentation.       Patient Forms    No documentation.           LALO Najera

## 2021-07-26 NOTE — PROGRESS NOTES
Name: James Loaiza ADMIT: 2021   : 1945  PCP: Magdiel Bowens MD    MRN: [id number] LOS: 0 days   AGE/SEX: 75 y.o. male  ROOM:  [facility] P491/1     CC: Cellulitis and venous stasis ulcer  Interval History: Patient did well all day yesterday and slept well through the night.  Some increased pain this morning.  Subjective   Subjective     Review of Systems  Objective   Objective     Vitals:   Temp:  [97.2 °F (36.2 °C)-98 °F (36.7 °C)] 98 °F (36.7 °C)  Heart Rate:  [58-61] 59  Resp:  [16] 16  BP: (121-167)/(64-87) 121/64    No intake/output data recorded.    Scheduled Meds:     apixaban, 5 mg, Oral, Q12H  aspirin, 81 mg, Oral, Daily  atorvastatin, 40 mg, Oral, Daily  cefTRIAXone, 2 g, Intravenous, Q24H  furosemide, 40 mg, Oral, Daily  gabapentin, 300 mg, Oral, Nightly  NIFEdipine XL, 60 mg, Oral, Daily  ramipril, 10 mg, Oral, Daily  senna-docusate sodium, 1 tablet, Oral, BID  silver sulfadiazine, , Topical, Q24H  sodium chloride, 10 mL, Intravenous, Q12H  tamsulosin, 0.4 mg, Oral, Nightly  tiotropium bromide-olodaterol, 2 puff, Inhalation, Daily  vancomycin, 1,000 mg, Intravenous, Q24H      IV Meds:   Pharmacy to dose vancomycin,         Physical Exam  Vitals reviewed.   Constitutional:       Appearance: He is well-developed.   Eyes:      Conjunctiva/sclera: Conjunctivae normal.   Cardiovascular:      Rate and Rhythm: Normal rate.   Pulmonary:      Effort: Pulmonary effort is normal.   Abdominal:      Palpations: Abdomen is soft.      Tenderness: There is no abdominal tenderness.   Neurological:      Mental Status: He is alert and oriented to person, place, and time.       Vascular: Edema persist.  Stable erythema.    Data Reviewed:  CBC    Results from last 7 days   Lab Units 21  0618 21  0826 21  1457   WBC 10*3/mm3 8.05 9.59 10.38   HEMOGLOBIN g/dL 12.3* 12.5* 12.8*   PLATELETS 10*3/mm3 195 192 212     BMP   Results from last 7 days   Lab Units 21  0826 21  1451    SODIUM mmol/L 139 139   POTASSIUM mmol/L 4.7 4.5   CHLORIDE mmol/L 106 103   CO2 mmol/L 21.7* 26.0   BUN mg/dL 26* 31*   CREATININE mg/dL 1.91* 2.14*   GLUCOSE mg/dL 104* 95       Most notable findings include: Normal white blood cell count.    Active Hospital Problems:   Active Hospital Problems    Diagnosis  POA   • **Open wound of right lower leg [S81.801A]  Yes   • REJI (acute kidney injury) (CMS/[facility]) [N17.9]  Yes   • Constipation [K59.00]  Yes   • Venous insufficiency [I87.2]  Yes   • Chronic anticoagulation [Z79.01]  Not Applicable   • Chronic obstructive pulmonary disease (CMS/[facility]) [J44.9]  Yes   • Benign essential hypertension ([place] ) [I10]  Yes   • Chronic kidney disease ([place]) [N18.9]  Yes   • Morbid obesity with BMI of 45.0-49.9, adult (CMS/[facility]) [E66.01, Z68.42]  Not Applicable   • Obstructive sleep apnea syndrome [G47.33]  Yes      Resolved Hospital Problems   No resolved problems to display.      Assessment/Plan   [place], Subsequent Hospital Care  Assessment / Plan     Right-sided cellulitis/venous stasis ulcer: Patient with recent ablation by Dr. Roger Ochoa.  Admitted for cellulitis that was refractory to outpatient management.  Currently on ceftriaxone and vancomycin.  Plans are for wound care nurses to do an Unna boot dressing change today.  I would not leave this on for a week as we typically do because of the cellulitis/infection.  Instead, he has an appointment to see Dr. Ochoa at our vein office on Wednesday.  If an appropriate outpatient antibiotic plan could be established I think discharge with that follow-up would be best.    Griffin Ramirez MD  21  07:35 EDT  Office Number [phone]

## 2021-07-26 NOTE — NURSING NOTE
07/26/21 1050   Wound 07/24/21 1836 Right distal calf   Placement Date/Time: 07/24/21 1836   Present on Hospital Admission: Yes  Side: Right  Orientation: distal  Location: calf   Base moist;other (see comments);pink  (scattererd stasis dermatitis to posterior calf)   Periwound dry;moist   Periwound Temperature warm   Periwound Skin Turgor soft   Edges irregular   Wound Length (cm) 6 cm   Wound Width (cm) 6 cm   Drainage Characteristics/Odor serous   Drainage Amount scant   Care, Wound cleansed with;soap and water;julian boot   Dressing Care dressing applied;silver impregnated;hydrofiber;silicone;border dressing     CWON note: pt seen for evaluation of RLE wound and dressing change. Pt with ongoing venous stasis dermatitis to posterior calf. Dressing placed per above, will plan to change 2x week if he remains in-patient; otherwise he can follow up with MD.

## 2021-07-26 NOTE — PROGRESS NOTES
"Pharmacokinetic Evaluation - Vancomycin    James Loaiza is a 75 y.o. male on vancomycin pharmacy to dose.  MRN: [id number]  : 1945    Day of vancomycin therapy: 2  Indication: Skin and soft tissue infection  Consulted by: Dr. Winchester    Goal AUC: 400-600 mg/L*hr  *AUC will be targeted in this patient since it is a better measure of antibiotic exposure and allows for more appropriate balance of safety and efficacy. Since AUC is a total measure of drug exposure over the dosing interval, a therapeutic AUC may be reached even in patients with a trough lower than the traditional goals of 10-20 mg/L.*    Current dose: 1000 mg IV Q24  Other antimicrobials: Ceftriaxone 2g q24    Blood pressure 121/64, pulse 59, temperature 98 °F (36.7 °C), temperature source Oral, resp. rate 16, height 188 cm (74\"), weight (!) 152 kg (335 lb), SpO2 95 %.  Results from last 7 days   Lab Units 21  0618 21  0821  1457   CREATININE mg/dL 1.80* 1.91* 2.14*     Estimated Creatinine Clearance: 56.9 mL/min (A) (by C-G formula based on SCr of 1.8 mg/dL (H)).  Results from last 7 days   Lab Units 21  0618 21  0821  1457   WBC 10*3/mm3 8.05 9.59 10.38   HEMOGLOBIN g/dL 12.3* 12.5* 12.8*   HEMATOCRIT % 37.2* 38.1 40.0   PLATELETS 10*3/mm3 195 192 212     Other relevant labs/chart info: 71 YO M with complicated med hx including DVT and BLE edema seen in edema clinic for wraps. Pt admitted from ED with worsening discomfort and nonhealing wound on the RLE. Pt initially presented with wound to PCP ~ 3 mo. Prior; s/p antibiotics including doxycycline and Augmentin.     Radiology:    XR Tibia Fibula R - No gas is seen in the soft tissues of the lower leg. The underlying bony structures appear normal.     Cultures:    Blood cx: NGTD    Dosing hx (include troughs if drawn):      PK Parameters (Patient):  Cl = 2.31 L/hr  Vc = 28.8 L  T 1/2 = 28.6 hr    Assessment:  Using the [employer] Bayesian software, " the current regimen of 1000mg daily predicts an AUC of 425 mg/L*hr and trough = 15 mg/L. Pt appears to be improving and is cleared for d/c with PO abx.     Plan:  1) Continue vancomycin 1000 mg every 24 hours until ready to transition to PO  2) No plan for further troughs since anticipate D/C soon.  3) Monitor for s/sxns of vancomycin toxicity including changes in UOP/SCr; encourage hydration as tolerated to decrease risk of toxic accumulation.    Thank you for this opportunity to review this patient,  Nellie Maldonado, Pharm.D., [facility]  Oncology Pharmacy Specialist  [phone]

## 2021-07-26 NOTE — PLAN OF CARE
Goal Outcome Evaluation:  Plan of Care Reviewed With: patient        Progress: improving   Pt has rested well tonight-received one dose of tylenol for complaints of a headache but leg pain is improving. Dressing changed per MD order-tolerating antibiotics with no adverse effects. Up ad jackie in room-C-PAP overnight-continuous pulse ox in place. Will continue to monitor and follow current POC.

## 2021-07-26 NOTE — DISCHARGE SUMMARY
Patient Name: James Loaiza  : 1945  MRN: [id number]    Date of Admission: 2021  Date of Discharge:  2021  Primary Care Physician: Magdiel Bowens MD      Chief Complaint:   Cellulitis      Discharge Diagnoses     Active Hospital Problems    Diagnosis  POA   • **Open wound of right lower leg [S81.801A]  Yes   • REJI (acute kidney injury) (CMS/[facility]) [N17.9]  Yes   • Constipation [K59.00]  Yes   • Venous insufficiency [I87.2]  Yes   • Chronic anticoagulation [Z79.01]  Not Applicable   • Chronic obstructive pulmonary disease (CMS/[facility]) [J44.9]  Yes   • Benign essential hypertension (1999) [I10]  Yes   • Chronic kidney disease ([place]) [N18.9]  Yes   • Morbid obesity with BMI of 45.0-49.9, adult (CMS/[facility]) [E66.01, Z68.42]  Not Applicable   • Obstructive sleep apnea syndrome [G47.33]  Yes      Resolved Hospital Problems   No resolved problems to display.        Hospital Course     Mr. Loaiza is a 75 y.o. male with a history of venous insufficiency s/p right lower extremity great saphenous and short saphenous ablation , morbid obesity, hypertension, COPD who presented to [facility] as he was failing outpatient treatment for RLE cellulitis. He was evaluated by vascular surgery who recently preformed a right lower extremity great saphenous and short saphenous ablation (Dr. Ochoa). They asked wound care to come see the patient who placed an Unna boot with instructions not to keep on for a week straight but to keep follow up appointment with Dr. Ochoa on Wednesday and they can change at that time. They believe the patient will benefit from follow up with the [facility] wound care center for ongoing wound care. On admission he was initiated on vanc and rocephin. Given failed  outpatient treatment with doxycyline and augmentin, infectious disease was consulted for final abx recommendations, in which are as follows: clindamycin and cipro for 7 days. He is stable for discharge home today  and should follow up with his PCP in 1-2 weeks and keep scheduled appointments with Dr. Winchester as planned and Dr. Ochoa on Wednesday with plans to change Unna boot.    Day of Discharge     Subjective:  no new complaints or events overnight. he is more than ready to go home. a little apprehensive about abx.     denies chest pain, palpitations, SOA, fever, chills, nausea, vomiting and diarrhea.     Physical Exam:  Temp:  [97.2 °F (36.2 °C)-98 °F (36.7 °C)] 98 °F (36.7 °C)  Heart Rate:  [58-61] 59  Resp:  [16] 16  BP: (121-167)/(64-87) 121/64  Body mass index is 43.01 kg/m².  Physical Exam  Vitals and nursing note reviewed.   Constitutional:       Appearance: He is obese. He is ill-appearing (chronically).   HENT:      Head: Normocephalic and atraumatic.   Eyes:      General:         Right eye: Discharge present.      Extraocular Movements: Extraocular movements intact.   Cardiovascular:      Rate and Rhythm: Normal rate and regular rhythm.   Pulmonary:      Effort: Pulmonary effort is normal. No respiratory distress.      Breath sounds: Normal breath sounds.   Abdominal:      General: Bowel sounds are normal. There is no distension.      Palpations: Abdomen is soft.      Tenderness: There is no abdominal tenderness.   Musculoskeletal:         General: Swelling and tenderness (RLE but has much improved) present. Normal range of motion.      Cervical back: Normal range of motion and neck supple.   Skin:     General: Skin is warm and dry.      Comments: RLE with Unna boot in place   Neurological:      Mental Status: He is alert and oriented to person, place, and time. Mental status is at baseline.   Psychiatric:         Mood and Affect: Mood normal.         Behavior: Behavior normal.         Consultants     Consult Orders (all) (From admission, onward)     Start     Ordered    07/25/21 0275  Inpatient Infectious Diseases Consult  Once     Specialty:  Infectious Diseases  Provider:  Jolanta Winchester MD    07/25/21 6785     07/24/21 1943  Inpatient Vascular Surgery Consult  Once     Specialty:  Vascular Surgery  Provider:  Roger Ochoa MD    07/24/21 1945              Procedures     * Surgery not found *      Imaging Results (All)     Procedure Component Value Units Date/Time    XR Tibia Fibula 2 View Right [[id number]] Collected: 07/24/21 1446     Updated: 07/24/21 1514    Narrative:      TWO-VIEW RIGHT TIBIA-FIBULA     HISTORY: Nonhealing wound of lower leg.     No gas is seen in the soft tissues of the lower leg. The underlying bony  structures appear normal.     This report was finalized on 7/24/2021 3:11 PM by Dr. Remi Renee M.D.           Results for orders placed during the hospital encounter of 07/24/21    Duplex Venous Lower Extremity - Right    Interpretation Summary  · Sub-acute right lower extremity superficial thrombophlebitis noted in the great saphenous (above knee), great saphenous (below knee) and small saphenous. This is consistent with successful prior venous ablation.  · All other right sided veins appeared normal.    Results for orders placed during the hospital encounter of 07/22/21    Adult Transthoracic Echo Complete W/ Cont if Necessary Per Protocol    Interpretation Summary  · Calculated left ventricular EF = 58.1% Estimated left ventricular EF = 58% Estimated left ventricular EF was in agreement with the calculated left ventricular EF. Left ventricular systolic function is normal. Normal global longitudinal LV strain (GLS) = -20.4%. Left ventricle strain data was reviewed by the physician and found to be accurate. Normal left ventricular cavity size and wall thickness noted. All left ventricular wall segments contract normally. Left ventricular diastolic function is consistent with (grade I) impaired relaxation.  · Mild aortic valve regurgitation is present.  · Mild dilation of the sinuses of Valsalva is present. Sinus of Valsalva = 4.1 cm Mild dilation of the ascending aorta is present. Ascending  aorta = 3.9 cm    Pertinent Labs     Results from last 7 days   Lab Units 07/26/21  0618 07/25/21  0826 07/24/21  1457   WBC 10*3/mm3 8.05 9.59 10.38   HEMOGLOBIN g/dL 12.3* 12.5* 12.8*   PLATELETS 10*3/mm3 195 192 212     Results from last 7 days   Lab Units 07/26/21  0618 07/25/21  0826 07/24/21  1457   SODIUM mmol/L 138 139 139   POTASSIUM mmol/L 4.3 4.7 4.5   CHLORIDE mmol/L 107 106 103   CO2 mmol/L 22.4 21.7* 26.0   BUN mg/dL 26* 26* 31*   CREATININE mg/dL 1.80* 1.91* 2.14*   GLUCOSE mg/dL 86 104* 95   Estimated Creatinine Clearance: 56.9 mL/min (A) (by C-G formula based on SCr of 1.8 mg/dL (H)).  Results from last 7 days   Lab Units 07/25/21  0826   ALBUMIN g/dL 3.70   BILIRUBIN mg/dL 0.3   ALK PHOS U/L 155*   AST (SGOT) U/L 22   ALT (SGPT) U/L 21     Results from last 7 days   Lab Units 07/26/21  0618 07/25/21  0826 07/24/21  1457   CALCIUM mg/dL 8.4* 8.4* 8.7   ALBUMIN g/dL  --  3.70  --                Invalid input(s): LDLCALC  Results from last 7 days   Lab Units 07/24/21  1631 07/24/21  1457   BLOODCX  No growth at 24 hours No growth at 24 hours     Results from last 7 days   Lab Units 07/24/21  1623   COVID19  Not Detected       Test Results Pending at Discharge     Pending Labs     Order Current Status    Blood Culture - Blood, Arm, Left Preliminary result    Blood Culture - Blood, Arm, Right Preliminary result          Discharge Details        Discharge Medications      New Medications      Instructions Start Date   ciprofloxacin 500 MG tablet  Commonly known as: Cipro   500 mg, Oral, 2 Times Daily      clindamycin 300 MG capsule  Commonly known as: Cleocin   300 mg, Oral, 3 Times Daily      HYDROcodone-acetaminophen 5-325 MG per tablet  Commonly known as: Norco   1 tablet, Oral, Every 6 Hours PRN      silver sulfadiazine 1 % cream  Commonly known as: SILVADENE, SSD   Topical, Every 24 Hours Scheduled   Start Date: July 27, 2021        Continue These Medications      Instructions Start Date   apixaban  5 MG tablet tablet  Commonly known as: ELIQUIS   5 mg, Oral, Every 12 Hours Scheduled      aspirin 81 MG EC tablet   81 mg, Oral, Daily      atorvastatin 40 MG tablet  Commonly known as: LIPITOR   40 mg, Oral, Daily      furosemide 40 MG tablet  Commonly known as: LASIX   40 mg, Oral, Daily      gabapentin 300 MG capsule  Commonly known as: NEURONTIN   300 mg, Oral, Nightly      NIFEdipine XL 60 MG 24 hr tablet  Commonly known as: PROCARDIA XL   60 mg, Oral, Daily      ramipril 10 MG capsule  Commonly known as: ALTACE   10 mg, Oral, Daily      Stiolto Respimat 2.5-2.5 MCG/ACT aerosol solution inhaler  Generic drug: tiotropium bromide-olodaterol   2 puffs, Inhalation, Daily - RT      tamsulosin 0.4 MG capsule 24 hr capsule  Commonly known as: FLOMAX   1 capsule, Oral, Nightly         Stop These Medications    doxycycline 100 MG capsule  Commonly known as: VIBRAMYCIN            No Known Allergies    Discharge Disposition:  Home or Self Care      Discharge Diet:  Diet Order   Procedures   • Diet Regular; Cardiac       Discharge Activity:       CODE STATUS:    Code Status and Medical Interventions:   Ordered at: 07/24/21 1945     Code Status:    CPR     Medical Interventions (Level of Support Prior to Arrest):    Full       Future Appointments   Date Time Provider Department Center   7/28/2021  1:00 PM Eloina Newton APRN [facility]   8/20/2021 11:00 AM Magdiel Bowens MD [facility]   10/8/2021  1:30 PM Magdiel Bowens MD [facility]   5/5/2022  2:30 PM Eloina Matt APRN [facility]     Additional Instructions for the Follow-ups that You Need to Schedule     Discharge Follow-up with PCP   As directed       Currently Documented PCP:    Magdiel Bowens MD    PCP Phone Number:    [phone]     Follow Up Details: 1-2 weeks         Discharge Follow-up with PCP   As directed       Currently Documented PCP:    Magdiel Bowens MD    PCP Phone Number:    [phone]     Follow Up  Details: 1-2 weeks         Discharge Follow-up with Specified Provider: Dr Winchester   As directed      To: Dr Winchester    Follow Up Details: per his recs         Discharge Follow-up with Specified Provider: Dr. Ochoa; 2 Days   As directed      To: Dr. Ochoa    Follow Up: 2 Days           Follow-up Information     Roger Ochoa MD Follow up on 7/28/2021.    Specialty: Vascular Surgery  Why: Patient already has appointment at [facility] with Dr. Roger Ochoa on Wednesday.  Contact information:  [address] [place] [address] [id number] [phone]             Magdiel Bowens MD .    Specialty: Internal Medicine  Why: 1-2 weeks  Contact information:  [address] [place] [address]  [phone]             Magdiel Bowens MD .    Specialty: Internal Medicine  Why: 1-2 weeks  Contact information:  [address] [place] [address]  [phone]                   Additional Instructions for the Follow-ups that You Need to Schedule     Discharge Follow-up with PCP   As directed       Currently Documented PCP:    Magdiel Bowens MD    PCP Phone Number:    [phone]     Follow Up Details: 1-2 weeks         Discharge Follow-up with PCP   As directed       Currently Documented PCP:    Magdiel Bowens MD    PCP Phone Number:    [phone]     Follow Up Details: 1-2 weeks         Discharge Follow-up with Specified Provider: Dr Winchester   As directed      To: Dr Winchester    Follow Up Details: per his recs         Discharge Follow-up with Specified Provider: Dr. Ochoa; 2 Days   As directed      To: Dr. Ochoa    Follow Up: 2 Days           Time Spent on Discharge:  Greater than 30 minutes      YANIRA Philippe  [facility]  07/26/21  12:42 EDT

## 2021-07-26 NOTE — PLAN OF CARE
Goal Outcome Evaluation:  Plan of Care Reviewed With: patient           Outcome Summary: Pt has sit up in chair all shift, has had tylenol x's 1 for pain. Pt showered and sit back in the chair. Pt has no needs at this time, will continue to monitor.

## 2021-07-27 ENCOUNTER — TRANSITIONAL CARE MANAGEMENT TELEPHONE ENCOUNTER (OUTPATIENT)
Dept: CALL CENTER | Facility: HOSPITAL | Age: 76
End: 2021-07-27

## 2021-07-27 NOTE — OUTREACH NOTE
Prep Survey      Responses   [facility] patient discharged from?  [place]   Is LACE score < 7 ?  No   Emergency Room discharge w/ pulse ox?  No   Eligibility  [facility]   Date of Admission  07/24/21   Date of Discharge  07/26/21   Discharge Disposition  Home or Self Care   Discharge diagnosis  open wound RLE, venous insufficiency, REJI on CKD   Does the patient have one of the following disease processes/diagnoses(primary or secondary)?  Other   Does the patient have Home health ordered?  No   Is there a DME ordered?  No   Prep survey completed?  Yes          Michaelle Castro RN

## 2021-07-27 NOTE — OUTREACH NOTE
Call Center TCM Note      Responses   [facility] patient discharged from?  [place]   Does the patient have one of the following disease processes/diagnoses(primary or secondary)?  Other   TCM attempt successful?  Yes   Call start time  1045   Call end time  1048   Discharge diagnosis  open wound RLE, venous insufficiency, REJI on CKD   Does the patient have all medications ordered at discharge?  Yes   Is the patient taking all medications as directed (includes completed medication regime)?  Yes   Does the patient have a primary care provider?   Yes   Does the patient have an appointment with their PCP within 7 days of discharge?  No   Comments regarding PCP  declined to make a f/u appt during call, says he is going on 8/20   Nursing Interventions  Advised patient to make appointment   Has the patient kept scheduled appointments due by today?  N/A   Has home health visited the patient within 72 hours of discharge?  N/A   Psychosocial issues?  No   Did the patient receive a copy of their discharge instructions?  Yes   Nursing interventions  Reviewed instructions with patient   What is the patient's perception of their health status since discharge?  Improving   Is the patient/caregiver able to teach back the hierarchy of who to call/visit for symptoms/problems? PCP, Specialist, Home health nurse, Urgent Care, ED, 911  Yes   TCM call completed?  Yes   Wrap up additional comments  Says he is doing well, no questions or concerns at this time, states he has some appts coming up and he declined to make a sooner appt with Dr. Bowens than the one already scheduled for 8/20.          Yady Patino RN    7/27/2021, 10:48 EDT

## 2021-07-27 NOTE — OUTREACH NOTE
Call Center TCM Note      Responses   [facility] patient discharged from?  [place]   Does the patient have one of the following disease processes/diagnoses(primary or secondary)?  Other   TCM attempt successful?  No   Unsuccessful attempts  Attempt 1          Yady Patino RN    7/27/2021, 09:55 EDT

## 2021-07-28 ENCOUNTER — OFFICE VISIT (OUTPATIENT)
Dept: PAIN MEDICINE | Facility: CLINIC | Age: 76
End: 2021-07-28

## 2021-07-28 VITALS
OXYGEN SATURATION: 96 % | TEMPERATURE: 96.8 F | SYSTOLIC BLOOD PRESSURE: 134 MMHG | RESPIRATION RATE: 20 BRPM | DIASTOLIC BLOOD PRESSURE: 72 MMHG | WEIGHT: 315 LBS | HEART RATE: 68 BPM | BODY MASS INDEX: 40.43 KG/M2 | HEIGHT: 74 IN

## 2021-07-28 DIAGNOSIS — G89.29 OTHER CHRONIC PAIN: ICD-10-CM

## 2021-07-28 DIAGNOSIS — M47.812 CERVICAL FACET JOINT SYNDROME: Primary | ICD-10-CM

## 2021-07-28 DIAGNOSIS — M54.81 BILATERAL OCCIPITAL NEURALGIA: ICD-10-CM

## 2021-07-28 DIAGNOSIS — M43.06 LUMBAR SPONDYLOLYSIS: ICD-10-CM

## 2021-07-28 DIAGNOSIS — M25.519 SHOULDER PAIN, UNSPECIFIED CHRONICITY, UNSPECIFIED LATERALITY: ICD-10-CM

## 2021-07-28 DIAGNOSIS — M48.02 CERVICAL STENOSIS OF SPINE: ICD-10-CM

## 2021-07-28 PROCEDURE — 99214 OFFICE O/P EST MOD 30 MIN: CPT | Performed by: NURSE PRACTITIONER

## 2021-07-28 RX ORDER — GABAPENTIN 300 MG/1
300 CAPSULE ORAL NIGHTLY
Qty: 30 CAPSULE | Refills: 3 | Status: SHIPPED | OUTPATIENT
Start: 2021-07-28 | End: 2021-11-02 | Stop reason: SDUPTHER

## 2021-07-28 NOTE — PROGRESS NOTES
CHIEF COMPLAINT  Follow-up for neck pain.    Subjective   James Loaiza is a 75 y.o. male  who presents for follow-up.  He has a history of neck pain. Office visit from 4/29/2020 with YANIRA Lorenz reviewed. Patient has a history of chronic back and neck pain.    Procedure List:  -- 3-2-2021-- Bilateral C2-C3 RFL   --1-22-21-- bilateral OCNB  -- 10-23-20-- bilateral OCNB and bilateral L2-L5 MBB  -- 9-21-20-- bilateral OCNB  -- 9-9-20-- ELENITA (DR VALADEZ PILY)  -- 6-22-18-- bilateral L2-L5 RFL- 75% long term relief    Patient is maintained on gabapentin 300 mg nightly. Plan to continue gabapentin with consideration to repeat cervical or lumbar interventions in the future as needed.    Today his pain is 2/10VAS in severity. He reports ongoing relief to his neck pain from the cervical ablation. He states that around the same time he had the ablation he was in PT at [facility] for his right shoulder.  During this time his shoulder pain significantly improved.  He states that last night the pain in right shoulder began to increase.  He states this is NOT severe pain like it was before, but it did affect his sleep last night.      Discussed Gabapentin 300 mg nightly.  Patient state that he did miss one night of this a few weeks back and he saw worsening in his pain.  He denies any side effects including somnolence. ADLs by self. Discussed that with his improvement in his pain I recommend continuing this medication. Patient states understanding.     Remains on Eliquis.  Cannot take NSAIDS.     Patient remained masked during entire encounter. No cough present. I donned a mask and eye protection throughout entire visit. Prior to donning mask and eye protection, hand hygiene was performed, as well as when it was doffed.  I was closer than 6 feet, but not for an extended period of time. No obvious exposure to any bodily fluids.    Back Pain  This is a recurrent problem. The current episode started more than 1  month ago. The problem occurs constantly. Progression since onset: improved since last office visit. The pain is present in the lumbar spine and sacro-iliac. The quality of the pain is described as aching. The pain does not radiate. The pain is at a severity of 2/10. The pain is mild. Worse during: frequent urination at night which increases his pain--getting up and out of bed. The symptoms are aggravated by standing and twisting. Stiffness is present all day. Pertinent negatives include no abdominal pain, bladder incontinence, bowel incontinence, chest pain, dysuria, fever, headaches, numbness or weakness. Risk factors include lack of exercise, obesity and recent trauma. Treatments tried: lumbar RFL, OTC IBU.   Neck Pain   This is a new problem. The current episode started more than 1 month ago. The problem has been waxing and waning (improved since last evaluation). The pain is associated with nothing. The pain is present in the occipital region. The pain is at a severity of 1/10. The pain is severe. Pertinent negatives include no chest pain, fever, headaches, numbness or weakness.      PEG Assessment   What number best describes your pain on average in the past week?1  What number best describes how, during the past week, pain has interfered with your enjoyment of life?0  What number best describes how, during the past week, pain has interfered with your general activity?  0    The following portions of the patient's history were reviewed and updated as appropriate: allergies, current medications, past family history, past medical history, past social history, past surgical history and problem list.    Review of Systems   Constitutional: Negative for fatigue and fever.   HENT: Negative for congestion.    Eyes: Negative for visual disturbance.   Respiratory: Positive for shortness of breath. Negative for cough and wheezing.    Cardiovascular: Positive for leg swelling (right leg). Negative for chest pain and  "palpitations.   Gastrointestinal: Negative for abdominal pain, bowel incontinence, constipation and diarrhea.   Genitourinary: Negative for bladder incontinence, difficulty urinating and dysuria.   Musculoskeletal: Positive for arthralgias (right shoulder) and back pain. Negative for neck pain.   Neurological: Negative for weakness, numbness and headaches.   Psychiatric/Behavioral: Positive for sleep disturbance. Negative for suicidal ideas. The patient is not nervous/anxious.      --  The aforementioned information the Chief Complaint section and above subjective data including any HPI data, and also the Review of Systems data, has been personally reviewed and affirmed.  --    Hospital admission from 7/24/2021 to 7/26/2021 reviewed. Patient presented to [facility] due to failing outpatient treatment for right lower extremity cellulitis. He was evaluated by vascular surgery who recently performed a right lower extremity great saphenous and short saphenous ablation by Dr. Esqueda. Patient was placed in Unna boot by wound care with instructions to follow-up with Dr. Esqueda in office on Wednesday. Believe patient will benefit from follow-up with [facility] wound care [place] for ongoing wound care. Patient was discharged home on clindamycin and Cipro for 7 days under the recommendation of ID.    Vitals:    07/28/21 1321   BP: 134/72   Pulse: 68   Resp: 20   Temp: 96.8 °F (36 °C)   SpO2: 96%   Weight: (!) 155 kg (341 lb)   Height: 188 cm (74\")   PainSc:   2   PainLoc: Shoulder     Objective   Physical Exam  Vitals and nursing note reviewed.   Constitutional:       Appearance: Normal appearance. He is well-developed.   Eyes:      General: Lids are normal.   Cardiovascular:      Rate and Rhythm: Normal rate.   Pulmonary:      Effort: Pulmonary effort is normal.   Musculoskeletal:      Right shoulder: Tenderness present.      Cervical back: Normal range of motion. No tenderness. Normal range of motion.      Lumbar " back: No tenderness. Decreased range of motion.   Skin:     Comments: Unna boot to right lower extremity   Neurological:      Mental Status: He is alert and oriented to person, place, and time.   Psychiatric:         Attention and Perception: Attention normal.         Mood and Affect: Mood normal.         Speech: Speech normal.         Behavior: Behavior normal.         Judgment: Judgment normal.       Assessment/Plan   Diagnoses and all orders for this visit:    1. Cervical facet joint syndrome (Primary)    2. Shoulder pain, unspecified chronicity, unspecified laterality  -     Ambulatory Referral to Physical Therapy Evaluate and treat    3. Bilateral occipital neuralgia    4. Cervical stenosis of spine  -     Ambulatory Referral to Physical Therapy Evaluate and treat    5. Lumbar spondylolysis    6. Other chronic pain    --- Resume PT to right shoulder and neck. Patient requests to return to PT at [facility] as he had success there previously for his right shoulder pain.   --- Refill gabapentin 300 mg nightly.   --- Recommend that we hold off on any procedures currently as he is  Undergoing treatment for right lower extremity cellulitis.   --- Follow-up 3 months or sooner if needed.      DILAN REPORT  As part of the patient's treatment plan, I am prescribing controlled substances. The patient has been made aware of appropriate use of such medications, including potential risk of somnolence, limited ability to drive and/or work safely, and the potential for dependence or overdose. It has also bee made clear that these medications are for use by this patient only, without concomitant use of alcohol or other substances unless prescribed.     As the clinician, I personally reviewed the DILAN from 7/28/2021 while the patient was in the office today.    History and physical exam exhibit continued safe and appropriate use of controlled substances.     Dictated utilizing Dragon dictation.

## 2021-07-29 LAB
BACTERIA SPEC AEROBE CULT: NORMAL
BACTERIA SPEC AEROBE CULT: NORMAL

## 2021-08-02 ENCOUNTER — TELEPHONE (OUTPATIENT)
Dept: FAMILY MEDICINE CLINIC | Facility: CLINIC | Age: 76
End: 2021-08-02

## 2021-08-02 RX ORDER — CIPROFLOXACIN 500 MG/1
500 TABLET, FILM COATED ORAL 2 TIMES DAILY
Qty: 14 TABLET | Refills: 0 | Status: CANCELLED | OUTPATIENT
Start: 2021-08-02 | End: 2021-08-09

## 2021-08-02 RX ORDER — CLINDAMYCIN HYDROCHLORIDE 300 MG/1
300 CAPSULE ORAL 3 TIMES DAILY
Qty: 21 CAPSULE | Refills: 0 | Status: CANCELLED | OUTPATIENT
Start: 2021-08-02 | End: 2021-08-09

## 2021-08-02 NOTE — TELEPHONE ENCOUNTER
Caller: James Loaiza    Relationship: Self    Best call back number: [phone] (    Medication needed:   Requested Prescriptions     Pending Prescriptions Disp Refills   • ciprofloxacin (Cipro) 500 MG tablet 14 tablet 0     Sig: Take 1 tablet by mouth 2 (Two) Times a Day for 7 days.   • clindamycin (Cleocin) 300 MG capsule 21 capsule 0     Sig: Take 1 capsule by mouth 3 (Three) Times a Day for 7 days.       When do you need the refill by: 08/02/21    What additional details did the patient provide when requesting the medication: PATIENT IS ABOUT TO TAKE LAST OF THESE MEDICATIONS HE WAS PRESCRIBED AT HOSPITAL AND TODAY IS FIRST DAY HE STARTED TO FEEL A LITTLE RELIEF  HE FEELS HE NEEDS A LITTLE MORE OF IT.    Does the patient have less than a 3 day supply:  [x] Yes  [] No    What is the patient's preferred pharmacy: [facility] [address] [place] [address] [address] [facility]  - [phone] [facility] [phone] FX

## 2021-08-03 NOTE — TELEPHONE ENCOUNTER
Patient informed that he will need to follow up with us for a hospital f/u to discuss further treatment

## 2021-08-04 ENCOUNTER — OFFICE VISIT (OUTPATIENT)
Dept: FAMILY MEDICINE CLINIC | Facility: CLINIC | Age: 76
End: 2021-08-04

## 2021-08-04 VITALS
BODY MASS INDEX: 40.43 KG/M2 | WEIGHT: 315 LBS | RESPIRATION RATE: 16 BRPM | HEIGHT: 74 IN | DIASTOLIC BLOOD PRESSURE: 90 MMHG | SYSTOLIC BLOOD PRESSURE: 146 MMHG

## 2021-08-04 DIAGNOSIS — L03.115 CELLULITIS OF RIGHT LOWER EXTREMITY: Primary | Chronic | ICD-10-CM

## 2021-08-04 DIAGNOSIS — E66.01 MORBID OBESITY WITH BMI OF 45.0-49.9, ADULT (HCC): Chronic | ICD-10-CM

## 2021-08-04 PROCEDURE — 99214 OFFICE O/P EST MOD 30 MIN: CPT | Performed by: INTERNAL MEDICINE

## 2021-08-04 NOTE — PROGRESS NOTES
08/04/2021    CC: Cellulitis (f/u...no other issues)  .        HPI  Wound Infection  This is a recurrent problem. The current episode started more than 1 month ago. The problem occurs constantly. The problem has been gradually improving. The treatment provided moderate relief.        Subjective   James Loaiza is a 75 y.o. male.      The following portions of the patient's history were reviewed and updated as appropriate: allergies, current medications, past family history, past medical history, past social history, past surgical history and problem list.    Problem List  Patient Active Problem List   Diagnosis   • Lumbar herniated disc L3-L5 3/16/16 Open MRI   • Abnormal electrocardiogram   • Abnormal weight gain   • Aortic valve insufficiency   • Coronary arteriosclerosis in native artery   • Benign essential hypertension ([place] 1999)   • Edema   • Dyspnea on exertion   • Fatigue   • Left ventricular hypertrophy   • Obstructive sleep apnea syndrome   • Arthritis of left hip   • Hx of pulmonary embolus 7/15   • Morbid obesity with BMI of 45.0-49.9, adult (CMS/[facility])   • Intermittent dysphagia   • Chronic kidney disease ([facility])   • Hyperlipidemia 1999   • BPH (benign prostatic hypertrophy)   • Left cervical radiculopathy   • Rotator cuff tear, left   • Cardiomegaly   • Plantar fasciitis   • Hypogonadism male   • Vitamin D deficiency disease   • Renal cyst, left   • Preventative health care   • Medication management   • Chronic low back pain   • Lumbar spondylolysis   • Headache   • Other chronic pain   • Pulmonary embolus (CMS/[facility])   • Chronic obstructive pulmonary disease (CMS/[facility])   • Osteoarthritis of right shoulder   • Chronic anticoagulation   • Cervical stenosis of spine   • Bilateral occipital neuralgia   • Shoulder pain   • Cervical facet joint syndrome   • Open wound of right lower leg   • REJI (acute kidney injury) (CMS/[facility])   • Constipation   • Venous insufficiency       Past Medical History  Past Medical  "History:   Diagnosis Date   • Abnormal EKG     referring to cardiology   • Anxiety    • Arthritis    • Back pain     worsening   • BPH (benign prostatic hyperplasia)     BPH/Nocturia/ Urinary Frequency   • Breast nodule     right   • Cardiomegaly     Cardiomegaly/ SOB with exertion   • Circulation problem    • Constipation    • COPD (chronic obstructive pulmonary disease) (CMS/[facility])    • Deviated septum    • DJD (degenerative joint disease)     DJD Knees   • DVT (deep venous thrombosis) (CMS/[facility])    • Dysphagia     solids and liquids - resolved with normal studies   • Encounter for annual health examination 11/14/2013    Annual Health Assessment   • Enlarged prostate    • Fatigue     Extreme fatigue   • Hyperlipidemia 1999   • Hypertension 1999    followed Dr. Marcelo   • Hypogonadism male    • Left hip pain     and DJD   • Left leg pain    • Low back pain    • Moist mucous membranes of ear, nose, and throat     \"Mucous in throat\"   • Morbid obesity (CMS/[facility])     (BMI-41.2za)   • Muscle cramping    • Muscle spasm     Muscle spasms   • Neck arthritis    • Neck muscle spasm     Neck muscle spasm/Cervical strain   • Neck pain    • Obesity    • Plantar fasciitis    • Prostatitis     recurrent   • Psoriasis    • Pulmonary embolus (CMS/[facility]) 01/2019    on Xarelltoypseerlipidemia   • Radiculopathy     Radiculopathy / Neuropathy left ar   • Renal insufficiency     followed by Dr. Mendes   • Seborrhea    • Shortness of breath    • Sleep apnea     Obstructive Sleep apnea worsening; uses CPAP   • Urinary frequency    • Vascular disorder    • Vertigo    • Weight gain    • Wellness examination 10/23/2014    Annual Wellness Visit       Surgical History  Past Surgical History:   Procedure Laterality Date   • APPENDECTOMY  1965   • CARDIAC CATHETERIZATION  07/29/2010    [facility]   • COLONOSCOPY  01/05/2010   • COLONOSCOPY  10/01/2001   • NASAL SEPTUM SURGERY     • NOSE SURGERY  1999   • RADIOFREQUENCY " ABLATION Bilateral 3/2/2021    Procedure: RADIOFREQUENCY ABLATION NERVES---bilateral cervical 2-cervical3;  Surgeon: Edu Tineo MD;  [place] [facility] [place];  Service: Pain Management;  Laterality: Bilateral;   • TURP / TRANSURETHRAL INCISION / DRAINAGE PROSTATE  10/23/2015    Michael Castro       Family History  Family History   Problem Relation Age of Onset   • Arthritis Mother    • Heart disease Mother    • Hypertension Mother    • Heart attack Father    • Heart disease Father    • Hypertension Father    • Arthritis Sister    • Multiple sclerosis Sister    • Alcohol abuse Brother    • Diabetes Son    • Malig Hyperthermia Neg Hx        Social History  Social History    Tobacco Use      Smoking status: Never Smoker      Smokeless tobacco: Never Used       Is the Patient a current tobacco user? No    Allergies  No Known Allergies    Current Medications    Current Outpatient Medications:   •  apixaban (ELIQUIS) 5 MG tablet tablet, Take 5 mg by mouth Every 12 (Twelve) Hours., Disp: , Rfl:   •  aspirin 81 MG EC tablet, Take 81 mg by mouth Daily., Disp: , Rfl:   •  atorvastatin (LIPITOR) 40 MG tablet, Take 40 mg by mouth Daily., Disp: , Rfl:   •  furosemide (LASIX) 40 MG tablet, Take 40 mg by mouth Daily., Disp: , Rfl:   •  gabapentin (NEURONTIN) 300 MG capsule, Take 1 capsule by mouth Every Night., Disp: 30 capsule, Rfl: 3  •  HYDROcodone-acetaminophen (Norco) 5-325 MG per tablet, Take 1 tablet by mouth Every 6 (Six) Hours As Needed for Moderate Pain ., Disp: 12 tablet, Rfl: 0  •  NIFEdipine XL (PROCARDIA XL) 60 MG 24 hr tablet, Take 60 mg by mouth Daily., Disp: , Rfl:   •  ramipril (ALTACE) 10 MG capsule, Take 10 mg by mouth Daily., Disp: , Rfl:   •  silver sulfadiazine (SILVADENE, SSD) 1 % cream, Apply  topically to the appropriate area as directed Daily., Disp: , Rfl:   •  tamsulosin (FLOMAX) 0.4 MG capsule 24 hr capsule, Take 1 capsule by mouth Every Night., Disp: , Rfl:   •  tiotropium bromide-olodaterol  (STIOLTO RESPIMAT) 2.5-2.5 MCG/ACT aerosol solution inhaler, Inhale 2 puffs Daily., Disp: , Rfl:      Review of System  Review of Systems   Constitutional: Negative.    Respiratory: Negative.    Cardiovascular: Negative.    Skin: Positive for skin lesions.     I have reviewed and confirmed the accuracy of the ROS as documented by the MA/LPN/RN Magdiel Bowens MD    Vitals:    08/04/21 0830   BP: 146/90   Resp: 16     Body mass index is 44.14 kg/m².    Objective     Physical Exam  Physical Exam  Constitutional:       Appearance: He is obese.   Cardiovascular:      Rate and Rhythm: Normal rate and regular rhythm.      Pulses: Normal pulses.      Heart sounds: Normal heart sounds.   Pulmonary:      Effort: Pulmonary effort is normal.      Breath sounds: Normal breath sounds.   Musculoskeletal:      Cervical back: Normal range of motion.   Neurological:      Mental Status: He is alert.         Assessment/Plan      This 75-year-old patient presents again for follow-up of cellulitis of the right lower extremity.  In the interim of visits he had hospitalization due to sick failed outpatient oral therapy.  He was placed on Cipro And clindamycin.  He relates that the pain in his right lower extremity is much less than before hospitalization.  He is continue to use the tramadol we gave him before.  Is also been seen by wound care center for treatment of the cellulitis that is improving.  It is not weeping now the bandages are clean and dry.  He is concerned because he relates that in the past the antibiotics would help for a few days and then the infection will recur and so he wants to be treated for a longer period of time.  We have indicated to him that the treatment of time indicated by infectious disease is what we will use for now but we will touch bases with him if there is any need for change.    He denies any chills or fever.  Overall his pain has decreased to 2-3 over ten.    He is to continue treatment with the  wound care center.  We'll see him back for follow-up in the next several weeks unless he has chills fever or other indication of recurrence of the infection.    He needs to decrease his caloric intake in order to decrease his weight which will help with the edema as well.    Diagnoses and all orders for this visit:    1. Cellulitis of right lower extremity (Primary)  -     CBC & Differential  -     Comprehensive metabolic panel; Future             Magdiel Bowens MD  08/04/2021

## 2021-08-05 ENCOUNTER — READMISSION MANAGEMENT (OUTPATIENT)
Dept: CALL CENTER | Facility: HOSPITAL | Age: 76
End: 2021-08-05

## 2021-08-05 NOTE — OUTREACH NOTE
Medical Week 2 Survey      Responses   [facility] patient discharged from?  [place]   Does the patient have one of the following disease processes/diagnoses(primary or secondary)?  Other   Week 2 attempt successful?  No   Unsuccessful attempts  Attempt 1          Joleen Cage RN

## 2021-08-06 ENCOUNTER — TELEPHONE (OUTPATIENT)
Dept: FAMILY MEDICINE CLINIC | Facility: CLINIC | Age: 76
End: 2021-08-06

## 2021-08-06 NOTE — TELEPHONE ENCOUNTER
Caller: James Loaiza    Relationship to patient: Self    Best call back number: [phone]    Patient is needing: PATIENT WANTED TO KNOW IF DR. HEARD CONTACTED THE INFECTIOUS DISEASE PROVIDER REGARDING HIS CELLULITIS. HE ALSO SAID THAT HE HAS SOME SORES AND LESIONS ON HIS LEGS AND WANTED TO KNOW IF HE COULD STILL TAKE THE ANTIBIOTICS THAT HE HAS LEFT OVER.

## 2021-08-09 ENCOUNTER — TELEPHONE (OUTPATIENT)
Dept: FAMILY MEDICINE CLINIC | Facility: CLINIC | Age: 76
End: 2021-08-09

## 2021-08-09 RX ORDER — CLINDAMYCIN HYDROCHLORIDE 300 MG/1
CAPSULE ORAL
Qty: 30 CAPSULE | Refills: 0 | Status: SHIPPED | OUTPATIENT
Start: 2021-08-09 | End: 2021-09-01

## 2021-08-09 RX ORDER — CIPROFLOXACIN 500 MG/1
500 TABLET, FILM COATED ORAL 2 TIMES DAILY
Qty: 20 TABLET | Refills: 0 | Status: SHIPPED | OUTPATIENT
Start: 2021-08-09 | End: 2021-09-01

## 2021-08-09 NOTE — TELEPHONE ENCOUNTER
Completed conversation with Mr. Loaiza where he relates that the wound has never stopped weeping and that wound care therapy team Has Not Seen Him but instead he was seen by the edema clinic people today.  He relates that they stated they could not continue to treat him as long as his wound was weeping and he was not getting antibiotics.  We will touch bases with the edema clinic and infectious disease.

## 2021-08-11 ENCOUNTER — LAB REQUISITION (OUTPATIENT)
Dept: LAB | Facility: HOSPITAL | Age: 76
End: 2021-08-11

## 2021-08-11 ENCOUNTER — OFFICE VISIT (OUTPATIENT)
Dept: WOUND CARE | Facility: HOSPITAL | Age: 76
End: 2021-08-11

## 2021-08-11 ENCOUNTER — READMISSION MANAGEMENT (OUTPATIENT)
Dept: CALL CENTER | Facility: HOSPITAL | Age: 76
End: 2021-08-11

## 2021-08-11 ENCOUNTER — TRANSCRIBE ORDERS (OUTPATIENT)
Dept: ADMINISTRATIVE | Facility: HOSPITAL | Age: 76
End: 2021-08-11

## 2021-08-11 DIAGNOSIS — L97.911 NON-PRESSURE CHRONIC ULCER RIGHT LOWER LEG, LIMITED TO BREAKDOWN SKIN (HCC): Primary | ICD-10-CM

## 2021-08-11 DIAGNOSIS — Z00.00 ENCOUNTER FOR GENERAL ADULT MEDICAL EXAMINATION WITHOUT ABNORMAL FINDINGS: ICD-10-CM

## 2021-08-11 PROCEDURE — 87205 SMEAR GRAM STAIN: CPT | Performed by: SURGERY

## 2021-08-11 PROCEDURE — 87075 CULTR BACTERIA EXCEPT BLOOD: CPT | Performed by: SURGERY

## 2021-08-11 PROCEDURE — G0463 HOSPITAL OUTPT CLINIC VISIT: HCPCS

## 2021-08-11 PROCEDURE — 87186 SC STD MICRODIL/AGAR DIL: CPT | Performed by: SURGERY

## 2021-08-11 PROCEDURE — 87070 CULTURE OTHR SPECIMN AEROBIC: CPT | Performed by: SURGERY

## 2021-08-11 PROCEDURE — 87181 SC STD AGAR DILUTION PER AGT: CPT | Performed by: SURGERY

## 2021-08-11 NOTE — OUTREACH NOTE
Medical Week 2 Survey      Responses   [facility] patient discharged from?  [place]   Does the patient have one of the following disease processes/diagnoses(primary or secondary)?  Other   Week 2 attempt successful?  Yes   Call start time  1605   Discharge diagnosis  open wound RLE, venous insufficiency, REJI on CKD   Call end time  1608   Meds reviewed with patient/caregiver?  Yes   Is the patient having any side effects they believe may be caused by any medication additions or changes?  No   Does the patient have all medications ordered at discharge?  Yes   Prescription comments  antibiotic reordered   Is the patient taking all medications as directed (includes completed medication regime)?  Yes   Does the patient have a primary care provider?   Yes   Does the patient have an appointment with their PCP within 7 days of discharge?  Yes   Has the patient kept scheduled appointments due by today?  Yes   Has home health visited the patient within 72 hours of discharge?  N/A   Psychosocial issues?  No   Comments  pt states has been to wound center, [facility] replaced, will go again 8/13/21   Did the patient receive a copy of their discharge instructions?  Yes   Nursing interventions  Reviewed instructions with patient   What is the patient's perception of their health status since discharge?  Improving   Is the patient/caregiver able to teach back signs and symptoms related to disease process for when to call PCP?  Yes   Is the patient/caregiver able to teach back signs and symptoms related to disease process for when to call 911?  Yes   Is the patient/caregiver able to teach back the hierarchy of who to call/visit for symptoms/problems? PCP, Specialist, Home health nurse, Urgent Care, ED, 911  Yes   Additional teach back comments  pt states good understanding of care needed for venous stasis, plans to see lymphedema specialists in addition to current treatment with the wound clinic   Week 2 Call Completed?  Yes           Kym Munoz, RN

## 2021-08-13 ENCOUNTER — OFFICE VISIT (OUTPATIENT)
Dept: WOUND CARE | Facility: HOSPITAL | Age: 76
End: 2021-08-13

## 2021-08-16 LAB
BACTERIA SPEC AEROBE CULT: ABNORMAL
BACTERIA SPEC AEROBE CULT: ABNORMAL
BACTERIA SPEC ANAEROBE CULT: NORMAL
GRAM STN SPEC: ABNORMAL

## 2021-08-18 ENCOUNTER — APPOINTMENT (OUTPATIENT)
Dept: WOUND CARE | Facility: HOSPITAL | Age: 76
End: 2021-08-18

## 2021-08-18 ENCOUNTER — OFFICE VISIT (OUTPATIENT)
Dept: WOUND CARE | Facility: HOSPITAL | Age: 76
End: 2021-08-18

## 2021-08-18 RX ORDER — NIFEDIPINE 60 MG/1
60 TABLET, EXTENDED RELEASE ORAL DAILY
Qty: 90 TABLET | Refills: 2 | Status: SHIPPED | OUTPATIENT
Start: 2021-08-18 | End: 2022-07-18

## 2021-08-20 ENCOUNTER — OFFICE VISIT (OUTPATIENT)
Dept: WOUND CARE | Facility: HOSPITAL | Age: 76
End: 2021-08-20

## 2021-08-24 ENCOUNTER — HOSPITAL ENCOUNTER (OUTPATIENT)
Dept: CARDIOLOGY | Facility: HOSPITAL | Age: 76
Discharge: HOME | End: 2021-08-24
Admitting: SURGERY

## 2021-08-24 DIAGNOSIS — L97.911 NON-PRESSURE CHRONIC ULCER RIGHT LOWER LEG, LIMITED TO BREAKDOWN SKIN (HCC): ICD-10-CM

## 2021-08-24 LAB
BH CV LOWER ARTERIAL LEFT ABI RATIO: 1.1
BH CV LOWER ARTERIAL LEFT DORSALIS PEDIS SYS MAX: 190 MMHG
BH CV LOWER ARTERIAL LEFT GREAT TOE SYS MAX: 147 MMHG
BH CV LOWER ARTERIAL LEFT POST TIBIAL SYS MAX: 186 MMHG
BH CV LOWER ARTERIAL LEFT TBI RATIO: 0.9
BH CV LOWER ARTERIAL RIGHT ABI RATIO: 1.2
BH CV LOWER ARTERIAL RIGHT DORSALIS PEDIS SYS MAX: 197 MMHG
BH CV LOWER ARTERIAL RIGHT GREAT TOE SYS MAX: 155 MMHG
BH CV LOWER ARTERIAL RIGHT POST TIBIAL SYS MAX: 198 MMHG
BH CV LOWER ARTERIAL RIGHT TBI RATIO: 0.95
MAXIMAL PREDICTED HEART RATE: 147 BPM
STRESS TARGET HR: 125 BPM
UPPER ARTERIAL LEFT ARM BRACHIAL SYS MAX: 161 MMHG
UPPER ARTERIAL RIGHT ARM BRACHIAL SYS MAX: 163 MMHG

## 2021-08-24 PROCEDURE — 93922 UPR/L XTREMITY ART 2 LEVELS: CPT

## 2021-08-26 ENCOUNTER — OFFICE VISIT (OUTPATIENT)
Dept: WOUND CARE | Facility: HOSPITAL | Age: 76
End: 2021-08-26

## 2021-08-27 ENCOUNTER — OFFICE VISIT (OUTPATIENT)
Dept: ORTHOPEDIC SURGERY | Facility: CLINIC | Age: 76
End: 2021-08-27

## 2021-08-27 VITALS — HEIGHT: 74 IN | BODY MASS INDEX: 40.43 KG/M2 | TEMPERATURE: 97.2 F | WEIGHT: 315 LBS

## 2021-08-27 DIAGNOSIS — M25.562 CHRONIC PAIN OF LEFT KNEE: ICD-10-CM

## 2021-08-27 DIAGNOSIS — R52 PAIN: Primary | ICD-10-CM

## 2021-08-27 DIAGNOSIS — G89.29 CHRONIC PAIN OF LEFT KNEE: ICD-10-CM

## 2021-08-27 PROCEDURE — 99214 OFFICE O/P EST MOD 30 MIN: CPT | Performed by: ORTHOPAEDIC SURGERY

## 2021-08-27 PROCEDURE — 73562 X-RAY EXAM OF KNEE 3: CPT | Performed by: ORTHOPAEDIC SURGERY

## 2021-08-27 PROCEDURE — 20610 DRAIN/INJ JOINT/BURSA W/O US: CPT | Performed by: ORTHOPAEDIC SURGERY

## 2021-08-27 RX ORDER — CEFDINIR 300 MG/1
CAPSULE ORAL
COMMUNITY
Start: 2021-08-16 | End: 2021-09-01

## 2021-08-27 RX ORDER — METHYLPREDNISOLONE ACETATE 80 MG/ML
80 INJECTION, SUSPENSION INTRA-ARTICULAR; INTRALESIONAL; INTRAMUSCULAR; SOFT TISSUE
Status: COMPLETED | OUTPATIENT
Start: 2021-08-27 | End: 2021-08-27

## 2021-08-27 RX ADMIN — METHYLPREDNISOLONE ACETATE 80 MG: 80 INJECTION, SUSPENSION INTRA-ARTICULAR; INTRALESIONAL; INTRAMUSCULAR; SOFT TISSUE at 14:47

## 2021-08-27 NOTE — PROGRESS NOTES
Chief complaint: New complaint of left knee pain    Mr. Loaiza comes in today for new complaint of left knee pain.  It started about 2 weeks ago.  It came on suddenly without any discrete injury.  He tells me he felt something pop or tear in the back of his knee.  He has noticed swelling.  The pain is moderate and intermittent.  The pain is 1 out of 10 at rest but up to 4 or 5 out of 10 when walking.  Denies any catching, popping or locking.  Denies any weakness, numbness or paresthesias.    Left knee is examined.  Skin is benign.  No atrophy, swelling or masses.  He has some posterior tenderness in the popliteal fossa.  No palpable swellings or masses.  Knee extension causes him mild posterior discomfort.  Knee flexion beyond about 100 degrees also causes him more moderate posterior discomfort.  Medial Roscoe's is positive for medial and posterior pain.  Lateral Roscoe's is negative.  His knee is stable on exam.  He has intact motor and sensory function in his lower leg and foot.  Brisk capillary refill.  Negative straight leg raise test.    AP, lateral and merchant views of the left knee are ordered and reviewed to evaluate his complaint.  No comparison films are available.  He appears to have moderate medial and patellofemoral compartment osteoarthritis.  No other concerning findings.    Assessment: Left knee osteoarthritis with suspected degenerative meniscus tear and probable Baker's cyst    Plan: We discussed his options.  He elected to try an injection.  The risk, benefits and alternatives were discussed, particularly his elevated risk status due to anticoagulation.  He acknowledged understanding of this information and consented.  The injection was performed as described below.  I instructed him to give this about 2 weeks and then call me if no better.  Otherwise, he can follow-up as needed.    Large Joint Arthrocentesis: L knee  Date/Time: 8/27/2021 2:47 PM  Consent given by: patient  Site marked: site  marked  Timeout: Immediately prior to procedure a time out was called to verify the correct patient, procedure, equipment, support staff and site/side marked as required   Supporting Documentation  Indications: pain   Procedure Details  Location: knee - L knee  Preparation: Patient was prepped and draped in the usual sterile fashion  Needle gauge: 21G.  Approach: anterolateral  Medications administered: 2 mL lidocaine (cardiac); 80 mg methylPREDNISolone acetate 80 MG/ML  Patient tolerance: patient tolerated the procedure well with no immediate complications

## 2021-09-01 ENCOUNTER — OFFICE VISIT (OUTPATIENT)
Dept: WOUND CARE | Facility: HOSPITAL | Age: 76
End: 2021-09-01

## 2021-09-01 ENCOUNTER — OFFICE VISIT (OUTPATIENT)
Dept: INFECTIOUS DISEASES | Facility: CLINIC | Age: 76
End: 2021-09-01

## 2021-09-01 VITALS
WEIGHT: 315 LBS | DIASTOLIC BLOOD PRESSURE: 80 MMHG | RESPIRATION RATE: 16 BRPM | HEART RATE: 86 BPM | SYSTOLIC BLOOD PRESSURE: 157 MMHG | TEMPERATURE: 97.7 F | BODY MASS INDEX: 40.43 KG/M2 | HEIGHT: 74 IN

## 2021-09-01 DIAGNOSIS — I87.2 VENOUS INSUFFICIENCY: Primary | ICD-10-CM

## 2021-09-01 PROCEDURE — G0463 HOSPITAL OUTPT CLINIC VISIT: HCPCS

## 2021-09-01 PROCEDURE — 99203 OFFICE O/P NEW LOW 30 MIN: CPT | Performed by: INTERNAL MEDICINE

## 2021-09-01 NOTE — PROGRESS NOTES
cc: Here for evaluation of cellulitis    Patient reports that he was in his usual state of health until May 2021 when he developed open wounds on his lower extremity penicillins lymphedema and swelling.  Dr Randolph Ochoa had performed chemical ablation of the right leg and attempt to improve venous stasis.  Fortunately the patient had significant problems and required admission to [facility] in late July 2021.  Per review the records he was seen by Dr. valdez and had dramatic improvement on vancomycin, ceftriaxone and placement of an Unna boot.  He was discharged on clindamycin and ciprofloxacin.  With continued wound care and Unna boot he says that his wounds have healed.  He denies any constitutional symptoms of infection such as fever, chills, night sweats.  He continues to follow with the wound care center.    Past medical history includes hypertension, hyperlipidemia, osteoarthritis, gout, PE, COPD, appendectomy, vein ablation  No family history of infectious diseases  Social history: He is , retired and lives here in [place], [place]    Review of Systems: All other reviewed and negative except as per HPI    There were no vitals taken for this visit.  GENERAL: Awake and alert, in no acute distress.      SKIN: Warm and dry without cutaneous eruptions chronic venous stasis changes on the right lower extremity with no ulcerations appreciated and still significant 2+ lower extremity edema  PSYCHIATRIC: Appropriate mood, affect, insight, and judgment.       DIAGNOSTICS:  Lab Results   Component Value Date    WBC 8.05 07/26/2021    HGB 12.3 (L) 07/26/2021    HCT 37.2 (L) 07/26/2021     07/26/2021     Assessment and Plan  1.  Venous insufficiency with chronic lymphedema and venous stasis ulcerations with superinfection: Currently he has no ongoing infections at this time requiring additional antibiotics.  Discussed with him that really the most important thing going forward is to try to  keep the fluid off the leg and the importance of compression, Unna boot and weight loss.  These ulcerations can be very difficult to heal and I told the patient that it was not unusual for them to take several months to turn around.  He says that he is seeing the light and will be more adherent to compression in future.

## 2021-09-02 ENCOUNTER — OFFICE VISIT (OUTPATIENT)
Dept: FAMILY MEDICINE CLINIC | Facility: CLINIC | Age: 76
End: 2021-09-02

## 2021-09-02 VITALS
WEIGHT: 315 LBS | SYSTOLIC BLOOD PRESSURE: 140 MMHG | HEIGHT: 74 IN | DIASTOLIC BLOOD PRESSURE: 80 MMHG | RESPIRATION RATE: 16 BRPM | BODY MASS INDEX: 40.43 KG/M2

## 2021-09-02 DIAGNOSIS — L03.115 CELLULITIS OF RIGHT LOWER EXTREMITY: Primary | Chronic | ICD-10-CM

## 2021-09-02 DIAGNOSIS — N18.32 STAGE 3B CHRONIC KIDNEY DISEASE (HCC): Chronic | ICD-10-CM

## 2021-09-02 DIAGNOSIS — I10 ESSENTIAL HYPERTENSION: Chronic | ICD-10-CM

## 2021-09-02 PROCEDURE — 99214 OFFICE O/P EST MOD 30 MIN: CPT | Performed by: INTERNAL MEDICINE

## 2021-09-06 NOTE — PROGRESS NOTES
09/02/2021    CC: Cellulitis (RLE f/u.  has seen ortho since lov...no other issues)  .        HPI  Leg Pain   The incident occurred more than 1 week ago. The incident occurred at home. The pain is present in the right leg. The quality of the pain is described as aching and burning. The pain is moderate. He reports no foreign bodies present. The symptoms are aggravated by movement.        Subjective   James Loaiza is a 75 y.o. male.      The following portions of the patient's history were reviewed and updated as appropriate: allergies, current medications, past family history, past medical history, past social history, past surgical history and problem list.    Problem List  Patient Active Problem List   Diagnosis   • Lumbar herniated disc L3-L5 3/16/16 Open MRI   • Abnormal electrocardiogram   • Abnormal weight gain   • Aortic valve insufficiency   • Coronary arteriosclerosis in native artery   • Benign essential hypertension (Ijeoma 1999)   • Edema   • Dyspnea on exertion   • Fatigue   • Left ventricular hypertrophy   • Obstructive sleep apnea syndrome   • Arthritis of left hip   • Hx of pulmonary embolus 7/15   • Morbid obesity with BMI of 45.0-49.9, adult (CMS/[facility])   • Intermittent dysphagia   • Chronic kidney disease ([place]ona)   • Hyperlipidemia 1999   • BPH (benign prostatic hypertrophy)   • Left cervical radiculopathy   • Rotator cuff tear, left   • Cardiomegaly   • Plantar fasciitis   • Hypogonadism male   • Vitamin D deficiency disease   • Renal cyst, left   • Preventative health care   • Medication management   • Chronic low back pain   • Lumbar spondylolysis   • Headache   • Other chronic pain   • Pulmonary embolus (CMS/HCC)   • Chronic obstructive pulmonary disease (CMS/HCC)   • Osteoarthritis of right shoulder   • Chronic anticoagulation   • Cervical stenosis of spine   • Bilateral occipital neuralgia   • Shoulder pain   • Cervical facet joint syndrome   • Open wound of right lower leg   • REJI (acute  "kidney injury) (CMS/[facility])   • Constipation   • Venous insufficiency       Past Medical History  Past Medical History:   Diagnosis Date   • Abnormal EKG     referring to cardiology   • Anxiety    • Arthritis    • Back pain     worsening   • BPH (benign prostatic hyperplasia)     BPH/Nocturia/ Urinary Frequency   • Breast nodule     right   • Cardiomegaly     Cardiomegaly/ SOB with exertion   • Circulation problem    • Constipation    • COPD (chronic obstructive pulmonary disease) (CMS/[facility])    • Deviated septum    • DJD (degenerative joint disease)     DJD Knees   • DVT (deep venous thrombosis) (CMS/[facility])    • Dysphagia     solids and liquids - resolved with normal studies   • Encounter for annual health examination 11/14/2013    Annual Health Assessment   • Enlarged prostate    • Fatigue     Extreme fatigue   • Hyperlipidemia 1999   • Hypertension 1999    followed Dr. Marcelo   • Hypogonadism male    • Left hip pain     and DJD   • Left leg pain    • Low back pain    • Moist mucous membranes of ear, nose, and throat     \"Mucous in throat\"   • Morbid obesity (CMS/[facility])     (BMI-41.2za)   • Muscle cramping    • Muscle spasm     Muscle spasms   • Neck arthritis    • Neck muscle spasm     Neck muscle spasm/Cervical strain   • Neck pain    • Obesity    • Plantar fasciitis    • Prostatitis     recurrent   • Psoriasis    • Pulmonary embolus (CMS/[facility]) 01/2019    on Xarelltoypseerlipidemia   • Radiculopathy     Radiculopathy / Neuropathy left ar   • Renal insufficiency     followed by Dr. Mendes   • Seborrhea    • Shortness of breath    • Sleep apnea     Obstructive Sleep apnea worsening; uses CPAP   • Urinary frequency    • Vascular disorder    • Vertigo    • Weight gain    • Wellness examination 10/23/2014    Annual Wellness Visit       Surgical History  Past Surgical History:   Procedure Laterality Date   • APPENDECTOMY  1965   • CARDIAC CATHETERIZATION  07/29/2010    [facility]   • COLONOSCOPY  " 01/05/2010   • COLONOSCOPY  10/01/2001   • NASAL SEPTUM SURGERY     • NOSE SURGERY  1999   • RADIOFREQUENCY ABLATION Bilateral 3/2/2021    Procedure: RADIOFREQUENCY ABLATION NERVES---bilateral cervical 2-cervical3;  Surgeon: Edu Tineo MD;  Location: [facility] MAIN OR;  Service: Pain Management;  Laterality: Bilateral;   • TURP / TRANSURETHRAL INCISION / DRAINAGE PROSTATE  10/23/2015    Michael Castro       Family History  Family History   Problem Relation Age of Onset   • Arthritis Mother    • Heart disease Mother    • Hypertension Mother    • Heart attack Father    • Heart disease Father    • Hypertension Father    • Arthritis Sister    • Multiple sclerosis Sister    • Alcohol abuse Brother    • Diabetes Son    • Malig Hyperthermia Neg Hx        Social History  Social History    Tobacco Use      Smoking status: Never Smoker      Smokeless tobacco: Never Used       Is the Patient a current tobacco user? No    Allergies  No Known Allergies    Current Medications    Current Outpatient Medications:   •  apixaban (ELIQUIS) 5 MG tablet tablet, Take 5 mg by mouth Every 12 (Twelve) Hours., Disp: , Rfl:   •  aspirin 81 MG EC tablet, Take 81 mg by mouth Daily., Disp: , Rfl:   •  atorvastatin (LIPITOR) 40 MG tablet, Take 40 mg by mouth Daily., Disp: , Rfl:   •  furosemide (LASIX) 40 MG tablet, Take 40 mg by mouth Daily., Disp: , Rfl:   •  gabapentin (NEURONTIN) 300 MG capsule, Take 1 capsule by mouth Every Night., Disp: 30 capsule, Rfl: 3  •  NIFEdipine XL (PROCARDIA XL) 60 MG 24 hr tablet, Take 1 tablet by mouth Daily., Disp: 90 tablet, Rfl: 2  •  ramipril (ALTACE) 10 MG capsule, Take 10 mg by mouth Daily., Disp: , Rfl:   •  tamsulosin (FLOMAX) 0.4 MG capsule 24 hr capsule, Take 1 capsule by mouth Every Night., Disp: , Rfl:   •  tiotropium bromide-olodaterol (STIOLTO RESPIMAT) 2.5-2.5 MCG/ACT aerosol solution inhaler, Inhale 2 puffs Daily., Disp: , Rfl:      Review of System  Review of Systems   Constitutional:  Negative.    Eyes: Negative.    Respiratory: Negative.    Cardiovascular: Negative.    Skin: Positive for rash.        Extensive cellulitis of right lower extremity, currently wrapped with compression bandage   Allergic/Immunologic: Negative.    Neurological: Negative.      I have reviewed and confirmed the accuracy of the ROS as documented by the MA/LPN/RN Magdiel Bowens MD    Vitals:    09/02/21 0911   BP: 140/80   Resp: 16     Body mass index is 43.73 kg/m².    Objective     Physical Exam  Physical Exam  Constitutional:       Appearance: Normal appearance. He is obese.   Cardiovascular:      Rate and Rhythm: Normal rate and regular rhythm.      Pulses: Normal pulses.      Heart sounds: Normal heart sounds.   Pulmonary:      Effort: Pulmonary effort is normal.      Breath sounds: Normal breath sounds.   Musculoskeletal:         General: Swelling present.      Cervical back: Normal range of motion and neck supple.      Right lower leg: Edema present.   Skin:     Findings: Lesion present.      Comments: Cellulitis right lower extremity, currently with compression bandage in place   Neurological:      Mental Status: He is alert.         Assessment/Plan      This 75-year-old patient presents today for follow-up.  He was seen by infectious disease yesterday and significant progress was made in that the patient is better able to understand now that his disease process is going to be long-standing and will require continued compliance on his part.  As a result of discussion with infectious disease.  He relates he now better understands that he'll need to take antibiotics as needed, but that the key to treatment.  We will be keeping his compression hose in place and taken the medication as prescribed.  He relates that he was seen by Dr. Lima seen and is due to follow-up with him in the next several months.  He will continue to be seen by wound care center for wrapping and management of his cellulitis of the right  lower extremity.    He was seen by Dr. Mancera, orthopedic surgeon for degenerative joint disease On 8/27 and was given a steroid injection for his degenerative joint knee pain.    We also discussed the importance of weight loss diet.  He will and initially tried to just decreasing his caloric intake.  We will see him again in follow-up in about 2 months to reevaluate.  He also understands the importance of maintaining a low sodium diet to help better control his blood pressure.  We'll discontinue the Altase and face of his CKD 3 and reevaluate.        Diagnoses and all orders for this visit:    1. Cellulitis of right lower extremity (Primary)  Comments:  improving    2. Essential hypertension  Comments:  stable, not at goal    3. Stage 3b chronic kidney disease (CMS/HCC)  Comments:  stable,      Plan:  1.)  Follow-up in the next few months for weight  evaluation       Magdiel Bowens MD  09/02/2021

## 2021-09-08 ENCOUNTER — OFFICE VISIT (OUTPATIENT)
Dept: WOUND CARE | Facility: HOSPITAL | Age: 76
End: 2021-09-08

## 2021-09-08 PROCEDURE — G0463 HOSPITAL OUTPT CLINIC VISIT: HCPCS

## 2021-09-22 RX ORDER — ATORVASTATIN CALCIUM 40 MG/1
40 TABLET, FILM COATED ORAL DAILY
Qty: 90 TABLET | Refills: 1 | Status: SHIPPED | OUTPATIENT
Start: 2021-09-22 | End: 2022-02-14

## 2021-09-22 NOTE — TELEPHONE ENCOUNTER
Rx Refill Note  Requested Prescriptions     Pending Prescriptions Disp Refills   • atorvastatin (LIPITOR) 40 MG tablet       Sig: Take 1 tablet by mouth Daily.      Last office visit with prescribing clinician: 9/2/2021      Next office visit with prescribing clinician: 11/3/2021            Fredrick Castro MA  09/22/21, 16:05 EDT

## 2021-09-22 NOTE — TELEPHONE ENCOUNTER
Caller: James Loaiza    Relationship: Self      Medication requested (name and dosage): atorvastatin (LIPITOR) 40 MG tablet      Pharmacy where request should be sent: [facility] [address] [place] [address] [address]  - [phone]  - [phone] FX     Additional details provided by patient: PATIENT WOULD LIKE A 90 DAY SUPPLY    Best call back number: [phone] (H)    Does the patient have less than a 3 day supply:  [x] Yes  [] No    Felicita David Rep   09/22/21 15:56 EDT

## 2021-10-05 ENCOUNTER — OFFICE VISIT (OUTPATIENT)
Dept: FAMILY MEDICINE CLINIC | Facility: CLINIC | Age: 76
End: 2021-10-05

## 2021-10-05 VITALS
HEIGHT: 74 IN | RESPIRATION RATE: 16 BRPM | WEIGHT: 315 LBS | BODY MASS INDEX: 40.43 KG/M2 | SYSTOLIC BLOOD PRESSURE: 136 MMHG | DIASTOLIC BLOOD PRESSURE: 88 MMHG

## 2021-10-05 DIAGNOSIS — L03.115 CELLULITIS OF RIGHT LOWER EXTREMITY: Primary | Chronic | ICD-10-CM

## 2021-10-05 DIAGNOSIS — I10 PRIMARY HYPERTENSION: ICD-10-CM

## 2021-10-05 PROCEDURE — 99213 OFFICE O/P EST LOW 20 MIN: CPT | Performed by: INTERNAL MEDICINE

## 2021-10-09 NOTE — PROGRESS NOTES
10/05/2021    CC: Cellulitis (f/u...no other issues)  .        HPI  Wound Infection  This is a chronic problem. The current episode started 1 to 4 weeks ago. The problem occurs constantly. The problem has been rapidly improving. Associated symptoms include a rash.        Subjective   James Loaiza is a 76 y.o. male.      The following portions of the patient's history were reviewed and updated as appropriate: allergies, current medications, past family history, past medical history, past social history, past surgical history and problem list.    Problem List  Patient Active Problem List   Diagnosis   • Lumbar herniated disc L3-L5 3/16/16 Open MRI   • Abnormal electrocardiogram   • Abnormal weight gain   • Aortic valve insufficiency   • Coronary arteriosclerosis in native artery   • Benign essential hypertension (Ijeoma 1999)   • Edema   • Dyspnea on exertion   • Fatigue   • Left ventricular hypertrophy   • Obstructive sleep apnea syndrome   • Arthritis of left hip   • Hx of pulmonary embolus 7/15   • Morbid obesity with BMI of 45.0-49.9, adult ([facility])   • Intermittent dysphagia   • Chronic kidney disease ([place]ona)   • Hyperlipidemia 1999   • BPH (benign prostatic hypertrophy)   • Left cervical radiculopathy   • Rotator cuff tear, left   • Cardiomegaly   • Plantar fasciitis   • Hypogonadism male   • Vitamin D deficiency disease   • Renal cyst, left   • Preventative health care   • Medication management   • Chronic low back pain   • Lumbar spondylolysis   • Headache   • Other chronic pain   • Pulmonary embolus ([facility])   • Chronic obstructive pulmonary disease ([facility])   • Osteoarthritis of right shoulder   • Chronic anticoagulation   • Cervical stenosis of spine   • Bilateral occipital neuralgia   • Shoulder pain   • Cervical facet joint syndrome   • Open wound of right lower leg   • REJI (acute kidney injury) ([facility])   • Constipation   • Venous insufficiency       Past Medical History  Past Medical History:   Diagnosis Date   •  "Abnormal EKG     referring to cardiology   • Anxiety    • Arthritis    • Back pain     worsening   • BPH (benign prostatic hyperplasia)     BPH/Nocturia/ Urinary Frequency   • Breast nodule     right   • Cardiomegaly     Cardiomegaly/ SOB with exertion   • Circulation problem    • Constipation    • COPD (chronic obstructive pulmonary disease) ([facility])    • Deviated septum    • DJD (degenerative joint disease)     DJD Knees   • DVT (deep venous thrombosis) ([facility])    • Dysphagia     solids and liquids - resolved with normal studies   • Encounter for annual health examination 11/14/2013    Annual Health Assessment   • Enlarged prostate    • Fatigue     Extreme fatigue   • Hyperlipidemia 1999   • Hypertension 1999    followed Dr. Marcelo   • Hypogonadism male    • Left hip pain     and DJD   • Left leg pain    • Low back pain    • Moist mucous membranes of ear, nose, and throat     \"Mucous in throat\"   • Morbid obesity ([facility])     (BMI-41.2za)   • Muscle cramping    • Muscle spasm     Muscle spasms   • Neck arthritis    • Neck muscle spasm     Neck muscle spasm/Cervical strain   • Neck pain    • Obesity    • Plantar fasciitis    • Prostatitis     recurrent   • Psoriasis    • Pulmonary embolus ([facility]) 01/2019    on Xarelltoypseerlipidemia   • Radiculopathy     Radiculopathy / Neuropathy left ar   • Renal insufficiency     followed by Dr. Mendes   • Seborrhea    • Shortness of breath    • Sleep apnea     Obstructive Sleep apnea worsening; uses CPAP   • Urinary frequency    • Vascular disorder    • Vertigo    • Weight gain    • Wellness examination 10/23/2014    Annual Wellness Visit       Surgical History  Past Surgical History:   Procedure Laterality Date   • APPENDECTOMY  1965   • CARDIAC CATHETERIZATION  07/29/2010    [facility]   • COLONOSCOPY  01/05/2010   • COLONOSCOPY  10/01/2001   • NASAL SEPTUM SURGERY     • NOSE SURGERY  1999   • RADIOFREQUENCY ABLATION Bilateral 3/2/2021    Procedure: " RADIOFREQUENCY ABLATION NERVES---bilateral cervical 2-cervical3;  Surgeon: Edu Tineo MD;  Location: [facility] MAIN OR;  Service: Pain Management;  Laterality: Bilateral;   • TURP / TRANSURETHRAL INCISION / DRAINAGE PROSTATE  10/23/2015    Michael Castro       Family History  Family History   Problem Relation Age of Onset   • Arthritis Mother    • Heart disease Mother    • Hypertension Mother    • Heart attack Father    • Heart disease Father    • Hypertension Father    • Arthritis Sister    • Multiple sclerosis Sister    • Alcohol abuse Brother    • Diabetes Son    • Malig Hyperthermia Neg Hx        Social History  Social History    Tobacco Use      Smoking status: Never Smoker      Smokeless tobacco: Never Used       Is the Patient a current tobacco user? No    Allergies  No Known Allergies    Current Medications    Current Outpatient Medications:   •  apixaban (ELIQUIS) 5 MG tablet tablet, Take 5 mg by mouth Every 12 (Twelve) Hours., Disp: , Rfl:   •  aspirin 81 MG EC tablet, Take 81 mg by mouth Daily., Disp: , Rfl:   •  atorvastatin (LIPITOR) 40 MG tablet, Take 1 tablet by mouth Daily., Disp: 90 tablet, Rfl: 1  •  furosemide (LASIX) 40 MG tablet, Take 40 mg by mouth Daily., Disp: , Rfl:   •  gabapentin (NEURONTIN) 300 MG capsule, Take 1 capsule by mouth Every Night., Disp: 30 capsule, Rfl: 3  •  NIFEdipine XL (PROCARDIA XL) 60 MG 24 hr tablet, Take 1 tablet by mouth Daily., Disp: 90 tablet, Rfl: 2  •  ramipril (ALTACE) 10 MG capsule, Take 10 mg by mouth Daily., Disp: , Rfl:   •  tamsulosin (FLOMAX) 0.4 MG capsule 24 hr capsule, Take 1 capsule by mouth Every Night., Disp: , Rfl:   •  tiotropium bromide-olodaterol (STIOLTO RESPIMAT) 2.5-2.5 MCG/ACT aerosol solution inhaler, Inhale 2 puffs Daily., Disp: , Rfl:      Review of System  Review of Systems   Eyes: Negative.    Respiratory: Negative.    Cardiovascular: Negative.    Gastrointestinal: Negative.    Skin: Positive for rash.     I have reviewed and  confirmed the accuracy of the ROS as documented by the MA/LPN/RN Magdiel Bowens MD    Vitals:    10/05/21 1021   BP: 136/88   Resp: 16     Body mass index is 43.63 kg/m².    Objective     Physical Exam  Physical Exam  HENT:      Head: Normocephalic and atraumatic.      Mouth/Throat:      Mouth: Mucous membranes are dry.   Eyes:      Extraocular Movements: Extraocular movements intact.      Pupils: Pupils are equal, round, and reactive to light.   Cardiovascular:      Rate and Rhythm: Normal rate and regular rhythm.      Pulses: Normal pulses.      Heart sounds: Normal heart sounds.   Pulmonary:      Effort: Pulmonary effort is normal.      Breath sounds: Normal breath sounds.   Musculoskeletal:      Cervical back: Normal range of motion and neck supple.   Skin:     Findings: Lesion present.      Comments: Cellulitis right lower extremity total length 6 inches mild erythema no drainage     Neurological:      Mental Status: He is alert.         Assessment/Plan    This 76-year-old patient presents for follow-up of cellulitis.  His legs are showing marked improvement from 3 to 4 weeks ago.  He relates that he has completed his antibiotics and has been seen by the wound care center.  He relates that the pain is pretty much resolved at this point.  There is much less erythema seen around the center of the lesion.  He relates that he understands now the importance of wearing the support hose and to and thinks this is one of the keys to his improvement and the wound.  Is also trying to elevate his feet as much as he can the oozing from the wound has resolved and the patient is able to ambulate without assistance.    Note is made that is BMI is 43.6.  His blood pressure initially was 136/88 and improvement from the past several weeks when it was up to 157/80.          Diagnoses and all orders for this visit:    1. Cellulitis of right lower extremity (Primary)  Comments:  Improving    2. Primary  hypertension      Plan:  1.)  Follow-up in about 6 weeks.  2.)  Continue support hose and elevation of the legs.       Magdiel Bowens MD  10/05/2021

## 2021-11-02 ENCOUNTER — TRANSCRIBE ORDERS (OUTPATIENT)
Dept: SURGERY | Facility: SURGERY CENTER | Age: 76
End: 2021-11-02

## 2021-11-02 ENCOUNTER — PREP FOR SURGERY (OUTPATIENT)
Dept: SURGERY | Facility: SURGERY CENTER | Age: 76
End: 2021-11-02

## 2021-11-02 ENCOUNTER — OFFICE VISIT (OUTPATIENT)
Dept: PAIN MEDICINE | Facility: CLINIC | Age: 76
End: 2021-11-02

## 2021-11-02 VITALS
WEIGHT: 315 LBS | TEMPERATURE: 96.8 F | OXYGEN SATURATION: 95 % | DIASTOLIC BLOOD PRESSURE: 68 MMHG | HEART RATE: 67 BPM | RESPIRATION RATE: 20 BRPM | HEIGHT: 74 IN | BODY MASS INDEX: 40.43 KG/M2 | SYSTOLIC BLOOD PRESSURE: 148 MMHG

## 2021-11-02 DIAGNOSIS — M47.812 CERVICAL FACET JOINT SYNDROME: Primary | ICD-10-CM

## 2021-11-02 DIAGNOSIS — M43.06 LUMBAR SPONDYLOLYSIS: ICD-10-CM

## 2021-11-02 DIAGNOSIS — M47.812 CERVICAL FACET JOINT SYNDROME: ICD-10-CM

## 2021-11-02 DIAGNOSIS — G89.29 OTHER CHRONIC PAIN: Primary | ICD-10-CM

## 2021-11-02 PROCEDURE — 99214 OFFICE O/P EST MOD 30 MIN: CPT | Performed by: NURSE PRACTITIONER

## 2021-11-02 RX ORDER — ATORVASTATIN CALCIUM 40 MG/1
1 TABLET, FILM COATED ORAL DAILY
COMMUNITY
End: 2021-11-19

## 2021-11-02 RX ORDER — ASPIRIN 81 MG/81MG
1 CAPSULE ORAL DAILY
COMMUNITY

## 2021-11-02 RX ORDER — GABAPENTIN 300 MG/1
300 CAPSULE ORAL NIGHTLY
Qty: 30 CAPSULE | Refills: 3 | Status: SHIPPED | OUTPATIENT
Start: 2021-11-02 | End: 2022-04-06

## 2021-11-02 RX ORDER — GABAPENTIN 300 MG/1
1 CAPSULE ORAL
COMMUNITY
End: 2021-11-19

## 2021-11-02 RX ORDER — SODIUM CHLORIDE 0.9 % (FLUSH) 0.9 %
10 SYRINGE (ML) INJECTION EVERY 12 HOURS SCHEDULED
Status: CANCELLED | OUTPATIENT
Start: 2021-11-02

## 2021-11-02 RX ORDER — SODIUM CHLORIDE 0.9 % (FLUSH) 0.9 %
10 SYRINGE (ML) INJECTION AS NEEDED
Status: CANCELLED | OUTPATIENT
Start: 2021-11-02

## 2021-11-02 RX ORDER — TAMSULOSIN HYDROCHLORIDE 0.4 MG/1
0.4 CAPSULE ORAL DAILY
COMMUNITY
End: 2021-11-19

## 2021-11-02 NOTE — PROGRESS NOTES
"CHIEF COMPLAINT  F/u neck pain. Pt sts pain is the same, however would like to discuss neck injections.     Subjective   James Loaiza is a 76 y.o. male  who presents for follow-up.  He has a history of chronic back and neck pain. Reports his pain is unchanged since last evaluation.    Had an issue with cellulitis on RLE. Went to wound care and is \"healed up.\"    Complains of pain in his neck and back. Today his pain is 2/10VAS. His primary complaint is his neck today.  Describes the pain as intermittent throbbing. Pain increases with certain movements, activity, pressure; pain decreases with medication, rest and procedures. Continues with GAbapentin 300 mg nightly. Denies any side effects.  ADL's by self. Denies any bowel or bladder changes.     Patient remained masked during entire encounter. No cough present. I donned a mask and eye protection throughout entire visit. Prior to donning mask and eye protection, hand hygiene was performed, as well as when it was doffed.  I was closer than 6 feet, but not for an extended period of time. No obvious exposure to any bodily fluids.    Back Pain  This is a recurrent problem. The current episode started more than 1 month ago. The problem occurs constantly. Progression since onset: improved since last office visit. The pain is present in the lumbar spine and sacro-iliac. The quality of the pain is described as aching. The pain does not radiate. The pain is at a severity of 1/10. The pain is mild. Worse during: frequent urination at night which increases his pain--getting up and out of bed. The symptoms are aggravated by standing and twisting. Stiffness is present all day. Pertinent negatives include no abdominal pain, bladder incontinence, bowel incontinence, chest pain, dysuria, fever, headaches, numbness or weakness. Risk factors include lack of exercise, obesity and recent trauma. Treatments tried: lumbar RFL, OTC IBU.   Neck Pain   This is a new problem. The current " episode started more than 1 month ago. The problem has been gradually worsening (worse since last evaluation). The pain is associated with nothing. The pain is present in the occipital region. The pain is at a severity of 2/10. The pain is severe. Pertinent negatives include no chest pain, fever, headaches, numbness or weakness.       Procedure List:  -- 3-2-2021-- Bilateral C2-C3 RFL   --1-22-21-- bilateral OCNB  -- 10-23-20-- bilateral OCNB and bilateral L2-L5 MBB  -- 9-21-20-- bilateral OCNB  -- 9-9-20-- ELENITA (DR ALLYSON RIVERA)  -- 6-22-18-- bilateral L2-L5 RFL- 75% long term relief    Remains on Eliquis.  Cannot take NSAIDS.     PEG Assessment   What number best describes your pain on average in the past week?2  What number best describes how, during the past week, pain has interfered with your enjoyment of life?2  What number best describes how, during the past week, pain has interfered with your general activity?  2    The following portions of the patient's history were reviewed and updated as appropriate: allergies, current medications, past family history, past medical history, past social history, past surgical history and problem list.    Review of Systems   Constitutional: Negative for activity change, fatigue and fever.   HENT: Negative for congestion.    Eyes: Negative for visual disturbance.   Respiratory: Positive for shortness of breath (occ hx copd). Negative for cough.    Cardiovascular: Negative for chest pain.   Gastrointestinal: Negative for abdominal pain, bowel incontinence, constipation and diarrhea.   Endocrine: Negative for polyuria.   Genitourinary: Negative for bladder incontinence, difficulty urinating and dysuria.   Musculoskeletal: Positive for back pain, neck pain and neck stiffness.   Allergic/Immunologic: Negative for immunocompromised state.   Neurological: Negative for dizziness, weakness, light-headedness, numbness and headaches.   Psychiatric/Behavioral: Negative for agitation,  "sleep disturbance and suicidal ideas. The patient is not nervous/anxious.      I have reviewed and confirmed the accuracy of the ROS as documented by the MA/LPN/RN YANIRA Stokes    Vitals:    11/02/21 0829   BP: 148/68   Pulse: 67   Resp: 20   Temp: 96.8 °F (36 °C)   SpO2: 95%   Weight: (!) 153 kg (338 lb)   Height: 188 cm (74\")   PainSc:   2   PainLoc: Neck         Objective   Physical Exam  Vitals and nursing note reviewed.   Constitutional:       Appearance: Normal appearance. He is well-developed.   HENT:      Head: Normocephalic and atraumatic.   Eyes:      General: Lids are normal.   Neck:      Comments: Exquisite tenderness of bilateral occiput  Abdominal:      General: Abdomen is protuberant.   Musculoskeletal:      Right shoulder: Tenderness present. Decreased range of motion.      Left shoulder: Tenderness present. Decreased range of motion.      Cervical back: Pain with movement, spinous process tenderness and muscular tenderness present. Decreased range of motion.      Lumbar back: Tenderness present. Decreased range of motion.   Skin:     General: Skin is warm and dry.   Neurological:      Mental Status: He is alert.      Gait: Gait normal.      Deep Tendon Reflexes:      Reflex Scores:       Bicep reflexes are 1+ on the right side and 1+ on the left side.       Brachioradialis reflexes are 1+ on the right side and 1+ on the left side.  Psychiatric:         Speech: Speech normal.         Behavior: Behavior normal. Behavior is cooperative.         Assessment/Plan   Diagnoses and all orders for this visit:    1. Other chronic pain (Primary)    2. Cervical facet joint syndrome    3. Lumbar spondylolysis    Other orders  -     gabapentin (NEURONTIN) 300 MG capsule; Take 1 capsule by mouth Every Night.  Dispense: 30 capsule; Refill: 3      --- Refill gabapentin.  --- Repeat bilateral C2-C3 RFL. Stop Eliquis 3 days prior. Reviewed the procedure at length with the patient.  Included in the review was " "expectations, complications, risk and benefits.The procedure was described in detail and the risks, benefits and alternatives were discussed with the patient (including but not limited to: bleeding, infection, nerve damage, worsening of pain, inability to perform injection, paralysis, seizures, coma, no pain relief and death) who agreed to proceed.  Discussed the potential for sedation if warranted/wanted.  The procedure will plan to be performed at [facility] with fluoroscopic guidance(unless ultrasound is indicated) and could potentially have steroids and contrast dye used. Questions were answered and in a way the patient could understand.  Patient verbalized understanding and wishes to proceed.  This intervention will be ordered.  Discussed with patient that all procedures are part of a multimodal plan of care and include either formal PT or a home exercise program.  Patient has no evidence of coagulopathy or current infection.  --- Follow-up after procedure or sooner if needed    -------  Education about Medial Branch Blockade and RF Therapy:    This medial branch blockade (MBB) suggested is intended for diagnostic purposes, with the intent of offering the patient Radiofrequency thermal rhizotomy (RF) if the MBB is diagnostically effective.  The diagnostic blockade is necessary to determine the likelihood that RF therapy could be efficacious in providing long term relief to the patient.    Medial branches are sensory nerve branches that connect to a facet joint and transmit sensations & pain signals from that joint.  Facet is a term for the type of joints found in the spine.  Medial branches are the nerves that go to a facet, and therefore are also sometimes called \"facet joint nerves\" (FJNs).      In a medial branch blockade procedure, xray fluoroscopy is used to verify the locations of the outside of the joint lines which are being targeted.  Under xray guidance, needles are placed to " these areas.  Contrast dye is injected to confirm proper placement, with dye flowing over the joint area, and to ensure that the dye does not flow into unintended areas such as a vein.  When this is confirmed, local anesthetic is injected to block the medial branch at that joint level.      If MBBs are diagnostically successful in blocking pain, then the patient is most likely a great candidate for Radiofrequency of those facet joint nerves.  In the RF procedure, needles are placed to the joint lines in the same fashion, and after testing, the needle tips are heated to thermally treat the nerves, blocking the nerves by in essence damaging the nerves with the heat treatment.       Medically, a successful RF procedure should provide a patient with 50% pain relief or more for at least 6 months.  Clinical experience suggests that successful patients receive relief more in the range of 12 months on average.  We also discussed that a fortunate minority of patients receive therapeutic success from the MBB, and may not require RF ablation.  If a patient receives more than 8 weeks of relief from MBB, then occasional repeat MBB for therapeutic purposes is a very reasonable alternative therapy.  This course of therapy is consistent with our LCDs according to our CMS  in the area, and therefore other insurance providers should follow accordingly.  We will monitor our patients to screen for these therapeutic responders and will offer RF therapy only when necessary.        We discussed that MBB & RF are not without risks.  Guidelines regarding anticoagulant use & neuraxial procedures will be respected.  Patients that are ill or otherwise may be at risk for sepsis will not have their spines accessed by neuraxial injections of any type.  This patient will not be offered these therapies if there is an increased risk.   We discussed that there is a risk of postprocedural pain and also a risk of worsening of clinical  picture with these procedures as with any neuraxial procedure.    -------      --------    Anticoagulation risks for procedures....   - there are risks to discontinuation of anticoagulants for neuraxial procedures and surgery.  Risks include but are not limited to deep vein thromboses, pulmonary emboli, myocardial infarction, and stroke    - Neuraxial access with a needle is relatively contraindicated while anticoagulated because of the risk of hemorrhage and/or epidural hematoma, which can be a neurosurgical emergency to evacuate bleeding.  Left unchecked, bleeding can cause nerve damage leading to paralysis and/or death.  --------             DILAN REPORT  As part of the patient's treatment plan, I am prescribing controlled substances. The patient has been made aware of appropriate use of such medications, including potential risk of somnolence, limited ability to drive and/or work safely, and the potential for dependence or overdose. It has also bee made clear that these medications are for use by this patient only, without concomitant use of alcohol or other substances unless prescribed.     Patient has completed prescribing agreement detailing terms of continued prescribing of controlled substances, including monitoring DILAN reports, urine drug screening, and pill counts if necessary. The patient is aware that inappropriate use will results in cessation of prescribing such medications.    As the clinician, I personally reviewed the DILAN from 11-2-21 while the patient was in the office today.    History and physical exam exhibit continued safe and appropriate use of controlled substances.       Dictated utilizing Dragon dictation.

## 2021-11-03 ENCOUNTER — TRANSCRIBE ORDERS (OUTPATIENT)
Dept: SURGERY | Facility: SURGERY CENTER | Age: 76
End: 2021-11-03

## 2021-11-03 DIAGNOSIS — Z41.9 SURGERY, ELECTIVE: Primary | ICD-10-CM

## 2021-11-18 ENCOUNTER — APPOINTMENT (OUTPATIENT)
Dept: GENERAL RADIOLOGY | Facility: SURGERY CENTER | Age: 76
End: 2021-11-18

## 2021-11-19 ENCOUNTER — OFFICE VISIT (OUTPATIENT)
Dept: FAMILY MEDICINE CLINIC | Facility: CLINIC | Age: 76
End: 2021-11-19

## 2021-11-19 VITALS
HEIGHT: 74 IN | DIASTOLIC BLOOD PRESSURE: 88 MMHG | WEIGHT: 315 LBS | BODY MASS INDEX: 40.43 KG/M2 | SYSTOLIC BLOOD PRESSURE: 136 MMHG | RESPIRATION RATE: 16 BRPM

## 2021-11-19 DIAGNOSIS — R07.89 CHEST DISCOMFORT: Primary | ICD-10-CM

## 2021-11-19 DIAGNOSIS — K21.9 GASTROESOPHAGEAL REFLUX DISEASE WITHOUT ESOPHAGITIS: Chronic | ICD-10-CM

## 2021-11-19 DIAGNOSIS — M54.2 CHRONIC NECK PAIN: ICD-10-CM

## 2021-11-19 DIAGNOSIS — G89.29 CHRONIC NECK PAIN: ICD-10-CM

## 2021-11-19 PROBLEM — E83.51 HYPOCALCEMIA: Status: ACTIVE | Noted: 2021-10-15

## 2021-11-19 PROBLEM — I51.89 DIASTOLIC DYSFUNCTION: Status: ACTIVE | Noted: 2021-10-15

## 2021-11-19 PROBLEM — N14.0 ANALGESIC NEPHROPATHY: Status: ACTIVE | Noted: 2021-10-15

## 2021-11-19 PROBLEM — D64.9 ANEMIA: Status: ACTIVE | Noted: 2021-10-15

## 2021-11-19 PROCEDURE — 99214 OFFICE O/P EST MOD 30 MIN: CPT | Performed by: INTERNAL MEDICINE

## 2021-11-19 PROCEDURE — 93000 ELECTROCARDIOGRAM COMPLETE: CPT | Performed by: INTERNAL MEDICINE

## 2021-11-19 RX ORDER — PANTOPRAZOLE SODIUM 20 MG/1
20 TABLET, DELAYED RELEASE ORAL DAILY
Qty: 30 TABLET | Refills: 1 | Status: SHIPPED | OUTPATIENT
Start: 2021-11-19 | End: 2021-12-22 | Stop reason: SDUPTHER

## 2021-11-19 RX ORDER — RAMIPRIL 10 MG/1
10 CAPSULE ORAL DAILY
Qty: 90 CAPSULE | Refills: 1 | Status: SHIPPED | OUTPATIENT
Start: 2021-11-19 | End: 2022-06-27

## 2021-11-19 NOTE — PROGRESS NOTES
11/19/2021    CC: Gas (relieved by belching.  started about 3 wks ago)  .        HPI  Heartburn  He complains of belching, early satiety and heartburn. This is a recurrent problem. The problem occurs occasionally. The problem has been unchanged. The heartburn duration is several minutes. The heartburn does not wake him from sleep. The heartburn does not limit his activity. The heartburn doesn't change with position. Risk factors include obesity.        Subjective   James Loaiza is a 76 y.o. male.      The following portions of the patient's history were reviewed and updated as appropriate: allergies, current medications, past family history, past medical history, past social history, past surgical history and problem list.    Problem List  Patient Active Problem List   Diagnosis   • Lumbar herniated disc L3-L5 3/16/16 Open MRI   • Abnormal electrocardiogram   • Abnormal weight gain   • Aortic valve insufficiency   • Coronary arteriosclerosis in native artery   • Benign essential hypertension ([place] 1999)   • Edema   • Dyspnea on exertion   • Fatigue   • Left ventricular hypertrophy   • Obstructive sleep apnea syndrome   • Arthritis of left hip   • Hx of pulmonary embolus 7/15   • Morbid obesity with BMI of 45.0-49.9, adult (HCC)   • Intermittent dysphagia   • Chronic kidney disease ([place])   • Hyperlipidemia 1999   • BPH (benign prostatic hypertrophy)   • Left cervical radiculopathy   • Rotator cuff tear, left   • Cardiomegaly   • Plantar fasciitis   • Hypogonadism male   • Vitamin D deficiency disease   • Renal cyst, left   • Preventative health care   • Medication management   • Chronic low back pain   • Lumbar spondylolysis   • Headache   • Other chronic pain   • Pulmonary embolus (HCC)   • Chronic obstructive pulmonary disease (HCC)   • Osteoarthritis of right shoulder   • Chronic anticoagulation   • Cervical stenosis of spine   • Bilateral occipital neuralgia   • Shoulder pain   • Cervical facet joint  "syndrome   • Open wound of right lower leg   • REJI (acute kidney injury) ([facility])   • Constipation   • Venous insufficiency   • Analgesic nephropathy   • Anemia   • Diastolic dysfunction   • Hypocalcemia       Past Medical History  Past Medical History:   Diagnosis Date   • Abnormal EKG     referring to cardiology   • Anxiety    • Arthritis    • Back pain     worsening   • BPH (benign prostatic hyperplasia)     BPH/Nocturia/ Urinary Frequency   • Breast nodule     right   • Cardiomegaly     Cardiomegaly/ SOB with exertion   • Circulation problem    • Constipation    • COPD (chronic obstructive pulmonary disease) ([facility])    • Deviated septum    • DJD (degenerative joint disease)     DJD Knees   • DVT (deep venous thrombosis) ([facility])    • Dysphagia     solids and liquids - resolved with normal studies   • Encounter for annual health examination 11/14/2013    Annual Health Assessment   • Enlarged prostate    • Fatigue     Extreme fatigue   • Hyperlipidemia 1999   • Hypertension 1999    followed Dr. Marcelo   • Hypogonadism male    • Left hip pain     and DJD   • Left leg pain    • Low back pain    • Moist mucous membranes of ear, nose, and throat     \"Mucous in throat\"   • Morbid obesity ([facility])     (BMI-41.2za)   • Muscle cramping    • Muscle spasm     Muscle spasms   • Neck arthritis    • Neck muscle spasm     Neck muscle spasm/Cervical strain   • Neck pain    • Obesity    • Plantar fasciitis    • Prostatitis     recurrent   • Psoriasis    • Pulmonary embolus ([facility]) 01/2019    on Xarelltoypseerlipidemia   • Radiculopathy     Radiculopathy / Neuropathy left ar   • Renal insufficiency     followed by Dr. Mendes   • Seborrhea    • Shortness of breath    • Sleep apnea     Obstructive Sleep apnea worsening; uses CPAP   • Urinary frequency    • Vascular disorder    • Vertigo    • Weight gain    • Wellness examination 10/23/2014    Annual Wellness Visit       Surgical History  Past Surgical History:   Procedure Laterality Date   • " APPENDECTOMY  1965   • CARDIAC CATHETERIZATION  07/29/2010    [facility]   • COLONOSCOPY  01/05/2010   • COLONOSCOPY  10/01/2001   • NASAL SEPTUM SURGERY     • NOSE SURGERY  1999   • RADIOFREQUENCY ABLATION Bilateral 3/2/2021    Procedure: RADIOFREQUENCY ABLATION NERVES---bilateral cervical 2-cervical3;  Surgeon: Edu Tineo MD;  Location: [facility] MAIN OR;  Service: Pain Management;  Laterality: Bilateral;   • TURP / TRANSURETHRAL INCISION / DRAINAGE PROSTATE  10/23/2015    Michael Castro       Family History  Family History   Problem Relation Age of Onset   • Arthritis Mother    • Heart disease Mother    • Hypertension Mother    • Heart attack Father    • Heart disease Father    • Hypertension Father    • Arthritis Sister    • Multiple sclerosis Sister    • Alcohol abuse Brother    • Diabetes Son    • Malig Hyperthermia Neg Hx        Social History  Social History    Tobacco Use      Smoking status: Never Smoker      Smokeless tobacco: Never Used       Is the Patient a current tobacco user? No    Allergies  No Known Allergies    Current Medications    Current Outpatient Medications:   •  apixaban (ELIQUIS) 5 MG tablet tablet, Take 5 mg by mouth Every 12 (Twelve) Hours., Disp: , Rfl:   •  Aspirin ([place]) 81 MG capsule, Take 1 tablet by mouth., Disp: , Rfl:   •  aspirin 81 MG EC tablet, Take 81 mg by mouth Daily., Disp: , Rfl:   •  atorvastatin (LIPITOR) 40 MG tablet, Take 1 tablet by mouth Daily., Disp: 90 tablet, Rfl: 1  •  furosemide (LASIX) 40 MG tablet, Take 40 mg by mouth Daily., Disp: , Rfl:   •  gabapentin (NEURONTIN) 300 MG capsule, Take 1 capsule by mouth Every Night., Disp: 30 capsule, Rfl: 3  •  NIFEdipine XL (PROCARDIA XL) 60 MG 24 hr tablet, Take 1 tablet by mouth Daily., Disp: 90 tablet, Rfl: 2  •  ramipril (ALTACE) 10 MG capsule, Take 1 capsule by mouth Daily., Disp: 90 capsule, Rfl: 1  •  tamsulosin (FLOMAX) 0.4 MG capsule 24 hr capsule, Take 1 capsule by mouth  Every Night., Disp: , Rfl:   •  tiotropium bromide-olodaterol (STIOLTO RESPIMAT) 2.5-2.5 MCG/ACT aerosol solution inhaler, Inhale 2 puffs Daily., Disp: , Rfl:   •  pantoprazole (Protonix) 20 MG EC tablet, Take 1 tablet by mouth Daily., Disp: 30 tablet, Rfl: 1     Review of System  Review of Systems   Constitutional: Negative.    Eyes: Negative.    Respiratory: Negative.    Cardiovascular: Negative.    Endocrine: Negative.      I have reviewed and confirmed the accuracy of the ROS as documented by the MA/LPN/RN Magdiel Bowens MD    Vitals:    11/19/21 1007   BP: 136/88   Resp: 16     Body mass index is 43.4 kg/m².    Objective     Physical Exam  Physical Exam  Constitutional:       Appearance: Normal appearance. He is obese.   HENT:      Head: Normocephalic.   Cardiovascular:      Rate and Rhythm: Normal rate and regular rhythm.      Heart sounds: Normal heart sounds.   Pulmonary:      Effort: Pulmonary effort is normal.      Breath sounds: Normal breath sounds.   Musculoskeletal:      Cervical back: Normal range of motion and neck supple.         Assessment/Plan      This 76-year-old patient presents at this time with complaint of 3 weeks of chest discomfort this relieved when he burps.  He relates he has had no sweating associated with this.  Is been present for about 3 weeks.  He relates he usually awakens around 430 to 5:30 in the morning with this discomfort.  He denies any change in his diet.  He denies shortness of breath or overt chest discomfort.  He relates that it feels like something is going back into his throat.    Patient's EKG showed normal sinus rhythm with a sinus rate of 60 nonspecific ST-T wave changes otherwise normal EKG was seen no comparative EKG could be found.    Patient relates that several of his family members have the same discomfort.    He also relates he has had a recurrence of the neck pain that he has had in the past.  He relates that he is seen by pain management in particular  Dr. Tineo and he was scheduled to have an injection done last week but the patient canceled a few days after that the pain reoccurred so he has a follow-up appointment with pain management on Monday.          Diagnoses and all orders for this visit:    1. Chest discomfort (Primary)  Comments:  New onset x3 weeks  Orders:  -     ECG 12 Lead  -     pantoprazole (Protonix) 20 MG EC tablet; Take 1 tablet by mouth Daily.  Dispense: 30 tablet; Refill: 1    2. Gastroesophageal reflux disease without esophagitis    3. Chronic neck pain    Other orders  -     ramipril (ALTACE) 10 MG capsule; Take 1 capsule by mouth Daily.  Dispense: 90 capsule; Refill: 1      Plan:  1.)  Protonix 20 mg 1 tab p.o. daily  2.)  Follow-up in the next several months.  3.)  The patient is urged to call us if the is no resolution of the symptoms in the next several days.       Magdiel Bowens MD  11/19/2021

## 2021-11-29 ENCOUNTER — APPOINTMENT (OUTPATIENT)
Dept: GENERAL RADIOLOGY | Facility: SURGERY CENTER | Age: 76
End: 2021-11-29

## 2021-12-02 ENCOUNTER — HOSPITAL ENCOUNTER (OUTPATIENT)
Dept: GENERAL RADIOLOGY | Facility: SURGERY CENTER | Age: 76
Setting detail: HOSPITAL OUTPATIENT SURGERY
End: 2021-12-02

## 2021-12-02 ENCOUNTER — HOSPITAL ENCOUNTER (OUTPATIENT)
Facility: SURGERY CENTER | Age: 76
Setting detail: HOSPITAL OUTPATIENT SURGERY
Discharge: HOME | End: 2021-12-02
Attending: ANESTHESIOLOGY | Admitting: ANESTHESIOLOGY

## 2021-12-02 VITALS
TEMPERATURE: 98.9 F | WEIGHT: 315 LBS | BODY MASS INDEX: 40.43 KG/M2 | OXYGEN SATURATION: 96 % | HEIGHT: 74 IN | DIASTOLIC BLOOD PRESSURE: 83 MMHG | RESPIRATION RATE: 16 BRPM | HEART RATE: 65 BPM | SYSTOLIC BLOOD PRESSURE: 155 MMHG

## 2021-12-02 DIAGNOSIS — Z41.9 SURGERY, ELECTIVE: ICD-10-CM

## 2021-12-02 DIAGNOSIS — M47.812 CERVICAL FACET JOINT SYNDROME: ICD-10-CM

## 2021-12-02 PROCEDURE — 64633 DESTROY CERV/THOR FACET JNT: CPT | Performed by: ANESTHESIOLOGY

## 2021-12-02 PROCEDURE — 25010000002 FENTANYL CITRATE (PF) 50 MCG/ML SOLUTION: Performed by: ANESTHESIOLOGY

## 2021-12-02 PROCEDURE — 25010000002 MIDAZOLAM PER 1 MG: Performed by: ANESTHESIOLOGY

## 2021-12-02 PROCEDURE — 3E0T3TZ INTRODUCTION OF DESTRUCTIVE AGENT INTO PERIPHERAL NERVES AND PLEXI, PERCUTANEOUS APPROACH: ICD-10-PCS | Performed by: ANESTHESIOLOGY

## 2021-12-02 PROCEDURE — 76000 FLUOROSCOPY <1 HR PHYS/QHP: CPT

## 2021-12-02 PROCEDURE — 99152 MOD SED SAME PHYS/QHP 5/>YRS: CPT | Performed by: ANESTHESIOLOGY

## 2021-12-02 RX ORDER — SODIUM CHLORIDE 0.9 % (FLUSH) 0.9 %
10 SYRINGE (ML) INJECTION AS NEEDED
Status: DISCONTINUED | OUTPATIENT
Start: 2021-12-02 | End: 2021-12-02 | Stop reason: HOSPADM

## 2021-12-02 RX ORDER — MIDAZOLAM HYDROCHLORIDE 1 MG/ML
INJECTION INTRAMUSCULAR; INTRAVENOUS AS NEEDED
Status: DISCONTINUED | OUTPATIENT
Start: 2021-12-02 | End: 2021-12-02 | Stop reason: HOSPADM

## 2021-12-02 RX ORDER — SODIUM CHLORIDE, SODIUM LACTATE, POTASSIUM CHLORIDE, CALCIUM CHLORIDE 600; 310; 30; 20 MG/100ML; MG/100ML; MG/100ML; MG/100ML
20 INJECTION, SOLUTION INTRAVENOUS CONTINUOUS
Status: DISCONTINUED | OUTPATIENT
Start: 2021-12-02 | End: 2021-12-02 | Stop reason: HOSPADM

## 2021-12-02 RX ORDER — FENTANYL CITRATE 50 UG/ML
INJECTION, SOLUTION INTRAMUSCULAR; INTRAVENOUS AS NEEDED
Status: DISCONTINUED | OUTPATIENT
Start: 2021-12-02 | End: 2021-12-02 | Stop reason: HOSPADM

## 2021-12-02 RX ORDER — SODIUM CHLORIDE 0.9 % (FLUSH) 0.9 %
10 SYRINGE (ML) INJECTION EVERY 12 HOURS SCHEDULED
Status: DISCONTINUED | OUTPATIENT
Start: 2021-12-02 | End: 2021-12-02 | Stop reason: HOSPADM

## 2021-12-02 RX ADMIN — SODIUM CHLORIDE, POTASSIUM CHLORIDE, SODIUM LACTATE AND CALCIUM CHLORIDE 20 ML/HR: 600; 310; 30; 20 INJECTION, SOLUTION INTRAVENOUS at 15:02

## 2021-12-02 NOTE — DISCHARGE INSTRUCTIONS
[facility] - Post-procedure Instructions          --  While there are no absolute restrictions, it is recommended that you do not perform strenuous activity today. In the morning, you may resume your level of activity as before your block.    --  If you have a band-aid at your injection site, please remove it later today. Observe the area for any redness, swelling, pus-like drainage, or a temperature over 101°. If any of these symptoms occur, please call your doctor at [phone]. If after office hours, leave a message and the on-call provider will return your call.    --  Ice may be applied to your injection site. It is recommended you avoid direct heat (heating pad; hot tub) for 1-2 days.    --  Call [facility] at [phone] if you experience persistent headache, persistent bleeding from the injection site, or severe pain not relieved by heat or oral medication.    --  Do not make important decisions today.    --  Due to the effects of the block and/or the I.V. Sedation, DO NOT drive or operate hazardous machinery for 12 hours.  Local anesthetics may cause numbness after procedure and precautions must be taken with regards to operating equipment as well as with walking, even if ambulating with assistance of another person or with an assistive device.    --  Do not drink alcohol for 12 hours.    -- You may return to work tomorrow, or as directed by your referring doctor.    --  Occasionally you may notice a slight increase in your pain after the procedure. This should start to improve within the next 24-48 hours. Radiofrequency ablation procedure pain may last 3-4 weeks.    --  It may take as long as 3-4 days before you notice a gradual improvement in your pain and/or other symptoms.    -- You may continue to take your prescribed pain medication as needed.    --  Some normal possible side effects of steroid use could include fluid retention, increased blood sugar, dull headache,  increased sweating, increased appetite, mood swings and flushing.    --  Diabetics are recommended to watch their blood glucose level closely for 24-48 hours after the injection.    --  Must stay in PACU for 20 min upon arrival and prove no leg weakness before being discharged.    --  IN THE EVENT OF A LIFE THREATENING EMERGENCY, (CHEST PAIN, BREATHING DIFFICULTIES, PARALYSIS…) YOU SHOULD GO TO YOUR NEAREST EMERGENCY ROOM.    --  You should be contacted by our office within 2-3 days to schedule follow up or next appointment date.  If not contacted within 7 days, please call the office at [phone]

## 2021-12-02 NOTE — OP NOTE
Cervical Facet Nerve (Medial Branch) Radiofrequency Lesioning  [facility]      PREOPERATIVE DIAGNOSIS:  Cervical spondylosis without myelopathy   POSTOPERATIVE DIAGNOSIS:  Same as preoperative diagnosis    PROCEDURE:    Cervical Facet Nerve (medial branch) RF, with Fluoroscopy:      LEVELS: bilateral  C2 and C3    PRE-PROCEDURE DISCUSSION WITH PATIENT:    Risks and complications were discussed with the patient prior to starting the procedure and informed consent was obtained.  We discussed various topics, including but not limited to bleeding, infection, injury, nerve injury, paralysis, coma, death, postprocedural soreness or painful flare, worsening of clinical picture, lack of pain relief.  We discussed the previous positive diagnostic nature of medial branch blockades.      SURGEON:  Edu Tineo MD    REASON FOR PROCEDURE:    The patient presents with chronic axial neck pain. The patient underwent 2 bilateral cervical medial branch blocks with diagnostically positive relief. Given the patient’s significant pain relief, it is diagnostic that we have likely found the source of the patient’s pain; therefore, radiofrequency ablation of those nerves has been indicated for therapeutic pain relief.    SEDATION:  Versed 2mg & Fentanyl 100 mcg IV  TIME OF PROCEDURE:  After administration of the IV sedative, the intraoperative procedure time was 17 minutes.      ANESTHETIC:   Lidocaine 1%  STEROID:   NONE  TOTAL VOLUME OF SOLUTION:  4 ml      DESCRIPTON OF PROCEDURE:  After obtaining informed consent, the patient was placed in the prone position. IV access was obtained.  EKG, blood pressure, and pulse oximeter were monitored and any & all sedation was administered by an RN under my direct guidance.  The cervical area was prepped with Chloraprep and draped with sterile barrier.     Under fluoroscopic guidance the waists of the above mentioned vertebra were identified and marked at the lateral margin  of the vertebrae on the AP fluoroscopic view.  Skin and subcutaneous tissue were then anesthetized with 1% lidocaine 1mL at each point.  Then RF cannula needles were sequentially introduced under fluoroscopic guidance to the waists of these vertebrae contacting periosteum on the lateral edge of the vertebra on the AP fluroscopic view. After confirming the position of the needle under fluoroscopic PA and lateral views, confirming the needle position in the center of the trapezoid at each level, and confirming negative aspiration of blood and CSF, testing was initiated.  There was a lack of radicular stimulation on each needle at 3v and 2Hz.      Then, in each needle, 1mL of the aforementioned local anesthetic was instilled.  After 2 minutes had elapsed, Radiofrequency thermal lesioning was initiated for 2 minutes at 80 degrees Celsius.      Needles were removed intact from all levels.  Vitals were stable throughout.       ESTIMATED BLOOD LOSS:  minimal  SPECIMENS:  None    COMPLICATIONS:    No complications were noted., There was no indication of vascular uptake on live injection of contrast dye., There was no indication of intrathecal uptake on live injection of contrast dye., There was not any evidence of dural puncture.   and The patient did not have any signs of postprocedure numbness nor weakness.    TOLERANCE & DISCHARGE CONDITION:    The patient tolerated the procedure well.  The patient was transported to the recovery area without difficulties.  The patient was discharged to home under the care of family in stable and satisfactory condition.  The patient did notice improvement in pain on extension and rotation of the cervical spine.      PLAN OF CARE:  1. The patient was given our standard instruction sheet.  2. We discussed that Cervical Medial Branch Blockade is a diagnostic procedure in consideration for radiofrequency ablation if two diagnostic procedures proved to be positive for significant benefit.  If  sustained relief of six to eight weeks is obtained, then an alternative plan could be therapeutic cervical medial branch blocks on an as-needed basis.  3. The patient is asked to keep a pain log hourly for 8 hours postoperatively today.  4. The patient will Return to clinic 4-6 wks.  5. The patient will resume all medications as per the medication reconciliation sheet.

## 2021-12-22 ENCOUNTER — OFFICE VISIT (OUTPATIENT)
Dept: FAMILY MEDICINE CLINIC | Facility: CLINIC | Age: 76
End: 2021-12-22

## 2021-12-22 VITALS
WEIGHT: 315 LBS | HEIGHT: 74 IN | BODY MASS INDEX: 40.43 KG/M2 | RESPIRATION RATE: 16 BRPM | SYSTOLIC BLOOD PRESSURE: 150 MMHG | DIASTOLIC BLOOD PRESSURE: 80 MMHG

## 2021-12-22 DIAGNOSIS — L02.415 CELLULITIS AND ABSCESS OF RIGHT LOWER EXTREMITY: Primary | ICD-10-CM

## 2021-12-22 DIAGNOSIS — L03.115 CELLULITIS AND ABSCESS OF RIGHT LOWER EXTREMITY: Primary | ICD-10-CM

## 2021-12-22 DIAGNOSIS — K21.9 GASTROESOPHAGEAL REFLUX DISEASE WITHOUT ESOPHAGITIS: ICD-10-CM

## 2021-12-22 DIAGNOSIS — M17.0 PRIMARY OSTEOARTHRITIS OF BOTH KNEES: ICD-10-CM

## 2021-12-22 PROCEDURE — 99213 OFFICE O/P EST LOW 20 MIN: CPT | Performed by: INTERNAL MEDICINE

## 2021-12-22 RX ORDER — PANTOPRAZOLE SODIUM 20 MG/1
20 TABLET, DELAYED RELEASE ORAL DAILY
Qty: 30 TABLET | Refills: 3 | Status: SHIPPED | OUTPATIENT
Start: 2021-12-22 | End: 2022-01-24 | Stop reason: SDUPTHER

## 2021-12-22 NOTE — PROGRESS NOTES
12/22/2021    CC: Cellulitis (f/u) and Knee Pain (bilat.  started in early November...no other issues)  .        HPI  This 76-year-old presents today for follow-up of cellulitis.  He had a stormy course over the past 7 to 8 months but the infection has pretty much abated at this point.  He denies any pain or discomfort and is wearing his support hose although they are very loose.       Subjective   James Loaiza is a 76 y.o. male.      The following portions of the patient's history were reviewed and updated as appropriate: allergies, current medications, past family history, past medical history, past social history, past surgical history and problem list.    Problem List  Patient Active Problem List   Diagnosis   • Lumbar herniated disc L3-L5 3/16/16 Open MRI   • Abnormal electrocardiogram   • Abnormal weight gain   • Aortic valve insufficiency   • Coronary arteriosclerosis in native artery   • Benign essential hypertension ([place] 1999)   • Edema   • Dyspnea on exertion   • Fatigue   • Left ventricular hypertrophy   • Obstructive sleep apnea syndrome   • Arthritis of left hip   • Hx of pulmonary embolus 7/15   • Morbid obesity with BMI of 45.0-49.9, adult ([facility])   • Intermittent dysphagia   • Chronic kidney disease ([place])   • Hyperlipidemia 1999   • BPH (benign prostatic hypertrophy)   • Left cervical radiculopathy   • Rotator cuff tear, left   • Cardiomegaly   • Plantar fasciitis   • Hypogonadism male   • Vitamin D deficiency disease   • Renal cyst, left   • Preventative health care   • Medication management   • Chronic low back pain   • Lumbar spondylolysis   • Headache   • Other chronic pain   • Pulmonary embolus (HCC)   • Chronic obstructive pulmonary disease (HCC)   • Osteoarthritis of right shoulder   • Chronic anticoagulation   • Cervical stenosis of spine   • Bilateral occipital neuralgia   • Shoulder pain   • Cervical facet joint syndrome   • Open wound of right lower leg   • REJI (acute kidney injury)  "([facility])   • Constipation   • Venous insufficiency   • Analgesic nephropathy   • Anemia   • Diastolic dysfunction   • Hypocalcemia       Past Medical History  Past Medical History:   Diagnosis Date   • Abnormal EKG     referring to cardiology   • Anxiety    • Arthritis    • Back pain     worsening   • BPH (benign prostatic hyperplasia)     BPH/Nocturia/ Urinary Frequency   • Breast nodule     right   • Cardiomegaly     Cardiomegaly/ SOB with exertion   • Circulation problem    • Constipation    • COPD (chronic obstructive pulmonary disease) ([facility])    • Deviated septum    • DJD (degenerative joint disease)     DJD Knees   • DVT (deep venous thrombosis) ([facility])    • Dysphagia     solids and liquids - resolved with normal studies   • Encounter for annual health examination 11/14/2013    Annual Health Assessment   • Enlarged prostate    • Fatigue     Extreme fatigue   • Hyperlipidemia 1999   • Hypertension 1999    followed Dr. Marcelo   • Hypogonadism male    • Left hip pain     and DJD   • Left leg pain    • Low back pain    • Moist mucous membranes of ear, nose, and throat     \"Mucous in throat\"   • Morbid obesity ([facility])     (BMI-41.2za)   • Muscle cramping    • Muscle spasm     Muscle spasms   • Neck arthritis    • Neck muscle spasm     Neck muscle spasm/Cervical strain   • Neck pain    • Obesity    • Plantar fasciitis    • Prostatitis     recurrent   • Psoriasis    • Pulmonary embolus ([facility]) 01/2019    on Xarelltoypseerlipidemia   • Radiculopathy     Radiculopathy / Neuropathy left ar   • Renal insufficiency     followed by Dr. Mendes   • Seborrhea    • Shortness of breath    • Sleep apnea     Obstructive Sleep apnea worsening; uses CPAP   • Urinary frequency    • Vascular disorder    • Vertigo    • Weight gain    • Wellness examination 10/23/2014    Annual Wellness Visit       Surgical History  Past Surgical History:   Procedure Laterality Date   • APPENDECTOMY  1965   • CARDIAC CATHETERIZATION  07/29/2010    Fozia De La Paz" Vessel med management   • COLONOSCOPY  01/05/2010   • COLONOSCOPY  10/01/2001   • NASAL SEPTUM SURGERY     • NOSE SURGERY  1999   • RADIOFREQUENCY ABLATION Bilateral 3/2/2021    Procedure: RADIOFREQUENCY ABLATION NERVES---bilateral cervical 2-cervical3;  Surgeon: Edu Tineo MD;  Location: [facility];  Service: Pain Management;  Laterality: Bilateral;   • RADIOFREQUENCY ABLATION Bilateral 12/2/2021    Procedure: RADIOFREQUENCY ABLATION NERVES--bilateral cervical2-3;  Surgeon: Edu Tineo MD;  Location: [facility];  Service: Pain Management;  Laterality: Bilateral;   • TURP / TRANSURETHRAL INCISION / DRAINAGE PROSTATE  10/23/2015    Michael Castro       Family History  Family History   Problem Relation Age of Onset   • Arthritis Mother    • Heart disease Mother    • Hypertension Mother    • Heart attack Father    • Heart disease Father    • Hypertension Father    • Arthritis Sister    • Multiple sclerosis Sister    • Alcohol abuse Brother    • Diabetes Son    • Malig Hyperthermia Neg Hx        Social History  Social History    Tobacco Use      Smoking status: Never Smoker      Smokeless tobacco: Never Used       Is the Patient a current tobacco user? No    Allergies  No Known Allergies    Current Medications    Current Outpatient Medications:   •  apixaban (ELIQUIS) 5 MG tablet tablet, Take 5 mg by mouth Every 12 (Twelve) Hours., Disp: , Rfl:   •  Aspirin ([place]) 81 MG capsule, Take 1 tablet by mouth., Disp: , Rfl:   •  atorvastatin (LIPITOR) 40 MG tablet, Take 1 tablet by mouth Daily., Disp: 90 tablet, Rfl: 1  •  furosemide (LASIX) 40 MG tablet, Take 40 mg by mouth Daily., Disp: , Rfl:   •  gabapentin (NEURONTIN) 300 MG capsule, Take 1 capsule by mouth Every Night., Disp: 30 capsule, Rfl: 3  •  NIFEdipine XL (PROCARDIA XL) 60 MG 24 hr tablet, Take 1 tablet by mouth Daily., Disp: 90 tablet, Rfl: 2  •  pantoprazole (Protonix) 20 MG EC tablet, Take 1 tablet by mouth Daily., Disp: 30 tablet,  Rfl: 3  •  ramipril (ALTACE) 10 MG capsule, Take 1 capsule by mouth Daily., Disp: 90 capsule, Rfl: 1  •  tamsulosin (FLOMAX) 0.4 MG capsule 24 hr capsule, Take 1 capsule by mouth Every Night., Disp: , Rfl:   •  tiotropium bromide-olodaterol (STIOLTO RESPIMAT) 2.5-2.5 MCG/ACT aerosol solution inhaler, Inhale 2 puffs Daily., Disp: , Rfl:      Review of System  Review of Systems   Respiratory: Negative.    Endocrine: Negative.    Skin: Negative.      I have reviewed and confirmed the accuracy of the ROS as documented by the MA/LPN/RN Magdiel Bowens MD    Vitals:    12/22/21 0810   BP: 150/80   Resp: 16     Body mass index is 43.65 kg/m².    Objective     Physical Exam  Physical Exam  Constitutional:       Appearance: Normal appearance. He is obese.   HENT:      Head: Normocephalic.      Mouth/Throat:      Mouth: Mucous membranes are moist.   Eyes:      Extraocular Movements: Extraocular movements intact.   Skin:     General: Skin is warm.      Findings: Rash present.      Comments: Healing cellulitis along the right lower extremity from the pretibial area up to the calf   Neurological:      Mental Status: He is alert.         Assessment/Plan      This 76-year-old patient presents at this time for follow-up of cellulitis.  He has had a stormy course with a cellulitis over the past year but over the past several months has shown a marked improvement.  He is using his support hose as recommended but his support hose today do show need for replacement.  The redness has markedly improved in his right lower extremity and the tenderness has resolved.  There is no weeping from any wounds.    Is also had complaints of reflux times the past 3 weeks but notes that this is because he ran out of his Protonix.  He relates problems taking the Protonix the symptoms are relieved.  We have taken a point to emphasize to him the importance of not reclining within 2 hours of ingesting a meal.    The patient has been bothered with  degenerative joint disease in both knees times several years.  Usage of NSAID's is not favored secondary to his use of Eliquis.  Voltaren gel is favored for now and if more aggressive treatment is needed we will consider referral to orthopedics for evaluation and treatment.        Diagnoses and all orders for this visit:    1. Cellulitis and abscess of right lower extremity (Primary)    2. Primary osteoarthritis of both knees    3. Gastroesophageal reflux disease without esophagitis  -     pantoprazole (Protonix) 20 MG EC tablet; Take 1 tablet by mouth Daily.  Dispense: 30 tablet; Refill: 3      Plan:  1.)  Renew Protonix 20 mg 1 tab p.o. daily  2.)  Voltaren gel 2% OTC to be applied twice daily to his knees for pain.  3.)  Follow-up in about 4 months.  4.)  We encouraged the patient to get new support hose.       Magdiel Bowens MD  12/22/2021

## 2021-12-30 ENCOUNTER — OFFICE VISIT (OUTPATIENT)
Dept: PAIN MEDICINE | Facility: CLINIC | Age: 76
End: 2021-12-30

## 2021-12-30 VITALS
OXYGEN SATURATION: 97 % | SYSTOLIC BLOOD PRESSURE: 155 MMHG | HEIGHT: 74 IN | TEMPERATURE: 96.9 F | RESPIRATION RATE: 20 BRPM | WEIGHT: 315 LBS | DIASTOLIC BLOOD PRESSURE: 83 MMHG | HEART RATE: 64 BPM | BODY MASS INDEX: 40.43 KG/M2

## 2021-12-30 DIAGNOSIS — M54.81 BILATERAL OCCIPITAL NEURALGIA: ICD-10-CM

## 2021-12-30 DIAGNOSIS — G89.29 OTHER CHRONIC PAIN: Primary | ICD-10-CM

## 2021-12-30 DIAGNOSIS — M43.06 LUMBAR SPONDYLOLYSIS: ICD-10-CM

## 2021-12-30 DIAGNOSIS — M47.812 CERVICAL FACET JOINT SYNDROME: ICD-10-CM

## 2021-12-30 PROCEDURE — 99214 OFFICE O/P EST MOD 30 MIN: CPT | Performed by: NURSE PRACTITIONER

## 2021-12-30 NOTE — PROGRESS NOTES
"CHIEF COMPLAINT  F/U PROCEDURE,RADIOFREQUENCY ABLATION NERVES--bilateral cervical2-3.      Pt states RFA was effective , his pain level improved up to 80%.     Subjective   James Loaiza is a 76 y.o. male  who presents to the office for follow-up of procedure.  He completed a bilateral C2-C3 RFL   on  12-2-21 performed by Dr. COOPER for management of NECK PAIN/OCCIPITAL NEURALGIA. Patient reports 80% ONGOING relief from the procedure.     Complains of pain in his low back and neck. Today his pain is 1/10VAS. Describes his pain as intermittent throbbing and aching. Is still having some soreness in right neck after procedure but this is improving. Pain increases with activity, household chores, prolonged position; pain decreases with medication, rest and procedures.  Continues with Gabapentin 300 mg nightly. Denies any side effects.  ADL's by self. Denies any bowel or bladder changes.     Reports his low back pain is still stable since LMBB in October 2020.    Orthopedist is Dr Serrano. Previously seen for shoulder issues. Having increasing knee pain. \"it feels like the bones are rubbing.\" Only hurts with walking and standing.  Does not hurt when sitting or laying.     Patient remained masked during entire encounter. No cough present. I donned a mask and eye protection throughout entire visit. Prior to donning mask and eye protection, hand hygiene was performed, as well as when it was doffed.  I was closer than 6 feet, but not for an extended period of time. No obvious exposure to any bodily fluids.    Back Pain  This is a recurrent problem. The current episode started more than 1 month ago. The problem occurs constantly. Progression since onset: unchanged since last office visit. The pain is present in the lumbar spine and sacro-iliac. The quality of the pain is described as aching. The pain does not radiate. The pain is at a severity of 1/10. The pain is mild. Worse during: frequent urination at night which " increases his pain--getting up and out of bed. The symptoms are aggravated by standing and twisting. Stiffness is present all day. Pertinent negatives include no abdominal pain, bladder incontinence, bowel incontinence, chest pain, dysuria, fever, headaches, numbness or weakness. Risk factors include lack of exercise, obesity and recent trauma. Treatments tried: lumbar RFL, OTC IBU.   Neck Pain   This is a new problem. The current episode started more than 1 month ago. The problem has been gradually worsening (improved since last evaluation). The pain is associated with nothing. The pain is present in the occipital region. The pain is at a severity of 1/10. The pain is severe. Pertinent negatives include no chest pain, fever, headaches, numbness or weakness.      Procedure List:  --12-2-221-- bilateral C2-C3 RFL  -- 3-2-2021-- Bilateral C2-C3 RFL   --1-22-21-- bilateral OCNB  -- 10-23-20-- bilateral OCNB and bilateral L2-L5 MBB  -- 9-21-20-- bilateral OCNB  -- 9-9-20-- ELENITA (DR ALLYSON RIVERA)  -- 6-22-18-- bilateral L2-L5 RFL- 75% long term relief     Remains on Eliquis.  Cannot take NSAIDS.     PEG Assessment   What number best describes your pain on average in the past week?2  What number best describes how, during the past week, pain has interfered with your enjoyment of life?2  What number best describes how, during the past week, pain has interfered with your general activity?  2    The following portions of the patient's history were reviewed and updated as appropriate: allergies, current medications, past family history, past medical history, past social history, past surgical history and problem list.    Review of Systems   Constitutional: Positive for fatigue. Negative for activity change and fever.   HENT: Negative for congestion.    Eyes: Negative for visual disturbance.   Respiratory: Negative for cough and chest tightness.    Cardiovascular: Negative for chest pain.   Gastrointestinal: Negative for  "abdominal pain, bowel incontinence, constipation and diarrhea.   Genitourinary: Negative for bladder incontinence, difficulty urinating and dysuria.   Musculoskeletal: Positive for back pain and neck pain.   Neurological: Negative for dizziness, weakness, light-headedness, numbness and headaches.   Psychiatric/Behavioral: Negative for agitation, sleep disturbance and suicidal ideas. The patient is not nervous/anxious.      I have reviewed and confirmed the accuracy of the ROS as documented by the MA/LPN/RN YANIRA Stokes       Vitals:    12/30/21 1124   BP: 155/83   Pulse: 64   Resp: 20   Temp: 96.9 °F (36.1 °C)   SpO2: 97%   Weight: (!) 154 kg (339 lb 6.4 oz)   Height: 188 cm (74\")   PainSc:   1   PainLoc: Neck     Objective   Physical Exam  Vitals and nursing note reviewed.   Constitutional:       Appearance: He is well-developed.   HENT:      Head: Normocephalic and atraumatic.   Pulmonary:      Effort: Pulmonary effort is normal.   Musculoskeletal:      Cervical back: Muscular tenderness present. No spinous process tenderness. Decreased range of motion.      Lumbar back: Tenderness present.   Neurological:      Mental Status: He is alert.      Gait: Gait abnormal.   Psychiatric:         Speech: Speech normal.         Behavior: Behavior normal.         Thought Content: Thought content normal.         Judgment: Judgment normal.         Assessment/Plan   Diagnoses and all orders for this visit:    1. Other chronic pain (Primary)    2. Cervical facet joint syndrome    3. Lumbar spondylolysis    4. Bilateral occipital neuralgia      --- Continue with gabapentin. Has refills at pharmacy. Will call as needed.  --- repeat lumbar or cervical interventions in future PRN-- currently stable.  --- Will discuss knee pain with Dr Serrano.  --- Follow-up 6 months or sooner if needed.         DILAN GARCIA  As part of the patient's treatment plan, I am prescribing controlled substances. The patient has been made aware " of appropriate use of such medications, including potential risk of somnolence, limited ability to drive and/or work safely, and the potential for dependence or overdose. It has also bee made clear that these medications are for use by this patient only, without concomitant use of alcohol or other substances unless prescribed.     Patient has completed prescribing agreement detailing terms of continued prescribing of controlled substances, including monitoring DILAN reports, urine drug screening, and pill counts if necessary. The patient is aware that inappropriate use will results in cessation of prescribing such medications.    As the clinician, I personally reviewed the DILAN from 12-30-21 while the patient was in the office today.    History and physical exam exhibit continued safe and appropriate use of controlled substances.         Dictated utilizing Dragon dictation.      This document is intended for medical expert use only. Reading of this document by patients and/or patient's family without participating medical staff guidance may result in misinterpretation and unintended morbidity.   Any interpretation of such data is the responsibility of the patient and/or family member responsible for the patient in concert with their primary or specialist providers, not to be left for sources of online searches such as [employer], [employer] or similar queries. Relying on these approaches to knowledge may result in misinterpretation, misguided goals of care and even death should patients or family members try recommendations outside of the realm of professional medical care in a supervised way.

## 2022-01-10 ENCOUNTER — OFFICE VISIT (OUTPATIENT)
Dept: ORTHOPEDIC SURGERY | Facility: CLINIC | Age: 77
End: 2022-01-10

## 2022-01-10 VITALS — TEMPERATURE: 98 F | BODY MASS INDEX: 40.43 KG/M2 | WEIGHT: 315 LBS | HEIGHT: 74 IN

## 2022-01-10 DIAGNOSIS — M25.562 ACUTE BILATERAL KNEE PAIN: Primary | ICD-10-CM

## 2022-01-10 DIAGNOSIS — M17.10 ARTHRITIS OF KNEE: ICD-10-CM

## 2022-01-10 DIAGNOSIS — M25.561 ACUTE BILATERAL KNEE PAIN: Primary | ICD-10-CM

## 2022-01-10 PROCEDURE — 99214 OFFICE O/P EST MOD 30 MIN: CPT | Performed by: ORTHOPAEDIC SURGERY

## 2022-01-10 PROCEDURE — 20610 DRAIN/INJ JOINT/BURSA W/O US: CPT | Performed by: ORTHOPAEDIC SURGERY

## 2022-01-10 PROCEDURE — 73562 X-RAY EXAM OF KNEE 3: CPT | Performed by: ORTHOPAEDIC SURGERY

## 2022-01-10 RX ORDER — TRIAMCINOLONE ACETONIDE 40 MG/ML
80 INJECTION, SUSPENSION INTRA-ARTICULAR; INTRAMUSCULAR
Status: COMPLETED | OUTPATIENT
Start: 2022-01-10 | End: 2022-01-10

## 2022-01-10 RX ADMIN — TRIAMCINOLONE ACETONIDE 80 MG: 40 INJECTION, SUSPENSION INTRA-ARTICULAR; INTRAMUSCULAR at 14:40

## 2022-01-24 DIAGNOSIS — K21.9 GASTROESOPHAGEAL REFLUX DISEASE WITHOUT ESOPHAGITIS: ICD-10-CM

## 2022-01-24 RX ORDER — PANTOPRAZOLE SODIUM 20 MG/1
20 TABLET, DELAYED RELEASE ORAL DAILY
Qty: 30 TABLET | Refills: 3 | Status: SHIPPED | OUTPATIENT
Start: 2022-01-24 | End: 2022-06-20

## 2022-01-24 NOTE — TELEPHONE ENCOUNTER
Caller: James Loaiza    Relationship: Self    Best call back number: [phone]  Requested Prescriptions:   Requested Prescriptions     Pending Prescriptions Disp Refills   • pantoprazole (Protonix) 20 MG EC tablet 30 tablet 3     Sig: Take 1 tablet by mouth Daily.        [facility] [place] [facility] [place] [address]  - [phone]  - [phone] FX     Additional details provided by patient: PATIENT HAS ONE TABLET LEFT    Does the patient have less than a 3 day supply:  [x] Yes  [] No    Felicita Zaragoza Rep   01/24/22 14:25 EST

## 2022-01-26 NOTE — THERAPY TREATMENT NOTE
Outpatient Physical Therapy Ortho Treatment Note  [facility]     Patient Name: James Loaiza  : 1945  MRN: [id number]  Today's Date: 2021      Visit Date: 2021    Visit Dx:    ICD-10-CM ICD-9-CM   1. Bilateral shoulder pain, unspecified chronicity  M25.511 719.41    M25.512    2. Impaired mobility  Z74.09 799.89   3. Chronic midline low back pain without sciatica  M54.5 724.2    G89.29 338.29       Patient Active Problem List   Diagnosis   • Lumbar herniated disc L3-L5 3/16/16 Open MRI   • Abnormal electrocardiogram   • Abnormal weight gain   • Aortic valve insufficiency   • Coronary arteriosclerosis in native artery   • Benign essential hypertension ([place]je[place] )   • Edema   • Dyspnea on exertion   • Fatigue   • Left ventricular hypertrophy   • Obstructive sleep apnea syndrome   • Arthritis of left hip   • Hx of pulmonary embolus 7/15   • Morbid obesity with BMI of 45.0-49.9, adult (CMS/[facility])   • Intermittent dysphagia   • Chronic kidney disease ([place])   • Hyperlipidemia    • BPH (benign prostatic hypertrophy)   • Left cervical radiculopathy   • Rotator cuff tear, left   • Cardiomegaly   • Plantar fasciitis   • Hypogonadism male   • Vitamin D deficiency disease   • Renal cyst, left   • Preventative health care   • Medication management   • Chronic low back pain   • Lumbar spondylolysis   • Headache   • Other chronic pain   • Pulmonary embolus (CMS/[facility])   • Chronic obstructive pulmonary disease (CMS/[facility])   • Osteoarthritis of right shoulder   • Chronic anticoagulation   • Cervical stenosis of spine        Past Medical History:   Diagnosis Date   • Abnormal EKG     referring to cardiology   • Anxiety    • Arthritis    • Back pain     worsening   • BPH (benign prostatic hyperplasia)     BPH/Nocturia/ Urinary Frequency   • Breast nodule     right   • Cardiomegaly     Cardiomegaly/ SOB with exertion   • Circulation problem    • Constipation    • COPD (chronic obstructive pulmonary disease)  POC blood glucose: [facility]  01/26/22 0247 "(CMS/[facility])    • Deviated septum    • DJD (degenerative joint disease)     DJD Knees   • DVT (deep venous thrombosis) (CMS/[facility])    • Dysphagia     solids and liquids - resolved with normal studies   • Encounter for annual health examination 11/14/2013    Annual Health Assessment   • Enlarged prostate    • Fatigue     Extreme fatigue   • Hyperlipidemia 1999   • Hypertension 1999    followed Dr. Marcelo   • Hypogonadism male    • Left hip pain     and DJD   • Left leg pain    • Low back pain    • Moist mucous membranes of ear, nose, and throat     \"Mucous in throat\"   • Morbid obesity (CMS/[facility])     (BMI-41.2za)   • Muscle cramping    • Muscle spasm     Muscle spasms   • Neck arthritis    • Neck muscle spasm     Neck muscle spasm/Cervical strain   • Neck pain    • Obesity    • Plantar fasciitis    • Prostatitis     recurrent   • Psoriasis    • Pulmonary embolus (CMS/[facility]) 07/2015    on Xarelltoypseerlipidemia   • Radiculopathy     Radiculopathy / Neuropathy left ar   • Renal insufficiency     followed by Dr. Mendes   • Seborrhea    • Shortness of breath    • Sleep apnea     Obstructive Sleep apnea worsening   • Urinary frequency    • Vascular disorder    • Vertigo    • Weight gain    • Wellness examination 10/23/2014    Annual Wellness Visit        Past Surgical History:   Procedure Laterality Date   • APPENDECTOMY  1965   • CARDIAC CATHETERIZATION  07/29/2010    [facility]   • COLONOSCOPY  01/05/2010   • COLONOSCOPY  10/01/2001   • NASAL SEPTUM SURGERY     • NOSE SURGERY  1999   • TURP / TRANSURETHRAL INCISION / DRAINAGE PROSTATE  10/23/2015    Michael Castro                       PT Assessment/Plan     Row Name 01/25/21 3051          PT Assessment    Assessment Comments  Pt reporting increasing difficulty sleeping with limited tolerance to SLing either direction. Continued trigger points along medial scapular border with reproduction of symptoms with manual therapy. Instructed pt in self " trigger point release using thera cane and suggested maybe purchasing for home use. Continued emphasis on scapular strengthening as pt scapulae remain situated laterally along T spine and seated posture remains poor.  -LB        PT Plan    PT Plan Comments  Assess tolerance to last session. Focus on scapular mobility, stabilization, postural improvement.  -LB       User Key  (r) = Recorded By, (t) = Taken By, (c) = Cosigned By    Initials Name Provider Type    LB Tiera Cochran, PT Physical Therapist            OP Exercises     Row Name 01/25/21 1300             Subjective Comments    Subjective Comments  I am having more pain at night. It is getting harder to sleep on either shoulder due to the pain.  -LB         Subjective Pain    Able to rate subjective pain?  yes  -LB      Pre-Treatment Pain Level  5  -LB      Subjective Pain Comment  I didn't get hardly any sleep last night.  -LB         Total Minutes    41647 - PT Therapeutic Exercise Minutes  15  -LB      47883 - PT Manual Therapy Minutes  25  -LB         Exercise 1    Exercise Name 1  seated T spine extension over chair  -LB      Reps 1  3  -LB      Time 1  15  -LB         Exercise 2    Exercise Name 2  seated shoulder rolls  -LB      Sets 2  2  -LB      Reps 2  10  -LB         Exercise 3    Exercise Name 3  seated UT stretch  -LB      Reps 3  3  -LB      Time 3  20  -LB         Exercise 4    Exercise Name 4  seated ball diagonals   -LB      Reps 4  8  -LB      Additional Comments  each way; small pink ball  -LB         Exercise 5    Exercise Name 5  seated crossbody adduction stretch  -LB      Reps 5  3  -LB      Time 5  20  -LB         Exercise 6    Exercise Name 6  seated scapular clock  -LB      Reps 6  8  -LB      Time 6  3  -LB      Additional Comments  3,6,9,12  -LB         Exercise 7    Exercise Name 7  wall wash flexion  -LB      Reps 7  10  -LB        User Key  (r) = Recorded By, (t) = Taken By, (c) = Cosigned By    Initials Name Provider Type    LB  Tiera Cochran, PT Physical Therapist                      Manual Rx (last 36 hours)      Manual Treatments     Row Name 01/25/21 1300             Total Minutes    85927 - PT Manual Therapy Minutes  25  -LB         Manual Rx 1    Manual Rx 1 Location  STM seated in chair to B upper trap, periscapular muscles  -LB      Manual Rx 1 Duration  25  -LB        User Key  (r) = Recorded By, (t) = Taken By, (c) = Cosigned By    Initials Name Provider Type    LB Tiera Cochran, PT Physical Therapist          PT OP Goals     Row Name 01/25/21 1400          PT Short Term Goals    STG Date to Achieve  12/25/20  -LB     STG 1  Pt will demonstrate independence and compliance with initial HEP.  -LB     STG 1 Progress  Met  -LB     STG 2  Pt will report compliance with postural modifications during leisure tasks to reduce UT/cervical spine stress.  -LB     STG 2 Progress  Progressing  -LB     STG 3  Pt will report ability to stay in bed for entireity of night without moving locations due to shoulder pain.  -LB     STG 3 Progress  Progressing  -LB        Long Term Goals    LTG Date to Achieve  01/10/21  -LB     LTG 1  Pt will report 50% reduction in night time waking due to shoulder pain.  -LB     LTG 1 Progress  Progressing  -LB     LTG 2  Pt will demonstrate improved seated posture without cuing while in clinic.  -LB     LTG 2 Progress  Met  -LB     LTG 3  Pt will demonstrate improved B lateral flexion/cervical rotation to 50% B without provocation of symptoms.  -LB     LTG 3 Progress  Ongoing  -LB       User Key  (r) = Recorded By, (t) = Taken By, (c) = Cosigned By    Initials Name Provider Type    LB Tiera Cochran, PT Physical Therapist          Therapy Education  Given: Symptoms/condition management, HEP, Posture/body mechanics  Program: Reinforced  How Provided: Verbal, Demonstration  Provided to: Patient  Level of Understanding: Teach back education performed, Verbalized, Demonstrated              Time Calculation:   Start Time:  1200  Stop Time: 1245  Time Calculation (min): 45 min  Total Timed Code Minutes- PT: 44 minute(s)  Therapy Charges for Today     Code Description Service Date Service Provider Modifiers Qty    [id number]  PT THER PROC EA 15 MIN 1/25/2021 Tiera Cochran, PT GP 1    [id number]  PT MANUAL THERAPY EA 15 MIN 1/25/2021 Tiera Cochran, PT GP 2                    Tiera Cochran PT  1/25/2021

## 2022-02-07 ENCOUNTER — TELEPHONE (OUTPATIENT)
Dept: ORTHOPEDIC SURGERY | Facility: CLINIC | Age: 77
End: 2022-02-07

## 2022-02-07 NOTE — TELEPHONE ENCOUNTER
I spoke to Mr. Loaiza.  I answered all questions to his satisfaction.  He tells me that he is used ice which has helped tremendously.  I reminded him to avoid all anti-inflammatories as he is currently taking Eliquis.  He tells me he uses extra strength Tylenol as needed.  He would like to schedule a repeat injection for April at the [facility] office.  I will ask a staff member to assist him with scheduling this appointment.

## 2022-02-08 ENCOUNTER — TELEPHONE (OUTPATIENT)
Dept: FAMILY MEDICINE CLINIC | Facility: CLINIC | Age: 77
End: 2022-02-08

## 2022-02-08 RX ORDER — METHYLPREDNISOLONE 4 MG/1
TABLET ORAL
Qty: 1 EACH | Refills: 0 | Status: SHIPPED | OUTPATIENT
Start: 2022-02-08 | End: 2022-02-14

## 2022-02-08 NOTE — TELEPHONE ENCOUNTER
Caller: James Loaiza    Relationship: Self    Best call back number: [phone] (H)    What medication are you requesting:     What are your current symptoms:KNEE SWELLING, GOUT IN LEFT AND RIGHT TOE, HEEL    How long have you been experiencing symptoms:     Have you had these symptoms before:    [x] Yes  [] No    Have you been treated for these symptoms before:   [x] Yes  [] No    If a prescription is needed, what is your preferred pharmacy and phone number[place] [address] [address] [facility] [address]  - [phone] [facility] [phone] FX     Additional notes:

## 2022-02-09 ENCOUNTER — TELEPHONE (OUTPATIENT)
Dept: FAMILY MEDICINE CLINIC | Facility: CLINIC | Age: 77
End: 2022-02-09

## 2022-02-14 ENCOUNTER — OFFICE VISIT (OUTPATIENT)
Dept: FAMILY MEDICINE CLINIC | Facility: CLINIC | Age: 77
End: 2022-02-14

## 2022-02-14 VITALS
WEIGHT: 315 LBS | RESPIRATION RATE: 16 BRPM | BODY MASS INDEX: 40.43 KG/M2 | SYSTOLIC BLOOD PRESSURE: 140 MMHG | DIASTOLIC BLOOD PRESSURE: 80 MMHG | HEIGHT: 74 IN

## 2022-02-14 DIAGNOSIS — M10.9 ACUTE GOUT OF RIGHT KNEE, UNSPECIFIED CAUSE: Primary | ICD-10-CM

## 2022-02-14 PROCEDURE — 99214 OFFICE O/P EST MOD 30 MIN: CPT | Performed by: INTERNAL MEDICINE

## 2022-02-14 RX ORDER — METHYLPREDNISOLONE 4 MG/1
TABLET ORAL
Qty: 1 EACH | Refills: 0 | Status: SHIPPED | OUTPATIENT
Start: 2022-02-14 | End: 2022-03-11

## 2022-02-14 NOTE — PROGRESS NOTES
02/14/2022    CC: Gout (right knee.  started about 11 days ago...no other issues)  .        HPI  Knee Pain   The incident occurred 3 to 6 hours ago. The incident occurred at home. There was no injury mechanism. The pain is present in the right knee.        Ricardo Loaiza is a 76 y.o. male.      The following portions of the patient's history were reviewed and updated as appropriate: allergies, current medications, past family history, past medical history, past social history, past surgical history and problem list.    Problem List  Patient Active Problem List   Diagnosis   • Lumbar herniated disc L3-L5 3/16/16 Open MRI   • Abnormal electrocardiogram   • Abnormal weight gain   • Aortic valve insufficiency   • Coronary arteriosclerosis in native artery   • Benign essential hypertension ([place]je[place] 1999)   • Edema   • Dyspnea on exertion   • Fatigue   • Left ventricular hypertrophy   • Obstructive sleep apnea syndrome   • Arthritis of left hip   • Hx of pulmonary embolus 7/15   • Morbid obesity with BMI of 45.0-49.9, adult ([facility])   • Intermittent dysphagia   • Chronic kidney disease ([facility])   • Hyperlipidemia 1999   • BPH (benign prostatic hypertrophy)   • Left cervical radiculopathy   • Rotator cuff tear, left   • Cardiomegaly   • Plantar fasciitis   • Hypogonadism male   • Vitamin D deficiency disease   • Renal cyst, left   • Preventative health care   • Medication management   • Chronic low back pain   • Lumbar spondylolysis   • Headache   • Other chronic pain   • Pulmonary embolus ([facility])   • Chronic obstructive pulmonary disease ([facility])   • Osteoarthritis of right shoulder   • Chronic anticoagulation   • Cervical stenosis of spine   • Bilateral occipital neuralgia   • Shoulder pain   • Cervical facet joint syndrome   • Open wound of right lower leg   • REJI (acute kidney injury) ([facility])   • Constipation   • Venous insufficiency   • Analgesic nephropathy   • Anemia   • Diastolic dysfunction   • Hypocalcemia  "      Past Medical History  Past Medical History:   Diagnosis Date   • Abnormal EKG     referring to cardiology   • Anxiety    • Arthritis    • Back pain     worsening   • BPH (benign prostatic hyperplasia)     BPH/Nocturia/ Urinary Frequency   • Breast nodule     right   • Cardiomegaly     Cardiomegaly/ SOB with exertion   • Circulation problem    • Constipation    • COPD (chronic obstructive pulmonary disease) ([facility])    • Deviated septum    • DJD (degenerative joint disease)     DJD Knees   • DVT (deep venous thrombosis) ([facility])    • Dysphagia     solids and liquids - resolved with normal studies   • Encounter for annual health examination 11/14/2013    Annual Health Assessment   • Enlarged prostate    • Fatigue     Extreme fatigue   • Hyperlipidemia 1999   • Hypertension 1999    followed Dr. Marcelo   • Hypogonadism male    • Left hip pain     and DJD   • Left leg pain    • Low back pain    • Moist mucous membranes of ear, nose, and throat     \"Mucous in throat\"   • Morbid obesity ([facility])     (BMI-41.2za)   • Muscle cramping    • Muscle spasm     Muscle spasms   • Neck arthritis    • Neck muscle spasm     Neck muscle spasm/Cervical strain   • Neck pain    • Obesity    • Plantar fasciitis    • Prostatitis     recurrent   • Psoriasis    • Pulmonary embolus ([facility]) 01/2019    on Xarelltoypseerlipidemia   • Radiculopathy     Radiculopathy / Neuropathy left ar   • Renal insufficiency     followed by Dr. Mendes   • Seborrhea    • Shortness of breath    • Sleep apnea     Obstructive Sleep apnea worsening; uses CPAP   • Urinary frequency    • Vascular disorder    • Vertigo    • Weight gain    • Wellness examination 10/23/2014    Annual Wellness Visit       Surgical History  Past Surgical History:   Procedure Laterality Date   • APPENDECTOMY  1965   • CARDIAC CATHETERIZATION  07/29/2010    [facility]   • COLONOSCOPY  01/05/2010   • COLONOSCOPY  10/01/2001   • NASAL SEPTUM SURGERY     • NOSE SURGERY  1999 "   • RADIOFREQUENCY ABLATION Bilateral 3/2/2021    Procedure: RADIOFREQUENCY ABLATION NERVES---bilateral cervical 2-cervical3;  Surgeon: Edu Tineo MD;  Location: [facility];  Service: Pain Management;  Laterality: Bilateral;   • RADIOFREQUENCY ABLATION Bilateral 12/2/2021    Procedure: RADIOFREQUENCY ABLATION NERVES--bilateral cervical2-3;  Surgeon: Edu Tineo MD;  Location: [facility];  Service: Pain Management;  Laterality: Bilateral;   • TURP / TRANSURETHRAL INCISION / DRAINAGE PROSTATE  10/23/2015    Michael Castro       Family History  Family History   Problem Relation Age of Onset   • Arthritis Mother    • Heart disease Mother    • Hypertension Mother    • Heart attack Father    • Heart disease Father    • Hypertension Father    • Arthritis Sister    • Multiple sclerosis Sister    • Alcohol abuse Brother    • Diabetes Son    • Malig Hyperthermia Neg Hx        Social History  Social History    Tobacco Use      Smoking status: Never Smoker      Smokeless tobacco: Never Used       Is the Patient a current tobacco user? No    Allergies  No Known Allergies    Current Medications    Current Outpatient Medications:   •  apixaban (ELIQUIS) 5 MG tablet tablet, Take 5 mg by mouth Every 12 (Twelve) Hours., Disp: , Rfl:   •  Aspirin ([place]) 81 MG capsule, Take 1 tablet by mouth., Disp: , Rfl:   •  furosemide (LASIX) 40 MG tablet, Take 40 mg by mouth Daily., Disp: , Rfl:   •  gabapentin (NEURONTIN) 300 MG capsule, Take 1 capsule by mouth Every Night., Disp: 30 capsule, Rfl: 3  •  NIFEdipine XL (PROCARDIA XL) 60 MG 24 hr tablet, Take 1 tablet by mouth Daily., Disp: 90 tablet, Rfl: 2  •  pantoprazole (Protonix) 20 MG EC tablet, Take 1 tablet by mouth Daily., Disp: 30 tablet, Rfl: 3  •  ramipril (ALTACE) 10 MG capsule, Take 1 capsule by mouth Daily., Disp: 90 capsule, Rfl: 1  •  tamsulosin (FLOMAX) 0.4 MG capsule 24 hr capsule, Take 1 capsule by mouth Every Night., Disp: , Rfl:   •  tiotropium  bromide-olodaterol (STIOLTO RESPIMAT) 2.5-2.5 MCG/ACT aerosol solution inhaler, Inhale 2 puffs Daily., Disp: , Rfl:   •  methylPREDNISolone (MEDROL) 4 MG dose pack, Take as directed on package instructions., Disp: 1 each, Rfl: 0     Review of System  Review of Systems   Constitutional: Negative.    Respiratory: Negative.    Cardiovascular: Negative.    Gastrointestinal: Negative.    Musculoskeletal:        Patient complains of recurrence of pain in the right knee     I have reviewed and confirmed the accuracy of the ROS as documented by the MA/LPN/RN Magdiel Bowens MD    Vitals:    02/14/22 1514   BP: 140/80   Resp: 16     Body mass index is 42.78 kg/m².    Objective     Physical Exam  Physical Exam  Cardiovascular:      Rate and Rhythm: Normal rate and regular rhythm.   Pulmonary:      Effort: Pulmonary effort is normal.      Breath sounds: Normal breath sounds.   Musculoskeletal:         General: Tenderness present.      Comments: Right knee demonstrates increased warmth point tenderness over the patella there is no effusion.         Assessment/Plan        This 76-year-old patient presents at this time for follow-up of acute gouty arthritis.  He relates about a week or so ago he had an acute gouty episode and was given a Medrol Dosepak.  Medrol Dosepak with him number of days had caused the toes in both feet to resolve there redness and tenderness and initially both knees as well.  He relates that over the past day or so he has had a recurrence of the pain in his right knee.  There is point tenderness appreciated over the patella.  It is reddened and warm to touch.  Patient ambulates with difficulty.          Diagnoses and all orders for this visit:    1. Acute gout of right knee, unspecified cause (Primary)  -     Uric Acid    Other orders  -     methylPREDNISolone (MEDROL) 4 MG dose pack; Take as directed on package instructions.  Dispense: 1 each; Refill: 0    Plan:   1.)  Medrol Dosepak   2.)  Uric acid  level         Magdiel Bowens MD  02/14/2022

## 2022-02-15 DIAGNOSIS — N18.32 STAGE 3B CHRONIC KIDNEY DISEASE: Primary | ICD-10-CM

## 2022-02-15 LAB
ALBUMIN SERPL-MCNC: 3.7 G/DL (ref 3.7–4.7)
ALBUMIN/GLOB SERPL: 1.2 {RATIO} (ref 1.2–2.2)
ALP SERPL-CCNC: 146 IU/L (ref 44–121)
ALT SERPL-CCNC: 16 IU/L (ref 0–44)
AST SERPL-CCNC: 15 IU/L (ref 0–40)
BASOPHILS # BLD AUTO: 0 X10E3/UL (ref 0–0.2)
BASOPHILS NFR BLD AUTO: 0 %
BILIRUB SERPL-MCNC: 0.2 MG/DL (ref 0–1.2)
BUN SERPL-MCNC: 36 MG/DL (ref 8–27)
BUN/CREAT SERPL: 17 (ref 10–24)
CALCIUM SERPL-MCNC: 8.5 MG/DL (ref 8.6–10.2)
CHLORIDE SERPL-SCNC: 108 MMOL/L (ref 96–106)
CO2 SERPL-SCNC: 21 MMOL/L (ref 20–29)
CREAT SERPL-MCNC: 2.11 MG/DL (ref 0.76–1.27)
EOSINOPHIL # BLD AUTO: 0 X10E3/UL (ref 0–0.4)
EOSINOPHIL NFR BLD AUTO: 0 %
ERYTHROCYTE [DISTWIDTH] IN BLOOD BY AUTOMATED COUNT: 12.8 % (ref 11.6–15.4)
GLOBULIN SER CALC-MCNC: 3.1 G/DL (ref 1.5–4.5)
GLUCOSE SERPL-MCNC: 93 MG/DL (ref 65–99)
HCT VFR BLD AUTO: 38.7 % (ref 37.5–51)
HGB BLD-MCNC: 12.9 G/DL (ref 13–17.7)
IMM GRANULOCYTES # BLD AUTO: 0.1 X10E3/UL (ref 0–0.1)
IMM GRANULOCYTES NFR BLD AUTO: 1 %
LYMPHOCYTES # BLD AUTO: 1 X10E3/UL (ref 0.7–3.1)
LYMPHOCYTES NFR BLD AUTO: 9 %
MCH RBC QN AUTO: 31.9 PG (ref 26.6–33)
MCHC RBC AUTO-ENTMCNC: 33.3 G/DL (ref 31.5–35.7)
MCV RBC AUTO: 96 FL (ref 79–97)
MONOCYTES # BLD AUTO: 0.6 X10E3/UL (ref 0.1–0.9)
MONOCYTES NFR BLD AUTO: 6 %
NEUTROPHILS # BLD AUTO: 8.5 X10E3/UL (ref 1.4–7)
NEUTROPHILS NFR BLD AUTO: 84 %
PLATELET # BLD AUTO: 253 X10E3/UL (ref 150–450)
POTASSIUM SERPL-SCNC: 4.9 MMOL/L (ref 3.5–5.2)
PROT SERPL-MCNC: 6.8 G/DL (ref 6–8.5)
RBC # BLD AUTO: 4.05 X10E6/UL (ref 4.14–5.8)
SODIUM SERPL-SCNC: 143 MMOL/L (ref 134–144)
URATE SERPL-MCNC: 7.9 MG/DL (ref 3.8–8.4)
WBC # BLD AUTO: 10.2 X10E3/UL (ref 3.4–10.8)

## 2022-02-22 ENCOUNTER — TELEPHONE (OUTPATIENT)
Dept: FAMILY MEDICINE CLINIC | Facility: CLINIC | Age: 77
End: 2022-02-22

## 2022-02-22 NOTE — TELEPHONE ENCOUNTER
Hub please inform patient    LEFT MESSAGE INFORMING MELINDA THAT THE FORMS WERE RECEIVED.  WE WILL CONTACT HER ONCE COMPLETED.

## 2022-02-22 NOTE — TELEPHONE ENCOUNTER
Caller: No Loaiza    Relationship: Emergency Contact    Best call back number:[phone] (H)     What is the best time to reach you: ANYTIME    Who are you requesting to speak with (clinical staff, provider,  specific staff member): CLINICAL STAFF    What was the call regarding: PATIENT CALLING TO VERIFY IF PAPERWORK WAS RECEIVED FROM [employer] ABOUT FMLA    Do you require a callback: YES

## 2022-03-03 NOTE — TELEPHONE ENCOUNTER
Spoke with patient about the FMLA forms that his daughter had sent to us.  He was unaware that she had requested them.  He is going to discuss this with her, and let us know.    If we are to proceed, then he will need to complete the release of information form (page 5), and she will need to complete the employee portion on page 3.

## 2022-03-11 ENCOUNTER — OFFICE VISIT (OUTPATIENT)
Dept: FAMILY MEDICINE CLINIC | Facility: CLINIC | Age: 77
End: 2022-03-11

## 2022-03-11 VITALS
HEART RATE: 77 BPM | WEIGHT: 315 LBS | DIASTOLIC BLOOD PRESSURE: 66 MMHG | OXYGEN SATURATION: 96 % | HEIGHT: 74 IN | SYSTOLIC BLOOD PRESSURE: 132 MMHG | BODY MASS INDEX: 40.43 KG/M2

## 2022-03-11 DIAGNOSIS — M25.512 CHRONIC PAIN OF BOTH SHOULDERS: ICD-10-CM

## 2022-03-11 DIAGNOSIS — L42 PITYRIASIS ROSEA: Primary | ICD-10-CM

## 2022-03-11 DIAGNOSIS — L27.0 DRUG ERUPTION: ICD-10-CM

## 2022-03-11 DIAGNOSIS — M54.2 NECK PAIN: ICD-10-CM

## 2022-03-11 DIAGNOSIS — G89.29 CHRONIC PAIN OF BOTH SHOULDERS: ICD-10-CM

## 2022-03-11 DIAGNOSIS — M25.511 CHRONIC PAIN OF BOTH SHOULDERS: ICD-10-CM

## 2022-03-11 PROCEDURE — 99214 OFFICE O/P EST MOD 30 MIN: CPT | Performed by: INTERNAL MEDICINE

## 2022-03-11 RX ORDER — TRIAMCINOLONE ACETONIDE 1 MG/ML
LOTION TOPICAL 3 TIMES DAILY
Qty: 120 ML | Refills: 1 | Status: SHIPPED | OUTPATIENT
Start: 2022-03-11

## 2022-03-11 NOTE — PROGRESS NOTES
Subjective Chief complaint is abrasions on thighs and arms  James Loaiza is a 76 y.o. male.     History of Present Illness was is here today for some spots that of come out.  They have been present for approximately a week or so.  Of note he did recently start some allopurinol by his nephrologist.  I did advise stopping that.  He has places on his chest as well as his buttocks and legs.  He also has some itching on his back but the spots on his legs do not seem to itch.  He is complaining of some neck and shoulder pain which is fairly chronic for him.  Physical therapy seem to help him previously.    The following portions of the patient's history were reviewed and updated as appropriate: allergies, current medications, past family history, past medical history, past social history, past surgical history and problem list.    Review of Systems   Constitutional: Negative for chills and fever.   Skin: Positive for color change, dry skin and rash.       Objective   Physical Exam  Vitals and nursing note reviewed.   Constitutional:       Appearance: Normal appearance.   Cardiovascular:      Rate and Rhythm: Normal rate.   Pulmonary:      Effort: Pulmonary effort is normal.   Skin:     Comments: There is a diffuse erythematous eruption on his back.  There is also some erythema and scale on his chest.  He has some ovoid lesions on his buttocks and legs.  These have some scale to them.   Neurological:      Mental Status: He is alert.           Assessment/Plan   Diagnoses and all orders for this visit:    1. Pityriasis rosea (Primary)  -     Ambulatory Referral to Dermatology    2. Drug eruption  -     Ambulatory Referral to Dermatology    3. Neck pain  -     Ambulatory Referral to Physical Therapy Evaluate and treat, Ortho    4. Chronic pain of both shoulders  -     Ambulatory Referral to Physical Therapy Evaluate and treat, Ortho    Other orders  -     triamcinolone (KENALOG) 0.1 % lotion; Apply  topically to the  appropriate area as directed 3 (Three) Times a Day.  Dispense: 120 mL; Refill: 1      James is here today for rash primarily.  It is a little bit confusing.  On his back it looks like a drug eruption therefore have advised him to stop taking the allopurinol.  However his legs and buttocks seem to look more like a pityriasis rosea.  Do not think it would be a nummular eczema or psoriasis.  Other concerns would be mycosis fungoides.  However he had a fairly normal CBC other than some anemia several weeks ago.  I am going to give him some Kenalog for the lesions.  I am going to refer him to a dermatologist.

## 2022-04-06 RX ORDER — GABAPENTIN 300 MG/1
CAPSULE ORAL
Qty: 30 CAPSULE | Refills: 2 | Status: SHIPPED | OUTPATIENT
Start: 2022-04-06 | End: 2022-04-21 | Stop reason: DRUGHIGH

## 2022-04-11 ENCOUNTER — OFFICE VISIT (OUTPATIENT)
Dept: FAMILY MEDICINE CLINIC | Facility: CLINIC | Age: 77
End: 2022-04-11

## 2022-04-11 VITALS
HEIGHT: 74 IN | DIASTOLIC BLOOD PRESSURE: 82 MMHG | WEIGHT: 315 LBS | BODY MASS INDEX: 40.43 KG/M2 | RESPIRATION RATE: 16 BRPM | SYSTOLIC BLOOD PRESSURE: 148 MMHG

## 2022-04-11 DIAGNOSIS — K57.90 DIVERTICULOSIS: Primary | Chronic | ICD-10-CM

## 2022-04-11 DIAGNOSIS — M10.9 GOUTY ARTHRITIS: Chronic | ICD-10-CM

## 2022-04-11 DIAGNOSIS — L40.9 PSORIASIS: Chronic | ICD-10-CM

## 2022-04-11 PROBLEM — R22.43 LOCALIZED SWELLING, MASS AND LUMP, LOWER LIMB, BILATERAL: Status: ACTIVE | Noted: 2022-02-18

## 2022-04-11 PROCEDURE — 99214 OFFICE O/P EST MOD 30 MIN: CPT | Performed by: INTERNAL MEDICINE

## 2022-04-12 LAB
BASOPHILS # BLD AUTO: 0 X10E3/UL (ref 0–0.2)
BASOPHILS NFR BLD AUTO: 0 %
EOSINOPHIL # BLD AUTO: 0.1 X10E3/UL (ref 0–0.4)
EOSINOPHIL NFR BLD AUTO: 1 %
ERYTHROCYTE [DISTWIDTH] IN BLOOD BY AUTOMATED COUNT: 13.1 % (ref 11.6–15.4)
HCT VFR BLD AUTO: 34.9 % (ref 37.5–51)
HGB BLD-MCNC: 11.7 G/DL (ref 13–17.7)
IMM GRANULOCYTES # BLD AUTO: 0.1 X10E3/UL (ref 0–0.1)
IMM GRANULOCYTES NFR BLD AUTO: 1 %
LYMPHOCYTES # BLD AUTO: 1.2 X10E3/UL (ref 0.7–3.1)
LYMPHOCYTES NFR BLD AUTO: 12 %
MCH RBC QN AUTO: 32.7 PG (ref 26.6–33)
MCHC RBC AUTO-ENTMCNC: 33.5 G/DL (ref 31.5–35.7)
MCV RBC AUTO: 98 FL (ref 79–97)
MONOCYTES # BLD AUTO: 0.6 X10E3/UL (ref 0.1–0.9)
MONOCYTES NFR BLD AUTO: 7 %
NEUTROPHILS # BLD AUTO: 7.3 X10E3/UL (ref 1.4–7)
NEUTROPHILS NFR BLD AUTO: 79 %
PLATELET # BLD AUTO: 229 X10E3/UL (ref 150–450)
RBC # BLD AUTO: 3.58 X10E6/UL (ref 4.14–5.8)
WBC # BLD AUTO: 9.3 X10E3/UL (ref 3.4–10.8)

## 2022-04-14 NOTE — PROGRESS NOTES
04/11/2022    CC: Gout (F/U) and Rectal Bleeding (Occas.)  .        HPI  Gout  This is a chronic problem. The current episode started more than 1 month ago. The problem occurs intermittently. The problem has been gradually improving. Nothing aggravates the symptoms. The treatment provided mild relief.   Rectal Bleeding         Subjective   James Loaiza is a 76 y.o. male.      The following portions of the patient's history were reviewed and updated as appropriate: allergies, current medications, past family history, past medical history, past social history, past surgical history and problem list.    Problem List  Patient Active Problem List   Diagnosis   • Lumbar herniated disc L3-L5 3/16/16 Open MRI   • Abnormal electrocardiogram   • Abnormal weight gain   • Aortic valve insufficiency   • Coronary arteriosclerosis in native artery   • Benign essential hypertension ([place] 1999)   • Edema   • Dyspnea on exertion   • Fatigue   • Left ventricular hypertrophy   • Obstructive sleep apnea syndrome   • Arthritis of left hip   • Hx of pulmonary embolus 7/15   • Morbid obesity with BMI of 45.0-49.9, adult ([facility])   • Intermittent dysphagia   • Chronic kidney disease ([place])   • Hyperlipidemia 1999   • BPH (benign prostatic hypertrophy)   • Left cervical radiculopathy   • Rotator cuff tear, left   • Cardiomegaly   • Plantar fasciitis   • Hypogonadism male   • Vitamin D deficiency disease   • Renal cyst, left   • Preventative health care   • Medication management   • Chronic low back pain   • Lumbar spondylolysis   • Headache   • Other chronic pain   • Pulmonary embolus (HCC)   • Chronic obstructive pulmonary disease ([facility])   • Osteoarthritis of right shoulder   • Chronic anticoagulation   • Cervical stenosis of spine   • Bilateral occipital neuralgia   • Shoulder pain   • Cervical facet joint syndrome   • Open wound of right lower leg   • REJI (acute kidney injury) ([facility])   • Constipation   • Venous insufficiency   • Analgesic  "nephropathy   • Anemia   • Diastolic dysfunction   • Hypocalcemia   • Hypertension   • Localized swelling, mass and lump, lower limb, bilateral       Past Medical History  Past Medical History:   Diagnosis Date   • Abnormal EKG     referring to cardiology   • Anxiety    • Arthritis    • Back pain     worsening   • BPH (benign prostatic hyperplasia)     BPH/Nocturia/ Urinary Frequency   • Breast nodule     right   • Cardiomegaly     Cardiomegaly/ SOB with exertion   • Circulation problem    • Constipation    • COPD (chronic obstructive pulmonary disease) ([facility])    • Deviated septum    • DJD (degenerative joint disease)     DJD Knees   • DVT (deep venous thrombosis) ([facility])    • Dysphagia     solids and liquids - resolved with normal studies   • Encounter for annual health examination 11/14/2013    Annual Health Assessment   • Enlarged prostate    • Fatigue     Extreme fatigue   • Hyperlipidemia 1999   • Hypertension 1999    followed Dr. Marcelo   • Hypogonadism male    • Left hip pain     and DJD   • Left leg pain    • Low back pain    • Moist mucous membranes of ear, nose, and throat     \"Mucous in throat\"   • Morbid obesity ([facility])     (BMI-41.2za)   • Muscle cramping    • Muscle spasm     Muscle spasms   • Neck arthritis    • Neck muscle spasm     Neck muscle spasm/Cervical strain   • Neck pain    • Obesity    • Plantar fasciitis    • Prostatitis     recurrent   • Psoriasis    • Pulmonary embolus ([facility]) 01/2019    on Xarelltoypseerlipidemia   • Radiculopathy     Radiculopathy / Neuropathy left ar   • Renal insufficiency     followed by Dr. Mendes   • Seborrhea    • Shortness of breath    • Sleep apnea     Obstructive Sleep apnea worsening; uses CPAP   • Urinary frequency    • Vascular disorder    • Vertigo    • Weight gain    • Wellness examination 10/23/2014    Annual Wellness Visit       Surgical History  Past Surgical History:   Procedure Laterality Date   • APPENDECTOMY  1965   • CARDIAC CATHETERIZATION  07/29/2010 "    [facility]   • COLONOSCOPY  01/05/2010   • COLONOSCOPY  10/01/2001   • NASAL SEPTUM SURGERY     • NOSE SURGERY  1999   • RADIOFREQUENCY ABLATION Bilateral 3/2/2021    Procedure: RADIOFREQUENCY ABLATION NERVES---bilateral cervical 2-cervical3;  Surgeon: Edu Tineo MD;  Location: [facility];  Service: Pain Management;  Laterality: Bilateral;   • RADIOFREQUENCY ABLATION Bilateral 12/2/2021    Procedure: RADIOFREQUENCY ABLATION NERVES--bilateral cervical2-3;  Surgeon: Edu Tineo MD;  Location: [facility];  Service: Pain Management;  Laterality: Bilateral;   • TURP / TRANSURETHRAL INCISION / DRAINAGE PROSTATE  10/23/2015    Michael Castro       Family History  Family History   Problem Relation Age of Onset   • Arthritis Mother    • Heart disease Mother    • Hypertension Mother    • Heart attack Father    • Heart disease Father    • Hypertension Father    • Arthritis Sister    • Multiple sclerosis Sister    • Alcohol abuse Brother    • Diabetes Son    • Malig Hyperthermia Neg Hx        Social History  Social History    Tobacco Use      Smoking status: Never Smoker      Smokeless tobacco: Never Used                Allergies  No Known Allergies    Current Medications    Current Outpatient Medications:   •  apixaban (ELIQUIS) 5 MG tablet tablet, Take 5 mg by mouth Every 12 (Twelve) Hours., Disp: , Rfl:   •  Aspirin ([place]) 81 MG capsule, Take 1 tablet by mouth., Disp: , Rfl:   •  furosemide (LASIX) 40 MG tablet, Take 40 mg by mouth Daily., Disp: , Rfl:   •  gabapentin (NEURONTIN) 300 MG capsule, TAKE ONE CAPSULE BY MOUTH ONCE NIGHTLY, Disp: 30 capsule, Rfl: 2  •  NIFEdipine XL (PROCARDIA XL) 60 MG 24 hr tablet, Take 1 tablet by mouth Daily., Disp: 90 tablet, Rfl: 2  •  pantoprazole (Protonix) 20 MG EC tablet, Take 1 tablet by mouth Daily., Disp: 30 tablet, Rfl: 3  •  ramipril (ALTACE) 10 MG capsule, Take 1 capsule by mouth Daily., Disp: 90 capsule, Rfl: 1  •  tamsulosin  (FLOMAX) 0.4 MG capsule 24 hr capsule, Take 1 capsule by mouth Every Night., Disp: , Rfl:   •  tiotropium bromide-olodaterol (STIOLTO RESPIMAT) 2.5-2.5 MCG/ACT aerosol solution inhaler, Inhale 2 puffs Daily., Disp: , Rfl:   •  triamcinolone (KENALOG) 0.1 % lotion, Apply  topically to the appropriate area as directed 3 (Three) Times a Day., Disp: 120 mL, Rfl: 1     Review of System  Review of Systems   Constitutional: Negative.    HENT: Negative.    Eyes: Negative.    Respiratory: Negative.    Cardiovascular: Negative.    Gastrointestinal: Positive for blood in stool and hematochezia.   Musculoskeletal: Positive for gout.     I have reviewed and confirmed the accuracy of the ROS as documented by the MA/LPN/RN Magdiel Bowens MD    Vitals:    04/11/22 1112   BP: 148/82   Resp: 16     Body mass index is 43.14 kg/m².    Objective     Physical Exam  Physical Exam  Vitals and nursing note reviewed.   HENT:      Head: Normocephalic and atraumatic.   Eyes:      Extraocular Movements: Extraocular movements intact.      Pupils: Pupils are equal, round, and reactive to light.   Cardiovascular:      Rate and Rhythm: Normal rate and regular rhythm.      Pulses: Normal pulses.      Heart sounds: Normal heart sounds.   Pulmonary:      Effort: Pulmonary effort is normal.      Breath sounds: Normal breath sounds.   Musculoskeletal:         General: Normal range of motion.      Cervical back: Normal range of motion and neck supple.   Neurological:      General: No focal deficit present.      Mental Status: He is oriented to person, place, and time.         Assessment/Plan      This 76-year-old patient presents today for follow-up of gout.  He has a past history of having hyperuricemia with a previous level on 2/14 of 7.9.  Patient was seen recently by Dr. Freeman who discontinued his allopurinol as there was concern about a rash.  The patient was subsequently seen by dermatology who felt that this rash was secondary to psoriasis.   Patient relates he has had no more episodes of gout since discontinuation of the allopurinol .    There was some concern about starting the patient on allopurinol with his creatinine being 2.11 and a GFR of 35 however we note that this was started by nephrology.    Patient also relates he has had blood on his tissue paper upon cleansing himself after bowel movement for the past few days.  Rectal exam showed heme positive stool note is made the patient does have a significant history of diverticulosis with his last colonoscopy being 3 years ago by Dr. Todd.  Dr. Todd also noted that the patient had diverticulosis with reticuloanal verge prominence.  Patient denies any pain or discomfort with his bowel movements and has not seen any bright red-blue blood per stool.  Note is also made that the patient is on Eliquis 5 mg p.o. daily and aspirin 81 mg 1 tab p.o. daily for atrial fibrillation.    I do not feel the patient's occasional rectal bleeding is significant at this point we will not make any changes in his medication.  I do think it important that he be restarted on his allopurinol to prevent gouty arthritis attacks as the patient is prone to noncompliance with his food.    Patient's blood pressure was 148/82 he is urged to continue his blood pressure medication.  He currently is on nifedipine 60 mg 1 tab p.o. daily, Altace 10 mg 1 tab p.o. daily, and furosemide 40 mg 1 tab p.o. daily as needed.        Diagnoses and all orders for this visit:    1. Diverticulosis (Primary)  Comments:  Stable  Orders:  -     Cancel: CBC w AUTO Differential; Future  -     CBC w AUTO Differential    2. Psoriasis  Comments:  Recently diagnosed, Follow-up from dermatology pending    3. Gouty arthritis  Comments:  Stable      Plan:  1.)  Restart allopurinol  2. Follow-up with dermatology regarding his psoriasis treatment  3.)  He is to notify us if his rectal bleeding becomes more profuse.       Magdiel Bowens MD  04/11/2022

## 2022-04-15 ENCOUNTER — CLINICAL SUPPORT (OUTPATIENT)
Dept: ORTHOPEDIC SURGERY | Facility: CLINIC | Age: 77
End: 2022-04-15

## 2022-04-15 VITALS — BODY MASS INDEX: 40.43 KG/M2 | TEMPERATURE: 98.6 F | HEIGHT: 74 IN | WEIGHT: 315 LBS

## 2022-04-15 DIAGNOSIS — M17.12 PRIMARY OSTEOARTHRITIS OF LEFT KNEE: ICD-10-CM

## 2022-04-15 DIAGNOSIS — M17.11 PRIMARY OSTEOARTHRITIS OF RIGHT KNEE: Primary | ICD-10-CM

## 2022-04-15 PROCEDURE — 20610 DRAIN/INJ JOINT/BURSA W/O US: CPT | Performed by: NURSE PRACTITIONER

## 2022-04-15 RX ORDER — METHYLPREDNISOLONE ACETATE 80 MG/ML
80 INJECTION, SUSPENSION INTRA-ARTICULAR; INTRALESIONAL; INTRAMUSCULAR; SOFT TISSUE
Status: COMPLETED | OUTPATIENT
Start: 2022-04-15 | End: 2022-04-15

## 2022-04-15 RX ADMIN — METHYLPREDNISOLONE ACETATE 80 MG: 80 INJECTION, SUSPENSION INTRA-ARTICULAR; INTRALESIONAL; INTRAMUSCULAR; SOFT TISSUE at 11:35

## 2022-04-15 NOTE — PROGRESS NOTES
Mr. Loaiza comes in today for follow-up.  Injections have worked well in the past.  The patient would like to get repeat injections today.  The risks, benefits and alternatives were discussed and the patient consented.  Going forward, the patient will follow-up as needed.    YANIRA Andrea    04/15/2022    Large Joint Arthrocentesis: R knee  Date/Time: 4/15/2022 11:35 AM  Consent given by: patient  Site marked: site marked  Timeout: Immediately prior to procedure a time out was called to verify the correct patient, procedure, equipment, support staff and site/side marked as required   Supporting Documentation  Indications: pain   Procedure Details  Location: knee - R knee  Preparation: Patient was prepped and draped in the usual sterile fashion  Needle gauge: 21 G.  Approach: anterolateral  Medications administered: 80 mg methylPREDNISolone acetate 80 MG/ML; 2 mL lidocaine (cardiac)  Patient tolerance: patient tolerated the procedure well with no immediate complications    Large Joint Arthrocentesis: L knee  Date/Time: 4/15/2022 11:35 AM  Consent given by: patient  Site marked: site marked  Timeout: Immediately prior to procedure a time out was called to verify the correct patient, procedure, equipment, support staff and site/side marked as required   Supporting Documentation  Indications: pain   Procedure Details  Location: knee - L knee  Preparation: Patient was prepped and draped in the usual sterile fashion  Needle gauge: 21 G.  Approach: anterolateral  Medications administered: 80 mg methylPREDNISolone acetate 80 MG/ML; 2 mL lidocaine (cardiac)  Patient tolerance: patient tolerated the procedure well with no immediate complications

## 2022-04-19 ENCOUNTER — TELEPHONE (OUTPATIENT)
Dept: PAIN MEDICINE | Facility: CLINIC | Age: 77
End: 2022-04-19

## 2022-04-21 ENCOUNTER — OFFICE VISIT (OUTPATIENT)
Dept: PAIN MEDICINE | Facility: CLINIC | Age: 77
End: 2022-04-21

## 2022-04-21 VITALS
HEART RATE: 63 BPM | OXYGEN SATURATION: 97 % | DIASTOLIC BLOOD PRESSURE: 86 MMHG | RESPIRATION RATE: 18 BRPM | SYSTOLIC BLOOD PRESSURE: 161 MMHG | HEIGHT: 74 IN | BODY MASS INDEX: 40.43 KG/M2 | TEMPERATURE: 96.4 F | WEIGHT: 315 LBS

## 2022-04-21 DIAGNOSIS — M43.06 LUMBAR SPONDYLOLYSIS: ICD-10-CM

## 2022-04-21 DIAGNOSIS — G89.29 OTHER CHRONIC PAIN: Primary | ICD-10-CM

## 2022-04-21 DIAGNOSIS — M47.812 CERVICAL FACET JOINT SYNDROME: ICD-10-CM

## 2022-04-21 PROCEDURE — 99214 OFFICE O/P EST MOD 30 MIN: CPT | Performed by: NURSE PRACTITIONER

## 2022-04-21 RX ORDER — GABAPENTIN 400 MG/1
400 CAPSULE ORAL NIGHTLY
Qty: 30 CAPSULE | Refills: 2 | Status: SHIPPED | OUTPATIENT
Start: 2022-04-21 | End: 2022-06-30 | Stop reason: SINTOL

## 2022-04-21 NOTE — PROGRESS NOTES
CHIEF COMPLAINT  FOLLOW UP NECK PAIN  4 Month F/U - Patient in office reports pain was doing pretty good up until a week ago. Complains of slightly increased pain .     Subjective   James Loaiza is a 76 y.o. male  who presents for follow-up.  He has a history of chronic back and neck pain. Reports his neck pain is worse. Started worsening approximately 1 week ago. Does not remember any specific incident or trauma. Reports 2-3 nights ago, his pain was 10/10VAS. Today his pain is 4/10VAS.  Reports this is improving since a few nights ago.    Complains of pain in his neck and low back. Today his pain is 4/10VAS. His neck pain is his primary complaint.  Describes his pain as intermittent sharp and stabbing pain. Is also having some stiffness as well.  Pain increases with certain positions and movement; pain decreases with rest, medication and procedures.  Continues with gabapentin 300 mg nightly. Notes improvement in his pain with this. Denies any side effects from this.     Has been going to PT at [facility] for shoulder pain.     Reports he had bilateral knee injections(cortisone) 6 days ago.    Patient remained masked during entire encounter. No cough present. I donned a mask and eye protection throughout entire visit. Prior to donning mask and eye protection, hand hygiene was performed, as well as when it was doffed.  I was closer than 6 feet, but not for an extended period of time. No obvious exposure to any bodily fluids.    Back Pain  This is a recurrent problem. The current episode started more than 1 month ago. The problem occurs constantly. Progression since onset: unchanged since last office visit. The pain is present in the lumbar spine and sacro-iliac. The quality of the pain is described as aching. The pain does not radiate. The pain is mild. Worse during: frequent urination at night which increases his pain--getting up and out of bed. The symptoms are aggravated by standing and twisting.  Stiffness is present all day. Associated symptoms include headaches. Pertinent negatives include no abdominal pain, bladder incontinence, bowel incontinence, chest pain, dysuria, fever, numbness or weakness. Risk factors include lack of exercise, obesity and recent trauma. Treatments tried: lumbar RFL, OTC IBU.   Neck Pain   This is a new problem. The current episode started more than 1 month ago. The problem has been gradually worsening (variable since last evaluation). The pain is associated with nothing. The pain is present in the occipital region. The pain is at a severity of 4/10. The pain is severe. Associated symptoms include headaches. Pertinent negatives include no chest pain, fever, numbness or weakness.        Procedure List:  --12-2-221-- bilateral C2-C3 RFL  -- 3-2-2021-- Bilateral C2-C3 RFL   --1-22-21-- bilateral OCNB  -- 10-23-20-- bilateral OCNB and bilateral L2-L5 MBB  -- 9-21-20-- bilateral OCNB  -- 9-9-20-- ELENITA (DR ALLYSON RIVERA)  -- 6-22-18-- bilateral L2-L5 RFL- 75% long term relief     Remains on Eliquis.  Cannot take NSAIDS.       Narrative & Impression   CERVICAL SPINE CT MYELOGRAM     HISTORY: Neck pain with arm pain.     TECHNIQUE: Axial CT of the cervical spine with multiplanar reformatted  images is provided. Details of the injection procedure are reported  separately. This is correlated with a cervical MRI from 08/23/2020.  Radiation dose reduction techniques were utilized, including automated  exposure control and exposure modulation based on body size.     FINDINGS: The surface contours of the posterior fossa contents appear  normal. 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     Degenerative changes are observed between the odontoid and C1 ring  anteriorly but there is no hypertrophic change posteriorly. The canal is  patent at this level. The articulations of the skull base with C1 appear  normal.     At C2-3, there is bulky enthesophyte anteriorly  without definite  ankylosis. The canal is patent. The foramina are patent.     At C3-4, the disc is mildly narrowed. There is hypertrophic facet change  on the right. The canal is patent. The right foramen appears moderately  to severely stenotic due to osteophyte. The left foramen is moderately  stenotic.     At C4-5, there is bulky enthesophyte anteriorly without ankylosis. Some  facet arthropathy is present. The canal is patent. There is severe bony  foraminal stenosis on the right and mild-to-moderate foraminal stenosis  on the left.     At C5-6 and C6-C7, there is bulky enthesophyte bridging the disc spaces  anteriorly creating solid ankylosis. There is also ankylosis across the  annulus and uncovertebral joints posteriorly at C5-C6. The spinal canal  is patent at these levels. There is moderate-to-severe osseous foraminal  stenosis on the right at C5-6. The left foramen is patent at this level.  At C6-7, the canal is patent. There is moderate osseous foraminal  stenosis on the left. The right foramen is patent.     At C7-T1, the disc is narrowed. There is bulky enthesophyte anteriorly  on the left. The canal and foramina are patent.     At T1-2, the disc and the facets appear normal and the canal and  foramina are patent.     Along the caudal edge of the field-of-view, the T2-3 level is not  segmented as already mentioned. The canal is patent at those levels. The  foramina are quite narrowed bilaterally. At T3-4, there is facet  arthropathy. The canal and foramina are patent.     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 There is osseous foraminal  stenosis at several levels as discussed level by level above. No  significant appearing spinal canal stenosis is present, however.     This report was finalized on 3/16/2021 7:22 AM by Dr. Daniel Newton M.D.     PEG Assessment   What number best describes your pain on average in the past  "week?10  What number best describes how, during the past week, pain has interfered with your enjoyment of life?10  What number best describes how, during the past week, pain has interfered with your general activity?  10    The following portions of the patient's history were reviewed and updated as appropriate: allergies, current medications, past family history, past medical history, past social history, past surgical history and problem list.    Review of Systems   Constitutional: Negative for activity change, fatigue and fever.   HENT: Negative for congestion.    Eyes: Negative for visual disturbance.   Respiratory: Negative for cough and chest tightness.    Cardiovascular: Negative for chest pain.   Gastrointestinal: Negative for abdominal pain, bowel incontinence, constipation and diarrhea.   Genitourinary: Negative for bladder incontinence, difficulty urinating and dysuria.   Musculoskeletal: Positive for back pain and neck pain.   Neurological: Positive for headaches. Negative for dizziness, weakness, light-headedness and numbness.   Psychiatric/Behavioral: Positive for sleep disturbance. Negative for agitation and suicidal ideas. The patient is not nervous/anxious.      I have reviewed and confirmed the accuracy of the ROS as documented by the MA/LPN/RN Sarai Bermudez, YANIRA      Vitals:    04/21/22 1059   BP: 161/86  Comment: Patient states he has not take BP medication.   BP Location: Left arm   Patient Position: Sitting   Pulse: 63   Resp: 18   Temp: 96.4 °F (35.8 °C)   SpO2: 97%   Weight: (!) 152 kg (334 lb)   Height: 188 cm (74.02\")   PainSc:   4   PainLoc: Neck         Objective   Physical Exam  Vitals and nursing note reviewed.   Constitutional:       Appearance: He is well-developed.   HENT:      Head: Normocephalic and atraumatic.   Neck:      Comments: Mild tenderness of bilateral occiput  Pulmonary:      Effort: Pulmonary effort is normal.   Musculoskeletal:      Cervical back: Muscular " tenderness present. No spinous process tenderness. Decreased range of motion.      Lumbar back: Tenderness present.   Neurological:      Mental Status: He is alert.      Gait: Gait abnormal.      Deep Tendon Reflexes:      Reflex Scores:       Bicep reflexes are 1+ on the right side and 1+ on the left side.       Brachioradialis reflexes are 1+ on the right side and 1+ on the left side.  Psychiatric:         Speech: Speech normal.         Behavior: Behavior normal.         Thought Content: Thought content normal.         Judgment: Judgment normal.         Assessment/Plan   Diagnoses and all orders for this visit:    1. Other chronic pain (Primary)    2. Cervical facet joint syndrome    3. Lumbar spondylolysis    Other orders  -     gabapentin (NEURONTIN) 400 MG capsule; Take 1 capsule by mouth Every Night.  Dispense: 30 capsule; Refill: 2      --- Increase gabapentin 400 mg nightly. Discussed medication with the patient.  Included in this discussion was the potential for side effects and adverse events.  Patient verbalized understanding and wished to proceed.  Prescription will be sent to pharmacy.  --- Consider repeat cervical RFL--- to be performed after 6-2-22.  --- Consider OCNB versus cervical MBB. Discussed with patient.   --- Follow-up 2 months or sooner if needed           DILAN REPORT  As part of the patient's treatment plan, I am prescribing controlled substances. The patient has been made aware of appropriate use of such medications, including potential risk of somnolence, limited ability to drive and/or work safely, and the potential for dependence or overdose. It has also bee made clear that these medications are for use by this patient only, without concomitant use of alcohol or other substances unless prescribed.     Patient has completed prescribing agreement detailing terms of continued prescribing of controlled substances, including monitoring DILAN reports, urine drug screening, and pill counts  if necessary. The patient is aware that inappropriate use will results in cessation of prescribing such medications.    As the clinician, I personally reviewed the DILAN from 4-21-22 while the patient was in the office today.    History and physical exam exhibit continued safe and appropriate use of controlled substances.       Dictated utilizing Dragon dictation.     This document is intended for medical expert use only. Reading of this document by patients and/or patient's family without participating medical staff guidance may result in misinterpretation and unintended morbidity.   Any interpretation of such data is the responsibility of the patient and/or family member responsible for the patient in concert with their primary or specialist providers, not to be left for sources of online searches such as [employer], [employer] or similar queries. Relying on these approaches to knowledge may result in misinterpretation, misguided goals of care and even death should patients or family members try recommendations outside of the realm of professional medical care in a supervised way.

## 2022-05-05 ENCOUNTER — OFFICE VISIT (OUTPATIENT)
Dept: CARDIOLOGY | Facility: CLINIC | Age: 77
End: 2022-05-05

## 2022-05-05 VITALS
SYSTOLIC BLOOD PRESSURE: 144 MMHG | HEART RATE: 61 BPM | DIASTOLIC BLOOD PRESSURE: 88 MMHG | WEIGHT: 315 LBS | HEIGHT: 74 IN | BODY MASS INDEX: 40.43 KG/M2

## 2022-05-05 DIAGNOSIS — I51.89 DIASTOLIC DYSFUNCTION: ICD-10-CM

## 2022-05-05 DIAGNOSIS — G47.33 OBSTRUCTIVE SLEEP APNEA SYNDROME: ICD-10-CM

## 2022-05-05 DIAGNOSIS — I25.10 CORONARY ARTERIOSCLEROSIS IN NATIVE ARTERY: Primary | Chronic | ICD-10-CM

## 2022-05-05 DIAGNOSIS — E66.01 MORBID OBESITY WITH BMI OF 40.0-44.9, ADULT: ICD-10-CM

## 2022-05-05 DIAGNOSIS — N18.32 STAGE 3B CHRONIC KIDNEY DISEASE: ICD-10-CM

## 2022-05-05 DIAGNOSIS — J44.9 CHRONIC OBSTRUCTIVE PULMONARY DISEASE, UNSPECIFIED COPD TYPE: ICD-10-CM

## 2022-05-05 DIAGNOSIS — Z86.711 HISTORY OF PULMONARY EMBOLISM: ICD-10-CM

## 2022-05-05 DIAGNOSIS — I77.810 ASCENDING AORTA DILATION: ICD-10-CM

## 2022-05-05 DIAGNOSIS — I35.1 NONRHEUMATIC AORTIC VALVE INSUFFICIENCY: Chronic | ICD-10-CM

## 2022-05-05 DIAGNOSIS — I10 ESSENTIAL HYPERTENSION: ICD-10-CM

## 2022-05-05 DIAGNOSIS — R06.09 DYSPNEA ON EXERTION: ICD-10-CM

## 2022-05-05 PROCEDURE — 99214 OFFICE O/P EST MOD 30 MIN: CPT | Performed by: NURSE PRACTITIONER

## 2022-05-05 PROCEDURE — 93000 ELECTROCARDIOGRAM COMPLETE: CPT | Performed by: NURSE PRACTITIONER

## 2022-05-05 NOTE — PROGRESS NOTES
Date of Office Visit: 2022  Encounter Provider: YANIRA Posada  Place of Service: [facility] CARDIOLOGY  Patient Name: James Loaiza  :1945  Primary Cardiologist: Dr. Pradeep Obrien    Chief Complaint   Patient presents with   • diastolic dysfunction   • Coronary Artery Disease   • Annual Exam   :     HPI: James Loaiza is a pleasant 76 y.o. male who presents today for follow-up on coronary artery disease.  He is a new patient to me and I have reviewed his medical records.    In , he was diagnosed with single-vessel coronary artery disease with a 70% small caliber diagonal per coronary angiography.  Medical treatment was recommended.  He is a previous patient of Dr. Fred Raphael who has retired.    His risk factors include morbid obesity, obstructive sleep apnea, hypertension, hyperlipidemia, and chronic kidney disease followed by Dr. Beth.  He has known chronic lower extremity edema and venous insufficiency.    In , he was diagnosed with a pulmonary embolus and DVT.  After treatment his anticoagulation was discontinued.  In 2019, he had recurrent DVT and pulmonary embolism and was placed on apixaban indefinitely.    In 2018, stress test revealed a small area of mild fixed hypoperfusion in the inferior posterior wall and no ischemia.    In 2021, 2D echocardiogram showed EF of 58%, GLS -20.4%, grade 1 diastolic dysfunction, mild aortic valve regurgitation, mild dilation of the sinuses of Valsalva measuring 4.1 cm and mild dilation of ascending aorta measuring 3.9 cm.    In May 2021, he established care with Dr. Pradeep Obrien.  His aspirin was decreased to 81 mg daily.    He presents today for an annual follow-up visit.  His blood pressure was elevated on the first check and better on the second check.  He says he never runs this high at home.  He has chronic shortness of breath which he feels is due from his COPD and follows with   "Jaime.  He has chronic lower extremity edema which is unchanged.  He denies chest pain, PND, orthopnea, palpitations, dizziness, syncope, or bleeding.  He reports daytime fatigue and has been using a CPAP machine.  He said last month he noticed some blood in the stool and this has resolved.      Past Medical History:   Diagnosis Date   • Abnormal EKG     referring to cardiology   • Anxiety    • Arthritis    • Ascending aorta dilation ([facility])    • Back pain     worsening   • BPH (benign prostatic hyperplasia)     BPH/Nocturia/ Urinary Frequency   • Breast nodule     right   • Cardiomegaly     Cardiomegaly/ SOB with exertion   • Circulation problem    • Constipation    • COPD (chronic obstructive pulmonary disease) ([facility])    • Deviated septum    • DJD (degenerative joint disease)     DJD Knees   • DVT (deep venous thrombosis) ([facility])    • Dysphagia     solids and liquids - resolved with normal studies   • Encounter for annual health examination 11/14/2013    Annual Health Assessment   • Enlarged prostate    • Fatigue     Extreme fatigue   • Hyperlipidemia 1999   • Hypertension 1999    followed Dr. Marcelo   • Hypogonadism male    • Left hip pain     and DJD   • Left leg pain    • Low back pain    • Moist mucous membranes of ear, nose, and throat     \"Mucous in throat\"   • Morbid obesity ([facility])     (BMI-41.2za)   • Muscle cramping    • Muscle spasm     Muscle spasms   • Neck arthritis    • Neck muscle spasm     Neck muscle spasm/Cervical strain   • Neck pain    • Obesity    • Plantar fasciitis    • Prostatitis     recurrent   • Psoriasis    • Pulmonary embolus ([facility]) 01/2019    on Xarelltoypseerlipidemia   • Radiculopathy     Radiculopathy / Neuropathy left ar   • Renal insufficiency     followed by Dr. Mendes   • Seborrhea    • Shortness of breath    • Sleep apnea     Obstructive Sleep apnea worsening; uses CPAP   • Urinary frequency    • Vascular disorder    • Vertigo    • Weight gain    • Wellness examination 10/23/2014 "    Annual Wellness Visit       Past Surgical History:   Procedure Laterality Date   • APPENDECTOMY  1965   • CARDIAC CATHETERIZATION  07/29/2010    [facility]   • COLONOSCOPY  01/05/2010   • COLONOSCOPY  10/01/2001   • NASAL SEPTUM SURGERY     • NOSE SURGERY  1999   • RADIOFREQUENCY ABLATION Bilateral 3/2/2021    Procedure: RADIOFREQUENCY ABLATION NERVES---bilateral cervical 2-cervical3;  Surgeon: Edu Tineo MD;  Location: [facility];  Service: Pain Management;  Laterality: Bilateral;   • RADIOFREQUENCY ABLATION Bilateral 12/2/2021    Procedure: RADIOFREQUENCY ABLATION NERVES--bilateral cervical2-3;  Surgeon: Edu Tineo MD;  Location: [facility];  Service: Pain Management;  Laterality: Bilateral;   • TURP / TRANSURETHRAL INCISION / DRAINAGE PROSTATE  10/23/2015    Michael Castro       Social History     Socioeconomic History   • Marital status:    Tobacco Use   • Smoking status: Never Smoker   • Smokeless tobacco: Never Used   • Tobacco comment: caffiene use: soda and coffee   Vaping Use   • Vaping Use: Never used   Substance and Sexual Activity   • Alcohol use: Yes     Comment: 2-3 times a year   • Drug use: No   • Sexual activity: Defer       Family History   Problem Relation Age of Onset   • Arthritis Mother    • Heart disease Mother    • Hypertension Mother    • Heart attack Father    • Heart disease Father    • Hypertension Father    • Arthritis Sister    • Multiple sclerosis Sister    • Alcohol abuse Brother    • Diabetes Son    • Malig Hyperthermia Neg Hx        The following portion of the patient's history were reviewed and updated as appropriate: past medical history, past surgical history, past social history, past family history, allergies, current medications, and problem list.    Review of Systems   Constitutional: Positive for malaise/fatigue.   Cardiovascular: Positive for dyspnea on exertion and leg swelling.   Respiratory: Positive for  "shortness of breath.    Musculoskeletal: Positive for joint pain.   Neurological: Positive for excessive daytime sleepiness.       No Known Allergies      Current Outpatient Medications:   •  apixaban (ELIQUIS) 5 MG tablet tablet, Take 5 mg by mouth Every 12 (Twelve) Hours., Disp: , Rfl:   •  Aspirin (Vazalore) 81 MG capsule, Take 1 tablet by mouth., Disp: , Rfl:   •  furosemide (LASIX) 40 MG tablet, Take 40 mg by mouth Daily., Disp: , Rfl:   •  gabapentin (NEURONTIN) 400 MG capsule, Take 1 capsule by mouth Every Night., Disp: 30 capsule, Rfl: 2  •  NIFEdipine XL (PROCARDIA XL) 60 MG 24 hr tablet, Take 1 tablet by mouth Daily., Disp: 90 tablet, Rfl: 2  •  pantoprazole (Protonix) 20 MG EC tablet, Take 1 tablet by mouth Daily., Disp: 30 tablet, Rfl: 3  •  ramipril (ALTACE) 10 MG capsule, Take 1 capsule by mouth Daily., Disp: 90 capsule, Rfl: 1  •  tamsulosin (FLOMAX) 0.4 MG capsule 24 hr capsule, Take 1 capsule by mouth Every Night., Disp: , Rfl:   •  tiotropium bromide-olodaterol (STIOLTO RESPIMAT) 2.5-2.5 MCG/ACT aerosol solution inhaler, Inhale 2 puffs Daily., Disp: , Rfl:   •  triamcinolone (KENALOG) 0.1 % lotion, Apply  topically to the appropriate area as directed 3 (Three) Times a Day., Disp: 120 mL, Rfl: 1        Objective:     Vitals:    05/05/22 1437 05/05/22 1449 05/05/22 1504   BP: (!) 188/90 180/92 144/88   BP Location: Left arm Right arm Left arm   Patient Position:   Sitting   Pulse: 61     Weight: (!) 153 kg (337 lb)     Height: 188 cm (74\")       Body mass index is 43.27 kg/m².    PHYSICAL EXAM:    Vitals Reviewed.   General Appearance: No acute distress, well developed and well nourished. Obese.   Eyes: Conjunctiva and lids: No erythema, swelling, or discharge. Sclera non-icteric. Glasses.   HENT: Atraumatic, normocephalic. External eyes, ears, and nose normal. No hearing loss noted. Mucous membranes normal. Lips not cyanotic. Neck supple with no tenderness. Wearing mask.   Respiratory: No signs of " respiratory distress. Respiration rhythm and depth normal.   Clear to auscultation. No rales, crackles, rhonchi, or wheezing auscultated.   Cardiovascular:  Jugular Venous Pressure: Normal  Heart Rate and Rhythm: Normal, Heart Sounds: Normal S1 and S2. No S3 or S4 noted.  Murmurs: No murmurs noted. No rubs, thrills, or gallops.   Lower Extremities: +1 right; trace on left.  Gastrointestinal:  Abdomen obese.  Musculoskeletal: Normal movement of extremities.  Skin and Nails: General appearance normal. No pallor, cyanosis, diaphoresis. Skin temperature normal. No clubbing of fingernails.   Psychiatric: Patient alert and oriented to person, place, and time. Speech and behavior appropriate. Normal mood and affect.       ECG 12 Lead    Date/Time: 5/5/2022 2:47 PM  Performed by: Eloina Matt APRN  Authorized by: Eloina Matt APRN   Comparison: compared with previous ECG from 5/4/2021  Similar to previous ECG  Rhythm: sinus rhythm  Rate: normal  BPM: 61  Conduction: conduction normal  ST Segments: ST segments normal  T Waves: T waves normal  QRS axis: normal  Other: no other findings    Clinical impression: normal ECG              Assessment:       Diagnosis Plan   1. Coronary arteriosclerosis in native artery     2. Diastolic dysfunction     3. Dyspnea on exertion     4. Chronic obstructive pulmonary disease, unspecified COPD type ([facility])     5. Essential hypertension     6. Stage 3b chronic kidney disease ([facility])     7. Nonrheumatic aortic valve insufficiency     8. Ascending aorta dilation ([facility])     9. History of pulmonary embolism     10. Obstructive sleep apnea syndrome     11. Morbid obesity with BMI of 40.0-44.9, adult ([facility])            Plan:       1.  Coronary Artery Disease: Single-vessel disease of a small caliber diagonal noted per coronary angiography in 2010.  Denies anginal symptoms.  Continue aspirin and a atorvastatin.    2.  Diastolic Dysfunction: Reports some mild dyspnea and has chronic lower  extremity edema which is most likely multifactorial: He feels like he is well compensated today.  Continue furosemide.    3/4.  Dyspnea: He feels that this is from his COPD.    5.  Hypertension: Elevated today, but he states that typically well controlled.    6.  Stage IIIb Chronic Kidney Disease: Followed by Dr. Beth.    7.  Aortic Insufficiency: Mild per echocardiogram in 2021.    8.  Mildly dilated aorta in the Valsalva and ascending aorta.    9.  History of DVT and pulmonary embolism: Remains on apixaban.    10.  Obstructive Sleep Apnea: He reports daytime somnolence.  I recommended that he have his CPAP machine settings rechecked.    11.  Obesity: BMI is 43 and he would benefit from weight loss.    12.  If he has any further bloody stools, have asked him to report this to his PCP.    13.  I recommended a 1 year follow-up visit with Dr. Pradeep Obrien.    As always, it has been a pleasure to participate in your patient's care. Thank you.       Sincerely,         YANIRA Anaya  [facility] Cardiology      · Dictated utilizing Dragon Dictation  · COVID-19 Precautions - Patient was compliant in wearing a mask. When I saw the patient, I used appropriate personal protective equipment (PPE) including mask and eye shield (standard procedure).  Additionally, I used gown and gloves if indicated.  Hand hygiene was completed before and after seeing the patient.

## 2022-05-06 PROBLEM — Z86.711 HISTORY OF PULMONARY EMBOLISM: Status: ACTIVE | Noted: 2019-01-13

## 2022-05-09 RX ORDER — ATORVASTATIN CALCIUM 40 MG/1
TABLET, FILM COATED ORAL
Qty: 90 TABLET | Refills: 1 | Status: SHIPPED | OUTPATIENT
Start: 2022-05-09 | End: 2022-11-07

## 2022-05-09 NOTE — TELEPHONE ENCOUNTER
Rx Refill Note  Requested Prescriptions     Pending Prescriptions Disp Refills   • atorvastatin (LIPITOR) 40 MG tablet [Pharmacy Med Name: ATORVASTATIN 40 MG TABLET] 90 tablet 1     Sig: TAKE ONE TABLET BY MOUTH DAILY      Last office visit with prescribing clinician: 4/11/2022      Next office visit with prescribing clinician: 5/16/2022            Fredrick Castro MA  05/09/22, 12:37 EDT

## 2022-05-16 ENCOUNTER — OFFICE VISIT (OUTPATIENT)
Dept: FAMILY MEDICINE CLINIC | Facility: CLINIC | Age: 77
End: 2022-05-16

## 2022-05-16 VITALS
HEIGHT: 74 IN | BODY MASS INDEX: 40.43 KG/M2 | HEART RATE: 65 BPM | SYSTOLIC BLOOD PRESSURE: 138 MMHG | TEMPERATURE: 96.9 F | RESPIRATION RATE: 18 BRPM | WEIGHT: 315 LBS | DIASTOLIC BLOOD PRESSURE: 84 MMHG | OXYGEN SATURATION: 94 %

## 2022-05-16 DIAGNOSIS — I10 PRIMARY HYPERTENSION: Chronic | ICD-10-CM

## 2022-05-16 DIAGNOSIS — L03.115 CELLULITIS OF RIGHT LOWER EXTREMITY: Primary | ICD-10-CM

## 2022-05-16 DIAGNOSIS — L98.9 SKIN LESION OF RIGHT LOWER EXTREMITY: ICD-10-CM

## 2022-05-16 DIAGNOSIS — Z91.199 MEDICALLY NONCOMPLIANT: Chronic | ICD-10-CM

## 2022-05-16 PROCEDURE — 99214 OFFICE O/P EST MOD 30 MIN: CPT | Performed by: INTERNAL MEDICINE

## 2022-05-17 ENCOUNTER — TELEPHONE (OUTPATIENT)
Dept: FAMILY MEDICINE CLINIC | Facility: CLINIC | Age: 77
End: 2022-05-17

## 2022-05-17 NOTE — TELEPHONE ENCOUNTER
Caller: James Loaiza    Relationship to patient: Self    Best call back number: [phone]    Patient is needing: PATIENT WANTED TO LET DR. HEARD KNOW THAT HE PUTS AQUAPHOR ON HIS LEGS AND THE DERMATOLOGIST PRESCRIBED TRIAMCINOLONE ACETONITE CREAM FOR HIS ARM AND LEGS

## 2022-05-19 RX ORDER — FUROSEMIDE 40 MG/1
40 TABLET ORAL 2 TIMES DAILY
Qty: 40 TABLET | Refills: 1 | Status: SHIPPED | OUTPATIENT
Start: 2022-05-19

## 2022-05-20 NOTE — PROGRESS NOTES
05/16/2022    CC: Rectal Bleeding, Follow-up, and Edema (Right leg)  .        HPI  This 76-year-old patient presents for follow-up of cellulitis.  His wound on his right leg was addressed by the wound care center and he has made remarkable progress.  Actually the wound cleared but appears to be restarting over the past 2 weeks he relates.       Subjective   James Loaiza is a 76 y.o. male.      The following portions of the patient's history were reviewed and updated as appropriate: allergies, current medications, past family history, past medical history, past social history, past surgical history and problem list.    Problem List  Patient Active Problem List   Diagnosis   • Lumbar herniated disc L3-L5 3/16/16 Open MRI   • Aortic valve insufficiency   • Coronary arteriosclerosis in native artery   • Edema   • Dyspnea on exertion   • Fatigue   • Left ventricular hypertrophy   • Obstructive sleep apnea syndrome   • Arthritis of left hip   • Morbid obesity with BMI of 40.0-44.9, adult ([facility])   • Intermittent dysphagia   • Hyperlipidemia 1999   • BPH (benign prostatic hypertrophy)   • Left cervical radiculopathy   • Rotator cuff tear, left   • Plantar fasciitis   • Hypogonadism male   • Vitamin D deficiency disease   • Renal cyst, left   • Preventative health care   • Medication management   • Chronic low back pain   • Lumbar spondylolysis   • Headache   • Other chronic pain   • History of pulmonary embolism   • Chronic obstructive pulmonary disease ([facility])   • Osteoarthritis of right shoulder   • Chronic anticoagulation   • Cervical stenosis of spine   • Bilateral occipital neuralgia   • Shoulder pain   • Cervical facet joint syndrome   • Open wound of right lower leg   • REJI (acute kidney injury) ([facility])   • Constipation   • Venous insufficiency   • Analgesic nephropathy   • Anemia   • Diastolic dysfunction   • Hypocalcemia   • Hypertension   • Localized swelling, mass and lump, lower limb, bilateral   • Stage 3b chronic  "kidney disease ([facility])   • Ascending aorta dilation ([facility])       Past Medical History  Past Medical History:   Diagnosis Date   • Abnormal EKG     referring to cardiology   • Anxiety    • Arthritis    • Ascending aorta dilation ([facility])    • Back pain     worsening   • BPH (benign prostatic hyperplasia)     BPH/Nocturia/ Urinary Frequency   • Breast nodule     right   • Cardiomegaly     Cardiomegaly/ SOB with exertion   • Circulation problem    • Constipation    • COPD (chronic obstructive pulmonary disease) ([facility])    • Deviated septum    • DJD (degenerative joint disease)     DJD Knees   • DVT (deep venous thrombosis) ([facility])    • Dysphagia     solids and liquids - resolved with normal studies   • Encounter for annual health examination 11/14/2013    Annual Health Assessment   • Enlarged prostate    • Fatigue     Extreme fatigue   • Hyperlipidemia 1999   • Hypertension 1999    followed Dr. Marcelo   • Hypogonadism male    • Left hip pain     and DJD   • Left leg pain    • Low back pain    • Moist mucous membranes of ear, nose, and throat     \"Mucous in throat\"   • Morbid obesity ([facility])     (BMI-41.2za)   • Muscle cramping    • Muscle spasm     Muscle spasms   • Neck arthritis    • Neck muscle spasm     Neck muscle spasm/Cervical strain   • Neck pain    • Obesity    • Plantar fasciitis    • Prostatitis     recurrent   • Psoriasis    • Pulmonary embolus ([facility]) 01/2019    on Xarelltoypseerlipidemia   • Radiculopathy     Radiculopathy / Neuropathy left ar   • Renal insufficiency     followed by Dr. Mendes   • Seborrhea    • Shortness of breath    • Sleep apnea     Obstructive Sleep apnea worsening; uses CPAP   • Urinary frequency    • Vascular disorder    • Vertigo    • Weight gain    • Wellness examination 10/23/2014    Annual Wellness Visit       Surgical History  Past Surgical History:   Procedure Laterality Date   • APPENDECTOMY  1965   • CARDIAC CATHETERIZATION  07/29/2010    [facility]   • " COLONOSCOPY  01/05/2010   • COLONOSCOPY  10/01/2001   • NASAL SEPTUM SURGERY     • NOSE SURGERY  1999   • RADIOFREQUENCY ABLATION Bilateral 3/2/2021    Procedure: RADIOFREQUENCY ABLATION NERVES---bilateral cervical 2-cervical3;  Surgeon: Edu Tineo MD;  Location: [facility];  Service: Pain Management;  Laterality: Bilateral;   • RADIOFREQUENCY ABLATION Bilateral 12/2/2021    Procedure: RADIOFREQUENCY ABLATION NERVES--bilateral cervical2-3;  Surgeon: Edu Tineo MD;  Location: [facility];  Service: Pain Management;  Laterality: Bilateral;   • TURP / TRANSURETHRAL INCISION / DRAINAGE PROSTATE  10/23/2015    Michael Castro       Family History  Family History   Problem Relation Age of Onset   • Arthritis Mother    • Heart disease Mother    • Hypertension Mother    • Heart attack Father    • Heart disease Father    • Hypertension Father    • Arthritis Sister    • Multiple sclerosis Sister    • Alcohol abuse Brother    • Diabetes Son    • Malig Hyperthermia Neg Hx        Social History  Social History    Tobacco Use      Smoking status: Never Smoker      Smokeless tobacco: Never Used      Tobacco comment: caffiene use: soda and coffee       Is the Patient a current tobacco user? No    Allergies  No Known Allergies    Current Medications    Current Outpatient Medications:   •  apixaban (ELIQUIS) 5 MG tablet tablet, Take 5 mg by mouth Every 12 (Twelve) Hours., Disp: , Rfl:   •  Aspirin (Vazalore) 81 MG capsule, Take 1 tablet by mouth., Disp: , Rfl:   •  atorvastatin (LIPITOR) 40 MG tablet, TAKE ONE TABLET BY MOUTH DAILY, Disp: 90 tablet, Rfl: 1  •  gabapentin (NEURONTIN) 400 MG capsule, Take 1 capsule by mouth Every Night., Disp: 30 capsule, Rfl: 2  •  NIFEdipine XL (PROCARDIA XL) 60 MG 24 hr tablet, Take 1 tablet by mouth Daily., Disp: 90 tablet, Rfl: 2  •  pantoprazole (Protonix) 20 MG EC tablet, Take 1 tablet by mouth Daily., Disp: 30 tablet, Rfl: 3  •  ramipril (ALTACE) 10 MG capsule, Take 1  capsule by mouth Daily., Disp: 90 capsule, Rfl: 1  •  tamsulosin (FLOMAX) 0.4 MG capsule 24 hr capsule, Take 1 capsule by mouth Every Night., Disp: , Rfl:   •  tiotropium bromide-olodaterol (STIOLTO RESPIMAT) 2.5-2.5 MCG/ACT aerosol solution inhaler, Inhale 2 puffs Daily., Disp: , Rfl:   •  triamcinolone (KENALOG) 0.1 % lotion, Apply  topically to the appropriate area as directed 3 (Three) Times a Day., Disp: 120 mL, Rfl: 1  •  furosemide (Lasix) 40 MG tablet, Take 1 tablet by mouth 2 (Two) Times a Day., Disp: 40 tablet, Rfl: 1     Review of System  Review of Systems   Eyes: Negative.    Respiratory: Negative.    Cardiovascular: Negative.    Gastrointestinal: Negative.    Endocrine: Negative.    Genitourinary: Negative.    Musculoskeletal: Negative.    Skin: Positive for color change and wound.        Patient cellulitis appears to be recurring.  Also lesions consistent with plaque psoriasis.  Along the right lower extremity.  He lesions are hypopigmented along the tibia on the right.   Allergic/Immunologic: Negative.    Neurological: Negative.      I have reviewed and confirmed the accuracy of the ROS as documented by the MA/LPN/RN Magdiel Bowens MD    Vitals:    05/16/22 1206   BP: 138/84   Pulse: 65   Resp: 18   Temp: 96.9 °F (36.1 °C)   SpO2: 94%     Body mass index is 42.63 kg/m².    Objective     Physical Exam  Physical Exam  Cardiovascular:      Pulses: Normal pulses.      Heart sounds: Normal heart sounds.   Pulmonary:      Effort: Pulmonary effort is normal.      Breath sounds: Normal breath sounds.   Musculoskeletal:         General: Normal range of motion.   Skin:     Findings: Lesion and rash present.      Comments: Hypopigmented lesions possibly from plaque psoriasis appearing on the right lower extremity         Assessment & Plan      This 76-year-old patient presents at this time for follow-up of cellulitis.  The extensive cellulitis on the right lower extremity had resolved until about 2 weeks ago  he relates.  He was seen by dermatologist and started on triamcinolone cream but the patient supplemented this with various other OTC creams that he applied as well.  He has developed 3 whitish to hypopigmented areas on the right tibial area in addition to the increase erythema appreciated on the right lower extremity.  He would like to be seen by another dermatologist as he relates that the previous dermatologist spent little or no time and did not address his concerns.    Patient also relates that the Lasix that he was taking was inconsistently taken by him hence the increase in swelling in his lower extremities.  Diagnoses and all orders for this visit:    1. Cellulitis of right lower extremity (Primary)  Comments:  Recurrence  Orders:  -     Ambulatory Referral to Wound Clinic    2. Medically noncompliant    3. Primary hypertension  Comments:  Poorly controlled    4. Skin lesion of right lower extremity  -     Ambulatory Referral to Dermatology    Other orders  -     furosemide (Lasix) 40 MG tablet; Take 1 tablet by mouth 2 (Two) Times a Day.  Dispense: 40 tablet; Refill: 1    Plan:  1.)  Restart Lasix 40 mg 1 tab p.o. every morning  2.)  Referral to Dr. Pollard, dermatologist for evaluation of lesions on her right lower extremity.  3.)  Follow-up in the next several weeks.  4.)  Referral back to wound care for evaluation and treatment         Magdiel Bowens MD  05/16/2022

## 2022-05-22 ENCOUNTER — APPOINTMENT (OUTPATIENT)
Dept: GENERAL RADIOLOGY | Facility: HOSPITAL | Age: 77
End: 2022-05-22

## 2022-05-22 ENCOUNTER — HOSPITAL ENCOUNTER (EMERGENCY)
Facility: HOSPITAL | Age: 77
Discharge: HOME | End: 2022-05-22
Attending: EMERGENCY MEDICINE | Admitting: EMERGENCY MEDICINE

## 2022-05-22 ENCOUNTER — APPOINTMENT (OUTPATIENT)
Dept: CARDIOLOGY | Facility: HOSPITAL | Age: 77
End: 2022-05-22

## 2022-05-22 VITALS
HEIGHT: 74 IN | RESPIRATION RATE: 16 BRPM | HEART RATE: 58 BPM | BODY MASS INDEX: 40.43 KG/M2 | OXYGEN SATURATION: 97 % | DIASTOLIC BLOOD PRESSURE: 91 MMHG | SYSTOLIC BLOOD PRESSURE: 168 MMHG | WEIGHT: 315 LBS | TEMPERATURE: 97.5 F

## 2022-05-22 DIAGNOSIS — R60.0 PEDAL EDEMA: ICD-10-CM

## 2022-05-22 DIAGNOSIS — S29.019A THORACIC MYOFASCIAL STRAIN, INITIAL ENCOUNTER: Primary | ICD-10-CM

## 2022-05-22 LAB
BH CV LOWER VASCULAR LEFT COMMON FEMORAL AUGMENT: NORMAL
BH CV LOWER VASCULAR LEFT COMMON FEMORAL COMPETENT: NORMAL
BH CV LOWER VASCULAR LEFT COMMON FEMORAL COMPRESS: NORMAL
BH CV LOWER VASCULAR LEFT COMMON FEMORAL PHASIC: NORMAL
BH CV LOWER VASCULAR LEFT COMMON FEMORAL SPONT: NORMAL
BH CV LOWER VASCULAR RIGHT COMMON FEMORAL AUGMENT: NORMAL
BH CV LOWER VASCULAR RIGHT COMMON FEMORAL COMPETENT: NORMAL
BH CV LOWER VASCULAR RIGHT COMMON FEMORAL COMPRESS: NORMAL
BH CV LOWER VASCULAR RIGHT COMMON FEMORAL PHASIC: NORMAL
BH CV LOWER VASCULAR RIGHT COMMON FEMORAL SPONT: NORMAL
BH CV LOWER VASCULAR RIGHT DISTAL FEMORAL COMPRESS: NORMAL
BH CV LOWER VASCULAR RIGHT GASTRONEMIUS COMPRESS: NORMAL
BH CV LOWER VASCULAR RIGHT GREATER SAPH AK COMPRESS: NORMAL
BH CV LOWER VASCULAR RIGHT GREATER SAPH BK COMPRESS: NORMAL
BH CV LOWER VASCULAR RIGHT LESSER SAPH COMPRESS: NORMAL
BH CV LOWER VASCULAR RIGHT MID FEMORAL AUGMENT: NORMAL
BH CV LOWER VASCULAR RIGHT MID FEMORAL COMPETENT: NORMAL
BH CV LOWER VASCULAR RIGHT MID FEMORAL COMPRESS: NORMAL
BH CV LOWER VASCULAR RIGHT MID FEMORAL PHASIC: NORMAL
BH CV LOWER VASCULAR RIGHT MID FEMORAL SPONT: NORMAL
BH CV LOWER VASCULAR RIGHT PERONEAL COMPRESS: NORMAL
BH CV LOWER VASCULAR RIGHT POPLITEAL AUGMENT: NORMAL
BH CV LOWER VASCULAR RIGHT POPLITEAL COMPETENT: NORMAL
BH CV LOWER VASCULAR RIGHT POPLITEAL COMPRESS: NORMAL
BH CV LOWER VASCULAR RIGHT POPLITEAL PHASIC: NORMAL
BH CV LOWER VASCULAR RIGHT POPLITEAL SPONT: NORMAL
BH CV LOWER VASCULAR RIGHT POSTERIOR TIBIAL COMPRESS: NORMAL
BH CV LOWER VASCULAR RIGHT PROFUNDA FEMORAL COMPRESS: NORMAL
BH CV LOWER VASCULAR RIGHT PROXIMAL FEMORAL COMPRESS: NORMAL
BH CV LOWER VASCULAR RIGHT SAPHENOFEMORAL JUNCTION COMPRESS: NORMAL
MAXIMAL PREDICTED HEART RATE: 146 BPM
STRESS TARGET HR: 124 BPM

## 2022-05-22 PROCEDURE — 99283 EMERGENCY DEPT VISIT LOW MDM: CPT

## 2022-05-22 PROCEDURE — 93971 EXTREMITY STUDY: CPT

## 2022-05-22 PROCEDURE — 72072 X-RAY EXAM THORAC SPINE 3VWS: CPT

## 2022-05-22 RX ORDER — LIDOCAINE 50 MG/G
1 PATCH TOPICAL
Status: DISCONTINUED | OUTPATIENT
Start: 2022-05-22 | End: 2022-05-22 | Stop reason: HOSPADM

## 2022-05-22 RX ORDER — LIDOCAINE 50 MG/G
1 PATCH TOPICAL EVERY 24 HOURS
Qty: 6 EACH | Refills: 0 | Status: SHIPPED | OUTPATIENT
Start: 2022-05-22 | End: 2023-04-04

## 2022-05-22 RX ORDER — CYCLOBENZAPRINE HCL 10 MG
10 TABLET ORAL 3 TIMES DAILY PRN
Qty: 12 TABLET | Refills: 0 | Status: ON HOLD | OUTPATIENT
Start: 2022-05-22 | End: 2022-10-28

## 2022-05-22 RX ORDER — HYDROCODONE BITARTRATE AND ACETAMINOPHEN 7.5; 325 MG/1; MG/1
1 TABLET ORAL ONCE
Status: COMPLETED | OUTPATIENT
Start: 2022-05-22 | End: 2022-05-22

## 2022-05-22 RX ADMIN — LIDOCAINE 1 PATCH: 50 PATCH TOPICAL at 15:45

## 2022-05-22 RX ADMIN — HYDROCODONE BITARTRATE AND ACETAMINOPHEN 1 TABLET: 7.5; 325 TABLET ORAL at 15:45

## 2022-05-22 NOTE — PROGRESS NOTES
Preliminary negative results of today's right lower extremity venous duplex were called to Dr. Godfrey.

## 2022-05-22 NOTE — ED TRIAGE NOTES
Pt arrives to ER via pv with complaints of lower back pain. Pt says he was sitting on a device yesterday that collapsed on him and he fell on his bottom. Pt denies any numbness or tingling in his legs or any loss of urination. Pt masked on arrival, this RN in ppe.

## 2022-05-22 NOTE — ED PROVIDER NOTES
EMERGENCY DEPARTMENT ENCOUNTER    CHIEF COMPLAINT  Chief Complaint: Back pain  History given by: Patient  History limited by: Nothing  Room Number: 24/24  PMD: Magdiel Bowens MD  Vascular surgeon: Dr Ochoa      HPI:  Pt is a 76 y.o. male presents complaining of back pain after a table broke that he was sitting on causing the patient to fall in the sitting position onto his buttocks on the floor.  Patient reports the pain is worse with movement, lying flat, denies weakness, numbness, chest pain, shortness of air, abdominal pain, nausea/vomiting.  Patient reports he has had chronic swelling of bilateral lower extremities right greater than left but right leg has been worse than usual for him for the past week.  Patient reports he is taking Eliquis for history of blood clots, last dose of Eliquis is 11 AM today.  Patient reports he is taken ibuprofen 400 mg 2 doses since his fall with mild relief    Duration: 1 day  Associated Symptoms: Nothing  Aggravating Factors: Movement, lying flat  Alleviating Factors: Rest  Treatment before arrival: Ibuprofen 400 mg x 2 doses    PAST MEDICAL HISTORY  Active Ambulatory Problems     Diagnosis Date Noted   • Lumbar herniated disc L3-L5 3/16/16 Open MRI 06/13/2016   • Aortic valve insufficiency 06/13/2016   • Coronary arteriosclerosis in native artery 06/13/2016   • Edema 06/13/2016   • Dyspnea on exertion 06/13/2016   • Fatigue 06/13/2016   • Left ventricular hypertrophy 06/13/2016   • Obstructive sleep apnea syndrome 06/13/2016   • Arthritis of left hip 06/13/2016   • Morbid obesity with BMI of 40.0-44.9, adult (HCC) 06/13/2016   • Intermittent dysphagia 06/13/2016   • Hyperlipidemia 1999 06/13/2016   • BPH (benign prostatic hypertrophy) 06/13/2016   • Left cervical radiculopathy 06/13/2016   • Rotator cuff tear, left 06/13/2016   • Plantar fasciitis 06/13/2016   • Hypogonadism male 06/13/2016   • Vitamin D deficiency disease 06/13/2016   • Renal cyst, left 06/13/2016   •  Preventative health care 09/22/2016   • Medication management 09/22/2016   • Chronic low back pain 03/01/2017   • Lumbar spondylolysis 03/01/2017   • Headache 03/13/2017   • Other chronic pain 07/30/2018   • History of pulmonary embolism 01/13/2019   • Chronic obstructive pulmonary disease (HCC) 01/29/2019   • Osteoarthritis of right shoulder 01/30/2019   • Chronic anticoagulation 02/20/2019   • Cervical stenosis of spine 09/21/2020   • Bilateral occipital neuralgia 01/28/2021   • Shoulder pain 01/28/2021   • Cervical facet joint syndrome 02/22/2021   • Open wound of right lower leg 07/24/2021   • REJI (acute kidney injury) ([facility]) 07/25/2021   • Constipation 07/25/2021   • Venous insufficiency 07/25/2021   • 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   • Anemia 10/15/2021   • Diastolic dysfunction 10/15/2021   • Hypocalcemia 10/15/2021   • Hypertension 06/13/2016   • Localized swelling, mass and lump, lower limb, bilateral 02/18/2022   • Stage 3b chronic kidney disease ([facility])    • Ascending aorta dilation ([facility])      Resolved Ambulatory Problems     Diagnosis Date Noted   • Abnormal electrocardiogram 06/13/2016   • Abnormal weight gain 06/13/2016   • Benign essential hypertension ([place] 1999) 06/13/2016   • Hx of pulmonary embolus 7/15 06/13/2016   • Chronic kidney disease ([place]) 06/13/2016   • Cardiomegaly 06/13/2016     Past Medical History:   Diagnosis Date   • Abnormal EKG    • Anxiety    • Arthritis    • Back pain    • BPH (benign prostatic hyperplasia)    • Breast nodule    • Circulation problem    • COPD (chronic obstructive pulmonary disease) ([facility])    • Deviated septum    • DJD (degenerative joint disease)    • DVT (deep venous thrombosis) ([facility])    • Dysphagia    • Encounter for annual health examination 11/14/2013   • Enlarged prostate    • Left hip pain    • Left leg pain    • Low back pain    • Moist mucous membranes of ear, nose, and throat    • Morbid obesity ([facility])    • Muscle cramping    • Muscle spasm    • Neck  arthritis    • Neck muscle spasm    • Neck pain    • Obesity    • Prostatitis    • Psoriasis    • Pulmonary embolus (HCC) 01/2019   • Radiculopathy    • Renal insufficiency    • Seborrhea    • Shortness of breath    • Sleep apnea    • Urinary frequency    • Vascular disorder    • Vertigo    • Weight gain    • Wellness examination 10/23/2014       PAST SURGICAL HISTORY  Past Surgical History:   Procedure Laterality Date   • APPENDECTOMY  1965   • CARDIAC CATHETERIZATION  07/29/2010    [facility]   • COLONOSCOPY  01/05/2010   • COLONOSCOPY  10/01/2001   • NASAL SEPTUM SURGERY     • NOSE SURGERY  1999   • RADIOFREQUENCY ABLATION Bilateral 3/2/2021    Procedure: RADIOFREQUENCY ABLATION NERVES---bilateral cervical 2-cervical3;  Surgeon: Edu Tineo MD;  Location: [facility];  Service: Pain Management;  Laterality: Bilateral;   • RADIOFREQUENCY ABLATION Bilateral 12/2/2021    Procedure: RADIOFREQUENCY ABLATION NERVES--bilateral cervical2-3;  Surgeon: Edu Tineo MD;  Location: [facility];  Service: Pain Management;  Laterality: Bilateral;   • TURP / TRANSURETHRAL INCISION / DRAINAGE PROSTATE  10/23/2015    Michael Castro       FAMILY HISTORY  Family History   Problem Relation Age of Onset   • Arthritis Mother    • Heart disease Mother    • Hypertension Mother    • Heart attack Father    • Heart disease Father    • Hypertension Father    • Arthritis Sister    • Multiple sclerosis Sister    • Alcohol abuse Brother    • Diabetes Son    • Malig Hyperthermia Neg Hx        SOCIAL HISTORY  Social History     Socioeconomic History   • Marital status:    Tobacco Use   • Smoking status: Never Smoker   • Smokeless tobacco: Never Used   • Tobacco comment: caffiene use: soda and coffee   Vaping Use   • Vaping Use: Never used   Substance and Sexual Activity   • Alcohol use: Yes     Comment: 2-3 times a year   • Drug use: No   • Sexual activity: Defer       ALLERGIES  Patient has no  known allergies.    REVIEW OF SYSTEMS  Review of Systems   Constitutional: Negative for chills and fever.   HENT: Negative for sore throat and trouble swallowing.    Eyes: Negative for visual disturbance.   Respiratory: Negative for cough and shortness of breath.    Cardiovascular: Positive for leg swelling. Negative for chest pain.   Gastrointestinal: Negative for abdominal pain, diarrhea and vomiting.   Endocrine: Negative.    Genitourinary: Negative for decreased urine volume and frequency.   Musculoskeletal: Positive for back pain. Negative for neck pain.   Skin: Negative for rash.   Allergic/Immunologic: Negative.    Neurological: Negative for weakness and numbness.   Hematological: Negative.    Psychiatric/Behavioral: Negative.    All other systems reviewed and are negative.      PHYSICAL EXAM  ED Triage Vitals [05/22/22 1300]   Temp Heart Rate Resp BP SpO2   97.5 °F (36.4 °C) 65 16 -- 98 %      Temp src Heart Rate Source Patient Position BP Location FiO2 (%)   Tympanic Monitor -- -- --       Physical Exam  Vitals and nursing note reviewed.   Constitutional:       General: He is in acute distress.   HENT:      Head: Normocephalic and atraumatic.   Cardiovascular:      Rate and Rhythm: Normal rate and regular rhythm.      Pulses:           Posterior tibial pulses are 2+ on the right side and 2+ on the left side.      Heart sounds: Normal heart sounds. No murmur heard.  Pulmonary:      Effort: Pulmonary effort is normal. No respiratory distress.      Breath sounds: Normal breath sounds. No wheezing.   Abdominal:      General: Bowel sounds are normal.      Palpations: Abdomen is soft.      Tenderness: There is no abdominal tenderness. There is no guarding or rebound.   Musculoskeletal:         General: Normal range of motion.      Cervical back: Normal and normal range of motion.      Thoracic back: Tenderness ( Bilateral paravertebral lower thoracic levels) and bony tenderness ( T7-T11) present.      Lumbar  back: Normal.      Comments: Sensation, range of motion, plantar and dorsiflexion, hip flexion bilateral legs normal.   Skin:     General: Skin is warm and dry.   Neurological:      Mental Status: He is alert and oriented to person, place, and time.   Psychiatric:         Mood and Affect: Mood and affect normal.             RADIOLOGY  XR Spine Thoracic 3 View   Final Result         CONCLUSION: Extensive degenerative change similar to 2015, no acute  osseous abnormality has developed. Special attention T8-T11    Duplex venous lower extremity right negative for DVT    I ordered the above noted radiological studies. Interpreted by radiologist. Viewed by me in PACS.       PROCEDURES  Procedures      PROGRESS AND CONSULTS  ED Course as of 05/22/22 2026   Sun May 22, 2022   1643 Ultrasound shows vascular study right leg negative for acute DVT [TO]      ED Course User Index  [TO] Eloina Godfrey MD           MEDICAL DECISION MAKING  Results were reviewed/discussed with the patient and they were also made aware of online access. Pt also made aware that some labs, such as cultures, will not be resulted during ER visit and followup with PMD is necessary.       MDM       DIAGNOSIS  Final diagnoses:   Thoracic myofascial strain, initial encounter   Pedal edema       DISPOSITION  DISCHARGE    Patient discharged in stable condition.    Reviewed implications of results, diagnosis, meds, responsibility to follow up, warning signs and symptoms of possible worsening, potential complications and reasons to return to ER    Patient/Family voiced understanding of above instructions.    Discussed plan for discharge, as there is no emergent indication for admission. Patient referred to primary care provider for BP management due to today's BP. Pt/family is agreeable and understands need for follow up and repeat testing.  Pt is aware that discharge does not mean that nothing is wrong but it indicates no emergency is present that requires  admission and they must continue care with follow-up as given below or physician of their choice.     FOLLOW-UP  Magdiel Bowens MD  [address] [id number] [phone]    Schedule an appointment as soon as possible for a visit in 3 days  EVEN IF WELL         Medication List      New Prescriptions    cyclobenzaprine 10 MG tablet  Commonly known as: FLEXERIL  Take 1 tablet by mouth 3 (Three) Times a Day As Needed for Muscle Spasms.     lidocaine 5 %  Commonly known as: LIDODERM  Place 1 patch on the skin as directed by provider Daily. Remove & Discard patch within 12 hours or as directed by MD           Where to Get Your Medications      These medications were sent to [address] - [address] [facility] - [phone]  - [phone]   [address] [place] [address]    Phone: [phone]   · cyclobenzaprine 10 MG tablet  · lidocaine 5 %           Latest Documented Vital Signs:  As of 20:26 EDT  BP- 168/91 HR- 58 Temp- 97.5 °F (36.4 °C) (Tympanic) O2 sat- 97%    --  Patient was wearing facemask when I entered the room and throughout our encounter. Full protective equipment was worn throughout this patient encounter including a face mask, eye protection and gloves. Hand hygiene was performed before donning protective equipment and after removal when leaving the room.      Eloina Godfrey MD  05/22/22 2026

## 2022-05-24 ENCOUNTER — OFFICE VISIT (OUTPATIENT)
Dept: FAMILY MEDICINE CLINIC | Facility: CLINIC | Age: 77
End: 2022-05-24

## 2022-05-24 VITALS
WEIGHT: 315 LBS | SYSTOLIC BLOOD PRESSURE: 138 MMHG | HEIGHT: 74 IN | BODY MASS INDEX: 40.43 KG/M2 | DIASTOLIC BLOOD PRESSURE: 72 MMHG | RESPIRATION RATE: 16 BRPM

## 2022-05-24 DIAGNOSIS — L03.115 CELLULITIS OF RIGHT LOWER EXTREMITY: Primary | Chronic | ICD-10-CM
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
## 2022-05-24 PROCEDURE — 99213 OFFICE O/P EST LOW 20 MIN: CPT | Performed by: INTERNAL MEDICINE

## 2022-05-28 NOTE — PROGRESS NOTES
This 76-year-old patient presents for follow-up today after having been seen in the emergency room after a fall.  Patient relates that he was sitting in a chair and it collapsed.  Note is made that his BMI is 42.7.  05/24/2022    CC: Fall (Follow up from [facility] ER)  .        HPI  History of Present Illness     Subjective   James Loaiza is a 76 y.o. male.      The following portions of the patient's history were reviewed and updated as appropriate: allergies, current medications, past family history, past medical history, past social history, past surgical history and problem list.    Problem List  Patient Active Problem List   Diagnosis   • Lumbar herniated disc L3-L5 3/16/16 Open MRI   • Aortic valve insufficiency   • Coronary arteriosclerosis in native artery   • Edema   • Dyspnea on exertion   • Fatigue   • Left ventricular hypertrophy   • Obstructive sleep apnea syndrome   • Arthritis of left hip   • Morbid obesity with BMI of 40.0-44.9, adult ([facility])   • Intermittent dysphagia   • Hyperlipidemia 1999   • BPH (benign prostatic hypertrophy)   • Left cervical radiculopathy   • Rotator cuff tear, left   • Plantar fasciitis   • Hypogonadism male   • Vitamin D deficiency disease   • Renal cyst, left   • Preventative health care   • Medication management   • Chronic low back pain   • Lumbar spondylolysis   • Headache   • Other chronic pain   • History of pulmonary embolism   • Chronic obstructive pulmonary disease (HCC)   • Osteoarthritis of right shoulder   • Chronic anticoagulation   • Cervical stenosis of spine   • Bilateral occipital neuralgia   • Shoulder pain   • Cervical facet joint syndrome   • Open wound of right lower leg   • REJI (acute kidney injury) ([facility])   • Constipation   • Venous insufficiency   • Analgesic nephropathy   • Anemia   • Diastolic dysfunction   • Hypocalcemia   • Hypertension   • Localized swelling, mass and lump, lower limb, bilateral   • Stage 3b chronic kidney disease (HCC)   • Ascending  "aorta dilation ([facility])       Past Medical History  Past Medical History:   Diagnosis Date   • Abnormal EKG     referring to cardiology   • Anxiety    • Arthritis    • Ascending aorta dilation ([facility])    • Back pain     worsening   • BPH (benign prostatic hyperplasia)     BPH/Nocturia/ Urinary Frequency   • Breast nodule     right   • Cardiomegaly     Cardiomegaly/ SOB with exertion   • Circulation problem    • Constipation    • COPD (chronic obstructive pulmonary disease) ([facility])    • Deviated septum    • DJD (degenerative joint disease)     DJD Knees   • DVT (deep venous thrombosis) ([facility])    • Dysphagia     solids and liquids - resolved with normal studies   • Encounter for annual health examination 11/14/2013    Annual Health Assessment   • Enlarged prostate    • Fatigue     Extreme fatigue   • Hyperlipidemia 1999   • Hypertension 1999    followed Dr. Marcelo   • Hypogonadism male    • Left hip pain     and DJD   • Left leg pain    • Low back pain    • Moist mucous membranes of ear, nose, and throat     \"Mucous in throat\"   • Morbid obesity ([facility])     (BMI-41.2za)   • Muscle cramping    • Muscle spasm     Muscle spasms   • Neck arthritis    • Neck muscle spasm     Neck muscle spasm/Cervical strain   • Neck pain    • Obesity    • Plantar fasciitis    • Prostatitis     recurrent   • Psoriasis    • Pulmonary embolus ([facility]) 01/2019    on Xarelltoypseerlipidemia   • Radiculopathy     Radiculopathy / Neuropathy left ar   • Renal insufficiency     followed by Dr. Mendes   • Seborrhea    • Shortness of breath    • Sleep apnea     Obstructive Sleep apnea worsening; uses CPAP   • Urinary frequency    • Vascular disorder    • Vertigo    • Weight gain    • Wellness examination 10/23/2014    Annual Wellness Visit       Surgical History  Past Surgical History:   Procedure Laterality Date   • APPENDECTOMY  1965   • CARDIAC CATHETERIZATION  07/29/2010    [facility]   • COLONOSCOPY  01/05/2010   • COLONOSCOPY  " 10/01/2001   • NASAL SEPTUM SURGERY     • NOSE SURGERY  1999   • RADIOFREQUENCY ABLATION Bilateral 3/2/2021    Procedure: RADIOFREQUENCY ABLATION NERVES---bilateral cervical 2-cervical3;  Surgeon: Edu Tineo MD;  Location: [facility];  Service: Pain Management;  Laterality: Bilateral;   • RADIOFREQUENCY ABLATION Bilateral 12/2/2021    Procedure: RADIOFREQUENCY ABLATION NERVES--bilateral cervical2-3;  Surgeon: Edu Tineo MD;  Location: [facility];  Service: Pain Management;  Laterality: Bilateral;   • TURP / TRANSURETHRAL INCISION / DRAINAGE PROSTATE  10/23/2015    Michael Castro       Family History  Family History   Problem Relation Age of Onset   • Arthritis Mother    • Heart disease Mother    • Hypertension Mother    • Heart attack Father    • Heart disease Father    • Hypertension Father    • Arthritis Sister    • Multiple sclerosis Sister    • Alcohol abuse Brother    • Diabetes Son    • Malig Hyperthermia Neg Hx        Social History  Social History    Tobacco Use      Smoking status: Never Smoker      Smokeless tobacco: Never Used      Tobacco comment: caffiene use: soda and coffee       Is the Patient a current tobacco user? No    Allergies  No Known Allergies    Current Medications    Current Outpatient Medications:   •  apixaban (ELIQUIS) 5 MG tablet tablet, Take 5 mg by mouth Every 12 (Twelve) Hours., Disp: , Rfl:   •  Aspirin ([place]) 81 MG capsule, Take 1 tablet by mouth., Disp: , Rfl:   •  atorvastatin (LIPITOR) 40 MG tablet, TAKE ONE TABLET BY MOUTH DAILY, Disp: 90 tablet, Rfl: 1  •  cyclobenzaprine (FLEXERIL) 10 MG tablet, Take 1 tablet by mouth 3 (Three) Times a Day As Needed for Muscle Spasms., Disp: 12 tablet, Rfl: 0  •  furosemide (Lasix) 40 MG tablet, Take 1 tablet by mouth 2 (Two) Times a Day., Disp: 40 tablet, Rfl: 1  •  gabapentin (NEURONTIN) 400 MG capsule, Take 1 capsule by mouth Every Night., Disp: 30 capsule, Rfl: 2  •  lidocaine (LIDODERM) 5 %, Place 1 patch on  the skin as directed by provider Daily. Remove & Discard patch within 12 hours or as directed by MD, Disp: 6 each, Rfl: 0  •  NIFEdipine XL (PROCARDIA XL) 60 MG 24 hr tablet, Take 1 tablet by mouth Daily., Disp: 90 tablet, Rfl: 2  •  pantoprazole (Protonix) 20 MG EC tablet, Take 1 tablet by mouth Daily., Disp: 30 tablet, Rfl: 3  •  ramipril (ALTACE) 10 MG capsule, Take 1 capsule by mouth Daily., Disp: 90 capsule, Rfl: 1  •  tamsulosin (FLOMAX) 0.4 MG capsule 24 hr capsule, Take 1 capsule by mouth Every Night., Disp: , Rfl:   •  tiotropium bromide-olodaterol (STIOLTO RESPIMAT) 2.5-2.5 MCG/ACT aerosol solution inhaler, Inhale 2 puffs Daily., Disp: , Rfl:   •  triamcinolone (KENALOG) 0.1 % lotion, Apply  topically to the appropriate area as directed 3 (Three) Times a Day., Disp: 120 mL, Rfl: 1     Review of System  Review of Systems   Eyes: Negative.    Respiratory: Negative.    Cardiovascular: Negative.      I have reviewed and confirmed the accuracy of the ROS as documented by the MA/LPN/RN Magdiel Bowens MD    Vitals:    05/24/22 0909   BP: 138/72   Resp: 16     Body mass index is 42.75 kg/m².    Objective     Physical Exam  Physical Exam  Constitutional:       Appearance: Normal appearance.   HENT:      Head: Atraumatic.   Cardiovascular:      Rate and Rhythm: Normal rate and regular rhythm.      Pulses: Normal pulses.      Heart sounds: Normal heart sounds.   Musculoskeletal:      Cervical back: Normal range of motion and neck supple.   Skin:     General: Skin is warm.      Comments: Cellulitis right lower extremity   Neurological:      Mental Status: He is alert.         Assessment & Plan    This 76-year-old patient was seen in the emergency room on 5/22 after a fall.  He relates that he fell on his buttocks.  He has no pain or discomfort at this time.  He relates he feels much better than yesterday.  He was given a prescription for Flexeril but he has not picked that up yet.  Also he was given diuretic for  his edema and cellulitis however he has not started wound care visits.  Also relates he had his first dose of Lasix this morning.    Diagnoses and all orders for this visit:    1. Cellulitis of right lower extremity (Primary)  Comments:  Unchanged from last visit    2. Fall, initial encounter  Comments:  No apparent injury from his fall last night      Plan:  The 1.)  The patient was urged to  his prescription for Flexeril  2.)  He was also urged to continue his Lasix prescription  3.)  We are awaiting contact with the patient from wound [facility] regarding wound care.  4.)  Follow-up as last visit.       Magdiel Bowens MD  05/24/2022

## 2022-06-07 ENCOUNTER — OFFICE VISIT (OUTPATIENT)
Dept: WOUND CARE | Facility: HOSPITAL | Age: 77
End: 2022-06-07

## 2022-06-07 PROCEDURE — G0463 HOSPITAL OUTPT CLINIC VISIT: HCPCS

## 2022-06-10 ENCOUNTER — OFFICE VISIT (OUTPATIENT)
Dept: WOUND CARE | Facility: HOSPITAL | Age: 77
End: 2022-06-10

## 2022-06-14 ENCOUNTER — OFFICE VISIT (OUTPATIENT)
Dept: WOUND CARE | Facility: HOSPITAL | Age: 77
End: 2022-06-14

## 2022-06-14 PROCEDURE — G0463 HOSPITAL OUTPT CLINIC VISIT: HCPCS

## 2022-06-18 DIAGNOSIS — K21.9 GASTROESOPHAGEAL REFLUX DISEASE WITHOUT ESOPHAGITIS: ICD-10-CM

## 2022-06-20 RX ORDER — PANTOPRAZOLE SODIUM 20 MG/1
TABLET, DELAYED RELEASE ORAL
Qty: 30 TABLET | Refills: 3 | Status: SHIPPED | OUTPATIENT
Start: 2022-06-20

## 2022-06-27 RX ORDER — RAMIPRIL 10 MG/1
CAPSULE ORAL
Qty: 90 CAPSULE | Refills: 1 | Status: SHIPPED | OUTPATIENT
Start: 2022-06-27

## 2022-06-30 ENCOUNTER — OFFICE VISIT (OUTPATIENT)
Dept: PAIN MEDICINE | Facility: CLINIC | Age: 77
End: 2022-06-30

## 2022-06-30 VITALS
BODY MASS INDEX: 40.43 KG/M2 | TEMPERATURE: 96.6 F | DIASTOLIC BLOOD PRESSURE: 88 MMHG | HEART RATE: 83 BPM | RESPIRATION RATE: 16 BRPM | HEIGHT: 74 IN | SYSTOLIC BLOOD PRESSURE: 155 MMHG | WEIGHT: 315 LBS | OXYGEN SATURATION: 94 %

## 2022-06-30 DIAGNOSIS — M54.81 BILATERAL OCCIPITAL NEURALGIA: ICD-10-CM

## 2022-06-30 DIAGNOSIS — M47.812 CERVICAL FACET JOINT SYNDROME: ICD-10-CM

## 2022-06-30 DIAGNOSIS — G89.29 OTHER CHRONIC PAIN: Primary | ICD-10-CM

## 2022-06-30 DIAGNOSIS — M43.06 LUMBAR SPONDYLOLYSIS: ICD-10-CM

## 2022-06-30 PROCEDURE — 99214 OFFICE O/P EST MOD 30 MIN: CPT | Performed by: NURSE PRACTITIONER

## 2022-06-30 RX ORDER — GABAPENTIN 300 MG/1
300 CAPSULE ORAL NIGHTLY
Qty: 30 CAPSULE | Refills: 0 | Status: SHIPPED | OUTPATIENT
Start: 2022-06-30 | End: 2023-03-20 | Stop reason: SDUPTHER

## 2022-06-30 NOTE — PROGRESS NOTES
CHIEF COMPLAINT  2 Month cervical pain evaluation  Patient in office reports his pain has continued to remain stabilized since his last OV . Pt mentioned he had a fall 4 weeks states he was sitting down on chair and it broke and fell backwards and hurt his back .     Subjective   James Loaiza  is a 76 y.o. male  who presents for follow-up.  He has a history of chronic back and neck pain. Reports his pain is variable since last evaluation.    Reports a fall on 5-21-22. Fell flat onto buttocks and then fell backwards. Went to ED on 5-22-22. Had to sleep in recliner for a few weeks. Now feeling better. Can sleep in bed now.    Complains of pain in his neck, mid back and low back. Today his pain is 2/10VAS.  His neck pain is his primary complaint today. Is believing his cervical RFA may be starting to wear off. Is not quite ready to repeat cervical RFA.  Is also having ongoing relief for lumbar interventions of . Continues with Gabapentin 400 mg nightly.  Has been noticing he's a little more sleepy and not quite as sharp. Discussed changing back to gabapentin 300 mg nightly.     Procedure List:  --12-2-221-- bilateral C2-C3 RFL  -- 3-2-2021-- Bilateral C2-C3 RFL   --1-22-21-- bilateral OCNB  -- 10-23-20-- bilateral OCNB and bilateral L2-L5 MBB  -- 9-21-20-- bilateral OCNB  -- 9-9-20-- ELENITA (DR ALLYSON RIVERA)  -- 6-22-18-- bilateral L2-L5 RFL- 75% long term relief     Remains on Eliquis.  Cannot take NSAIDS.     Patient remained masked during entire encounter. No cough present. I donned a mask and eye protection throughout entire visit. Prior to donning mask and eye protection, hand hygiene was performed, as well as when it was doffed.  I was closer than 6 feet, but not for an extended period of time. No obvious exposure to any bodily fluids.    History of Present Illness     Study Result    Narrative & Impression   XR SPINE THORACIC 3 VW-clinical: Fell with pain T8-T11     COMPARISON to 2/20/2015     FINDINGS: The  appearance of the thoracic spine is quite similar to the  previous examination, I see no indication of fracture or subluxation.  There is diffuse hyperostosis, lower thoracic multilevel disc  degeneration and spurring/osteophyte formation.     CONCLUSION: Extensive degenerative change similar to 2015, no acute  osseous abnormality has developed. Special attention T8-T11.     This report was finalized on 5/22/2022 3:45 PM by Dr. Chris Max M.D.         PEG Assessment   What number best describes your pain on average in the past week?8  What number best describes how, during the past week, pain has interfered with your enjoyment of life?8  What number best describes how, during the past week, pain has interfered with your general activity?  8    The following portions of the patient's history were reviewed and updated as appropriate: allergies, current medications, past family history, past medical history, past social history, past surgical history and problem list.    Review of Systems   Constitutional: Negative for activity change, fatigue and fever.   HENT: Negative for congestion.    Eyes: Negative for visual disturbance.   Respiratory: Negative for cough and chest tightness.    Cardiovascular: Negative for chest pain.   Gastrointestinal: Negative for abdominal pain, constipation and diarrhea.   Genitourinary: Positive for difficulty urinating. Negative for dysuria.   Musculoskeletal: Positive for neck pain.   Neurological: Negative for dizziness, weakness, light-headedness, numbness and headaches.   Psychiatric/Behavioral: Positive for sleep disturbance. Negative for agitation and suicidal ideas. The patient is not nervous/anxious.      I have reviewed and confirmed the accuracy of the ROS as documented by the MA/ERWIN/RN Sarai Bermudez, YANIRA    Vitals:    06/30/22 1122   BP: 155/88   BP Location: Left arm   Patient Position: Sitting   Pulse: 83   Resp: 16   Temp: 96.6 °F (35.9 °C)   SpO2: 94%   Weight:  "(!) 150 kg (331 lb 9.6 oz)   Height: 188 cm (74\")   PainSc:   2   PainLoc: Comment: neck       Objective   Physical Exam  Vitals and nursing note reviewed.   Constitutional:       Appearance: He is well-developed.   HENT:      Head: Normocephalic and atraumatic.   Neck:      Comments: Mild tenderness of bilateral occiput  Pulmonary:      Effort: Pulmonary effort is normal.   Musculoskeletal:      Cervical back: Decreased range of motion.      Lumbar back: Tenderness present.   Neurological:      Mental Status: He is alert.      Gait: Gait abnormal.   Psychiatric:         Speech: Speech normal.         Behavior: Behavior normal.         Thought Content: Thought content normal.         Judgment: Judgment normal.         Assessment & Plan   Diagnoses and all orders for this visit:    1. Other chronic pain (Primary)    2. Cervical facet joint syndrome    3. Lumbar spondylolysis    4. Bilateral occipital neuralgia    Other orders  -     gabapentin (NEURONTIN) 300 MG capsule; Take 1 capsule by mouth Every Night.  Dispense: 30 capsule; Refill: 0      --- discontinue Gabapentin 400 mg  --- Return to Gabapentin 300 mg nightly. Discussed medication with the patient.  Included in this discussion was the potential for side effects and adverse events.  Patient verbalized understanding and wished to proceed.  Prescription will be sent to pharmacy.  --- repeat procedures as needed-- patient reports he is stable at thi time.  --- Follow-up 3 months or sooner if needed     DILAN GARCIA  As part of the patient's treatment plan, I am prescribing controlled substances. The patient has been made aware of appropriate use of such medications, including potential risk of somnolence, limited ability to drive and/or work safely, and the potential for dependence or overdose. It has also been made clear that these medications are for use by this patient only, without concomitant use of alcohol or other substances unless prescribed. "     Patient has completed prescribing agreement detailing terms of continued prescribing of controlled substances, including monitoring DILAN reports, urine drug screening, and pill counts if necessary. The patient is aware that inappropriate use will results in cessation of prescribing such medications.    As the clinician, I personally reviewed the DILAN from 6-30-22 while the patient was in the office today.    History and physical exam exhibit continued safe and appropriate use of controlled substances.       Dictated utilizing Dragon dictation.     This document is intended for medical expert use only. Reading of this document by patients and/or patient's family without participating medical staff guidance may result in misinterpretation and unintended morbidity.   Any interpretation of such data is the responsibility of the patient and/or family member responsible for the patient in concert with their primary or specialist providers, not to be left for sources of online searches such as [employer], [employer] or similar queries. Relying on these approaches to knowledge may result in misinterpretation, misguided goals of care and even death should patients or family members try recommendations outside of the realm of professional medical care in a supervised way.

## 2022-07-06 ENCOUNTER — OFFICE VISIT (OUTPATIENT)
Dept: FAMILY MEDICINE CLINIC | Facility: CLINIC | Age: 77
End: 2022-07-06

## 2022-07-06 VITALS
SYSTOLIC BLOOD PRESSURE: 130 MMHG | DIASTOLIC BLOOD PRESSURE: 70 MMHG | RESPIRATION RATE: 16 BRPM | BODY MASS INDEX: 40.43 KG/M2 | HEIGHT: 74 IN | WEIGHT: 315 LBS

## 2022-07-06 DIAGNOSIS — M10.9 GOUTY ARTHRITIS: Primary | ICD-10-CM

## 2022-07-06 DIAGNOSIS — Z00.00 MEDICARE ANNUAL WELLNESS VISIT, SUBSEQUENT: ICD-10-CM

## 2022-07-06 DIAGNOSIS — E78.5 HYPERLIPIDEMIA, UNSPECIFIED HYPERLIPIDEMIA TYPE: ICD-10-CM

## 2022-07-06 PROCEDURE — 1170F FXNL STATUS ASSESSED: CPT | Performed by: INTERNAL MEDICINE

## 2022-07-06 PROCEDURE — G0439 PPPS, SUBSEQ VISIT: HCPCS | Performed by: INTERNAL MEDICINE

## 2022-07-06 PROCEDURE — 1159F MED LIST DOCD IN RCRD: CPT | Performed by: INTERNAL MEDICINE

## 2022-07-06 NOTE — PROGRESS NOTES
The ABCs of the Annual Wellness Visit  Subsequent Medicare Wellness Visit    Chief Complaint   Patient presents with   • Medicare Wellness-subsequent     No other issues      Subjective    History of Present Illness:  James Loaiza is a 76 y.o. male who presents for a Subsequent Medicare Wellness Visit.    The following portions of the patient's history were reviewed and   updated as appropriate: allergies, current medications, past family history, past medical history, past social history, past surgical history and problem list.    Compared to one year ago, the patient feels his physical   health is better.    Compared to one year ago, the patient feels his mental   health is worse.    Recent Hospitalizations:  This patient has had a [facility] admission record on file within the last 365 days.    Current Medical Providers:  Patient Care Team:  Magdiel Bowens MD as PCP - General (Internal Medicine)  Davonte Sandoval MD as Consulting Physician (Pulmonary Disease)  Pradeep Obrien III, MD as Consulting Physician (Cardiology)  Roger Ochoa MD as Consulting Physician (Vascular Surgery)  Herman Beth MD as Consulting Physician (Nephrology)    Outpatient Medications Prior to Visit   Medication Sig Dispense Refill   • apixaban (ELIQUIS) 5 MG tablet tablet Take 5 mg by mouth Every 12 (Twelve) Hours.     • Aspirin ([place]) 81 MG capsule Take 1 tablet by mouth.     • atorvastatin (LIPITOR) 40 MG tablet TAKE ONE TABLET BY MOUTH DAILY 90 tablet 1   • cyclobenzaprine (FLEXERIL) 10 MG tablet Take 1 tablet by mouth 3 (Three) Times a Day As Needed for Muscle Spasms. 12 tablet 0   • furosemide (Lasix) 40 MG tablet Take 1 tablet by mouth 2 (Two) Times a Day. 40 tablet 1   • gabapentin (NEURONTIN) 300 MG capsule Take 1 capsule by mouth Every Night. 30 capsule 0   • lidocaine (LIDODERM) 5 % Place 1 patch on the skin as directed by provider Daily. Remove & Discard patch within 12 hours or as directed by MD  6 each 0   • NIFEdipine XL (PROCARDIA XL) 60 MG 24 hr tablet Take 1 tablet by mouth Daily. 90 tablet 2   • pantoprazole (PROTONIX) 20 MG EC tablet TAKE ONE TABLET BY MOUTH DAILY 30 tablet 3   • ramipril (ALTACE) 10 MG capsule TAKE ONE CAPSULE BY MOUTH DAILY 90 capsule 1   • tamsulosin (FLOMAX) 0.4 MG capsule 24 hr capsule Take 1 capsule by mouth Every Night.     • tiotropium bromide-olodaterol (STIOLTO RESPIMAT) 2.5-2.5 MCG/ACT aerosol solution inhaler Inhale 2 puffs Daily.     • triamcinolone (KENALOG) 0.1 % lotion Apply  topically to the appropriate area as directed 3 (Three) Times a Day. 120 mL 1     No facility-administered medications prior to visit.       No opioid medication identified on active medication list. I have reviewed chart for other potential  high risk medication/s and harmful drug interactions in the elderly.          Aspirin is on active medication list. Aspirin use is indicated based on review of current medical condition/s. Pros and cons of this therapy have been discussed today. Benefits of this medication outweigh potential harm.  Patient has been encouraged to continue taking this medication.  .      Patient Active Problem List   Diagnosis   • Lumbar herniated disc L3-L5 3/16/16 Open MRI   • Aortic valve insufficiency   • Coronary arteriosclerosis in native artery   • Edema   • Dyspnea on exertion   • Fatigue   • Left ventricular hypertrophy   • Obstructive sleep apnea syndrome   • Arthritis of left hip   • Morbid obesity with BMI of 40.0-44.9, adult (HCC)   • Intermittent dysphagia   • Hyperlipidemia 1999   • BPH (benign prostatic hypertrophy)   • Left cervical radiculopathy   • Rotator cuff tear, left   • Plantar fasciitis   • Hypogonadism male   • Vitamin D deficiency disease   • Renal cyst, left   • Preventative health care   • Medication management   • Chronic low back pain   • Lumbar spondylolysis   • Headache   • Other chronic pain   • History of pulmonary embolism   • Chronic  "obstructive pulmonary disease (HCC)   • Osteoarthritis of right shoulder   • Chronic anticoagulation   • Cervical stenosis of spine   • Bilateral occipital neuralgia   • Shoulder pain   • Cervical facet joint syndrome   • Open wound of right lower leg   • REJI (acute kidney injury) ([facility])   • Constipation   • Venous insufficiency   • Analgesic nephropathy   • Anemia   • Diastolic dysfunction   • Hypocalcemia   • Hypertension   • Localized swelling, mass and lump, lower limb, bilateral   • Stage 3b chronic kidney disease (HCC)   • Ascending aorta dilation ([facility])     Advance Care Planning  Advance Directive is not on file.  ACP discussion was held with the patient during this visit. Patient does not have an advance directive, information provided.    Review of Systems   Constitutional: Negative.    HENT: Negative.    Eyes: Negative.    Respiratory: Negative.    Cardiovascular: Negative.    Gastrointestinal: Negative.    Musculoskeletal: Negative.    Skin: Negative.    Psychiatric/Behavioral: Negative.          Objective    Vitals:    07/06/22 0859   BP: 130/70   Cuff Size: Large Adult   Resp: 16   Weight: (!) 152 kg (335 lb)   Height: 188 cm (74\")     BMI Readings from Last 1 Encounters:   07/06/22 43.01 kg/m²   BMI is above normal parameters. Recommendations include: educational material    Does the patient have evidence of cognitive impairment? No    Physical Exam  Vitals and nursing note reviewed.   Constitutional:       Appearance: He is well-developed.   HENT:      Head: Normocephalic and atraumatic.   Eyes:      Conjunctiva/sclera: Conjunctivae normal.   Cardiovascular:      Rate and Rhythm: Normal rate and regular rhythm.      Heart sounds: Normal heart sounds.   Pulmonary:      Effort: Pulmonary effort is normal.      Breath sounds: Normal breath sounds.   Abdominal:      General: Bowel sounds are normal.      Palpations: Abdomen is soft.   Musculoskeletal:         General: Normal range of motion.      Cervical " back: Normal range of motion and neck supple.   Skin:     General: Skin is warm and dry.   Neurological:      Mental Status: He is alert and oriented to person, place, and time.   Psychiatric:         Behavior: Behavior normal.                 HEALTH RISK ASSESSMENT    Smoking Status:  Social History     Tobacco Use   Smoking Status Never Smoker   Smokeless Tobacco Never Used   Tobacco Comment    caffiene use: soda and coffee     Alcohol Consumption:  Social History     Substance and Sexual Activity   Alcohol Use Yes    Comment: 2-3 times a year     Fall Risk Screen:    MADELYN Fall Risk Assessment has not been completed.    Depression Screening:  PHQ-2/PHQ-9 Depression Screening 7/6/2022   Retired PHQ-9 Total Score -   Retired Total Score -   Little Interest or Pleasure in Doing Things 0-->not at all   Feeling Down, Depressed or Hopeless 0-->not at all   PHQ-9: Brief Depression Severity Measure Score 0       Health Habits and Functional and Cognitive Screening:  Functional & Cognitive Status 7/6/2022   Do you have difficulty preparing food and eating? No   Do you have difficulty bathing yourself, getting dressed or grooming yourself? No   Do you have difficulty using the toilet? No   Do you have difficulty moving around from place to place? No   Do you have trouble with steps or getting out of a bed or a chair? Yes   Current Diet -   Dental Exam Not up to date   Eye Exam Not up to date   Exercise (times per week) -   Current Exercises Include No Regular Exercise   Current Exercise Activities Include -   Do you need help using the phone?  No   Are you deaf or do you have serious difficulty hearing?  No   Do you need help with transportation? No   Do you need help shopping? No   Do you need help preparing meals?  No   Do you need help with housework?  No   Do you need help with laundry? No   Do you need help taking your medications? No   Do you need help managing money? No   Do you ever drive or ride in a car without  wearing a seat belt? Yes   Have you felt unusual stress, anger or loneliness in the last month? Yes   Who do you live with? Spouse   If you need help, do you have trouble finding someone available to you? No   Have you been bothered in the last four weeks by sexual problems? No   Do you have difficulty concentrating, remembering or making decisions? Yes       Age-appropriate Screening Schedule:  Refer to the list below for future screening recommendations based on patient's age, sex and/or medical conditions. Orders for these recommended tests are listed in the plan section. The patient has been provided with a written plan.    Health Maintenance   Topic Date Due   • LIPID PANEL  11/30/2022 (Originally 2/24/2022)   • ZOSTER VACCINE (2 of 2) 05/24/2023 (Originally 5/10/2018)   • TDAP/TD VACCINES (2 - Td or Tdap) 05/31/2023 (Originally 12/4/2018)   • INFLUENZA VACCINE  10/01/2022              Assessment & Plan      This 76-year-old patient presents at this time for Medicare wellness review.    Regarding anticipatory guidance.  We discussed the importance of low-cholesterol diet.  We gave him a low-cholesterol diet sheet with limited and stick with instructions on foods to adhere to.  We will recheck his cholesterol today.    CMS Preventative Services Quick Reference  Risk Factors Identified During Encounter  Obesity/Overweight   The above risks/problems have been discussed with the patient.  Follow up actions/plans if indicated are seen below in the Assessment/Plan Section.  Pertinent information has been shared with the patient in the After Visit Summary.    There are no diagnoses linked to this encounter.    Follow Up:   No follow-ups on file.     An After Visit Summary and PPPS were made available to the patient.

## 2022-07-07 LAB
CHOLEST SERPL-MCNC: 202 MG/DL (ref 100–199)
HDLC SERPL-MCNC: 33 MG/DL
LDLC SERPL CALC-MCNC: 135 MG/DL (ref 0–99)
TRIGL SERPL-MCNC: 191 MG/DL (ref 0–149)
URATE SERPL-MCNC: 9.7 MG/DL (ref 3.8–8.4)
VLDLC SERPL CALC-MCNC: 34 MG/DL (ref 5–40)

## 2022-07-15 ENCOUNTER — CLINICAL SUPPORT (OUTPATIENT)
Dept: ORTHOPEDIC SURGERY | Facility: CLINIC | Age: 77
End: 2022-07-15

## 2022-07-15 VITALS — BODY MASS INDEX: 40.43 KG/M2 | HEIGHT: 74 IN | WEIGHT: 315 LBS | TEMPERATURE: 96.8 F

## 2022-07-15 DIAGNOSIS — M17.12 PRIMARY OSTEOARTHRITIS OF LEFT KNEE: ICD-10-CM

## 2022-07-15 DIAGNOSIS — M17.11 PRIMARY OSTEOARTHRITIS OF RIGHT KNEE: Primary | ICD-10-CM

## 2022-07-15 PROCEDURE — 20610 DRAIN/INJ JOINT/BURSA W/O US: CPT | Performed by: NURSE PRACTITIONER

## 2022-07-15 RX ORDER — METHYLPREDNISOLONE ACETATE 80 MG/ML
80 INJECTION, SUSPENSION INTRA-ARTICULAR; INTRALESIONAL; INTRAMUSCULAR; SOFT TISSUE
Status: COMPLETED | OUTPATIENT
Start: 2022-07-15 | End: 2022-07-15

## 2022-07-15 RX ORDER — LIDOCAINE HYDROCHLORIDE 10 MG/ML
2 INJECTION, SOLUTION EPIDURAL; INFILTRATION; INTRACAUDAL; PERINEURAL
Status: COMPLETED | OUTPATIENT
Start: 2022-07-15 | End: 2022-07-15

## 2022-07-15 RX ADMIN — LIDOCAINE HYDROCHLORIDE 2 ML: 10 INJECTION, SOLUTION EPIDURAL; INFILTRATION; INTRACAUDAL; PERINEURAL at 13:08

## 2022-07-15 RX ADMIN — METHYLPREDNISOLONE ACETATE 80 MG: 80 INJECTION, SUSPENSION INTRA-ARTICULAR; INTRALESIONAL; INTRAMUSCULAR; SOFT TISSUE at 13:09

## 2022-07-15 RX ADMIN — METHYLPREDNISOLONE ACETATE 80 MG: 80 INJECTION, SUSPENSION INTRA-ARTICULAR; INTRALESIONAL; INTRAMUSCULAR; SOFT TISSUE at 13:08

## 2022-07-15 RX ADMIN — LIDOCAINE HYDROCHLORIDE 2 ML: 10 INJECTION, SOLUTION EPIDURAL; INFILTRATION; INTRACAUDAL; PERINEURAL at 13:09

## 2022-07-15 NOTE — PROGRESS NOTES
Mr. Loaiza comes in today for follow-up.  Injections have worked well in the past.  The patient would like to get repeat injections today.  The risks, benefits and alternatives were discussed and the patient consented.  Going forward, the patient will follow-up as needed.    YANIRA Andrea    07/15/2022      Large Joint Arthrocentesis: R knee  Date/Time: 7/15/2022 1:09 PM  Consent given by: patient  Site marked: site marked  Timeout: Immediately prior to procedure a time out was called to verify the correct patient, procedure, equipment, support staff and site/side marked as required   Supporting Documentation  Indications: pain, joint swelling and diagnostic evaluation   Procedure Details  Location: knee - R knee  Preparation: Patient was prepped and draped in the usual sterile fashion  Needle gauge: 21G.  Approach: anterolateral  Medications administered: 80 mg methylPREDNISolone acetate 80 MG/ML; 2 mL lidocaine PF 1% 1 %  Patient tolerance: patient tolerated the procedure well with no immediate complications    Large Joint Arthrocentesis: L knee  Date/Time: 7/15/2022 1:08 PM  Consent given by: patient  Site marked: site marked  Timeout: Immediately prior to procedure a time out was called to verify the correct patient, procedure, equipment, support staff and site/side marked as required   Supporting Documentation  Indications: pain   Procedure Details  Location: knee - L knee  Preparation: Patient was prepped and draped in the usual sterile fashion  Needle gauge: 21G.  Approach: anterolateral  Medications administered: 80 mg methylPREDNISolone acetate 80 MG/ML; 2 mL lidocaine PF 1% 1 %  Patient tolerance: patient tolerated the procedure well with no immediate complications

## 2022-07-18 RX ORDER — NIFEDIPINE 60 MG/1
TABLET, EXTENDED RELEASE ORAL
Qty: 90 TABLET | Refills: 2 | Status: SHIPPED | OUTPATIENT
Start: 2022-07-18

## 2022-08-12 ENCOUNTER — TELEPHONE (OUTPATIENT)
Dept: FAMILY MEDICINE CLINIC | Facility: CLINIC | Age: 77
End: 2022-08-12

## 2022-08-12 NOTE — TELEPHONE ENCOUNTER
Hub please inform patient    Unable to reach No.  Informed patient that the form is complete.  Part of it needs to be completed by No and the $20 fee will need to be paid before we can fax it.

## 2022-09-01 ENCOUNTER — OFFICE VISIT (OUTPATIENT)
Dept: FAMILY MEDICINE CLINIC | Facility: CLINIC | Age: 77
End: 2022-09-01

## 2022-09-01 VITALS
HEART RATE: 72 BPM | SYSTOLIC BLOOD PRESSURE: 144 MMHG | WEIGHT: 315 LBS | DIASTOLIC BLOOD PRESSURE: 86 MMHG | OXYGEN SATURATION: 95 % | BODY MASS INDEX: 40.43 KG/M2 | HEIGHT: 74 IN

## 2022-09-01 DIAGNOSIS — I10 PRIMARY HYPERTENSION: ICD-10-CM

## 2022-09-01 DIAGNOSIS — L03.115 CELLULITIS OF RIGHT LOWER EXTREMITY: Primary | ICD-10-CM

## 2022-09-01 PROCEDURE — 99214 OFFICE O/P EST MOD 30 MIN: CPT | Performed by: INTERNAL MEDICINE

## 2022-09-01 NOTE — PROGRESS NOTES
09/01/2022    CC: Memory Loss (5 wk f/u ) and Leg Swelling (Right leg ... no other issues )  .        HPI  This 76-year-old patient presents at this time for follow-up of hypertension.  He relates he is feeling fine having had no problems in the interim of visits.  He relates that the leg swelling he had previously has improved considerably.  Said no pain or discomfort in the lower extremity.    Memory Loss    Leg Swelling         Subjective   James Loaiza is a 76 y.o. male.      The following portions of the patient's history were reviewed and updated as appropriate: allergies, current medications, past family history, past medical history, past social history, past surgical history and problem list.    Problem List  Patient Active Problem List   Diagnosis   • Lumbar herniated disc L3-L5 3/16/16 Open MRI   • Aortic valve insufficiency   • Coronary arteriosclerosis in native artery   • Edema   • Dyspnea on exertion   • Fatigue   • Left ventricular hypertrophy   • Obstructive sleep apnea syndrome   • Arthritis of left hip   • Morbid obesity with BMI of 40.0-44.9, adult ([facility]ly [facility])   • Intermittent dysphagia   • Hyperlipidemia 1999   • BPH (benign prostatic hypertrophy)   • Left cervical radiculopathy   • Rotator cuff tear, left   • Plantar fasciitis   • Hypogonadism male   • Vitamin D deficiency disease   • Renal cyst, left   • Preventative health care   • Medication management   • Chronic low back pain   • Lumbar spondylolysis   • Headache   • Other chronic pain   • History of pulmonary embolism   • Chronic obstructive pulmonary disease ([facility])   • Osteoarthritis of right shoulder   • Chronic anticoagulation   • Cervical stenosis of spine   • Bilateral occipital neuralgia   • Shoulder pain   • Cervical facet joint syndrome   • Open wound of right lower leg   • REJI (acute kidney injury) ([facility])   • Constipation   • Venous insufficiency   • Analgesic nephropathy   • Anemia   • Diastolic dysfunction   • Hypocalcemia   •  "Hypertension   • Localized swelling, mass and lump, lower limb, bilateral   • Stage 3b chronic kidney disease (HCC)   • Ascending aorta dilation (HCC)       Past Medical History  Past Medical History:   Diagnosis Date   • Abnormal EKG     referring to cardiology   • Anxiety    • Arthritis    • Ascending aorta dilation (HCC)    • Back pain     worsening   • BPH (benign prostatic hyperplasia)     BPH/Nocturia/ Urinary Frequency   • Breast nodule     right   • Cardiomegaly     Cardiomegaly/ SOB with exertion   • Circulation problem    • Constipation    • COPD (chronic obstructive pulmonary disease) ([facility])    • Deviated septum    • DJD (degenerative joint disease)     DJD Knees   • DVT (deep venous thrombosis) ([facility])    • Dysphagia     solids and liquids - resolved with normal studies   • Encounter for annual health examination 11/14/2013    Annual Health Assessment   • Enlarged prostate    • Fatigue     Extreme fatigue   • Hyperlipidemia 1999   • Hypertension 1999    followed Dr. Marcelo   • Hypogonadism male    • Left hip pain     and DJD   • Left leg pain    • Low back pain    • Moist mucous membranes of ear, nose, and throat     \"Mucous in throat\"   • Morbid obesity ([facility])     (BMI-41.2za)   • Muscle cramping    • Muscle spasm     Muscle spasms   • Neck arthritis    • Neck muscle spasm     Neck muscle spasm/Cervical strain   • Neck pain    • Obesity    • Plantar fasciitis    • Prostatitis     recurrent   • Psoriasis    • Pulmonary embolus ([facility]) 01/2019    on Xarelltoypseerlipidemia   • Radiculopathy     Radiculopathy / Neuropathy left ar   • Renal insufficiency     followed by Dr. Mendes   • Seborrhea    • Shortness of breath    • Sleep apnea     Obstructive Sleep apnea worsening; uses CPAP   • Urinary frequency    • Vascular disorder    • Vertigo    • Weight gain    • Wellness examination 10/23/2014    Annual Wellness Visit       Surgical History  Past Surgical History:   Procedure Laterality Date   • APPENDECTOMY  " 1965   • CARDIAC CATHETERIZATION  07/29/2010    [facility]   • COLONOSCOPY  01/05/2010   • COLONOSCOPY  10/01/2001   • NASAL SEPTUM SURGERY     • NOSE SURGERY  1999   • RADIOFREQUENCY ABLATION Bilateral 3/2/2021    Procedure: RADIOFREQUENCY ABLATION NERVES---bilateral cervical 2-cervical3;  Surgeon: Edu Tineo MD;  Location: [facility] MAIN OR;  Service: Pain Management;  Laterality: Bilateral;   • RADIOFREQUENCY ABLATION Bilateral 12/2/2021    Procedure: RADIOFREQUENCY ABLATION NERVES--bilateral cervical2-3;  Surgeon: Edu Tineo MD;  Location: [facility];  Service: Pain Management;  Laterality: Bilateral;   • TURP / TRANSURETHRAL INCISION / DRAINAGE PROSTATE  10/23/2015    Michael Castro       Family History  Family History   Problem Relation Age of Onset   • Arthritis Mother    • Heart disease Mother    • Hypertension Mother    • Heart attack Father    • Heart disease Father    • Hypertension Father    • Arthritis Sister    • Multiple sclerosis Sister    • Alcohol abuse Brother    • Diabetes Son    • Malig Hyperthermia Neg Hx        Social History  Social History    Tobacco Use      Smoking status: Never Smoker      Smokeless tobacco: Never Used      Tobacco comment: caffiene use: soda and coffee       Is the Patient a current tobacco user? No    Allergies  No Known Allergies    Current Medications    Current Outpatient Medications:   •  apixaban (ELIQUIS) 5 MG tablet tablet, Take 5 mg by mouth Every 12 (Twelve) Hours., Disp: , Rfl:   •  Aspirin ([place]) 81 MG capsule, Take 1 tablet by mouth., Disp: , Rfl:   •  atorvastatin (LIPITOR) 40 MG tablet, TAKE ONE TABLET BY MOUTH DAILY, Disp: 90 tablet, Rfl: 1  •  cyclobenzaprine (FLEXERIL) 10 MG tablet, Take 1 tablet by mouth 3 (Three) Times a Day As Needed for Muscle Spasms., Disp: 12 tablet, Rfl: 0  •  furosemide (Lasix) 40 MG tablet, Take 1 tablet by mouth 2 (Two) Times a Day., Disp: 40 tablet, Rfl: 1  •  gabapentin  (NEURONTIN) 300 MG capsule, Take 1 capsule by mouth Every Night., Disp: 30 capsule, Rfl: 0  •  NIFEdipine XL (PROCARDIA XL) 60 MG 24 hr tablet, TAKE ONE TABLET BY MOUTH DAILY, Disp: 90 tablet, Rfl: 2  •  pantoprazole (PROTONIX) 20 MG EC tablet, TAKE ONE TABLET BY MOUTH DAILY, Disp: 30 tablet, Rfl: 3  •  ramipril (ALTACE) 10 MG capsule, TAKE ONE CAPSULE BY MOUTH DAILY, Disp: 90 capsule, Rfl: 1  •  tamsulosin (FLOMAX) 0.4 MG capsule 24 hr capsule, Take 1 capsule by mouth Every Night., Disp: , Rfl:   •  tiotropium bromide-olodaterol (STIOLTO RESPIMAT) 2.5-2.5 MCG/ACT aerosol solution inhaler, Inhale 2 puffs Daily., Disp: , Rfl:   •  triamcinolone (KENALOG) 0.1 % lotion, Apply  topically to the appropriate area as directed 3 (Three) Times a Day., Disp: 120 mL, Rfl: 1  •  lidocaine (LIDODERM) 5 %, Place 1 patch on the skin as directed by provider Daily. Remove & Discard patch within 12 hours or as directed by MD, Disp: 6 each, Rfl: 0     Review of System  Review of Systems   Eyes: Negative.    Endocrine: Negative.    Neurological: Positive for memory problem.     I have reviewed and confirmed the accuracy of the ROS as documented by the MA/LPN/RN Magdiel Bowens MD    Vitals:    09/01/22 1542   BP: 144/86   Pulse: 72   SpO2: 95%     Body mass index is 42.35 kg/m².    Objective     Physical Exam  Physical Exam  Abdominal:      Palpations: Abdomen is soft.   Musculoskeletal:      Cervical back: Normal range of motion.   Neurological:      Mental Status: He is alert.         Assessment & Plan    This 76-year-old patient presents today for follow-up of hypertension.  He relates he is feeling fine having had no problems in the interval visits.  He is currently taking Altace 10 mg 1 tab p.o. daily and nifedipine 60 mg 1 tab p.o. daily.  He relates he took his medication today as prescribed.    The right lower extremity edema previously seen has resolved considerably but is still present about 2+ in nature.  Left leg shows no  evidence of edema.  In the interim of visits he was seen by vascular surgery who have recommended lymphedema clinic.  We also note that the patient support hose knee replacement they are not tight enough.  It he needs around 24-28 Will later compression hose.  We also note that the brawny edema he has had in the past continues there he will probably always have this.    His blood pressure was initially elevated at 144/86 on recheck by me in the left arm sitting position standard cuff was 146/80.  We will increase his Altace to 10 mg p.o. twice daily.  Would like to avoid increasing nifedipine because of this likely increase in edema lower extremities.      Diagnoses and all orders for this visit:    1. Cellulitis of right lower extremity (Primary)    2. Primary hypertension      Plan:  1.)  Increase Altace to 10 mg p.o. as needed twice daily.  2.)  Follow-up in 1 to 2 months for reevaluation.       Magdiel Bowens MD  09/01/2022

## 2022-09-12 ENCOUNTER — PREP FOR SURGERY (OUTPATIENT)
Dept: SURGERY | Facility: SURGERY CENTER | Age: 77
End: 2022-09-12

## 2022-09-12 ENCOUNTER — OFFICE VISIT (OUTPATIENT)
Dept: PAIN MEDICINE | Facility: CLINIC | Age: 77
End: 2022-09-12

## 2022-09-12 VITALS
WEIGHT: 315 LBS | OXYGEN SATURATION: 97 % | HEART RATE: 63 BPM | RESPIRATION RATE: 18 BRPM | BODY MASS INDEX: 40.43 KG/M2 | HEIGHT: 74 IN | DIASTOLIC BLOOD PRESSURE: 82 MMHG | TEMPERATURE: 97.3 F | SYSTOLIC BLOOD PRESSURE: 172 MMHG

## 2022-09-12 DIAGNOSIS — G89.29 CHRONIC PAIN OF BOTH SHOULDERS: ICD-10-CM

## 2022-09-12 DIAGNOSIS — M47.812 CERVICAL FACET JOINT SYNDROME: Primary | ICD-10-CM

## 2022-09-12 DIAGNOSIS — M25.512 CHRONIC PAIN OF BOTH SHOULDERS: ICD-10-CM

## 2022-09-12 DIAGNOSIS — M25.511 CHRONIC PAIN OF BOTH SHOULDERS: ICD-10-CM

## 2022-09-12 DIAGNOSIS — M54.81 BILATERAL OCCIPITAL NEURALGIA: ICD-10-CM

## 2022-09-12 DIAGNOSIS — G89.4 CHRONIC PAIN SYNDROME: ICD-10-CM

## 2022-09-12 DIAGNOSIS — M54.81 BILATERAL OCCIPITAL NEURALGIA: Primary | ICD-10-CM

## 2022-09-12 PROCEDURE — 99214 OFFICE O/P EST MOD 30 MIN: CPT | Performed by: PHYSICIAN ASSISTANT

## 2022-09-12 RX ORDER — SODIUM CHLORIDE 0.9 % (FLUSH) 0.9 %
10 SYRINGE (ML) INJECTION AS NEEDED
Status: CANCELLED | OUTPATIENT
Start: 2022-09-12

## 2022-09-12 RX ORDER — SODIUM CHLORIDE 0.9 % (FLUSH) 0.9 %
10 SYRINGE (ML) INJECTION EVERY 12 HOURS SCHEDULED
Status: CANCELLED | OUTPATIENT
Start: 2022-09-12

## 2022-09-12 NOTE — PROGRESS NOTES
CHIEF COMPLAINT  Follow-up for neck pain.    Subjective   James Loaiza is a 76 y.o. male  who presents for follow-up.  He has a history of chronic neck and back pain.  Since his last office visit he has noted progressive worsening of midline cervical spine pain particularly noted within the overlying bilateral occipital grooves consistent with occipital neuralgia.  The patient had undergone RFA bilateral C2-3 on 12/2/2021 with greater than 80% reduction of pain until the last 1-2 weeks.  His neck pain continues to be his primary pain complaint is he now feels that the cervical RFA is completely worn off.    Patient is also noted increasing bilateral shoulder pain stating that the last time this occurred he was able to successfully reduce it by attending physical therapy.  He is requesting a referral for PT on today.    He has secondary complaints of lumbar spine pain however he is not had to repeat any type of lumbar injective therapies as he has had consistent relief.    He is currently managed with gabapentin 300 mg nightly without adverse effects.  Patient thinks that he may have skipped his last few doses and feels that that may have also contributed to the increasing occipital neuralgia.    Pain today 5/10 VAS in severity    Procedure List:  --12-2-221-- bilateral C2-C3 RFL  -- 3-2-2021-- Bilateral C2-C3 RFL   --1-22-21-- bilateral OCNB  -- 10-23-20-- bilateral OCNB and bilateral L2-L5 MBB  -- 9-21-20-- bilateral OCNB  -- 9-9-20-- ELENITA (DR ALLYSON RIVERA)  -- 6-22-18-- bilateral L2-L5 RFL- 75% long term relief      Neck Pain   This is a recurrent problem. The current episode started more than 1 year ago. The problem occurs constantly. The problem has been rapidly worsening. The pain is present in the occipital region. The quality of the pain is described as burning, shooting and stabbing. The pain is at a severity of 5/10. The pain is moderate. The symptoms are aggravated by position (Lying flat on the back of  "his head). The pain is worse during the day. Pertinent negatives include no chest pain, numbness or weakness.   Shoulder Injury   Both shoulders are affected. The injury mechanism is unknown. The quality of the pain is described as aching. The pain does not radiate. The pain is at a severity of 5/10. The pain is moderate. Pertinent negatives include no chest pain or numbness.        PEG Assessment   What number best describes your pain on average in the past week?2  What number best describes how, during the past week, pain has interfered with your enjoyment of life?5  What number best describes how, during the past week, pain has interfered with your general activity?  5    Review of Pertinent Medical Data ---  No imaging reviewed today    The following portions of the patient's history were reviewed and updated as appropriate: allergies, current medications, past family history, past medical history, past social history, past surgical history and problem list.    Review of Systems   Constitutional: Positive for fatigue.   HENT: Negative for congestion.    Eyes: Negative for visual disturbance.   Respiratory: Positive for shortness of breath.    Cardiovascular: Negative for chest pain.   Gastrointestinal: Negative for constipation and diarrhea.   Genitourinary: Positive for frequency. Negative for difficulty urinating.   Musculoskeletal: Positive for arthralgias (bilateral shoulders) and neck pain.   Neurological: Negative for weakness and numbness.   Psychiatric/Behavioral: Positive for sleep disturbance. Negative for suicidal ideas. The patient is nervous/anxious.      I have reviewed and confirmed the accuracy of the ROS as documented by the MA/LPN/RN CARY Yepez    Vitals:    09/12/22 1323   BP: 172/82   Pulse: 63   Resp: 18   Temp: 97.3 °F (36.3 °C)   SpO2: 97%   Weight: (!) 149 kg (329 lb 6.4 oz)   Height: 188 cm (74\")   PainSc:   5   PainLoc: Neck         Objective   Physical Exam  Vitals and nursing " note reviewed.   Constitutional:       Appearance: Normal appearance. He is obese.   HENT:      Head: Normocephalic.   Pulmonary:      Effort: Pulmonary effort is normal.   Musculoskeletal:      Cervical back: Tenderness (Exquisite tenderness to light palpation over the bilateral occipital grooves) present. Pain with movement, spinous process tenderness and muscular tenderness present. Decreased range of motion.   Skin:     General: Skin is warm and dry.   Neurological:      General: No focal deficit present.      Mental Status: He is alert and oriented to person, place, and time.      Cranial Nerves: Cranial nerves are intact.      Sensory: Sensation is intact.      Motor: Motor function is intact.      Gait: Gait abnormal.   Psychiatric:         Mood and Affect: Mood normal.         Behavior: Behavior normal.         Thought Content: Thought content normal.         Judgment: Judgment normal.       Assessment & Plan   Diagnoses and all orders for this visit:    1. Cervical facet joint syndrome (Primary)    2. Bilateral occipital neuralgia    3. Chronic pain of both shoulders  -     Ambulatory Referral to Physical Therapy Evaluate and treat        --- Authorization placed to proceed with therapeutic RFA bilateral C2-3.  --- Based on patient's exquisite tenderness within overlying bilateral occipital regions we will proceed with therapeutic bilateral greater occipital nerve block as the patient is having extreme difficulty sleeping secondary to extreme pain at night.  --- Follow-up after injective therapy  --- Orders placed for patient to proceed to physical therapy for further evaluation and treatment of bilateral shoulder pain        Dictated utilizing Dragon dictation.     This document is intended for medical expert use only. Reading of this document by patients and/or patient's family without participating medical staff guidance may result in misinterpretation and unintended morbidity.   Any interpretation of  such data is the responsibility of the patient and/or family member responsible for the patient in concert with their primary or specialist providers, not to be left for sources of online searches such as [employer], [employer] or similar queries. Relying on these approaches to knowledge may result in misinterpretation, misguided goals of care and even death should patients or family members try recommendations outside of the realm of professional medical care in a supervised way.    Patient remained masked during entire encounter. No cough present. I donned a mask and eye protection throughout entire visit. Prior to donning mask and eye protection, hand hygiene was performed, as well as when it was doffed.  I was closer than 6 feet, but not for an extended period of time. No obvious exposure to any bodily fluids.

## 2022-09-13 ENCOUNTER — TRANSCRIBE ORDERS (OUTPATIENT)
Dept: SURGERY | Facility: SURGERY CENTER | Age: 77
End: 2022-09-13

## 2022-09-13 DIAGNOSIS — Z41.9 SURGERY, ELECTIVE: Primary | ICD-10-CM

## 2022-09-19 ENCOUNTER — PROCEDURE VISIT (OUTPATIENT)
Dept: PAIN MEDICINE | Facility: CLINIC | Age: 77
End: 2022-09-19

## 2022-09-19 VITALS
OXYGEN SATURATION: 95 % | HEART RATE: 81 BPM | SYSTOLIC BLOOD PRESSURE: 164 MMHG | DIASTOLIC BLOOD PRESSURE: 78 MMHG | BODY MASS INDEX: 40.43 KG/M2 | WEIGHT: 315 LBS | HEIGHT: 74 IN | TEMPERATURE: 97.7 F

## 2022-09-19 DIAGNOSIS — M54.81 BILATERAL OCCIPITAL NEURALGIA: Primary | ICD-10-CM

## 2022-09-19 PROCEDURE — 64405 NJX AA&/STRD GR OCPL NRV: CPT | Performed by: ANESTHESIOLOGY

## 2022-09-19 NOTE — PROGRESS NOTES
"CHIEF COMPLAINT: Neck Pain      James Loaiza is a 76 y.o. male.  He is here for the following procedure:  bilateral occipital nerve block.     History of cervical occipital area pain in response to these blocks in the past.  Bilateral radiating pain across the occiput and up to the apex and headaches.  Occipital blocks are indicated as previously noted.    Vitals:    09/19/22 0915   BP: 164/78   Pulse: 81   Temp: 97.7 °F (36.5 °C)   SpO2: 95%   Weight: (!) 148 kg (327 lb)   Height: 188 cm (74\")   PainSc:   4   PainLoc: Head       Bupivacaine Lot#: [id number] Exp: 06/01/2024 NDC#: 8720-8751-67  Depo Medrol Lot#: [id number] Exp: 02/28/2024  NDC#: [address]-8       Procedures    Bilateral occipital nerve block:    Informed consent was obtained.  We discussed items including but not limited to bleeding and nerve injury and infection and paralysis and stroke and death.    A solution was prepared of 8 mL of 0.25% bupivacaine and 80 mg of Depo-Medrol.    The left and right occipital areas were cleansed with alcohol ×2 and then a 25-gauge needle was inserted just medial to each of the occipital arteries until contacting periosteum and aspiration was negative and then 3 mL of the above solution was injected.  The needle at each location was withdrawn to the skin and then redirected laterally and aspiration was checked and then I proceeded to inject another 2  milliliters of medication in the vicinity of the lesser occipital branch.  Again this was bilaterally performed.      The needle was removed intact.  Bleeding was minimal.  No apparent complications.          Visit Diagnoses:  1. Bilateral occipital neuralgia        Edu Tineo MD  Pain Management    --      Vitals:    09/19/22 0915   PainSc:   4   PainLoc: Head          James Loiaza reports a pain score of 4.  Given his pain assessment as noted, treatment options were discussed and the following options were decided upon as a follow-up plan to address the patient's " pain: continuation of current treatment plan for pain and steroid injections.

## 2022-10-24 ENCOUNTER — OFFICE VISIT (OUTPATIENT)
Dept: ORTHOPEDIC SURGERY | Facility: CLINIC | Age: 77
End: 2022-10-24

## 2022-10-24 VITALS — WEIGHT: 315 LBS | TEMPERATURE: 98.4 F | BODY MASS INDEX: 40.43 KG/M2 | HEIGHT: 74 IN

## 2022-10-24 DIAGNOSIS — M25.511 CHRONIC PAIN OF BOTH SHOULDERS: Primary | ICD-10-CM

## 2022-10-24 DIAGNOSIS — G89.29 CHRONIC PAIN OF BOTH SHOULDERS: Primary | ICD-10-CM

## 2022-10-24 DIAGNOSIS — M25.512 CHRONIC PAIN OF BOTH SHOULDERS: Primary | ICD-10-CM

## 2022-10-24 PROCEDURE — 73030 X-RAY EXAM OF SHOULDER: CPT | Performed by: NURSE PRACTITIONER

## 2022-10-24 PROCEDURE — 20610 DRAIN/INJ JOINT/BURSA W/O US: CPT | Performed by: NURSE PRACTITIONER

## 2022-10-24 PROCEDURE — 99213 OFFICE O/P EST LOW 20 MIN: CPT | Performed by: NURSE PRACTITIONER

## 2022-10-24 RX ADMIN — METHYLPREDNISOLONE ACETATE 80 MG: 80 INJECTION, SUSPENSION INTRA-ARTICULAR; INTRALESIONAL; INTRAMUSCULAR; SOFT TISSUE at 16:40

## 2022-10-26 ENCOUNTER — APPOINTMENT (OUTPATIENT)
Dept: GENERAL RADIOLOGY | Facility: SURGERY CENTER | Age: 77
End: 2022-10-26

## 2022-10-26 RX ORDER — METHYLPREDNISOLONE ACETATE 80 MG/ML
80 INJECTION, SUSPENSION INTRA-ARTICULAR; INTRALESIONAL; INTRAMUSCULAR; SOFT TISSUE
Status: COMPLETED | OUTPATIENT
Start: 2022-10-24 | End: 2022-10-24

## 2022-10-27 PROCEDURE — S0260 H&P FOR SURGERY: HCPCS | Performed by: ANESTHESIOLOGY

## 2022-10-27 NOTE — PROGRESS NOTES
"Chief Complaint:  Follow up bilateral shoulder pain    HPI:  Mr. Loaiza follows up today for bilateral shoulder pain.  His symptoms began in mid 2020.  He was initially evaluated by Dr. Serrano in November 2020.  Reports his pain has been intermittent and has fluctuated in intensity since onset.  He is currently in physical therapy which seems to be helping the right, but says there has been little to no improvement on the left.  Localizes the majority of his pain to the deltoid region bilaterally.  Describes his current pain as moderate, constant, and aching.  Pain is worse at night and often interferes with his sleep.  Pain is somewhat better with rest, ice, and Tylenol.  Reports associated stiffness.  Denies numbness or tingling in lower arm or hand.  Patient is right hand dominant.      Vitals:    10/24/22 1638   Temp: 98.4 °F (36.9 °C)   Weight: (!) 147 kg (325 lb)   Height: 188 cm (74\")     Exam:   Both shoulders are fairly symmetric.  Skin is benign.  No gross abnormalities on inspection including any atrophy, swellings, or masses.  No palpable masses or adenopathy.  No focal areas of significant tenderness.  Full shoulder motion.  No evident instability or apprehension.  He has discomfort with resisted forward elevation in the scapular plane, but no weakness.  Mildly positive Neer, Santos.  Good strength in the rotator cuff, deltoid, biceps, triceps, and .  Intact sensation throughout the arm.  Brisk cap refill.  Palpable radial pulse.  Good skin turgor.    Imaging:  AP, scapular Y, and axillary views of both shoulders are ordered by myself and reviewed to evaluate the patient's complaint.  These are compared to previous x-rays.  The x-rays show advanced acromioclavicular joint arthritis bilaterally.  He has a hooked acromion bilaterally.  He has a downward sloping distal clavicle.  No significant glenohumeral arthritis noted in either shoulder. No obvious acute abnormalities, lesions, masses, " significant degenerative changes, or other concerning findings.  The acromiohumeral interval is normal bilaterally.    Assessment:   Chronic bilateral shoulder pain, suspect bilateral rotator cuff tendinopathy      Plan:  We thoroughly discussed a number of conservative treatments including rest, ice, Tylenol, topical pain relievers, and cortisone injections. He has acknowledged understanding of this information and has opted to try cortisone injections.  The risk, benefits and alternatives to the injections were thoroughly discussed.  He consented and the injections were performed as described below.  Additionally, I have given him a prescription for a transdermal pain[facility]inflammatory [facility].  The risks and application instructions were discussed.  He will follow up in 6 weeks for reevaluation.    YANIRA Andrea     10/24/2022    Large Joint Arthrocentesis: R subacromial bursa  Date/Time: 10/24/2022 4:40 PM  Consent given by: patient  Site marked: site marked  Timeout: Immediately prior to procedure a time out was called to verify the correct patient, procedure, equipment, support staff and site/side marked as required   Supporting Documentation  Indications: pain   Procedure Details  Location: shoulder - R subacromial bursa  Preparation: Patient was prepped and draped in the usual sterile fashion  Needle gauge: 21g.  Approach: posterior  Medications administered: 80 mg methylPREDNISolone acetate 80 MG/ML; 2 mL lidocaine (cardiac)  Patient tolerance: patient tolerated the procedure well with no immediate complications    Large Joint Arthrocentesis: L subacromial bursa  Date/Time: 10/24/2022 4:40 PM  Consent given by: patient  Site marked: site marked  Timeout: Immediately prior to procedure a time out was called to verify the correct patient, procedure, equipment, support staff and site/side marked as required   Supporting Documentation  Indications: pain   Procedure  Details  Location: shoulder - L subacromial bursa  Preparation: Patient was prepped and draped in the usual sterile fashion  Needle gauge: 21g.  Approach: posterior  Medications administered: 80 mg methylPREDNISolone acetate 80 MG/ML; 2 mL lidocaine (cardiac)  Patient tolerance: patient tolerated the procedure well with no immediate complications

## 2022-10-28 ENCOUNTER — HOSPITAL ENCOUNTER (OUTPATIENT)
Dept: GENERAL RADIOLOGY | Facility: SURGERY CENTER | Age: 77
Setting detail: HOSPITAL OUTPATIENT SURGERY
End: 2022-10-28

## 2022-10-28 ENCOUNTER — HOSPITAL ENCOUNTER (OUTPATIENT)
Facility: SURGERY CENTER | Age: 77
Setting detail: HOSPITAL OUTPATIENT SURGERY
Discharge: HOME | End: 2022-10-28
Attending: ANESTHESIOLOGY | Admitting: ANESTHESIOLOGY

## 2022-10-28 VITALS
HEIGHT: 74 IN | HEART RATE: 53 BPM | TEMPERATURE: 98 F | SYSTOLIC BLOOD PRESSURE: 157 MMHG | BODY MASS INDEX: 40.43 KG/M2 | OXYGEN SATURATION: 96 % | WEIGHT: 315 LBS | DIASTOLIC BLOOD PRESSURE: 77 MMHG | RESPIRATION RATE: 16 BRPM

## 2022-10-28 DIAGNOSIS — Z41.9 SURGERY, ELECTIVE: ICD-10-CM

## 2022-10-28 PROCEDURE — 25010000002 MIDAZOLAM PER 1 MG: Performed by: ANESTHESIOLOGY

## 2022-10-28 PROCEDURE — 76000 FLUOROSCOPY <1 HR PHYS/QHP: CPT

## 2022-10-28 PROCEDURE — 64633 DESTROY CERV/THOR FACET JNT: CPT | Performed by: ANESTHESIOLOGY

## 2022-10-28 PROCEDURE — 25010000002 FENTANYL CITRATE (PF) 50 MCG/ML SOLUTION: Performed by: ANESTHESIOLOGY

## 2022-10-28 PROCEDURE — 99152 MOD SED SAME PHYS/QHP 5/>YRS: CPT | Performed by: ANESTHESIOLOGY

## 2022-10-28 RX ORDER — SODIUM CHLORIDE 0.9 % (FLUSH) 0.9 %
10 SYRINGE (ML) INJECTION AS NEEDED
Status: DISCONTINUED | OUTPATIENT
Start: 2022-10-28 | End: 2022-10-28 | Stop reason: HOSPADM

## 2022-10-28 RX ORDER — SODIUM CHLORIDE 0.9 % (FLUSH) 0.9 %
10 SYRINGE (ML) INJECTION EVERY 12 HOURS SCHEDULED
Status: DISCONTINUED | OUTPATIENT
Start: 2022-10-28 | End: 2022-10-28 | Stop reason: HOSPADM

## 2022-10-28 RX ORDER — MIDAZOLAM HYDROCHLORIDE 1 MG/ML
INJECTION INTRAMUSCULAR; INTRAVENOUS AS NEEDED
Status: DISCONTINUED | OUTPATIENT
Start: 2022-10-28 | End: 2022-10-28 | Stop reason: HOSPADM

## 2022-10-28 RX ORDER — SODIUM CHLORIDE, SODIUM LACTATE, POTASSIUM CHLORIDE, CALCIUM CHLORIDE 600; 310; 30; 20 MG/100ML; MG/100ML; MG/100ML; MG/100ML
1000 INJECTION, SOLUTION INTRAVENOUS CONTINUOUS
Status: DISCONTINUED | OUTPATIENT
Start: 2022-10-28 | End: 2022-10-28 | Stop reason: HOSPADM

## 2022-10-28 RX ORDER — LIDOCAINE HYDROCHLORIDE 10 MG/ML
0.5 INJECTION, SOLUTION INFILTRATION; PERINEURAL ONCE AS NEEDED
Status: DISCONTINUED | OUTPATIENT
Start: 2022-10-28 | End: 2022-10-28 | Stop reason: HOSPADM

## 2022-10-28 RX ORDER — FENTANYL CITRATE 50 UG/ML
INJECTION, SOLUTION INTRAMUSCULAR; INTRAVENOUS AS NEEDED
Status: DISCONTINUED | OUTPATIENT
Start: 2022-10-28 | End: 2022-10-28 | Stop reason: HOSPADM

## 2022-10-28 RX ADMIN — SODIUM CHLORIDE, POTASSIUM CHLORIDE, SODIUM LACTATE AND CALCIUM CHLORIDE 500 ML: 600; 310; 30; 20 INJECTION, SOLUTION INTRAVENOUS at 09:46

## 2022-10-28 NOTE — DISCHARGE INSTRUCTIONS
[facility] - Post-procedure Instructions          --  While there are no absolute restrictions, it is recommended that you do not perform strenuous activity today. In the morning, you may resume your level of activity as before your block.    --  If you have a band-aid at your injection site, please remove it later today. Observe the area for any redness, swelling, pus-like drainage, or a temperature over 101°. If any of these symptoms occur, please call your doctor at [phone]. If after office hours, leave a message and the on-call provider will return your call.    --  Ice may be applied to your injection site. It is recommended you avoid direct heat (heating pad; hot tub) for 1-2 days.    --  Call [facility] at [phone] if you experience persistent headache, persistent bleeding from the injection site, or severe pain not relieved by heat or oral medication.    --  Do not make important decisions today.    --  Due to the effects of the block and/or the I.V. Sedation, DO NOT drive or operate hazardous machinery for 12 hours.  Local anesthetics may cause numbness after procedure and precautions must be taken with regards to operating equipment as well as with walking, even if ambulating with assistance of another person or with an assistive device.    --  Do not drink alcohol for 12 hours.    -- You may return to work tomorrow, or as directed by your referring doctor.    --  Occasionally you may notice a slight increase in your pain after the procedure. This should start to improve within the next 24-48 hours. Radiofrequency ablation procedure pain may last 3-4 weeks.    --  It may take as long as 3-4 days before you notice a gradual improvement in your pain and/or other symptoms.    -- You may continue to take your prescribed pain medication as needed.    --  Some normal possible side effects of steroid use could include fluid retention, increased blood sugar, dull headache,  increased sweating, increased appetite, mood swings and flushing.    --  Diabetics are recommended to watch their blood glucose level closely for 24-48 hours after the injection.    --  Must stay in PACU for 20 min upon arrival and prove no leg weakness before being discharged.    --  IN THE EVENT OF A LIFE THREATENING EMERGENCY, (CHEST PAIN, BREATHING DIFFICULTIES, PARALYSIS…) YOU SHOULD GO TO YOUR NEAREST EMERGENCY ROOM.    --  You should be contacted by our office within 2-3 days to schedule follow up or next appointment date.  If not contacted within 7 days, please call the office at [phone]

## 2022-10-28 NOTE — OP NOTE
Cervical Facet Nerve (Medial Branch) Radiofrequency Lesioning    [facility]      PREOPERATIVE DIAGNOSIS:  Cervical spondylosis without myelopathy   POSTOPERATIVE DIAGNOSIS:  Same as preoperative diagnosis    PROCEDURE:    Cervical Facet Nerve (medial branch) RF, with Fluoroscopy:      LEVELS: bilateral  C2 and C3    PRE-PROCEDURE DISCUSSION WITH PATIENT:    Risks and complications were discussed with the patient prior to starting the procedure and informed consent was obtained.  We discussed various topics, including but not limited to bleeding, infection, injury, nerve injury, paralysis, coma, death, postprocedural soreness or painful flare, worsening of clinical picture, lack of pain relief.  We discussed the previous positive diagnostic nature of medial branch blockades.      SURGEON:  Edu Tineo MD    REASON FOR PROCEDURE:    The patient presents with chronic axial neck pain. The patient underwent 2 bilateral cervical medial branch blocks with diagnostically positive relief. Given the patient’s significant pain relief, it is diagnostic that we have likely found the source of the patient’s pain; therefore, radiofrequency ablation of those nerves has been indicated for therapeutic pain relief.  Significant relief previously.  Cervicogenic headaches well treated.    SEDATION:  Versed 2mg & Fentanyl 100 mcg IV, The use of increased procedural sedation was carefully considered, and for this particular patient the need for additional procedural sedation seemed necessary in this instance to safely perform the procedure. and The patient had higher than average levels of procedural anxiety and the need to provide additional procedural sedation was needed to safely proceed.  TIME OF PROCEDURE:  After administration of the IV sedative, the intraoperative procedure time was 19 minutes.      ANESTHETIC:   Lidocaine 2%  STEROID:   NONE  TOTAL VOLUME OF SOLUTION:  4 ml      DESCRIPTON OF  PROCEDURE:  After obtaining informed consent, the patient was placed in the prone position. IV access was obtained.  EKG, blood pressure, and pulse oximeter were monitored and any & all sedation was administered by an RN under my direct guidance.  The cervical area was prepped with Chloraprep and draped with sterile barrier.     Under fluoroscopic guidance the waists of the above mentioned vertebra were identified and marked at the lateral margin of the vertebrae on the AP fluoroscopic view.  Skin and subcutaneous tissue were then anesthetized with 1% lidocaine 1mL at each point.  Then RF cannula needles were sequentially introduced under fluoroscopic guidance to the waists of these vertebrae contacting periosteum on the lateral edge of the vertebra on the AP fluroscopic view. After confirming the position of the needle under fluoroscopic PA and lateral views, confirming the needle position in the center of the trapezoid at each level, and confirming negative aspiration of blood and CSF, testing was initiated.  There was a lack of radicular stimulation on each needle at 3v and 2Hz.      Then, in each needle, 1mL of the aforementioned local anesthetic was instilled.  After 2 minutes had elapsed, Radiofrequency thermal lesioning was initiated for 2 minutes at 80 degrees Celsius.      Needles were removed intact from all levels.  Vitals were stable throughout.       ESTIMATED BLOOD LOSS:  minimal  SPECIMENS:  None    COMPLICATIONS:    No complications were noted., There was not any evidence of dural puncture.   and The patient did not have any signs of postprocedure numbness nor weakness.    TOLERANCE & DISCHARGE CONDITION:    The patient tolerated the procedure well.  The patient was transported to the recovery area without difficulties.  The patient was discharged to home under the care of family in stable and satisfactory condition.  The patient did notice improvement in pain on extension and rotation of the  cervical spine.      PLAN OF CARE:  1. The patient was given our standard instruction sheet.  2. We discussed that Cervical Medial Branch Blockade is a diagnostic procedure in consideration for radiofrequency ablation if two diagnostic procedures proved to be positive for significant benefit.  If sustained relief of six to eight weeks is obtained, then an alternative plan could be therapeutic cervical medial branch blocks on an as-needed basis.  3. The patient is asked to keep a pain log hourly for 8 hours postoperatively today.  4. The patient will Plan for office visit scheduled in about 3 mo.  5. The patient will resume all medications as per the medication reconciliation sheet.

## 2022-10-28 NOTE — H&P
Brief Pre-procedural / Pre-operative H&P        -----    Pertinent Diagnosis:   Cervical spondylosis.  Cervical facet arthropathy.  Cervicogenic headache.    Proposed Procedure: Cervical medial branch radiofrequency ablation anticipated at the C2 and C3 branches on both sides      Subjective   James Loaiza is a 77 y.o. male  who presents for intervention.  He has a history of neck pain and also headache pain.      History of Present Illness     He had significant therapeutic relief on 2 different occasions from radiofrequency ablation of the C2 and C3 branches.  He had over 10 months of greater than 80% pain relief from the last procedure.  Pain abruptly returned a few weeks ago and requesting radiofrequency ablation once again.  Because the painful pattern is the same quality is the same as it was before at baseline it is very reasonable to repeat the radiofrequency ablation therapeutically.    -------    The following portions of the patient's history were reviewed and updated as appropriate: allergies, current medications, past family history, past medical history, past social history, past surgical history and problem list.    No Known Allergies      Current Facility-Administered Medications:   •  lactated ringers infusion 1,000 mL, 1,000 mL, Intravenous, Continuous, Edu Tineo MD, Last Rate: 25 mL/hr at 10/28/22 0946, 500 mL at 10/28/22 0946  •  lidocaine (XYLOCAINE) 1 % injection 0.5 mL, 0.5 mL, Intradermal, Once PRN, Edu Tineo MD  •  sodium chloride 0.9 % flush 10 mL, 10 mL, Intravenous, Q12H, Edu Tineo MD  •  sodium chloride 0.9 % flush 10 mL, 10 mL, Intravenous, PRN, Edu Tineo MD  •  sodium chloride 0.9 % flush 10 mL, 10 mL, Intravenous, PRN, Edu Tineo MD    No current facility-administered medications on file prior to encounter.     Current Outpatient Medications on File Prior to Encounter   Medication Sig Dispense Refill   • atorvastatin (LIPITOR) 40 MG tablet  TAKE ONE TABLET BY MOUTH DAILY 90 tablet 1   • furosemide (Lasix) 40 MG tablet Take 1 tablet by mouth 2 (Two) Times a Day. 40 tablet 1   • gabapentin (NEURONTIN) 300 MG capsule Take 1 capsule by mouth Every Night. 30 capsule 0   • NIFEdipine XL (PROCARDIA XL) 60 MG 24 hr tablet TAKE ONE TABLET BY MOUTH DAILY 90 tablet 2   • pantoprazole (PROTONIX) 20 MG EC tablet TAKE ONE TABLET BY MOUTH DAILY 30 tablet 3   • ramipril (ALTACE) 10 MG capsule TAKE ONE CAPSULE BY MOUTH DAILY 90 capsule 1   • tamsulosin (FLOMAX) 0.4 MG capsule 24 hr capsule Take 1 capsule by mouth Every Night.     • tiotropium bromide-olodaterol (STIOLTO RESPIMAT) 2.5-2.5 MCG/ACT aerosol solution inhaler Inhale 2 puffs Daily.     • apixaban (ELIQUIS) 5 MG tablet tablet Take 5 mg by mouth Every 12 (Twelve) Hours.     • Aspirin (Vazalore) 81 MG capsule Take 1 tablet by mouth.     • lidocaine (LIDODERM) 5 % Place 1 patch on the skin as directed by provider Daily. Remove & Discard patch within 12 hours or as directed by MD 6 each 0   • triamcinolone (KENALOG) 0.1 % lotion Apply  topically to the appropriate area as directed 3 (Three) Times a Day. 120 mL 1   • [DISCONTINUED] cyclobenzaprine (FLEXERIL) 10 MG tablet Take 1 tablet by mouth 3 (Three) Times a Day As Needed for Muscle Spasms. 12 tablet 0       Patient Active Problem List   Diagnosis   • Lumbar herniated disc L3-L5 3/16/16 Open MRI   • Aortic valve insufficiency   • Coronary arteriosclerosis in native artery   • Edema   • Dyspnea on exertion   • Fatigue   • Left ventricular hypertrophy   • Obstructive sleep apnea syndrome   • Arthritis of left hip   • Morbid obesity with BMI of 40.0-44.9, adult (HCC)   • Intermittent dysphagia   • Hyperlipidemia 1999   • BPH (benign prostatic hypertrophy)   • Left cervical radiculopathy   • Rotator cuff tear, left   • Plantar fasciitis   • Hypogonadism male   • Vitamin D deficiency disease   • Renal cyst, left   • Preventative health care   • Medication management  "  • Chronic low back pain   • Lumbar spondylolysis   • Headache   • Chronic pain syndrome   • History of pulmonary embolism   • Chronic obstructive pulmonary disease ([facility])   • Osteoarthritis of right shoulder   • Chronic anticoagulation   • Cervical stenosis of spine   • Bilateral occipital neuralgia   • Chronic pain of both shoulders   • Cervical facet joint syndrome   • Open wound of right lower leg   • REJI (acute kidney injury) ([facility])   • Constipation   • Venous insufficiency   • Analgesic nephropathy   • Anemia   • Diastolic dysfunction   • Hypocalcemia   • Hypertension   • Localized swelling, mass and lump, lower limb, bilateral   • Stage 3b chronic kidney disease ([facility])   • Ascending aorta dilation ([facility])   • Bilateral occipital neuralgia       Past Medical History:   Diagnosis Date   • Abnormal EKG     referring to cardiology   • Anxiety    • Arthritis    • Ascending aorta dilation ([facility])    • Back pain     worsening   • BPH (benign prostatic hyperplasia)     BPH/Nocturia/ Urinary Frequency   • Breast nodule     right   • Cardiomegaly     Cardiomegaly/ SOB with exertion   • Circulation problem    • Constipation    • COPD (chronic obstructive pulmonary disease) ([facility])    • Deviated septum    • DJD (degenerative joint disease)     DJD Knees   • DVT (deep venous thrombosis) ([facility])    • Dysphagia     solids and liquids - resolved with normal studies   • Encounter for annual health examination 11/14/2013    Annual Health Assessment   • Enlarged prostate    • Fatigue     Extreme fatigue   • Hyperlipidemia 1999   • Hypertension 1999    followed Dr. Marcelo   • Hypogonadism male    • Left hip pain     and DJD   • Left leg pain    • Low back pain    • Moist mucous membranes of ear, nose, and throat     \"Mucous in throat\"   • Morbid obesity ([facility])     (BMI-41.2za)   • Muscle cramping    • Muscle spasm     Muscle spasms   • Neck arthritis    • Neck muscle spasm     Neck muscle spasm/Cervical strain   • Neck pain    • Obesity  "   • Plantar fasciitis    • Prostatitis     recurrent   • Psoriasis    • Pulmonary embolus (HCC) 01/2019    on Xarelltoypseerlipidemia   • Radiculopathy     Radiculopathy / Neuropathy left ar   • Renal insufficiency     followed by Dr. Mendes   • Seborrhea    • Shortness of breath    • Sleep apnea     Obstructive Sleep apnea worsening; uses CPAP   • Urinary frequency    • Vascular disorder    • Vertigo    • Weight gain    • Wellness examination 10/23/2014    Annual Wellness Visit       Past Surgical History:   Procedure Laterality Date   • APPENDECTOMY  1965   • CARDIAC CATHETERIZATION  07/29/2010    [facility]   • COLONOSCOPY  01/05/2010   • COLONOSCOPY  10/01/2001   • NASAL SEPTUM SURGERY     • NOSE SURGERY  1999   • RADIOFREQUENCY ABLATION Bilateral 3/2/2021    Procedure: RADIOFREQUENCY ABLATION NERVES---bilateral cervical 2-cervical3;  Surgeon: Edu Tineo MD;  [place] [facility];  Service: Pain Management;  Laterality: Bilateral;   • RADIOFREQUENCY ABLATION Bilateral 12/2/2021    Procedure: RADIOFREQUENCY ABLATION NERVES--bilateral cervical2-3;  Surgeon: Edu Tineo MD;  [place] [facility];  Service: Pain Management;  Laterality: Bilateral;   • TURP / TRANSURETHRAL INCISION / DRAINAGE PROSTATE  10/23/2015    Michael Castro       Family History   Problem Relation Age of Onset   • Arthritis Mother    • Heart disease Mother    • Hypertension Mother    • Heart attack Father    • Heart disease Father    • Hypertension Father    • Arthritis Sister    • Multiple sclerosis Sister    • Alcohol abuse Brother    • Diabetes Son    • Malig Hyperthermia Neg Hx        Social History     Socioeconomic History   • Marital status:    Tobacco Use   • Smoking status: Never   • Smokeless tobacco: Never   • Tobacco comments:     caffiene use: soda and coffee   Vaping Use   • Vaping Use: Never used   Substance and Sexual Activity   • Alcohol use: Yes     Comment: 2-3 times a  "year   • Drug use: No   • Sexual activity: Defer       -------       Review of Systems  No Fever, No Chills, No ear pain, No sinus pressure or drainage, No eye pain or drainage, No cough, No SOB, No chest tightness nor chest pain, no palpitations.        Vitals:    10/28/22 0936 10/28/22 0951   BP: (!) 191/97 (!) 183/99   BP Location: Left arm Left arm   Patient Position: Lying Sitting   Pulse: 62    Resp: 20    Temp: 97.5 °F (36.4 °C)    TempSrc: Temporal    SpO2: 96%    Weight: (!) 148 kg (326 lb)    Height: 188 cm (74\")          Objective   Physical Exam  VSS, NNR, NCAT, NMNA, NRD, AAOx3.  Crepitus.  Upper neck tender.    -------    Assessment & Plan:  - as noted above, the stated intervention is indicated  - Follow-up plan will be noted in the operative report        He has a follow-up scheduled already in January EMR Dragon/Transcription disclaimer:   Typed items in this encounter note may have been created by electronic transcription/translation software which converts spoken language to printed text. The electronic translation of spoken language may permit erroneous, or at times, nonsensical words or phrases to be inadvertently transcribed; Although I have reviewed the note for such errors, some may still exist.      "

## 2022-11-07 ENCOUNTER — TELEPHONE (OUTPATIENT)
Dept: FAMILY MEDICINE CLINIC | Facility: CLINIC | Age: 77
End: 2022-11-07

## 2022-11-07 RX ORDER — ATORVASTATIN CALCIUM 40 MG/1
TABLET, FILM COATED ORAL
Qty: 90 TABLET | Refills: 1 | Status: SHIPPED | OUTPATIENT
Start: 2022-11-07

## 2022-12-09 ENCOUNTER — OFFICE VISIT (OUTPATIENT)
Dept: ORTHOPEDIC SURGERY | Facility: CLINIC | Age: 77
End: 2022-12-09

## 2022-12-09 VITALS — WEIGHT: 315 LBS | HEIGHT: 74 IN | TEMPERATURE: 97.6 F | BODY MASS INDEX: 40.43 KG/M2

## 2022-12-09 DIAGNOSIS — G89.29 CHRONIC LEFT SHOULDER PAIN: Primary | ICD-10-CM

## 2022-12-09 DIAGNOSIS — M25.511 CHRONIC RIGHT SHOULDER PAIN: ICD-10-CM

## 2022-12-09 DIAGNOSIS — M25.512 CHRONIC LEFT SHOULDER PAIN: Primary | ICD-10-CM

## 2022-12-09 DIAGNOSIS — G89.29 CHRONIC RIGHT SHOULDER PAIN: ICD-10-CM

## 2022-12-09 PROCEDURE — 99213 OFFICE O/P EST LOW 20 MIN: CPT | Performed by: NURSE PRACTITIONER

## 2022-12-12 RX ORDER — DIAZEPAM 10 MG/1
TABLET ORAL
Qty: 1 TABLET | Refills: 0 | Status: SHIPPED | OUTPATIENT
Start: 2022-12-12 | End: 2022-12-21 | Stop reason: SDUPTHER

## 2022-12-12 NOTE — PROGRESS NOTES
"Mr. Loaiza follows up today for bilateral shoulder pain.  Reports his symptoms have persisted.  He says the left is more bothersome than the right.  He has tried physical therapy, injections, rest, ice, and Tylenol without improvement.  Reports he continues to have associated stiffness.  Denies numbness or tingling in the lower arm or hand bilaterally.    Vitals:    12/09/22 1345   Temp: 97.6 °F (36.4 °C)   Weight: (!) 152 kg (335 lb 4.8 oz)   Height: 188 cm (74.02\")     Exam: Exam is relatively unchanged compared to previous visit.    Assessment: Chronic bilateral shoulder pain, suspect bilateral rotator cuff tendinopathy versus rotator cuff tear    Plan: He has tried and failed a number of conservative treatments.  I recommended getting a MRI of the left shoulder.  He agreed.  I have entered this referral for him.  Since he has had difficulty tolerating MRIs in the past, he is requesting to have an open MRI.  Additionally, I have given him a one-time dose of Valium to take 1 hour prior to the procedure.  The risks were discussed.  Patient understands he will need a  to and from the study.  I will call with the MRI results and come up with a plan at that time.    Meera Bartholomew, APRN    12/09/2022    "

## 2022-12-21 ENCOUNTER — TELEPHONE (OUTPATIENT)
Dept: ORTHOPEDIC SURGERY | Facility: CLINIC | Age: 77
End: 2022-12-21

## 2022-12-21 DIAGNOSIS — M25.512 CHRONIC LEFT SHOULDER PAIN: ICD-10-CM

## 2022-12-21 DIAGNOSIS — G89.29 CHRONIC LEFT SHOULDER PAIN: ICD-10-CM

## 2022-12-21 RX ORDER — DIAZEPAM 10 MG/1
TABLET ORAL
Qty: 1 TABLET | Refills: 0 | Status: SHIPPED | OUTPATIENT
Start: 2022-12-21 | End: 2022-12-23 | Stop reason: SDUPTHER

## 2022-12-21 NOTE — TELEPHONE ENCOUNTER
Provider: SUSAN HUESTON    Caller: BETHANIE ORDONEZ    Relationship to Patient: SELF    Pharmacy: [email or web address]    Phone Number: [phone]    Reason for Call: PATIENT GOT THE TIME OF HIS MRI WRONG AT [facility]-- HE THOUGHT IT WAS THIS MORNING BUT IT IS THIS EVENING. HE TOOK THE VALIUM PILL EARLY DUE TO THIS MIX UP AND NEEDS TO HAVE ANOTHER ONE SENT IN. PT WANTS TO KNOW IF THAT CAN BE DONE TODAY OR IF WE NEED TO RESCHEDULE THE APPT. PLEASE CALL HIM BACK ASAP.

## 2022-12-22 ENCOUNTER — TELEPHONE (OUTPATIENT)
Dept: ORTHOPEDIC SURGERY | Facility: CLINIC | Age: 77
End: 2022-12-22

## 2022-12-22 DIAGNOSIS — M25.512 CHRONIC LEFT SHOULDER PAIN: ICD-10-CM

## 2022-12-22 DIAGNOSIS — G89.29 CHRONIC LEFT SHOULDER PAIN: ICD-10-CM

## 2022-12-22 NOTE — TELEPHONE ENCOUNTER
Caller: James Loaiza    Relationship to patient: Self    Best call back number:     Patient is needing: PATIENT HAD MRI AT [facility] LAST NIGHT AND HAD SOME TROUBLE AND THEY MAY NEED TO DO A CT AND WANTED TO TALK WITH SUSAN IN REGARDS TO THIS

## 2022-12-23 RX ORDER — DIAZEPAM 10 MG/1
TABLET ORAL
Qty: 1 TABLET | Refills: 0 | Status: SHIPPED | OUTPATIENT
Start: 2022-12-23 | End: 2023-04-04

## 2022-12-23 NOTE — TELEPHONE ENCOUNTER
I spoke to Mr. Loaiza.  He tells me he attempted to have the MRI performed at [place].  He said the tech had tried twice to place devices on his shoulder to assist with getting the best images.  Mr. Loaiza says he was put in and out of the MRI tube a couple different times and was beginning to have pain and discomfort in his neck and upper back.  They wanted to try a third time, but he was unable to tolerate a third attempt.  He says he is willing to give it another try at [place] if we can get him rescheduled.  I have entered another prescription for Valium.  The risk were discussed.  He is aware he will need a  to and from the study.

## 2022-12-27 ENCOUNTER — OFFICE VISIT (OUTPATIENT)
Dept: FAMILY MEDICINE CLINIC | Facility: CLINIC | Age: 77
End: 2022-12-27

## 2022-12-27 VITALS
DIASTOLIC BLOOD PRESSURE: 88 MMHG | BODY MASS INDEX: 40.43 KG/M2 | RESPIRATION RATE: 16 BRPM | WEIGHT: 315 LBS | HEIGHT: 74 IN | SYSTOLIC BLOOD PRESSURE: 140 MMHG

## 2022-12-27 DIAGNOSIS — R53.82 CHRONIC FATIGUE: ICD-10-CM

## 2022-12-27 DIAGNOSIS — I10 PRIMARY HYPERTENSION: Primary | ICD-10-CM

## 2022-12-27 DIAGNOSIS — Z91.119 DIETARY NONCOMPLIANCE: ICD-10-CM

## 2022-12-27 DIAGNOSIS — Z23 NEED FOR INFLUENZA VACCINATION: ICD-10-CM

## 2022-12-27 DIAGNOSIS — D64.9 ANEMIA, UNSPECIFIED TYPE: ICD-10-CM

## 2022-12-27 PROCEDURE — 99214 OFFICE O/P EST MOD 30 MIN: CPT | Performed by: INTERNAL MEDICINE

## 2022-12-27 PROCEDURE — 90662 IIV NO PRSV INCREASED AG IM: CPT | Performed by: INTERNAL MEDICINE

## 2022-12-27 PROCEDURE — G0008 ADMIN INFLUENZA VIRUS VAC: HCPCS | Performed by: INTERNAL MEDICINE

## 2022-12-27 RX ORDER — FERROUS SULFATE 325(65) MG
325 TABLET ORAL WEEKLY
COMMUNITY

## 2022-12-28 NOTE — PROGRESS NOTES
12/27/2022    CC: Hypertension (F/U--no other issues)  .        HPI  Hypertension  This is a chronic problem. The current episode started more than 1 year ago. The problem has been gradually improving since onset. The problem is uncontrolled. Risk factors for coronary artery disease include male gender, obesity and dyslipidemia.        Ricardo Loaiza is a 77 y.o. male.      The following portions of the patient's history were reviewed and updated as appropriate: allergies, current medications, past family history, past medical history, past social history, past surgical history and problem list.    Problem List  Patient Active Problem List   Diagnosis   • Lumbar herniated disc L3-L5 3/16/16 Open MRI   • Aortic valve insufficiency   • Coronary arteriosclerosis in native artery   • Edema   • Dyspnea on exertion   • Fatigue   • Left ventricular hypertrophy   • Obstructive sleep apnea syndrome   • Arthritis of left hip   • Morbid obesity with BMI of 40.0-44.9, adult ([facility])   • Intermittent dysphagia   • Hyperlipidemia 1999   • BPH (benign prostatic hypertrophy)   • Left cervical radiculopathy   • Rotator cuff tear, left   • Plantar fasciitis   • Hypogonadism male   • Vitamin D deficiency disease   • Renal cyst, left   • Preventative health care   • Medication management   • Chronic low back pain   • Lumbar spondylolysis   • Headache   • Chronic pain syndrome   • History of pulmonary embolism   • Chronic obstructive pulmonary disease ([facility])   • Osteoarthritis of right shoulder   • Chronic anticoagulation   • Cervical stenosis of spine   • Bilateral occipital neuralgia   • Chronic pain of both shoulders   • Cervical facet joint syndrome   • Open wound of right lower leg   • REJI (acute kidney injury) ([facility])   • Constipation   • Venous insufficiency   • Analgesic nephropathy   • Anemia   • Diastolic dysfunction   • Hypocalcemia   • Hypertension   • Localized swelling, mass and lump, lower limb, bilateral   • Stage  "3b chronic kidney disease (HCC)   • Ascending aorta dilation (HCC)   • Bilateral occipital neuralgia       Past Medical History  Past Medical History:   Diagnosis Date   • Abnormal EKG     referring to cardiology   • Anxiety    • Arthritis    • Ascending aorta dilation (HCC)    • Back pain     worsening   • BPH (benign prostatic hyperplasia)     BPH/Nocturia/ Urinary Frequency   • Breast nodule     right   • Cardiomegaly     Cardiomegaly/ SOB with exertion   • Circulation problem    • Constipation    • COPD (chronic obstructive pulmonary disease) ([facility])    • Deviated septum    • DJD (degenerative joint disease)     DJD Knees   • DVT (deep venous thrombosis) ([facility])    • Dysphagia     solids and liquids - resolved with normal studies   • Encounter for annual health examination 11/14/2013    Annual Health Assessment   • Enlarged prostate    • Fatigue     Extreme fatigue   • Hyperlipidemia 1999   • Hypertension 1999    followed Dr. Marcelo   • Hypogonadism male    • Left hip pain     and DJD   • Left leg pain    • Low back pain    • Moist mucous membranes of ear, nose, and throat     \"Mucous in throat\"   • Morbid obesity ([facility])     (BMI-41.2za)   • Muscle cramping    • Muscle spasm     Muscle spasms   • Neck arthritis    • Neck muscle spasm     Neck muscle spasm/Cervical strain   • Neck pain    • Obesity    • Plantar fasciitis    • Prostatitis     recurrent   • Psoriasis    • Pulmonary embolus ([facility]) 01/2019    on Xarelltoypseerlipidemia   • Radiculopathy     Radiculopathy / Neuropathy left ar   • Renal insufficiency     followed by Dr. Mendes   • Seborrhea    • Shortness of breath    • Sleep apnea     Obstructive Sleep apnea worsening; uses CPAP   • Urinary frequency    • Vascular disorder    • Vertigo    • Weight gain    • Wellness examination 10/23/2014    Annual Wellness Visit       Surgical History  Past Surgical History:   Procedure Laterality Date   • APPENDECTOMY  1965   • CARDIAC CATHETERIZATION  07/29/2010    " [facility]   • COLONOSCOPY  01/05/2010   • COLONOSCOPY  10/01/2001   • NASAL SEPTUM SURGERY     • NOSE SURGERY  1999   • RADIOFREQUENCY ABLATION Bilateral 3/2/2021    Procedure: RADIOFREQUENCY ABLATION NERVES---bilateral cervical 2-cervical3;  Surgeon: Edu Tineo MD;  Location: [facility];  Service: Pain Management;  Laterality: Bilateral;   • RADIOFREQUENCY ABLATION Bilateral 12/2/2021    Procedure: RADIOFREQUENCY ABLATION NERVES--bilateral cervical2-3;  Surgeon: Edu Tineo MD;  Location: [facility];  Service: Pain Management;  Laterality: Bilateral;   • RADIOFREQUENCY ABLATION Bilateral 10/28/2022    Procedure: CERVICAL RADIOFREQUENCY LESIONING BILATERAL C2-3;  Surgeon: Edu Tineo MD;  Location: [facility];  Service: Pain Management;  Laterality: Bilateral;   • TURP / TRANSURETHRAL INCISION / DRAINAGE PROSTATE  10/23/2015    Michael Castro       Family History  Family History   Problem Relation Age of Onset   • Arthritis Mother    • Heart disease Mother    • Hypertension Mother    • Heart attack Father    • Heart disease Father    • Hypertension Father    • Arthritis Sister    • Multiple sclerosis Sister    • Alcohol abuse Brother    • Diabetes Son    • Malig Hyperthermia Neg Hx        Social History  Social History    Tobacco Use      Smoking status: Never      Smokeless tobacco: Never      Tobacco comments: caffiene use: soda and coffee       Is the Patient a current tobacco user? No    Allergies  No Known Allergies    Current Medications    Current Outpatient Medications:   •  apixaban (ELIQUIS) 5 MG tablet tablet, Take 5 mg by mouth Every 12 (Twelve) Hours., Disp: , Rfl:   •  Aspirin ([place]) 81 MG capsule, Take 1 tablet by mouth., Disp: , Rfl:   •  atorvastatin (LIPITOR) 40 MG tablet, TAKE ONE TABLET BY MOUTH DAILY, Disp: 90 tablet, Rfl: 1  •  ferrous sulfate 325 (65 FE) MG tablet, Take 325 mg by mouth Daily With Breakfast., Disp: , Rfl:   •   furosemide (Lasix) 40 MG tablet, Take 1 tablet by mouth 2 (Two) Times a Day., Disp: 40 tablet, Rfl: 1  •  gabapentin (NEURONTIN) 300 MG capsule, Take 1 capsule by mouth Every Night., Disp: 30 capsule, Rfl: 0  •  lidocaine (LIDODERM) 5 %, Place 1 patch on the skin as directed by provider Daily. Remove & Discard patch within 12 hours or as directed by MD, Disp: 6 each, Rfl: 0  •  NIFEdipine XL (PROCARDIA XL) 60 MG 24 hr tablet, TAKE ONE TABLET BY MOUTH DAILY, Disp: 90 tablet, Rfl: 2  •  pantoprazole (PROTONIX) 20 MG EC tablet, TAKE ONE TABLET BY MOUTH DAILY, Disp: 30 tablet, Rfl: 3  •  ramipril (ALTACE) 10 MG capsule, TAKE ONE CAPSULE BY MOUTH DAILY, Disp: 90 capsule, Rfl: 1  •  tamsulosin (FLOMAX) 0.4 MG capsule 24 hr capsule, Take 1 capsule by mouth 2 (Two) Times a Day., Disp: , Rfl:   •  tiotropium bromide-olodaterol (STIOLTO RESPIMAT) 2.5-2.5 MCG/ACT aerosol solution inhaler, Inhale 2 puffs Daily., Disp: , Rfl:   •  triamcinolone (KENALOG) 0.1 % lotion, Apply  topically to the appropriate area as directed 3 (Three) Times a Day., Disp: 120 mL, Rfl: 1  •  diazePAM (Valium) 10 MG tablet, Take 1 tablet by mouth 1 hour prior to MRI procedure., Disp: 1 tablet, Rfl: 0     Review of System  Review of Systems   Constitutional: Negative.    HENT: Negative.    Eyes: Negative.    Respiratory: Negative.    Cardiovascular: Negative.    Gastrointestinal: Negative.      I have reviewed and confirmed the accuracy of the ROS as documented by the MA/LPN/RN Magdiel Bowens MD    Vitals:    12/27/22 1026   BP: 140/88   Resp: 16     Body mass index is 43.27 kg/m².    Objective     Physical Exam  Physical Exam  Cardiovascular:      Rate and Rhythm: Normal rate and regular rhythm.      Pulses: Normal pulses.      Heart sounds: Normal heart sounds.   Pulmonary:      Effort: Pulmonary effort is normal.   Abdominal:      General: Abdomen is flat.      Palpations: Abdomen is soft.         Assessment & Plan      This 77-year-old patient  presents today for follow-up of hypertension.  He relates that in the interim of [facility]  He relates that this is caused tremendous savings in his medication moving him down from $500 a month down to 92.  Also in the interim of visits he was seen by urology for increasing nocturia.  Urology increase his tamsulosin to 1 tab p.o. twice daily the patient relates he seen some minimal improvement.    His blood pressure with the last visit was elevated at 157/77 on check today by me in the left arm sitting position standard cuff is 146/88 note is made however he has not had his Lasix for the day.  Also he has had a high sodium meal last night consisting of French fries and other sodium content foods.    [facility] relates that [facility] was diagnosed with anemia and started on ferrous sulfate 1 tablet twice daily.  He relates this is caused some mild constipation but not anything of a problem.    Diagnoses and all orders for this visit:    1. Primary hypertension (Primary)    2. Chronic fatigue    3. Need for influenza vaccination  -     Fluzone High-Dose 65+yrs    4. Anemia, unspecified type    5. Dietary noncompliance      Plan:  1.)  Follow-up in 2 to 3 months.  2.)  We will repeat his CBC in 2 months for reevaluation of his anemia.  3.)  Add vitamin C 500 mg 1 tab p.o. q. OD for anemia.  4.)  We encouraged the patient to continue his tamsulosin 1 tab p.o. twice daily and will reevaluate that with his next visit as well.       Magdiel Bowens MD  12/27/2022

## 2023-01-05 ENCOUNTER — TELEPHONE (OUTPATIENT)
Dept: ORTHOPEDIC SURGERY | Facility: CLINIC | Age: 78
End: 2023-01-05

## 2023-01-05 NOTE — TELEPHONE ENCOUNTER
Provider: SUSAN HUESTON  Caller: BETHANIE ORDONEZ  Relationship to Patient: PATIENT    Phone Number: [phone]    Reason for Call: PATIENT WOULD LIKE A CALL BACK ABOUT MRI FOR HIS SHLDR; STATES HE WAS UNABLE TO COMPLETE MRI COUPLE WEEKS AGO AND HAS NOT HEARD FROM THE OFFICE OR [facility] IMAG[facility] AND HE WOULD LIKE A CALLBACK ASAP    When was the patient last seen: 12.9.22

## 2023-01-12 ENCOUNTER — TELEPHONE (OUTPATIENT)
Dept: ORTHOPEDIC SURGERY | Facility: CLINIC | Age: 78
End: 2023-01-12

## 2023-01-12 NOTE — TELEPHONE ENCOUNTER
Caller: James Loaiza    Relationship to patient: Self    Best call back number:     Chief complaint: BILATERAL KNEE PAIN    Type of visit: CORTISONE INJECTIONS    Requested date: AFTER 1/15/23

## 2023-01-18 ENCOUNTER — CLINICAL SUPPORT (OUTPATIENT)
Dept: ORTHOPEDIC SURGERY | Facility: CLINIC | Age: 78
End: 2023-01-18
Payer: MEDICARE

## 2023-01-18 VITALS — HEIGHT: 74 IN | TEMPERATURE: 97.8 F | BODY MASS INDEX: 40.43 KG/M2 | WEIGHT: 315 LBS

## 2023-01-18 DIAGNOSIS — M25.561 BILATERAL CHRONIC KNEE PAIN: Primary | ICD-10-CM

## 2023-01-18 DIAGNOSIS — M17.12 PRIMARY OSTEOARTHRITIS OF LEFT KNEE: ICD-10-CM

## 2023-01-18 DIAGNOSIS — M25.562 BILATERAL CHRONIC KNEE PAIN: Primary | ICD-10-CM

## 2023-01-18 DIAGNOSIS — G89.29 BILATERAL CHRONIC KNEE PAIN: Primary | ICD-10-CM

## 2023-01-18 DIAGNOSIS — M17.11 PRIMARY OSTEOARTHRITIS OF RIGHT KNEE: ICD-10-CM

## 2023-01-18 PROCEDURE — 73562 X-RAY EXAM OF KNEE 3: CPT | Performed by: NURSE PRACTITIONER

## 2023-01-18 PROCEDURE — 20610 DRAIN/INJ JOINT/BURSA W/O US: CPT | Performed by: NURSE PRACTITIONER

## 2023-01-18 RX ORDER — METHYLPREDNISOLONE ACETATE 80 MG/ML
80 INJECTION, SUSPENSION INTRA-ARTICULAR; INTRALESIONAL; INTRAMUSCULAR; SOFT TISSUE
Status: COMPLETED | OUTPATIENT
Start: 2023-01-18 | End: 2023-01-18

## 2023-01-18 RX ORDER — LIDOCAINE HYDROCHLORIDE 10 MG/ML
2 INJECTION, SOLUTION EPIDURAL; INFILTRATION; INTRACAUDAL; PERINEURAL
Status: COMPLETED | OUTPATIENT
Start: 2023-01-18 | End: 2023-01-18

## 2023-01-18 RX ADMIN — METHYLPREDNISOLONE ACETATE 80 MG: 80 INJECTION, SUSPENSION INTRA-ARTICULAR; INTRALESIONAL; INTRAMUSCULAR; SOFT TISSUE at 09:56

## 2023-01-18 RX ADMIN — LIDOCAINE HYDROCHLORIDE 2 ML: 10 INJECTION, SOLUTION EPIDURAL; INFILTRATION; INTRACAUDAL; PERINEURAL at 09:56

## 2023-01-18 RX ADMIN — LIDOCAINE HYDROCHLORIDE 2 ML: 10 INJECTION, SOLUTION EPIDURAL; INFILTRATION; INTRACAUDAL; PERINEURAL at 09:55

## 2023-01-18 RX ADMIN — METHYLPREDNISOLONE ACETATE 80 MG: 80 INJECTION, SUSPENSION INTRA-ARTICULAR; INTRALESIONAL; INTRAMUSCULAR; SOFT TISSUE at 09:55

## 2023-01-18 NOTE — PROGRESS NOTES
Mr. Loaiza comes in today for follow-up.  Injections have worked well in the past.  The patient would like to get repeat injections today.      Imaging:  Bilateral standing AP views, bilateral merchants views and a lateral view of both knees are ordered by myself and reviewed to evaluate the patient's complaint.  These are compared to previous x-rays.  The x-rays show significant degenerative arthritis including joint space narrowing, osteophyte formation, and subchondral sclerosis.  The majority of the degenerative changes appear to involve the medial compartment bilaterally.  No significant changes compared to previous x-rays.    The risks, benefits and alternatives were discussed and the patient consented.  Going forward, the patient will follow-up as needed.    Meera Bartholomew, YANIRA    01/18/2023    Large Joint Arthrocentesis: R knee  Date/Time: 1/18/2023 9:55 AM  Site marked: site marked  Timeout: Immediately prior to procedure a time out was called to verify the correct patient, procedure, equipment, support staff and site/side marked as required   Supporting Documentation  Indications: pain   Procedure Details  Location: knee - R knee  Preparation: Patient was prepped and draped in the usual sterile fashion  Needle gauge: 21 G.  Approach: anterolateral  Medications administered: 2 mL lidocaine PF 1% 1 %; 80 mg methylPREDNISolone acetate 80 MG/ML  Patient tolerance: patient tolerated the procedure well with no immediate complications    Large Joint Arthrocentesis: L knee  Date/Time: 1/18/2023 9:56 AM  Consent given by: patient  Site marked: site marked  Timeout: Immediately prior to procedure a time out was called to verify the correct patient, procedure, equipment, support staff and site/side marked as required   Supporting Documentation  Indications: pain   Procedure Details  Location: knee - L knee  Preparation: Patient was prepped and draped in the usual sterile fashion  Needle gauge: 21 G.  Approach:  anterolateral  Medications administered: 80 mg methylPREDNISolone acetate 80 MG/ML; 2 mL lidocaine PF 1% 1 %  Patient tolerance: patient tolerated the procedure well with no immediate complications

## 2023-01-26 ENCOUNTER — OFFICE VISIT (OUTPATIENT)
Dept: PAIN MEDICINE | Facility: CLINIC | Age: 78
End: 2023-01-26
Payer: MEDICARE

## 2023-01-26 VITALS
HEART RATE: 73 BPM | TEMPERATURE: 97.1 F | OXYGEN SATURATION: 96 % | BODY MASS INDEX: 40.43 KG/M2 | DIASTOLIC BLOOD PRESSURE: 76 MMHG | SYSTOLIC BLOOD PRESSURE: 154 MMHG | HEIGHT: 74 IN | RESPIRATION RATE: 20 BRPM | WEIGHT: 315 LBS

## 2023-01-26 DIAGNOSIS — M47.812 CERVICAL FACET JOINT SYNDROME: ICD-10-CM

## 2023-01-26 DIAGNOSIS — M54.50 CHRONIC LOW BACK PAIN, UNSPECIFIED BACK PAIN LATERALITY, UNSPECIFIED WHETHER SCIATICA PRESENT: ICD-10-CM

## 2023-01-26 DIAGNOSIS — M25.511 CHRONIC PAIN OF BOTH SHOULDERS: ICD-10-CM

## 2023-01-26 DIAGNOSIS — G89.29 CHRONIC PAIN OF BOTH SHOULDERS: ICD-10-CM

## 2023-01-26 DIAGNOSIS — M54.81 BILATERAL OCCIPITAL NEURALGIA: Primary | ICD-10-CM

## 2023-01-26 DIAGNOSIS — G89.29 CHRONIC LOW BACK PAIN, UNSPECIFIED BACK PAIN LATERALITY, UNSPECIFIED WHETHER SCIATICA PRESENT: ICD-10-CM

## 2023-01-26 DIAGNOSIS — M25.512 CHRONIC PAIN OF BOTH SHOULDERS: ICD-10-CM

## 2023-01-26 DIAGNOSIS — M25.519 SHOULDER PAIN, UNSPECIFIED CHRONICITY, UNSPECIFIED LATERALITY: ICD-10-CM

## 2023-01-26 PROCEDURE — 99214 OFFICE O/P EST MOD 30 MIN: CPT

## 2023-01-26 NOTE — PROGRESS NOTES
CHIEF COMPLAINT  Neck and bilateral shoulder pain    Subjective   James Loaiza is a 77 y.o. male  who presents to the office for follow-up of procedure.  He completed a Bilateral C2-3 RFL on 10/28/22 performed by Dr. Tineo for management of cervical pain. Patient reports 90% ongoing relief from the procedure.     Today pain is 1/10VAS in severity. Pain is located in his neck and bilateral shoulders. Describes this pain as an intermittent ache. Pain is worsened by cold weather, lying flat, and certain positions. Pain improves with heat, physical therapy, procedures, and medication. Completed with PT in September and continues with HEP as tolerated. Medication regimen includes Gabapentin 300mg daily and OTC Tylenol PRN. Denies any side effects from the regimen, including constipation and somnolence.    Patient also has a history of chronic low back pain.  At this time back pain remains stable.  He still reports that he is receiving ongoing benefit from a bilateral L2-L5 RFA in 2018.    Procedures:   10/28/22 - Bilateral C2-C3 RFL - 90% ongoing relief  12/2/21 - Bilateral C2-C3 RFL   3/2/21 - Bilateral C2-C3 RFL   1/22/21 - Bilateral OCNB  9/9/20 - ELENITA  6/22/18 - Bilateral L2-L5 RFL - 75% ongoing relief    Neck Pain   This is a chronic problem. The current episode started more than 1 year ago. The problem occurs constantly. The problem has been gradually improving. The pain is associated with an unknown factor. The pain is present in the midline, left side and right side. The quality of the pain is described as aching. The pain is at a severity of 1/10. The pain is mild. The symptoms are aggravated by position (cold weather, ). Pertinent negatives include no chest pain, fever, headaches, numbness or weakness. He has tried heat and home exercises (PT, Gabapentin, Cervical RFA) for the symptoms. The treatment provided moderate relief.   Shoulder Injury   Incident location: Bilateral shoulders. The incident occurred  more than 1 week ago. The injury mechanism is unknown. The quality of the pain is described as aching. The pain does not radiate. The pain is at a severity of 1/10. Pertinent negatives include no chest pain or numbness. The symptoms are aggravated by movement (certain sleep positions,). He has tried acetaminophen (PT, HEP, ) for the symptoms.      PEG Assessment   What number best describes your pain on average in the past week?2  What number best describes how, during the past week, pain has interfered with your enjoyment of life?0  What number best describes how, during the past week, pain has interfered with your general activity?  0    Review of Pertinent Medical Data ---  Reviewed office note from CARY Thakkar from 9/12/2022.  Patient presents for follow-up appointment.  He has a history of chronic neck and back pain.  At the time of his appointment he had noted that his midline cervical pain was worsening.  Pain was specifically located bilateral occipital grooves consistent with occipital neuralgia.  Patient previously undergone a C2-3 RFA on 12/2/2021 with greater than 80% reduction of pain that lasted until recently.  Neck pain continues to be the primary complaint of pain.  Patient also complains of lumbar spine pain however he notes ongoing relief from lumbar injective therapies at this time.  Patient also notes increasing bilateral shoulder pain.  He is requesting referral to physical therapy at this time.  Medication regimen includes gabapentin 300 mg nightly.  No adverse effects noted.  Patient thinks that he may have missed a few doses and feels that this may be contributing to his increasing occipital neuralgia.  Plan at this appointment was to proceed with therapeutic RFA at bilateral C2-3 level.  Due to tenderness to bilateral occipital regions, patient will proceed with therapeutic bilateral greater occipital nerve blocks.  Referral for physical therapy was also placed for bilateral shoulder  "pain.    The following portions of the patient's history were reviewed and updated as appropriate: allergies, current medications, past family history, past medical history, past social history, past surgical history and problem list.    Review of Systems   Constitutional: Negative for activity change, fatigue and fever.   HENT: Negative for congestion.    Eyes: Negative for visual disturbance.   Respiratory: Negative for cough and chest tightness.    Cardiovascular: Negative for chest pain.   Gastrointestinal: Negative for abdominal pain, constipation and diarrhea.   Genitourinary: Negative for difficulty urinating and dysuria.   Musculoskeletal: Positive for neck pain.   Neurological: Negative for dizziness, weakness, light-headedness, numbness and headaches.   Psychiatric/Behavioral: Negative for agitation, sleep disturbance and suicidal ideas. The patient is not nervous/anxious.      I have reviewed and confirmed the accuracy of the ROS as documented by the MA/LPN/RN YANIRA Novoa     Vitals:    01/26/23 1249   BP: 154/76   BP Location: Left arm   Patient Position: Sitting   Cuff Size: Large Adult   Pulse: 73   Resp: 20   Temp: 97.1 °F (36.2 °C)   TempSrc: Temporal   SpO2: 96%   Weight: (!) 152 kg (335 lb 6.4 oz)   Height: 188 cm (74\")   PainSc:   1   PainLoc: Comment: NECK     Objective   Physical Exam  Constitutional:       Appearance: Normal appearance. He is obese.   HENT:      Head: Normocephalic.   Cardiovascular:      Rate and Rhythm: Normal rate and regular rhythm.   Pulmonary:      Effort: Pulmonary effort is normal.      Breath sounds: Normal breath sounds.   Musculoskeletal:      Cervical back: Tenderness and bony tenderness present. No pain with movement. Decreased range of motion.   Skin:     General: Skin is warm and dry.      Capillary Refill: Capillary refill takes less than 2 seconds.   Neurological:      General: No focal deficit present.      Mental Status: He is alert and oriented to " person, place, and time.      Gait: Gait abnormal.   Psychiatric:         Mood and Affect: Mood normal.         Behavior: Behavior normal.         Thought Content: Thought content normal.         Cognition and Memory: Cognition normal.       Assessment & Plan   Diagnoses and all orders for this visit:    1. Bilateral occipital neuralgia (Primary)    2. Cervical facet joint syndrome    3. Chronic pain of both shoulders    4. Shoulder pain, unspecified chronicity, unspecified laterality    5. Chronic low back pain, unspecified back pain laterality, unspecified whether sciatica present    --- Continue Gabapentin. No refill needed at this time.   --- Follow-up 6 months or sooner if needed for worsening pain or to repeat procedures     DILAN REPORT  As part of the patient's treatment plan, I am prescribing controlled substances. The patient has been made aware of appropriate use of such medications, including potential risk of somnolence, limited ability to drive and/or work safely, and the potential for dependence or overdose. It has also been made clear that these medications are for use by this patient only, without concomitant use of alcohol or other substances unless prescribed.     Patient has completed prescribing agreement detailing terms of continued prescribing of controlled substances, including monitoring DILAN reports, urine drug screening, and pill counts if necessary. The patient is aware that inappropriate use will results in cessation of prescribing such medications.    As the clinician, I personally reviewed the DILAN from 1/26/23 while the patient was in the office today.    History and physical exam exhibit continued safe and appropriate use of controlled substances.    Dictated utilizing Dragon dictation.      Patient remained masked during entire encounter. No cough present. I donned a mask and eye protection throughout entire visit. Prior to donning mask and eye protection, hand hygiene was  performed, as well as when it was doffed.  I was closer than 6 feet, but not for an extended period of time. No obvious exposure to any bodily fluids.

## 2023-02-01 ENCOUNTER — TELEPHONE (OUTPATIENT)
Dept: ORTHOPEDIC SURGERY | Facility: CLINIC | Age: 78
End: 2023-02-01
Payer: MEDICARE

## 2023-02-01 NOTE — TELEPHONE ENCOUNTER
I spoke to Mr. Loaiza.  I provided him with the left shoulder MRI results.  I recommended he follow up with Dr. Serrano for evaluation and to discuss the study findings in detail.  He agreed.  I will have a staff member call him to schedule this appointment.  He verbalized understanding and appreciated the call.

## 2023-03-01 ENCOUNTER — OFFICE VISIT (OUTPATIENT)
Dept: ORTHOPEDIC SURGERY | Facility: CLINIC | Age: 78
End: 2023-03-01
Payer: MEDICARE

## 2023-03-01 ENCOUNTER — PREP FOR SURGERY (OUTPATIENT)
Dept: OTHER | Facility: HOSPITAL | Age: 78
End: 2023-03-01
Payer: MEDICARE

## 2023-03-01 VITALS — BODY MASS INDEX: 40.43 KG/M2 | WEIGHT: 315 LBS | TEMPERATURE: 97.7 F | HEIGHT: 74 IN

## 2023-03-01 DIAGNOSIS — M25.512 CHRONIC LEFT SHOULDER PAIN: Primary | ICD-10-CM

## 2023-03-01 DIAGNOSIS — G89.29 CHRONIC LEFT SHOULDER PAIN: Primary | ICD-10-CM

## 2023-03-01 PROCEDURE — 99214 OFFICE O/P EST MOD 30 MIN: CPT | Performed by: ORTHOPAEDIC SURGERY

## 2023-03-01 RX ORDER — CEFAZOLIN SODIUM IN 0.9 % NACL 3 G/100 ML
3 INTRAVENOUS SOLUTION, PIGGYBACK (ML) INTRAVENOUS ONCE
OUTPATIENT
Start: 2023-04-18 | End: 2023-03-01

## 2023-03-01 NOTE — PROGRESS NOTES
Patient:James Loaiza    YOB: 1945    Medical Record Number:[id number]    Chief Complaints: Left shoulder pain    History of Present Illness:     77 y.o. male patient who presents for his left shoulder.  This has been a longstanding issue for him, dating back at least 2 years.  He has previously seen Meera.  He has tried injections and formal physical therapy.  He says the injection helped but the effects were very transient.  Therapy did not help appreciably.  He reports worsening pain and dysfunction.  Most of his pain is over the side and occasionally the top of the shoulder.  The symptoms are worse with reaching and lifting as well as at night.  Denies shooting pain down the arm, distal weakness, numbness or paresthesias    Allergies:No Known Allergies    Home Medications:    Current Outpatient Medications:   •  apixaban (ELIQUIS) 5 MG tablet tablet, Take 1 tablet by mouth Every 12 (Twelve) Hours., Disp: , Rfl:   •  Aspirin (Vazalore) 81 MG capsule, Take 1 tablet by mouth., Disp: , Rfl:   •  atorvastatin (LIPITOR) 40 MG tablet, TAKE ONE TABLET BY MOUTH DAILY, Disp: 90 tablet, Rfl: 1  •  diazePAM (Valium) 10 MG tablet, Take 1 tablet by mouth 1 hour prior to MRI procedure., Disp: 1 tablet, Rfl: 0  •  ferrous sulfate 325 (65 FE) MG tablet, Take 1 tablet by mouth Daily With Breakfast., Disp: , Rfl:   •  furosemide (Lasix) 40 MG tablet, Take 1 tablet by mouth 2 (Two) Times a Day., Disp: 40 tablet, Rfl: 1  •  gabapentin (NEURONTIN) 300 MG capsule, Take 1 capsule by mouth Every Night., Disp: 30 capsule, Rfl: 0  •  lidocaine (LIDODERM) 5 %, Place 1 patch on the skin as directed by provider Daily. Remove & Discard patch within 12 hours or as directed by MD, Disp: 6 each, Rfl: 0  •  NIFEdipine XL (PROCARDIA XL) 60 MG 24 hr tablet, TAKE ONE TABLET BY MOUTH DAILY, Disp: 90 tablet, Rfl: 2  •  pantoprazole (PROTONIX) 20 MG EC tablet, TAKE ONE TABLET BY MOUTH DAILY, Disp: 30 tablet, Rfl: 3  •  ramipril (ALTACE) 10  "MG capsule, TAKE ONE CAPSULE BY MOUTH DAILY, Disp: 90 capsule, Rfl: 1  •  tamsulosin (FLOMAX) 0.4 MG capsule 24 hr capsule, Take 1 capsule by mouth 2 (Two) Times a Day., Disp: , Rfl:   •  tiotropium bromide-olodaterol (STIOLTO RESPIMAT) 2.5-2.5 MCG/ACT aerosol solution inhaler, Inhale 2 puffs Daily., Disp: , Rfl:   •  triamcinolone (KENALOG) 0.1 % lotion, Apply  topically to the appropriate area as directed 3 (Three) Times a Day., Disp: 120 mL, Rfl: 1    Past Medical History:   Diagnosis Date   • Abnormal EKG     referring to cardiology   • Anxiety    • Arthritis    • Ascending aorta dilation ([facility])    • Back pain     worsening   • BPH (benign prostatic hyperplasia)     BPH/Nocturia/ Urinary Frequency   • Breast nodule     right   • Cardiomegaly     Cardiomegaly/ SOB with exertion   • Circulation problem    • Constipation    • COPD (chronic obstructive pulmonary disease) ([facility])    • Deviated septum    • DJD (degenerative joint disease)     DJD Knees   • DVT (deep venous thrombosis) ([facility])    • Dysphagia     solids and liquids - resolved with normal studies   • Encounter for annual health examination 11/14/2013    Annual Health Assessment   • Enlarged prostate    • Fatigue     Extreme fatigue   • Hyperlipidemia 1999   • Hypertension 1999    followed Dr. Marcelo   • Hypogonadism male    • Left hip pain     and DJD   • Left leg pain    • Low back pain    • Moist mucous membranes of ear, nose, and throat     \"Mucous in throat\"   • Morbid obesity ([facility])     (BMI-41.2za)   • Muscle cramping    • Muscle spasm     Muscle spasms   • Neck arthritis    • Neck muscle spasm     Neck muscle spasm/Cervical strain   • Neck pain    • Obesity    • Plantar fasciitis    • Prostatitis     recurrent   • Psoriasis    • Pulmonary embolus ([facility]) 01/2019    on Xarelltoypseerlipidemia   • Radiculopathy     Radiculopathy / Neuropathy left ar   • Renal insufficiency     followed by Dr. Mendes   • Seborrhea    • Shortness of breath    • Sleep apnea  "    Obstructive Sleep apnea worsening; uses CPAP   • Urinary frequency    • Vascular disorder    • Vertigo    • Weight gain    • Wellness examination 10/23/2014    Annual Wellness Visit       Past Surgical History:   Procedure Laterality Date   • APPENDECTOMY  1965   • CARDIAC CATHETERIZATION  07/29/2010    [facility]   • COLONOSCOPY  01/05/2010   • COLONOSCOPY  10/01/2001   • NASAL SEPTUM SURGERY     • NOSE SURGERY  1999   • RADIOFREQUENCY ABLATION Bilateral 3/2/2021    Procedure: RADIOFREQUENCY ABLATION NERVES---bilateral cervical 2-cervical3;  Surgeon: Edu Tineo MD;  Location: [facility];  Service: Pain Management;  Laterality: Bilateral;   • RADIOFREQUENCY ABLATION Bilateral 12/2/2021    Procedure: RADIOFREQUENCY ABLATION NERVES--bilateral cervical2-3;  Surgeon: Edu Tineo MD;  Location: [facility];  Service: Pain Management;  Laterality: Bilateral;   • RADIOFREQUENCY ABLATION Bilateral 10/28/2022    Procedure: CERVICAL RADIOFREQUENCY LESIONING BILATERAL C2-3;  Surgeon: Edu Tineo MD;  Location: [facility];  Service: Pain Management;  Laterality: Bilateral;   • TURP / TRANSURETHRAL INCISION / DRAINAGE PROSTATE  10/23/2015    Michael Castro       Social History     Occupational History   • Not on file   Tobacco Use   • Smoking status: Never   • Smokeless tobacco: Never   • Tobacco comments:     caffiene use: soda and coffee   Vaping Use   • Vaping Use: Never used   Substance and Sexual Activity   • Alcohol use: Yes     Comment: 2-3 times a year   • Drug use: No   • Sexual activity: Defer      Social History     Social History Narrative   • Not on file       Family History   Problem Relation Age of Onset   • Arthritis Mother    • Heart disease Mother    • Hypertension Mother    • Heart attack Father    • Heart disease Father    • Hypertension Father    • Arthritis Sister    • Multiple sclerosis Sister    • Alcohol abuse Brother    • Diabetes Son    •  "Malig Hyperthermia Neg Hx        Review of Systems:      Constitutional: Denies fever, shaking or chills   Eyes: Denies change in visual acuity   HEENT: Denies nasal congestion or sore throat   Respiratory: Denies cough or shortness of breath   Cardiovascular: Denies chest pain or edema  Endocrine: Denies tremors, palpitations, intolerance of heat or cold, polyuria, polydipsia.  GI: Denies abdominal pain, nausea, vomiting, bloody stools or diarrhea  : Denies frequency, urgency, incontinence, retention, or nocturia.  Musculoskeletal: Denies numbness, tingling or loss of motor function except as above  Integument: Denies rash, lesion or ulceration   Neurologic: Denies headache or focal weakness, deficits  Heme: Denies spontaneous or excessive bleeding, epistaxis, hematuria, melena, fatigue, enlarged or tender lymph nodes.      All other pertinent positives and negatives as noted above in HPI.    Physical Exam:77 y.o. male  Vitals:    03/01/23 0832   Temp: 97.7 °F (36.5 °C)   Weight: (!) 151 kg (332 lb 9.6 oz)   Height: 188 cm (74\")       General:  Patient is awake and alert.  Appears in no acute distress or discomfort.    Psych:  Affect and demeanor are appropriate.    Extremities:  Left shoulder is examined.  Skin is benign.  No gross abnormalities on inspection including any atrophy, swellings, or masses.  No palpable masses or adenopathy.  Focal tenderness noted over the acromioclavicular joint.  Full shoulder motion.  No evident instability or apprehension.  Positive Neer, Santos, and active compression maneuvers.  Mild discomfort with elevation in the scapular plane but good strength.  Good strength with internal and external rotation. Good strength in the deltoid, biceps, triceps, and .  Intact sensation throughout the arm.  Brisk cap refill.  Palpable radial pulse.  Good skin turgor.    Imaging: Previous x-rays including 3 views of the left shoulder and MRI left shoulder are all reviewed.  I have " independently interpreted the MRI and reviewed the radiology interpretation.  The x-rays show moderate acromioclavicular arthritis.  No other significant finding.  MRI shows extensive rotator cuff tendinopathy with the partial-thickness articular sided supraspinatus tendon tear.  This tear looks like it is up to 50% but it is hard to say for sure on the noncontrasted study.  He has biceps tendinopathy and labral degeneration.  There is moderate acromioclavicular arthritis and impingement morphology.  Mild to moderate subacromial/subdeltoid bursitis    Assessment/Plan: Partial-thickness left rotator cuff tear with rotator cuff tendinopathy, subacromial/subdeltoid bursitis and acromioclavicular arthritis    We discussed his options, both surgical and nonsurgical.  He feels like that therapy and injections have not helped appreciably.  He feels like the pain is getting worse and he has expressed a desire to move forward with surgical options.  I told him that from a surgical standpoint he would be a candidate for rotator cuff debridement versus repair, subacromial decompression, distal clavicle excision and I would also plan to address any labral and/or biceps pathology at the same time.  We had a thorough discussion regarding the risks, benefits and alternatives to the proposed procedure.  I explained that surgical risks include infection, hematoma, persistent pain and/or loss of motion, iatrogenic nerve and/or blood vessel injury resulting in permanent weakness, numbness or dysfunction, RSD, DVT, PE, positioning related neuropraxia, and anesthesia related complications resulting in death.  We further discussed the possible need to address any rotator cuff, labral and/or biceps pathology, if found at the time of the surgery.  I explained to patient that I would certainly plan to address this in the same surgical setting if we were to identify any unexpected pathology.  We discussed the risk associated with this  including possible anchor related complications including failure of fixation, loosening, cutout of the anchors, chondrolysis, re-tear necessitating revision surgery.  We also discussed a possible tenotomy versus tenodesis of the biceps, just depending on where the pathology is located.  We discussed the fact that if the biceps demonstrates significant pathology in the groove that I would plan to perform a tenotomy which could result in alisia deformity or persistent pain, weakness or cramping.  We also discussed possible risks with a tenodesis as well including screw related complications including cutout, chondrolysis, failure of fixation with re-tear of the biceps, fracture and possible iatrogenic nerve injury.  The patient voiced understanding of the risks, benefits, and alternative forms of treatment that were discussed and the patient consents to proceed.  No guarantees were given regarding the results of this procedure.  I did answer all the questions posed by the patient in detail.  He will need medical clearance and permission to come off the Eliquis preoperatively.  He understands.    Kevin Serrano MD    03/01/2023

## 2023-03-06 ENCOUNTER — OFFICE VISIT (OUTPATIENT)
Dept: FAMILY MEDICINE CLINIC | Facility: CLINIC | Age: 78
End: 2023-03-06
Payer: MEDICARE

## 2023-03-06 VITALS
DIASTOLIC BLOOD PRESSURE: 84 MMHG | OXYGEN SATURATION: 95 % | HEART RATE: 64 BPM | BODY MASS INDEX: 42.37 KG/M2 | WEIGHT: 315 LBS | SYSTOLIC BLOOD PRESSURE: 142 MMHG

## 2023-03-06 DIAGNOSIS — D64.9 ANEMIA, UNSPECIFIED TYPE: Primary | ICD-10-CM

## 2023-03-06 DIAGNOSIS — D64.9 ANEMIA, UNSPECIFIED TYPE: ICD-10-CM

## 2023-03-06 PROCEDURE — 99214 OFFICE O/P EST MOD 30 MIN: CPT | Performed by: INTERNAL MEDICINE

## 2023-03-06 NOTE — PROGRESS NOTES
03/06/2023    CC: No chief complaint on file.  .        HPI  Anemia  Presents for follow-up visit.        Subjective   James Loaiza is a 77 y.o. male.      The following portions of the patient's history were reviewed and updated as appropriate: allergies, current medications, past family history, past medical history, past social history, past surgical history and problem list.    Problem List  Patient Active Problem List   Diagnosis   • Lumbar herniated disc L3-L5 3/16/16 Open MRI   • Aortic valve insufficiency   • Coronary arteriosclerosis in native artery   • Edema   • Dyspnea on exertion   • Fatigue   • Left ventricular hypertrophy   • Obstructive sleep apnea syndrome   • Arthritis of left hip   • Morbid obesity with BMI of 40.0-44.9, adult ([facility])   • Intermittent dysphagia   • Hyperlipidemia 1999   • BPH (benign prostatic hypertrophy)   • Left cervical radiculopathy   • Rotator cuff tear, left   • Plantar fasciitis   • Hypogonadism male   • Vitamin D deficiency disease   • Renal cyst, left   • Preventative health care   • Medication management   • Chronic low back pain   • Lumbar spondylolysis   • Headache   • Chronic pain syndrome   • History of pulmonary embolism   • Chronic obstructive pulmonary disease ([facility])   • Osteoarthritis of right shoulder   • Chronic anticoagulation   • Cervical stenosis of spine   • Bilateral occipital neuralgia   • Chronic pain of both shoulders   • Cervical facet joint syndrome   • Open wound of right lower leg   • REJI (acute kidney injury) ([facility])   • Constipation   • Venous insufficiency   • Analgesic nephropathy   • Anemia   • Diastolic dysfunction   • Hypocalcemia   • Hypertension   • Localized swelling, mass and lump, lower limb, bilateral   • Stage 3b chronic kidney disease ([facility])   • Ascending aorta dilation ([facility])   • Bilateral occipital neuralgia       Past Medical History  Past Medical History:   Diagnosis Date   • Abnormal EKG     referring to cardiology   • Anxiety    •  "Arthritis    • Ascending aorta dilation ([facility])    • Back pain     worsening   • BPH (benign prostatic hyperplasia)     BPH/Nocturia/ Urinary Frequency   • Breast nodule     right   • Cardiomegaly     Cardiomegaly/ SOB with exertion   • Circulation problem    • Constipation    • COPD (chronic obstructive pulmonary disease) ([facility])    • Deviated septum    • DJD (degenerative joint disease)     DJD Knees   • DVT (deep venous thrombosis) ([facility])    • Dysphagia     solids and liquids - resolved with normal studies   • Encounter for annual health examination 11/14/2013    Annual Health Assessment   • Enlarged prostate    • Fatigue     Extreme fatigue   • Hyperlipidemia 1999   • Hypertension 1999    followed Dr. Marcelo   • Hypogonadism male    • Left hip pain     and DJD   • Left leg pain    • Low back pain    • Moist mucous membranes of ear, nose, and throat     \"Mucous in throat\"   • Morbid obesity ([facility])     (BMI-41.2za)   • Muscle cramping    • Muscle spasm     Muscle spasms   • Neck arthritis    • Neck muscle spasm     Neck muscle spasm/Cervical strain   • Neck pain    • Obesity    • Plantar fasciitis    • Prostatitis     recurrent   • Psoriasis    • Pulmonary embolus ([facility]) 01/2019    on Xarelltoypseerlipidemia   • Radiculopathy     Radiculopathy / Neuropathy left ar   • Renal insufficiency     followed by Dr. Mendes   • Seborrhea    • Shortness of breath    • Sleep apnea     Obstructive Sleep apnea worsening; uses CPAP   • Urinary frequency    • Vascular disorder    • Vertigo    • Weight gain    • Wellness examination 10/23/2014    Annual Wellness Visit       Surgical History  Past Surgical History:   Procedure Laterality Date   • APPENDECTOMY  1965   • CARDIAC CATHETERIZATION  07/29/2010    [facility]   • COLONOSCOPY  01/05/2010   • COLONOSCOPY  10/01/2001   • NASAL SEPTUM SURGERY     • NOSE SURGERY  1999   • RADIOFREQUENCY ABLATION Bilateral 3/2/2021    Procedure: RADIOFREQUENCY ABLATION " NERVES---bilateral cervical 2-cervical3;  Surgeon: Edu Tineo MD;  Location[facility];  Service: Pain Management;  Laterality: Bilateral;   • RADIOFREQUENCY ABLATION Bilateral 12/2/2021    Procedure: RADIOFREQUENCY ABLATION NERVES--bilateral cervical2-3;  Surgeon: Edu Tineo MD;  Location: [facility];  Service: Pain Management;  Laterality: Bilateral;   • RADIOFREQUENCY ABLATION Bilateral 10/28/2022    Procedure: CERVICAL RADIOFREQUENCY LESIONING BILATERAL C2-3;  Surgeon: Edu Tineo MD;  Location: [facility];  Service: Pain Management;  Laterality: Bilateral;   • TURP / TRANSURETHRAL INCISION / DRAINAGE PROSTATE  10/23/2015    Michael Castro       Family History  Family History   Problem Relation Age of Onset   • Arthritis Mother    • Heart disease Mother    • Hypertension Mother    • Heart attack Father    • Heart disease Father    • Hypertension Father    • Arthritis Sister    • Multiple sclerosis Sister    • Alcohol abuse Brother    • Diabetes Son    • Malig Hyperthermia Neg Hx        Social History  Social History    Tobacco Use      Smoking status: Never      Smokeless tobacco: Never      Tobacco comments: caffiene use: soda and coffee       Is the Patient a current tobacco user? No    Allergies  No Known Allergies    Current Medications    Current Outpatient Medications:   •  apixaban (ELIQUIS) 5 MG tablet tablet, Take 1 tablet by mouth Every 12 (Twelve) Hours., Disp: , Rfl:   •  Aspirin (Vazalore) 81 MG capsule, Take 1 tablet by mouth., Disp: , Rfl:   •  atorvastatin (LIPITOR) 40 MG tablet, TAKE ONE TABLET BY MOUTH DAILY, Disp: 90 tablet, Rfl: 1  •  diazePAM (Valium) 10 MG tablet, Take 1 tablet by mouth 1 hour prior to MRI procedure., Disp: 1 tablet, Rfl: 0  •  ferrous sulfate 325 (65 FE) MG tablet, Take 1 tablet by mouth Daily With Breakfast., Disp: , Rfl:   •  furosemide (Lasix) 40 MG tablet, Take 1 tablet by mouth 2 (Two) Times a Day., Disp: 40 tablet, Rfl: 1  •   gabapentin (NEURONTIN) 300 MG capsule, Take 1 capsule by mouth Every Night., Disp: 30 capsule, Rfl: 0  •  lidocaine (LIDODERM) 5 %, Place 1 patch on the skin as directed by provider Daily. Remove & Discard patch within 12 hours or as directed by MD, Disp: 6 each, Rfl: 0  •  NIFEdipine XL (PROCARDIA XL) 60 MG 24 hr tablet, TAKE ONE TABLET BY MOUTH DAILY, Disp: 90 tablet, Rfl: 2  •  pantoprazole (PROTONIX) 20 MG EC tablet, TAKE ONE TABLET BY MOUTH DAILY, Disp: 30 tablet, Rfl: 3  •  ramipril (ALTACE) 10 MG capsule, TAKE ONE CAPSULE BY MOUTH DAILY, Disp: 90 capsule, Rfl: 1  •  tamsulosin (FLOMAX) 0.4 MG capsule 24 hr capsule, Take 1 capsule by mouth 2 (Two) Times a Day., Disp: , Rfl:   •  tiotropium bromide-olodaterol (STIOLTO RESPIMAT) 2.5-2.5 MCG/ACT aerosol solution inhaler, Inhale 2 puffs Daily., Disp: , Rfl:   •  triamcinolone (KENALOG) 0.1 % lotion, Apply  topically to the appropriate area as directed 3 (Three) Times a Day., Disp: 120 mL, Rfl: 1     Review of System  Review of Systems   Eyes: Negative.    Respiratory: Negative.    Gastrointestinal: Negative.      I have reviewed and confirmed the accuracy of the ROS as documented by the MA/LPN/RN Magdiel Bowens MD    There were no vitals filed for this visit.  There is no height or weight on file to calculate BMI.    Objective     Physical Exam  Physical Exam  Constitutional:       Appearance: Normal appearance.   HENT:      Head: Normocephalic and atraumatic.   Cardiovascular:      Rate and Rhythm: Regular rhythm.      Heart sounds: Normal heart sounds.   Pulmonary:      Effort: Pulmonary effort is normal.   Musculoskeletal:      Cervical back: Normal range of motion and neck supple.   Neurological:      Mental Status: He is alert.         Assessment & Plan    This 77-year-old patient presents today for follow-up of anemia.  [facility] and has been on supplemental iron since.  He relates that he shifted to  take the iron supplement p.o. half of the tablet every day because he was having problems with constipation.    His blood pressure is improved down to 142/84 in the left arm sitting position standard cuff.  We note that he lost 2 pounds from his last visit.    Diagnoses and all orders for this visit:    1. Anemia, unspecified type (Primary)      Plan:  1.)  CBC to evaluate anemia status  2.)  Serum ferritin and TIBC.  3.)  Follow-up in 4 to 5 months.       Magdiel Bowens MD  03/06/2023

## 2023-03-07 LAB
FERRITIN SERPL-MCNC: 128 NG/ML (ref 30–400)
IRON SATN MFR SERPL: 22 % (ref 20–50)
IRON SERPL-MCNC: 63 MCG/DL (ref 59–158)
TIBC SERPL-MCNC: 289 MCG/DL
UIBC SERPL-MCNC: 226 MCG/DL (ref 112–346)

## 2023-03-17 ENCOUNTER — TELEPHONE (OUTPATIENT)
Dept: PAIN MEDICINE | Facility: CLINIC | Age: 78
End: 2023-03-17

## 2023-03-17 NOTE — TELEPHONE ENCOUNTER
We can increase his Gabapentin to BID or go up on the dosage that he is taking at night. He could try OTC Tylenol Arthritis to see if this can help with pain/inflammation. I would encourage him to continue to use ice as it helps with inflammation and lidocaine patches. Please let me know what he would like to do with the Gabapentin. Thanks.

## 2023-03-17 NOTE — TELEPHONE ENCOUNTER
Patient stated he just wants to come in to discuss medication in person, he stated he doesn't feel comfortable not being able to speak to you directly.

## 2023-03-17 NOTE — TELEPHONE ENCOUNTER
"Spoke with  he states he is having right shoulder pain  over the last few days . Taking gabapentin nightly but doesn't seem to be helping , also uses ice,heat and lidocaine patches. \"Im not able to sleep at night due to the pain\" stated he can not sleep on his left side . MRI showed arthritis in the joint . Any recommendations?"

## 2023-03-17 NOTE — TELEPHONE ENCOUNTER
Caller: James Loaiza    Relationship to patient: Self    Best call back number: [phone]    Patient is needing:  PT REQUESTING A CALL BACK FROM APRN RELATED TO RT SHOULDER PAIN POSSIBLE MEDICATION. PT WOULD LIKE TO PROVIDED MORE DETAILS TO HER. PLEASE ADVISE

## 2023-03-20 ENCOUNTER — OFFICE VISIT (OUTPATIENT)
Dept: PAIN MEDICINE | Facility: CLINIC | Age: 78
End: 2023-03-20
Payer: MEDICARE

## 2023-03-20 ENCOUNTER — TELEPHONE (OUTPATIENT)
Dept: PAIN MEDICINE | Facility: CLINIC | Age: 78
End: 2023-03-20

## 2023-03-20 VITALS
WEIGHT: 315 LBS | DIASTOLIC BLOOD PRESSURE: 85 MMHG | OXYGEN SATURATION: 95 % | BODY MASS INDEX: 40.43 KG/M2 | HEIGHT: 74 IN | SYSTOLIC BLOOD PRESSURE: 162 MMHG | HEART RATE: 93 BPM | TEMPERATURE: 99.1 F

## 2023-03-20 DIAGNOSIS — G89.29 CHRONIC LOW BACK PAIN, UNSPECIFIED BACK PAIN LATERALITY, UNSPECIFIED WHETHER SCIATICA PRESENT: ICD-10-CM

## 2023-03-20 DIAGNOSIS — M47.812 CERVICAL FACET JOINT SYNDROME: ICD-10-CM

## 2023-03-20 DIAGNOSIS — M54.81 BILATERAL OCCIPITAL NEURALGIA: Primary | ICD-10-CM

## 2023-03-20 DIAGNOSIS — M25.519 SHOULDER PAIN, UNSPECIFIED CHRONICITY, UNSPECIFIED LATERALITY: ICD-10-CM

## 2023-03-20 DIAGNOSIS — M54.50 CHRONIC LOW BACK PAIN, UNSPECIFIED BACK PAIN LATERALITY, UNSPECIFIED WHETHER SCIATICA PRESENT: ICD-10-CM

## 2023-03-20 DIAGNOSIS — M25.511 CHRONIC PAIN OF BOTH SHOULDERS: ICD-10-CM

## 2023-03-20 DIAGNOSIS — M25.512 CHRONIC PAIN OF BOTH SHOULDERS: ICD-10-CM

## 2023-03-20 DIAGNOSIS — G89.29 CHRONIC PAIN OF BOTH SHOULDERS: ICD-10-CM

## 2023-03-20 PROCEDURE — 1160F RVW MEDS BY RX/DR IN RCRD: CPT

## 2023-03-20 PROCEDURE — 3077F SYST BP >= 140 MM HG: CPT

## 2023-03-20 PROCEDURE — 1159F MED LIST DOCD IN RCRD: CPT

## 2023-03-20 PROCEDURE — 1125F AMNT PAIN NOTED PAIN PRSNT: CPT

## 2023-03-20 PROCEDURE — 99214 OFFICE O/P EST MOD 30 MIN: CPT

## 2023-03-20 PROCEDURE — 3079F DIAST BP 80-89 MM HG: CPT

## 2023-03-20 RX ORDER — GABAPENTIN 300 MG/1
300 CAPSULE ORAL 3 TIMES DAILY
Qty: 90 CAPSULE | Refills: 0 | Status: SHIPPED | OUTPATIENT
Start: 2023-03-20

## 2023-03-20 NOTE — PROGRESS NOTES
CHIEF COMPLAINT  Bilateral shoulder pain    Subjective   James Loaiza is a 77 y.o. male  who presents for follow-up.  He has a history of chronic neck and bilateral shoulder pain. He reports increased right shoulder pain, especially at night.     Today pain is 3/10VAS in severity. Pain is located in his neck and bilateral shoulders. Describes this pain as an intermittent ache. Pain is worsened by cold weather, lying flat, and certain positions. Pain improves with heat, physical therapy, procedures, and medication. Completed with PT in September and continues with HEP as tolerated. Medication regimen includes Gabapentin 300mg daily and OTC Tylenol PRN. Denies any side effects from the regimen, including constipation and somnolence.     Patient also has a history of chronic low back pain.  At this time back pain remains stable.  He still reports that he is receiving ongoing benefit from a bilateral L2-L5 RFA in 2018.    He is scheduled for a left shoulder arthroscopy with Dr. Serrano on 4/18/23.     Procedures:   10/28/22 - Bilateral C2-C3 RFL - 90% ongoing relief  12/2/21 - Bilateral C2-C3 RFL   3/2/21 - Bilateral C2-C3 RFL   1/22/21 - Bilateral OCNB  9/9/20 - ELENITA  6/22/18 - Bilateral L2-L5 RFL - 75% ongoing relief    Neck Pain   This is a chronic problem. The current episode started more than 1 year ago. The problem occurs constantly. The problem has been waxing and waning. The pain is associated with an unknown factor. The pain is present in the midline, left side and right side. The quality of the pain is described as aching. The pain is at a severity of 1/10. The pain is mild. The symptoms are aggravated by position (cold weather, ). Pertinent negatives include no chest pain, fever, headaches, numbness or weakness. He has tried heat and home exercises (PT, Gabapentin, Cervical RFA) for the symptoms. The treatment provided moderate relief.   Shoulder Injury   Incident location: Bilateral shoulders, right > left.  The incident occurred more than 1 week ago. The injury mechanism is unknown. The quality of the pain is described as aching. The pain does not radiate. The pain is at a severity of 3/10. Pertinent negatives include no chest pain or numbness. The symptoms are aggravated by movement (certain sleep positions,). He has tried acetaminophen (PT, HEP, ) for the symptoms. The treatment provided mild relief.      PEG Assessment   What number best describes your pain on average in the past week?6  What number best describes how, during the past week, pain has interfered with your enjoyment of life?10  What number best describes how, during the past week, pain has interfered with your general activity?  2    Review of Pertinent Medical Data ---  CERVICAL SPINE CT MYELOGRAM     HISTORY: Neck pain with arm pain.     TECHNIQUE: Axial CT of the cervical spine with multiplanar reformatted  images is provided. Details of the injection procedure are reported  separately. This is correlated with a cervical MRI from 08/23/2020.  Radiation dose reduction techniques were utilized, including automated  exposure control and exposure modulation based on body size.     FINDINGS: The surface contours of the posterior fossa contents appear  normal. 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     Degenerative changes are observed between the odontoid and C1 ring  anteriorly but there is no hypertrophic change posteriorly. The canal is  patent at this level. The articulations of the skull base with C1 appear  normal.     At C2-3, there is bulky enthesophyte anteriorly without definite  ankylosis. The canal is patent. The foramina are patent.     At C3-4, the disc is mildly narrowed. There is hypertrophic facet change  on the right. The canal is patent. The right foramen appears moderately  to severely stenotic due to osteophyte. The left foramen is moderately  stenotic.     At C4-5, there is bulky enthesophyte  anteriorly without ankylosis. Some  facet arthropathy is present. The canal is patent. There is severe bony  foraminal stenosis on the right and mild-to-moderate foraminal stenosis  on the left.     At C5-6 and C6-C7, there is bulky enthesophyte bridging the disc spaces  anteriorly creating solid ankylosis. There is also ankylosis across the  annulus and uncovertebral joints posteriorly at C5-C6. The spinal canal  is patent at these levels. There is moderate-to-severe osseous foraminal  stenosis on the right at C5-6. The left foramen is patent at this level.  At C6-7, the canal is patent. There is moderate osseous foraminal  stenosis on the left. The right foramen is patent.     At C7-T1, the disc is narrowed. There is bulky enthesophyte anteriorly  on the left. The canal and foramina are patent.     At T1-2, the disc and the facets appear normal and the canal and  foramina are patent.     Along the caudal edge of the field-of-view, the T2-3 level is not  segmented as already mentioned. The canal is patent at those levels. The  foramina are quite narrowed bilaterally. At T3-4, there is facet  arthropathy. The canal and foramina are patent.     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 There is osseous foraminal  stenosis at several levels as discussed level by level above. No  significant appearing spinal canal stenosis is present, however.     This report was finalized on 3/16/2021 7:22 AM by Dr. Daniel Newton M.D.    The following portions of the patient's history were reviewed and updated as appropriate: allergies, current medications, past family history, past medical history, past social history, past surgical history and problem list.    Review of Systems   Constitutional: Negative for chills and fever.   Respiratory: Negative for cough and shortness of breath.    Cardiovascular: Negative for chest pain.   Gastrointestinal: Negative  "for abdominal distention, constipation and diarrhea.   Genitourinary: Negative for difficulty urinating and dysuria.   Musculoskeletal: Negative for back pain and neck pain.   Neurological: Negative for dizziness, weakness, light-headedness, numbness and headaches.   Psychiatric/Behavioral: Positive for sleep disturbance. Negative for suicidal ideas.     I have reviewed and confirmed the accuracy of the ROS as documented by the MA/LPN/RN YANIRA Novoa    Vitals:    03/20/23 1154   BP: 162/85   BP Location: Left arm   Patient Position: Sitting   Pulse: 93   Temp: 99.1 °F (37.3 °C)   SpO2: 95%   Weight: (!) 149 kg (329 lb)   Height: 188 cm (74\")   PainSc:   3   PainLoc: Shoulder     Objective   Physical Exam  Constitutional:       Appearance: Normal appearance. He is obese.   HENT:      Head: Normocephalic.   Cardiovascular:      Rate and Rhythm: Normal rate and regular rhythm.   Pulmonary:      Effort: Pulmonary effort is normal.      Breath sounds: Normal breath sounds.   Musculoskeletal:      Right shoulder: Tenderness present. Decreased range of motion.      Left shoulder: Tenderness present. Decreased range of motion.      Cervical back: Tenderness and bony tenderness present. No pain with movement. Decreased range of motion.   Skin:     General: Skin is warm and dry.      Capillary Refill: Capillary refill takes less than 2 seconds.   Neurological:      General: No focal deficit present.      Mental Status: He is alert and oriented to person, place, and time.      Gait: Gait abnormal.   Psychiatric:         Mood and Affect: Mood normal.         Behavior: Behavior normal.         Thought Content: Thought content normal.         Cognition and Memory: Cognition normal.       Assessment & Plan   Diagnoses and all orders for this visit:    1. Bilateral occipital neuralgia (Primary)    2. Cervical facet joint syndrome    3. Chronic pain of both shoulders    4. Shoulder pain, unspecified chronicity, " unspecified laterality    5. Chronic low back pain, unspecified back pain laterality, unspecified whether sciatica present  -     gabapentin (NEURONTIN) 300 MG capsule; Take 1 capsule by mouth 3 (Three) Times a Day.  Dispense: 90 capsule; Refill: 0    --- Increase Gabapentin 300mg to TID. Refill sent to pharmacy.   --- Patient would like to avoid opioids. Discussed that I do not recommended them at this time due to upcoming left shoulder arthroplasty scheduled on 4/18/23.  --- Encouraged patient to trial OTC Tylenol Arthritis and continue heat/ice as tolerated  --- Follow-up in 2 months or sooner if needed     DILAN REPORT  As part of the patient's treatment plan, I am prescribing controlled substances. The patient has been made aware of appropriate use of such medications, including potential risk of somnolence, limited ability to drive and/or work safely, and the potential for dependence or overdose. It has also been made clear that these medications are for use by this patient only, without concomitant use of alcohol or other substances unless prescribed.     Patient has completed prescribing agreement detailing terms of continued prescribing of controlled substances, including monitoring DILAN reports, urine drug screening, and pill counts if necessary. The patient is aware that inappropriate use will results in cessation of prescribing such medications.    As the clinician, I personally reviewed the DILAN from 3/20/23 while the patient was in the office today.    History and physical exam exhibit continued safe and appropriate use of controlled substances.    Dictated utilizing Dragon dictation.     Patient remained masked during entire encounter. No cough present. I donned a mask and eye protection throughout entire visit. Prior to donning mask and eye protection, hand hygiene was performed, as well as when it was doffed.  I was closer than 6 feet, but not for an extended period of time. No obvious exposure  to any bodily fluids.

## 2023-03-20 NOTE — TELEPHONE ENCOUNTER
Can you please let patient know that the [facility] does not accept electronic prescriptions? Please let me know what pharmacy to send it to. Thanks.

## 2023-03-30 ENCOUNTER — TELEPHONE (OUTPATIENT)
Dept: FAMILY MEDICINE CLINIC | Facility: CLINIC | Age: 78
End: 2023-03-30
Payer: MEDICARE

## 2023-04-04 ENCOUNTER — PRE-ADMISSION TESTING (OUTPATIENT)
Dept: PREADMISSION TESTING | Facility: HOSPITAL | Age: 78
End: 2023-04-04
Payer: MEDICARE

## 2023-04-04 LAB
ANION GAP SERPL CALCULATED.3IONS-SCNC: 7.6 MMOL/L (ref 5–15)
BUN SERPL-MCNC: 27 MG/DL (ref 8–23)
BUN/CREAT SERPL: 11.3 (ref 7–25)
CALCIUM SPEC-SCNC: 8.9 MG/DL (ref 8.6–10.5)
CHLORIDE SERPL-SCNC: 106 MMOL/L (ref 98–107)
CO2 SERPL-SCNC: 26.4 MMOL/L (ref 22–29)
CREAT SERPL-MCNC: 2.39 MG/DL (ref 0.76–1.27)
DEPRECATED RDW RBC AUTO: 43.7 FL (ref 37–54)
EGFRCR SERPLBLD CKD-EPI 2021: 27.6 ML/MIN/1.73
ERYTHROCYTE [DISTWIDTH] IN BLOOD BY AUTOMATED COUNT: 12.7 % (ref 12.3–15.4)
GLUCOSE SERPL-MCNC: 114 MG/DL (ref 65–99)
HCT VFR BLD AUTO: 36.5 % (ref 37.5–51)
HGB BLD-MCNC: 12 G/DL (ref 13–17.7)
MCH RBC QN AUTO: 31.4 PG (ref 26.6–33)
MCHC RBC AUTO-ENTMCNC: 32.9 G/DL (ref 31.5–35.7)
MCV RBC AUTO: 95.5 FL (ref 79–97)
PLATELET # BLD AUTO: 161 10*3/MM3 (ref 140–450)
PMV BLD AUTO: 10.2 FL (ref 6–12)
POTASSIUM SERPL-SCNC: 3.9 MMOL/L (ref 3.5–5.2)
QT INTERVAL: 483 MS
RBC # BLD AUTO: 3.82 10*6/MM3 (ref 4.14–5.8)
SODIUM SERPL-SCNC: 140 MMOL/L (ref 136–145)
WBC NRBC COR # BLD: 8.52 10*3/MM3 (ref 3.4–10.8)

## 2023-04-04 PROCEDURE — 85027 COMPLETE CBC AUTOMATED: CPT

## 2023-04-04 PROCEDURE — 36415 COLL VENOUS BLD VENIPUNCTURE: CPT

## 2023-04-04 PROCEDURE — 80048 BASIC METABOLIC PNL TOTAL CA: CPT

## 2023-04-04 PROCEDURE — 93005 ELECTROCARDIOGRAM TRACING: CPT

## 2023-04-04 PROCEDURE — 93010 ELECTROCARDIOGRAM REPORT: CPT | Performed by: INTERNAL MEDICINE

## 2023-04-04 RX ORDER — ACETAMINOPHEN 500 MG
1000 TABLET ORAL EVERY 6 HOURS PRN
COMMUNITY
End: 2023-04-18 | Stop reason: HOSPADM

## 2023-04-04 NOTE — DISCHARGE INSTRUCTIONS
Take the following medications the morning of surgery:    Use inhaler   atorvastatin  nifedipine  protonix    If you are on prescription narcotic pain medication to control your pain you may also take that medication the morning of surgery.    General Instructions:  Do not eat solid food after midnight the night before surgery.  You may drink clear liquids day of surgery but must stop at least one hour before your hospital arrival time.  It is beneficial for you to have a clear drink that contains carbohydrates the day of surgery.  We suggest a 12 to 20 ounce bottle of Gatorade or Powerade for non-diabetic patients or a 12 to 20 ounce bottle of G2 or Powerade Zero for diabetic patients. (Pediatric patients, are not advised to drink a 12 to 20 ounce carbohydrate drink)    Clear liquids are liquids you can see through.  Nothing red in color.     Plain water                               Sports drinks  Sodas                                   Gelatin (Jell-O)  Fruit juices without pulp such as white grape juice and apple juice  Popsicles that contain no fruit or yogurt  Tea or coffee (no cream or milk added)  Gatorade / Powerade  G2 / Powerade Zero    Infants may have breast milk up to four hours before surgery.  Infants drinking formula may drink formula up to six hours before surgery.   Patients who avoid smoking, chewing tobacco and alcohol for 4 weeks prior to surgery have a reduced risk of post-operative complications.  Quit smoking as many days before surgery as you can.  Do not smoke, use chewing tobacco or drink alcohol the day of surgery.   If applicable bring your C-PAP/ BI-PAP machine.  Bring any papers given to you in the doctor’s office.  Wear clean comfortable clothes.  Do not wear contact lenses, false eyelashes or make-up.  Bring a case for your glasses.   Bring crutches or walker if applicable.  Remove all piercings.  Leave jewelry and any other valuables at home.  Hair extensions with metal clips must  be removed prior to surgery.  The Pre-Admission Testing nurse will instruct you to bring medications if unable to obtain an accurate list in Pre-Admission Testing.        If you were given a blood bank ID arm band remember to bring it with you the day of surgery.    Preventing a Surgical Site Infection:  For 2 to 3 days before surgery, avoid shaving with a razor because the razor can irritate skin and make it easier to develop an infection.    Any areas of open skin can increase the risk of a post-operative wound infection by allowing bacteria to enter and travel throughout the body.  Notify your surgeon if you have any skin wounds / rashes even if it is not near the expected surgical site.  The area will need assessed to determine if surgery should be delayed until it is healed.  The night prior to surgery shower using a fresh bar of anti-bacterial soap (such as Dial) and clean washcloth.  Sleep in a clean bed with clean clothing.  Do not allow pets to sleep with you.  Shower on the morning of surgery using a fresh bar of anti-bacterial soap (such as Dial) and clean washcloth.  Dry with a clean towel and dress in clean clothing.  Ask your surgeon if you will be receiving antibiotics prior to surgery.  Make sure you, your family, and all healthcare providers clean their hands with soap and water or an alcohol based hand  before caring for you or your wound.    Day of surgery:4/18/2023   [facility] to call with time of arrival  Your arrival time is approximately two hours before your scheduled surgery time.  Upon arrival, a Pre-op nurse and Anesthesiologist will review your health history, obtain vital signs, and answer questions you may have.  The only belongings needed at this time will be a list of your home medications and if applicable your C-PAP/BI-PAP machine.  A Pre-op nurse will start an IV and you may receive medication in preparation for surgery, including something to help you relax.     Please be  aware that surgery does come with discomfort.  We want to make every effort to control your discomfort so please discuss any uncontrolled symptoms with your nurse.   Your doctor will most likely have prescribed pain medications.      If you are going home after surgery you will receive individualized written care instructions before being discharged.  A responsible adult must drive you to and from the hospital on the day of your surgery and stay with you for 24 hours.  Discharge prescriptions can be filled by the hospital pharmacy during regular pharmacy hours.  If you are having surgery late in the day/evening your prescription may be e-prescribed to your pharmacy.  Please verify your pharmacy hours or chose a 24 hour pharmacy to avoid not having access to your prescription because your pharmacy has closed for the day.    If you are staying overnight following surgery, you will be transported to your hospital room following the recovery period.  [facility] has all private rooms.    If you have any questions please call Pre-Admission Testing at [phone].  Deductibles and co-payments are collected on the day of service. Please be prepared to pay the required co-pay, deductible or deposit on the day of service as defined by your plan.    Call your surgeon immediately if you experience any of the following symptoms:  Sore Throat  Shortness of Breath or difficulty breathing  Cough  Chills  Body soreness or muscle pain  Headache  Fever  New loss of taste or smell  Do not arrive for your surgery ill.  Your procedure will need to be rescheduled to another time.  You will need to call your physician before the day of surgery to avoid any unnecessary exposure to hospital staff as well as other patients.

## 2023-04-05 ENCOUNTER — TELEPHONE (OUTPATIENT)
Dept: ORTHOPEDIC SURGERY | Facility: CLINIC | Age: 78
End: 2023-04-05
Payer: MEDICARE

## 2023-04-05 NOTE — TELEPHONE ENCOUNTER
In review of his preadmission testing.  BUN was 27 creatinine was 2.39.  Patient does have a history of chronic kidney disease stage III.  He was recently seen by Dr. Herman Beth first part of March and he was concerned that his creatinine was increasing.  I have faxed a request for nephrology clearance to Dr. Herman Beth's office at [phone]

## 2023-04-11 ENCOUNTER — TELEPHONE (OUTPATIENT)
Dept: FAMILY MEDICINE CLINIC | Facility: CLINIC | Age: 78
End: 2023-04-11

## 2023-04-11 NOTE — TELEPHONE ENCOUNTER
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  SHE STATES THAT [facility] HAS INFORMED HER THAT THEY STILL HAVE NOT RECEIVED THIS PAPERWORK FROM THE OFFICE, SO THIS IS NOT IN PLACE YET.    MELINDA IS ASKING FOR A CALL BACK ON 4/12/23 FROM [facility] SO THAT THEY CAN DISCUSS THE STATUS OF THE PAPERWORK, AND WHEN IT WILL BE FAXED TO [facility].  FAX NUMBER IS:  [phone], AND PLEASE LIST THE LEAVE NUMBER ON THE FAX COVER SHEET WHICH IS [phone].      PLEASE ADVISE MELINDA AS SOON AS POSSIBLE.

## 2023-04-13 NOTE — TELEPHONE ENCOUNTER
PATIENTS DAUGHTER CALLED TO SEE IF THIS HAS BEEN SENT YET. STATES THAT SHE ASKED FOR SOMEONE TO CALL HER BACK AND HASNT RECEIVED A CALL BACK YET. PLEASE REACH OUT TO DAUGHTER.     [phone]

## 2023-04-18 ENCOUNTER — ANESTHESIA EVENT (OUTPATIENT)
Dept: PERIOP | Facility: HOSPITAL | Age: 78
End: 2023-04-18
Payer: MEDICARE

## 2023-04-18 ENCOUNTER — HOSPITAL ENCOUNTER (OUTPATIENT)
Facility: HOSPITAL | Age: 78
Setting detail: HOSPITAL OUTPATIENT SURGERY
Discharge: HOME | End: 2023-04-18
Attending: ORTHOPAEDIC SURGERY | Admitting: ORTHOPAEDIC SURGERY
Payer: MEDICARE

## 2023-04-18 ENCOUNTER — ANESTHESIA (OUTPATIENT)
Dept: PERIOP | Facility: HOSPITAL | Age: 78
End: 2023-04-18
Payer: MEDICARE

## 2023-04-18 VITALS
HEART RATE: 80 BPM | RESPIRATION RATE: 20 BRPM | SYSTOLIC BLOOD PRESSURE: 141 MMHG | OXYGEN SATURATION: 92 % | DIASTOLIC BLOOD PRESSURE: 74 MMHG | TEMPERATURE: 97.6 F

## 2023-04-18 DIAGNOSIS — G89.29 CHRONIC LEFT SHOULDER PAIN: ICD-10-CM

## 2023-04-18 DIAGNOSIS — M25.512 CHRONIC LEFT SHOULDER PAIN: ICD-10-CM

## 2023-04-18 PROCEDURE — 29826 SHO ARTHRS SRG DECOMPRESSION: CPT | Performed by: ORTHOPAEDIC SURGERY

## 2023-04-18 PROCEDURE — 29828 SHO ARTHRS SRG BICP TENODSIS: CPT | Performed by: ORTHOPAEDIC SURGERY

## 2023-04-18 PROCEDURE — 0 BUPIVACAINE LIPOSOME 1.3 % SUSPENSION: Performed by: ANESTHESIOLOGY

## 2023-04-18 PROCEDURE — C1713 ANCHOR/SCREW BN/BN,TIS/BN: HCPCS | Performed by: ORTHOPAEDIC SURGERY

## 2023-04-18 PROCEDURE — 25010000002 CEFAZOLIN PER 500 MG: Performed by: ORTHOPAEDIC SURGERY

## 2023-04-18 PROCEDURE — 25010000002 EPINEPHRINE PER 0.1 MG: Performed by: ORTHOPAEDIC SURGERY

## 2023-04-18 PROCEDURE — 25010000002 DEXAMETHASONE SODIUM PHOSPHATE 20 MG/5ML SOLUTION: Performed by: REGISTERED NURSE

## 2023-04-18 PROCEDURE — C9290 INJ, BUPIVACAINE LIPOSOME: HCPCS | Performed by: ANESTHESIOLOGY

## 2023-04-18 PROCEDURE — 29824 SHO ARTHRS SRG DSTL CLAVICLC: CPT | Performed by: ORTHOPAEDIC SURGERY

## 2023-04-18 PROCEDURE — 25010000002 ONDANSETRON PER 1 MG: Performed by: REGISTERED NURSE

## 2023-04-18 PROCEDURE — 25010000002 SUCCINYLCHOLINE PER 20 MG: Performed by: REGISTERED NURSE

## 2023-04-18 PROCEDURE — 25010000002 PROPOFOL 10 MG/ML EMULSION: Performed by: REGISTERED NURSE

## 2023-04-18 DEVICE — 4.75MM BC KNOTLESS SWIVELOCK
Type: IMPLANTABLE DEVICE | Site: SHOULDER | Status: FUNCTIONAL
Brand: ARTHREX®

## 2023-04-18 DEVICE — BIO-COMP SWIVELOCK C, CLD 5.5X19.1MM
Type: IMPLANTABLE DEVICE | Site: SHOULDER | Status: FUNCTIONAL
Brand: ARTHREX®

## 2023-04-18 RX ORDER — DIPHENHYDRAMINE HYDROCHLORIDE 50 MG/ML
12.5 INJECTION INTRAMUSCULAR; INTRAVENOUS
Status: DISCONTINUED | OUTPATIENT
Start: 2023-04-18 | End: 2023-04-18 | Stop reason: HOSPADM

## 2023-04-18 RX ORDER — HYDROMORPHONE HYDROCHLORIDE 1 MG/ML
0.25 INJECTION, SOLUTION INTRAMUSCULAR; INTRAVENOUS; SUBCUTANEOUS
Status: DISCONTINUED | OUTPATIENT
Start: 2023-04-18 | End: 2023-04-18 | Stop reason: HOSPADM

## 2023-04-18 RX ORDER — ONDANSETRON 2 MG/ML
INJECTION INTRAMUSCULAR; INTRAVENOUS AS NEEDED
Status: DISCONTINUED | OUTPATIENT
Start: 2023-04-18 | End: 2023-04-18 | Stop reason: SURG

## 2023-04-18 RX ORDER — HYDROCODONE BITARTRATE AND ACETAMINOPHEN 5; 325 MG/1; MG/1
1 TABLET ORAL ONCE AS NEEDED
Status: DISCONTINUED | OUTPATIENT
Start: 2023-04-18 | End: 2023-04-18 | Stop reason: HOSPADM

## 2023-04-18 RX ORDER — DOCUSATE SODIUM 100 MG/1
100 CAPSULE, LIQUID FILLED ORAL 2 TIMES DAILY
Qty: 60 CAPSULE | Refills: 0 | Status: SHIPPED | OUTPATIENT
Start: 2023-04-18

## 2023-04-18 RX ORDER — PROMETHAZINE HYDROCHLORIDE 25 MG/1
25 SUPPOSITORY RECTAL ONCE AS NEEDED
Status: DISCONTINUED | OUTPATIENT
Start: 2023-04-18 | End: 2023-04-18 | Stop reason: HOSPADM

## 2023-04-18 RX ORDER — FAMOTIDINE 10 MG/ML
20 INJECTION, SOLUTION INTRAVENOUS ONCE
Status: COMPLETED | OUTPATIENT
Start: 2023-04-18 | End: 2023-04-18

## 2023-04-18 RX ORDER — IPRATROPIUM BROMIDE AND ALBUTEROL SULFATE 2.5; .5 MG/3ML; MG/3ML
3 SOLUTION RESPIRATORY (INHALATION) ONCE AS NEEDED
Status: DISCONTINUED | OUTPATIENT
Start: 2023-04-18 | End: 2023-04-18 | Stop reason: HOSPADM

## 2023-04-18 RX ORDER — SODIUM CHLORIDE, SODIUM LACTATE, POTASSIUM CHLORIDE, CALCIUM CHLORIDE 600; 310; 30; 20 MG/100ML; MG/100ML; MG/100ML; MG/100ML
9 INJECTION, SOLUTION INTRAVENOUS CONTINUOUS
Status: DISCONTINUED | OUTPATIENT
Start: 2023-04-18 | End: 2023-04-18 | Stop reason: HOSPADM

## 2023-04-18 RX ORDER — DEXAMETHASONE SODIUM PHOSPHATE 4 MG/ML
INJECTION, SOLUTION INTRA-ARTICULAR; INTRALESIONAL; INTRAMUSCULAR; INTRAVENOUS; SOFT TISSUE AS NEEDED
Status: DISCONTINUED | OUTPATIENT
Start: 2023-04-18 | End: 2023-04-18 | Stop reason: SURG

## 2023-04-18 RX ORDER — HYDRALAZINE HYDROCHLORIDE 20 MG/ML
5 INJECTION INTRAMUSCULAR; INTRAVENOUS
Status: DISCONTINUED | OUTPATIENT
Start: 2023-04-18 | End: 2023-04-18 | Stop reason: HOSPADM

## 2023-04-18 RX ORDER — LIDOCAINE HYDROCHLORIDE 10 MG/ML
0.5 INJECTION, SOLUTION EPIDURAL; INFILTRATION; INTRACAUDAL; PERINEURAL ONCE AS NEEDED
Status: DISCONTINUED | OUTPATIENT
Start: 2023-04-18 | End: 2023-04-18 | Stop reason: HOSPADM

## 2023-04-18 RX ORDER — EPHEDRINE SULFATE 50 MG/ML
5 INJECTION, SOLUTION INTRAVENOUS ONCE AS NEEDED
Status: DISCONTINUED | OUTPATIENT
Start: 2023-04-18 | End: 2023-04-18 | Stop reason: HOSPADM

## 2023-04-18 RX ORDER — PROMETHAZINE HYDROCHLORIDE 25 MG/1
25 TABLET ORAL ONCE AS NEEDED
Status: DISCONTINUED | OUTPATIENT
Start: 2023-04-18 | End: 2023-04-18 | Stop reason: HOSPADM

## 2023-04-18 RX ORDER — SODIUM CHLORIDE 0.9 % (FLUSH) 0.9 %
3-10 SYRINGE (ML) INJECTION AS NEEDED
Status: DISCONTINUED | OUTPATIENT
Start: 2023-04-18 | End: 2023-04-18 | Stop reason: HOSPADM

## 2023-04-18 RX ORDER — HYDROCODONE BITARTRATE AND ACETAMINOPHEN 7.5; 325 MG/1; MG/1
1 TABLET ORAL EVERY 4 HOURS PRN
Qty: 42 TABLET | Refills: 0 | Status: SHIPPED | OUTPATIENT
Start: 2023-04-18

## 2023-04-18 RX ORDER — ONDANSETRON 4 MG/1
4 TABLET, FILM COATED ORAL EVERY 8 HOURS PRN
Qty: 30 TABLET | Refills: 0 | Status: SHIPPED | OUTPATIENT
Start: 2023-04-18

## 2023-04-18 RX ORDER — LIDOCAINE HYDROCHLORIDE 20 MG/ML
INJECTION, SOLUTION EPIDURAL; INFILTRATION; INTRACAUDAL; PERINEURAL AS NEEDED
Status: DISCONTINUED | OUTPATIENT
Start: 2023-04-18 | End: 2023-04-18 | Stop reason: SURG

## 2023-04-18 RX ORDER — DROPERIDOL 2.5 MG/ML
0.62 INJECTION, SOLUTION INTRAMUSCULAR; INTRAVENOUS
Status: DISCONTINUED | OUTPATIENT
Start: 2023-04-18 | End: 2023-04-18 | Stop reason: HOSPADM

## 2023-04-18 RX ORDER — ACETAMINOPHEN 325 MG/1
650 TABLET ORAL 2 TIMES DAILY PRN
Qty: 60 TABLET | Refills: 0 | Status: SHIPPED | OUTPATIENT
Start: 2023-04-18

## 2023-04-18 RX ORDER — SODIUM CHLORIDE 0.9 % (FLUSH) 0.9 %
3 SYRINGE (ML) INJECTION EVERY 12 HOURS SCHEDULED
Status: DISCONTINUED | OUTPATIENT
Start: 2023-04-18 | End: 2023-04-18 | Stop reason: HOSPADM

## 2023-04-18 RX ORDER — LABETALOL HYDROCHLORIDE 5 MG/ML
5 INJECTION, SOLUTION INTRAVENOUS
Status: DISCONTINUED | OUTPATIENT
Start: 2023-04-18 | End: 2023-04-18 | Stop reason: HOSPADM

## 2023-04-18 RX ORDER — FLUMAZENIL 0.1 MG/ML
0.2 INJECTION INTRAVENOUS AS NEEDED
Status: DISCONTINUED | OUTPATIENT
Start: 2023-04-18 | End: 2023-04-18 | Stop reason: HOSPADM

## 2023-04-18 RX ORDER — BUPIVACAINE HYDROCHLORIDE 5 MG/ML
INJECTION, SOLUTION EPIDURAL; INTRACAUDAL
Status: COMPLETED | OUTPATIENT
Start: 2023-04-18 | End: 2023-04-18

## 2023-04-18 RX ORDER — CEFAZOLIN SODIUM IN 0.9 % NACL 3 G/100 ML
3 INTRAVENOUS SOLUTION, PIGGYBACK (ML) INTRAVENOUS ONCE
Status: COMPLETED | OUTPATIENT
Start: 2023-04-18 | End: 2023-04-18

## 2023-04-18 RX ORDER — PROPOFOL 10 MG/ML
VIAL (ML) INTRAVENOUS AS NEEDED
Status: DISCONTINUED | OUTPATIENT
Start: 2023-04-18 | End: 2023-04-18 | Stop reason: SURG

## 2023-04-18 RX ORDER — FENTANYL CITRATE 50 UG/ML
25 INJECTION, SOLUTION INTRAMUSCULAR; INTRAVENOUS
Status: DISCONTINUED | OUTPATIENT
Start: 2023-04-18 | End: 2023-04-18 | Stop reason: HOSPADM

## 2023-04-18 RX ORDER — ONDANSETRON 2 MG/ML
4 INJECTION INTRAMUSCULAR; INTRAVENOUS ONCE AS NEEDED
Status: DISCONTINUED | OUTPATIENT
Start: 2023-04-18 | End: 2023-04-18 | Stop reason: HOSPADM

## 2023-04-18 RX ORDER — NALOXONE HCL 0.4 MG/ML
0.2 VIAL (ML) INJECTION AS NEEDED
Status: DISCONTINUED | OUTPATIENT
Start: 2023-04-18 | End: 2023-04-18 | Stop reason: HOSPADM

## 2023-04-18 RX ORDER — HYDROCODONE BITARTRATE AND ACETAMINOPHEN 7.5; 325 MG/1; MG/1
1 TABLET ORAL EVERY 4 HOURS PRN
Status: DISCONTINUED | OUTPATIENT
Start: 2023-04-18 | End: 2023-04-18 | Stop reason: HOSPADM

## 2023-04-18 RX ORDER — SUCCINYLCHOLINE CHLORIDE 20 MG/ML
INJECTION INTRAMUSCULAR; INTRAVENOUS AS NEEDED
Status: DISCONTINUED | OUTPATIENT
Start: 2023-04-18 | End: 2023-04-18 | Stop reason: SURG

## 2023-04-18 RX ADMIN — SODIUM CHLORIDE, POTASSIUM CHLORIDE, SODIUM LACTATE AND CALCIUM CHLORIDE 9 ML/HR: 600; 310; 30; 20 INJECTION, SOLUTION INTRAVENOUS at 10:34

## 2023-04-18 RX ADMIN — SUCCINYLCHOLINE CHLORIDE 180 MG: 20 INJECTION, SOLUTION INTRAMUSCULAR; INTRAVENOUS; PARENTERAL at 11:03

## 2023-04-18 RX ADMIN — BUPIVACAINE 10 ML: 13.3 INJECTION, SUSPENSION, LIPOSOMAL INFILTRATION at 10:30

## 2023-04-18 RX ADMIN — LIDOCAINE HYDROCHLORIDE 100 MG: 20 INJECTION, SOLUTION EPIDURAL; INFILTRATION; INTRACAUDAL; PERINEURAL at 11:03

## 2023-04-18 RX ADMIN — ONDANSETRON 4 MG: 2 INJECTION INTRAMUSCULAR; INTRAVENOUS at 11:19

## 2023-04-18 RX ADMIN — DEXAMETHASONE SODIUM PHOSPHATE 14 MG: 4 INJECTION, SOLUTION INTRAMUSCULAR; INTRAVENOUS at 11:19

## 2023-04-18 RX ADMIN — FAMOTIDINE 20 MG: 10 INJECTION INTRAVENOUS at 10:46

## 2023-04-18 RX ADMIN — PROPOFOL 220 MG: 10 INJECTION, EMULSION INTRAVENOUS at 11:03

## 2023-04-18 RX ADMIN — CEFAZOLIN 3 G: 10 INJECTION, POWDER, FOR SOLUTION INTRAVENOUS at 10:47

## 2023-04-18 RX ADMIN — CEFAZOLIN 3 G: 10 INJECTION, POWDER, FOR SOLUTION INTRAVENOUS at 11:08

## 2023-04-18 RX ADMIN — BUPIVACAINE HYDROCHLORIDE 10 ML: 5 INJECTION, SOLUTION EPIDURAL; INTRACAUDAL; PERINEURAL at 10:30

## 2023-04-18 NOTE — BRIEF OP NOTE
SHOULDER ARTHROSCOPY  Progress Note    James Loaiza  4/18/2023    Pre-op Diagnosis:   Chronic left shoulder pain [M25.512, G89.29]       Post-Op Diagnosis Codes:     * Chronic left shoulder pain [M25.512, G89.29]    Procedure/CPT® Codes:        Procedure(s):  SHOULDER ARTHROSCOPY WITH SUBACROMIAL DECOMPRESSION, DISTAL CLAVICLE EXCISION,  BICEPS TENODESIS        Surgeon(s):  Kevin Serrano MD    Anesthesia: General with Block    Staff:   Circulator: No Dudley RN  Scrub Person: Tiffanie Hauser; Moses Ching  Vendor Representative: Julius Jerome Michael  Assistant: Cherelle Alfaro  Assistant: Cherelle Alfaro      Estimated Blood Loss: minimal    Urine Voided: * No values recorded between 4/18/2023 10:50 AM and 4/18/2023 12:22 PM *    Specimens:                None          Drains: * No LDAs found *    Findings: See dictation        Complications: None    Assistant: Cherelle Alfaro  was responsible for performing the following activities: Retraction and their skilled assistance was necessary for the success of this case.    Kevin Serrano MD     Date: 4/18/2023  Time: 12:24 EDT

## 2023-04-18 NOTE — ANESTHESIA PROCEDURE NOTES
Interscalene nerve block      Patient reassessed immediately prior to procedure    Patient location during procedure: pre-op  Start time: 4/18/2023 10:30 AM  Stop time: 4/18/2023 10:35 AM  Reason for block: at surgeon's request and post-op pain management  Performed by  Anesthesiologist: Ann Marie Kenny MD  Preanesthetic Checklist  Completed: patient identified, IV checked, site marked, risks and benefits discussed, surgical consent, monitors and equipment checked, pre-op evaluation and timeout performed  Prep:  Pt Position: sitting  Sterile barriers:cap, gloves and mask  Prep: ChloraPrep  Patient monitoring: blood pressure monitoring, continuous pulse oximetry and EKG  Procedure    Sedation: no  Performed under: local infiltration  Guidance:ultrasound guided    ULTRASOUND INTERPRETATION.  Using ultrasound guidance a 22 G gauge needle was placed in close proximity to the brachial plexus nerve, at which point, under ultrasound guidance anesthetic was injected in the area of the nerve and spread of the anesthesia was seen on ultrasound in close proximity thereto.  There were no abnormalities seen on ultrasound; a digital image was taken; and the patient tolerated the procedure with no complications. Images:still images obtained, printed/placed on chart    Laterality:left  Block Type:interscalene  Injection Technique:single-shot  Needle Type:short-bevel and echogenic  Needle Gauge:22 G  Resistance on Injection: none    Medications Used: bupivacaine liposome (EXPAREL) 1.3 % injection - Infiltration   10 mL - 4/18/2023 10:30:00 AM  bupivacaine (PF) (MARCAINE) 0.5 % injection - Injection   10 mL - 4/18/2023 10:30:00 AM      Medications  Comment:Ultrasound Interpretation:  Using ultrasound guidance the needle was placed in close proximity to the target nerve and anesthetic was injected in the area of the target nerve and/or bundles, and spread of the anesthetic was seen on ultrasound in close proximity thereto.   There were no abnormalities seen on ultrasound; a digital image was taken; and the patient tolerated the procedure with no complications.    Block placed for postoperative pain control per surgeon request.    Post Assessment  Injection Assessment: negative aspiration for heme, no paresthesia on injection and incremental injection  Patient Tolerance:comfortable throughout block  Complications:no

## 2023-04-18 NOTE — OP NOTE
04/18/23     Pre-Operative Diagnosis: Left shoulder symptomatic acromioclavicular osteoarthritis, subacromial/subdeltoid bursitis, rotator cuff tendinopathy and partial-thickness tear, possible degenerative glenoid labral tear    Post-Operative Diagnosis:  Left shoulder symptomatic acromioclavicular osteoarthritis, subacromial/subdeltoid bursitis, low-grade partial-thickness articular sided supraspinatus tear, degenerative glenoid labral tear and biceps instability    Procedure Performed:   1.  Arthroscopic biceps tenodesis   2.  Arthroscopic subacromial decompression and extensive bursectomy    3.  Arthroscopic distal clavicle excision    Surgeon: Kevin Serrano MD     Assistant: Cherelle Alfaro whose assistance was critical for help with patient positioning, suctioning and irrigation, retraction, manipulation of the extremity for insertion of the implants, wound closure and application of the bandages.  Her assistance was critical to the success of this case.     Anesthesia: Regional followed general.     Complications: None.     Estimated Blood Loss: Less than 50 mL.     Implants: none    Specimens: * No orders in the log *    Brief Operative Indication:  Mr. Loaiza had a history of a left shoulder pain which had been refractory to prolonged conservative treatment.  We talked about surgical and non-surgical treatment options.  He was determined to be a candidate for operative intervention given his failure of conservative treatment.   I explained that surgical risks include infection, hematoma, hardware related complications including failure of fixation, cutout, arthrofibrosis, persistent pain and/or loss of motion, iatrogenic nerve and/or blood vessel injury resulting in permanent weakness, numbness or dysfunction, DVT, PE, positioning related neuropraxia, and anesthesia related complications resulting in death.     Description of Procedure in Detail:  The patient and operative site were identified in the  preoperative holding area.  The surgical site was marked with the patient's confirmation.  Adequate regional anesthesia of the left upper extremity was administered by the anesthesiologist.  The patient was then taken to the operating room and placed in the supine position.  Adequate general anesthesia was then administered.  The patient was repositioned into the lateral decubitus position.  All bony prominences were carefully padded and protected.  The left upper extremity was prepped and draped in the standard sterile fashion.  I cleaned the extremity with an alcohol solution.  A Hibiclens scrub was performed.  Lastly, the extremity was prepped with 2 ChloraPrep preps.  I allowed those to dry for approximately 3 minutes before the draping procedure was carried out.  A timeout was taken and preoperative antibiotics administered prior to surgical incision.      The arm was placed into 10 pounds of lateral traction.  A standard posterior portal was established.  The scope was inserted into the joint and directed anteriorly to the rotator interval where a standard anterior portal was established.  A 7 mm cannula was inserted into the joint and then a diagnostic arthroscopy performed.  He had small areas of grade III/IV chondromalacia on the humeral side.  This was patchy but I would estimate it involved about 20% of the overall articular surface.  He had grade II chondromalacia on the glenoid side involving about 30% but no areas of full-thickness chondral loss.  As expected, he had a partial-thickness tear of the supraspinatus but this appeared to be very low-grade.  I measured the depth of the tearing with a probe and it extended to no more than 2 to 3 mm.  This was debrided with a shaver and then I remeasured.  Again, the depth of the tearing was confirmed to be no more than 3 mm.    He had a complex tear of the superior labrum with complete detachment of the biceps anchor and biceps instability.  I probed the  biceps and the long head of the biceps looked completely normal other than the detachment of the anchor.  I determined that he was a candidate for tenodesis.  The long head of the biceps was released with arthroscopic scissors.  The biceps stump and degenerative superior labral tear were then debrided with a shaver.  He had some fraying of the posterior labrum which I also debrided with a shaver.  Otherwise no significant pathology was noted in the joint.    Having completed the work in the joint, I directed my attention to the subacromial space.  A standard lateral portal was established.  There was an extensive amount of inflamed appearing bursal tissue which was removed with a shaver.  There was a moderate subacromial spur and fraying of the coracoacromial ligament consistent with impingement syndrome.  An Arthrex Apollo device was used to release the coracoacromial ligament and then a barrel tip leroy used to level the undersurface of the acromion back to the level of the scapular spine.  At this point the rotator cuff was examined from the bursal side.  This was completely intact.     Next, I directed my attention to the distal clavicle excision.  The acromioclavicular joint was exposed.  There was extensive arthropathy and spurring off of the inferior portion of the clavicle.  The Arthrex Apollo device was inserted through the anterior portal and used to release the inferior capsule.  The distal clavicle was exposed.  I removed approximately 7-9 mm of bone from the distal clavicle under direct visualization.  Once I had completed the distal clavicle excision, I confirmed that there were no amputated fragments or remaining areas that warranted debridement by inserting the scope in both the lateral and anterior portals to visualize directly into the acromioclavicular joint.  I confirmed that this space was completely decompressed.  Final images were taken and saved.    Next I created an accessory portal overlying  the biceps groove.  While instrumenting through this portal, the biceps was exposed at the top of the groove.  This was delivered into the subacromial space.  The diseased, intra-articular portion of the biceps was carefully debrided with a shaver.  I placed a 4.75 mm swivel lock anchor.  I attempted to pass the sutures through the tendon but the suture broke during this process.  I ended up upsizing to a 5.5 mm anchor.  This anchor got excellent purchase.  The sutures were retrieved, passed through the biceps using the scorpion and then tied over the top of the biceps using a 6 throw surgeon's knot.  The excess sutures were cut and removed.  The biceps was secure and confirmed to be stable when probed.    The wounds were copiously irrigated out with sterile saline and closed in a layered fashion using Monocryl for the deep tissues and nylon for the skin.  Sterile dressings were applied.  The drapes were withdrawn.  The arm was placed in a sling.  The patient was awakened and taken to the recovery room in good condition.    Kevin Serrano MD    04/18/23

## 2023-04-18 NOTE — H&P
History & Physical       Patient: James Loaiza    YOB: 1945    Medical Record Number: [id number]    Chief Complaints: Preop    History of Present Illness: 77 y.o. male presents today in anticipation of upcoming surgery.  Denies any changes to medical history.  Denies any changes to his symptomatology.    Allergies: No Known Allergies    Home Medications:  No current facility-administered medications for this encounter.    Current Outpatient Medications:   •  acetaminophen (TYLENOL) 500 MG tablet, Take 2 tablets by mouth Every 6 (Six) Hours As Needed for Mild Pain, Headache or Fever., Disp: , Rfl:   •  apixaban (ELIQUIS) 5 MG tablet tablet, Take 1 tablet by mouth Every 12 (Twelve) Hours., Disp: , Rfl:   •  Aspirin ([place]) 81 MG capsule, Take 1 tablet by mouth Daily. To stop 5-7 days before surgery, Disp: , Rfl:   •  atorvastatin (LIPITOR) 40 MG tablet, TAKE ONE TABLET BY MOUTH DAILY (Patient taking differently: Take 1 tablet by mouth Every Morning.), Disp: 90 tablet, Rfl: 1  •  ferrous sulfate 325 (65 FE) MG tablet, Take 1 tablet by mouth 1 (One) Time Per Week., Disp: , Rfl:   •  furosemide (Lasix) 40 MG tablet, Take 1 tablet by mouth 2 (Two) Times a Day. (Patient taking differently: Take 1 tablet by mouth Every Morning.), Disp: 40 tablet, Rfl: 1  •  gabapentin (NEURONTIN) 300 MG capsule, Take 1 capsule by mouth 3 (Three) Times a Day., Disp: 90 capsule, Rfl: 0  •  NIFEdipine XL (PROCARDIA XL) 60 MG 24 hr tablet, TAKE ONE TABLET BY MOUTH DAILY (Patient taking differently: Take 1 tablet by mouth Every Morning.), Disp: 90 tablet, Rfl: 2  •  pantoprazole (PROTONIX) 20 MG EC tablet, TAKE ONE TABLET BY MOUTH DAILY (Patient taking differently: Take 1 tablet by mouth Every Morning.), Disp: 30 tablet, Rfl: 3  •  ramipril (ALTACE) 10 MG capsule, TAKE ONE CAPSULE BY MOUTH DAILY (Patient taking differently: Take 1 capsule by mouth Every Morning.), Disp: 90 capsule, Rfl: 1  •  tamsulosin (FLOMAX) 0.4 MG  "capsule 24 hr capsule, Take 2 capsules by mouth Every Night., Disp: , Rfl:   •  tiotropium bromide-olodaterol (STIOLTO RESPIMAT) 2.5-2.5 MCG/ACT aerosol solution inhaler, Inhale 2 puffs Daily., Disp: , Rfl:   •  triamcinolone (KENALOG) 0.1 % lotion, Apply  topically to the appropriate area as directed 3 (Three) Times a Day., Disp: 120 mL, Rfl: 1    Past Medical History:   Diagnosis Date   • Abnormal EKG     referring to cardiology   • Anxiety     history   • Arthritis    • Ascending aorta dilation    • Back pain     worsening   • BPH (benign prostatic hyperplasia)     BPH/Nocturia/ Urinary Frequency   • Breast nodule     right   • Cardiomegaly     Cardiomegaly/ SOB with exertion   • Cellulitis of left lower extremity     history   • Circulation problem    • Constipation    • COPD (chronic obstructive pulmonary disease)    • Deviated septum    • DJD (degenerative joint disease)     DJD Knees   • DVT (deep venous thrombosis)    • Dysphagia     solids and liquids - resolved with normal studies   • Encounter for annual health examination 11/14/2013    Annual Health Assessment   • Enlarged prostate    • Fatigue     Extreme fatigue   • GERD (gastroesophageal reflux disease)    • Hyperlipidemia 1999   • Hypertension 1999    followed Dr. Marcelo   • Hypogonadism male    • Left hip pain     and DJD   • Left leg pain    • Low back pain    • Moist mucous membranes of ear, nose, and throat     \"Mucous in throat\"   • Morbid obesity     (BMI-41.2za)   • Muscle cramping    • Muscle spasm     Muscle spasms   • Neck arthritis    • Neck muscle spasm     Neck muscle spasm/Cervical strain   • Neck pain    • Obesity    • Plantar fasciitis    • Prostatitis     recurrent   • Psoriasis    • Pulmonary embolus 01/2019    on Xarelltoypseerlipidemia   • Radiculopathy     Radiculopathy / Neuropathy left ar   • Renal insufficiency     followed by Dr. Mendes   • Seborrhea    • Shortness of breath    • Sleep apnea     Obstructive Sleep apnea " worsening; uses CPAP   • Urinary frequency    • Vascular disorder    • Vertigo    • Weight gain    • Wellness examination 10/23/2014    Annual Wellness Visit          Past Surgical History:   Procedure Laterality Date   • APPENDECTOMY  1965   • CARDIAC CATHETERIZATION  07/29/2010    [facility]   • COLONOSCOPY  01/05/2010   • COLONOSCOPY  10/01/2001   • NASAL SEPTUM SURGERY  1999   • RADIOFREQUENCY ABLATION Bilateral 03/02/2021    Procedure: RADIOFREQUENCY ABLATION NERVES---bilateral cervical 2-cervical3;  Surgeon: Edu Tineo MD;  Location: [facility];  Service: Pain Management;  Laterality: Bilateral;   • RADIOFREQUENCY ABLATION Bilateral 12/02/2021    Procedure: RADIOFREQUENCY ABLATION NERVES--bilateral cervical2-3;  Surgeon: Edu Tineo MD;  Location: [facility];  Service: Pain Management;  Laterality: Bilateral;   • RADIOFREQUENCY ABLATION Bilateral 10/28/2022    Procedure: CERVICAL RADIOFREQUENCY LESIONING BILATERAL C2-3;  Surgeon: Edu Tineo MD;  Location: [facility];  Service: Pain Management;  Laterality: Bilateral;   • TURP / TRANSURETHRAL INCISION / DRAINAGE PROSTATE  10/23/2015    Michael Castro          Social History     Occupational History   • Not on file   Tobacco Use   • Smoking status: Never   • Smokeless tobacco: Never   • Tobacco comments:     caffiene use: soda and coffee   Vaping Use   • Vaping Use: Never used   Substance and Sexual Activity   • Alcohol use: Yes     Comment: 2-3  a year   • Drug use: Never   • Sexual activity: Defer      Social History     Social History Narrative   • Not on file          Family History   Problem Relation Age of Onset   • Arthritis Mother    • Heart disease Mother    • Hypertension Mother    • Heart attack Father    • Heart disease Father    • Hypertension Father    • Arthritis Sister    • Multiple sclerosis Sister    • Alcohol abuse Brother    • Diabetes Son    • Malig Hyperthermia Neg Hx        Review  of Systems:  A 14 point review of systems is reviewed with the patient.  Pertinent positives are listed above.  All others are negative.    Physical Exam: 77 y.o. male    There were no vitals filed for this visit.    General:  Patient is awake and alert.  Appears in no acute distress or discomfort.    Psych:  Affect and demeanor are appropriate.    Eyes:  Conjunctiva and sclera appear grossly normal.  Eyes track well and EOM seem to be intact.    Ears:  No gross abnormalities.  Hearing adequate for the exam.    Cardiovascular:  Regular rate and rhythm.    Lungs:  Good chest expansion.  Breathing unlabored.    Lymph:  No palpable adenopathy about neck or axilla.    Extremity:  Skin benign and intact without evidence for swelling, masses or atrophy.  Exam otherwise deferred at this time.    Diagnostic Tests:  Lab Results   Component Value Date    GLUCOSE 114 (H) 04/04/2023    CALCIUM 8.9 04/04/2023     04/04/2023    K 3.9 04/04/2023    CO2 26.4 04/04/2023     04/04/2023    BUN 27 (H) 04/04/2023    CREATININE 2.39 (H) 04/04/2023    EGFRIFAFRI 35 (L) 02/14/2022    EGFRIFNONA 30 (L) 02/14/2022    BCR 11.3 04/04/2023    ANIONGAP 7.6 04/04/2023     Lab Results   Component Value Date    WBC 8.52 04/04/2023    HGB 12.0 (L) 04/04/2023    HCT 36.5 (L) 04/04/2023    MCV 95.5 04/04/2023     04/04/2023     Lab Results   Component Value Date    INR 1.13 (H) 01/13/2019    INR 1.0 07/18/2015    PROTIME 14.3 (H) 01/13/2019    PROTIME 11.7 07/18/2015       Assessment:  Left shoulder rotator cuff tendinopathy, partial-thickness rotator cuff tear, subacromial bursitis, acromioclavicular arthritis and degenerative labral tear with biceps tendinopathy    Plan: He denies any changes to his symptoms.  Plan is to proceed with shoulder arthroscopy.  Plan is to address any cuff, biceps and/or labral pathology as indicated.  We will plan for a subacromial decompression and distal clavicle excision as well.  He understands and  consents.    Kevin Serrano MD  04/18/23

## 2023-04-18 NOTE — ANESTHESIA PROCEDURE NOTES
Airway  Urgency: elective    Date/Time: 4/18/2023 11:04 AM  Airway not difficult    General Information and Staff    Patient location during procedure: OR  CRNA/CAA: Christin Kessler CRNA    Indications and Patient Condition  Indications for airway management: airway protection    Preoxygenated: yes  MILS maintained throughout  Mask difficulty assessment: 1 - vent by mask    Final Airway Details  Final airway type: endotracheal airway      Successful airway: ETT  Cuffed: yes   Successful intubation technique: direct laryngoscopy  Endotracheal tube insertion site: oral  Blade: Poly  Blade size: 3  ETT size (mm): 7.5  Cormack-Lehane Classification: grade IIa - partial view of glottis  Placement verified by: chest auscultation and capnometry   Measured from: lips  ETT/EBT  to lips (cm): 22  Number of attempts at approach: 1  Assessment: lips, teeth, and gum same as pre-op and atraumatic intubation    Additional Comments  Atraumatic insertion

## 2023-04-18 NOTE — DISCHARGE INSTRUCTIONS
What to expect after a Nerve Block    Nerve blocks administered to block pain affect many types of nerves, including those nerves that control movement, pain, and normal sensation. Following a nerve block, you may notice some bruising at the site where the block was given. You may experience sensations such as: numbness of the affected area or limb, tingling, heaviness (that is the limb feels heavy to you), weakness or inability to move the affected arm or leg, or a feeling as if your arm or leg has “fallen asleep.”     A nerve block can last from 2 to 36 hours depending on the medications used.  Usually the weakness wears off first followed by the tingling and heaviness. As the block wears off, you may begin to notice pain; however, this sequence of events may occur in any order. Typically, you will be able to move your limb before you will feel it. Once a nerve block begins to wear off, the effects are usually completely gone within 60 minutes.  If you experience continued side effects that you believe are block related for longer than 48 hours, please call your healthcare provider. Please see block-specific instructions below.    Instructions for any block involving the shoulder or arm  If you have had any kind of shoulder/arm block, you will go home with your arm in a sling. Wear the sling until the block has completely worn off. You may be required to wear it for a longer period of time per your surgeon’s recommendations.  If you have had a shoulder/arm block, it is a good idea to sleep on a recliner with pillows under your arm.    You may experience symptoms such as:  Shortness of breath  Hoarseness   Blurry vision  Unequal pupils  Drooping of your face on the same side as the block was performed    These are side effects associated with this kind of block and should go away within 12 hours.    Note: If you have severe or prolonged shortness of breath, please seek medical assistance as soon as possible.      Protection of a “blocked” arm or leg (limb)  After a nerve block, you cannot feel pain, pressure, or extremes of temperature in the affected limb. And because of this, your blocked limb is at more risk for injury. For example, it is possible to burn your limb on an extremely hot surface without feeling it.     When resting, it is important to reposition your limb periodically to avoid prolonged pressure on it. This may require the use of pillows and padding.    While sleeping, you should avoid rolling onto the affected limb or putting too much pressure on it.     If you have a cast or tight dressing, check the color of your fingers or toes of the affected limb. Call your surgeon if they look discolored (that is, dusky, dark colored).    Use caution in cold weather. Cover your limb appropriately to protect it from the cold.      Pain Management:    Your surgeon will give you a prescription for pain medication. Begin taking this before the nerve block wears off. Bear in mind that sometimes the block can wear off in the middle of the night.      Pendulum Exercises for Post Op Shoulder Surgery      Lean over with your good arm supported on a table or chair.  Relax the injured arm and allow it to hang straight down at your side.  Slowly begin to swing the relaxed arm back and forth.  Then swing it side to side.  Next, move it in a [place]. Now,  reverse the direction.        Generally, you should spend about 5 minutes doing this exercise.  It should be done 2-3 times daily or as directed by your physician.

## 2023-04-18 NOTE — ANESTHESIA POSTPROCEDURE EVALUATION
Patient: James Loaiza    Procedure Summary     Date: 04/18/23 Room / [facility]    Anesthesia Start: 1054 Anesthesia Stop: 1229    Procedure: SHOULDER ARTHROSCOPY WITH SUBACROMIAL DECOMPRESSION, DISTAL CLAVICLE EXCISION,  BICEPS TENODESIS (Left: Shoulder) Diagnosis:       Chronic left shoulder pain      (Chronic left shoulder pain [M25.512, G89.29])    Surgeons: Kevin Serrano MD Provider: Daniel Morton MD    Anesthesia Type: general ASA Status: 3          Anesthesia Type: general    Vitals  Vitals Value Taken Time   /71 04/18/23 1315   Temp 36.4 °C (97.6 °F) 04/18/23 1315   Pulse 76 04/18/23 1325   Resp 16 04/18/23 1315   SpO2 91 % 04/18/23 1325   Vitals shown include unvalidated device data.        Post Anesthesia Care and Evaluation    Patient location during evaluation: bedside  Patient participation: complete - patient participated  Level of consciousness: awake  Pain management: adequate    Airway patency: patent  Anesthetic complications: No anesthetic complications  PONV Status: controlled  Cardiovascular status: acceptable  Respiratory status: acceptable  Hydration status: acceptable    Comments: --------------------            04/18/23               1335     --------------------   BP:       147/72     Pulse:      73       Resp:       16       Temp:                SpO2:      93%      --------------------

## 2023-04-18 NOTE — ANESTHESIA PREPROCEDURE EVALUATION
Anesthesia Evaluation     Patient summary reviewed and Nursing notes reviewed   NPO Solid Status: > 8 hours  NPO Liquid Status: > 2 hours           Airway   Dental      Pulmonary    (+) pulmonary embolism (recurrent, on perm AC), COPD, shortness of breath, sleep apnea on CPAP,   Cardiovascular     ECG reviewed  PT is on anticoagulation therapy    (+) hypertension, valvular problems/murmurs (mild) AI, CAD (small caliber disease, medical management per cardiology report), CHF Diastolic >=55%, DVT (recurrent, on perm AC), hyperlipidemia,       Neuro/Psych  (+) headaches, dizziness/light headedness (vertigo), numbness (cervical radic), psychiatric history Anxiety,    GI/Hepatic/Renal/Endo    (+) obesity, morbid obesity, GERD,  renal disease (CKD stage IIIb) CRI,     Musculoskeletal     (+) back pain, neck pain (cervical facet, occipital neuralgia),   Abdominal    Substance History      OB/GYN          Other   arthritis,                    Anesthesia Plan    ASA 3     general     (With ISB for POPC PSR    I have reviewed the patient's history and chart with the patient, including all pertinent laboratory results and imaging. I have explained the risks of anesthesia including but not limited to dental damage, corneal abrasion, nerve injury, MI, stroke, aspiration, and death. Patient has agreed to proceed.     Risks of peripheral nerve block were discussed with patient including but not limited to: inadequate block, bleeding, infection, persistent numbness or weakness, nerve damage, painful dysasthesia and need to protect limb while numb.    There were no vitals taken for this visit.  )  intravenous induction     Anesthetic plan, risks, benefits, and alternatives have been provided, discussed and informed consent has been obtained with: patient.        CODE STATUS:

## 2023-04-19 ENCOUNTER — TELEPHONE (OUTPATIENT)
Dept: ORTHOPEDIC SURGERY | Facility: CLINIC | Age: 78
End: 2023-04-19
Payer: MEDICARE

## 2023-04-19 NOTE — TELEPHONE ENCOUNTER
Postop follow-up call.  I spoke to Mr. Loaiza.  He reports his nerve block is still in effect.  Instructed him to postpone pendulum exercises until he has full sensation return to the entire arm.  We discussed postop care instructions.  I encouraged him to call with any questions or concerns prior to his follow-up appointment.  He verbalized understanding of all we discussed and appreciated the call.

## 2023-04-26 ENCOUNTER — OFFICE VISIT (OUTPATIENT)
Dept: ORTHOPEDIC SURGERY | Facility: CLINIC | Age: 78
End: 2023-04-26
Payer: MEDICARE

## 2023-04-26 ENCOUNTER — TELEPHONE (OUTPATIENT)
Dept: ORTHOPEDIC SURGERY | Facility: CLINIC | Age: 78
End: 2023-04-26
Payer: MEDICARE

## 2023-04-26 VITALS — HEIGHT: 74 IN | TEMPERATURE: 97.8 F | BODY MASS INDEX: 40.43 KG/M2 | WEIGHT: 315 LBS

## 2023-04-26 DIAGNOSIS — Z98.890 S/P ARTHROSCOPY OF LEFT SHOULDER: Primary | ICD-10-CM

## 2023-04-26 NOTE — TELEPHONE ENCOUNTER
Caller: James Loaiza    Relationship to patient: Self    Best call back number: [phone]    Patient is needing: FARRUKH FROM [facility]  INFORMED PT IS WANTING TO HAVE IN HOME PHYSICAL THERAPY. FARRUKH EXPLAINED THAT DR. PRESLEY NEEDS TO  SUBMIT AN EXPEDITED INSURANCE AUTH REQUEST THROUGH [employer] SO THAT COULD BE COMPLETED WITHIN 72H OTHERWISE IT COULD TAKE UP TO 14 DAYS.

## 2023-04-26 NOTE — PROGRESS NOTES
James Loaiza : 1945 MRN: [id number] DATE: 2023    CC: 1 week s/p left shoulder arthroscopy    HPI: Patient returns to clinic today for follow up.  Reports pain is well controlled.  Denies fevers, drainage, redness or other concerning symptoms.      Vitals:    23 1137   Temp: 97.8 °F (36.6 °C)       Exam:  Wounds appear well-approximated.  Arm and forearm soft.  Shoulder moves fluidly with pendulums.  Good motor and sensory function in the hand and wrist.  Palpable radial pulse with brisk capillary refill     Impression:  1 week s/p left shoulder biceps tenodesis, SAD, DCE    Plan:    1.  Begin PT per protocol--encouraged patient to progress motion as tolerated and demonstrated home exercise program.  2.  Follow up in 3 weeks  3.  Counseled the patient about appropriate activity modifications and restrictions.    Kevin Serrano MD

## 2023-04-27 ENCOUNTER — TELEPHONE (OUTPATIENT)
Dept: FAMILY MEDICINE CLINIC | Facility: CLINIC | Age: 78
End: 2023-04-27

## 2023-04-27 ENCOUNTER — HOME HEALTH ADMISSION (OUTPATIENT)
Dept: HOME HEALTH SERVICES | Facility: HOME HEALTHCARE | Age: 78
End: 2023-04-27
Payer: MEDICARE

## 2023-04-27 NOTE — TELEPHONE ENCOUNTER
Have spoke with patient and his daughter regarding his home health.  They do not have a specific agency has a preference.  I explained to them that a lot of the home health agencies are short therapist and that I will contact each agency to see who will accept the patient.  In the meantime the daughter was concerned about increased swelling in his leg as well is some erythema.  Patient does have a history of peripheral edema and wears compression stockings.  He denies any calf pain or tenderness.  She actually thinks that his swelling in the leg does look better than it did before his surgery.  She is encouraged him to try to wear his ARA hose.  Have advised her to contact the primary care physician regarding the swelling and erythema to his leg.  I did offer to possibly send him for Doppler to rule out DVT since he does have a history however the daughter was not sure she can get in there today.  Since he is up and moving around and does not have any calf pain she would prefer to hold off on the ultrasound and is planning to discuss all this with his PCP.  Have advised her that if she cannot get a hold of the PCP to contact me back at the office and we can set up his ultrasound

## 2023-04-27 NOTE — TELEPHONE ENCOUNTER
Caller: James Loaiza    Relationship: Self    Best call back number: [phone]    What is the medical concern/diagnosis: SWELLING OF RIGHT LEG    What specialty or service is being requested: VASCULAR DOCTOR    Any additional details: PATIENT WOULD LIKE A CALL ONCE REFERRAL IS PLACED.

## 2023-05-03 DIAGNOSIS — R60.9 DEPENDENT EDEMA: Primary | ICD-10-CM

## 2023-05-10 ENCOUNTER — TELEPHONE (OUTPATIENT)
Dept: ORTHOPEDIC SURGERY | Facility: CLINIC | Age: 78
End: 2023-05-10

## 2023-05-10 NOTE — TELEPHONE ENCOUNTER
Caller: BETHANIE ORDONEZ    Relationship to patient: SELF    Best call back number: [phone]    Chief complaint: BILATERAL KNEE INJECTION    Type of visit: BILATERAL KNEE INJECTION    Requested date: 05/17/2023    If rescheduling, when is the original appointment: NA     Additional notes: PATIENT WANTS THIS ON THE SAME DAY AS HIS POST OP.

## 2023-05-12 ENCOUNTER — CLINICAL SUPPORT (OUTPATIENT)
Dept: ORTHOPEDIC SURGERY | Facility: CLINIC | Age: 78
End: 2023-05-12
Payer: MEDICARE

## 2023-05-12 VITALS — RESPIRATION RATE: 16 BRPM | TEMPERATURE: 97.3 F | WEIGHT: 315 LBS | HEIGHT: 74 IN | BODY MASS INDEX: 40.43 KG/M2

## 2023-05-12 DIAGNOSIS — G89.29 BILATERAL CHRONIC KNEE PAIN: Primary | ICD-10-CM

## 2023-05-12 DIAGNOSIS — M25.562 BILATERAL CHRONIC KNEE PAIN: Primary | ICD-10-CM

## 2023-05-12 DIAGNOSIS — Z98.890 S/P ARTHROSCOPY OF LEFT SHOULDER: Primary | ICD-10-CM

## 2023-05-12 DIAGNOSIS — M17.12 PRIMARY OSTEOARTHRITIS OF LEFT KNEE: ICD-10-CM

## 2023-05-12 DIAGNOSIS — M17.11 PRIMARY OSTEOARTHRITIS OF RIGHT KNEE: ICD-10-CM

## 2023-05-12 DIAGNOSIS — M25.561 BILATERAL CHRONIC KNEE PAIN: Primary | ICD-10-CM

## 2023-05-12 RX ORDER — LIDOCAINE HYDROCHLORIDE 10 MG/ML
2 INJECTION, SOLUTION EPIDURAL; INFILTRATION; INTRACAUDAL; PERINEURAL
Status: COMPLETED | OUTPATIENT
Start: 2023-05-12 | End: 2023-05-12

## 2023-05-12 RX ORDER — RAMIPRIL 10 MG/1
CAPSULE ORAL
COMMUNITY
Start: 2023-05-01

## 2023-05-12 RX ORDER — METHYLPREDNISOLONE ACETATE 80 MG/ML
80 INJECTION, SUSPENSION INTRA-ARTICULAR; INTRALESIONAL; INTRAMUSCULAR; SOFT TISSUE
Status: COMPLETED | OUTPATIENT
Start: 2023-05-12 | End: 2023-05-12

## 2023-05-12 RX ORDER — NIFEDIPINE 60 MG/1
TABLET, FILM COATED, EXTENDED RELEASE ORAL
COMMUNITY
Start: 2023-05-01

## 2023-05-12 RX ORDER — PANTOPRAZOLE SODIUM 20 MG/1
TABLET, DELAYED RELEASE ORAL
COMMUNITY
Start: 2023-05-01

## 2023-05-12 RX ORDER — TAMSULOSIN HYDROCHLORIDE 0.4 MG/1
CAPSULE ORAL
COMMUNITY
Start: 2023-05-01

## 2023-05-12 RX ORDER — FERROUS SULFATE 325(65) MG
TABLET ORAL
COMMUNITY
Start: 2023-05-01

## 2023-05-12 RX ORDER — ATORVASTATIN CALCIUM 40 MG/1
TABLET, FILM COATED ORAL
COMMUNITY
Start: 2023-05-01

## 2023-05-12 RX ORDER — TIOTROPIUM BROMIDE AND OLODATEROL 3.124; 2.736 UG/1; UG/1
SPRAY, METERED RESPIRATORY (INHALATION)
COMMUNITY
Start: 2023-05-01

## 2023-05-12 RX ORDER — GABAPENTIN 300 MG/1
CAPSULE ORAL
COMMUNITY
Start: 2023-05-01

## 2023-05-12 RX ORDER — ACETAMINOPHEN 500 MG
TABLET ORAL
COMMUNITY
Start: 2023-05-01

## 2023-05-12 RX ORDER — FUROSEMIDE 40 MG/1
TABLET ORAL
COMMUNITY
Start: 2023-05-01

## 2023-05-12 RX ADMIN — METHYLPREDNISOLONE ACETATE 80 MG: 80 INJECTION, SUSPENSION INTRA-ARTICULAR; INTRALESIONAL; INTRAMUSCULAR; SOFT TISSUE at 15:34

## 2023-05-12 RX ADMIN — LIDOCAINE HYDROCHLORIDE 2 ML: 10 INJECTION, SOLUTION EPIDURAL; INFILTRATION; INTRACAUDAL; PERINEURAL at 15:34

## 2023-05-12 RX ADMIN — METHYLPREDNISOLONE ACETATE 80 MG: 80 INJECTION, SUSPENSION INTRA-ARTICULAR; INTRALESIONAL; INTRAMUSCULAR; SOFT TISSUE at 15:33

## 2023-05-12 RX ADMIN — LIDOCAINE HYDROCHLORIDE 2 ML: 10 INJECTION, SOLUTION EPIDURAL; INFILTRATION; INTRACAUDAL; PERINEURAL at 15:33

## 2023-05-12 NOTE — PROGRESS NOTES
Mr. Loaiza comes in today for follow-up.  Injections have worked well in the past.  The patient would like to get repeat injections today.  The risks, benefits and alternatives were discussed and the patient consented.  Going forward, the patient will follow-up as needed.    YANIRA Andrea    05/12/2023    Large Joint Arthrocentesis: L knee  Date/Time: 5/12/2023 3:34 PM  Consent given by: patient  Site marked: site marked  Timeout: Immediately prior to procedure a time out was called to verify the correct patient, procedure, equipment, support staff and site/side marked as required   Supporting Documentation  Indications: pain   Procedure Details  Location: knee - L knee  Preparation: Patient was prepped and draped in the usual sterile fashion  Needle gauge: 21 G.  Approach: anterolateral  Medications administered: 80 mg methylPREDNISolone acetate 80 MG/ML; 2 mL lidocaine PF 1% 1 %  Patient tolerance: patient tolerated the procedure well with no immediate complications    Large Joint Arthrocentesis: R knee  Date/Time: 5/12/2023 3:33 PM  Consent given by: patient  Site marked: site marked  Timeout: Immediately prior to procedure a time out was called to verify the correct patient, procedure, equipment, support staff and site/side marked as required   Supporting Documentation  Indications: pain   Procedure Details  Location: knee - R knee  Preparation: Patient was prepped and draped in the usual sterile fashion  Needle gauge: 21 G.  Approach: anterolateral  Medications administered: 80 mg methylPREDNISolone acetate 80 MG/ML; 2 mL lidocaine PF 1% 1 %  Patient tolerance: patient tolerated the procedure well with no immediate complications

## 2023-05-19 ENCOUNTER — OFFICE VISIT (OUTPATIENT)
Dept: ORTHOPEDIC SURGERY | Facility: CLINIC | Age: 78
End: 2023-05-19
Payer: MEDICARE

## 2023-05-19 VITALS — TEMPERATURE: 97.8 F | BODY MASS INDEX: 40.17 KG/M2 | HEIGHT: 74 IN | WEIGHT: 313 LBS

## 2023-05-19 DIAGNOSIS — Z09 SURGERY FOLLOW-UP: Primary | ICD-10-CM

## 2023-05-19 NOTE — PROGRESS NOTES
James Loaiza : 1945 MRN: [id number] DATE: 2023    CC: 1 month s/p left shoulder arthroscopy    HPI: Patient returns to clinic today for follow up.  Reports pain is much better.  Reports motion is progressing with home exercises.  He has not started formal physical therapy, however, he will call to get this scheduled.    Vitals:    23 1018   Temp: 97.8 °F (36.6 °C)       Exam:  Wounds appear healed.  Shoulder moves fluidly and his  motion is on track.  Good motor and sensory function in the hand and wrist.  Palpable radial pulse with brisk capillary refill.    Impression:  1 month s/p left shoulder arthroscopic biceps tenodesis, SAD, DCE    Plan:    1.  Continue PT per protocol--encouraged patient to progress motion as tolerated and demonstrated home exercise program.  2.  OK to D/C sling.  OK to begin driving.  3.  Follow up in 6 weeks with Dr. Serrano  4.  Counseled the patient about appropriate activity modifications and restrictions.    Meera Bartholomew, YANIRA     2023

## 2023-05-24 NOTE — TELEPHONE ENCOUNTER
PLEASE SEEN ADDITIONAL DETAILS-    Caller: James Loaiza    Relationship: Self    Best call back number: [phone]    Requested Prescriptions:   Requested Prescriptions     Pending Prescriptions Disp Refills   • ramipril (ALTACE) 10 MG capsule 90 capsule 1     Sig: Take 1 capsule by mouth Daily.        Pharmacy where request should be sent: [facility] [id number] [place], [place] - [facility] [address]  AT [address]  - [phone] [facility] [phone] FX     Last office visit with prescribing clinician: 3/6/2023   Last telemedicine visit with prescribing clinician: Visit date not found   Next office visit with prescribing clinician: Visit date not found     Additional details provided by patient: PATIENT WAS ONLY GIVEN A 15 DAY SUPPLY BY THE PHARMACY AND THEY WERE GOING TO MAIL THE REST ONCE THEY RECEIVED THE FULL DOSAGE. PT LOST BOTTLE WITH THE 15 DAY SUPPLY AND THE MED HAS NOT BEEN MAILED IN YET. WOULD LIKE TO KNOW IF HE CAN GET A 15 DAY SUPPLY SENT TO [facility] UNTIL THE OTHERS ARRIVE.     Does the patient have less than a 3 day supply:  [x] Yes  [] No    Would you like a call back once the refill request has been completed: [] Yes [] No    If the office needs to give you a call back, can they leave a voicemail: [] Yes [] No    Felicita Deal Rep   05/24/23 08:46 EDT

## 2023-05-25 ENCOUNTER — TRANSCRIBE ORDERS (OUTPATIENT)
Dept: ADMINISTRATIVE | Facility: HOSPITAL | Age: 78
End: 2023-05-25

## 2023-05-25 ENCOUNTER — TELEPHONE (OUTPATIENT)
Dept: ORTHOPEDIC SURGERY | Facility: CLINIC | Age: 78
End: 2023-05-25
Payer: MEDICARE

## 2023-05-25 DIAGNOSIS — J98.4 RESTRICTIVE LUNG DISEASE: ICD-10-CM

## 2023-05-25 DIAGNOSIS — J44.9 CHRONIC OBSTRUCTIVE PULMONARY DISEASE, UNSPECIFIED COPD TYPE: Primary | ICD-10-CM

## 2023-05-25 RX ORDER — RAMIPRIL 10 MG/1
10 CAPSULE ORAL EVERY MORNING
Qty: 90 CAPSULE | Refills: 3 | Status: SHIPPED | OUTPATIENT
Start: 2023-05-25

## 2023-05-25 NOTE — TELEPHONE ENCOUNTER
Gela from [facility] called requesting PT order.  It was faxed to [phone] & confirmation was received.

## 2023-07-10 ENCOUNTER — TELEPHONE (OUTPATIENT)
Dept: FAMILY MEDICINE CLINIC | Facility: CLINIC | Age: 78
End: 2023-07-10

## 2023-07-10 NOTE — TELEPHONE ENCOUNTER
Caller: James Loaiza    Relationship: Self    Best call back number: [phone]     What is the best time to reach you: ANY    Who are you requesting to speak with (clinical staff, provider,  specific staff member): CLINICAL    Do you know the name of the person who called:     What was the call regarding: PATIENT HAS BEEN DEALING WITH GOUT PAIN IN LEFT HAND AND WRIST/SWELLING IN ONE HAND FOR ABOUT TWO WEEKS NOW. PATIENT IS WANTING TO KNOW WHAT TO DO OR TAKE FOR THE PAIN.    Is it okay if the provider responds through MyChart:

## 2023-07-11 NOTE — TELEPHONE ENCOUNTER
Caller: Melinda Loaiza    Relationship: Emergency Contact    Best call back number: [phone]     What was the call regarding: MELINDA STATES THE PATIENT IS IN A LOT OF PAIN FROM THE GOUT IN HIS HANDS. PATIENT WOULD LIKE FOR SOMEONE TO GIVE HIM A CALL ASAP    PLEASE CALL AND ADVISE

## 2023-07-11 NOTE — TELEPHONE ENCOUNTER
Patient has not yet picked up the steroid that was previously given from the ER.  I advised him to pick it up.

## 2023-07-20 ENCOUNTER — OFFICE VISIT (OUTPATIENT)
Dept: FAMILY MEDICINE CLINIC | Facility: CLINIC | Age: 78
End: 2023-07-20
Payer: MEDICARE

## 2023-07-20 VITALS
BODY MASS INDEX: 40.17 KG/M2 | SYSTOLIC BLOOD PRESSURE: 122 MMHG | WEIGHT: 313 LBS | DIASTOLIC BLOOD PRESSURE: 80 MMHG | HEIGHT: 74 IN

## 2023-07-20 DIAGNOSIS — N18.32 STAGE 3B CHRONIC KIDNEY DISEASE: ICD-10-CM

## 2023-07-20 DIAGNOSIS — M10.9 GOUTY ARTHRITIS: Primary | ICD-10-CM

## 2023-07-20 DIAGNOSIS — Z79.01 CHRONIC ANTICOAGULATION: ICD-10-CM

## 2023-07-20 DIAGNOSIS — I25.10 CORONARY ARTERIOSCLEROSIS IN NATIVE ARTERY: ICD-10-CM

## 2023-07-20 RX ORDER — METHYLPREDNISOLONE 4 MG/1
TABLET ORAL
Qty: 21 TABLET | Refills: 1 | Status: SHIPPED | OUTPATIENT
Start: 2023-07-20 | End: 2023-07-20 | Stop reason: SDUPTHER

## 2023-07-27 ENCOUNTER — OFFICE VISIT (OUTPATIENT)
Dept: FAMILY MEDICINE CLINIC | Facility: CLINIC | Age: 78
End: 2023-07-27
Payer: MEDICARE

## 2023-07-27 VITALS
DIASTOLIC BLOOD PRESSURE: 70 MMHG | OXYGEN SATURATION: 97 % | SYSTOLIC BLOOD PRESSURE: 132 MMHG | BODY MASS INDEX: 40.43 KG/M2 | HEART RATE: 65 BPM | HEIGHT: 74 IN | RESPIRATION RATE: 18 BRPM | WEIGHT: 315 LBS

## 2023-07-27 DIAGNOSIS — I10 PRIMARY HYPERTENSION: ICD-10-CM

## 2023-07-27 DIAGNOSIS — R60.9 DEPENDENT EDEMA: ICD-10-CM

## 2023-07-27 DIAGNOSIS — M10.9 GOUTY ARTHRITIS: Primary | ICD-10-CM

## 2023-07-27 DIAGNOSIS — N18.4 CKD (CHRONIC KIDNEY DISEASE) STAGE 4, GFR 15-29 ML/MIN: ICD-10-CM

## 2023-07-27 PROCEDURE — 3075F SYST BP GE 130 - 139MM HG: CPT | Performed by: INTERNAL MEDICINE

## 2023-07-27 PROCEDURE — 3078F DIAST BP <80 MM HG: CPT | Performed by: INTERNAL MEDICINE

## 2023-07-27 PROCEDURE — 99214 OFFICE O/P EST MOD 30 MIN: CPT | Performed by: INTERNAL MEDICINE

## 2023-07-27 RX ORDER — METHYLPREDNISOLONE 4 MG/1
TABLET ORAL
Qty: 21 TABLET | Refills: 1 | Status: SHIPPED | OUTPATIENT
Start: 2023-07-27

## 2023-07-27 NOTE — PROGRESS NOTES
07/20/2023    Assessment & Plan   This 77-year-old patient presents at this time for with a recurrence of acute gouty arthritis in his left wrist.  Patient was seen in [facility] [place] emergency room on 7/3/2023.  Because of some delay in getting the medication he did not actually started on time and finished it on 7/17/2023.  Patient relates that approximately 24 hours following his completion of the prednisone pack the pain reoccurred.  Note is made he is currently being followed by [facility] nephrology group with Dr. Beth.  He has CKD 3 with his last GFR Of 27.6.  His creatinine is 2.39 with a BUN of 25.  His uric acid level at the time was 9.0.    Patient's blood pressure shows good control at 122/80 in the left arm sitting position standard cuff.  His weight is essentially unchanged from the past at 313 pounds with a BMI of 40.2.    The patient is scheduled for renal biopsy in 1 week.  I feel this recurrence of pain in his left wrist occurred associated with increased warmth and tenderness and a manifestation of chronic gouty arthritis.  We will start him on another Medrol Dosepak and will discuss with nephrology using allopurinol.      Diagnoses and all orders for this visit:    1. Gouty arthritis (Primary)    2. Stage 3b chronic kidney disease    3. Coronary arteriosclerosis in native artery    4. Chronic anticoagulation    Other orders  -     Discontinue: methylPREDNISolone (MEDROL) 4 MG dose pack; Take as directed on package instructions.  Dispense: 21 tablet; Refill: 1      Plan:  1.)  We will discuss further treatment with Dr. Beltran, nephrologist  2.)  For now Medrol Dosepak  3.)  Follow-up in 1 to 2 weeks       CC: Gout (Left wrist.  Was seen in [facility] [place] ER on 7/3/23.  Took last Prednisone on 7/17/23.  Symptoms returned the next day.---no other issues)  .        HPI  Gout  Associated symptoms include arthralgias and joint swelling.      Subjective   James Loaiza is a 77 y.o. male.      The following portions of  the patient's history were reviewed and updated as appropriate: allergies, current medications, past family history, past medical history, past social history, past surgical history, and problem list.    Problem List  Patient Active Problem List   Diagnosis    Lumbar herniated disc L3-L5 3/16/16 Open MRI    Aortic valve insufficiency    Coronary arteriosclerosis in native artery    Edema    Dyspnea on exertion    Fatigue    Left ventricular hypertrophy    Obstructive sleep apnea syndrome    Arthritis of left hip    Morbid obesity with BMI of 40.0-44.9, adult    Intermittent dysphagia    Hyperlipidemia 1999    BPH (benign prostatic hypertrophy)    Left cervical radiculopathy    Rotator cuff tear, left    Plantar fasciitis    Hypogonadism male    Vitamin D deficiency disease    Renal cyst, left    Preventative health care    Medication management    Chronic low back pain    Lumbar spondylolysis    Headache    Chronic pain syndrome    History of pulmonary embolism    Chronic obstructive pulmonary disease    Osteoarthritis of right shoulder    Chronic anticoagulation    Cervical stenosis of spine    Bilateral occipital neuralgia    Chronic pain of both shoulders    Cervical facet joint syndrome    Open wound of right lower leg    REJI (acute kidney injury)    Constipation    Venous insufficiency    Analgesic nephropathy    Anemia    Diastolic dysfunction    Hypocalcemia    Hypertension    Localized swelling, mass and lump, lower limb, bilateral    Stage 3b chronic kidney disease    Ascending aorta dilation    Bilateral occipital neuralgia       Past Medical History  Past Medical History:   Diagnosis Date    Abnormal EKG     referring to cardiology    Anxiety     history    Arthritis     Ascending aorta dilation     Back pain     worsening    BPH (benign prostatic hyperplasia)     BPH/Nocturia/ Urinary Frequency    Breast nodule     right    Cardiomegaly     Cardiomegaly/ SOB with exertion    Cellulitis of left lower  "extremity     history    Circulation problem     Constipation     COPD (chronic obstructive pulmonary disease)     Deviated septum     DJD (degenerative joint disease)     DJD Knees    DVT (deep venous thrombosis)     Dysphagia     solids and liquids - resolved with normal studies    Encounter for annual health examination 11/14/2013    Annual Health Assessment    Enlarged prostate     Fatigue     Extreme fatigue    GERD (gastroesophageal reflux disease)     Hyperlipidemia 1999    Hypertension 1999    followed Dr. Marcelo    Hypogonadism male     Left hip pain     and DJD    Left leg pain     Low back pain     Moist mucous membranes of ear, nose, and throat     \"Mucous in throat\"    Morbid obesity     (BMI-41.2za)    Muscle cramping     Muscle spasm     Muscle spasms    Neck arthritis     Neck muscle spasm     Neck muscle spasm/Cervical strain    Neck pain     Obesity     Plantar fasciitis     Prostatitis     recurrent    Psoriasis     Pulmonary embolus 01/2019    on Xarelltoypseerlipidemia    Radiculopathy     Radiculopathy / Neuropathy left ar    Renal insufficiency     followed by Dr. Mendes    Seborrhea     Shortness of breath     Sleep apnea     Obstructive Sleep apnea worsening; uses CPAP    Urinary frequency     Vascular disorder     Vertigo     Weight gain     Wellness examination 10/23/2014    Annual Wellness Visit       Surgical History  Past Surgical History:   Procedure Laterality Date    APPENDECTOMY  1965    CARDIAC CATHETERIZATION  07/29/2010    [facility]    COLONOSCOPY  01/05/2010    COLONOSCOPY  10/01/2001    NASAL SEPTUM SURGERY  1999    RADIOFREQUENCY ABLATION Bilateral 03/02/2021    Procedure: RADIOFREQUENCY ABLATION NERVES---bilateral cervical 2-cervical3;  Surgeon: Edu Tineo MD;  Location: [facility] MA[facility] OR;  Service: Pain Management;  Laterality: Bilateral;    RADIOFREQUENCY ABLATION Bilateral 12/02/2021    Procedure: RADIOFREQUENCY ABLATION NERVES--bilateral " cervical2-3;  Surgeon: Edu Tineo MD;  Location: [facility];  Service: Pain Management;  Laterality: Bilateral;    RADIOFREQUENCY ABLATION Bilateral 10/28/2022    Procedure: CERVICAL RADIOFREQUENCY LESIONING BILATERAL C2-3;  Surgeon: Edu Tineo MD;  Location: [facility];  Service: Pain Management;  Laterality: Bilateral;    SHOULDER ARTHROSCOPY Left 4/18/2023    Procedure: SHOULDER ARTHROSCOPY WITH SUBACROMIAL DECOMPRESSION, DISTAL CLAVICLE EXCISION,  BICEPS TENODESIS;  Surgeon: Kevin Serrano MD;  [place]  [facility];  Service: Orthopedics;  Laterality: Left;    TURP / TRANSURETHRAL INCISION / DRAINAGE PROSTATE  10/23/2015    Michael Castro       Family History  Family History   Problem Relation Age of Onset    Arthritis Mother     Heart disease Mother     Hypertension Mother     Heart attack Father     Heart disease Father     Hypertension Father     Arthritis Sister     Multiple sclerosis Sister     Alcohol abuse Brother     Diabetes Son     Malig Hyperthermia Neg Hx        Social History  Social History    Tobacco Use      Smoking status: Never      Smokeless tobacco: Never      Tobacco comments: caffiene use: soda and coffee       Is the Patient a current tobacco user? No    Allergies  No Known Allergies    Current Medications    Current Outpatient Medications:     acetaminophen (TYLENOL) 325 MG tablet, Take 2 tablets by mouth 2 (Two) Times a Day As Needed for Mild Pain., Disp: 60 tablet, Rfl: 0    albuterol sulfate  (90 Base) MCG/ACT inhaler, , Disp: , Rfl:     apixaban (ELIQUIS) 5 MG tablet tablet, Take 1 tablet by mouth Every 12 (Twelve) Hours., Disp: , Rfl:     Aspirin ([place]) 81 MG capsule, Take 1 tablet by mouth Daily. To stop 5-7 days before surgery, Disp: , Rfl:     atorvastatin (LIPITOR) 40 MG tablet, TAKE ONE TABLET BY MOUTH DAILY, Disp: 90 tablet, Rfl: 1    furosemide (Lasix) 40 MG tablet, Take 1 tablet by mouth 2 (Two) Times a Day. (Patient taking  differently: Take 1 tablet by mouth Every Morning.), Disp: 40 tablet, Rfl: 1    gabapentin (NEURONTIN) 300 MG capsule, Take 1 capsule by mouth 3 (Three) Times a Day. (Patient taking differently: Take 1 capsule by mouth every night at bedtime.), Disp: 90 capsule, Rfl: 0    NIFEdipine XL (PROCARDIA XL) 60 MG 24 hr tablet, TAKE ONE TABLET BY MOUTH DAILY, Disp: 90 tablet, Rfl: 2    pantoprazole (PROTONIX) 20 MG EC tablet, TAKE ONE TABLET BY MOUTH DAILY (Patient taking differently: Take 1 tablet by mouth Every Morning.), Disp: 30 tablet, Rfl: 3    ramipril (ALTACE) 10 MG capsule, Take 1 capsule by mouth Every Morning., Disp: 90 capsule, Rfl: 3    tamsulosin (FLOMAX) 0.4 MG capsule 24 hr capsule, Take 2 capsules by mouth Every Night., Disp: , Rfl:     tiotropium bromide-olodaterol (Stiolto Respimat) 2.5-2.5 MCG/ACT aerosol solution inhaler, 2 puff DAILY (route: inhalation), Disp: , Rfl:     acetaminophen (TYLENOL) 500 MG tablet, , Disp: , Rfl:     Aspirin 81 MG capsule, , Disp: , Rfl:     atorvastatin (LIPITOR) 40 MG tablet, , Disp: , Rfl:     docusate sodium (COLACE) 100 MG capsule, Take 1 capsule by mouth 2 (Two) Times a Day. (Patient not taking: Reported on 7/7/2023), Disp: 60 capsule, Rfl: 0    ferrous sulfate 325 (65 FE) MG tablet, Take 1 tablet by mouth 1 (One) Time Per Week. (Patient not taking: Reported on 7/7/2023), Disp: , Rfl:     ferrous sulfate 325 (65 FE) MG tablet, , Disp: , Rfl:     furosemide (LASIX) 40 MG tablet, , Disp: , Rfl:     gabapentin (NEURONTIN) 300 MG capsule, , Disp: , Rfl:     HYDROcodone-acetaminophen (NORCO) 5-325 MG per tablet, Take 1 tablet by mouth Every 8 (Eight) Hours As Needed for Severe Pain. (Patient not taking: Reported on 7/7/2023), Disp: 10 tablet, Rfl: 0    methylPREDNISolone (MEDROL) 4 MG dose pack, Take as directed on package instructions., Disp: 21 tablet, Rfl: 1    metoprolol succinate XL (TOPROL-XL) 25 MG 24 hr tablet, Take 1 tablet by mouth Daily., Disp: 90 tablet, Rfl:  0    NIFEdipine CC (ADALAT CC) 60 MG 24 hr tablet, , Disp: , Rfl:     ondansetron ODT (ZOFRAN-ODT) 4 MG disintegrating tablet, Place 1 tablet on the tongue 4 (Four) Times a Day As Needed for Nausea or Vomiting. (Patient not taking: Reported on 7/7/2023), Disp: 15 tablet, Rfl: 0    pantoprazole (PROTONIX) 20 MG EC tablet, , Disp: , Rfl:     ramipril (ALTACE) 10 MG capsule, 1 capsule DAILY (route: oral) (Patient not taking: Reported on 7/7/2023), Disp: , Rfl:     tamsulosin (FLOMAX) 0.4 MG capsule 24 hr capsule, , Disp: , Rfl:     tiotropium bromide-olodaterol (STIOLTO RESPIMAT) 2.5-2.5 MCG/ACT aerosol solution inhaler, Inhale 2 puffs Daily., Disp: , Rfl:     triamcinolone (KENALOG) 0.1 % lotion, Apply  topically to the appropriate area as directed 3 (Three) Times a Day. (Patient not taking: Reported on 7/7/2023), Disp: 120 mL, Rfl: 1     Review of System  Review of Systems   Musculoskeletal:  Positive for arthralgias, gout and joint swelling.        Painful movement in the left wrist   I have reviewed and confirmed the accuracy of the ROS as documented by the MA/LPN/RN Magdiel Bowens MD    Vitals:    07/20/23 1122   BP: 122/80     Body mass index is 40.19 kg/m².    Objective     Physical Exam  Physical Exam  Constitutional:       General: He is not in acute distress.     Appearance: Normal appearance.   HENT:      Head: Normocephalic and atraumatic.   Cardiovascular:      Rate and Rhythm: Normal rate and regular rhythm.   Pulmonary:      Effort: Pulmonary effort is normal. No respiratory distress.      Breath sounds: Normal breath sounds. No wheezing, rhonchi or rales.   Musculoskeletal:         General: Swelling and tenderness present.      Comments: Tender left wrist with increased warmth, redness, having reoccurred after cessation with prednisone 1 week ago   Neurological:      General: No focal deficit present.      Mental Status: He is alert and oriented to person, place, and time.   Psychiatric:         Mood  and Affect: Mood normal.         Behavior: Behavior normal.         Thought Content: Thought content normal.         Judgment: Judgment normal.         Magdiel Bowens MD  07/20/2023

## 2023-07-31 ENCOUNTER — TELEPHONE (OUTPATIENT)
Dept: FAMILY MEDICINE CLINIC | Facility: CLINIC | Age: 78
End: 2023-07-31

## 2023-07-31 NOTE — TELEPHONE ENCOUNTER
Caller: James Loaiza    Relationship to patient: Self    Best call back number: [phone]    Patient is needing: PT HAD APPT WITH PCP ON 7/27/23 AND WAS TOLD TO CALL IN WITH UPDATE. PT FEELS LIKE THE GOUT IS COMPLETELY GONE NOW.

## 2023-08-08 ENCOUNTER — TRANSCRIBE ORDERS (OUTPATIENT)
Dept: ADMINISTRATIVE | Facility: HOSPITAL | Age: 78
End: 2023-08-08
Payer: MEDICARE

## 2023-08-08 DIAGNOSIS — M51.26 RLD (RUPTURED LUMBAR DISC): Primary | ICD-10-CM

## 2023-08-09 ENCOUNTER — TELEPHONE (OUTPATIENT)
Dept: FAMILY MEDICINE CLINIC | Facility: CLINIC | Age: 78
End: 2023-08-09
Payer: MEDICARE

## 2023-08-09 RX ORDER — ALLOPURINOL 100 MG/1
TABLET ORAL
Qty: 90 TABLET | Refills: 4 | Status: SHIPPED | OUTPATIENT
Start: 2023-08-09

## 2023-08-09 NOTE — TELEPHONE ENCOUNTER
SIOBHAN STILL WITH [facility] IS CALLING TO ADVISE DR GRIMES SAID WHEN HE WAS TALKING TO THE PT THAT YOU NEEDED GUIDANCE ON DOSING HIS ALLOPURINOL.  SIOBHAN SAID ALLOPURINOL 100MG 1 QD TIMES ONE WEEK THEN 200MG 1 QD TIMES ONE WEEK THEN 300M 1 QD.  PER DR CORNELIO LOYA#[id number]

## 2023-08-11 ENCOUNTER — OFFICE VISIT (OUTPATIENT)
Dept: ORTHOPEDIC SURGERY | Facility: CLINIC | Age: 78
End: 2023-08-11
Payer: MEDICARE

## 2023-08-11 ENCOUNTER — TELEPHONE (OUTPATIENT)
Dept: FAMILY MEDICINE CLINIC | Facility: CLINIC | Age: 78
End: 2023-08-11

## 2023-08-11 ENCOUNTER — TELEPHONE (OUTPATIENT)
Dept: FAMILY MEDICINE CLINIC | Facility: CLINIC | Age: 78
End: 2023-08-11
Payer: MEDICARE

## 2023-08-11 VITALS — HEIGHT: 74 IN | WEIGHT: 315 LBS | TEMPERATURE: 98.6 F | BODY MASS INDEX: 40.43 KG/M2

## 2023-08-11 DIAGNOSIS — M54.2 NECK AND SHOULDER PAIN: Primary | ICD-10-CM

## 2023-08-11 DIAGNOSIS — M25.519 NECK AND SHOULDER PAIN: Primary | ICD-10-CM

## 2023-08-11 NOTE — TELEPHONE ENCOUNTER
Caller: James Loaiza    Relationship: Self    Best call back number: [phone]     What medication are you requesting: GABAPENTIN    What are your current symptoms: FOR SLEEPING. [facility], HE HAS BEEN WAITING 8 DAYS FOR THEM TO GIVE HIM THIS MEDICATION.     How long have you been experiencing symptoms: HE HAS BEEN OUT SINCE 8/3/2023    If a prescription is needed, what is your preferred pharmacy and phone number:    [facility] - [place], [place] - [address] - [phone]  - [phone] FX

## 2023-08-11 NOTE — PROGRESS NOTES
James Loaiza : 1945 MRN: [id number] DATE: 2023    CC: Follow-up s/p left shoulder arthroscopy    HPI: Pt. returns to clinic today for follow up.  He reports that the shoulder seems to be doing okay.  His therapist released him.  He is still having a lot of pain at night.  He reports burning, throbbing pain in the upper arm if he sleeps on his left side for more than a few minutes.  He is also starting to have pain into the back of his right shoulder as well.  Denies numbness, tingling or weakness    Vitals:    23 1431   Temp: 98.6 øF (37 øC)       Exam:    General:  Awake and alert.  No acute distress.    Neck: Skin is benign.  No tenderness or step-offs.  Motion is limited and uncomfortable.  Spurling's maneuver does seem to reproduce shoulder pain bilaterally.    Left shoulder:  Wounds are healed.  Arm and forearm soft.  Motion is full.  Good strength with resistive testing of the rotator cuff and deltoid.  Negative Neer, Santos, speeds, Kunkle's and active compression maneuvers.  Good motor and sensory function in the hand and wrist.  Palpable radial pulse.    Imaging   none.  I did review his previous C-spine x-rays and this showed advanced multilevel degenerative disc disease.    Impression: 1.  Follow-up now approximately 5 months s/p left shoulder biceps tenodesis, SAD, DCE 2.  Cervical spondyloarthropathy and suspected spinal stenosis    Plan:    I think his shoulder is doing fine.  I am concerned that his new complaints are coming from his neck.  He has not had a recent MRI of his neck.  He may potentially be a candidate for epidurals.  I am going to send him for an MRI of the cervical spine.  I told him I will call him once I see the results.    Kevin Serrano MD    2023

## 2023-08-11 NOTE — TELEPHONE ENCOUNTER
Caller: James Loaiza    Relationship: Self    Best call back number: [phone]     What was the call regarding: THE PATIENT HAS BEEN WAITING FOR MONTHS FOR COMMUNICATION REGARDING A REFERRAL FOR THE VASCULAR ISSUES IN HIS LEGS. HE REMEMBERS DR. HEARD MENTIONING A PROVIDER AT [facility], SINCE THE PATIENT IS NOT HAPPY WITH HIS CURRENT PROVIDER.

## 2023-08-11 NOTE — TELEPHONE ENCOUNTER
Caller: James Loaiza    Relationship: Self    Best call back number: [phone]     What was the call regarding: THE PATIENT DISCUSSED A PRESCRIPTION OF ALLOPURINOL WITH DR. HEARD FOR HIS GOUT.  DR. HEARD DID NOT WANT TO PRESCRIBE IT UNTIL SPEAKING WITH THE PATIENT'S NEPHROLOGIST, DR. NIKI GRIMES, ABOUT HIS KIDNEYS  THE PATIENT HAS BEEN WAITING FOR 4 WEEKS AND HAS HAD NO COMMUNICATION.

## 2023-08-14 DIAGNOSIS — G89.29 CHRONIC LOW BACK PAIN, UNSPECIFIED BACK PAIN LATERALITY, UNSPECIFIED WHETHER SCIATICA PRESENT: ICD-10-CM

## 2023-08-14 DIAGNOSIS — M54.50 CHRONIC LOW BACK PAIN, UNSPECIFIED BACK PAIN LATERALITY, UNSPECIFIED WHETHER SCIATICA PRESENT: ICD-10-CM

## 2023-08-14 RX ORDER — GABAPENTIN 300 MG/1
CAPSULE ORAL
Qty: 90 CAPSULE | Refills: 0 | Status: SHIPPED | OUTPATIENT
Start: 2023-08-14

## 2023-08-14 NOTE — TELEPHONE ENCOUNTER
Will refill at this time but he will need an office visit sometime in September for further refills.

## 2023-08-15 NOTE — PROGRESS NOTES
07/27/2023    Assessment & Plan     This 77-year-old patient presents today for follow-up. he has had several episodes of acute gouty arthritis.  his last uric acid level was 7.9 back on 2/15.  He has responded well to Medrol Dosepaks for these acute episodes but I have discussed with him the importance of helping to prevent these in the future instead of just treating them.  We had a detailed discussion regarding the use of Medrol Dosepaks and I have indicated to them that I would like to discuss starting him on allopurinol with his nephrologist Dr. Beth. Patient's creatinine remains elevated at 2.39 with a EGFR that 27.6.  The patient just finished his last Medrol Dosepak 2 days ago and noticed a reoccurrence of the redness, tenderness and increased warmth in his feet.  The hallux on the right appears to be inflamed on inspection and palpation.  I feel this is another manifestation of acute gouty arthritis.    Total time of the visit and discussion was 18 minutes.    Patient's blood pressure is well controlled today at 132/70 in the left arm sitting position standard cuff.  His weight is 315 pounds with a BMI of 40.4 this is been relatively stable over the past several weeks.    Diagnoses and all orders for this visit:    1. Gouty arthritis (Primary)    2. Dependent edema    3. Primary hypertension    Other orders  -     methylPREDNISolone (MEDROL) 4 MG dose pack; Take as directed on package instructions. (Patient not taking: Reported on 8/11/2023)  Dispense: 21 tablet; Refill: 1      Plan:  1.)  We will consider starting on allopurinol after discussing with Dr. Beth, nephrology  2.)  Follow-up in the next several weeks       CC: No chief complaint on file.  .        HPI  History of Present Illness     Subjective   James Loaiza is a 77 y.o. male.      The following portions of the patient's history were reviewed and updated as appropriate: allergies, current medications, past family history, past medical  history, past social history, past surgical history, and problem list.    Problem List  Patient Active Problem List   Diagnosis    Lumbar herniated disc L3-L5 3/16/16 Open MRI    Aortic valve insufficiency    Coronary arteriosclerosis in native artery    Edema    Dyspnea on exertion    Fatigue    Left ventricular hypertrophy    Obstructive sleep apnea syndrome    Arthritis of left hip    Morbid obesity with BMI of 40.0-44.9, adult    Intermittent dysphagia    Hyperlipidemia 1999    BPH (benign prostatic hypertrophy)    Left cervical radiculopathy    Rotator cuff tear, left    Plantar fasciitis    Hypogonadism male    Vitamin D deficiency disease    Renal cyst, left    Preventative health care    Medication management    Chronic low back pain    Lumbar spondylolysis    Headache    Chronic pain syndrome    History of pulmonary embolism    Chronic obstructive pulmonary disease    Osteoarthritis of right shoulder    Chronic anticoagulation    Cervical stenosis of spine    Bilateral occipital neuralgia    Chronic pain of both shoulders    Cervical facet joint syndrome    Open wound of right lower leg    REJI (acute kidney injury)    Constipation    Venous insufficiency    Analgesic nephropathy    Anemia    Diastolic dysfunction    Hypocalcemia    Hypertension    Localized swelling, mass and lump, lower limb, bilateral    Stage 3b chronic kidney disease    Ascending aorta dilation    Bilateral occipital neuralgia       Past Medical History  Past Medical History:   Diagnosis Date    Abnormal EKG     referring to cardiology    Anxiety     history    Arthritis     Ascending aorta dilation     Back pain     worsening    BPH (benign prostatic hyperplasia)     BPH/Nocturia/ Urinary Frequency    Breast nodule     right    Cardiomegaly     Cardiomegaly/ SOB with exertion    Cellulitis of left lower extremity     history    Circulation problem     Constipation     COPD  "(chronic obstructive pulmonary disease)     Deviated septum     DJD (degenerative joint disease)     DJD Knees    DVT (deep venous thrombosis)     Dysphagia     solids and liquids - resolved with normal studies    Encounter for annual health examination 11/14/2013    Annual Health Assessment    Enlarged prostate     Fatigue     Extreme fatigue    GERD (gastroesophageal reflux disease)     Hyperlipidemia 1999    Hypertension 1999    followed Dr. Marcelo    Hypogonadism male     Left hip pain     and DJD    Left leg pain     Low back pain     Moist mucous membranes of ear, nose, and throat     \"Mucous in throat\"    Morbid obesity     (BMI-41.2za)    Muscle cramping     Muscle spasm     Muscle spasms    Neck arthritis     Neck muscle spasm     Neck muscle spasm/Cervical strain    Neck pain     Obesity     Plantar fasciitis     Prostatitis     recurrent    Psoriasis     Pulmonary embolus 01/2019    on Xarelltoypseerlipidemia    Radiculopathy     Radiculopathy / Neuropathy left ar    Renal insufficiency     followed by Dr. Mendes    Seborrhea     Shortness of breath     Sleep apnea     Obstructive Sleep apnea worsening; uses CPAP    Urinary frequency     Vascular disorder     Vertigo     Weight gain     Wellness examination 10/23/2014    Annual Wellness Visit       Surgical History  Past Surgical History:   Procedure Laterality Date    APPENDECTOMY  1965    CARDIAC CATHETERIZATION  07/29/2010    [facility]    COLONOSCOPY  01/05/2010    COLONOSCOPY  10/01/2001    KIDNEY SURGERY  07/27/2023    [facility]    NASAL SEPTUM SURGERY  1999    RADIOFREQUENCY ABLATION Bilateral 03/02/2021    Procedure: RADIOFREQUENCY ABLATION NERVES---bilateral cervical 2-cervical3;  Surgeon: Edu Tineo MD;  Location: [facility] [place] MAIN OR;  Service: Pain Management;  Laterality: Bilateral;    RADIOFREQUENCY ABLATION Bilateral 12/02/2021    Procedure: RADIOFREQUENCY ABLATION " NERVES--bilateral cervical2-3;  Surgeon: Edu Tieno MD;  Location: [facility];  Service: Pain Management;  Laterality: Bilateral;    RADIOFREQUENCY ABLATION Bilateral 10/28/2022    Procedure: CERVICAL RADIOFREQUENCY LESIONING BILATERAL C2-3;  Surgeon: Edu Tineo MD;  Location: [facility];  Service: Pain Management;  Laterality: Bilateral;    SHOULDER ARTHROSCOPY Left 04/18/2023    Procedure: SHOULDER ARTHROSCOPY WITH SUBACROMIAL DECOMPRESSION, DISTAL CLAVICLE EXCISION,  BICEPS TENODESIS;  Surgeon: Kevin Serrano MD;  Location:  [facility];  Service: Orthopedics;  Laterality: Left;    TURP / TRANSURETHRAL INCISION / DRAINAGE PROSTATE  10/23/2015    [facility] Castro       Family History  Family History   Problem Relation Age of Onset    Arthritis Mother     Heart disease Mother     Hypertension Mother     Heart attack Father     Heart disease Father     Hypertension Father     Arthritis Sister     Multiple sclerosis Sister     Alcohol abuse Brother     Diabetes Son     Malig Hyperthermia Neg Hx        Social History  Social History    Tobacco Use      Smoking status: Never      Smokeless tobacco: Never      Tobacco comments: caffiene use: soda and coffee       Is the Patient a current tobacco user? No    Allergies  No Known Allergies    Current Medications    Current Outpatient Medications:     acetaminophen (TYLENOL) 325 MG tablet, Take 2 tablets by mouth 2 (Two) Times a Day As Needed for Mild Pain., Disp: 60 tablet, Rfl: 0    albuterol sulfate  (90 Base) MCG/ACT inhaler, , Disp: , Rfl:     atorvastatin (LIPITOR) 40 MG tablet, TAKE ONE TABLET BY MOUTH DAILY, Disp: 90 tablet, Rfl: 1    atorvastatin (LIPITOR) 40 MG tablet, , Disp: , Rfl:     docusate sodium (COLACE) 100 MG capsule, Take 1 capsule by mouth 2 (Two) Times a Day., Disp: 60 capsule, Rfl: 0    ferrous sulfate 325 (65 FE) MG tablet, Take 1 tablet by mouth 1 (One) Time Per Week. (Patient not taking:  Reported on 8/11/2023), Disp: , Rfl:     furosemide (Lasix) 40 MG tablet, Take 1 tablet by mouth 2 (Two) Times a Day. (Patient taking differently: Take 1 tablet by mouth Every Morning.), Disp: 40 tablet, Rfl: 1    methylPREDNISolone (MEDROL) 4 MG dose pack, Take as directed on package instructions. (Patient not taking: Reported on 8/11/2023), Disp: 21 tablet, Rfl: 1    metoprolol succinate XL (TOPROL-XL) 25 MG 24 hr tablet, Take 1 tablet by mouth Daily., Disp: 90 tablet, Rfl: 0    NIFEdipine XL (PROCARDIA XL) 60 MG 24 hr tablet, TAKE ONE TABLET BY MOUTH DAILY, Disp: 90 tablet, Rfl: 2    ondansetron ODT (ZOFRAN-ODT) 4 MG disintegrating tablet, Place 1 tablet on the tongue 4 (Four) Times a Day As Needed for Nausea or Vomiting. (Patient not taking: Reported on 8/11/2023), Disp: 15 tablet, Rfl: 0    pantoprazole (PROTONIX) 20 MG EC tablet, TAKE ONE TABLET BY MOUTH DAILY (Patient taking differently: Take 1 tablet by mouth Every Morning.), Disp: 30 tablet, Rfl: 3    pantoprazole (PROTONIX) 20 MG EC tablet, , Disp: , Rfl:     ramipril (ALTACE) 10 MG capsule, Take 1 capsule by mouth Every Morning., Disp: 90 capsule, Rfl: 3    tamsulosin (FLOMAX) 0.4 MG capsule 24 hr capsule, Take 2 capsules by mouth Every Night., Disp: , Rfl:     tiotropium bromide-olodaterol (STIOLTO RESPIMAT) 2.5-2.5 MCG/ACT aerosol solution inhaler, Inhale 2 puffs Daily., Disp: , Rfl:     triamcinolone (KENALOG) 0.1 % lotion, Apply  topically to the appropriate area as directed 3 (Three) Times a Day., Disp: 120 mL, Rfl: 1    allopurinol (Zyloprim) 100 MG tablet, Allopurinol 100 mg 1 daily x 1 week then 2 tablets daily x 1 week then 3 tablets daily., Disp: 90 tablet, Rfl: 4    apixaban (ELIQUIS) 5 MG tablet tablet, Take 1 tablet by mouth Every 12 (Twelve) Hours., Disp: , Rfl:     Aspirin (Vazalore) 81 MG capsule, Take 1 tablet by mouth Daily. To stop 5-7 days before surgery, Disp: , Rfl:     gabapentin (NEURONTIN) 300 MG capsule, TAKE ONE  CAPSULE BY MOUTH THREE TIMES A DAY, Disp: 90 capsule, Rfl: 0    HYDROcodone-acetaminophen (NORCO) 5-325 MG per tablet, Take 1 tablet by mouth Every 8 (Eight) Hours As Needed for Severe Pain., Disp: 10 tablet, Rfl: 0    NIFEdipine CC (ADALAT CC) 60 MG 24 hr tablet, , Disp: , Rfl:     ramipril (ALTACE) 10 MG capsule, , Disp: , Rfl:     tamsulosin (FLOMAX) 0.4 MG capsule 24 hr capsule, , Disp: , Rfl:     tiotropium bromide-olodaterol (Stiolto Respimat) 2.5-2.5 MCG/ACT aerosol solution inhaler, , Disp: , Rfl:      Review of System  Review of Systems   Constitutional:  Negative for chills and fever.   Respiratory:  Negative for cough and shortness of breath.    Cardiovascular:  Negative for chest pain and palpitations.   Gastrointestinal:  Negative for constipation, diarrhea, nausea and vomiting.   Musculoskeletal:         Right hallux pain and increased warmth   Neurological:  Negative for dizziness and headache.   I have reviewed and confirmed the accuracy of the ROS as documented by the MA/LPN/RN Magdiel Bowens MD    Vitals:    07/27/23 1539   BP: 132/70   Pulse: 65   Resp: 18   SpO2: 97%     Body mass index is 40.43 kg/mý.    Objective     Physical Exam  Physical Exam  Vitals and nursing note reviewed.   Constitutional:       General: He is not in acute distress.     Appearance: Normal appearance. He is well-developed.   HENT:      Head: Normocephalic and atraumatic.   Eyes:      Conjunctiva/sclera: Conjunctivae normal.   Cardiovascular:      Rate and Rhythm: Normal rate and regular rhythm.      Heart sounds: Normal heart sounds.   Pulmonary:      Effort: Pulmonary effort is normal. No respiratory distress.      Breath sounds: Normal breath sounds. No wheezing, rhonchi or rales.   Abdominal:      General: Bowel sounds are normal.      Palpations: Abdomen is soft.   Musculoskeletal:         General: Normal range of motion.      Cervical back: Normal range of motion and neck supple.      Comments: Right hallux  warmth, tenderness and redness   Skin:     General: Skin is warm and dry.   Neurological:      General: No focal deficit present.      Mental Status: He is alert and oriented to person, place, and time.   Psychiatric:         Mood and Affect: Mood normal.         Behavior: Behavior normal.         Thought Content: Thought content normal.         Judgment: Judgment normal.         Magdiel Bowens MD  07/27/2023

## 2023-08-22 ENCOUNTER — OFFICE VISIT (OUTPATIENT)
Dept: FAMILY MEDICINE CLINIC | Facility: CLINIC | Age: 78
End: 2023-08-22
Payer: MEDICARE

## 2023-08-22 VITALS
DIASTOLIC BLOOD PRESSURE: 78 MMHG | SYSTOLIC BLOOD PRESSURE: 140 MMHG | BODY MASS INDEX: 40.43 KG/M2 | WEIGHT: 315 LBS | HEIGHT: 74 IN

## 2023-08-22 DIAGNOSIS — Z23 NEED FOR COVID-19 VACCINE: ICD-10-CM

## 2023-08-22 DIAGNOSIS — M10.9 GOUTY ARTHRITIS: Primary | ICD-10-CM

## 2023-08-22 DIAGNOSIS — I10 PRIMARY HYPERTENSION: ICD-10-CM

## 2023-08-22 PROCEDURE — 99213 OFFICE O/P EST LOW 20 MIN: CPT | Performed by: INTERNAL MEDICINE

## 2023-08-22 PROCEDURE — 91312 COVID-19 (PFIZER) BIVALENT 12+YRS: CPT | Performed by: INTERNAL MEDICINE

## 2023-08-22 PROCEDURE — 3077F SYST BP >= 140 MM HG: CPT | Performed by: INTERNAL MEDICINE

## 2023-08-22 PROCEDURE — 3078F DIAST BP <80 MM HG: CPT | Performed by: INTERNAL MEDICINE

## 2023-08-22 PROCEDURE — 0124A COVID-19 (PFIZER) BIVALENT 12+YRS: CPT | Performed by: INTERNAL MEDICINE

## 2023-08-22 RX ORDER — ALLOPURINOL 100 MG/1
TABLET ORAL
Qty: 90 TABLET | Refills: 4 | Status: SHIPPED | OUTPATIENT
Start: 2023-08-22

## 2023-08-22 NOTE — PROGRESS NOTES
08/22/2023    Assessment & Plan   This 77-year-old patient presents today for follow-up of gouty arthritis.  With his last visit we started him on allopurinol he [facility] but if there is appears to been a problem and that he still has not received his medication.  He relates he has not had any acute gout arthritis episodes in the interim.    We did discuss with him the increasing incidence of COVID-19 variant and the need to be fully immunized.  He is behind 1 dose and agrees to get vaccinated today.    Patient's blood pressure 140/78 in the left arm sitting position large cuff.  His weight is unchanged from the past at 315.  We will see him back for follow-up and repeat his uric acid level after he has been on the allopurinol.  For now he appears to be stable.    We will call in another prescription for the allopurinol at his local pharmacy rather than [facility].    Diagnoses and all orders for this visit:    1. Gouty arthritis (Primary)  -     Uric acid; Future    2. Need for COVID-19 vaccine  -     COVID-19 (Pfizer) Bivalent 12+yrs      Plan:  1.)  Follow-up in 4 to 6 weeks with a Medicare wellness review and a uric acid level after having started the allopurinol.       CC: gouty arthritis (F/U---no other issues)  .        HPI  History of Present Illness     Subjective   James Loaiza is a 77 y.o. male.      The following portions of the patient's history were reviewed and updated as appropriate: allergies, current medications, past family history, past medical history, past social history, past surgical history, and problem list.    Problem List  Patient Active Problem List   Diagnosis    Lumbar herniated disc L3-L5 3/16/16 Open MRI    Aortic valve insufficiency    Coronary arteriosclerosis in native artery    Edema    Dyspnea on exertion    Fatigue    Left ventricular hypertrophy    Obstructive sleep apnea syndrome    Arthritis of left hip    Morbid obesity with BMI of  40.0-44.9, adult    Intermittent dysphagia    Hyperlipidemia 1999    BPH (benign prostatic hypertrophy)    Left cervical radiculopathy    Rotator cuff tear, left    Plantar fasciitis    Hypogonadism male    Vitamin D deficiency disease    Renal cyst, left    Preventative health care    Medication management    Chronic low back pain    Lumbar spondylolysis    Headache    Chronic pain syndrome    History of pulmonary embolism    Chronic obstructive pulmonary disease    Osteoarthritis of right shoulder    Chronic anticoagulation    Cervical stenosis of spine    Bilateral occipital neuralgia    Chronic pain of both shoulders    Cervical facet joint syndrome    Open wound of right lower leg    REJI (acute kidney injury)    Constipation    Venous insufficiency    Analgesic nephropathy    Anemia    Diastolic dysfunction    Hypocalcemia    Hypertension    Localized swelling, mass and lump, lower limb, bilateral    Stage 3b chronic kidney disease    Ascending aorta dilation    Bilateral occipital neuralgia       Past Medical History  Past Medical History:   Diagnosis Date    Abnormal EKG     referring to cardiology    Anxiety     history    Arthritis     Ascending aorta dilation     Back pain     worsening    BPH (benign prostatic hyperplasia)     BPH/Nocturia/ Urinary Frequency    Breast nodule     right    Cardiomegaly     Cardiomegaly/ SOB with exertion    Cellulitis of left lower extremity     history    Circulation problem     Constipation     COPD (chronic obstructive pulmonary disease)     Deviated septum     DJD (degenerative joint disease)     DJD Knees    DVT (deep venous thrombosis)     Dysphagia     solids and liquids - resolved with normal studies    Encounter for annual health examination 11/14/2013    Annual Health Assessment    Enlarged prostate     Fatigue     Extreme fatigue    GERD (gastroesophageal reflux disease)     Hyperlipidemia 1999    Hypertension 1999    followed Dr. Marcelo    Hypogonadism male      "Left hip pain     and DJD    Left leg pain     Low back pain     Moist mucous membranes of ear, nose, and throat     \"Mucous in throat\"    Morbid obesity     (BMI-41.2za)    Muscle cramping     Muscle spasm     Muscle spasms    Neck arthritis     Neck muscle spasm     Neck muscle spasm/Cervical strain    Neck pain     Obesity     Plantar fasciitis     Prostatitis     recurrent    Psoriasis     Pulmonary embolus 01/2019    on Xarelltoypseerlipidemia    Radiculopathy     Radiculopathy / Neuropathy left ar    Renal insufficiency     followed by Dr. Mendes    Seborrhea     Shortness of breath     Sleep apnea     Obstructive Sleep apnea worsening; uses CPAP    Urinary frequency     Vascular disorder     Vertigo     Weight gain     Wellness examination 10/23/2014    Annual Wellness Visit       Surgical History  Past Surgical History:   Procedure Laterality Date    APPENDECTOMY  1965    CARDIAC CATHETERIZATION  07/29/2010    [facility]    COLONOSCOPY  01/05/2010    COLONOSCOPY  10/01/2001    KIDNEY SURGERY  07/27/2023    [facility]    NASAL SEPTUM SURGERY  1999    RADIOFREQUENCY ABLATION Bilateral 03/02/2021    Procedure: RADIOFREQUENCY ABLATION NERVES---bilateral cervical 2-cervical3;  Surgeon: Edu Tineo MD;  Location: [facility] MAIN OR;  Service: Pain Management;  Laterality: Bilateral;    RADIOFREQUENCY ABLATION Bilateral 12/02/2021    Procedure: RADIOFREQUENCY ABLATION NERVES--bilateral cervical2-3;  Surgeon: Edu Tineo MD;  Location: [facility];  Service: Pain Management;  Laterality: Bilateral;    RADIOFREQUENCY ABLATION Bilateral 10/28/2022    Procedure: CERVICAL RADIOFREQUENCY LESIONING BILATERAL C2-3;  Surgeon: Edu Tineo MD;  Location: [facility];  Service: Pain Management;  Laterality: Bilateral;    SHOULDER ARTHROSCOPY Left 04/18/2023    Procedure: SHOULDER ARTHROSCOPY WITH SUBACROMIAL DECOMPRESSION, DISTAL CLAVICLE EXCISION,  BICEPS TENODESIS;  Surgeon: Maggie, " Kevin HARPER MD;  Location: [facility] [place];  Service: Orthopedics;  Laterality: Left;    TURP / TRANSURETHRAL INCISION / DRAINAGE PROSTATE  10/23/2015    [facility] Castro       Family History  Family History   Problem Relation Age of Onset    Arthritis Mother     Heart disease Mother     Hypertension Mother     Heart attack Father     Heart disease Father     Hypertension Father     Arthritis Sister     Multiple sclerosis Sister     Alcohol abuse Brother     Diabetes Son     Malig Hyperthermia Neg Hx        Social History  Social History    Tobacco Use      Smoking status: Never      Smokeless tobacco: Never      Tobacco comments: caffiene use: soda and coffee       Is the Patient a current tobacco user? No    Allergies  No Known Allergies    Current Medications    Current Outpatient Medications:     acetaminophen (TYLENOL) 325 MG tablet, Take 2 tablets by mouth 2 (Two) Times a Day As Needed for Mild Pain., Disp: 60 tablet, Rfl: 0    albuterol sulfate  (90 Base) MCG/ACT inhaler, , Disp: , Rfl:     apixaban (ELIQUIS) 5 MG tablet tablet, Take 1 tablet by mouth Every 12 (Twelve) Hours., Disp: , Rfl:     Aspirin (Vazalore) 81 MG capsule, Take 1 tablet by mouth Daily. To stop 5-7 days before surgery, Disp: , Rfl:     atorvastatin (LIPITOR) 40 MG tablet, TAKE ONE TABLET BY MOUTH DAILY, Disp: 90 tablet, Rfl: 1    atorvastatin (LIPITOR) 40 MG tablet, , Disp: , Rfl:     docusate sodium (COLACE) 100 MG capsule, Take 1 capsule by mouth 2 (Two) Times a Day., Disp: 60 capsule, Rfl: 0    furosemide (Lasix) 40 MG tablet, Take 1 tablet by mouth 2 (Two) Times a Day. (Patient taking differently: Take 1 tablet by mouth Every Morning.), Disp: 40 tablet, Rfl: 1    gabapentin (NEURONTIN) 300 MG capsule, TAKE ONE CAPSULE BY MOUTH THREE TIMES A DAY, Disp: 90 capsule, Rfl: 0    HYDROcodone-acetaminophen (NORCO) 5-325 MG per tablet, Take 1 tablet by mouth Every 8 (Eight) Hours As Needed for Severe Pain., Disp: 10 tablet, Rfl: 0     metoprolol succinate XL (TOPROL-XL) 25 MG 24 hr tablet, Take 1 tablet by mouth Daily., Disp: 90 tablet, Rfl: 0    NIFEdipine CC (ADALAT CC) 60 MG 24 hr tablet, , Disp: , Rfl:     NIFEdipine XL (PROCARDIA XL) 60 MG 24 hr tablet, TAKE ONE TABLET BY MOUTH DAILY, Disp: 90 tablet, Rfl: 2    pantoprazole (PROTONIX) 20 MG EC tablet, TAKE ONE TABLET BY MOUTH DAILY (Patient taking differently: Take 1 tablet by mouth Every Morning.), Disp: 30 tablet, Rfl: 3    pantoprazole (PROTONIX) 20 MG EC tablet, , Disp: , Rfl:     ramipril (ALTACE) 10 MG capsule, , Disp: , Rfl:     ramipril (ALTACE) 10 MG capsule, Take 1 capsule by mouth Every Morning., Disp: 90 capsule, Rfl: 3    tamsulosin (FLOMAX) 0.4 MG capsule 24 hr capsule, Take 2 capsules by mouth Every Night., Disp: , Rfl:     tamsulosin (FLOMAX) 0.4 MG capsule 24 hr capsule, , Disp: , Rfl:     tiotropium bromide-olodaterol (STIOLTO RESPIMAT) 2.5-2.5 MCG/ACT aerosol solution inhaler, Inhale 2 puffs Daily., Disp: , Rfl:     tiotropium bromide-olodaterol (Stiolto Respimat) 2.5-2.5 MCG/ACT aerosol solution inhaler, , Disp: , Rfl:     triamcinolone (KENALOG) 0.1 % lotion, Apply  topically to the appropriate area as directed 3 (Three) Times a Day., Disp: 120 mL, Rfl: 1    allopurinol (Zyloprim) 100 MG tablet, Allopurinol 100 mg 1 daily x 1 week then 2 tablets daily x 1 week then 3 tablets daily., Disp: 90 tablet, Rfl: 4    ferrous sulfate 325 (65 FE) MG tablet, Take 1 tablet by mouth 1 (One) Time Per Week. (Patient not taking: Reported on 8/11/2023), Disp: , Rfl:     methylPREDNISolone (MEDROL) 4 MG dose pack, Take as directed on package instructions. (Patient not taking: Reported on 8/11/2023), Disp: 21 tablet, Rfl: 1    ondansetron ODT (ZOFRAN-ODT) 4 MG disintegrating tablet, Place 1 tablet on the tongue 4 (Four) Times a Day As Needed for Nausea or Vomiting. (Patient not taking: Reported on 8/11/2023), Disp: 15 tablet, Rfl: 0     Review of System  Review of Systems  I have reviewed  and confirmed the accuracy of the ROS as documented by the MA/LPN/RN Magdiel Bowens MD    Vitals:    08/22/23 0955   BP: 140/78     Body mass index is 40.44 kg/mý.    Objective     Physical Exam  Physical Exam        Magdiel Bowens MD  08/22/2023

## 2023-09-14 DIAGNOSIS — I48.91 ATRIAL FIBRILLATION WITH RVR: ICD-10-CM

## 2023-09-14 RX ORDER — METOPROLOL SUCCINATE 25 MG/1
25 TABLET, EXTENDED RELEASE ORAL DAILY
Qty: 90 TABLET | Refills: 1 | Status: SHIPPED | OUTPATIENT
Start: 2023-09-14

## 2023-10-19 ENCOUNTER — OFFICE VISIT (OUTPATIENT)
Dept: FAMILY MEDICINE CLINIC | Facility: CLINIC | Age: 78
End: 2023-10-19
Payer: MEDICARE

## 2023-10-19 VITALS
DIASTOLIC BLOOD PRESSURE: 88 MMHG | WEIGHT: 313 LBS | HEIGHT: 74 IN | BODY MASS INDEX: 40.17 KG/M2 | SYSTOLIC BLOOD PRESSURE: 126 MMHG

## 2023-10-19 DIAGNOSIS — M25.551 HIP PAIN, ACUTE, RIGHT: Primary | ICD-10-CM

## 2023-10-19 DIAGNOSIS — I10 PRIMARY HYPERTENSION: ICD-10-CM

## 2023-10-19 DIAGNOSIS — M10.9 GOUTY ARTHRITIS: ICD-10-CM

## 2023-10-19 NOTE — PROGRESS NOTES
10/19/2023    Assessment & Plan   This 78-year-old presents at this time for follow-up of gouty arthritis.  Is been long bothered by this problem but over the past several months he has not had any acute episodes.  He attributes this to more strictly following his diet and taking his medication.  He was started on allopurinol several months ago at 300 mg daily.    Patient also complains of pain in his right hip x3 to 4 weeks.  He denies any recent trauma or falls.  He also specifically denies any motor vehicle accidents.  He relates the pain feels like there is a golf ball or something on his right hip it disturbs him when he has been sitting on the hip for more than an hour or so.  He relates that there is a particular chair that he sit/stand as does not cause this problem.  It goes away when he is up walking around on it.  Examination reveals a no abnormalities.  Range of motion    Patient's blood pressure shows excellent control at 126/88 in the left arm sitting position standard cuff.  He is lost 2 pounds from his last visit about 3 months ago.    Patient also complains of needing an antibiotic for his teeth.  He relates that his dentist Dr. Adis Swanson at [facility] [phone] wants him to get antibiotics specifically Keflex 500 mg for me.  Patient does not know why.  We will give him a call and touch bases with him.      Diagnoses and all orders for this visit:    1. Hip pain, acute, right (Primary)  -     XR Hip With or Without Pelvis 2 - 3 View Right; Future    2. Gouty arthritis    3. Primary hypertension      Plan:  1.)  We encouraged the patient to continue to follow his low purine diet  2.)  I will touch base with his dentist regarding the need for Keflex.  3.)  X-ray of the right hip to evaluate this hip pain.       CC: gouty arthritis (F/U.  Has pain on the right buttock after sitting for a long while.  Dentist wanted him to clear  Keflex 50mg x15 days with you before he starts it.----no other issues)  .         HPI  History of Present Illness     Subjective   James Loaiza is a 78 y.o. male.      The following portions of the patient's history were reviewed and updated as appropriate: allergies, current medications, past family history, past medical history, past social history, past surgical history, and problem list.    Problem List  Patient Active Problem List   Diagnosis    Lumbar herniated disc L3-L5 3/16/16 Open MRI    Aortic valve insufficiency    Coronary arteriosclerosis in native artery    Edema    Dyspnea on exertion    Fatigue    Left ventricular hypertrophy    Obstructive sleep apnea syndrome    Arthritis of left hip    Morbid obesity with BMI of 40.0-44.9, adult    Intermittent dysphagia    Hyperlipidemia 1999    BPH (benign prostatic hypertrophy)    Left cervical radiculopathy    Rotator cuff tear, left    Plantar fasciitis    Hypogonadism male    Vitamin D deficiency disease    Renal cyst, left    Preventative health care    Medication management    Chronic low back pain    Lumbar spondylolysis    Headache    Chronic pain syndrome    History of pulmonary embolism    Chronic obstructive pulmonary disease    Osteoarthritis of right shoulder    Chronic anticoagulation    Cervical stenosis of spine    Bilateral occipital neuralgia    Chronic pain of both shoulders    Cervical facet joint syndrome    Open wound of right lower leg    REJI (acute kidney injury)    Constipation    Venous insufficiency    Analgesic nephropathy    Anemia    Diastolic dysfunction    Hypocalcemia    Hypertension    Localized swelling, mass and lump, lower limb, bilateral    Stage 3b chronic kidney disease    Ascending aorta dilation    Bilateral occipital neuralgia       Past Medical History  Past Medical History:   Diagnosis Date    Abnormal EKG     referring to cardiology    Anxiety     history    Arthritis     Ascending aorta dilation     Back pain     worsening    BPH (benign prostatic hyperplasia)     BPH/Nocturia/ Urinary  "Frequency    Breast nodule     right    Cardiomegaly     Cardiomegaly/ SOB with exertion    Cellulitis of left lower extremity     history    Circulation problem     Constipation     COPD (chronic obstructive pulmonary disease)     Deviated septum     DJD (degenerative joint disease)     DJD Knees    DVT (deep venous thrombosis)     Dysphagia     solids and liquids - resolved with normal studies    Encounter for annual health examination 11/14/2013    Annual Health Assessment    Enlarged prostate     Fatigue     Extreme fatigue    GERD (gastroesophageal reflux disease)     Hyperlipidemia 1999    Hypertension 1999    followed Dr. Marcelo    Hypogonadism male     Left hip pain     and DJD    Left leg pain     Low back pain     Moist mucous membranes of ear, nose, and throat     \"Mucous in throat\"    Morbid obesity     (BMI-41.2za)    Muscle cramping     Muscle spasm     Muscle spasms    Neck arthritis     Neck muscle spasm     Neck muscle spasm/Cervical strain    Neck pain     Obesity     Plantar fasciitis     Prostatitis     recurrent    Psoriasis     Pulmonary embolus 01/2019    on Xarelltoypseerlipidemia    Radiculopathy     Radiculopathy / Neuropathy left ar    Renal insufficiency     followed by Dr. Mendes    Seborrhea     Shortness of breath     Sleep apnea     Obstructive Sleep apnea worsening; uses CPAP    Urinary frequency     Vascular disorder     Vertigo     Weight gain     Wellness examination 10/23/2014    Annual Wellness Visit       Surgical History  Past Surgical History:   Procedure Laterality Date    APPENDECTOMY  1965    CARDIAC CATHETERIZATION  07/29/2010    [facility]    COLONOSCOPY  01/05/2010    COLONOSCOPY  10/01/2001    KIDNEY SURGERY  07/27/2023    [facility]    NASAL SEPTUM SURGERY  1999    RADIOFREQUENCY ABLATION Bilateral 03/02/2021    Procedure: RADIOFREQUENCY ABLATION NERVES---bilateral cervical 2-cervical3;  Surgeon: Edu Tineo MD;  [place] [facility] [place]  " Service: Pain Management;  Laterality: Bilateral;    RADIOFREQUENCY ABLATION Bilateral 12/02/2021    Procedure: RADIOFREQUENCY ABLATION NERVES--bilateral cervical2-3;  Surgeon: Edu Tineo MD;  Location: [facility];  Service: Pain Management;  Laterality: Bilateral;    RADIOFREQUENCY ABLATION Bilateral 10/28/2022    Procedure: CERVICAL RADIOFREQUENCY LESIONING BILATERAL C2-3;  Surgeon: Edu Tineo MD;  Location: [facility];  Service: Pain Management;  Laterality: Bilateral;    SHOULDER ARTHROSCOPY Left 04/18/2023    Procedure: SHOULDER ARTHROSCOPY WITH SUBACROMIAL DECOMPRESSION, DISTAL CLAVICLE EXCISION,  BICEPS TENODESIS;  Surgeon: Kevin Serrano MD;  [place]  [facility] [place] [facility] [place]  Service: Orthopedics;  Laterality: Left;    TURP / TRANSURETHRAL INCISION / DRAINAGE PROSTATE  10/23/2015    Michael Castro       Family History  Family History   Problem Relation Age of Onset    Arthritis Mother     Heart disease Mother     Hypertension Mother     Heart attack Father     Heart disease Father     Hypertension Father     Arthritis Sister     Multiple sclerosis Sister     Alcohol abuse Brother     Diabetes Son     Malig Hyperthermia Neg Hx        Social History  Social History    Tobacco Use      Smoking status: Never      Smokeless tobacco: Never      Tobacco comments: caffiene use: soda and coffee       Is the Patient a current tobacco user? No    Allergies  No Known Allergies    Current Medications    Current Outpatient Medications:     acetaminophen (TYLENOL) 325 MG tablet, Take 2 tablets by mouth 2 (Two) Times a Day As Needed for Mild Pain., Disp: 60 tablet, Rfl: 0    albuterol sulfate  (90 Base) MCG/ACT inhaler, , Disp: , Rfl:     allopurinol (Zyloprim) 100 MG tablet, Allopurinol 100 mg 1 daily x 1 week then 2 tablets daily x 1 week then 3 tablets daily., Disp: 90 tablet, Rfl: 4    apixaban (ELIQUIS) 5 MG tablet tablet, Take 1 tablet by mouth Every 12 (Twelve) Hours., Disp: , Rfl:      Aspirin ([place]) 81 MG capsule, Take 1 tablet by mouth Daily. To stop 5-7 days before surgery, Disp: , Rfl:     atorvastatin (LIPITOR) 40 MG tablet, TAKE ONE TABLET BY MOUTH DAILY, Disp: 90 tablet, Rfl: 1    docusate sodium (COLACE) 100 MG capsule, Take 1 capsule by mouth 2 (Two) Times a Day., Disp: 60 capsule, Rfl: 0    furosemide (Lasix) 40 MG tablet, Take 1 tablet by mouth 2 (Two) Times a Day. (Patient taking differently: Take 1 tablet by mouth Every Morning.), Disp: 40 tablet, Rfl: 1    gabapentin (NEURONTIN) 300 MG capsule, TAKE ONE CAPSULE BY MOUTH THREE TIMES A DAY, Disp: 90 capsule, Rfl: 0    HYDROcodone-acetaminophen (NORCO) 5-325 MG per tablet, Take 1 tablet by mouth Every 8 (Eight) Hours As Needed for Severe Pain., Disp: 10 tablet, Rfl: 0    metoprolol succinate XL (TOPROL-XL) 25 MG 24 hr tablet, TAKE 1 TABLET BY MOUTH DAILY, Disp: 90 tablet, Rfl: 1    NIFEdipine XL (PROCARDIA XL) 60 MG 24 hr tablet, TAKE ONE TABLET BY MOUTH DAILY, Disp: 90 tablet, Rfl: 2    pantoprazole (PROTONIX) 20 MG EC tablet, TAKE ONE TABLET BY MOUTH DAILY (Patient taking differently: Take 1 tablet by mouth Every Morning.), Disp: 30 tablet, Rfl: 3    ramipril (ALTACE) 10 MG capsule, , Disp: , Rfl:     ramipril (ALTACE) 10 MG capsule, Take 1 capsule by mouth Every Morning., Disp: 90 capsule, Rfl: 3    tamsulosin (FLOMAX) 0.4 MG capsule 24 hr capsule, Take 2 capsules by mouth Every Night., Disp: , Rfl:     tiotropium bromide-olodaterol (STIOLTO RESPIMAT) 2.5-2.5 MCG/ACT aerosol solution inhaler, Inhale 2 puffs Daily., Disp: , Rfl:     triamcinolone (KENALOG) 0.1 % lotion, Apply  topically to the appropriate area as directed 3 (Three) Times a Day., Disp: 120 mL, Rfl: 1    atorvastatin (LIPITOR) 40 MG tablet, , Disp: , Rfl:     ferrous sulfate 325 (65 FE) MG tablet, Take 1 tablet by mouth 1 (One) Time Per Week. (Patient not taking: Reported on 8/11/2023), Disp: , Rfl:     methylPREDNISolone (MEDROL) 4 MG dose pack, Take as directed on  package instructions. (Patient not taking: Reported on 8/11/2023), Disp: 21 tablet, Rfl: 1    NIFEdipine CC (ADALAT CC) 60 MG 24 hr tablet, , Disp: , Rfl:     ondansetron ODT (ZOFRAN-ODT) 4 MG disintegrating tablet, Place 1 tablet on the tongue 4 (Four) Times a Day As Needed for Nausea or Vomiting. (Patient not taking: Reported on 8/11/2023), Disp: 15 tablet, Rfl: 0    pantoprazole (PROTONIX) 20 MG EC tablet, , Disp: , Rfl:     tamsulosin (FLOMAX) 0.4 MG capsule 24 hr capsule, , Disp: , Rfl:     tiotropium bromide-olodaterol (Stiolto Respimat) 2.5-2.5 MCG/ACT aerosol solution inhaler, , Disp: , Rfl:      Review of System  Review of Systems   Constitutional:  Negative for chills and fever.   Respiratory:  Negative for cough and shortness of breath.    Cardiovascular:  Negative for chest pain and palpitations.   Gastrointestinal:  Negative for constipation, diarrhea, nausea and vomiting.   Neurological:  Negative for dizziness and headache.     I have reviewed and confirmed the accuracy of the ROS as documented by the MA/LPN/RN Magdiel Bowens MD    Vitals:    10/19/23 1045   BP: 126/88     Body mass index is 40.19 kg/m².    Objective     Physical Exam  Physical Exam  Vitals and nursing note reviewed.   Constitutional:       Appearance: He is well-developed.   HENT:      Head: Normocephalic and atraumatic.   Eyes:      Conjunctiva/sclera: Conjunctivae normal.   Cardiovascular:      Rate and Rhythm: Normal rate and regular rhythm.      Heart sounds: Normal heart sounds.   Pulmonary:      Effort: Pulmonary effort is normal.      Breath sounds: Normal breath sounds.   Abdominal:      General: Bowel sounds are normal.      Palpations: Abdomen is soft.   Musculoskeletal:         General: Normal range of motion.      Cervical back: Normal range of motion and neck supple.   Skin:     General: Skin is warm and dry.   Neurological:      Mental Status: He is alert and oriented to person, place, and time.   Psychiatric:          Behavior: Behavior normal.             Magdiel Bowens MD  10/19/2023

## 2023-10-20 ENCOUNTER — HOSPITAL ENCOUNTER (OUTPATIENT)
Dept: GENERAL RADIOLOGY | Facility: HOSPITAL | Age: 78
Discharge: HOME | End: 2023-10-20
Admitting: INTERNAL MEDICINE
Payer: MEDICARE

## 2023-10-20 DIAGNOSIS — M25.551 HIP PAIN, ACUTE, RIGHT: ICD-10-CM

## 2023-10-20 PROCEDURE — 73502 X-RAY EXAM HIP UNI 2-3 VIEWS: CPT

## 2023-10-30 ENCOUNTER — TELEPHONE (OUTPATIENT)
Dept: FAMILY MEDICINE CLINIC | Facility: CLINIC | Age: 78
End: 2023-10-30

## 2023-10-30 NOTE — TELEPHONE ENCOUNTER
Caller: Josse James    Relationship: Self    Best call back number: [phone]    What test was performed: XRAY     When was the test performed: 10/20/2023    Where was the test performed: [facility]    Additional notes: PATIENT STATES HE IS WANTING TO KNOW IF THE RESULTS ARE BACK YET.     PATIENT IS REQUESTING A CALL BACK.

## 2024-01-17 ENCOUNTER — TELEPHONE (OUTPATIENT)
Dept: PAIN MEDICINE | Facility: CLINIC | Age: 79
End: 2024-01-17
Payer: MEDICARE

## 2024-01-17 NOTE — TELEPHONE ENCOUNTER
Caller: BETHANIE    Relationship: SELF    Best call back number: [phone]    What is the best time to reach you: ASAP    Who are you requesting to speak with (clinical staff, provider,  specific staff member): CLINICAL STAFF        What was the call regarding: [facility] COOPER    Is it okay if the provider responds through MyChart: NO  CALL BACK

## 2024-01-18 NOTE — TELEPHONE ENCOUNTER
Mrs Claudette Blanc ,the  spoke with patient and relay to him that we did not get a referral from the [facility] as yet.

## 2024-01-25 ENCOUNTER — PREP FOR SURGERY (OUTPATIENT)
Dept: SURGERY | Facility: SURGERY CENTER | Age: 79
End: 2024-01-25
Payer: OTHER GOVERNMENT

## 2024-01-25 ENCOUNTER — TELEPHONE (OUTPATIENT)
Dept: PAIN MEDICINE | Facility: CLINIC | Age: 79
End: 2024-01-25

## 2024-01-25 ENCOUNTER — OFFICE VISIT (OUTPATIENT)
Dept: PAIN MEDICINE | Facility: CLINIC | Age: 79
End: 2024-01-25
Payer: OTHER GOVERNMENT

## 2024-01-25 VITALS
TEMPERATURE: 96.9 F | OXYGEN SATURATION: 95 % | HEIGHT: 74 IN | WEIGHT: 308.2 LBS | BODY MASS INDEX: 39.55 KG/M2 | HEART RATE: 55 BPM | SYSTOLIC BLOOD PRESSURE: 142 MMHG | RESPIRATION RATE: 20 BRPM | DIASTOLIC BLOOD PRESSURE: 60 MMHG

## 2024-01-25 DIAGNOSIS — M54.81 BILATERAL OCCIPITAL NEURALGIA: ICD-10-CM

## 2024-01-25 DIAGNOSIS — M25.519 SHOULDER PAIN, UNSPECIFIED CHRONICITY, UNSPECIFIED LATERALITY: Primary | ICD-10-CM

## 2024-01-25 DIAGNOSIS — M25.511 CHRONIC PAIN OF BOTH SHOULDERS: ICD-10-CM

## 2024-01-25 DIAGNOSIS — M25.512 CHRONIC PAIN OF BOTH SHOULDERS: ICD-10-CM

## 2024-01-25 DIAGNOSIS — M47.812 CERVICAL FACET JOINT SYNDROME: ICD-10-CM

## 2024-01-25 DIAGNOSIS — G89.29 CHRONIC LOW BACK PAIN, UNSPECIFIED BACK PAIN LATERALITY, UNSPECIFIED WHETHER SCIATICA PRESENT: Primary | ICD-10-CM

## 2024-01-25 DIAGNOSIS — G89.29 CHRONIC PAIN OF BOTH SHOULDERS: ICD-10-CM

## 2024-01-25 DIAGNOSIS — M54.50 CHRONIC LOW BACK PAIN, UNSPECIFIED BACK PAIN LATERALITY, UNSPECIFIED WHETHER SCIATICA PRESENT: Primary | ICD-10-CM

## 2024-01-25 DIAGNOSIS — M47.812 CERVICAL FACET JOINT SYNDROME: Primary | ICD-10-CM

## 2024-01-25 PROCEDURE — 99214 OFFICE O/P EST MOD 30 MIN: CPT

## 2024-01-25 RX ORDER — SODIUM CHLORIDE 0.9 % (FLUSH) 0.9 %
10 SYRINGE (ML) INJECTION EVERY 12 HOURS SCHEDULED
OUTPATIENT
Start: 2024-01-25

## 2024-01-25 RX ORDER — DICLOFENAC EPOLAMINE 0.01 G/1
SYSTEM TOPICAL 2 TIMES DAILY
Qty: 60 PATCH | COMMUNITY
End: 2024-01-25 | Stop reason: SDUPTHER

## 2024-01-25 RX ORDER — SODIUM CHLORIDE 0.9 % (FLUSH) 0.9 %
10 SYRINGE (ML) INJECTION AS NEEDED
OUTPATIENT
Start: 2024-01-25

## 2024-01-25 RX ORDER — LIDOCAINE 50 MG/G
1 PATCH TOPICAL EVERY 24 HOURS
Qty: 30 PATCH | Refills: 1 | Status: SHIPPED | OUTPATIENT
Start: 2024-01-25

## 2024-01-25 RX ORDER — DICLOFENAC EPOLAMINE 0.01 G/1
1 SYSTEM TOPICAL 2 TIMES DAILY
Qty: 60 PATCH | Refills: 1 | Status: SHIPPED | OUTPATIENT
Start: 2024-01-25 | End: 2024-01-25

## 2024-01-25 RX ORDER — GABAPENTIN 600 MG/1
600 TABLET ORAL 2 TIMES DAILY
Qty: 60 TABLET | Refills: 0 | Status: SHIPPED | OUTPATIENT
Start: 2024-01-25

## 2024-01-25 NOTE — PROGRESS NOTES
CHIEF COMPLAINT  Neck and shoulder pain    Subjective   James Loaiza is a 78 y.o. male  who presents for follow-up.  He has a history of chronic neck pain. He reports that his neck and shoulder pain have worsened since his last office visit.    Today pain is 3/10VAS in severity. Pain is located in his neck and bilateral shoulders. Describes this pain as a nearly continuous ache. Pain is worsened by cold weather, lying flat, and certain positions. Pain improves with heat, physical therapy, procedures, and medication. Completed with PT in September and continues with HEP as tolerated. Medication regimen includes Gabapentin 300mg daily and OTC Tylenol PRN. Denies any side effects from the regimen, including constipation and somnolence.     Patient also has a history of chronic low back pain.  At this time back pain remains stable.  He still reports that he is receiving ongoing benefit from a bilateral L2-L5 RFA in 2018.     He had a left shoulder arthroscopy with Dr. Serrano on 4/18/23 and reports no relief from the procedure.      Procedures:   10/28/22 - Bilateral C2-C3 RFL - 90% relief for greater than 1 year  12/2/21 - Bilateral C2-C3 RFL   3/2/21 - Bilateral C2-C3 RFL   1/22/21 - Bilateral OCNB  9/9/20 - ELENITA  6/22/18 - Bilateral L2-L5 RFL - 75% ongoing relief    Neck Pain   This is a chronic problem. The current episode started more than 1 year ago. The problem occurs constantly. The problem has been gradually worsening. The pain is associated with an unknown factor. The pain is present in the midline, left side and right side. The quality of the pain is described as aching. The pain is at a severity of 3/10. The pain is mild. The symptoms are aggravated by position (cold weather, ). Associated symptoms include weakness. Pertinent negatives include no chest pain, fever, headaches or numbness. He has tried heat and home exercises (PT, Gabapentin, Cervical RFA) for the symptoms. The treatment provided moderate  relief.   Shoulder Injury   Incident location: Bilateral shoulders, right > left. The incident occurred more than 1 week ago. The injury mechanism is unknown. The quality of the pain is described as aching. The pain does not radiate. The pain is at a severity of 3/10. Pertinent negatives include no chest pain or numbness. The symptoms are aggravated by movement (certain sleep positions,). He has tried acetaminophen (PT, HEP, ) for the symptoms. The treatment provided mild relief.      PEG Assessment   What number best describes your pain on average in the past week?8  What number best describes how, during the past week, pain has interfered with your enjoyment of life?8  What number best describes how, during the past week, pain has interfered with your general activity?  8    Review of Pertinent Medical Data ---            The following portions of the patient's history were reviewed and updated as appropriate: allergies, current medications, past family history, past medical history, past social history, past surgical history, and problem list.    Review of Systems   Constitutional:  Negative for activity change (less), fatigue and fever.   HENT:  Negative for congestion.    Respiratory:  Negative for cough and chest tightness.    Cardiovascular:  Negative for chest pain.   Gastrointestinal:  Negative for abdominal pain, constipation and diarrhea.   Genitourinary:  Negative for difficulty urinating and dysuria.   Musculoskeletal:  Positive for neck pain.   Neurological:  Positive for weakness. Negative for dizziness, light-headedness, numbness and headaches.   Psychiatric/Behavioral:  Positive for agitation and sleep disturbance. Negative for suicidal ideas. The patient is nervous/anxious.      I have reviewed and confirmed the accuracy of the ROS as documented by the MA/LPN/RN YANIRA Novoa    Vitals:    01/25/24 0822   BP: 142/60   BP Location: Left arm   Patient Position: Sitting   Cuff Size: Large  "Adult   Pulse: 55   Resp: 20   Temp: 96.9 °F (36.1 °C)   TempSrc: Temporal   SpO2: 95%   Weight: (!) 140 kg (308 lb 3.2 oz)   Height: 188 cm (74\")   PainSc:   3     Objective   Physical Exam  Constitutional:       Appearance: Normal appearance. He is obese.   HENT:      Head: Normocephalic.   Cardiovascular:      Rate and Rhythm: Normal rate and regular rhythm.   Pulmonary:      Effort: Pulmonary effort is normal.      Breath sounds: Normal breath sounds.   Musculoskeletal:      Right shoulder: Tenderness present. Decreased range of motion.      Left shoulder: Tenderness present. Decreased range of motion.      Cervical back: Tenderness and bony tenderness present. Pain with movement present. Decreased range of motion.      Comments: + pain with cervical flexion/extension   Skin:     General: Skin is warm and dry.      Capillary Refill: Capillary refill takes less than 2 seconds.   Neurological:      General: No focal deficit present.      Mental Status: He is alert and oriented to person, place, and time.      Gait: Gait abnormal.   Psychiatric:         Mood and Affect: Mood normal.         Behavior: Behavior normal.         Thought Content: Thought content normal.         Cognition and Memory: Cognition normal.       Assessment & Plan   Diagnoses and all orders for this visit:    1. Chronic low back pain, unspecified back pain laterality, unspecified whether sciatica present (Primary)  -     gabapentin (NEURONTIN) 600 MG tablet; Take 1 tablet by mouth 2 (Two) Times a Day.  Dispense: 60 tablet; Refill: 0    2. Bilateral occipital neuralgia    3. Cervical facet joint syndrome  -     gabapentin (NEURONTIN) 600 MG tablet; Take 1 tablet by mouth 2 (Two) Times a Day.  Dispense: 60 tablet; Refill: 0  -     Diclofenac Epolamine (FLECTOR) 1.3 % patch patch; Apply 1 patch topically to the appropriate area as directed 2 (Two) Times a Day.  Dispense: 60 patch; Refill: 1    4. Chronic pain of both shoulders  -     Diclofenac " "Epolamine (FLECTOR) 1.3 % patch patch; Apply 1 patch topically to the appropriate area as directed 2 (Two) Times a Day.  Dispense: 60 patch; Refill: 1      James Loaiza reports a pain score of 3.  Given his pain assessment as noted, treatment options were discussed and the following options were decided upon as a follow-up plan to address the patient's pain: continuation of current treatment plan for pain and prescription for opiod analgesics.    --- Repeat Bilateral C2-C3 RFA- Will need to hold Eliquis for 3 days prior to procedure. Will reach out to Dr. Sandoval with [facility].    -------  Education about Medial Branch Blockade and RF Therapy:  This medial branch blockade (MBB) suggested is intended for diagnostic purposes, with the intent of offering the patient Radiofrequency thermal rhizotomy (RF) if the MBB is diagnostically effective.  The diagnostic blockade is necessary to determine the likelihood that RF therapy could be efficacious in providing long term relief to the patient.    Medial branches are sensory nerve branches that connect to a facet joint and transmit sensations & pain signals from that joint.  Facet is a term for the type of joints found in the spine.  Medial branches are the nerves that go to a facet, and therefore are also sometimes called \"facet joint nerves\" (FJNs).      In a medial branch blockade procedure, xray fluoroscopy is used to verify the locations of the outside of the joint lines which are being targeted.  Under xray guidance, needles are placed to these areas.  Contrast dye is injected to confirm proper placement, with dye flowing over the joint area, and to ensure that the dye does not flow into unintended areas such as a vein.  When this is confirmed, local anesthetic is injected to block the medial branch at that joint level.      If MBBs are diagnostically successful in blocking pain, then the patient is most likely a great candidate for Radiofrequency of those " facet joint nerves.  In the RF procedure, needles are placed to the joint lines in the same fashion, and after testing, the needle tips are heated to thermally treat the nerves, blocking the nerves by in essence damaging the nerves with the heat treatment(non-pulsed).       Medically, a successful RF procedure should provide a patient with 50% pain relief or more for at least 6 months.  Clinical experience suggests that successful patients receive relief more in the range of 12 months on average.  We also discussed that a fortunate minority of patients receive therapeutic success from the MBB, and may not require RF ablation.  If a patient receives more than 8 weeks of relief from MBB, then occasional repeat MBB for therapeutic purposes is a very reasonable alternative therapy.  This course of therapy is consistent with our LCDs according to our CMS  in the area, and therefore other insurance providers should follow accordingly.  We will monitor our patients to screen for these therapeutic responders and will offer RF therapy only when necessary.      We discussed that MBB & RF are not without risks.  Guidelines regarding anticoagulant use & neuraxial procedures will be respected.  Patients that are ill or otherwise may be at risk for sepsis will not have their spines accessed by neuraxial injections of any type.  This patient will not be offered these therapies if there is an increased risk.   We discussed that there is a risk of postprocedural pain and also a risk of worsening of clinical picture with these procedures as with any neuraxial procedure.    -------  Discussed with the patient that sedation is optional for this procedure.  The sedation offered is called conscious sedation which is different from general anesthesia that is utilized in surgical procedures. The dosing of the sedation is determined by the physician and they will be monitored throughout the procedure. With conscious sedation it  is possible to remember parts or all of the procedure, this is normal. They will need to have a  with them as driving is prohibited following conscious sedation.      NPO instructions for conscious sedation:  --- Do not eat 6 hours prior to the procedure.   --- Do not drink any dairy or citrus 4 hours prior to the procedure.   --- Do not drink anything, including clear liquids, 2 hours prior to procedure.      If the NPO instructions are not followed then the procedure may be performed without sedation or the procedure will need to be rescheduled.   --- Increase Gabapentin to 600mg BID. Rx to pharmacy.   --- [facility] asks for prescription for Flector patches as they have provided relief in the past. Rx sent to pharmacy.   --- Follow-up for procedure     Thermal Radiofrequency Destruction  This repeat thermal radiofrequency destruction (RFA) of cervical, thoracic, or lumbar paravertebral facet joint (medial branch) nerves at the same anatomical site is considered medically reasonable and necessary as this patient has met the criteria of having a minimum of consistent 50% improvement in pain for at least six (6) months or at least 50% consistent improvement in the ability to perform previously painful movements and ADLs as compared to baseline measurement using the same scale.    DILAN REPORT  As part of the patient's treatment plan, I am prescribing controlled substances. The patient has been made aware of appropriate use of such medications, including potential risk of somnolence, limited ability to drive and/or work safely, and the potential for dependence or overdose. It has also been made clear that these medications are for use by this patient only, without concomitant use of alcohol or other substances unless prescribed.     Patient has completed prescribing agreement detailing terms of continued prescribing of controlled substances, including monitoring DILAN reports, urine drug screening, and pill  counts if necessary. The patient is aware that inappropriate use will results in cessation of prescribing such medications.    As the clinician, I personally reviewed the DILAN from 1/25/24 while the patient was in the office today.    History and physical exam exhibit continued safe and appropriate use of controlled substances.    Dictated utilizing Dragon dictation.

## 2024-01-25 NOTE — H&P (VIEW-ONLY)
CHIEF COMPLAINT  Neck and shoulder pain    Subjective   James Loaiza is a 78 y.o. male  who presents for follow-up.  He has a history of chronic neck pain. He reports that his neck and shoulder pain have worsened since his last office visit.    Today pain is 3/10VAS in severity. Pain is located in his neck and bilateral shoulders. Describes this pain as a nearly continuous ache. Pain is worsened by cold weather, lying flat, and certain positions. Pain improves with heat, physical therapy, procedures, and medication. Completed with PT in September and continues with HEP as tolerated. Medication regimen includes Gabapentin 300mg daily and OTC Tylenol PRN. Denies any side effects from the regimen, including constipation and somnolence.     Patient also has a history of chronic low back pain.  At this time back pain remains stable.  He still reports that he is receiving ongoing benefit from a bilateral L2-L5 RFA in 2018.     He had a left shoulder arthroscopy with Dr. Serrnao on 4/18/23 and reports no relief from the procedure.      Procedures:   10/28/22 - Bilateral C2-C3 RFL - 90% relief for greater than 1 year  12/2/21 - Bilateral C2-C3 RFL   3/2/21 - Bilateral C2-C3 RFL   1/22/21 - Bilateral OCNB  9/9/20 - ELENITA  6/22/18 - Bilateral L2-L5 RFL - 75% ongoing relief    Neck Pain   This is a chronic problem. The current episode started more than 1 year ago. The problem occurs constantly. The problem has been gradually worsening. The pain is associated with an unknown factor. The pain is present in the midline, left side and right side. The quality of the pain is described as aching. The pain is at a severity of 3/10. The pain is mild. The symptoms are aggravated by position (cold weather, ). Associated symptoms include weakness. Pertinent negatives include no chest pain, fever, headaches or numbness. He has tried heat and home exercises (PT, Gabapentin, Cervical RFA) for the symptoms. The treatment provided moderate  relief.   Shoulder Injury   Incident location: Bilateral shoulders, right > left. The incident occurred more than 1 week ago. The injury mechanism is unknown. The quality of the pain is described as aching. The pain does not radiate. The pain is at a severity of 3/10. Pertinent negatives include no chest pain or numbness. The symptoms are aggravated by movement (certain sleep positions,). He has tried acetaminophen (PT, HEP, ) for the symptoms. The treatment provided mild relief.      PEG Assessment   What number best describes your pain on average in the past week?8  What number best describes how, during the past week, pain has interfered with your enjoyment of life?8  What number best describes how, during the past week, pain has interfered with your general activity?  8    Review of Pertinent Medical Data ---            The following portions of the patient's history were reviewed and updated as appropriate: allergies, current medications, past family history, past medical history, past social history, past surgical history, and problem list.    Review of Systems   Constitutional:  Negative for activity change (less), fatigue and fever.   HENT:  Negative for congestion.    Respiratory:  Negative for cough and chest tightness.    Cardiovascular:  Negative for chest pain.   Gastrointestinal:  Negative for abdominal pain, constipation and diarrhea.   Genitourinary:  Negative for difficulty urinating and dysuria.   Musculoskeletal:  Positive for neck pain.   Neurological:  Positive for weakness. Negative for dizziness, light-headedness, numbness and headaches.   Psychiatric/Behavioral:  Positive for agitation and sleep disturbance. Negative for suicidal ideas. The patient is nervous/anxious.      I have reviewed and confirmed the accuracy of the ROS as documented by the MA/LPN/RN YANIRA Novoa    Vitals:    01/25/24 0822   BP: 142/60   BP Location: Left arm   Patient Position: Sitting   Cuff Size: Large  "Adult   Pulse: 55   Resp: 20   Temp: 96.9 °F (36.1 °C)   TempSrc: Temporal   SpO2: 95%   Weight: (!) 140 kg (308 lb 3.2 oz)   Height: 188 cm (74\")   PainSc:   3     Objective   Physical Exam  Constitutional:       Appearance: Normal appearance. He is obese.   HENT:      Head: Normocephalic.   Cardiovascular:      Rate and Rhythm: Normal rate and regular rhythm.   Pulmonary:      Effort: Pulmonary effort is normal.      Breath sounds: Normal breath sounds.   Musculoskeletal:      Right shoulder: Tenderness present. Decreased range of motion.      Left shoulder: Tenderness present. Decreased range of motion.      Cervical back: Tenderness and bony tenderness present. Pain with movement present. Decreased range of motion.      Comments: + pain with cervical flexion/extension   Skin:     General: Skin is warm and dry.      Capillary Refill: Capillary refill takes less than 2 seconds.   Neurological:      General: No focal deficit present.      Mental Status: He is alert and oriented to person, place, and time.      Gait: Gait abnormal.   Psychiatric:         Mood and Affect: Mood normal.         Behavior: Behavior normal.         Thought Content: Thought content normal.         Cognition and Memory: Cognition normal.       Assessment & Plan   Diagnoses and all orders for this visit:    1. Chronic low back pain, unspecified back pain laterality, unspecified whether sciatica present (Primary)  -     gabapentin (NEURONTIN) 600 MG tablet; Take 1 tablet by mouth 2 (Two) Times a Day.  Dispense: 60 tablet; Refill: 0    2. Bilateral occipital neuralgia    3. Cervical facet joint syndrome  -     gabapentin (NEURONTIN) 600 MG tablet; Take 1 tablet by mouth 2 (Two) Times a Day.  Dispense: 60 tablet; Refill: 0  -     Diclofenac Epolamine (FLECTOR) 1.3 % patch patch; Apply 1 patch topically to the appropriate area as directed 2 (Two) Times a Day.  Dispense: 60 patch; Refill: 1    4. Chronic pain of both shoulders  -     Diclofenac " "Epolamine (FLECTOR) 1.3 % patch patch; Apply 1 patch topically to the appropriate area as directed 2 (Two) Times a Day.  Dispense: 60 patch; Refill: 1      James Loaiza reports a pain score of 3.  Given his pain assessment as noted, treatment options were discussed and the following options were decided upon as a follow-up plan to address the patient's pain: continuation of current treatment plan for pain and prescription for opiod analgesics.    --- Repeat Bilateral C2-C3 RFA- Will need to hold Eliquis for 3 days prior to procedure. Will reach out to Dr. Sandoval with [facility].     Addendum 2/1/24: Received clearance from Joleen at Dr. Sandoval's giving permission for patient to hold Eliquis for 3 day prior to procedure. See telephone message from 1/31/24.   -------  Education about Medial Branch Blockade and RF Therapy:  This medial branch blockade (MBB) suggested is intended for diagnostic purposes, with the intent of offering the patient Radiofrequency thermal rhizotomy (RF) if the MBB is diagnostically effective.  The diagnostic blockade is necessary to determine the likelihood that RF therapy could be efficacious in providing long term relief to the patient.    Medial branches are sensory nerve branches that connect to a facet joint and transmit sensations & pain signals from that joint.  Facet is a term for the type of joints found in the spine.  Medial branches are the nerves that go to a facet, and therefore are also sometimes called \"facet joint nerves\" (FJNs).      In a medial branch blockade procedure, xray fluoroscopy is used to verify the locations of the outside of the joint lines which are being targeted.  Under xray guidance, needles are placed to these areas.  Contrast dye is injected to confirm proper placement, with dye flowing over the joint area, and to ensure that the dye does not flow into unintended areas such as a vein.  When this is confirmed, local anesthetic is injected to " block the medial branch at that joint level.      If MBBs are diagnostically successful in blocking pain, then the patient is most likely a great candidate for Radiofrequency of those facet joint nerves.  In the RF procedure, needles are placed to the joint lines in the same fashion, and after testing, the needle tips are heated to thermally treat the nerves, blocking the nerves by in essence damaging the nerves with the heat treatment(non-pulsed).       Medically, a successful RF procedure should provide a patient with 50% pain relief or more for at least 6 months.  Clinical experience suggests that successful patients receive relief more in the range of 12 months on average.  We also discussed that a fortunate minority of patients receive therapeutic success from the MBB, and may not require RF ablation.  If a patient receives more than 8 weeks of relief from MBB, then occasional repeat MBB for therapeutic purposes is a very reasonable alternative therapy.  This course of therapy is consistent with our LCDs according to our CMS  in the area, and therefore other insurance providers should follow accordingly.  We will monitor our patients to screen for these therapeutic responders and will offer RF therapy only when necessary.      We discussed that MBB & RF are not without risks.  Guidelines regarding anticoagulant use & neuraxial procedures will be respected.  Patients that are ill or otherwise may be at risk for sepsis will not have their spines accessed by neuraxial injections of any type.  This patient will not be offered these therapies if there is an increased risk.   We discussed that there is a risk of postprocedural pain and also a risk of worsening of clinical picture with these procedures as with any neuraxial procedure.    -------  Discussed with the patient that sedation is optional for this procedure.  The sedation offered is called conscious sedation which is different from general  anesthesia that is utilized in surgical procedures. The dosing of the sedation is determined by the physician and they will be monitored throughout the procedure. With conscious sedation it is possible to remember parts or all of the procedure, this is normal. They will need to have a  with them as driving is prohibited following conscious sedation.      NPO instructions for conscious sedation:  --- Do not eat 6 hours prior to the procedure.   --- Do not drink any dairy or citrus 4 hours prior to the procedure.   --- Do not drink anything, including clear liquids, 2 hours prior to procedure.      If the NPO instructions are not followed then the procedure may be performed without sedation or the procedure will need to be rescheduled.   --- Increase Gabapentin to 600mg BID. Rx to pharmacy.   --- [facility] asks for prescription for Flector patches as they have provided relief in the past. Rx sent to pharmacy.   --- Follow-up for procedure     Thermal Radiofrequency Destruction  This repeat thermal radiofrequency destruction (RFA) of cervical, thoracic, or lumbar paravertebral facet joint (medial branch) nerves at the same anatomical site is considered medically reasonable and necessary as this patient has met the criteria of having a minimum of consistent 50% improvement in pain for at least six (6) months or at least 50% consistent improvement in the ability to perform previously painful movements and ADLs as compared to baseline measurement using the same scale.    DILAN REPORT  As part of the patient's treatment plan, I am prescribing controlled substances. The patient has been made aware of appropriate use of such medications, including potential risk of somnolence, limited ability to drive and/or work safely, and the potential for dependence or overdose. It has also been made clear that these medications are for use by this patient only, without concomitant use of alcohol or other substances unless  prescribed.     Patient has completed prescribing agreement detailing terms of continued prescribing of controlled substances, including monitoring DILAN reports, urine drug screening, and pill counts if necessary. The patient is aware that inappropriate use will results in cessation of prescribing such medications.    As the clinician, I personally reviewed the DILAN from 1/25/24 while the patient was in the office today.    History and physical exam exhibit continued safe and appropriate use of controlled substances.    Dictated utilizing Dragon dictation.

## 2024-01-25 NOTE — TELEPHONE ENCOUNTER
[facility] and stated that they do not carry the Flector patch and wanted to know if you could change the prescription to a formulary medication such as Lidocaine patch or Voltaren gel. Please advise

## 2024-01-25 NOTE — TELEPHONE ENCOUNTER
Patient is planning on repeating a Bilateral C2-C3 RFA. Can you please reach out to Dr. Sandoval with [facility] to clearance to hold Eliquis for 3 days prior to this procedure?

## 2024-01-31 ENCOUNTER — TELEPHONE (OUTPATIENT)
Dept: PAIN MEDICINE | Facility: CLINIC | Age: 79
End: 2024-01-31
Payer: OTHER GOVERNMENT

## 2024-01-31 NOTE — TELEPHONE ENCOUNTER
Dr. Sandoval's office called and gave Joleen verbal confirmation that Mr. Loaiza can hold his eliquis for 3 days prior to his procedure.

## 2024-02-01 NOTE — TELEPHONE ENCOUNTER
Please let patient know that he we received clearance from Dr. Sandoval. He will need to hold his Eliquis for 3 days prior to procedure.

## 2024-02-21 ENCOUNTER — HOSPITAL ENCOUNTER (OUTPATIENT)
Dept: GENERAL RADIOLOGY | Facility: HOSPITAL | Age: 79
Discharge: HOME | End: 2024-02-21
Payer: MEDICARE

## 2024-02-21 ENCOUNTER — HOSPITAL ENCOUNTER (OUTPATIENT)
Dept: PAIN MEDICINE | Facility: HOSPITAL | Age: 79
Discharge: HOME | End: 2024-02-21
Payer: MEDICARE

## 2024-02-21 VITALS
HEART RATE: 55 BPM | DIASTOLIC BLOOD PRESSURE: 88 MMHG | TEMPERATURE: 98.1 F | RESPIRATION RATE: 18 BRPM | OXYGEN SATURATION: 94 % | SYSTOLIC BLOOD PRESSURE: 156 MMHG

## 2024-02-21 DIAGNOSIS — M25.512 CHRONIC PAIN OF BOTH SHOULDERS: ICD-10-CM

## 2024-02-21 DIAGNOSIS — M47.812 CERVICAL FACET JOINT SYNDROME: ICD-10-CM

## 2024-02-21 DIAGNOSIS — R52 PAIN: ICD-10-CM

## 2024-02-21 DIAGNOSIS — G89.29 CHRONIC PAIN OF BOTH SHOULDERS: ICD-10-CM

## 2024-02-21 DIAGNOSIS — M25.511 CHRONIC PAIN OF BOTH SHOULDERS: ICD-10-CM

## 2024-02-21 PROCEDURE — 25010000002 BUPIVACAINE (PF) 0.5 % SOLUTION: Performed by: ANESTHESIOLOGY

## 2024-02-21 PROCEDURE — 25010000002 FENTANYL CITRATE (PF) 50 MCG/ML SOLUTION: Performed by: ANESTHESIOLOGY

## 2024-02-21 PROCEDURE — 25010000002 MIDAZOLAM PER 1 MG: Performed by: ANESTHESIOLOGY

## 2024-02-21 PROCEDURE — 77003 FLUOROGUIDE FOR SPINE INJECT: CPT

## 2024-02-21 RX ORDER — BUPIVACAINE HYDROCHLORIDE 5 MG/ML
10 INJECTION, SOLUTION EPIDURAL; INTRACAUDAL ONCE
Status: COMPLETED | OUTPATIENT
Start: 2024-02-21 | End: 2024-02-21

## 2024-02-21 RX ORDER — SODIUM CHLORIDE 9 MG/ML
40 INJECTION, SOLUTION INTRAVENOUS AS NEEDED
Status: DISCONTINUED | OUTPATIENT
Start: 2024-02-21 | End: 2024-02-22 | Stop reason: HOSPADM

## 2024-02-21 RX ORDER — SODIUM CHLORIDE 0.9 % (FLUSH) 0.9 %
10 SYRINGE (ML) INJECTION AS NEEDED
Status: DISCONTINUED | OUTPATIENT
Start: 2024-02-21 | End: 2024-02-22 | Stop reason: HOSPADM

## 2024-02-21 RX ORDER — LIDOCAINE HYDROCHLORIDE 20 MG/ML
5 INJECTION, SOLUTION INFILTRATION; PERINEURAL ONCE
Status: COMPLETED | OUTPATIENT
Start: 2024-02-21 | End: 2024-02-21

## 2024-02-21 RX ORDER — FENTANYL CITRATE 50 UG/ML
100 INJECTION, SOLUTION INTRAMUSCULAR; INTRAVENOUS ONCE
Status: COMPLETED | OUTPATIENT
Start: 2024-02-21 | End: 2024-02-21

## 2024-02-21 RX ORDER — SODIUM CHLORIDE 0.9 % (FLUSH) 0.9 %
10 SYRINGE (ML) INJECTION EVERY 12 HOURS SCHEDULED
Status: DISCONTINUED | OUTPATIENT
Start: 2024-02-21 | End: 2024-02-22 | Stop reason: HOSPADM

## 2024-02-21 RX ORDER — MIDAZOLAM HYDROCHLORIDE 1 MG/ML
2 INJECTION INTRAMUSCULAR; INTRAVENOUS ONCE
Status: COMPLETED | OUTPATIENT
Start: 2024-02-21 | End: 2024-02-21

## 2024-02-21 RX ADMIN — LIDOCAINE HYDROCHLORIDE 10 ML: 20 INJECTION, SOLUTION EPIDURAL; INFILTRATION; INTRACAUDAL; PERINEURAL at 09:29

## 2024-02-21 RX ADMIN — MIDAZOLAM 2 MG: 1 INJECTION INTRAMUSCULAR; INTRAVENOUS at 09:28

## 2024-02-21 RX ADMIN — BUPIVACAINE HYDROCHLORIDE 30 ML: 5 INJECTION, SOLUTION EPIDURAL; INTRACAUDAL; PERINEURAL at 09:29

## 2024-02-21 RX ADMIN — FENTANYL CITRATE 100 MCG: 50 INJECTION, SOLUTION INTRAMUSCULAR; INTRAVENOUS at 09:28

## 2024-02-21 NOTE — PROCEDURES
Procedures    Cervical Facet Nerve (Medial Branch) Radiofrequency Lesioning  [facility]      PREOPERATIVE DIAGNOSIS:  Cervical spondylosis without myelopathy   POSTOPERATIVE DIAGNOSIS:  Same as preoperative diagnosis    PROCEDURE:    Cervical Facet Nerve (medial branch) RF, with Fluoroscopy:      LEVELS: bilateral  C2 and C3    PRE-PROCEDURE DISCUSSION WITH PATIENT:    Risks and complications were discussed with the patient prior to starting the procedure and informed consent was obtained.  We discussed various topics, including but not limited to bleeding, infection, injury, nerve injury, paralysis, coma, death, postprocedural soreness or painful flare, worsening of clinical picture, lack of pain relief.  We discussed the previous positive diagnostic nature of medial branch blockades.  The preprocedure and presedation assessment are performed as the H&P update as part of this epic encounter.  There has been no change from the preoperative assessment to the in the room airway assessment.    SURGEON:  Edu Tineo MD    REASON FOR PROCEDURE:    The patient presents with chronic axial neck pain. The patient underwent 2 bilateral cervical medial branch blocks with diagnostically positive relief. Given the patient’s significant pain relief, it is diagnostic that we have likely found the source of the patient’s pain; therefore, radiofrequency ablation of those nerves has been indicated for therapeutic pain relief.    SEDATION:  Versed 2mg & Fentanyl 100 mcg IV, The use of increased procedural sedation was carefully considered, and for this particular patient the need for additional procedural sedation seemed necessary in this instance to safely perform the procedure., and this is necessary to allow him to be able to maintain prone positioning and decrease muscle spasm especially during this thermal procedure.  TIME OF PROCEDURE:  After administration of the IV sedative, the intraoperative procedure time  was (3261-6039) 27 minutes.      ANESTHETIC:   Lidocaine 2%  STEROID:   NONE  TOTAL VOLUME OF SOLUTION:  6 ml      DESCRIPTON OF PROCEDURE:  After obtaining informed consent, the patient was placed in the prone position. IV access was obtained.  EKG, blood pressure, and pulse oximeter were monitored and any & all sedation was administered by an RN under my direct guidance.  The cervical area was prepped with Chloraprep and draped with sterile barrier.     Under fluoroscopic guidance the waists of the above mentioned vertebra were identified and marked at the lateral margin of the vertebrae on the AP fluoroscopic view.  Skin and subcutaneous tissue were then anesthetized with 1% lidocaine 1mL at each point.  Then RF cannula needles were sequentially introduced under fluoroscopic guidance to the waists of these vertebrae contacting periosteum on the lateral edge of the vertebra on the AP fluroscopic view. After confirming the position of the needle under fluoroscopic PA and lateral views, confirming the needle position in the center of the trapezoid at each level, and confirming negative aspiration of blood and CSF, testing was initiated.  There was a lack of radicular stimulation on each needle at 3v and 2Hz.      Then, in each needle, 1.5mL of the aforementioned local anesthetic was instilled.  After 2 minutes had elapsed, Radiofrequency thermal lesioning was initiated for 2 minutes at 80 degrees Celsius.      Needles were removed intact from all levels.  Vitals were stable throughout.       ESTIMATED BLOOD LOSS:  minimal  SPECIMENS:  None    COMPLICATIONS:    No complications were noted. and The patient did not have any signs of postprocedure numbness nor weakness.    TOLERANCE & DISCHARGE CONDITION:    The patient tolerated the procedure well.  The patient was transported to the recovery area without difficulties.  The patient was discharged to home under the care of family in stable and satisfactory condition.         PLAN OF CARE:  The patient was given our standard instruction sheet.  We discussed that Cervical Medial Branch Blockade is a diagnostic procedure in consideration for radiofrequency ablation if two diagnostic procedures proved to be positive for significant benefit.  If sustained relief of six to eight weeks is obtained, then an alternative plan could be therapeutic cervical medial branch blocks on an as-needed basis.  The patient is asked to keep a pain log hourly for 8 hours postoperatively today.  The patient will Return to clinic 6 wks.  The patient will resume all medications as per the medication reconciliation sheet.

## 2024-02-21 NOTE — DISCHARGE INSTRUCTIONS
[facility] - Post-procedure Instructions          --  While there are no absolute restrictions, it is recommended that you do not perform strenuous activity today. In the morning, you may resume your level of activity as before your block.    --  If you have a band-aid at your injection site, please remove it later today. Observe the area for any redness, swelling, pus-like drainage, or a temperature over 101°. If any of these symptoms occur, please call your doctor at [phone]. If after office hours, leave a message and the on-call provider will return your call.    --  Ice may be applied to your injection site. It is recommended you avoid direct heat (heating pad; hot tub) for 1-2 days.    --  Call [facility] at [phone] if you experience persistent headache, persistent bleeding from the injection site, or severe pain not relieved by heat or oral medication.    --  Do not make important decisions today.    --  Due to the effects of the block and/or the I.V. Sedation, DO NOT drive or operate hazardous machinery for 12 hours.  Local anesthetics may cause numbness after procedure and precautions must be taken with regards to operating equipment as well as with walking, even if ambulating with assistance of another person or with an assistive device.    --  Do not drink alcohol for 12 hours.    -- You may return to work tomorrow, or as directed by your referring doctor.    --  Occasionally you may notice a slight increase in your pain after the procedure. This should start to improve within the next 24-48 hours. Radiofrequency ablation procedure pain may last 3-4 weeks.    --  It may take as long as 3-4 days before you notice a gradual improvement in your pain and/or other symptoms.    -- You may continue to take your prescribed pain medication as needed.    --  Some normal possible side effects of steroid use could include fluid retention, increased blood sugar, dull headache,  increased sweating, increased appetite, mood swings and flushing.    --  Diabetics are recommended to watch their blood glucose level closely for 24-48 hours after the injection.    --  Must stay in PACU for 20 min upon arrival and prove no leg weakness before being discharged.    --  IN THE EVENT OF A LIFE THREATENING EMERGENCY, (CHEST PAIN, BREATHING DIFFICULTIES, PARALYSIS…) YOU SHOULD GO TO YOUR NEAREST EMERGENCY ROOM.    --  You should be contacted by our office within 2-3 days to schedule follow up or next appointment date.  If not contacted within 7 days, please call the office at [phone]      ---    May restart Eliquis at this evening's dose

## 2024-03-21 ENCOUNTER — OFFICE VISIT (OUTPATIENT)
Dept: PAIN MEDICINE | Facility: CLINIC | Age: 79
End: 2024-03-21
Payer: OTHER GOVERNMENT

## 2024-03-21 VITALS
DIASTOLIC BLOOD PRESSURE: 86 MMHG | OXYGEN SATURATION: 96 % | HEART RATE: 56 BPM | TEMPERATURE: 96.8 F | HEIGHT: 74 IN | BODY MASS INDEX: 40.37 KG/M2 | WEIGHT: 314.6 LBS | SYSTOLIC BLOOD PRESSURE: 142 MMHG

## 2024-03-21 DIAGNOSIS — M25.511 CHRONIC PAIN OF BOTH SHOULDERS: Primary | ICD-10-CM

## 2024-03-21 DIAGNOSIS — M25.512 CHRONIC PAIN OF BOTH SHOULDERS: Primary | ICD-10-CM

## 2024-03-21 DIAGNOSIS — G89.29 CHRONIC PAIN OF BOTH SHOULDERS: Primary | ICD-10-CM

## 2024-03-21 DIAGNOSIS — G89.29 CHRONIC LOW BACK PAIN, UNSPECIFIED BACK PAIN LATERALITY, UNSPECIFIED WHETHER SCIATICA PRESENT: ICD-10-CM

## 2024-03-21 DIAGNOSIS — M47.812 CERVICAL FACET JOINT SYNDROME: ICD-10-CM

## 2024-03-21 DIAGNOSIS — M54.50 CHRONIC LOW BACK PAIN, UNSPECIFIED BACK PAIN LATERALITY, UNSPECIFIED WHETHER SCIATICA PRESENT: ICD-10-CM

## 2024-03-21 DIAGNOSIS — M25.519 SHOULDER PAIN, UNSPECIFIED CHRONICITY, UNSPECIFIED LATERALITY: ICD-10-CM

## 2024-03-21 DIAGNOSIS — M54.81 BILATERAL OCCIPITAL NEURALGIA: ICD-10-CM

## 2024-03-21 RX ORDER — LIDOCAINE 50 MG/G
1 PATCH TOPICAL EVERY 24 HOURS
Qty: 30 PATCH | Refills: 5 | Status: SHIPPED | OUTPATIENT
Start: 2024-03-21

## 2024-03-21 NOTE — PROGRESS NOTES
CHIEF COMPLAINT  Neck and shoulder pain    Subjective   James Loaiza is a 78 y.o. male  who presents to the office for follow-up of procedure.  He completed a Bilateral C2-C3 Cervical RFA on  2/21/24 performed by Dr. Tineo for management of neck pain. Patient reports 80% relief from the procedure. He reports that his ROM has improved and his pain is less severe.     Today pain is 2/10VAS in severity. Pain is located in his neck and bilateral shoulders. Describes this pain as a nearly continuous ache. Pain is worsened by cold weather, lying flat, and certain positions. Pain improves with heat, physical therapy, procedures, and medication. Completed with PT in September and continues with HEP as tolerated. Medication regimen includes Gabapentin 300mg daily and OTC Tylenol PRN. Denies any side effects from the regimen, including constipation and somnolence.     Patient also has a history of chronic low back pain.  At this time back pain remains stable.  He still reports that he is receiving ongoing benefit from a bilateral L2-L5 RFA in 2018.     He had a left shoulder arthroscopy with Dr. Serrano on 4/18/23 and reports no relief from the procedure. he was scheduled to see Dr. Serrano but appointment was canceled due to insurance change. He plans to schedule an appointment once [employer] accepts [employer] insurance again.      Procedures:   2/21/24 - Bilateral C2-C3 RFA - 80% ongoing relief  10/28/22 - Bilateral C2-C3 RFL - 90% relief for greater than 1 year  12/2/21 - Bilateral C2-C3 RFL   3/2/21 - Bilateral C2-C3 RFL   1/22/21 - Bilateral OCNB  9/9/20 - ELENITA  6/22/18 - Bilateral L2-L5 RFL - 75% ongoing relief    Neck Pain   This is a chronic problem. The current episode started more than 1 year ago. The problem occurs constantly. The problem has been gradually improving. The pain is associated with an unknown factor. The pain is present in the midline, left side and right side. The quality of the pain is described as  "aching. The pain is at a severity of 2/10. The pain is mild. The symptoms are aggravated by position (cold weather, ). Associated symptoms include weakness. Pertinent negatives include no chest pain, fever, headaches or numbness. He has tried heat and home exercises (PT, Gabapentin, Cervical RFA) for the symptoms. The treatment provided moderate relief.   Shoulder Injury   Incident location: Bilateral shoulders, right > left. The incident occurred more than 1 week ago. The injury mechanism is unknown. The quality of the pain is described as aching. The pain does not radiate. The pain is at a severity of 3/10. Pertinent negatives include no chest pain or numbness. The symptoms are aggravated by movement (certain sleep positions,). He has tried acetaminophen (PT, HEP, ) for the symptoms. The treatment provided mild relief.     The following portions of the patient's history were reviewed and updated as appropriate: allergies, current medications, past family history, past medical history, past social history, past surgical history, and problem list.    Review of Systems   Constitutional:  Negative for fever.   Cardiovascular:  Negative for chest pain.   Neurological:  Positive for weakness. Negative for numbness and headaches.     I have reviewed and confirmed the accuracy of the ROS as documented by the MA/LPN/RN YANIRA Novoa     Vitals:    03/21/24 1040   BP: 142/86   BP Location: Left arm   Patient Position: Sitting   Pulse: 56   Temp: 96.8 °F (36 °C)   TempSrc: Temporal   SpO2: 96%   Weight: (!) 143 kg (314 lb 9.6 oz)   Height: 188 cm (74\")   PainSc:   2   PainLoc: Neck     Objective   Physical Exam  Constitutional:       Appearance: Normal appearance. He is obese.   HENT:      Head: Normocephalic.   Cardiovascular:      Rate and Rhythm: Normal rate and regular rhythm.   Pulmonary:      Effort: Pulmonary effort is normal.      Breath sounds: Normal breath sounds.   Musculoskeletal:      Right shoulder: " Tenderness present. Decreased range of motion.      Left shoulder: Tenderness present. Decreased range of motion.      Cervical back: No tenderness or bony tenderness. No pain with movement. Decreased range of motion.   Skin:     General: Skin is warm and dry.      Capillary Refill: Capillary refill takes less than 2 seconds.   Neurological:      General: No focal deficit present.      Mental Status: He is alert and oriented to person, place, and time.      Gait: Gait abnormal.   Psychiatric:         Mood and Affect: Mood normal.         Behavior: Behavior normal.         Thought Content: Thought content normal.         Cognition and Memory: Cognition normal.       Assessment & Plan   Diagnoses and all orders for this visit:    1. Chronic pain of both shoulders (Primary)    2. Cervical facet joint syndrome  -     lidocaine (LIDODERM) 5 %; Place 1 patch on the skin as directed by provider Daily. Remove & Discard patch within 12 hours or as directed by MD  Dispense: 30 patch; Refill: 5    3. Shoulder pain, unspecified chronicity, unspecified laterality  -     lidocaine (LIDODERM) 5 %; Place 1 patch on the skin as directed by provider Daily. Remove & Discard patch within 12 hours or as directed by MD  Dispense: 30 patch; Refill: 5    4. Chronic low back pain, unspecified back pain laterality, unspecified whether sciatica present    5. Bilateral occipital neuralgia      James Loaiza reports a pain score of 2.  Given his pain assessment as noted, treatment options were discussed and the following options were decided upon as a follow-up plan to address the patient's pain: continuation of current treatment plan for pain.    --- Continue Lidocaine patches. Refill sent to pharmacy.   --- Follow-up as needed for increased pain and/or to repeat procedures     DILAN REPORT  As the clinician, I personally reviewed the DILAN from 3/21/24 while the patient was in the office today.    Dictated utilizing Dragon dictation.

## 2024-04-01 ENCOUNTER — TELEPHONE (OUTPATIENT)
Dept: ORTHOPEDIC SURGERY | Facility: CLINIC | Age: 79
End: 2024-04-01

## 2024-04-01 NOTE — TELEPHONE ENCOUNTER
Caller: James Loaiza    Relationship to patient: Self    Best call back number: [phone] (home)     Patient is needing: PATIENT CALLED STATING THAT HE HAD [facility].   DOES THE OFFICE HAVE THOSE IMAGES.   PATIENT NEEDS TO GET A FOLLOW UP APPT FOR LEFT SHOULDER WITH DR PRESLEY SINCE THE SX ON 4/18/23 DID NOT WORK.   PLEASE ADVISE PATIENT IF THOSE HAVE BEEN RECEIVED.

## 2024-04-03 ENCOUNTER — TELEPHONE (OUTPATIENT)
Dept: PAIN MEDICINE | Facility: CLINIC | Age: 79
End: 2024-04-03
Payer: OTHER GOVERNMENT

## 2024-04-03 NOTE — TELEPHONE ENCOUNTER
Patient called stating he is having a return of neck pain (in the same area that RFA was performed) since Saturday. He is wondering of it would be possible to have a repeat of the injections he had with Dr. Tineo at some point. Looking back in the records, he did have bilateral occipital nerve block on 9/19/22. Please advise

## 2024-04-04 DIAGNOSIS — M54.81 BILATERAL OCCIPITAL NEURALGIA: Primary | ICD-10-CM

## 2024-04-04 NOTE — TELEPHONE ENCOUNTER
Did he have relief with the occipital nerve block? He may want to consider updating his cervical imaging due to worsening pain. Last imaging of his neck was from 2021.

## 2024-04-04 NOTE — TELEPHONE ENCOUNTER
Procedure has been ordered. Can you please see if we can obtain those records [facility]?    Joleen, can you please schedule patient for Bilateral Occipital Nerve Blocks in office with Dr. Tineo?

## 2024-04-04 NOTE — TELEPHONE ENCOUNTER
He states he had a neck[facility]shoulder [facility] in [facility]. He does wish to proceed with the nerve block.

## 2024-04-05 ENCOUNTER — OFFICE VISIT (OUTPATIENT)
Dept: ORTHOPEDIC SURGERY | Facility: CLINIC | Age: 79
End: 2024-04-05
Payer: MEDICARE

## 2024-04-05 VITALS — WEIGHT: 311.2 LBS | BODY MASS INDEX: 39.94 KG/M2 | HEIGHT: 74 IN | TEMPERATURE: 98.7 F

## 2024-04-05 DIAGNOSIS — M25.562 BILATERAL CHRONIC KNEE PAIN: Primary | ICD-10-CM

## 2024-04-05 DIAGNOSIS — M17.12 ARTHRITIS OF LEFT KNEE: ICD-10-CM

## 2024-04-05 DIAGNOSIS — M25.561 BILATERAL CHRONIC KNEE PAIN: Primary | ICD-10-CM

## 2024-04-05 DIAGNOSIS — G89.29 BILATERAL CHRONIC KNEE PAIN: Primary | ICD-10-CM

## 2024-04-05 DIAGNOSIS — M17.11 ARTHRITIS OF RIGHT KNEE: ICD-10-CM

## 2024-04-05 RX ORDER — LIDOCAINE HYDROCHLORIDE 10 MG/ML
2 INJECTION, SOLUTION EPIDURAL; INFILTRATION; INTRACAUDAL; PERINEURAL
Status: COMPLETED | OUTPATIENT
Start: 2024-04-05 | End: 2024-04-05

## 2024-04-05 RX ORDER — METHYLPREDNISOLONE ACETATE 80 MG/ML
80 INJECTION, SUSPENSION INTRA-ARTICULAR; INTRALESIONAL; INTRAMUSCULAR; SOFT TISSUE
Status: COMPLETED | OUTPATIENT
Start: 2024-04-05 | End: 2024-04-05

## 2024-04-05 RX ADMIN — LIDOCAINE HYDROCHLORIDE 2 ML: 10 INJECTION, SOLUTION EPIDURAL; INFILTRATION; INTRACAUDAL; PERINEURAL at 13:17

## 2024-04-05 RX ADMIN — METHYLPREDNISOLONE ACETATE 80 MG: 80 INJECTION, SUSPENSION INTRA-ARTICULAR; INTRALESIONAL; INTRAMUSCULAR; SOFT TISSUE at 13:17

## 2024-04-05 NOTE — PROGRESS NOTES
Mr. Loaiza comes in today for follow-up.  Injections have worked well in the past.  The patient would like to get repeat injections today.      Imaging:   Bilateral standing AP views, bilateral merchants views and a lateral view of both knees are ordered by myself and reviewed to evaluate the patient's complaint.  These are compared to previous x-rays.  The x-rays show significant degenerative arthritis including joint space narrowing, osteophyte and subchondral sclerosis.  The majority of the degenerative changes appear to involve the medial compartment bilaterally.  No significant changes compared to previous x-rays.    The risks, benefits and alternatives were discussed and the patient consented.  Going forward, the patient will follow-up as needed.    YANIRA Andrea    04/05/2024    Large Joint Arthrocentesis: R knee  Date/Time: 4/5/2024 1:17 PM  Consent given by: patient  Site marked: site marked  Timeout: Immediately prior to procedure a time out was called to verify the correct patient, procedure, equipment, support staff and site/side marked as required   Supporting Documentation  Indications: pain   Procedure Details  Location: knee - R knee  Preparation: Patient was prepped and draped in the usual sterile fashion  Needle gauge: 21G.  Approach: anterolateral  Medications administered: 80 mg methylPREDNISolone acetate 80 MG/ML; 2 mL lidocaine PF 1% 1 %  Patient tolerance: patient tolerated the procedure well with no immediate complications      Large Joint Arthrocentesis: L knee  Date/Time: 4/5/2024 1:17 PM  Consent given by: patient  Site marked: site marked  Timeout: Immediately prior to procedure a time out was called to verify the correct patient, procedure, equipment, support staff and site/side marked as required   Supporting Documentation  Indications: pain   Procedure Details  Location: knee - L knee  Preparation: Patient was prepped and draped in the usual sterile fashion  Needle gauge:  21G.  Approach: anterolateral  Medications administered: 80 mg methylPREDNISolone acetate 80 MG/ML; 2 mL lidocaine PF 1% 1 %  Patient tolerance: patient tolerated the procedure well with no immediate complications

## 2024-04-08 ENCOUNTER — PROCEDURE VISIT (OUTPATIENT)
Dept: PAIN MEDICINE | Facility: CLINIC | Age: 79
End: 2024-04-08
Payer: MEDICARE

## 2024-04-08 ENCOUNTER — OFFICE VISIT (OUTPATIENT)
Dept: ORTHOPEDIC SURGERY | Facility: CLINIC | Age: 79
End: 2024-04-08
Payer: OTHER GOVERNMENT

## 2024-04-08 VITALS
BODY MASS INDEX: 39.22 KG/M2 | DIASTOLIC BLOOD PRESSURE: 80 MMHG | HEART RATE: 52 BPM | HEIGHT: 74 IN | TEMPERATURE: 97.1 F | WEIGHT: 305.6 LBS | OXYGEN SATURATION: 97 % | SYSTOLIC BLOOD PRESSURE: 169 MMHG | RESPIRATION RATE: 20 BRPM

## 2024-04-08 VITALS — TEMPERATURE: 98.8 F | HEIGHT: 74 IN | WEIGHT: 306 LBS | BODY MASS INDEX: 39.27 KG/M2

## 2024-04-08 DIAGNOSIS — M25.512 LEFT SHOULDER PAIN, UNSPECIFIED CHRONICITY: Primary | ICD-10-CM

## 2024-04-08 DIAGNOSIS — M54.81 BILATERAL OCCIPITAL NEURALGIA: Primary | ICD-10-CM

## 2024-04-08 PROCEDURE — 20610 DRAIN/INJ JOINT/BURSA W/O US: CPT | Performed by: ORTHOPAEDIC SURGERY

## 2024-04-08 PROCEDURE — 64405 NJX AA&/STRD GR OCPL NRV: CPT | Performed by: ANESTHESIOLOGY

## 2024-04-08 PROCEDURE — 99213 OFFICE O/P EST LOW 20 MIN: CPT | Performed by: ORTHOPAEDIC SURGERY

## 2024-04-08 RX ORDER — METHYLPREDNISOLONE ACETATE 80 MG/ML
80 INJECTION, SUSPENSION INTRA-ARTICULAR; INTRALESIONAL; INTRAMUSCULAR; SOFT TISSUE
Status: COMPLETED | OUTPATIENT
Start: 2024-04-08 | End: 2024-04-08

## 2024-04-08 RX ORDER — LIDOCAINE HYDROCHLORIDE 10 MG/ML
2 INJECTION, SOLUTION EPIDURAL; INFILTRATION; INTRACAUDAL; PERINEURAL
Status: COMPLETED | OUTPATIENT
Start: 2024-04-08 | End: 2024-04-08

## 2024-04-08 RX ADMIN — METHYLPREDNISOLONE ACETATE 80 MG: 80 INJECTION, SUSPENSION INTRA-ARTICULAR; INTRALESIONAL; INTRAMUSCULAR; SOFT TISSUE at 11:57

## 2024-04-08 RX ADMIN — LIDOCAINE HYDROCHLORIDE 2 ML: 10 INJECTION, SOLUTION EPIDURAL; INFILTRATION; INTRACAUDAL; PERINEURAL at 11:57

## 2024-04-08 NOTE — PROGRESS NOTES
"CHIEF COMPLAINT: Neck Pain      James Loaiza is a 78 y.o. male.  He is here for the following procedure:  bilateral occipital nerve block.     Vitals:    04/08/24 0835   BP: 169/80   BP Location: Left arm   Patient Position: Sitting   Cuff Size: Large Adult   Pulse: 52   Resp: 20   Temp: 97.1 °F (36.2 °C)   TempSrc: Temporal   SpO2: 97%   Weight: (!) 139 kg (305 lb 9.6 oz)   Height: 188 cm (74\")   PainSc:   2       Bupivacaine 0.25% 75mg/30ml Lot#: [id number] Exp:   NDC: 31656-148-50  solu Medrol 40 mg/ml Lot#: [id number] Exp:   NDC: 3884-3486-58     Procedures    Bilateral occipital nerve block:    Informed consent was obtained.  We discussed items including but not limited to bleeding and nerve injury and infection and paralysis and stroke and death.    A solution was prepared of 8 mL of 0.25% bupivacaine and 80 mg of solu-Medrol.    The left and right occipital areas were cleansed with alcohol ×2 and then a 25-gauge needle was inserted just medial to each of the occipital arteries until contacting periosteum and aspiration was negative and then 3 mL of the above solution was injected.  The needle at each location was withdrawn to the skin and then redirected laterally and aspiration was checked and then I proceeded to inject another 2  milliliters of medication in the vicinity of the lesser occipital branch.  Again this was bilaterally performed.      The needle was removed intact.  Bleeding was minimal.  No apparent complications.        Visit Diagnoses:  1. Bilateral occipital neuralgia        Edu Tineo MD  Pain Management  --    Vitals:    04/08/24 0835   PainSc:   2            "

## 2024-04-08 NOTE — PROGRESS NOTES
Chief complaint: Left shoulder pain    Mr. Loaiza is seen today in follow-up for his left shoulder.  He says he has continued to have burning and throbbing upper arm pain.  He did get the MRI of his cervical spine.  He has been seeing Dr. Tineo.  He has had a couple of cervical epidurals and some nerve ablations.  He says that those helped tremendously with his neck pain but the shoulder continues to bother him.  The pain is worst at night.    Left shoulder is examined.  Portals are healed.  No effusion or significant areas of tenderness.  He has good motion.  Mild discomfort with abduction and forward elevation but strength is 5 out of 5.  Negative Neer, Santos, speeds and Tippah's maneuvers.    A report of a recent MRI of the left shoulder is available for review.  Unfortunately, I do not have the images themselves.  The report describes low-grade articular sided supraspinatus tendon tearing with associated tendinopathy.  There appears to be a low-grade strain of the supraspinatus at the myotendinous junction.  There is mention of moderate acromioclavicular arthritis in the impression but in the body of the report describes mild acromioclavicular arthritis.    I went back and looked at his previous arthroscopy photos.  He did appear to have early glenohumeral arthritis, particularly on the humeral side where he had scattered areas of chondral loss.    Assessment: Left shoulder osteoarthritis status post arthroscopic decompression, distal clavicle excision and biceps tenodesis    Plan: I told him that I do not see anything on his updated MRI report that would suggest that he needs further surgery at this point.  In the future he may need a replacement but I do not recommend that for him at this time.  I suggested we try an injection.  The risk, benefits and alternatives were discussed.  He consented and the procedure was performed as described below.  Going forward, he will follow-up with me as needed.  I told  him I will be happy to see him back if the pain persists or recurs.      Large Joint Arthrocentesis: L subacromial bursa  Date/Time: 4/8/2024 11:57 AM  Consent given by: patient  Site marked: site marked  Timeout: Immediately prior to procedure a time out was called to verify the correct patient, procedure, equipment, support staff and site/side marked as required   Supporting Documentation  Indications: pain   Procedure Details  Location: shoulder - L subacromial bursa  Preparation: Patient was prepped and draped in the usual sterile fashion  Needle gauge: 21 G.  Approach: posterior  Medications administered: 2 mL lidocaine PF 1% 1 %; 80 mg methylPREDNISolone acetate 80 MG/ML  Patient tolerance: patient tolerated the procedure well with no immediate complications

## 2024-04-17 DIAGNOSIS — M54.50 CHRONIC LOW BACK PAIN, UNSPECIFIED BACK PAIN LATERALITY, UNSPECIFIED WHETHER SCIATICA PRESENT: ICD-10-CM

## 2024-04-17 DIAGNOSIS — M47.812 CERVICAL FACET JOINT SYNDROME: ICD-10-CM

## 2024-04-17 DIAGNOSIS — G89.29 CHRONIC LOW BACK PAIN, UNSPECIFIED BACK PAIN LATERALITY, UNSPECIFIED WHETHER SCIATICA PRESENT: ICD-10-CM

## 2024-04-17 NOTE — TELEPHONE ENCOUNTER
Rx Refill Note  Requested Prescriptions     Pending Prescriptions Disp Refills    gabapentin (NEURONTIN) 600 MG tablet 60 tablet 0     Sig: Take 1 tablet by mouth 2 (Two) Times a Day.      Last office visit with prescribing clinician: 3/21/2024   Last telemedicine visit with prescribing clinician: Visit date not found   Next office visit with prescribing clinician: Visit date not found                         Would you like a call back once the refill request has been completed: [] Yes [] No    If the office needs to give you a call back, can they leave a voicemail: [] Yes [] No    Carlos Miguel CMA  04/17/24, 11:59 EDT

## 2024-04-18 RX ORDER — GABAPENTIN 600 MG/1
600 TABLET ORAL 2 TIMES DAILY
Qty: 60 TABLET | Refills: 0 | Status: SHIPPED | OUTPATIENT
Start: 2024-04-18

## 2024-07-03 DIAGNOSIS — G89.29 CHRONIC LOW BACK PAIN, UNSPECIFIED BACK PAIN LATERALITY, UNSPECIFIED WHETHER SCIATICA PRESENT: ICD-10-CM

## 2024-07-03 DIAGNOSIS — M47.812 CERVICAL FACET JOINT SYNDROME: ICD-10-CM

## 2024-07-03 DIAGNOSIS — M54.50 CHRONIC LOW BACK PAIN, UNSPECIFIED BACK PAIN LATERALITY, UNSPECIFIED WHETHER SCIATICA PRESENT: ICD-10-CM

## 2024-07-03 RX ORDER — GABAPENTIN 600 MG/1
600 TABLET ORAL 2 TIMES DAILY
Qty: 60 TABLET | Refills: 0 | Status: SHIPPED | OUTPATIENT
Start: 2024-07-03

## 2024-07-03 NOTE — TELEPHONE ENCOUNTER
Medication Refill Request    Date of phone call: 7/3/2024    Medication being requested: gabapentin 600 mg sig: take 1 tab BID  Qty: 60    Date of last visit: 4/8/2024    Date of last refill: 4/18/2024    DILAN up to date?: yes    Next Follow up?: 7/8/2024    Any new pertinent information? (i.e, new medication allergies, new use of medications, change in patient's health or condition, non-compliance or inconsistency with prescribing agreement?): Mr. Loaiza is out of medication.

## 2024-07-03 NOTE — TELEPHONE ENCOUNTER
Reviewed UDS and DILAN. Both updated and appropriate. Refill appropriate.    Covering for Jade DOYLE

## 2024-07-08 ENCOUNTER — OFFICE VISIT (OUTPATIENT)
Dept: PAIN MEDICINE | Facility: CLINIC | Age: 79
End: 2024-07-08
Payer: MEDICARE

## 2024-07-08 VITALS
RESPIRATION RATE: 18 BRPM | HEIGHT: 74 IN | TEMPERATURE: 96.2 F | OXYGEN SATURATION: 97 % | BODY MASS INDEX: 38.65 KG/M2 | SYSTOLIC BLOOD PRESSURE: 177 MMHG | HEART RATE: 55 BPM | WEIGHT: 301.2 LBS | DIASTOLIC BLOOD PRESSURE: 73 MMHG

## 2024-07-08 DIAGNOSIS — G89.29 CHRONIC LEFT SHOULDER PAIN: ICD-10-CM

## 2024-07-08 DIAGNOSIS — M54.81 BILATERAL OCCIPITAL NEURALGIA: Primary | ICD-10-CM

## 2024-07-08 DIAGNOSIS — M19.012 GLENOHUMERAL ARTHRITIS, LEFT: ICD-10-CM

## 2024-07-08 DIAGNOSIS — M47.817 LUMBOSACRAL SPONDYLOSIS WITHOUT MYELOPATHY: ICD-10-CM

## 2024-07-08 DIAGNOSIS — M47.812 CERVICAL FACET JOINT SYNDROME: ICD-10-CM

## 2024-07-08 DIAGNOSIS — M25.512 CHRONIC LEFT SHOULDER PAIN: ICD-10-CM

## 2024-07-08 PROCEDURE — 3078F DIAST BP <80 MM HG: CPT | Performed by: ANESTHESIOLOGY

## 2024-07-08 PROCEDURE — 1159F MED LIST DOCD IN RCRD: CPT | Performed by: ANESTHESIOLOGY

## 2024-07-08 PROCEDURE — 3077F SYST BP >= 140 MM HG: CPT | Performed by: ANESTHESIOLOGY

## 2024-07-08 PROCEDURE — 99214 OFFICE O/P EST MOD 30 MIN: CPT | Performed by: ANESTHESIOLOGY

## 2024-07-08 PROCEDURE — 1160F RVW MEDS BY RX/DR IN RCRD: CPT | Performed by: ANESTHESIOLOGY

## 2024-07-08 PROCEDURE — 1125F AMNT PAIN NOTED PAIN PRSNT: CPT | Performed by: ANESTHESIOLOGY

## 2024-07-08 RX ORDER — ERGOCALCIFEROL 1.25 MG/1
50000 CAPSULE ORAL WEEKLY
COMMUNITY

## 2024-07-08 NOTE — PROGRESS NOTES
CHIEF COMPLAINT  Follow-up for neck pain. Mr. Loaiza states that he got 50% pain relief from the occipital nerve block done on 4/8/2024.    Subjective   James Loaiza is a 78 y.o. male  who presents for follow-up.  He has a history of neck pain, headaches, and also back pain.    He has had significant therapeutic relief from many occasions on radiofrequency ablation in his neck and he has known C2-C3 arthritis.  Occasional flareups of headache pain with this upper cervical area problem causing reflexive occipital muscle spasm and treating her occipital neuralgia episodes which are thankfully suppressible with occipital nerve blocks.  Regarding his low back pain and has had significant therapeutic relief in the past from lumbar radiofrequency ablation in his low back pain is mild compared to other problems.    He had right shoulder pain in the past but recently his left shoulder pain has been significantly flared.  He had a left shoulder arthroscopy in April 2023 and unfortunately it did not offer him any pain relief.  He recently had a left subacromial bursa injection at the orthopedist office and unfortunately it was not helpful either.    Left shoulder pain is relieved from lack of activity and staying still.  This significant incident pain is causing some kinesiophobia.      History of Present Illness      Neck Pain   This is a chronic problem. The current episode started more than 1 year ago. The problem occurs constantly. The problem has been gradually worsening. The pain is associated with an unknown factor. The pain is present in the midline, left side and right side. The quality of the pain is described as aching. The pain is mild. The symptoms are aggravated by position (cold weather, ). Pertinent negatives include no chest pain, fever, headaches, numbness or weakness. He has tried heat and home exercises (PT, Gabapentin, Cervical RFA) for the symptoms. The treatment provided moderate relief.   Shoulder  Injury   Incident location: Bilateral shoulders,m previously right > left, but now the Left is >> right. The left shoulder is affected. The incident occurred more than 1 week ago. The injury mechanism is unknown. The quality of the pain is described as aching. The pain does not radiate. The pain is severe. Pertinent negatives include no chest pain or numbness. The symptoms are aggravated by movement (certain sleep positions,). He has tried acetaminophen (PT, HEP, ) for the symptoms. The treatment provided mild relief.   Back Pain  This is a chronic problem. The problem has been comes and goes since onset. The quality of the pain is described as aching. The pain is mild. Pertinent negatives include no chest pain, fever, headaches, numbness or weakness. The treatment provided moderate relief.        PEG Assessment   What number best describes your pain on average in the past week?3  What number best describes how, during the past week, pain has interfered with your enjoyment of life?0  What number best describes how, during the past week, pain has interfered with your general activity?  0    --  The aforementioned information the Chief Complaint section and above subjective data including any HPI data, and also the Review of Systems data, has been personally reviewed and affirmed.  --      Review of Pertinent Medical Data ---  E-cathy report is reviewed:  I reviewed the document in the electronic form under the PDMP tab in the Epic EMR...  - In this function, the report is a current report in as close to real-time as possible.  - The report was available for immediate review.    - I did elly the report as reviewed.  - There is not concern for aberrant behavior based on this ekasper review.        The following portions of the patient's history were reviewed and updated as appropriate: allergies, current medications, past family history, past medical history, past social history, past surgical history, and problem  list.    -------    The following portions of the patient's history were reviewed and updated as appropriate: allergies, current medications, past family history, past medical history, past social history, past surgical history and problem list.    No Known Allergies      Current Outpatient Medications:     acetaminophen (TYLENOL) 325 MG tablet, Take 2 tablets by mouth 2 (Two) Times a Day As Needed for Mild Pain., Disp: 60 tablet, Rfl: 0    albuterol sulfate  (90 Base) MCG/ACT inhaler, , Disp: , Rfl:     allopurinol (Zyloprim) 100 MG tablet, Allopurinol 100 mg 1 daily x 1 week then 2 tablets daily x 1 week then 3 tablets daily., Disp: 90 tablet, Rfl: 4    apixaban (ELIQUIS) 5 MG tablet tablet, Take 1 tablet by mouth Every 12 (Twelve) Hours., Disp: , Rfl:     Aspirin (V[place]) 81 MG capsule, Take 1 tablet by mouth Daily. To stop 5-7 days before surgery, Disp: , Rfl:     atorvastatin (LIPITOR) 40 MG tablet, TAKE ONE TABLET BY MOUTH DAILY, Disp: 90 tablet, Rfl: 1    docusate sodium (COLACE) 100 MG capsule, Take 1 capsule by mouth 2 (Two) Times a Day., Disp: 60 capsule, Rfl: 0    empagliflozin (JARDIANCE) 10 MG tablet tablet, Take 1 tablet by mouth Daily., Disp: , Rfl:     ergocalciferol (ERGOCALCIFEROL) 1.25 MG (45656 UT) capsule, Take 1 capsule by mouth 1 (One) Time Per Week., Disp: , Rfl:     ferrous sulfate 325 (65 FE) MG tablet, Take 1 tablet by mouth 1 (One) Time Per Week., Disp: , Rfl:     furosemide (Lasix) 40 MG tablet, Take 1 tablet by mouth 2 (Two) Times a Day. (Patient taking differently: Take 1 tablet by mouth Every Morning.), Disp: 40 tablet, Rfl: 1    gabapentin (NEURONTIN) 600 MG tablet, Take 1 tablet by mouth 2 (Two) Times a Day., Disp: 60 tablet, Rfl: 0    HYDROcodone-acetaminophen (NORCO) 5-325 MG per tablet, Take 1 tablet by mouth Every 8 (Eight) Hours As Needed for Severe Pain., Disp: 10 tablet, Rfl: 0    lidocaine (LIDODERM) 5 %, Place 1 patch on the skin as directed by provider Daily.  Remove & Discard patch within 12 hours or as directed by MD, Disp: 30 patch, Rfl: 5    metoprolol succinate XL (TOPROL-XL) 25 MG 24 hr tablet, TAKE 1 TABLET BY MOUTH DAILY, Disp: 90 tablet, Rfl: 1    NIFEdipine XL (PROCARDIA XL) 60 MG 24 hr tablet, TAKE ONE TABLET BY MOUTH DAILY, Disp: 90 tablet, Rfl: 2    NON FORMULARY, Compound cream, Disp: , Rfl:     ondansetron ODT (ZOFRAN-ODT) 4 MG disintegrating tablet, Place 1 tablet on the tongue 4 (Four) Times a Day As Needed for Nausea or Vomiting., Disp: 15 tablet, Rfl: 0    pantoprazole (PROTONIX) 20 MG EC tablet, TAKE ONE TABLET BY MOUTH DAILY (Patient taking differently: Take 1 tablet by mouth Every Morning.), Disp: 30 tablet, Rfl: 3    ramipril (ALTACE) 10 MG capsule, Take 1 capsule by mouth Every Morning., Disp: 90 capsule, Rfl: 3    tamsulosin (FLOMAX) 0.4 MG capsule 24 hr capsule, Take 2 capsules by mouth Every Night., Disp: , Rfl:     tiotropium bromide-olodaterol (STIOLTO RESPIMAT) 2.5-2.5 MCG/ACT aerosol solution inhaler, Inhale 2 puffs Daily., Disp: , Rfl:     triamcinolone (KENALOG) 0.1 % lotion, Apply  topically to the appropriate area as directed 3 (Three) Times a Day., Disp: 120 mL, Rfl: 1    Current Outpatient Medications on File Prior to Visit   Medication Sig Dispense Refill    acetaminophen (TYLENOL) 325 MG tablet Take 2 tablets by mouth 2 (Two) Times a Day As Needed for Mild Pain. 60 tablet 0    albuterol sulfate  (90 Base) MCG/ACT inhaler       allopurinol (Zyloprim) 100 MG tablet Allopurinol 100 mg 1 daily x 1 week then 2 tablets daily x 1 week then 3 tablets daily. 90 tablet 4    apixaban (ELIQUIS) 5 MG tablet tablet Take 1 tablet by mouth Every 12 (Twelve) Hours.      Aspirin (Vazalore) 81 MG capsule Take 1 tablet by mouth Daily. To stop 5-7 days before surgery      atorvastatin (LIPITOR) 40 MG tablet TAKE ONE TABLET BY MOUTH DAILY 90 tablet 1    docusate sodium (COLACE) 100 MG capsule Take 1 capsule by mouth 2 (Two) Times a Day. 60 capsule  0    empagliflozin (JARDIANCE) 10 MG tablet tablet Take 1 tablet by mouth Daily.      ergocalciferol (ERGOCALCIFEROL) 1.25 MG (09407 UT) capsule Take 1 capsule by mouth 1 (One) Time Per Week.      ferrous sulfate 325 (65 FE) MG tablet Take 1 tablet by mouth 1 (One) Time Per Week.      furosemide (Lasix) 40 MG tablet Take 1 tablet by mouth 2 (Two) Times a Day. (Patient taking differently: Take 1 tablet by mouth Every Morning.) 40 tablet 1    gabapentin (NEURONTIN) 600 MG tablet Take 1 tablet by mouth 2 (Two) Times a Day. 60 tablet 0    HYDROcodone-acetaminophen (NORCO) 5-325 MG per tablet Take 1 tablet by mouth Every 8 (Eight) Hours As Needed for Severe Pain. 10 tablet 0    lidocaine (LIDODERM) 5 % Place 1 patch on the skin as directed by provider Daily. Remove & Discard patch within 12 hours or as directed by MD 30 patch 5    metoprolol succinate XL (TOPROL-XL) 25 MG 24 hr tablet TAKE 1 TABLET BY MOUTH DAILY 90 tablet 1    NIFEdipine XL (PROCARDIA XL) 60 MG 24 hr tablet TAKE ONE TABLET BY MOUTH DAILY 90 tablet 2    NON FORMULARY Compound cream      ondansetron ODT (ZOFRAN-ODT) 4 MG disintegrating tablet Place 1 tablet on the tongue 4 (Four) Times a Day As Needed for Nausea or Vomiting. 15 tablet 0    pantoprazole (PROTONIX) 20 MG EC tablet TAKE ONE TABLET BY MOUTH DAILY (Patient taking differently: Take 1 tablet by mouth Every Morning.) 30 tablet 3    ramipril (ALTACE) 10 MG capsule Take 1 capsule by mouth Every Morning. 90 capsule 3    tamsulosin (FLOMAX) 0.4 MG capsule 24 hr capsule Take 2 capsules by mouth Every Night.      tiotropium bromide-olodaterol (STIOLTO RESPIMAT) 2.5-2.5 MCG/ACT aerosol solution inhaler Inhale 2 puffs Daily.      triamcinolone (KENALOG) 0.1 % lotion Apply  topically to the appropriate area as directed 3 (Three) Times a Day. 120 mL 1     No current facility-administered medications on file prior to visit.         * No active hospital problems. *       Past Medical History:   Diagnosis  "Date    Abnormal EKG     referring to cardiology    Anxiety     history    Arthritis     Ascending aorta dilation     Back pain     worsening    BPH (benign prostatic hyperplasia)     BPH/Nocturia/ Urinary Frequency    Breast nodule     right    Cardiomegaly     Cardiomegaly/ SOB with exertion    Cellulitis of left lower extremity     history    Circulation problem     Constipation     COPD (chronic obstructive pulmonary disease)     Deviated septum     DJD (degenerative joint disease)     DJD Knees    DVT (deep venous thrombosis)     Dysphagia     solids and liquids - resolved with normal studies    Encounter for annual health examination 11/14/2013    Annual Health Assessment    Enlarged prostate     Fatigue     Extreme fatigue    GERD (gastroesophageal reflux disease)     Hyperlipidemia 1999    Hypertension 1999    followed Dr. Marcelo    Hypogonadism male     Left hip pain     and DJD    Left leg pain     Low back pain     Moist mucous membranes of ear, nose, and throat     \"Mucous in throat\"    Morbid obesity     (BMI-41.2za)    Muscle cramping     Muscle spasm     Muscle spasms    Neck arthritis     Neck muscle spasm     Neck muscle spasm/Cervical strain    Neck pain     Obesity     Plantar fasciitis     Prostatitis     recurrent    Psoriasis     Pulmonary embolus 01/2019    on Xarelltoypseerlipidemia    Radiculopathy     Radiculopathy / Neuropathy left ar    Renal insufficiency     followed by Dr. Mendes    Seborrhea     Shortness of breath     Sleep apnea     Obstructive Sleep apnea worsening; uses CPAP    Urinary frequency     Vascular disorder     Vertigo     Weight gain     Wellness examination 10/23/2014    Annual Wellness Visit       Past Surgical History:   Procedure Laterality Date    APPENDECTOMY  1965    CARDIAC CATHETERIZATION  07/29/2010    [facility]    COLONOSCOPY  01/05/2010    COLONOSCOPY  10/01/2001    KIDNEY SURGERY  07/27/2023    biospy    NASAL SEPTUM SURGERY  1999 "    RADIOFREQUENCY ABLATION Bilateral 03/02/2021    Procedure: RADIOFREQUENCY ABLATION NERVES---bilateral cervical 2-cervical3;  Surgeon: Edu Tineo MD;  Location: [facility];  Service: Pain Management;  Laterality: Bilateral;    RADIOFREQUENCY ABLATION Bilateral 12/02/2021    Procedure: RADIOFREQUENCY ABLATION NERVES--bilateral cervical2-3;  Surgeon: Edu Tineo MD;  Location: [facility];  Service: Pain Management;  Laterality: Bilateral;    RADIOFREQUENCY ABLATION Bilateral 10/28/2022    Procedure: CERVICAL RADIOFREQUENCY LESIONING BILATERAL C2-3;  Surgeon: Edu Tineo MD;  Location: [facility];  Service: Pain Management;  Laterality: Bilateral;    SHOULDER ARTHROSCOPY Left 04/18/2023    Procedure: SHOULDER ARTHROSCOPY WITH SUBACROMIAL DECOMPRESSION, DISTAL CLAVICLE EXCISION,  BICEPS TENODESIS;  Surgeon: Kevin Serrano MD;  Location:  [facility] [place];  Service: Orthopedics;  Laterality: Left;    TURP / TRANSURETHRAL INCISION / DRAINAGE PROSTATE  10/23/2015    Michael Castro       Family History   Problem Relation Age of Onset    Arthritis Mother     Heart disease Mother     Hypertension Mother     Heart attack Father     Heart disease Father     Hypertension Father     Arthritis Sister     Multiple sclerosis Sister     Alcohol abuse Brother     Diabetes Son     Malig Hyperthermia Neg Hx        Social History     Socioeconomic History    Marital status:    Tobacco Use    Smoking status: Never     Passive exposure: Never    Smokeless tobacco: Never    Tobacco comments:     caffiene use: soda and coffee   Vaping Use    Vaping status: Never Used   Substance and Sexual Activity    Alcohol use: Yes     Comment: 2-3  a year    Drug use: Never    Sexual activity: Defer       -------        Review of Systems   Constitutional:  Negative for fever.   Cardiovascular:  Negative for chest pain.   Gastrointestinal:  Negative for constipation and diarrhea.   Genitourinary:  Negative for  "difficulty urinating.   Musculoskeletal:  Positive for back pain and neck pain.   Neurological:  Negative for weakness, numbness and headaches.   Psychiatric/Behavioral:  Positive for sleep disturbance. Negative for suicidal ideas. The patient is nervous/anxious.        Vitals:    07/08/24 0956   BP: 177/73   Pulse: 55   Resp: 18   Temp: 96.2 °F (35.7 °C)   SpO2: 97%   Weight: (!) 137 kg (301 lb 3.2 oz)   Height: 188 cm (74\")   PainSc:   2   PainLoc: Neck         Objective   Physical Exam  Constitutional:       Appearance: Normal appearance. He is obese.   HENT:      Head: Normocephalic.   Eyes:      General: No scleral icterus.  Pulmonary:      Effort: No respiratory distress.   Musculoskeletal:      Right shoulder: Tenderness present. Decreased range of motion.      Left shoulder: Tenderness (with motion) present. Decreased range of motion.      Cervical back: Neck supple. No tenderness or bony tenderness. No pain with movement. Decreased range of motion.      Comments: Santos positive L   Skin:     General: Skin is warm and dry.      Capillary Refill: Capillary refill takes less than 2 seconds.   Neurological:      General: No focal deficit present.      Mental Status: He is alert and oriented to person, place, and time.      Gait: Gait abnormal (mild antalgia).   Psychiatric:         Mood and Affect: Mood normal.         Behavior: Behavior normal.         Thought Content: Thought content normal.         Cognition and Memory: Cognition normal.         Judgment: Judgment normal.             Assessment & Plan   Diagnoses and all orders for this visit:    1. Bilateral occipital neuralgia (Primary)    2. Cervical facet joint syndrome    3. Chronic left shoulder pain    4. Glenohumeral arthritis, left    5. Lumbosacral spondylosis without myelopathy        James Loaiza reports a pain score of 2.  Given his pain assessment as noted, treatment options were discussed and the following options were decided upon as a " follow-up plan to address the patient's pain: steroid injections.      --- Follow-up 3 months    -- reach out to Dr. Serrano and ask if it would be reasonable to consider an intraarticular joint injection of the left shoulder relating to the glenohumeral arthritis, and if so would plan to schedule that with imaging guidance    -- continue gabapentin    -- may repeat cervical RF in the future PRN             DILAN REPORT  DILAN report has been reviewed and scanned into the patient's chart.  Date of last DILAN : as above.  No current use of opioids.         Dictated utilizing Dragon dictation.     ---      Vitals:    07/08/24 0956   PainSc:   2   PainLoc: Neck

## 2024-07-15 ENCOUNTER — PREP FOR SURGERY (OUTPATIENT)
Dept: SURGERY | Facility: SURGERY CENTER | Age: 79
End: 2024-07-15
Payer: MEDICARE

## 2024-07-15 DIAGNOSIS — M19.012 GLENOHUMERAL ARTHRITIS, LEFT: Primary | ICD-10-CM

## 2024-07-15 RX ORDER — DIAZEPAM 5 MG/1
10 TABLET ORAL ONCE AS NEEDED
OUTPATIENT
Start: 2024-07-15

## 2024-07-23 ENCOUNTER — OFFICE VISIT (OUTPATIENT)
Dept: CARDIOLOGY | Facility: CLINIC | Age: 79
End: 2024-07-23
Payer: MEDICARE

## 2024-07-23 VITALS
WEIGHT: 306 LBS | SYSTOLIC BLOOD PRESSURE: 130 MMHG | DIASTOLIC BLOOD PRESSURE: 75 MMHG | BODY MASS INDEX: 39.27 KG/M2 | HEART RATE: 51 BPM | OXYGEN SATURATION: 96 % | HEIGHT: 74 IN

## 2024-07-23 DIAGNOSIS — I25.10 CORONARY ARTERIOSCLEROSIS IN NATIVE ARTERY: Primary | Chronic | ICD-10-CM

## 2024-07-23 DIAGNOSIS — Z86.711 HISTORY OF PULMONARY EMBOLISM: ICD-10-CM

## 2024-07-23 DIAGNOSIS — I35.1 NONRHEUMATIC AORTIC VALVE INSUFFICIENCY: Chronic | ICD-10-CM

## 2024-07-23 DIAGNOSIS — Z79.01 CHRONIC ANTICOAGULATION: ICD-10-CM

## 2024-07-23 DIAGNOSIS — I51.7 LEFT VENTRICULAR HYPERTROPHY: ICD-10-CM

## 2024-07-23 DIAGNOSIS — N18.32 STAGE 3B CHRONIC KIDNEY DISEASE: ICD-10-CM

## 2024-07-23 PROCEDURE — 3075F SYST BP GE 130 - 139MM HG: CPT | Performed by: INTERNAL MEDICINE

## 2024-07-23 PROCEDURE — 99214 OFFICE O/P EST MOD 30 MIN: CPT | Performed by: INTERNAL MEDICINE

## 2024-07-23 PROCEDURE — 93000 ELECTROCARDIOGRAM COMPLETE: CPT | Performed by: INTERNAL MEDICINE

## 2024-07-23 PROCEDURE — 3078F DIAST BP <80 MM HG: CPT | Performed by: INTERNAL MEDICINE

## 2024-07-23 NOTE — PROGRESS NOTES
Subjective:     Encounter Date:07/23/24      Patient ID: James Loaiza is a 78 y.o. male.    Chief Complaint: Aortic insufficiency  History of Present Illness    Dear Jade,    I had the pleasure of seeing this patient in the office today for follow-up of CAD, PAF, chronic venous insufficiency and aortic insufficiency.    Some fatigue, no CP. No chills, fevers, no orthopnea or PND.    He used to follow with Dr. Raphael.  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  He has a history of morbid obesity with sleep apnea and is on CPAP, single-vessel coronary artery disease with a 70% small caliber diagonal by cardiac catheterization in 2010, hypertension, hyperlipidemia, as well as chronic renal failure and he follows with Dr. Beth.  He has a history of chronic lower extremity edema secondary to venous insufficiency. He follows with vascular (Dr. Tolentino) and the vein clinic.    He had a stress test performed in April 2018 that revealed a small area of mild fixed hypoperfusion in the inferior posterior wall base but no ischemia.  He had an echocardiogram performed February 2020, reviewed below, which showed continued mild aortic insufficiency.    The following portions of the patient's history were reviewed and updated as appropriate: allergies, current medications, past family history, past medical history, past social history, past surgical history and problem list.      ECG 12 Lead    Date/Time: 7/23/2024 2:55 PM  Performed by: Pradeep Obrien III, MD    Authorized by: Pradeep Obrien III, MD  Comparison: compared with previous ECG   Similar to previous ECG  Rhythm: sinus rhythm  Rate: normal  Conduction: conduction normal  ST Segments: ST segments normal  T Waves: T waves normal  QRS axis: normal  Other: no other findings    Clinical  "impression: normal ECG        I reviewed your EKG from this morning and it shows atrial fibrillation with a heart rate of 95 bpm at rest.     Objective:     Vitals:    07/23/24 1426   BP: 130/75   BP Location: Left arm   Patient Position: Sitting   Pulse: 51   SpO2: 96%   Weight: (!) 139 kg (306 lb)   Height: 188 cm (74\")     Body mass index is 39.29 kg/m².      Vitals reviewed.   Constitutional:       General: Not in acute distress.     Appearance: Well-developed. Not diaphoretic.   Eyes:      General:         Right eye: No discharge.         Left eye: No discharge.      Conjunctiva/sclera: Conjunctivae normal.      Pupils: Pupils are equal, round, and reactive to light.   HENT:      Head: Normocephalic and atraumatic.      Nose: Nose normal.   Neck:      Thyroid: No thyromegaly.      Trachea: No tracheal deviation.      Lymphadenopathy: No cervical adenopathy.   Pulmonary:      Effort: Pulmonary effort is normal. No respiratory distress.      Breath sounds: Normal breath sounds. No stridor.   Chest:      Chest wall: Not tender to palpatation.   Cardiovascular:      Normal rate. Irregularly irregular rhythm.      Murmurs: There is no murmur.      . No S3 gallop. No click. No rub.   Pulses:     Intact distal pulses.   Edema:     Peripheral edema present.  Abdominal:      General: Bowel sounds are normal. There is no distension.      Palpations: Abdomen is soft. There is no abdominal mass.      Tenderness: There is no abdominal tenderness. There is no guarding or rebound.   Musculoskeletal: Normal range of motion.         General: No tenderness or deformity.      Cervical back: Normal range of motion and neck supple. Skin:     General: Skin is warm and dry.      Findings: No erythema or rash.   Neurological:      Mental Status: Alert and oriented to person, place, and time.      Deep Tendon Reflexes: Reflexes are normal and symmetric.   Psychiatric:         Thought Content: Thought content normal.         Data and " "records reviewed:     Lab Results   Component Value Date    GLUCOSE 109 (H) 07/03/2023    BUN 25 (H) 07/03/2023    CREATININE 2.39 (H) 07/03/2023    EGFRIFNONA 30 (L) 02/14/2022    EGFRIFAFRI 35 (L) 02/14/2022    BCR 10.5 07/03/2023    K 4.4 07/03/2023    CO2 26.3 07/03/2023    CALCIUM 8.7 07/03/2023    PROTENTOTREF 6.8 02/14/2022    ALBUMIN 3.6 07/03/2023    LABIL2 1.2 02/14/2022    AST 10 07/03/2023    ALT 8 07/03/2023     No results found for: \"CHOL\"  Lab Results   Component Value Date    TRIG 191 (H) 07/06/2022    TRIG 143 02/24/2021    TRIG 161 (H) 12/12/2018     Lab Results   Component Value Date    HDL 33 (L) 07/06/2022    HDL 34 (L) 02/24/2021    HDL 31 (L) 12/12/2018     Lab Results   Component Value Date     (H) 07/06/2022    LDL 94 02/24/2021    LDL 73 12/12/2018     Lab Results   Component Value Date    VLDL 34 07/06/2022    VLDL 25 02/24/2021    VLDL 32.2 12/12/2018     Lab Results   Component Value Date    LDLHDL 2.66 02/24/2021    LDLHDL 2.35 12/12/2018    LDLHDL 2.22 06/14/2017       No radiology results for the last 90 days.  Results for orders placed during the hospital encounter of 06/29/23    Adult Transthoracic Echo Complete W/ Cont if Necessary Per Protocol    Interpretation Summary    Left ventricular systolic function is normal. Calculated left ventricular EF = 59.8%    Left ventricular diastolic function was normal.    Estimated right ventricular systolic pressure from tricuspid regurgitation is normal (<35 mmHg). Calculated right ventricular systolic pressure from tricuspid regurgitation is 29 mmHg.    Mild dilation of the aortic root is present. The aortic root measures 4.2 cm.    Trace to mild aortic valve regurgitation is present.    Echocardiogram [facility] February 2020:     Summary    The left ventricle is mildly enlarged.    There is moderate concentric left ventricular hypertrophy.    There is normal left ventricle systolic function with estimated left    ventricular ejection " fraction 55%.    There is mild grade 1A diastolic dysfunction of the left ventricle.    The left atrium is mildly enlarged.    The right ventricle is mildly enlarged    There is mild aortic regurgitation.    There is trivial to mild mitral regurgitation.    The aortic root and ascending aorta are mildly dilated.         Assessment:          Diagnosis Plan   1. Coronary arteriosclerosis in native artery  ECG 12 Lead      2. Nonrheumatic aortic valve insufficiency  ECG 12 Lead      3. Left ventricular hypertrophy  ECG 12 Lead      4. History of pulmonary embolism  ECG 12 Lead      5. Chronic anticoagulation  ECG 12 Lead      6. Stage 3b chronic kidney disease  ECG 12 Lead               Plan:       1.  Aortic valve insufficiency, mild, continue current medical therapy with nifedipine and ramipril  2.  Hypertension, good control, continue nifedipine and ramipril  3.  History of recurrent DVT and PE, on chronic anticoagulation.    4.  Morbid obesity, complicating all aspects of care  5.  Obstructive sleep apnea on CPAP, discussed compliance  6.  Chronic lower extremity edema secondary to venous insufficiency, patient remains on low-dose diuretic therapy. Just saw .  7.  PAF in SR, cont current medical therapy    Thank you very much for allowing us to participate in the care of this pleasant patient.  Please don't hesitate to call if I can be of assistance in any way.      Current Outpatient Medications:     acetaminophen (TYLENOL) 325 MG tablet, Take 2 tablets by mouth 2 (Two) Times a Day As Needed for Mild Pain., Disp: 60 tablet, Rfl: 0    albuterol sulfate  (90 Base) MCG/ACT inhaler, , Disp: , Rfl:     allopurinol (Zyloprim) 100 MG tablet, Allopurinol 100 mg 1 daily x 1 week then 2 tablets daily x 1 week then 3 tablets daily., Disp: 90 tablet, Rfl: 4    apixaban (ELIQUIS) 5 MG tablet tablet, Take 1 tablet by mouth Every 12 (Twelve) Hours., Disp: , Rfl:     Aspirin ([place]) 81 MG capsule, Take 1 tablet by  mouth Daily. To stop 5-7 days before surgery, Disp: , Rfl:     atorvastatin (LIPITOR) 40 MG tablet, TAKE ONE TABLET BY MOUTH DAILY, Disp: 90 tablet, Rfl: 1    docusate sodium (COLACE) 100 MG capsule, Take 1 capsule by mouth 2 (Two) Times a Day., Disp: 60 capsule, Rfl: 0    empagliflozin (JARDIANCE) 10 MG tablet tablet, Take 1 tablet by mouth Daily., Disp: , Rfl:     ergocalciferol (ERGOCALCIFEROL) 1.25 MG (46999 UT) capsule, Take 1 capsule by mouth 1 (One) Time Per Week., Disp: , Rfl:     ferrous sulfate 325 (65 FE) MG tablet, Take 1 tablet by mouth 1 (One) Time Per Week., Disp: , Rfl:     furosemide (Lasix) 40 MG tablet, Take 1 tablet by mouth 2 (Two) Times a Day. (Patient taking differently: Take 1 tablet by mouth Every Morning.), Disp: 40 tablet, Rfl: 1    gabapentin (NEURONTIN) 600 MG tablet, Take 1 tablet by mouth 2 (Two) Times a Day., Disp: 60 tablet, Rfl: 0    HYDROcodone-acetaminophen (NORCO) 5-325 MG per tablet, Take 1 tablet by mouth Every 8 (Eight) Hours As Needed for Severe Pain., Disp: 10 tablet, Rfl: 0    lidocaine (LIDODERM) 5 %, Place 1 patch on the skin as directed by provider Daily. Remove & Discard patch within 12 hours or as directed by MD, Disp: 30 patch, Rfl: 5    metoprolol succinate XL (TOPROL-XL) 25 MG 24 hr tablet, TAKE 1 TABLET BY MOUTH DAILY, Disp: 90 tablet, Rfl: 1    NIFEdipine XL (PROCARDIA XL) 60 MG 24 hr tablet, TAKE ONE TABLET BY MOUTH DAILY, Disp: 90 tablet, Rfl: 2    NON FORMULARY, Compound cream, Disp: , Rfl:     ondansetron ODT (ZOFRAN-ODT) 4 MG disintegrating tablet, Place 1 tablet on the tongue 4 (Four) Times a Day As Needed for Nausea or Vomiting., Disp: 15 tablet, Rfl: 0    pantoprazole (PROTONIX) 20 MG EC tablet, TAKE ONE TABLET BY MOUTH DAILY (Patient taking differently: Take 1 tablet by mouth Every Morning.), Disp: 30 tablet, Rfl: 3    ramipril (ALTACE) 10 MG capsule, Take 1 capsule by mouth Every Morning., Disp: 90 capsule, Rfl: 3    tamsulosin (FLOMAX) 0.4 MG capsule 24  hr capsule, Take 2 capsules by mouth Every Night., Disp: , Rfl:     tiotropium bromide-olodaterol (STIOLTO RESPIMAT) 2.5-2.5 MCG/ACT aerosol solution inhaler, Inhale 2 puffs Daily., Disp: , Rfl:     triamcinolone (KENALOG) 0.1 % lotion, Apply  topically to the appropriate area as directed 3 (Three) Times a Day., Disp: 120 mL, Rfl: 1         No follow-ups on file.

## 2024-07-31 ENCOUNTER — OFFICE VISIT (OUTPATIENT)
Dept: ORTHOPEDIC SURGERY | Facility: CLINIC | Age: 79
End: 2024-07-31
Payer: MEDICARE

## 2024-07-31 VITALS — TEMPERATURE: 98.3 F | HEIGHT: 74 IN | BODY MASS INDEX: 39.17 KG/M2 | WEIGHT: 305.2 LBS

## 2024-07-31 DIAGNOSIS — M17.11 ARTHRITIS OF RIGHT KNEE: ICD-10-CM

## 2024-07-31 DIAGNOSIS — M17.12 ARTHRITIS OF LEFT KNEE: ICD-10-CM

## 2024-07-31 DIAGNOSIS — G89.29 BILATERAL CHRONIC KNEE PAIN: Primary | ICD-10-CM

## 2024-07-31 DIAGNOSIS — M25.561 BILATERAL CHRONIC KNEE PAIN: Primary | ICD-10-CM

## 2024-07-31 DIAGNOSIS — M25.562 BILATERAL CHRONIC KNEE PAIN: Primary | ICD-10-CM

## 2024-07-31 RX ORDER — LIDOCAINE HYDROCHLORIDE 10 MG/ML
2 INJECTION, SOLUTION EPIDURAL; INFILTRATION; INTRACAUDAL; PERINEURAL
Status: COMPLETED | OUTPATIENT
Start: 2024-07-31 | End: 2024-07-31

## 2024-07-31 RX ORDER — METHYLPREDNISOLONE ACETATE 80 MG/ML
80 INJECTION, SUSPENSION INTRA-ARTICULAR; INTRALESIONAL; INTRAMUSCULAR; SOFT TISSUE
Status: COMPLETED | OUTPATIENT
Start: 2024-07-31 | End: 2024-07-31

## 2024-07-31 RX ADMIN — METHYLPREDNISOLONE ACETATE 80 MG: 80 INJECTION, SUSPENSION INTRA-ARTICULAR; INTRALESIONAL; INTRAMUSCULAR; SOFT TISSUE at 11:03

## 2024-07-31 RX ADMIN — LIDOCAINE HYDROCHLORIDE 2 ML: 10 INJECTION, SOLUTION EPIDURAL; INFILTRATION; INTRACAUDAL; PERINEURAL at 11:03

## 2024-07-31 NOTE — PROGRESS NOTES
Mr. Loaiza comes in today for follow-up.  Injections have worked well in the past.  The patient would like to get repeat injections today.  The risks, benefits and alternatives were discussed and the patient consented.  Going forward, the patient will follow-up as needed.    YANIRA Andrea    07/31/2024      Large Joint Arthrocentesis: R knee  Date/Time: 7/31/2024 11:03 AM  Consent given by: patient  Site marked: site marked  Timeout: Immediately prior to procedure a time out was called to verify the correct patient, procedure, equipment, support staff and site/side marked as required   Supporting Documentation  Indications: pain   Procedure Details  Location: knee - R knee  Preparation: Patient was prepped and draped in the usual sterile fashion  Needle gauge: 21 G.  Approach: anterolateral  Medications administered: 2 mL lidocaine PF 1% 1 %; 80 mg methylPREDNISolone acetate 80 MG/ML  Patient tolerance: patient tolerated the procedure well with no immediate complications      Large Joint Arthrocentesis: L knee  Date/Time: 7/31/2024 11:03 AM  Consent given by: patient  Site marked: site marked  Timeout: Immediately prior to procedure a time out was called to verify the correct patient, procedure, equipment, support staff and site/side marked as required   Supporting Documentation  Indications: pain   Procedure Details  Location: knee - L knee  Preparation: Patient was prepped and draped in the usual sterile fashion  Needle gauge: 21 G.  Approach: anterolateral  Medications administered: 2 mL lidocaine PF 1% 1 %; 80 mg methylPREDNISolone acetate 80 MG/ML  Patient tolerance: patient tolerated the procedure well with no immediate complications

## 2024-08-08 DIAGNOSIS — I48.91 ATRIAL FIBRILLATION WITH RVR: ICD-10-CM

## 2024-08-08 RX ORDER — METOPROLOL SUCCINATE 25 MG/1
25 TABLET, EXTENDED RELEASE ORAL DAILY
Qty: 90 TABLET | Refills: 1 | Status: SHIPPED | OUTPATIENT
Start: 2024-08-08

## 2024-08-08 NOTE — TELEPHONE ENCOUNTER
Caller: James Loaiza    Relationship: Self    Best call back number: [phone]    Requested Prescriptions:   Requested Prescriptions     Pending Prescriptions Disp Refills    metoprolol succinate XL (TOPROL-XL) 25 MG 24 hr tablet 90 tablet 1     Sig: Take 1 tablet by mouth Daily.        Pharmacy where request should be sent: [facility] - [place], [place] - [address] - [phone]  - [phone] FX     Last office visit with prescribing clinician: 7/23/2024   Last telemedicine visit with prescribing clinician: Visit date not found   Next office visit with prescribing clinician: Visit date not found     Additional details provided by patient: PATIENT HAS 1 DAY OF MEDICATION.     Does the patient have less than a 3 day supply:  [x] Yes  [] No    Would you like a call back once the refill request has been completed: [] Yes [] No    If the office needs to give you a call back, can they leave a voicemail: [] Yes [] No    Felicita Ruelas   08/08/24 11:20 EDT

## 2024-08-20 ENCOUNTER — PREP FOR SURGERY (OUTPATIENT)
Dept: OTHER | Facility: HOSPITAL | Age: 79
End: 2024-08-20
Payer: MEDICARE

## 2024-08-20 DIAGNOSIS — G89.29 CHRONIC LEFT SHOULDER PAIN: ICD-10-CM

## 2024-08-20 DIAGNOSIS — M25.512 CHRONIC LEFT SHOULDER PAIN: ICD-10-CM

## 2024-08-20 DIAGNOSIS — M19.012 GLENOHUMERAL ARTHRITIS, LEFT: Primary | ICD-10-CM

## 2024-08-20 RX ORDER — BUPIVACAINE HYDROCHLORIDE 5 MG/ML
10 INJECTION, SOLUTION EPIDURAL; INTRACAUDAL ONCE
Status: CANCELLED | OUTPATIENT
Start: 2024-08-21

## 2024-08-20 RX ORDER — METHYLPREDNISOLONE ACETATE 80 MG/ML
80 INJECTION, SUSPENSION INTRA-ARTICULAR; INTRALESIONAL; INTRAMUSCULAR; SOFT TISSUE ONCE
Status: CANCELLED | OUTPATIENT
Start: 2024-08-21

## 2024-08-20 RX ORDER — LIDOCAINE HYDROCHLORIDE 10 MG/ML
5 INJECTION, SOLUTION INFILTRATION; PERINEURAL ONCE
Status: CANCELLED | OUTPATIENT
Start: 2024-08-21

## 2024-08-20 RX ORDER — IOPAMIDOL 408 MG/ML
12 INJECTION, SOLUTION INTRATHECAL
Status: CANCELLED | OUTPATIENT
Start: 2024-08-21

## 2024-08-20 NOTE — PROGRESS NOTES
Brief Pre-procedural / Pre-operative H&P        -----    Pertinent Diagnosis:   Glenohumeral arthritis on the left.  Chronic left shoulder pain    Proposed Procedure: Left shoulder joint injection, left, fluoroscopic guidance      Subjective   James Loaiza is a 78 y.o. male  who presents for intervention.  He has a history of left shoulder pain..      History of Present Illness     Left shoulder pain has been more recently flared.  He had a arthroscopy of that joint in April of last year and fortunately did not realize any pain relief after that.  He also did not succeed with a left subacromial bursa injection at the orthopedist office.  I reached out to his orthopedist into request guidance, and ask if a glenohumeral injection would be reasonable and consistent with this plan of care and offered to perform it and he thought so and agreed with that plan.    The patient is avoiding activity and staying still to try to avoid shoulder pain flareups because of the significant amount of incident pain.  I am concerned about evolving Kinesiophobia.  He has decreased range of motion and positive Santos on the left    -------    The following portions of the patient's history were reviewed and updated as appropriate: allergies, current medications, past family history, past medical history, past social history, past surgical history and problem list.    No Known Allergies      Current Outpatient Medications:     acetaminophen (TYLENOL) 325 MG tablet, Take 2 tablets by mouth 2 (Two) Times a Day As Needed for Mild Pain., Disp: 60 tablet, Rfl: 0    albuterol sulfate  (90 Base) MCG/ACT inhaler, , Disp: , Rfl:     allopurinol (Zyloprim) 100 MG tablet, Allopurinol 100 mg 1 daily x 1 week then 2 tablets daily x 1 week then 3 tablets daily., Disp: 90 tablet, Rfl: 4    apixaban (ELIQUIS) 5 MG tablet tablet, Take 1 tablet by mouth Every 12 (Twelve) Hours., Disp: , Rfl:     Aspirin (Vazalore) 81 MG capsule, Take 1 tablet by  mouth Daily. To stop 5-7 days before surgery, Disp: , Rfl:     atorvastatin (LIPITOR) 40 MG tablet, TAKE ONE TABLET BY MOUTH DAILY, Disp: 90 tablet, Rfl: 1    docusate sodium (COLACE) 100 MG capsule, Take 1 capsule by mouth 2 (Two) Times a Day., Disp: 60 capsule, Rfl: 0    empagliflozin (JARDIANCE) 10 MG tablet tablet, Take 1 tablet by mouth Daily., Disp: , Rfl:     ergocalciferol (ERGOCALCIFEROL) 1.25 MG (26574 UT) capsule, Take 1 capsule by mouth 1 (One) Time Per Week., Disp: , Rfl:     ferrous sulfate 325 (65 FE) MG tablet, Take 1 tablet by mouth 1 (One) Time Per Week., Disp: , Rfl:     furosemide (Lasix) 40 MG tablet, Take 1 tablet by mouth 2 (Two) Times a Day. (Patient taking differently: Take 1 tablet by mouth Every Morning.), Disp: 40 tablet, Rfl: 1    gabapentin (NEURONTIN) 600 MG tablet, Take 1 tablet by mouth 2 (Two) Times a Day., Disp: 60 tablet, Rfl: 0    HYDROcodone-acetaminophen (NORCO) 5-325 MG per tablet, Take 1 tablet by mouth Every 8 (Eight) Hours As Needed for Severe Pain., Disp: 10 tablet, Rfl: 0    lidocaine (LIDODERM) 5 %, Place 1 patch on the skin as directed by provider Daily. Remove & Discard patch within 12 hours or as directed by MD, Disp: 30 patch, Rfl: 5    metoprolol succinate XL (TOPROL-XL) 25 MG 24 hr tablet, Take 1 tablet by mouth Daily., Disp: 90 tablet, Rfl: 3    NIFEdipine XL (PROCARDIA XL) 60 MG 24 hr tablet, TAKE ONE TABLET BY MOUTH DAILY, Disp: 90 tablet, Rfl: 2    NON FORMULARY, Compound cream, Disp: , Rfl:     pantoprazole (PROTONIX) 20 MG EC tablet, TAKE ONE TABLET BY MOUTH DAILY (Patient taking differently: Take 1 tablet by mouth Every Morning.), Disp: 30 tablet, Rfl: 3    ramipril (ALTACE) 10 MG capsule, Take 1 capsule by mouth Every Morning., Disp: 90 capsule, Rfl: 3    tamsulosin (FLOMAX) 0.4 MG capsule 24 hr capsule, Take 2 capsules by mouth Every Night., Disp: , Rfl:     tiotropium bromide-olodaterol (STIOLTO RESPIMAT) 2.5-2.5 MCG/ACT aerosol solution inhaler, Inhale 2  puffs Daily., Disp: , Rfl:     triamcinolone (KENALOG) 0.1 % lotion, Apply  topically to the appropriate area as directed 3 (Three) Times a Day., Disp: 120 mL, Rfl: 1    ondansetron ODT (ZOFRAN-ODT) 4 MG disintegrating tablet, Place 1 tablet on the tongue 4 (Four) Times a Day As Needed for Nausea or Vomiting., Disp: 15 tablet, Rfl: 0    Current Facility-Administered Medications:     bupivacaine (PF) (MARCAINE) 0.5 % injection 10 mL, 10 mL, Injection, Once, Edu Tineo MD    diazePAM (VALIUM) tablet 10 mg, 10 mg, Oral, Once PRN, Edu Tineo MD    iopamidol (ISOVUE-M 200) injection 41%, 12 mL, Intra-articular, Once in imaging, Edu Tineo MD    lidocaine (XYLOCAINE) 1 % injection 5 mL, 5 mL, Infiltration, Once, Edu Tineo MD    methylPREDNISolone acetate (DEPO-medrol) injection 80 mg, 80 mg, Intra-articular, Once, Edu Tineo MD    Current Outpatient Medications on File Prior to Encounter   Medication Sig Dispense Refill    acetaminophen (TYLENOL) 325 MG tablet Take 2 tablets by mouth 2 (Two) Times a Day As Needed for Mild Pain. 60 tablet 0    albuterol sulfate  (90 Base) MCG/ACT inhaler       allopurinol (Zyloprim) 100 MG tablet Allopurinol 100 mg 1 daily x 1 week then 2 tablets daily x 1 week then 3 tablets daily. 90 tablet 4    apixaban (ELIQUIS) 5 MG tablet tablet Take 1 tablet by mouth Every 12 (Twelve) Hours.      Aspirin (Vazalore) 81 MG capsule Take 1 tablet by mouth Daily. To stop 5-7 days before surgery      atorvastatin (LIPITOR) 40 MG tablet TAKE ONE TABLET BY MOUTH DAILY 90 tablet 1    docusate sodium (COLACE) 100 MG capsule Take 1 capsule by mouth 2 (Two) Times a Day. 60 capsule 0    empagliflozin (JARDIANCE) 10 MG tablet tablet Take 1 tablet by mouth Daily.      ergocalciferol (ERGOCALCIFEROL) 1.25 MG (10092 UT) capsule Take 1 capsule by mouth 1 (One) Time Per Week.      ferrous sulfate 325 (65 FE) MG tablet Take 1 tablet by mouth 1 (One) Time Per Week.       furosemide (Lasix) 40 MG tablet Take 1 tablet by mouth 2 (Two) Times a Day. (Patient taking differently: Take 1 tablet by mouth Every Morning.) 40 tablet 1    gabapentin (NEURONTIN) 600 MG tablet Take 1 tablet by mouth 2 (Two) Times a Day. 60 tablet 0    HYDROcodone-acetaminophen (NORCO) 5-325 MG per tablet Take 1 tablet by mouth Every 8 (Eight) Hours As Needed for Severe Pain. 10 tablet 0    lidocaine (LIDODERM) 5 % Place 1 patch on the skin as directed by provider Daily. Remove & Discard patch within 12 hours or as directed by MD 30 patch 5    metoprolol succinate XL (TOPROL-XL) 25 MG 24 hr tablet Take 1 tablet by mouth Daily. 90 tablet 3    NIFEdipine XL (PROCARDIA XL) 60 MG 24 hr tablet TAKE ONE TABLET BY MOUTH DAILY 90 tablet 2    NON FORMULARY Compound cream      pantoprazole (PROTONIX) 20 MG EC tablet TAKE ONE TABLET BY MOUTH DAILY (Patient taking differently: Take 1 tablet by mouth Every Morning.) 30 tablet 3    ramipril (ALTACE) 10 MG capsule Take 1 capsule by mouth Every Morning. 90 capsule 3    tamsulosin (FLOMAX) 0.4 MG capsule 24 hr capsule Take 2 capsules by mouth Every Night.      tiotropium bromide-olodaterol (STIOLTO RESPIMAT) 2.5-2.5 MCG/ACT aerosol solution inhaler Inhale 2 puffs Daily.      triamcinolone (KENALOG) 0.1 % lotion Apply  topically to the appropriate area as directed 3 (Three) Times a Day. 120 mL 1    ondansetron ODT (ZOFRAN-ODT) 4 MG disintegrating tablet Place 1 tablet on the tongue 4 (Four) Times a Day As Needed for Nausea or Vomiting. 15 tablet 0     No current facility-administered medications on file prior to encounter.         * No active hospital problems. *       Past Medical History:   Diagnosis Date    Abnormal EKG     referring to cardiology    Anxiety     history    Arthritis     Ascending aorta dilation     Back pain     worsening    BPH (benign prostatic hyperplasia)     BPH/Nocturia/ Urinary Frequency    Breast nodule     right    Cardiomegaly     Cardiomegaly/ SOB  "with exertion    Cellulitis of left lower extremity     history    Circulation problem     Constipation     COPD (chronic obstructive pulmonary disease)     Deviated septum     DJD (degenerative joint disease)     DJD Knees    DVT (deep venous thrombosis)     Dysphagia     solids and liquids - resolved with normal studies    Encounter for annual health examination 11/14/2013    Annual Health Assessment    Enlarged prostate     Fatigue     Extreme fatigue    GERD (gastroesophageal reflux disease)     Hyperlipidemia 1999    Hypertension 1999    followed Dr. Marcelo    Hypogonadism male     Left hip pain     and DJD    Left leg pain     Low back pain     Moist mucous membranes of ear, nose, and throat     \"Mucous in throat\"    Morbid obesity     (BMI-41.2za)    Muscle cramping     Muscle spasm     Muscle spasms    Neck arthritis     Neck muscle spasm     Neck muscle spasm/Cervical strain    Neck pain     Obesity     Plantar fasciitis     Prostatitis     recurrent    Psoriasis     Pulmonary embolus 01/2019    on Xarelltoypseerlipidemia    Radiculopathy     Radiculopathy / Neuropathy left ar    Renal insufficiency     followed by Dr. Mendes    Seborrhea     Shortness of breath     Sleep apnea     Obstructive Sleep apnea worsening; uses CPAP    Urinary frequency     Vascular disorder     Vertigo     Weight gain     Wellness examination 10/23/2014    Annual Wellness Visit       Past Surgical History:   Procedure Laterality Date    APPENDECTOMY  1965    CARDIAC CATHETERIZATION  07/29/2010    [facility]    COLONOSCOPY  01/05/2010    COLONOSCOPY  10/01/2001    KIDNEY SURGERY  07/27/2023    biosp[facility]    NASAL SEPTUM SURGERY  1999    RADIOFREQUENCY ABLATION Bilateral 03/02/2021    Procedure: RADIOFREQUENCY ABLATION NERVES---bilateral cervical 2-cervical3;  Surgeon: Edu Tineo MD;  [place] [facility] [place];  Service: Pain Management;  Laterality: Bilateral;    RADIOFREQUENCY ABLATION Bilateral " 12/02/2021    Procedure: RADIOFREQUENCY ABLATION NERVES--bilateral cervical2-3;  Surgeon: Edu Tineo MD;  Location: [facility];  Service: Pain Management;  Laterality: Bilateral;    RADIOFREQUENCY ABLATION Bilateral 10/28/2022    Procedure: CERVICAL RADIOFREQUENCY LESIONING BILATERAL C2-3;  Surgeon: Edu Tineo MD;  Location: [facility];  Service: Pain Management;  Laterality: Bilateral;    SHOULDER ARTHROSCOPY Left 04/18/2023    Procedure: SHOULDER ARTHROSCOPY WITH SUBACROMIAL DECOMPRESSION, DISTAL CLAVICLE EXCISION,  BICEPS TENODESIS;  Surgeon: Kevin Serrano MD;  Location:  [facility];  Service: Orthopedics;  Laterality: Left;    TURP / TRANSURETHRAL INCISION / DRAINAGE PROSTATE  10/23/2015    Michael Castro       Family History   Problem Relation Age of Onset    Arthritis Mother     Heart disease Mother     Hypertension Mother     Heart attack Father     Heart disease Father     Hypertension Father     Arthritis Sister     Multiple sclerosis Sister     Alcohol abuse Brother     Diabetes Son     Malig Hyperthermia Neg Hx        Social History     Socioeconomic History    Marital status:    Tobacco Use    Smoking status: Never     Passive exposure: Never    Smokeless tobacco: Never    Tobacco comments:     caffiene use: soda and coffee   Vaping Use    Vaping status: Never Used   Substance and Sexual Activity    Alcohol use: Yes     Comment: 2-3  a year    Drug use: Never    Sexual activity: Defer       -------       Review of Systems  No Fever, No Chills, No ear pain, No sinus pressure or drainage, No eye pain or drainage, No cough, No SOB, No chest tightness nor chest pain, no palpitations.          Vitals:    08/21/24 0828   BP: (!) 197/92  Comment: dr donahue made awareof reading   BP Location: Right arm   Patient Position: Sitting   Pulse: 55   Resp: 16   Temp: 97.1 °F (36.2 °C)   TempSrc: Infrared   SpO2: 96%         Objective   Physical Exam  VSS, NNR, NCAT, NMNA, NRD,  AAOx3.    Mallampati 4  -------    Assessment & Plan:  - as noted above, the stated intervention is indicated  - Follow-up plan will be noted in the operative report      ASA 3.  He could have some oral diazepam for anxiolytic and muscle relaxation if necessary  Plan to follow-up in the office in a couple of months      EMR Dragon/Transcription disclaimer:   Typed items in this encounter note may have been created by electronic transcription/translation software which converts spoken language to printed text. The electronic translation of spoken language may permit erroneous, or at times, nonsensical words or phrases to be inadvertently transcribed; Although I have reviewed the note for such errors, some may still exist.

## 2024-08-20 NOTE — H&P (VIEW-ONLY)
Brief Pre-procedural / Pre-operative H&P        -----    Pertinent Diagnosis:   Glenohumeral arthritis on the left.  Chronic left shoulder pain    Proposed Procedure: Left shoulder joint injection, left, fluoroscopic guidance      Subjective   James Loaiza is a 78 y.o. male  who presents for intervention.  He has a history of left shoulder pain..      History of Present Illness     Left shoulder pain has been more recently flared.  He had a arthroscopy of that joint in April of last year and fortunately did not realize any pain relief after that.  He also did not succeed with a left subacromial bursa injection at the orthopedist office.  I reached out to his orthopedist into request guidance, and ask if a glenohumeral injection would be reasonable and consistent with this plan of care and offered to perform it and he thought so and agreed with that plan.    The patient is avoiding activity and staying still to try to avoid shoulder pain flareups because of the significant amount of incident pain.  I am concerned about evolving Kinesiophobia.  He has decreased range of motion and positive Santos on the left    -------    The following portions of the patient's history were reviewed and updated as appropriate: allergies, current medications, past family history, past medical history, past social history, past surgical history and problem list.    No Known Allergies      Current Outpatient Medications:     acetaminophen (TYLENOL) 325 MG tablet, Take 2 tablets by mouth 2 (Two) Times a Day As Needed for Mild Pain., Disp: 60 tablet, Rfl: 0    albuterol sulfate  (90 Base) MCG/ACT inhaler, , Disp: , Rfl:     allopurinol (Zyloprim) 100 MG tablet, Allopurinol 100 mg 1 daily x 1 week then 2 tablets daily x 1 week then 3 tablets daily., Disp: 90 tablet, Rfl: 4    apixaban (ELIQUIS) 5 MG tablet tablet, Take 1 tablet by mouth Every 12 (Twelve) Hours., Disp: , Rfl:     Aspirin (Vazalore) 81 MG capsule, Take 1 tablet by  mouth Daily. To stop 5-7 days before surgery, Disp: , Rfl:     atorvastatin (LIPITOR) 40 MG tablet, TAKE ONE TABLET BY MOUTH DAILY, Disp: 90 tablet, Rfl: 1    docusate sodium (COLACE) 100 MG capsule, Take 1 capsule by mouth 2 (Two) Times a Day., Disp: 60 capsule, Rfl: 0    empagliflozin (JARDIANCE) 10 MG tablet tablet, Take 1 tablet by mouth Daily., Disp: , Rfl:     ergocalciferol (ERGOCALCIFEROL) 1.25 MG (19386 UT) capsule, Take 1 capsule by mouth 1 (One) Time Per Week., Disp: , Rfl:     ferrous sulfate 325 (65 FE) MG tablet, Take 1 tablet by mouth 1 (One) Time Per Week., Disp: , Rfl:     furosemide (Lasix) 40 MG tablet, Take 1 tablet by mouth 2 (Two) Times a Day. (Patient taking differently: Take 1 tablet by mouth Every Morning.), Disp: 40 tablet, Rfl: 1    gabapentin (NEURONTIN) 600 MG tablet, Take 1 tablet by mouth 2 (Two) Times a Day., Disp: 60 tablet, Rfl: 0    HYDROcodone-acetaminophen (NORCO) 5-325 MG per tablet, Take 1 tablet by mouth Every 8 (Eight) Hours As Needed for Severe Pain., Disp: 10 tablet, Rfl: 0    lidocaine (LIDODERM) 5 %, Place 1 patch on the skin as directed by provider Daily. Remove & Discard patch within 12 hours or as directed by MD, Disp: 30 patch, Rfl: 5    metoprolol succinate XL (TOPROL-XL) 25 MG 24 hr tablet, Take 1 tablet by mouth Daily., Disp: 90 tablet, Rfl: 3    NIFEdipine XL (PROCARDIA XL) 60 MG 24 hr tablet, TAKE ONE TABLET BY MOUTH DAILY, Disp: 90 tablet, Rfl: 2    NON FORMULARY, Compound cream, Disp: , Rfl:     pantoprazole (PROTONIX) 20 MG EC tablet, TAKE ONE TABLET BY MOUTH DAILY (Patient taking differently: Take 1 tablet by mouth Every Morning.), Disp: 30 tablet, Rfl: 3    ramipril (ALTACE) 10 MG capsule, Take 1 capsule by mouth Every Morning., Disp: 90 capsule, Rfl: 3    tamsulosin (FLOMAX) 0.4 MG capsule 24 hr capsule, Take 2 capsules by mouth Every Night., Disp: , Rfl:     tiotropium bromide-olodaterol (STIOLTO RESPIMAT) 2.5-2.5 MCG/ACT aerosol solution inhaler, Inhale 2  puffs Daily., Disp: , Rfl:     triamcinolone (KENALOG) 0.1 % lotion, Apply  topically to the appropriate area as directed 3 (Three) Times a Day., Disp: 120 mL, Rfl: 1    ondansetron ODT (ZOFRAN-ODT) 4 MG disintegrating tablet, Place 1 tablet on the tongue 4 (Four) Times a Day As Needed for Nausea or Vomiting., Disp: 15 tablet, Rfl: 0    Current Facility-Administered Medications:     bupivacaine (PF) (MARCAINE) 0.5 % injection 10 mL, 10 mL, Injection, Once, Edu Tineo MD    diazePAM (VALIUM) tablet 10 mg, 10 mg, Oral, Once PRN, Edu Tineo MD    iopamidol (ISOVUE-M 200) injection 41%, 12 mL, Intra-articular, Once in imaging, Edu Tineo MD    lidocaine (XYLOCAINE) 1 % injection 5 mL, 5 mL, Infiltration, Once, Edu Tineo MD    methylPREDNISolone acetate (DEPO-medrol) injection 80 mg, 80 mg, Intra-articular, Once, Edu Tineo MD    Current Outpatient Medications on File Prior to Encounter   Medication Sig Dispense Refill    acetaminophen (TYLENOL) 325 MG tablet Take 2 tablets by mouth 2 (Two) Times a Day As Needed for Mild Pain. 60 tablet 0    albuterol sulfate  (90 Base) MCG/ACT inhaler       allopurinol (Zyloprim) 100 MG tablet Allopurinol 100 mg 1 daily x 1 week then 2 tablets daily x 1 week then 3 tablets daily. 90 tablet 4    apixaban (ELIQUIS) 5 MG tablet tablet Take 1 tablet by mouth Every 12 (Twelve) Hours.      Aspirin (Vazalore) 81 MG capsule Take 1 tablet by mouth Daily. To stop 5-7 days before surgery      atorvastatin (LIPITOR) 40 MG tablet TAKE ONE TABLET BY MOUTH DAILY 90 tablet 1    docusate sodium (COLACE) 100 MG capsule Take 1 capsule by mouth 2 (Two) Times a Day. 60 capsule 0    empagliflozin (JARDIANCE) 10 MG tablet tablet Take 1 tablet by mouth Daily.      ergocalciferol (ERGOCALCIFEROL) 1.25 MG (00473 UT) capsule Take 1 capsule by mouth 1 (One) Time Per Week.      ferrous sulfate 325 (65 FE) MG tablet Take 1 tablet by mouth 1 (One) Time Per Week.       furosemide (Lasix) 40 MG tablet Take 1 tablet by mouth 2 (Two) Times a Day. (Patient taking differently: Take 1 tablet by mouth Every Morning.) 40 tablet 1    gabapentin (NEURONTIN) 600 MG tablet Take 1 tablet by mouth 2 (Two) Times a Day. 60 tablet 0    HYDROcodone-acetaminophen (NORCO) 5-325 MG per tablet Take 1 tablet by mouth Every 8 (Eight) Hours As Needed for Severe Pain. 10 tablet 0    lidocaine (LIDODERM) 5 % Place 1 patch on the skin as directed by provider Daily. Remove & Discard patch within 12 hours or as directed by MD 30 patch 5    metoprolol succinate XL (TOPROL-XL) 25 MG 24 hr tablet Take 1 tablet by mouth Daily. 90 tablet 3    NIFEdipine XL (PROCARDIA XL) 60 MG 24 hr tablet TAKE ONE TABLET BY MOUTH DAILY 90 tablet 2    NON FORMULARY Compound cream      pantoprazole (PROTONIX) 20 MG EC tablet TAKE ONE TABLET BY MOUTH DAILY (Patient taking differently: Take 1 tablet by mouth Every Morning.) 30 tablet 3    ramipril (ALTACE) 10 MG capsule Take 1 capsule by mouth Every Morning. 90 capsule 3    tamsulosin (FLOMAX) 0.4 MG capsule 24 hr capsule Take 2 capsules by mouth Every Night.      tiotropium bromide-olodaterol (STIOLTO RESPIMAT) 2.5-2.5 MCG/ACT aerosol solution inhaler Inhale 2 puffs Daily.      triamcinolone (KENALOG) 0.1 % lotion Apply  topically to the appropriate area as directed 3 (Three) Times a Day. 120 mL 1    ondansetron ODT (ZOFRAN-ODT) 4 MG disintegrating tablet Place 1 tablet on the tongue 4 (Four) Times a Day As Needed for Nausea or Vomiting. 15 tablet 0     No current facility-administered medications on file prior to encounter.         * No active hospital problems. *       Past Medical History:   Diagnosis Date    Abnormal EKG     referring to cardiology    Anxiety     history    Arthritis     Ascending aorta dilation     Back pain     worsening    BPH (benign prostatic hyperplasia)     BPH/Nocturia/ Urinary Frequency    Breast nodule     right    Cardiomegaly     Cardiomegaly/ SOB  "with exertion    Cellulitis of left lower extremity     history    Circulation problem     Constipation     COPD (chronic obstructive pulmonary disease)     Deviated septum     DJD (degenerative joint disease)     DJD Knees    DVT (deep venous thrombosis)     Dysphagia     solids and liquids - resolved with normal studies    Encounter for annual health examination 11/14/2013    Annual Health Assessment    Enlarged prostate     Fatigue     Extreme fatigue    GERD (gastroesophageal reflux disease)     Hyperlipidemia 1999    Hypertension 1999    followed Dr. Marcelo    Hypogonadism male     Left hip pain     and DJD    Left leg pain     Low back pain     Moist mucous membranes of ear, nose, and throat     \"Mucous in throat\"    Morbid obesity     (BMI-41.2za)    Muscle cramping     Muscle spasm     Muscle spasms    Neck arthritis     Neck muscle spasm     Neck muscle spasm/Cervical strain    Neck pain     Obesity     Plantar fasciitis     Prostatitis     recurrent    Psoriasis     Pulmonary embolus 01/2019    on Xarelltoypseerlipidemia    Radiculopathy     Radiculopathy / Neuropathy left ar    Renal insufficiency     followed by Dr. Mendes    Seborrhea     Shortness of breath     Sleep apnea     Obstructive Sleep apnea worsening; uses CPAP    Urinary frequency     Vascular disorder     Vertigo     Weight gain     Wellness examination 10/23/2014    Annual Wellness Visit       Past Surgical History:   Procedure Laterality Date    APPENDECTOMY  1965    CARDIAC CATHETERIZATION  07/29/2010    [facility]    COLONOSCOPY  01/05/2010    COLONOSCOPY  10/01/2001    KIDNEY SURGERY  07/27/2023    [facility]    NASAL SEPTUM SURGERY  1999    RADIOFREQUENCY ABLATION Bilateral 03/02/2021    Procedure: RADIOFREQUENCY ABLATION NERVES---bilateral cervical 2-cervical3;  Surgeon: Edu Tineo MD;  Location: [facility] MA[facility] OR;  Service: Pain Management;  Laterality: Bilateral;    RADIOFREQUENCY ABLATION Bilateral " 12/02/2021    Procedure: RADIOFREQUENCY ABLATION NERVES--bilateral cervical2-3;  Surgeon: Edu Tineo MD;  Location: [facility];  Service: Pain Management;  Laterality: Bilateral;    RADIOFREQUENCY ABLATION Bilateral 10/28/2022    Procedure: CERVICAL RADIOFREQUENCY LESIONING BILATERAL C2-3;  Surgeon: Edu Tineo MD;  Location: [facility];  Service: Pain Management;  Laterality: Bilateral;    SHOULDER ARTHROSCOPY Left 04/18/2023    Procedure: SHOULDER ARTHROSCOPY WITH SUBACROMIAL DECOMPRESSION, DISTAL CLAVICLE EXCISION,  BICEPS TENODESIS;  Surgeon: Kevin Serrano MD;  Location:  [facility];  Service: Orthopedics;  Laterality: Left;    TURP / TRANSURETHRAL INCISION / DRAINAGE PROSTATE  10/23/2015    Michael Castro       Family History   Problem Relation Age of Onset    Arthritis Mother     Heart disease Mother     Hypertension Mother     Heart attack Father     Heart disease Father     Hypertension Father     Arthritis Sister     Multiple sclerosis Sister     Alcohol abuse Brother     Diabetes Son     Malig Hyperthermia Neg Hx        Social History     Socioeconomic History    Marital status:    Tobacco Use    Smoking status: Never     Passive exposure: Never    Smokeless tobacco: Never    Tobacco comments:     caffiene use: soda and coffee   Vaping Use    Vaping status: Never Used   Substance and Sexual Activity    Alcohol use: Yes     Comment: 2-3  a year    Drug use: Never    Sexual activity: Defer       -------       Review of Systems  No Fever, No Chills, No ear pain, No sinus pressure or drainage, No eye pain or drainage, No cough, No SOB, No chest tightness nor chest pain, no palpitations.          Vitals:    08/21/24 0828   BP: (!) 197/92  Comment: dr donahue made awareof reading   BP Location: Right arm   Patient Position: Sitting   Pulse: 55   Resp: 16   Temp: 97.1 °F (36.2 °C)   TempSrc: Infrared   SpO2: 96%         Objective   Physical Exam  VSS, NNR, NCAT, NMNA, NRD,  AAOx3.    Mallampati 4  -------    Assessment & Plan:  - as noted above, the stated intervention is indicated  - Follow-up plan will be noted in the operative report      ASA 3.  He could have some oral diazepam for anxiolytic and muscle relaxation if necessary  Plan to follow-up in the office in a couple of months      EMR Dragon/Transcription disclaimer:   Typed items in this encounter note may have been created by electronic transcription/translation software which converts spoken language to printed text. The electronic translation of spoken language may permit erroneous, or at times, nonsensical words or phrases to be inadvertently transcribed; Although I have reviewed the note for such errors, some may still exist.

## 2024-08-21 ENCOUNTER — HOSPITAL ENCOUNTER (OUTPATIENT)
Dept: GENERAL RADIOLOGY | Facility: HOSPITAL | Age: 79
Discharge: HOME | End: 2024-08-21
Payer: MEDICARE

## 2024-08-21 ENCOUNTER — HOSPITAL ENCOUNTER (OUTPATIENT)
Dept: PAIN MEDICINE | Facility: HOSPITAL | Age: 79
Discharge: HOME | End: 2024-08-21
Payer: MEDICARE

## 2024-08-21 VITALS
HEART RATE: 50 BPM | SYSTOLIC BLOOD PRESSURE: 188 MMHG | RESPIRATION RATE: 16 BRPM | DIASTOLIC BLOOD PRESSURE: 97 MMHG | OXYGEN SATURATION: 98 % | TEMPERATURE: 97.1 F

## 2024-08-21 DIAGNOSIS — G89.29 CHRONIC LOW BACK PAIN, UNSPECIFIED BACK PAIN LATERALITY, UNSPECIFIED WHETHER SCIATICA PRESENT: ICD-10-CM

## 2024-08-21 DIAGNOSIS — M47.812 CERVICAL FACET JOINT SYNDROME: ICD-10-CM

## 2024-08-21 DIAGNOSIS — M25.512 CHRONIC LEFT SHOULDER PAIN: ICD-10-CM

## 2024-08-21 DIAGNOSIS — M19.012 GLENOHUMERAL ARTHRITIS, LEFT: ICD-10-CM

## 2024-08-21 DIAGNOSIS — R52 PAIN: ICD-10-CM

## 2024-08-21 DIAGNOSIS — M54.50 CHRONIC LOW BACK PAIN, UNSPECIFIED BACK PAIN LATERALITY, UNSPECIFIED WHETHER SCIATICA PRESENT: ICD-10-CM

## 2024-08-21 DIAGNOSIS — G89.29 CHRONIC LEFT SHOULDER PAIN: ICD-10-CM

## 2024-08-21 PROCEDURE — 77003 FLUOROGUIDE FOR SPINE INJECT: CPT

## 2024-08-21 PROCEDURE — 25510000001 IOPAMIDOL 41 % SOLUTION: Performed by: ANESTHESIOLOGY

## 2024-08-21 PROCEDURE — S0260 H&P FOR SURGERY: HCPCS | Performed by: ANESTHESIOLOGY

## 2024-08-21 PROCEDURE — 77002 NEEDLE LOCALIZATION BY XRAY: CPT | Performed by: ANESTHESIOLOGY

## 2024-08-21 PROCEDURE — 25010000002 METHYLPREDNISOLONE PER 80 MG: Performed by: ANESTHESIOLOGY

## 2024-08-21 PROCEDURE — 20610 DRAIN/INJ JOINT/BURSA W/O US: CPT | Performed by: ANESTHESIOLOGY

## 2024-08-21 PROCEDURE — 25010000002 BUPIVACAINE (PF) 0.5 % SOLUTION: Performed by: ANESTHESIOLOGY

## 2024-08-21 RX ORDER — GABAPENTIN 600 MG/1
600 TABLET ORAL 2 TIMES DAILY
Qty: 180 TABLET | Refills: 0 | Status: SHIPPED | OUTPATIENT
Start: 2024-08-21

## 2024-08-21 RX ORDER — IOPAMIDOL 408 MG/ML
12 INJECTION, SOLUTION INTRATHECAL
Status: COMPLETED | OUTPATIENT
Start: 2024-08-21 | End: 2024-08-21

## 2024-08-21 RX ORDER — METHYLPREDNISOLONE ACETATE 80 MG/ML
80 INJECTION, SUSPENSION INTRA-ARTICULAR; INTRALESIONAL; INTRAMUSCULAR; SOFT TISSUE ONCE
Status: COMPLETED | OUTPATIENT
Start: 2024-08-21 | End: 2024-08-21

## 2024-08-21 RX ORDER — LIDOCAINE HYDROCHLORIDE 10 MG/ML
5 INJECTION, SOLUTION INFILTRATION; PERINEURAL ONCE
Status: DISCONTINUED | OUTPATIENT
Start: 2024-08-21 | End: 2024-08-22 | Stop reason: HOSPADM

## 2024-08-21 RX ORDER — DIAZEPAM 5 MG/1
10 TABLET ORAL ONCE AS NEEDED
Status: DISCONTINUED | OUTPATIENT
Start: 2024-08-21 | End: 2024-08-22 | Stop reason: HOSPADM

## 2024-08-21 RX ORDER — BUPIVACAINE HYDROCHLORIDE 5 MG/ML
10 INJECTION, SOLUTION EPIDURAL; INTRACAUDAL ONCE
Status: COMPLETED | OUTPATIENT
Start: 2024-08-21 | End: 2024-08-21

## 2024-08-21 RX ADMIN — BUPIVACAINE HYDROCHLORIDE 10 ML: 5 INJECTION, SOLUTION EPIDURAL; INTRACAUDAL; PERINEURAL at 09:20

## 2024-08-21 RX ADMIN — IOPAMIDOL 10 ML: 408 INJECTION, SOLUTION INTRATHECAL at 09:20

## 2024-08-21 RX ADMIN — METHYLPREDNISOLONE ACETATE 80 MG: 80 INJECTION, SUSPENSION INTRA-ARTICULAR; INTRALESIONAL; INTRAMUSCULAR; SOFT TISSUE at 09:20

## 2024-08-21 NOTE — DISCHARGE INSTRUCTIONS
[facility] - Post-procedure Instructions          --  While there are no absolute restrictions, it is recommended that you do not perform strenuous activity today. In the morning, you may resume your level of activity as before your block.    --  If you have a band-aid at your injection site, please remove it later today. Observe the area for any redness, swelling, pus-like drainage, or a temperature over 101°. If any of these symptoms occur, please call your doctor at [phone]. If after office hours, leave a message and the on-call provider will return your call.    --  Ice may be applied to your injection site. It is recommended you avoid direct heat (heating pad; hot tub) for 1-2 days.    --  Call [facility] at [phone] if you experience persistent headache, persistent bleeding from the injection site, or severe pain not relieved by heat or oral medication.    --  Do not make important decisions today.    --  Due to the effects of the block and/or the I.V. Sedation, DO NOT drive or operate hazardous machinery for 12 hours.  Local anesthetics may cause numbness after procedure and precautions must be taken with regards to operating equipment as well as with walking, even if ambulating with assistance of another person or with an assistive device.    --  Do not drink alcohol for 12 hours.    -- You may return to work tomorrow, or as directed by your referring doctor.    --  Occasionally you may notice a slight increase in your pain after the procedure. This should start to improve within the next 24-48 hours. Radiofrequency ablation procedure pain may last 3-4 weeks.    --  It may take as long as 3-4 days before you notice a gradual improvement in your pain and/or other symptoms.    -- You may continue to take your prescribed pain medication as needed.    --  Some normal possible side effects of steroid use could include fluid retention, increased blood sugar, dull headache,  increased sweating, increased appetite, mood swings and flushing.    --  Diabetics are recommended to watch their blood glucose level closely for 24-48 hours after the injection.    --  Must stay in PACU for 20 min upon arrival and prove no leg weakness before being discharged.    --  IN THE EVENT OF A LIFE THREATENING EMERGENCY, (CHEST PAIN, BREATHING DIFFICULTIES, PARALYSIS…) YOU SHOULD GO TO YOUR NEAREST EMERGENCY ROOM.    --  You should be contacted by our office within 2-3 days to schedule follow up or next appointment date.  If not contacted within 7 days, please call the office at [phone]

## 2024-08-21 NOTE — PROCEDURES
Procedures    Glenohumeral injection, left    [facility]    PREOPERATIVE DIAGNOSIS: Glenohumeral arthritis, left.  Chronic left shoulder pain    POSTOPERATIVE DIAGNOSIS:  Same as preop diagnosis    PROCEDURE:   Glenohumeral joint injection on the left, with fluoroscopic guidance      PRE-PROCEDURE DISCUSSION WITH PATIENT:    Risks and complications were discussed with the patient prior to starting the procedure and informed consent was obtained.  We discussed various topics including but not limited to bleeding, infection, injury, nerve injury, paralysis, coma, death, postprocedural painful flare-up, postprocedural site soreness, and a lack of pain relief.      Preprocedure assessment/presedation assessment is noted as the H&P/H&P update as part of this Epic chart encounter.  Preassessment plan and preprocedure airway assessment are noted.  Immediate in the room airway assessment is unchanged from the preprocedure assessment performed in the preoperative area a few minutes ago.      SURGEON:  Edu Tineo MD    REASON FOR PROCEDURE:    Significant left shoulder pain, chronic.  Has followed with orthopedist.  Unfortunately did not respond recently to a subacromial bursa injection.  Has had arthroscopic procedure in the left shoulder a little over a year ago.  Continued with left shoulder pain.    SEDATION:  Patient declined administration of moderate sedation    ANESTHETIC:  Marcaine 0.5%  STEROID:  Methylprednisolone (DEPO MEDROL) 40mg/ml    DESCRIPTON OF PROCEDURE:  After obtaining informed consent, an I.V. was not started in the preoperative area. The patient taken to the operating room and was placed in the supine position.  All pressure points were well padded.  EKG, blood pressure, and pulse oximeter were monitored.  The appropriate area was prepped with Chloraprep and draped in a sterile fashion.     Under fluoroscopic views glenohumeral joint was visualized and an AP orientation.  After  applying 1% lidocaine subcutaneously advanced a 22-gauge spinal needle in plane with the fluoroscope beam to the superior aspect of the glenohumeral joint and upon feeling the sensation of entering the joint aspiration was checked was negative and arthrogram was completed with 1 cc of Isovue-200 and contrast was seen flowing around the head of the humerus.  Then the above solution of 4 cc of Marcaine 0.5% and the steroid were easily injected into the joint, checking for negative aspiration after each 1 cc of injection.    The needle was removed intact.  Vital signs were stable throughout.        ESTIMATED BLOOD LOSS:  <5 mL  SPECIMENS:  none    COMPLICATIONS:   No complications were noted., The patient did not have any signs of postprocedure numbness nor weakness., There was minimal amount of bleeding which was easily addressed with application of pressure and a bandage., and The patient was reassured.    TOLERANCE & DISCHARGE CONDITION:    The patient tolerated the procedure well.  The patient was transported to the recovery area without difficulties.  The patient was discharged to home under the care of family in stable and satisfactory condition.    PLAN OF CARE:  The patient was given our standard instruction sheet.  The patient will Return to clinic in 8 wks.  The patient will resume all medications as per the medication reconciliation sheet.

## 2024-10-04 ENCOUNTER — OFFICE VISIT (OUTPATIENT)
Dept: PAIN MEDICINE | Facility: CLINIC | Age: 79
End: 2024-10-04
Payer: MEDICARE

## 2024-10-04 ENCOUNTER — PREP FOR SURGERY (OUTPATIENT)
Dept: SURGERY | Facility: SURGERY CENTER | Age: 79
End: 2024-10-04
Payer: MEDICARE

## 2024-10-04 VITALS
TEMPERATURE: 98.4 F | WEIGHT: 311 LBS | RESPIRATION RATE: 20 BRPM | OXYGEN SATURATION: 96 % | HEIGHT: 74 IN | HEART RATE: 51 BPM | DIASTOLIC BLOOD PRESSURE: 82 MMHG | SYSTOLIC BLOOD PRESSURE: 178 MMHG | BODY MASS INDEX: 39.91 KG/M2

## 2024-10-04 DIAGNOSIS — G89.29 CHRONIC NECK PAIN: ICD-10-CM

## 2024-10-04 DIAGNOSIS — M19.012 GLENOHUMERAL ARTHRITIS, LEFT: ICD-10-CM

## 2024-10-04 DIAGNOSIS — M47.812 CERVICAL FACET JOINT SYNDROME: ICD-10-CM

## 2024-10-04 DIAGNOSIS — M54.2 CHRONIC NECK PAIN: ICD-10-CM

## 2024-10-04 DIAGNOSIS — M54.81 BILATERAL OCCIPITAL NEURALGIA: ICD-10-CM

## 2024-10-04 DIAGNOSIS — M54.50 CHRONIC LOW BACK PAIN, UNSPECIFIED BACK PAIN LATERALITY, UNSPECIFIED WHETHER SCIATICA PRESENT: Primary | ICD-10-CM

## 2024-10-04 DIAGNOSIS — G89.29 CHRONIC LOW BACK PAIN, UNSPECIFIED BACK PAIN LATERALITY, UNSPECIFIED WHETHER SCIATICA PRESENT: Primary | ICD-10-CM

## 2024-10-04 DIAGNOSIS — M47.812 CERVICAL FACET JOINT SYNDROME: Primary | ICD-10-CM

## 2024-10-04 LAB
POC AMPHETAMINES: NEGATIVE
POC BARBITURATES: NEGATIVE
POC BENZODIAZEPHINES: NEGATIVE
POC COCAINE: NEGATIVE
POC METHADONE: NEGATIVE
POC METHAMPHETAMINE SCREEN URINE: NEGATIVE
POC OPIATES: NEGATIVE
POC OXYCODONE: NEGATIVE
POC PHENCYCLIDINE: NEGATIVE
POC PROPOXYPHENE: NEGATIVE
POC THC: NEGATIVE
POC TRICYCLIC ANTIDEPRESSANTS: NEGATIVE

## 2024-10-04 RX ORDER — SODIUM CHLORIDE 0.9 % (FLUSH) 0.9 %
10 SYRINGE (ML) INJECTION AS NEEDED
OUTPATIENT
Start: 2024-10-04

## 2024-10-04 RX ORDER — CHOLECALCIFEROL (VITAMIN D3) 25 MCG
2 CAPSULE ORAL DAILY
COMMUNITY

## 2024-10-04 RX ORDER — SODIUM CHLORIDE 0.9 % (FLUSH) 0.9 %
10 SYRINGE (ML) INJECTION EVERY 12 HOURS SCHEDULED
OUTPATIENT
Start: 2024-10-04

## 2024-10-04 RX ORDER — FOLIC ACID 1 MG/1
1 TABLET ORAL DAILY
COMMUNITY
Start: 2024-07-03

## 2024-10-10 ENCOUNTER — TELEPHONE (OUTPATIENT)
Dept: PAIN MEDICINE | Facility: CLINIC | Age: 79
End: 2024-10-10

## 2024-10-10 NOTE — TELEPHONE ENCOUNTER
Caller: BETHANIE ORDONEZ     Relationship: PATIENT     Best call back number: [phone]    What test/procedure requested: RFA 11/13/2024  PATIENT WANTS TO BE SURE [facility] - [facility] AND WAS TOLD PROVIDER NEEDS TO REQUEST [facility] AUTH    PATIENT STATES INJECTION 08/21/2024 WAS NOT AUTHORIZED BY [facility] AND COST MORE OUT OF POCKET- GAVE PATIENT BILLING NUMBER

## 2024-10-10 NOTE — TELEPHONE ENCOUNTER
PT CALLED BACK AND STATED THE BILLING DR COOPER NEEDS TO SEND A [facility] TO [facility] IN AUGUST

## 2024-10-11 DIAGNOSIS — M25.519 SHOULDER PAIN, UNSPECIFIED CHRONICITY, UNSPECIFIED LATERALITY: ICD-10-CM

## 2024-10-11 DIAGNOSIS — M47.812 CERVICAL FACET JOINT SYNDROME: ICD-10-CM

## 2024-10-11 RX ORDER — LIDOCAINE 50 MG/G
1 PATCH TOPICAL EVERY 24 HOURS
Qty: 30 PATCH | Refills: 5 | Status: SHIPPED | OUTPATIENT
Start: 2024-10-11

## 2024-10-11 NOTE — TELEPHONE ENCOUNTER
Medication Refill Request    Date of phone call: 10/11/2024    Medication being requested: lidocaine 5% sig: Place 1 patch on the skin as directed by provider Daily   Qty: 30    Date of last visit: 10/4/2024    Date of last refill:     DILAN up to date?: yes    Next Follow up?: 12/20/2024    Any new pertinent information? (i.e, new medication allergies, new use of medications, change in patient's health or condition, non-compliance or inconsistency with prescribing agreement?):

## 2025-03-19 ENCOUNTER — TRANSCRIBE ORDERS (OUTPATIENT)
Dept: CARDIAC REHAB | Facility: HOSPITAL | Age: 80
End: 2025-03-19
Payer: OTHER GOVERNMENT

## 2025-03-19 DIAGNOSIS — J44.9 CHRONIC OBSTRUCTIVE PULMONARY DISEASE, UNSPECIFIED COPD TYPE: Primary | ICD-10-CM

## 2025-03-27 ENCOUNTER — TELEPHONE (OUTPATIENT)
Dept: ORTHOPEDIC SURGERY | Facility: CLINIC | Age: 80
End: 2025-03-27

## 2025-03-27 NOTE — TELEPHONE ENCOUNTER
Caller: James Loaiza    Relationship to patient: Self    Best call back number: [phone] (home)     Chief complaint: BILATERAL KNEE PAIN - LAST INJECTION 7/31/25     Type of visit: CORTISONE INJECTION    Requested date: ASAP    Additional notes: MR LOAIZA HAD AN ABLATION ON HIS NECK AND CORTISONE WAS USED IN THAT PROCEDURE ON 3/24/25 AND THEY TOLD HIM TO CHECK WITH THE OFFICE TO SEE HOW LONG HE SHOULD WAIT BEFORE HE HAS CORTISONE INJECTIONS IN HIS KNEES

## 2025-04-02 ENCOUNTER — OFFICE VISIT (OUTPATIENT)
Dept: ORTHOPEDIC SURGERY | Facility: CLINIC | Age: 80
End: 2025-04-02
Payer: MEDICARE

## 2025-04-02 VITALS — WEIGHT: 301 LBS | HEIGHT: 74 IN | TEMPERATURE: 98.2 F | BODY MASS INDEX: 38.63 KG/M2

## 2025-04-02 DIAGNOSIS — M25.561 BILATERAL CHRONIC KNEE PAIN: Primary | ICD-10-CM

## 2025-04-02 DIAGNOSIS — G89.29 BILATERAL CHRONIC KNEE PAIN: Primary | ICD-10-CM

## 2025-04-02 DIAGNOSIS — M17.12 ARTHRITIS OF LEFT KNEE: ICD-10-CM

## 2025-04-02 DIAGNOSIS — M17.11 ARTHRITIS OF RIGHT KNEE: ICD-10-CM

## 2025-04-02 DIAGNOSIS — M25.562 BILATERAL CHRONIC KNEE PAIN: Primary | ICD-10-CM

## 2025-04-02 PROCEDURE — 20610 DRAIN/INJ JOINT/BURSA W/O US: CPT | Performed by: NURSE PRACTITIONER

## 2025-04-02 PROCEDURE — 1160F RVW MEDS BY RX/DR IN RCRD: CPT | Performed by: NURSE PRACTITIONER

## 2025-04-02 PROCEDURE — 1159F MED LIST DOCD IN RCRD: CPT | Performed by: NURSE PRACTITIONER

## 2025-04-02 RX ORDER — LIDOCAINE HYDROCHLORIDE 10 MG/ML
2 INJECTION, SOLUTION EPIDURAL; INFILTRATION; INTRACAUDAL; PERINEURAL
Status: COMPLETED | OUTPATIENT
Start: 2025-04-02 | End: 2025-04-02

## 2025-04-02 RX ORDER — METHYLPREDNISOLONE ACETATE 80 MG/ML
80 INJECTION, SUSPENSION INTRA-ARTICULAR; INTRALESIONAL; INTRAMUSCULAR; SOFT TISSUE
Status: COMPLETED | OUTPATIENT
Start: 2025-04-02 | End: 2025-04-02

## 2025-04-02 RX ADMIN — LIDOCAINE HYDROCHLORIDE 2 ML: 10 INJECTION, SOLUTION EPIDURAL; INFILTRATION; INTRACAUDAL; PERINEURAL at 10:38

## 2025-04-02 RX ADMIN — METHYLPREDNISOLONE ACETATE 80 MG: 80 INJECTION, SUSPENSION INTRA-ARTICULAR; INTRALESIONAL; INTRAMUSCULAR; SOFT TISSUE at 10:37

## 2025-04-02 RX ADMIN — METHYLPREDNISOLONE ACETATE 80 MG: 80 INJECTION, SUSPENSION INTRA-ARTICULAR; INTRALESIONAL; INTRAMUSCULAR; SOFT TISSUE at 10:38

## 2025-04-02 RX ADMIN — LIDOCAINE HYDROCHLORIDE 2 ML: 10 INJECTION, SOLUTION EPIDURAL; INFILTRATION; INTRACAUDAL; PERINEURAL at 10:37

## 2025-04-02 NOTE — PROGRESS NOTES
Mr. Loaiza comes in today for follow-up.  Injections have worked well in the past.  The patient would like to get repeat injections today.  He has heard a lot about gel injections, he is interested in trying these during his next appointment.  The risks, benefits and alternatives were discussed and the patient consented.  Going forward, the patient will follow-up as needed.    YANIRA Andrea    04/02/2025      Large Joint Arthrocentesis: R knee  Date/Time: 4/2/2025 10:37 AM  Consent given by: patient  Site marked: site marked  Timeout: Immediately prior to procedure a time out was called to verify the correct patient, procedure, equipment, support staff and site/side marked as required   Supporting Documentation  Indications: pain   Procedure Details  Location: knee - R knee  Preparation: Patient was prepped and draped in the usual sterile fashion  Needle gauge: 21G.  Approach: anterolateral  Medications administered: 80 mg methylPREDNISolone acetate 80 MG/ML; 2 mL lidocaine PF 1% 1 %  Patient tolerance: patient tolerated the procedure well with no immediate complications      Large Joint Arthrocentesis: L knee  Date/Time: 4/2/2025 10:38 AM  Consent given by: patient  Site marked: site marked  Timeout: Immediately prior to procedure a time out was called to verify the correct patient, procedure, equipment, support staff and site/side marked as required   Supporting Documentation  Indications: pain   Procedure Details  Location: knee - L knee  Preparation: Patient was prepped and draped in the usual sterile fashion  Needle gauge: 21G.  Approach: anterolateral  Medications administered: 80 mg methylPREDNISolone acetate 80 MG/ML; 2 mL lidocaine PF 1% 1 %  Patient tolerance: patient tolerated the procedure well with no immediate complications

## 2025-04-10 ENCOUNTER — TELEPHONE (OUTPATIENT)
Dept: PAIN MEDICINE | Facility: CLINIC | Age: 80
End: 2025-04-10
Payer: OTHER GOVERNMENT

## 2025-04-10 NOTE — TELEPHONE ENCOUNTER
Caller: James Loaiza    Relationship to patient: Self    Best call back number: [phone] (home)     Patient is needing: PATIENT WANTS TO KNOW THE DETAILS IN LAYMANS TERMS WHAT HE HAD DONE WITH DR COOPER ON 10/28/22

## 2025-04-10 NOTE — TELEPHONE ENCOUNTER
Patient had a bilateral C2-C3 radiofrequency ablation.  He also had this procedure performed on 2/21/2024.  This procedure involves Dr. Tineo burning/ablating the cervical nerves at the C2-C3 level. The goal of this procedure is to provide more long-term pain relief (6-10 months) due to ablation of the nerves. Please let me know if he has further questions.

## 2025-04-10 NOTE — TELEPHONE ENCOUNTER
Patient informed. He states [facility] (due to [facility]) and getting some sort of steroid injection, which isn't providing in the same relief. He would like to have another RFA with Dr. Tineo. I scheduled him an appointment for tomorrow.

## 2025-04-11 ENCOUNTER — PREP FOR SURGERY (OUTPATIENT)
Dept: SURGERY | Facility: SURGERY CENTER | Age: 80
End: 2025-04-11
Payer: OTHER GOVERNMENT

## 2025-04-11 ENCOUNTER — OFFICE VISIT (OUTPATIENT)
Dept: PAIN MEDICINE | Facility: CLINIC | Age: 80
End: 2025-04-11
Payer: MEDICARE

## 2025-04-11 VITALS
BODY MASS INDEX: 38.3 KG/M2 | WEIGHT: 298.4 LBS | HEART RATE: 73 BPM | DIASTOLIC BLOOD PRESSURE: 56 MMHG | RESPIRATION RATE: 18 BRPM | HEIGHT: 74 IN | SYSTOLIC BLOOD PRESSURE: 145 MMHG | TEMPERATURE: 98.1 F | OXYGEN SATURATION: 97 %

## 2025-04-11 DIAGNOSIS — M54.81 BILATERAL OCCIPITAL NEURALGIA: ICD-10-CM

## 2025-04-11 DIAGNOSIS — M54.2 CHRONIC NECK PAIN: ICD-10-CM

## 2025-04-11 DIAGNOSIS — G89.29 CHRONIC NECK PAIN: ICD-10-CM

## 2025-04-11 DIAGNOSIS — M25.519 SHOULDER PAIN, UNSPECIFIED CHRONICITY, UNSPECIFIED LATERALITY: ICD-10-CM

## 2025-04-11 DIAGNOSIS — M47.812 CERVICAL FACET JOINT SYNDROME: Primary | ICD-10-CM

## 2025-04-11 RX ORDER — SODIUM CHLORIDE 0.9 % (FLUSH) 0.9 %
10 SYRINGE (ML) INJECTION AS NEEDED
OUTPATIENT
Start: 2025-04-11

## 2025-04-11 RX ORDER — SODIUM CHLORIDE 0.9 % (FLUSH) 0.9 %
10 SYRINGE (ML) INJECTION EVERY 12 HOURS SCHEDULED
OUTPATIENT
Start: 2025-04-11

## 2025-04-15 ENCOUNTER — TRANSCRIBE ORDERS (OUTPATIENT)
Dept: SURGERY | Facility: SURGERY CENTER | Age: 80
End: 2025-04-15
Payer: OTHER GOVERNMENT

## 2025-04-15 DIAGNOSIS — Z41.9 SURGERY, ELECTIVE: Primary | ICD-10-CM

## 2025-04-30 ENCOUNTER — OFFICE VISIT (OUTPATIENT)
Dept: CARDIAC REHAB | Facility: HOSPITAL | Age: 80
End: 2025-04-30
Payer: MEDICARE

## 2025-04-30 VITALS
HEART RATE: 52 BPM | WEIGHT: 294.3 LBS | OXYGEN SATURATION: 93 % | HEIGHT: 74 IN | DIASTOLIC BLOOD PRESSURE: 50 MMHG | SYSTOLIC BLOOD PRESSURE: 120 MMHG | BODY MASS INDEX: 37.77 KG/M2

## 2025-04-30 DIAGNOSIS — J44.9 COPD, MODERATE: Primary | ICD-10-CM

## 2025-04-30 PROCEDURE — 94625 PHY/QHP OP PULM RHB W/O MNTR: CPT

## 2025-04-30 NOTE — PROGRESS NOTES
"Pulmonary Rehab Initial Assessment      Name: James Loaiza  :1945 Allergies:Patient has no known allergies.   MRN: [id number] 79 y.o. Physician: Provider, No Known   Primary Diagnosis:    Diagnosis Plan   1. COPD, moderate         Event Date: 2025 Specialist: [facility]   Secondary Diagnosis: GENOVEVA  Note Author: Leesa Cannon RN     Cardiovascular History: HTN, hyperlipidemia, atrial fib, CAD, left ventricular hypertrophy, venous insufficiency, diastolic dysfunction, ascending aorta dilation     EXERCISE AT HOME  no  N/a  N/A          Ambulatory Status:Independent  Ambulatory Fall Risk Assessed on Initial Visit: yes 6 Minute Walk Pre- Pulmonary Rehab:  Distance:695 ft      RPE:3        RPD: 3              MPH: 1.3  Max. HR: 81        SPO2:92    MET: 2.0  Resting BP: 120/50     Peak BP: 130/60  Recovery BP: 128/60  Comments: Pt tolerated exercise well. No complaints noted. Sp02 was 92-96% with exercise.       NUTRITION  Lipids:yes If yes, labs as follows;  Total: No components found for: \"CHOLESTEROL\"  HDL:   HDL Cholesterol   Date Value Ref Range Status   2022 33 (L) >39 mg/dL Final    Lipids continued:  LDL:  LDL Chol Calc (NI[facility])   Date Value Ref Range Status   2022 135 (H) 0 - 99 mg/dL Final     Triglyceride: No components found for: \"TRIGLYCERIDE\"   Weight Management:                 Weight: 294.3 lbs  Height: 74 inches                                   BMI: Body mass index is 37.79 kg/m².  Waist Circumference: n/a inches   Alcohol Use: social drinker-rare Diabetes:No    Last HGBA1C with date if applicable:No components found for: \"A1C\"         SOCIAL HISTORY  Social History     Socioeconomic History    Marital status:    Tobacco Use    Smoking status: Never     Passive exposure: Never    Smokeless tobacco: Never    Tobacco comments:     caffiene use: soda and coffee   Vaping Use    Vaping status: Never Used   Substance and Sexual Activity    Alcohol use: Yes     Comment: 2-3  a year    " Drug use: Never    Sexual activity: Defer    Learning Barriers:Ready to Learn  Family Support:yes  Living Arrangement: lives with their spouse Tobacco Adjunct:no  Do you live with a smoker: no     PSYCHOSOCIAL  Clinical Depression: no    Stress: yes     Assess presence or absence of depression using a valid screening tool: no      Assessment: Pt is alert and oriented x3           Are you being hurt, hit, or freightened by anyone at home or in your life? no    Are you being neglected by a caregiver? No Shoulder flexibility/Range of motion: Excellent     Recommended arm activity: Any    Chair sit and reach within: 7 inches   Leg flexibility: Average    Leg Strength/Balance/Five times sit to stand: 20 seconds.   Pt used upper body for balance.    Recommended stretching: Chair   Balance: Balance Dysfunction Assessment: Lung clear. Regular heart rate and rhythm noted.     Family attends [place]: no      COMORBIDITIES  Sleep Apnea: yes If yes, Choose: CPAP    Cancer: no    Stroke: no   Pneumonia: no If yes, how many times n/a    Osteoporosis: no    GI Problems: yes GERD Frequent colds/allergies: yes    Other illnesses, surgeries, or comments Pulmonary embolism, Vit d deficiency, intermittent dysphagia, BPH, stage 3 CKD, anemia, arthritis, left rotator cuff tear, plantar fasciitis, chronic low back pain, lumbar herniated disc, cervical stenosis of spine, anxiety, cellulitis of left lower extremity, DJD, psoriasis    PAIN:  Are you having pain? no  If yes where is pain? N/a If yes, pain scale:  n/a      PULMONARY:  Do you use a nebulizer?: no   If yes, n/a    Do you use oxygen at home?: no  If yes, amount n/a    With rest: no  With activity: no Do you have a daily cough?: no  If yes, choose:  n/a    Do you every notice yourself wheezing?: no  If yes, when: n/a Other pulmonary/breathing problems?: no   OTHER:  Do you have physical limitations?: yes  If yes, neck pain with certain movement    Do you need assistance with ADLs?:  no  If yes, n/a Do you climb stairs at home?:yes  If yes, how many times very rarely    Have you ever attended a pulmonary rehab?: no  If yes, n/a [facility] Dyspnea Scale: 0 - 4:  3 = stops for breath after walking about 100 yards or after a few minutes on the level     Patient Goals: Pt would like to be able to get up and down out of his chair easier. He would like to be able to walk the big stores with less leg fatigue.  Pt would like to be able to climb steps with less SOA.      DISCHARGE PLANNING:  Do you have any home exercise equipment?: no    What are you plans for continuing exercise after completion of pulmonary rehab? Pt plans to exercise at home.      EDUCATION:  Pursed - lip breathing, Diaphragmatic breathing, Relaxation techniques, and Program information folder       PRE-PROGRAM ASSESSMENT:  PFT Date: n/a    FEV1/FVC: n/a FEV1: n/a    FVC: n/a DLCO: n/a     Time of arrival: 13:30    Time of departure: 15:15           4/30/2025  14:00 HIRAM Cannon RN

## 2025-05-06 ENCOUNTER — TREATMENT (OUTPATIENT)
Dept: CARDIAC REHAB | Facility: HOSPITAL | Age: 80
End: 2025-05-06
Payer: MEDICARE

## 2025-05-06 DIAGNOSIS — J44.9 COPD, MODERATE: Primary | ICD-10-CM

## 2025-05-06 PROCEDURE — 94625 PHY/QHP OP PULM RHB W/O MNTR: CPT

## 2025-05-08 ENCOUNTER — TREATMENT (OUTPATIENT)
Dept: CARDIAC REHAB | Facility: HOSPITAL | Age: 80
End: 2025-05-08
Payer: MEDICARE

## 2025-05-08 DIAGNOSIS — J44.9 COPD, MODERATE: Primary | ICD-10-CM

## 2025-05-08 PROCEDURE — 94625 PHY/QHP OP PULM RHB W/O MNTR: CPT

## 2025-05-13 ENCOUNTER — TREATMENT (OUTPATIENT)
Dept: CARDIAC REHAB | Facility: HOSPITAL | Age: 80
End: 2025-05-13
Payer: MEDICARE

## 2025-05-13 DIAGNOSIS — J44.9 COPD, MODERATE: Primary | ICD-10-CM

## 2025-05-13 PROCEDURE — 94625 PHY/QHP OP PULM RHB W/O MNTR: CPT

## 2025-05-15 ENCOUNTER — TREATMENT (OUTPATIENT)
Dept: CARDIAC REHAB | Facility: HOSPITAL | Age: 80
End: 2025-05-15
Payer: MEDICARE

## 2025-05-15 DIAGNOSIS — J44.9 COPD, MODERATE: Primary | ICD-10-CM

## 2025-05-15 PROCEDURE — 94625 PHY/QHP OP PULM RHB W/O MNTR: CPT

## 2025-05-20 ENCOUNTER — TREATMENT (OUTPATIENT)
Dept: CARDIAC REHAB | Facility: HOSPITAL | Age: 80
End: 2025-05-20
Payer: MEDICARE

## 2025-05-20 DIAGNOSIS — J44.9 COPD, MODERATE: Primary | ICD-10-CM

## 2025-05-20 PROCEDURE — 94625 PHY/QHP OP PULM RHB W/O MNTR: CPT

## 2025-05-20 PROCEDURE — 94626 PHY/QHP OP PULM RHB W/MNTR: CPT

## 2025-05-22 ENCOUNTER — TREATMENT (OUTPATIENT)
Dept: CARDIAC REHAB | Facility: HOSPITAL | Age: 80
End: 2025-05-22
Payer: MEDICARE

## 2025-05-22 DIAGNOSIS — J44.9 COPD, MODERATE: Primary | ICD-10-CM

## 2025-05-22 PROCEDURE — 94625 PHY/QHP OP PULM RHB W/O MNTR: CPT

## 2025-05-27 ENCOUNTER — TREATMENT (OUTPATIENT)
Dept: CARDIAC REHAB | Facility: HOSPITAL | Age: 80
End: 2025-05-27
Payer: MEDICARE

## 2025-05-27 DIAGNOSIS — J44.9 COPD, MODERATE: Primary | ICD-10-CM

## 2025-05-27 PROCEDURE — 94625 PHY/QHP OP PULM RHB W/O MNTR: CPT

## 2025-05-29 ENCOUNTER — TREATMENT (OUTPATIENT)
Dept: CARDIAC REHAB | Facility: HOSPITAL | Age: 80
End: 2025-05-29
Payer: MEDICARE

## 2025-05-29 DIAGNOSIS — J44.9 COPD, MODERATE: Primary | ICD-10-CM

## 2025-05-29 PROCEDURE — 94625 PHY/QHP OP PULM RHB W/O MNTR: CPT

## 2025-06-02 PROBLEM — N18.4 CKD (CHRONIC KIDNEY DISEASE) STAGE 4, GFR 15-29 ML/MIN: Status: ACTIVE | Noted: 2025-06-02

## 2025-06-02 PROBLEM — N05.1 FSGS (FOCAL SEGMENTAL GLOMERULOSCLEROSIS): Status: ACTIVE | Noted: 2025-06-02

## 2025-06-10 ENCOUNTER — TREATMENT (OUTPATIENT)
Dept: CARDIAC REHAB | Facility: HOSPITAL | Age: 80
End: 2025-06-10
Payer: MEDICARE

## 2025-06-10 DIAGNOSIS — J44.9 COPD, MODERATE: Primary | ICD-10-CM

## 2025-06-10 PROCEDURE — 94625 PHY/QHP OP PULM RHB W/O MNTR: CPT

## 2025-06-12 ENCOUNTER — TREATMENT (OUTPATIENT)
Dept: CARDIAC REHAB | Facility: HOSPITAL | Age: 80
End: 2025-06-12
Payer: MEDICARE

## 2025-06-12 DIAGNOSIS — J44.9 COPD, MODERATE: Primary | ICD-10-CM

## 2025-06-12 PROCEDURE — 94625 PHY/QHP OP PULM RHB W/O MNTR: CPT

## 2025-06-17 ENCOUNTER — TREATMENT (OUTPATIENT)
Dept: CARDIAC REHAB | Facility: HOSPITAL | Age: 80
End: 2025-06-17
Payer: MEDICARE

## 2025-06-17 DIAGNOSIS — J44.9 COPD, MODERATE: Primary | ICD-10-CM

## 2025-06-17 PROCEDURE — 94625 PHY/QHP OP PULM RHB W/O MNTR: CPT

## 2025-06-19 ENCOUNTER — TREATMENT (OUTPATIENT)
Dept: CARDIAC REHAB | Facility: HOSPITAL | Age: 80
End: 2025-06-19
Payer: MEDICARE

## 2025-06-19 DIAGNOSIS — J44.9 COPD, MODERATE: Primary | ICD-10-CM

## 2025-06-19 PROCEDURE — 94625 PHY/QHP OP PULM RHB W/O MNTR: CPT

## 2025-06-24 ENCOUNTER — TREATMENT (OUTPATIENT)
Dept: CARDIAC REHAB | Facility: HOSPITAL | Age: 80
End: 2025-06-24
Payer: MEDICARE

## 2025-06-24 DIAGNOSIS — J44.9 COPD, MODERATE: Primary | ICD-10-CM

## 2025-06-24 PROCEDURE — 94625 PHY/QHP OP PULM RHB W/O MNTR: CPT

## 2025-06-26 ENCOUNTER — TREATMENT (OUTPATIENT)
Dept: CARDIAC REHAB | Facility: HOSPITAL | Age: 80
End: 2025-06-26
Payer: MEDICARE

## 2025-06-26 DIAGNOSIS — J44.9 COPD, MODERATE: Primary | ICD-10-CM

## 2025-06-26 PROCEDURE — 94625 PHY/QHP OP PULM RHB W/O MNTR: CPT

## 2025-07-01 ENCOUNTER — TREATMENT (OUTPATIENT)
Dept: CARDIAC REHAB | Facility: HOSPITAL | Age: 80
End: 2025-07-01
Payer: MEDICARE

## 2025-07-01 DIAGNOSIS — J44.9 COPD, MODERATE: Primary | ICD-10-CM

## 2025-07-01 PROCEDURE — 94625 PHY/QHP OP PULM RHB W/O MNTR: CPT

## 2025-07-03 ENCOUNTER — APPOINTMENT (OUTPATIENT)
Dept: CARDIAC REHAB | Facility: HOSPITAL | Age: 80
End: 2025-07-03
Payer: OTHER GOVERNMENT

## 2025-07-08 ENCOUNTER — TREATMENT (OUTPATIENT)
Dept: CARDIAC REHAB | Facility: HOSPITAL | Age: 80
End: 2025-07-08
Payer: MEDICARE

## 2025-07-08 DIAGNOSIS — J44.9 COPD, MODERATE: Primary | ICD-10-CM

## 2025-07-08 PROCEDURE — 94625 PHY/QHP OP PULM RHB W/O MNTR: CPT

## 2025-07-10 ENCOUNTER — TREATMENT (OUTPATIENT)
Dept: CARDIAC REHAB | Facility: HOSPITAL | Age: 80
End: 2025-07-10
Payer: MEDICARE

## 2025-07-10 DIAGNOSIS — J44.9 COPD, MODERATE: Primary | ICD-10-CM

## 2025-07-10 PROCEDURE — 94625 PHY/QHP OP PULM RHB W/O MNTR: CPT

## 2025-07-15 ENCOUNTER — TREATMENT (OUTPATIENT)
Dept: CARDIAC REHAB | Facility: HOSPITAL | Age: 80
End: 2025-07-15
Payer: MEDICARE

## 2025-07-15 DIAGNOSIS — J44.9 COPD, MODERATE: Primary | ICD-10-CM

## 2025-07-15 PROCEDURE — 94625 PHY/QHP OP PULM RHB W/O MNTR: CPT

## 2025-07-22 ENCOUNTER — TREATMENT (OUTPATIENT)
Dept: CARDIAC REHAB | Facility: HOSPITAL | Age: 80
End: 2025-07-22
Payer: MEDICARE

## 2025-07-22 DIAGNOSIS — J44.9 COPD, MODERATE: Primary | ICD-10-CM

## 2025-07-22 PROCEDURE — 94625 PHY/QHP OP PULM RHB W/O MNTR: CPT

## 2025-07-24 ENCOUNTER — TREATMENT (OUTPATIENT)
Dept: CARDIAC REHAB | Facility: HOSPITAL | Age: 80
End: 2025-07-24
Payer: MEDICARE

## 2025-07-24 DIAGNOSIS — J44.9 COPD, MODERATE: Primary | ICD-10-CM

## 2025-07-24 PROCEDURE — 94625 PHY/QHP OP PULM RHB W/O MNTR: CPT

## 2025-08-05 ENCOUNTER — TREATMENT (OUTPATIENT)
Dept: CARDIAC REHAB | Facility: HOSPITAL | Age: 80
End: 2025-08-05
Payer: OTHER GOVERNMENT

## 2025-08-05 DIAGNOSIS — J44.9 COPD, MODERATE: Primary | ICD-10-CM

## 2025-08-05 PROCEDURE — 94625 PHY/QHP OP PULM RHB W/O MNTR: CPT

## 2025-08-07 ENCOUNTER — TREATMENT (OUTPATIENT)
Dept: CARDIAC REHAB | Facility: HOSPITAL | Age: 80
End: 2025-08-07
Payer: OTHER GOVERNMENT

## 2025-08-07 DIAGNOSIS — J44.9 COPD, MODERATE: Primary | ICD-10-CM

## 2025-08-07 PROCEDURE — 94625 PHY/QHP OP PULM RHB W/O MNTR: CPT

## 2025-08-12 ENCOUNTER — TREATMENT (OUTPATIENT)
Dept: CARDIAC REHAB | Facility: HOSPITAL | Age: 80
End: 2025-08-12
Payer: OTHER GOVERNMENT

## 2025-08-12 DIAGNOSIS — J44.9 COPD, MODERATE: Primary | ICD-10-CM

## 2025-08-12 PROCEDURE — 94625 PHY/QHP OP PULM RHB W/O MNTR: CPT

## (undated) DEVICE — DRAPE,U/ SHT,SPLIT,PLAS,STERIL: Brand: MEDLINE

## (undated) DEVICE — SUT ETHLN 3/0 PS1 18IN 1663H

## (undated) DEVICE — GLV SURG BIOGEL LTX PF 7

## (undated) DEVICE — SOL ISO/ALC 70PCT 4OZ

## (undated) DEVICE — Device

## (undated) DEVICE — EPIDURAL TRAY: Brand: MEDLINE INDUSTRIES, INC.

## (undated) DEVICE — GLV SURG BIOGEL LTX PF 6 1/2

## (undated) DEVICE — PAD GRND E/S NT200IX RF/GEN W/CABL DISP

## (undated) DEVICE — GLV SURG BIOGEL LTX PF 8 1/2

## (undated) DEVICE — DRSNG WND GZ CURAD OIL EMULSION 3X3IN STRL

## (undated) DEVICE — TP NDL SCORPION MULTIFIRE

## (undated) DEVICE — CANN TRPL DAM 7X7MM NO VLV

## (undated) DEVICE — GLV SURG SIGNATURE ESSENTIAL PF LTX SZ8.5

## (undated) DEVICE — TOWEL,OR,DSP,ST,BLUE,STD,4/PK,20PK/CS: Brand: MEDLINE

## (undated) DEVICE — GLV SURG TRIUMPH PF LTX 7.5 STRL

## (undated) DEVICE — GOWN,NON-REINFORCED,SIRUS,SET IN SLV,XXL: Brand: MEDLINE

## (undated) DEVICE — CANN NASL O2 INF

## (undated) DEVICE — GLV SURG SIGNATURE ESSENTIAL PF LTX SZ7

## (undated) DEVICE — TUBING SET, GRAVITY, 4-SPIKE

## (undated) DEVICE — ARM SLING: Brand: DEROYAL

## (undated) DEVICE — SKIN PREP TRAY W/CHG: Brand: MEDLINE INDUSTRIES, INC.

## (undated) DEVICE — ABL ASP APOLLO RF XL 90D

## (undated) DEVICE — POSTN ARMSLV LAT/TRACTION DISP

## (undated) DEVICE — PK ARTHSCP SHLDR TOWER 40

## (undated) DEVICE — EMERALD ARTHROSCOPY SHEET: Brand: CONVERTORS

## (undated) DEVICE — BLD SHAVER BONECUTTER 4MM 13CM

## (undated) DEVICE — APPL CHLORAPREP HI/LITE 26ML ORNG

## (undated) DEVICE — PAD GRND NEUROTHERM RF WO/CABL DISP